# Patient Record
Sex: MALE | Race: WHITE | NOT HISPANIC OR LATINO | Employment: OTHER | URBAN - METROPOLITAN AREA
[De-identification: names, ages, dates, MRNs, and addresses within clinical notes are randomized per-mention and may not be internally consistent; named-entity substitution may affect disease eponyms.]

---

## 2019-08-14 ENCOUNTER — RECORDS - HEALTHEAST (OUTPATIENT)
Dept: LAB | Facility: CLINIC | Age: 84
End: 2019-08-14

## 2019-08-15 LAB
ANION GAP SERPL CALCULATED.3IONS-SCNC: 7 MMOL/L (ref 5–18)
BASOPHILS # BLD AUTO: 0.1 THOU/UL (ref 0–0.2)
BASOPHILS NFR BLD AUTO: 1 % (ref 0–2)
BUN SERPL-MCNC: 21 MG/DL (ref 8–28)
CALCIUM SERPL-MCNC: 9.2 MG/DL (ref 8.5–10.5)
CHLORIDE BLD-SCNC: 105 MMOL/L (ref 98–107)
CO2 SERPL-SCNC: 27 MMOL/L (ref 22–31)
CREAT SERPL-MCNC: 0.88 MG/DL (ref 0.7–1.3)
EOSINOPHIL # BLD AUTO: 0.4 THOU/UL (ref 0–0.4)
EOSINOPHIL NFR BLD AUTO: 7 % (ref 0–6)
ERYTHROCYTE [DISTWIDTH] IN BLOOD BY AUTOMATED COUNT: 15.1 % (ref 11–14.5)
GFR SERPL CREATININE-BSD FRML MDRD: >60 ML/MIN/1.73M2
GLUCOSE BLD-MCNC: 78 MG/DL (ref 70–125)
HCT VFR BLD AUTO: 26.6 % (ref 40–54)
HGB BLD-MCNC: 8.6 G/DL (ref 14–18)
LYMPHOCYTES # BLD AUTO: 1.6 THOU/UL (ref 0.8–4.4)
LYMPHOCYTES NFR BLD AUTO: 24 % (ref 20–40)
MCH RBC QN AUTO: 32.2 PG (ref 27–34)
MCHC RBC AUTO-ENTMCNC: 32.3 G/DL (ref 32–36)
MCV RBC AUTO: 100 FL (ref 80–100)
MONOCYTES # BLD AUTO: 0.9 THOU/UL (ref 0–0.9)
MONOCYTES NFR BLD AUTO: 13 % (ref 2–10)
NEUTROPHILS # BLD AUTO: 3.6 THOU/UL (ref 2–7.7)
NEUTROPHILS NFR BLD AUTO: 55 % (ref 50–70)
PLATELET # BLD AUTO: 254 THOU/UL (ref 140–440)
PMV BLD AUTO: 10 FL (ref 8.5–12.5)
POTASSIUM BLD-SCNC: 4.6 MMOL/L (ref 3.5–5)
RBC # BLD AUTO: 2.67 MILL/UL (ref 4.4–6.2)
SODIUM SERPL-SCNC: 139 MMOL/L (ref 136–145)
WBC: 6.6 THOU/UL (ref 4–11)

## 2019-08-16 ENCOUNTER — RECORDS - HEALTHEAST (OUTPATIENT)
Dept: LAB | Facility: CLINIC | Age: 84
End: 2019-08-16

## 2019-08-19 LAB
ANION GAP SERPL CALCULATED.3IONS-SCNC: 5 MMOL/L (ref 5–18)
BUN SERPL-MCNC: 26 MG/DL (ref 8–28)
CALCIUM SERPL-MCNC: 9.3 MG/DL (ref 8.5–10.5)
CHLORIDE BLD-SCNC: 105 MMOL/L (ref 98–107)
CO2 SERPL-SCNC: 30 MMOL/L (ref 22–31)
CREAT SERPL-MCNC: 0.98 MG/DL (ref 0.7–1.3)
ERYTHROCYTE [DISTWIDTH] IN BLOOD BY AUTOMATED COUNT: 15.3 % (ref 11–14.5)
GFR SERPL CREATININE-BSD FRML MDRD: >60 ML/MIN/1.73M2
GLUCOSE BLD-MCNC: 91 MG/DL (ref 70–125)
HCT VFR BLD AUTO: 27.2 % (ref 40–54)
HGB BLD-MCNC: 8.7 G/DL (ref 14–18)
MCH RBC QN AUTO: 31.9 PG (ref 27–34)
MCHC RBC AUTO-ENTMCNC: 32 G/DL (ref 32–36)
MCV RBC AUTO: 100 FL (ref 80–100)
PLATELET # BLD AUTO: 278 THOU/UL (ref 140–440)
PMV BLD AUTO: 9.9 FL (ref 8.5–12.5)
POTASSIUM BLD-SCNC: 4.4 MMOL/L (ref 3.5–5)
RBC # BLD AUTO: 2.73 MILL/UL (ref 4.4–6.2)
SODIUM SERPL-SCNC: 140 MMOL/L (ref 136–145)
WBC: 7.4 THOU/UL (ref 4–11)

## 2020-11-30 ENCOUNTER — RECORDS - HEALTHEAST (OUTPATIENT)
Dept: LAB | Facility: CLINIC | Age: 85
End: 2020-11-30

## 2020-12-01 LAB
ANION GAP SERPL CALCULATED.3IONS-SCNC: 9 MMOL/L (ref 5–18)
BUN SERPL-MCNC: 31 MG/DL (ref 8–28)
CALCIUM SERPL-MCNC: 8.9 MG/DL (ref 8.5–10.5)
CHLORIDE BLD-SCNC: 103 MMOL/L (ref 98–107)
CO2 SERPL-SCNC: 28 MMOL/L (ref 22–31)
CREAT SERPL-MCNC: 1.11 MG/DL (ref 0.7–1.3)
GFR SERPL CREATININE-BSD FRML MDRD: >60 ML/MIN/1.73M2
GLUCOSE BLD-MCNC: 76 MG/DL (ref 70–125)
POTASSIUM BLD-SCNC: 4.3 MMOL/L (ref 3.5–5)
SODIUM SERPL-SCNC: 140 MMOL/L (ref 136–145)

## 2020-12-17 ENCOUNTER — RECORDS - HEALTHEAST (OUTPATIENT)
Dept: LAB | Facility: CLINIC | Age: 85
End: 2020-12-17

## 2020-12-18 LAB
ANION GAP SERPL CALCULATED.3IONS-SCNC: 5 MMOL/L (ref 5–18)
BUN SERPL-MCNC: 27 MG/DL (ref 8–28)
CALCIUM SERPL-MCNC: 9.1 MG/DL (ref 8.5–10.5)
CHLORIDE BLD-SCNC: 102 MMOL/L (ref 98–107)
CO2 SERPL-SCNC: 34 MMOL/L (ref 22–31)
CREAT SERPL-MCNC: 1.01 MG/DL (ref 0.7–1.3)
GFR SERPL CREATININE-BSD FRML MDRD: >60 ML/MIN/1.73M2
GLUCOSE BLD-MCNC: 75 MG/DL (ref 70–125)
POTASSIUM BLD-SCNC: 4 MMOL/L (ref 3.5–5)
SODIUM SERPL-SCNC: 141 MMOL/L (ref 136–145)

## 2021-12-19 ENCOUNTER — LAB REQUISITION (OUTPATIENT)
Dept: LAB | Facility: CLINIC | Age: 86
End: 2021-12-19
Payer: MEDICARE

## 2021-12-19 DIAGNOSIS — R53.1 WEAKNESS: ICD-10-CM

## 2021-12-19 LAB — TSH SERPL DL<=0.005 MIU/L-ACNC: 8.35 MU/L (ref 0.4–4)

## 2021-12-19 PROCEDURE — 84443 ASSAY THYROID STIM HORMONE: CPT | Mod: ORL | Performed by: FAMILY MEDICINE

## 2021-12-21 ENCOUNTER — LAB REQUISITION (OUTPATIENT)
Dept: LAB | Facility: CLINIC | Age: 86
End: 2021-12-21

## 2021-12-21 DIAGNOSIS — Z11.1 ENCOUNTER FOR SCREENING FOR RESPIRATORY TUBERCULOSIS: ICD-10-CM

## 2021-12-22 ENCOUNTER — LAB REQUISITION (OUTPATIENT)
Dept: LAB | Facility: CLINIC | Age: 86
End: 2021-12-22

## 2021-12-22 DIAGNOSIS — Z11.1 ENCOUNTER FOR SCREENING FOR RESPIRATORY TUBERCULOSIS: ICD-10-CM

## 2022-01-01 ENCOUNTER — LAB REQUISITION (OUTPATIENT)
Dept: LAB | Facility: CLINIC | Age: 87
End: 2022-01-01

## 2022-01-01 DIAGNOSIS — E87.6 HYPOKALEMIA: ICD-10-CM

## 2022-01-04 LAB
ANION GAP SERPL CALCULATED.3IONS-SCNC: 9 MMOL/L (ref 5–18)
BUN SERPL-MCNC: 21 MG/DL (ref 8–28)
CALCIUM SERPL-MCNC: 8.8 MG/DL (ref 8.5–10.5)
CHLORIDE BLD-SCNC: 104 MMOL/L (ref 98–107)
CO2 SERPL-SCNC: 27 MMOL/L (ref 22–31)
CREAT SERPL-MCNC: 1.12 MG/DL (ref 0.7–1.3)
GFR SERPL CREATININE-BSD FRML MDRD: 62 ML/MIN/1.73M2
GLUCOSE BLD-MCNC: 76 MG/DL (ref 70–125)
POTASSIUM BLD-SCNC: 4.1 MMOL/L (ref 3.5–5)
SODIUM SERPL-SCNC: 140 MMOL/L (ref 136–145)

## 2022-01-04 PROCEDURE — P9604 ONE-WAY ALLOW PRORATED TRIP: HCPCS | Performed by: GENERAL PRACTICE

## 2022-01-04 PROCEDURE — 36415 COLL VENOUS BLD VENIPUNCTURE: CPT | Performed by: GENERAL PRACTICE

## 2022-01-04 PROCEDURE — 80048 BASIC METABOLIC PNL TOTAL CA: CPT | Performed by: GENERAL PRACTICE

## 2022-06-15 ENCOUNTER — APPOINTMENT (OUTPATIENT)
Dept: CT IMAGING | Facility: CLINIC | Age: 87
DRG: 871 | End: 2022-06-15
Attending: INTERNAL MEDICINE
Payer: MEDICARE

## 2022-06-15 ENCOUNTER — APPOINTMENT (OUTPATIENT)
Dept: CT IMAGING | Facility: CLINIC | Age: 87
DRG: 871 | End: 2022-06-15
Attending: UROLOGY
Payer: MEDICARE

## 2022-06-15 ENCOUNTER — HOSPITAL ENCOUNTER (INPATIENT)
Facility: CLINIC | Age: 87
LOS: 8 days | Discharge: NURSING, REHAB OR LONG-TERM CARE | DRG: 871 | End: 2022-06-23
Attending: EMERGENCY MEDICINE | Admitting: INTERNAL MEDICINE
Payer: MEDICARE

## 2022-06-15 ENCOUNTER — APPOINTMENT (OUTPATIENT)
Dept: GENERAL RADIOLOGY | Facility: CLINIC | Age: 87
DRG: 871 | End: 2022-06-15
Attending: EMERGENCY MEDICINE
Payer: MEDICARE

## 2022-06-15 ENCOUNTER — ANESTHESIA EVENT (OUTPATIENT)
Dept: SURGERY | Facility: CLINIC | Age: 87
DRG: 871 | End: 2022-06-15
Payer: MEDICARE

## 2022-06-15 ENCOUNTER — ANESTHESIA (OUTPATIENT)
Dept: SURGERY | Facility: CLINIC | Age: 87
DRG: 871 | End: 2022-06-15
Payer: MEDICARE

## 2022-06-15 ENCOUNTER — APPOINTMENT (OUTPATIENT)
Dept: INTERVENTIONAL RADIOLOGY/VASCULAR | Facility: CLINIC | Age: 87
DRG: 871 | End: 2022-06-15
Attending: INTERNAL MEDICINE
Payer: MEDICARE

## 2022-06-15 DIAGNOSIS — R65.21 SEPTIC SHOCK (H): ICD-10-CM

## 2022-06-15 DIAGNOSIS — E87.6 HYPOKALEMIA: ICD-10-CM

## 2022-06-15 DIAGNOSIS — R33.9 URINARY RETENTION: Primary | ICD-10-CM

## 2022-06-15 DIAGNOSIS — A41.9 SEPTIC SHOCK (H): ICD-10-CM

## 2022-06-15 DIAGNOSIS — J18.9 COMMUNITY ACQUIRED PNEUMONIA OF LEFT LOWER LOBE OF LUNG: ICD-10-CM

## 2022-06-15 LAB
ALBUMIN SERPL-MCNC: 3.6 G/DL (ref 3.4–5)
ALBUMIN UR-MCNC: NEGATIVE MG/DL
ALP SERPL-CCNC: 75 U/L (ref 40–150)
ALT SERPL W P-5'-P-CCNC: 21 U/L (ref 0–70)
ANION GAP SERPL CALCULATED.3IONS-SCNC: 9 MMOL/L (ref 3–14)
ANION GAP SERPL CALCULATED.3IONS-SCNC: 9 MMOL/L (ref 3–14)
APPEARANCE UR: CLEAR
AST SERPL W P-5'-P-CCNC: 32 U/L (ref 0–45)
BACTERIA #/AREA URNS HPF: ABNORMAL /HPF
BASE EXCESS BLDA CALC-SCNC: -2.7 MMOL/L (ref -9–1.8)
BASOPHILS # BLD AUTO: 0 10E3/UL (ref 0–0.2)
BASOPHILS NFR BLD AUTO: 0 %
BILIRUB SERPL-MCNC: 0.7 MG/DL (ref 0.2–1.3)
BILIRUB UR QL STRIP: NEGATIVE
BUN SERPL-MCNC: 20 MG/DL (ref 7–30)
BUN SERPL-MCNC: 21 MG/DL (ref 7–30)
CALCIUM SERPL-MCNC: 9 MG/DL (ref 8.5–10.1)
CALCIUM SERPL-MCNC: 9.6 MG/DL (ref 8.5–10.1)
CHLORIDE BLD-SCNC: 104 MMOL/L (ref 94–109)
CHLORIDE BLD-SCNC: 105 MMOL/L (ref 94–109)
CO2 SERPL-SCNC: 25 MMOL/L (ref 20–32)
CO2 SERPL-SCNC: 25 MMOL/L (ref 20–32)
COLOR UR AUTO: ABNORMAL
CREAT SERPL-MCNC: 0.89 MG/DL (ref 0.66–1.25)
CREAT SERPL-MCNC: 0.99 MG/DL (ref 0.66–1.25)
ENTEROCOCCUS FAECALIS: NOT DETECTED
ENTEROCOCCUS FAECIUM: NOT DETECTED
EOSINOPHIL # BLD AUTO: 0.1 10E3/UL (ref 0–0.7)
EOSINOPHIL NFR BLD AUTO: 1 %
ERYTHROCYTE [DISTWIDTH] IN BLOOD BY AUTOMATED COUNT: 18.6 % (ref 10–15)
ERYTHROCYTE [DISTWIDTH] IN BLOOD BY AUTOMATED COUNT: 18.9 % (ref 10–15)
FLUAV RNA SPEC QL NAA+PROBE: NEGATIVE
FLUBV RNA RESP QL NAA+PROBE: NEGATIVE
GFR SERPL CREATININE-BSD FRML MDRD: 72 ML/MIN/1.73M2
GFR SERPL CREATININE-BSD FRML MDRD: 81 ML/MIN/1.73M2
GLUCOSE BLD-MCNC: 102 MG/DL (ref 70–99)
GLUCOSE BLD-MCNC: 98 MG/DL (ref 70–99)
GLUCOSE BLDC GLUCOMTR-MCNC: 104 MG/DL (ref 70–99)
GLUCOSE BLDC GLUCOMTR-MCNC: 123 MG/DL (ref 70–99)
GLUCOSE BLDC GLUCOMTR-MCNC: 124 MG/DL (ref 70–99)
GLUCOSE UR STRIP-MCNC: NEGATIVE MG/DL
HCO3 BLD-SCNC: 22 MMOL/L (ref 21–28)
HCO3 BLDV-SCNC: 29 MMOL/L (ref 21–28)
HCT VFR BLD AUTO: 34.4 % (ref 40–53)
HCT VFR BLD AUTO: 39.2 % (ref 40–53)
HGB BLD-MCNC: 10.9 G/DL (ref 13.3–17.7)
HGB BLD-MCNC: 12.2 G/DL (ref 13.3–17.7)
HGB UR QL STRIP: NEGATIVE
IMM GRANULOCYTES # BLD: 0 10E3/UL
IMM GRANULOCYTES NFR BLD: 0 %
KETONES UR STRIP-MCNC: NEGATIVE MG/DL
LACTATE BLD-SCNC: 5.1 MMOL/L
LACTATE SERPL-SCNC: 2.9 MMOL/L (ref 0.7–2)
LACTATE SERPL-SCNC: 3.2 MMOL/L (ref 0.7–2)
LACTATE SERPL-SCNC: 3.9 MMOL/L (ref 0.7–2)
LEUKOCYTE ESTERASE UR QL STRIP: ABNORMAL
LISTERIA SPECIES (DETECTED/NOT DETECTED): NOT DETECTED
LYMPHOCYTES # BLD AUTO: 0.9 10E3/UL (ref 0.8–5.3)
LYMPHOCYTES NFR BLD AUTO: 13 %
MCH RBC QN AUTO: 28.3 PG (ref 26.5–33)
MCH RBC QN AUTO: 28.5 PG (ref 26.5–33)
MCHC RBC AUTO-ENTMCNC: 31.1 G/DL (ref 31.5–36.5)
MCHC RBC AUTO-ENTMCNC: 31.7 G/DL (ref 31.5–36.5)
MCV RBC AUTO: 90 FL (ref 78–100)
MCV RBC AUTO: 91 FL (ref 78–100)
MONOCYTES # BLD AUTO: 0.1 10E3/UL (ref 0–1.3)
MONOCYTES NFR BLD AUTO: 1 %
MRSA DNA SPEC QL NAA+PROBE: NEGATIVE
MUCOUS THREADS #/AREA URNS LPF: PRESENT /LPF
NEUTROPHILS # BLD AUTO: 5.8 10E3/UL (ref 1.6–8.3)
NEUTROPHILS NFR BLD AUTO: 85 %
NITRATE UR QL: NEGATIVE
NRBC # BLD AUTO: 0 10E3/UL
NRBC BLD AUTO-RTO: 0 /100
O2/TOTAL GAS SETTING VFR VENT: 5 %
PCO2 BLD: 35 MM HG (ref 35–45)
PCO2 BLDV: 55 MM HG (ref 40–50)
PH BLD: 7.4 [PH] (ref 7.35–7.45)
PH BLDV: 7.34 [PH] (ref 7.32–7.43)
PH UR STRIP: 5 [PH] (ref 5–7)
PLATELET # BLD AUTO: 142 10E3/UL (ref 150–450)
PLATELET # BLD AUTO: 183 10E3/UL (ref 150–450)
PO2 BLD: 73 MM HG (ref 80–105)
PO2 BLDV: 18 MM HG (ref 25–47)
POTASSIUM BLD-SCNC: 3.2 MMOL/L (ref 3.4–5.3)
POTASSIUM BLD-SCNC: 4.4 MMOL/L (ref 3.4–5.3)
PROT SERPL-MCNC: 7.7 G/DL (ref 6.8–8.8)
RBC # BLD AUTO: 3.82 10E6/UL (ref 4.4–5.9)
RBC # BLD AUTO: 4.31 10E6/UL (ref 4.4–5.9)
RBC URINE: 1 /HPF
RSV RNA SPEC NAA+PROBE: NEGATIVE
SA TARGET DNA: NEGATIVE
SAO2 % BLDV: 22 % (ref 94–100)
SARS-COV-2 RNA RESP QL NAA+PROBE: NEGATIVE
SODIUM SERPL-SCNC: 138 MMOL/L (ref 133–144)
SODIUM SERPL-SCNC: 139 MMOL/L (ref 133–144)
SP GR UR STRIP: 1.01 (ref 1–1.03)
STAPHYLOCOCCUS AUREUS: NOT DETECTED
STAPHYLOCOCCUS EPIDERMIDIS: NOT DETECTED
STAPHYLOCOCCUS LUGDUNENSIS: NOT DETECTED
STAPHYLOCOCCUS SPECIES: NOT DETECTED
STREPTOCOCCUS AGALACTIAE: NOT DETECTED
STREPTOCOCCUS ANGINOSUS GROUP: NOT DETECTED
STREPTOCOCCUS PNEUMONIAE: NOT DETECTED
STREPTOCOCCUS PYOGENES: NOT DETECTED
STREPTOCOCCUS SPECIES: DETECTED
TSH SERPL DL<=0.005 MIU/L-ACNC: 0.54 MU/L (ref 0.4–4)
UROBILINOGEN UR STRIP-MCNC: NORMAL MG/DL
WBC # BLD AUTO: 6.9 10E3/UL (ref 4–11)
WBC # BLD AUTO: 8.5 10E3/UL (ref 4–11)
WBC URINE: 17 /HPF

## 2022-06-15 PROCEDURE — C1751 CATH, INF, PER/CENT/MIDLINE: HCPCS

## 2022-06-15 PROCEDURE — 360N000076 HC SURGERY LEVEL 3, PER MIN: Performed by: UROLOGY

## 2022-06-15 PROCEDURE — 36415 COLL VENOUS BLD VENIPUNCTURE: CPT | Performed by: EMERGENCY MEDICINE

## 2022-06-15 PROCEDURE — 99291 CRITICAL CARE FIRST HOUR: CPT | Performed by: INTERNAL MEDICINE

## 2022-06-15 PROCEDURE — 250N000011 HC RX IP 250 OP 636: Performed by: ANESTHESIOLOGY

## 2022-06-15 PROCEDURE — 96366 THER/PROPH/DIAG IV INF ADDON: CPT

## 2022-06-15 PROCEDURE — 250N000013 HC RX MED GY IP 250 OP 250 PS 637: Performed by: EMERGENCY MEDICINE

## 2022-06-15 PROCEDURE — 370N000017 HC ANESTHESIA TECHNICAL FEE, PER MIN: Performed by: UROLOGY

## 2022-06-15 PROCEDURE — 87637 SARSCOV2&INF A&B&RSV AMP PRB: CPT | Performed by: EMERGENCY MEDICINE

## 2022-06-15 PROCEDURE — 77001 FLUOROGUIDE FOR VEIN DEVICE: CPT

## 2022-06-15 PROCEDURE — 250N000011 HC RX IP 250 OP 636: Performed by: EMERGENCY MEDICINE

## 2022-06-15 PROCEDURE — 0T9B70Z DRAINAGE OF BLADDER WITH DRAINAGE DEVICE, VIA NATURAL OR ARTIFICIAL OPENING: ICD-10-PCS | Performed by: UROLOGY

## 2022-06-15 PROCEDURE — 99207 PR APP CREDIT; MD BILLING SHARED VISIT: CPT | Performed by: INTERNAL MEDICINE

## 2022-06-15 PROCEDURE — 82803 BLOOD GASES ANY COMBINATION: CPT | Performed by: INTERNAL MEDICINE

## 2022-06-15 PROCEDURE — 99291 CRITICAL CARE FIRST HOUR: CPT | Performed by: ANESTHESIOLOGY

## 2022-06-15 PROCEDURE — 87077 CULTURE AEROBIC IDENTIFY: CPT | Performed by: EMERGENCY MEDICINE

## 2022-06-15 PROCEDURE — 83605 ASSAY OF LACTIC ACID: CPT | Performed by: INTERNAL MEDICINE

## 2022-06-15 PROCEDURE — 02HV33Z INSERTION OF INFUSION DEVICE INTO SUPERIOR VENA CAVA, PERCUTANEOUS APPROACH: ICD-10-PCS | Performed by: RADIOLOGY

## 2022-06-15 PROCEDURE — 250N000009 HC RX 250: Performed by: INTERNAL MEDICINE

## 2022-06-15 PROCEDURE — 272N000001 HC OR GENERAL SUPPLY STERILE: Performed by: UROLOGY

## 2022-06-15 PROCEDURE — 87149 DNA/RNA DIRECT PROBE: CPT | Performed by: EMERGENCY MEDICINE

## 2022-06-15 PROCEDURE — 250N000011 HC RX IP 250 OP 636

## 2022-06-15 PROCEDURE — 87086 URINE CULTURE/COLONY COUNT: CPT | Performed by: EMERGENCY MEDICINE

## 2022-06-15 PROCEDURE — 99285 EMERGENCY DEPT VISIT HI MDM: CPT | Mod: CS,25

## 2022-06-15 PROCEDURE — 84443 ASSAY THYROID STIM HORMONE: CPT | Performed by: INTERNAL MEDICINE

## 2022-06-15 PROCEDURE — 87641 MR-STAPH DNA AMP PROBE: CPT | Performed by: INTERNAL MEDICINE

## 2022-06-15 PROCEDURE — 250N000011 HC RX IP 250 OP 636: Performed by: INTERNAL MEDICINE

## 2022-06-15 PROCEDURE — 710N000010 HC RECOVERY PHASE 1, LEVEL 2, PER MIN: Performed by: UROLOGY

## 2022-06-15 PROCEDURE — 3E043XZ INTRODUCTION OF VASOPRESSOR INTO CENTRAL VEIN, PERCUTANEOUS APPROACH: ICD-10-PCS | Performed by: INTERNAL MEDICINE

## 2022-06-15 PROCEDURE — 258N000003 HC RX IP 258 OP 636

## 2022-06-15 PROCEDURE — C1769 GUIDE WIRE: HCPCS | Performed by: UROLOGY

## 2022-06-15 PROCEDURE — 96365 THER/PROPH/DIAG IV INF INIT: CPT

## 2022-06-15 PROCEDURE — 258N000003 HC RX IP 258 OP 636: Performed by: ANESTHESIOLOGY

## 2022-06-15 PROCEDURE — 250N000013 HC RX MED GY IP 250 OP 250 PS 637: Performed by: INTERNAL MEDICINE

## 2022-06-15 PROCEDURE — 250N000013 HC RX MED GY IP 250 OP 250 PS 637: Performed by: NURSE PRACTITIONER

## 2022-06-15 PROCEDURE — 82365 CALCULUS SPECTROSCOPY: CPT | Performed by: UROLOGY

## 2022-06-15 PROCEDURE — 99223 1ST HOSP IP/OBS HIGH 75: CPT | Performed by: NURSE PRACTITIONER

## 2022-06-15 PROCEDURE — 258N000003 HC RX IP 258 OP 636: Performed by: INTERNAL MEDICINE

## 2022-06-15 PROCEDURE — 81001 URINALYSIS AUTO W/SCOPE: CPT | Performed by: EMERGENCY MEDICINE

## 2022-06-15 PROCEDURE — 83605 ASSAY OF LACTIC ACID: CPT

## 2022-06-15 PROCEDURE — 96367 TX/PROPH/DG ADDL SEQ IV INF: CPT

## 2022-06-15 PROCEDURE — 36415 COLL VENOUS BLD VENIPUNCTURE: CPT | Performed by: ANESTHESIOLOGY

## 2022-06-15 PROCEDURE — 36569 INSJ PICC 5 YR+ W/O IMAGING: CPT | Mod: 52

## 2022-06-15 PROCEDURE — 999N000157 HC STATISTIC RCP TIME EA 10 MIN

## 2022-06-15 PROCEDURE — 52318 REMOVE BLADDER STONE: CPT | Performed by: UROLOGY

## 2022-06-15 PROCEDURE — 258N000003 HC RX IP 258 OP 636: Performed by: EMERGENCY MEDICINE

## 2022-06-15 PROCEDURE — 71045 X-RAY EXAM CHEST 1 VIEW: CPT

## 2022-06-15 PROCEDURE — 82435 ASSAY OF BLOOD CHLORIDE: CPT | Performed by: EMERGENCY MEDICINE

## 2022-06-15 PROCEDURE — 85025 COMPLETE CBC W/AUTO DIFF WBC: CPT | Performed by: EMERGENCY MEDICINE

## 2022-06-15 PROCEDURE — 83605 ASSAY OF LACTIC ACID: CPT | Performed by: ANESTHESIOLOGY

## 2022-06-15 PROCEDURE — 0TCD8ZZ EXTIRPATION OF MATTER FROM URETHRA, VIA NATURAL OR ARTIFICIAL OPENING ENDOSCOPIC: ICD-10-PCS | Performed by: UROLOGY

## 2022-06-15 PROCEDURE — 0TJD8ZZ INSPECTION OF URETHRA, VIA NATURAL OR ARTIFICIAL OPENING ENDOSCOPIC: ICD-10-PCS | Performed by: UROLOGY

## 2022-06-15 PROCEDURE — 36415 COLL VENOUS BLD VENIPUNCTURE: CPT | Performed by: INTERNAL MEDICINE

## 2022-06-15 PROCEDURE — 36584 COMPL RPLCMT PICC RS&I: CPT

## 2022-06-15 PROCEDURE — 99221 1ST HOSP IP/OBS SF/LOW 40: CPT | Performed by: UROLOGY

## 2022-06-15 PROCEDURE — 85014 HEMATOCRIT: CPT | Performed by: INTERNAL MEDICINE

## 2022-06-15 PROCEDURE — 83605 ASSAY OF LACTIC ACID: CPT | Performed by: EMERGENCY MEDICINE

## 2022-06-15 PROCEDURE — 74176 CT ABD & PELVIS W/O CONTRAST: CPT

## 2022-06-15 PROCEDURE — 96361 HYDRATE IV INFUSION ADD-ON: CPT

## 2022-06-15 PROCEDURE — 200N000001 HC R&B ICU

## 2022-06-15 PROCEDURE — 272N000026 HC KIT POWER PICC TRIPLE LUMEN

## 2022-06-15 PROCEDURE — C1769 GUIDE WIRE: HCPCS

## 2022-06-15 PROCEDURE — 36600 WITHDRAWAL OF ARTERIAL BLOOD: CPT

## 2022-06-15 PROCEDURE — 87040 BLOOD CULTURE FOR BACTERIA: CPT | Performed by: INTERNAL MEDICINE

## 2022-06-15 PROCEDURE — C9803 HOPD COVID-19 SPEC COLLECT: HCPCS

## 2022-06-15 PROCEDURE — 70450 CT HEAD/BRAIN W/O DYE: CPT

## 2022-06-15 PROCEDURE — 99223 1ST HOSP IP/OBS HIGH 75: CPT | Mod: AI | Performed by: INTERNAL MEDICINE

## 2022-06-15 PROCEDURE — 250N000011 HC RX IP 250 OP 636: Performed by: NURSE ANESTHETIST, CERTIFIED REGISTERED

## 2022-06-15 PROCEDURE — 51798 US URINE CAPACITY MEASURE: CPT

## 2022-06-15 RX ORDER — PROCHLORPERAZINE 25 MG
12.5 SUPPOSITORY, RECTAL RECTAL EVERY 12 HOURS PRN
Status: DISCONTINUED | OUTPATIENT
Start: 2022-06-15 | End: 2022-06-23 | Stop reason: HOSPADM

## 2022-06-15 RX ORDER — ACETAMINOPHEN 325 MG/1
650 TABLET ORAL EVERY 4 HOURS PRN
Status: DISCONTINUED | OUTPATIENT
Start: 2022-06-15 | End: 2022-06-23 | Stop reason: HOSPADM

## 2022-06-15 RX ORDER — LIDOCAINE HYDROCHLORIDE 10 MG/ML
INJECTION, SOLUTION EPIDURAL; INFILTRATION; INTRACAUDAL; PERINEURAL
Status: DISCONTINUED
Start: 2022-06-15 | End: 2022-06-15 | Stop reason: HOSPADM

## 2022-06-15 RX ORDER — DIPHENHYDRAMINE HYDROCHLORIDE 50 MG/ML
25 INJECTION INTRAMUSCULAR; INTRAVENOUS ONCE
Status: DISCONTINUED | OUTPATIENT
Start: 2022-06-15 | End: 2022-06-15

## 2022-06-15 RX ORDER — PHENYLEPHRINE HYDROCHLORIDE 10 MG/ML
INJECTION INTRAVENOUS PRN
Status: DISCONTINUED | OUTPATIENT
Start: 2022-06-15 | End: 2022-06-15

## 2022-06-15 RX ORDER — DEXTROSE MONOHYDRATE 25 G/50ML
25-50 INJECTION, SOLUTION INTRAVENOUS
Status: DISCONTINUED | OUTPATIENT
Start: 2022-06-15 | End: 2022-06-15

## 2022-06-15 RX ORDER — TAMSULOSIN HYDROCHLORIDE 0.4 MG/1
0.8 CAPSULE ORAL DAILY
Status: ON HOLD | COMMUNITY
Start: 2022-02-03 | End: 2022-09-22

## 2022-06-15 RX ORDER — AZITHROMYCIN 500 MG/5ML
500 INJECTION, POWDER, LYOPHILIZED, FOR SOLUTION INTRAVENOUS EVERY 24 HOURS
Status: DISCONTINUED | OUTPATIENT
Start: 2022-06-15 | End: 2022-06-19

## 2022-06-15 RX ORDER — NICOTINE POLACRILEX 4 MG
15-30 LOZENGE BUCCAL
Status: DISCONTINUED | OUTPATIENT
Start: 2022-06-15 | End: 2022-06-15

## 2022-06-15 RX ORDER — ENOXAPARIN SODIUM 100 MG/ML
40 INJECTION SUBCUTANEOUS EVERY 24 HOURS
Status: DISCONTINUED | OUTPATIENT
Start: 2022-06-15 | End: 2022-06-23 | Stop reason: HOSPADM

## 2022-06-15 RX ORDER — POLYETHYLENE GLYCOL 3350 17 G/17G
17 POWDER, FOR SOLUTION ORAL DAILY PRN
Status: DISCONTINUED | OUTPATIENT
Start: 2022-06-15 | End: 2022-06-23 | Stop reason: HOSPADM

## 2022-06-15 RX ORDER — LIDOCAINE 40 MG/G
CREAM TOPICAL
Status: DISCONTINUED | OUTPATIENT
Start: 2022-06-15 | End: 2022-06-23 | Stop reason: HOSPADM

## 2022-06-15 RX ORDER — ONDANSETRON 2 MG/ML
4 INJECTION INTRAMUSCULAR; INTRAVENOUS EVERY 6 HOURS PRN
Status: DISCONTINUED | OUTPATIENT
Start: 2022-06-15 | End: 2022-06-23 | Stop reason: HOSPADM

## 2022-06-15 RX ORDER — SODIUM CHLORIDE 9 MG/ML
INJECTION, SOLUTION INTRAVENOUS CONTINUOUS
Status: DISCONTINUED | OUTPATIENT
Start: 2022-06-15 | End: 2022-06-15 | Stop reason: CLARIF

## 2022-06-15 RX ORDER — ONDANSETRON 4 MG/1
4 TABLET, ORALLY DISINTEGRATING ORAL EVERY 6 HOURS PRN
Status: DISCONTINUED | OUTPATIENT
Start: 2022-06-15 | End: 2022-06-23 | Stop reason: HOSPADM

## 2022-06-15 RX ORDER — NICOTINE POLACRILEX 4 MG
15-30 LOZENGE BUCCAL
Status: DISCONTINUED | OUTPATIENT
Start: 2022-06-15 | End: 2022-06-23 | Stop reason: HOSPADM

## 2022-06-15 RX ORDER — DIPHENHYDRAMINE HYDROCHLORIDE 50 MG/ML
INJECTION INTRAMUSCULAR; INTRAVENOUS
Status: COMPLETED
Start: 2022-06-15 | End: 2022-06-15

## 2022-06-15 RX ORDER — AMOXICILLIN 250 MG
2 CAPSULE ORAL 2 TIMES DAILY PRN
Status: DISCONTINUED | OUTPATIENT
Start: 2022-06-15 | End: 2022-06-23 | Stop reason: HOSPADM

## 2022-06-15 RX ORDER — DEXTROSE MONOHYDRATE 25 G/50ML
25-50 INJECTION, SOLUTION INTRAVENOUS
Status: DISCONTINUED | OUTPATIENT
Start: 2022-06-15 | End: 2022-06-23 | Stop reason: HOSPADM

## 2022-06-15 RX ORDER — FUROSEMIDE 40 MG
40 TABLET ORAL 2 TIMES DAILY
Status: ON HOLD | COMMUNITY
Start: 2022-02-03 | End: 2022-10-28

## 2022-06-15 RX ORDER — CEFAZOLIN SODIUM 1 G/50ML
2000 SOLUTION INTRAVENOUS ONCE
Status: COMPLETED | OUTPATIENT
Start: 2022-06-15 | End: 2022-06-15

## 2022-06-15 RX ORDER — ACETAMINOPHEN 500 MG
1000 TABLET ORAL ONCE
Status: COMPLETED | OUTPATIENT
Start: 2022-06-15 | End: 2022-06-15

## 2022-06-15 RX ORDER — FLUTICASONE PROPIONATE 50 MCG
1 SPRAY, SUSPENSION (ML) NASAL DAILY
Status: ON HOLD | COMMUNITY
End: 2022-10-28

## 2022-06-15 RX ORDER — NOREPINEPHRINE BITARTRATE 0.02 MG/ML
.01-.6 INJECTION, SOLUTION INTRAVENOUS CONTINUOUS
Status: DISCONTINUED | OUTPATIENT
Start: 2022-06-15 | End: 2022-06-17

## 2022-06-15 RX ORDER — PROPOFOL 10 MG/ML
INJECTION, EMULSION INTRAVENOUS PRN
Status: DISCONTINUED | OUTPATIENT
Start: 2022-06-15 | End: 2022-06-15

## 2022-06-15 RX ORDER — PROCHLORPERAZINE MALEATE 5 MG
5 TABLET ORAL EVERY 6 HOURS PRN
Status: DISCONTINUED | OUTPATIENT
Start: 2022-06-15 | End: 2022-06-23 | Stop reason: HOSPADM

## 2022-06-15 RX ORDER — DIPHENHYDRAMINE HYDROCHLORIDE 50 MG/ML
25 INJECTION INTRAMUSCULAR; INTRAVENOUS ONCE
Status: COMPLETED | OUTPATIENT
Start: 2022-06-15 | End: 2022-06-15

## 2022-06-15 RX ORDER — LEVOTHYROXINE SODIUM 112 UG/1
112 TABLET ORAL DAILY
Status: ON HOLD | COMMUNITY
Start: 2021-10-20 | End: 2022-09-22

## 2022-06-15 RX ORDER — SODIUM CHLORIDE, SODIUM LACTATE, POTASSIUM CHLORIDE, CALCIUM CHLORIDE 600; 310; 30; 20 MG/100ML; MG/100ML; MG/100ML; MG/100ML
INJECTION, SOLUTION INTRAVENOUS CONTINUOUS
Status: ACTIVE | OUTPATIENT
Start: 2022-06-15 | End: 2022-06-15

## 2022-06-15 RX ORDER — LIDOCAINE 40 MG/G
CREAM TOPICAL
Status: DISCONTINUED | OUTPATIENT
Start: 2022-06-15 | End: 2022-06-16

## 2022-06-15 RX ORDER — ACETAMINOPHEN 325 MG/1
325-650 TABLET ORAL EVERY 6 HOURS PRN
Status: ON HOLD | COMMUNITY
Start: 2020-11-23 | End: 2022-09-22

## 2022-06-15 RX ORDER — ASPIRIN 81 MG/1
81 TABLET, CHEWABLE ORAL DAILY
COMMUNITY
Start: 2022-04-05 | End: 2022-09-15

## 2022-06-15 RX ORDER — FINASTERIDE 5 MG/1
5 TABLET, FILM COATED ORAL DAILY
Status: ON HOLD | COMMUNITY
Start: 2022-02-03 | End: 2022-09-22

## 2022-06-15 RX ORDER — ACETAMINOPHEN 650 MG/1
650 SUPPOSITORY RECTAL EVERY 4 HOURS PRN
Status: DISCONTINUED | OUTPATIENT
Start: 2022-06-15 | End: 2022-06-23 | Stop reason: HOSPADM

## 2022-06-15 RX ORDER — GABAPENTIN 800 MG/1
800 TABLET ORAL 3 TIMES DAILY
COMMUNITY
Start: 2021-10-20 | End: 2022-06-23

## 2022-06-15 RX ORDER — AMOXICILLIN 250 MG
2 CAPSULE ORAL 2 TIMES DAILY PRN
Status: ON HOLD | COMMUNITY
End: 2022-09-22

## 2022-06-15 RX ORDER — POTASSIUM CHLORIDE 7.45 MG/ML
10 INJECTION INTRAVENOUS
Status: COMPLETED | OUTPATIENT
Start: 2022-06-15 | End: 2022-06-15

## 2022-06-15 RX ORDER — METHYLPREDNISOLONE SODIUM SUCCINATE 40 MG/ML
40 INJECTION, POWDER, LYOPHILIZED, FOR SOLUTION INTRAMUSCULAR; INTRAVENOUS EVERY 4 HOURS
Status: DISCONTINUED | OUTPATIENT
Start: 2022-06-15 | End: 2022-06-15

## 2022-06-15 RX ORDER — AMOXICILLIN 250 MG
1 CAPSULE ORAL 2 TIMES DAILY PRN
Status: DISCONTINUED | OUTPATIENT
Start: 2022-06-15 | End: 2022-06-23 | Stop reason: HOSPADM

## 2022-06-15 RX ADMIN — SODIUM CHLORIDE: 9 INJECTION, SOLUTION INTRAVENOUS at 07:30

## 2022-06-15 RX ADMIN — PROPOFOL 80 MG: 10 INJECTION, EMULSION INTRAVENOUS at 18:41

## 2022-06-15 RX ADMIN — TAZOBACTAM SODIUM AND PIPERACILLIN SODIUM 3.38 G: 375; 3 INJECTION, SOLUTION INTRAVENOUS at 16:45

## 2022-06-15 RX ADMIN — Medication 0.03 MCG/KG/MIN: at 09:45

## 2022-06-15 RX ADMIN — LIDOCAINE HYDROCHLORIDE 50 ML: 10 INJECTION, SOLUTION EPIDURAL; INFILTRATION; INTRACAUDAL; PERINEURAL at 18:41

## 2022-06-15 RX ADMIN — TAZOBACTAM SODIUM AND PIPERACILLIN SODIUM 3.38 G: 375; 3 INJECTION, SOLUTION INTRAVENOUS at 21:15

## 2022-06-15 RX ADMIN — TAZOBACTAM SODIUM AND PIPERACILLIN SODIUM 4.5 G: 500; 4 INJECTION, SOLUTION INTRAVENOUS at 02:58

## 2022-06-15 RX ADMIN — POTASSIUM CHLORIDE 10 MEQ: 7.46 INJECTION, SOLUTION INTRAVENOUS at 21:15

## 2022-06-15 RX ADMIN — SODIUM CHLORIDE, POTASSIUM CHLORIDE, SODIUM LACTATE AND CALCIUM CHLORIDE: 600; 310; 30; 20 INJECTION, SOLUTION INTRAVENOUS at 16:27

## 2022-06-15 RX ADMIN — VANCOMYCIN HYDROCHLORIDE 1250 MG: 10 INJECTION, POWDER, LYOPHILIZED, FOR SOLUTION INTRAVENOUS at 21:55

## 2022-06-15 RX ADMIN — TAZOBACTAM SODIUM AND PIPERACILLIN SODIUM 3.38 G: 375; 3 INJECTION, SOLUTION INTRAVENOUS at 09:03

## 2022-06-15 RX ADMIN — PHENYLEPHRINE HYDROCHLORIDE 100 MCG: 10 INJECTION INTRAVENOUS at 18:47

## 2022-06-15 RX ADMIN — SODIUM CHLORIDE, POTASSIUM CHLORIDE, SODIUM LACTATE AND CALCIUM CHLORIDE 1000 ML: 600; 310; 30; 20 INJECTION, SOLUTION INTRAVENOUS at 03:47

## 2022-06-15 RX ADMIN — ACETAMINOPHEN 650 MG: 325 TABLET, FILM COATED ORAL at 07:47

## 2022-06-15 RX ADMIN — DICLOFENAC SODIUM 4 G: 10 GEL TOPICAL at 13:46

## 2022-06-15 RX ADMIN — DICLOFENAC SODIUM 4 G: 10 GEL TOPICAL at 23:07

## 2022-06-15 RX ADMIN — POTASSIUM CHLORIDE 10 MEQ: 7.46 INJECTION, SOLUTION INTRAVENOUS at 18:15

## 2022-06-15 RX ADMIN — VANCOMYCIN HYDROCHLORIDE 2000 MG: 10 INJECTION, POWDER, LYOPHILIZED, FOR SOLUTION INTRAVENOUS at 03:25

## 2022-06-15 RX ADMIN — DIPHENHYDRAMINE HYDROCHLORIDE 25 MG: 50 INJECTION INTRAMUSCULAR; INTRAVENOUS at 14:01

## 2022-06-15 RX ADMIN — POTASSIUM CHLORIDE 10 MEQ: 7.46 INJECTION, SOLUTION INTRAVENOUS at 22:06

## 2022-06-15 RX ADMIN — AZITHROMYCIN MONOHYDRATE 500 MG: 500 INJECTION, POWDER, LYOPHILIZED, FOR SOLUTION INTRAVENOUS at 13:24

## 2022-06-15 RX ADMIN — SODIUM CHLORIDE, POTASSIUM CHLORIDE, SODIUM LACTATE AND CALCIUM CHLORIDE 1000 ML: 600; 310; 30; 20 INJECTION, SOLUTION INTRAVENOUS at 02:58

## 2022-06-15 RX ADMIN — ACETAMINOPHEN 1000 MG: 500 TABLET, FILM COATED ORAL at 02:58

## 2022-06-15 RX ADMIN — POTASSIUM CHLORIDE 10 MEQ: 7.46 INJECTION, SOLUTION INTRAVENOUS at 20:03

## 2022-06-15 RX ADMIN — HYDROCORTISONE SODIUM SUCCINATE 100 MG: 100 INJECTION, POWDER, FOR SOLUTION INTRAMUSCULAR; INTRAVENOUS at 13:54

## 2022-06-15 RX ADMIN — Medication 0.03 MCG/KG/MIN: at 17:09

## 2022-06-15 RX ADMIN — ENOXAPARIN SODIUM 40 MG: 40 INJECTION SUBCUTANEOUS at 09:03

## 2022-06-15 RX ADMIN — DIPHENHYDRAMINE HYDROCHLORIDE 25 MG: 50 INJECTION, SOLUTION INTRAMUSCULAR; INTRAVENOUS at 14:01

## 2022-06-15 ASSESSMENT — ACTIVITIES OF DAILY LIVING (ADL)
ADLS_ACUITY_SCORE: 49
ADLS_ACUITY_SCORE: 35
ADLS_ACUITY_SCORE: 35
ADLS_ACUITY_SCORE: 47
ADLS_ACUITY_SCORE: 49
ADLS_ACUITY_SCORE: 35
ADLS_ACUITY_SCORE: 49
ADLS_ACUITY_SCORE: 35

## 2022-06-15 ASSESSMENT — ENCOUNTER SYMPTOMS
VOMITING: 0
SHORTNESS OF BREATH: 0
FEVER: 1
DYSRHYTHMIAS: 1

## 2022-06-15 ASSESSMENT — LIFESTYLE VARIABLES: TOBACCO_USE: 1

## 2022-06-15 NOTE — PROVIDER NOTIFICATION
DATE:  6/15/2022   TIME OF RECEIPT FROM LAB:  2:05 pm  LAB TEST:  Blood Cultures  LAB VALUE:  Streptococcus Species from 0230 collection  RESULTS GIVEN WITH READ-BACK TO (PROVIDER):  Text paged results to Dr. Love  TIME LAB VALUE REPORTED TO PROVIDER:   2:07 pm    W updated primary RN

## 2022-06-15 NOTE — ED PROVIDER NOTES
"  History   Chief Complaint:  Fever       HPI     Alexandru Still is a 89 year old male with history of hypertension who presents with fever.  Patient arrives via EMS.  Patient pushed his life alert bracelet due to \"shaking.\"  He was noted to have a fever of 102.6 and suspected rigors thus brought to the emergency department.  Patient is a very poor historian.  When asked why he presents to the emergency department, he has no response.  He will answer direct questions and denies chest pain, shortness of breath, cough, dysuria, vomiting or diarrhea but history is quite limited.  Patient does live independently.  Paramedics remarked patient is well-known to them as he has frequent falls but has home health and is well cared for..    Review of Systems   Constitutional: Positive for fever.   Respiratory: Negative for shortness of breath.    Cardiovascular: Negative for chest pain.   Gastrointestinal: Negative for vomiting.   All other systems reviewed and are negative.    Allergies:  Cefuroxime  Diatrizoate Meglumine (Iv Contrast Dye)    Medications:  Lasix  Lisinopril  Gabapentin  Aspirin 81 mg  Magnesium   Synthroid  Zinc    Past Medical History:     Hypertension  Spinal stenosis  Hypothyroidism  AV block, first degree  Edema, bilateral lower extremity  Basal cell carcinoma of right ear  T12 vertebral fracture  Osteoarthritis    Past Surgical History:    PACEMAKER PLACEMENT  HERNIA REPAIR    Family History:    COPD  Stroke    Social History:  Lives independently     Physical Exam     Patient Vitals for the past 24 hrs:   BP Temp Temp src Pulse Resp SpO2 Weight   06/15/22 0400 133/70 -- -- 76 26 96 % --   06/15/22 0349 -- (!) 101.7  F (38.7  C) Oral -- -- -- --   06/15/22 0308 -- -- -- 87 19 93 % --   06/15/22 0228 -- -- -- -- -- -- 86.2 kg (190 lb)   06/15/22 0226 (!) 148/96 (!) 102.6  F (39.2  C) Oral 109 22 (!) 89 % --       Physical Exam    HEENT:    Oropharynx is dry  Eyes:    Conjunctiva normal  Neck:  "   Supple, no meningismus.     CV:     Tachycardic, regular rhythm.      No murmurs, rubs or gallops.       No unilateral leg swelling.       2+ radial pulses bilateral.       No lower extremity edema.  PULM:    Clear to auscultation bilateral although diminished throughout.       No respiratory distress.      No rales or wheezing.     No stridor.  ABD:    Soft, non-tender, non-distended.       No pulsatile masses.       No rebound, guarding or rigidity.  MSK:     No gross deformity to all four extremities.   LYMPH:   No cervical lymphadenopathy.  NEURO:   Alert; Oriented to person and place     No tremor.      Good muscle tone, no atrophy.  Skin:    Hot, dry  Psych:    Flat affect        Emergency Department Course   ECG  No results found for this or any previous visit.    Imaging:  XR Chest Port 1 View   Final Result   IMPRESSION: Left basilar atelectasis or pneumonia.        Report per radiology    Laboratory:  Labs Ordered and Resulted from Time of ED Arrival to Time of ED Departure   ROUTINE UA WITH MICROSCOPIC REFLEX TO CULTURE - Abnormal       Result Value    Color Urine Light Yellow      Appearance Urine Clear      Glucose Urine Negative      Bilirubin Urine Negative      Ketones Urine Negative      Specific Gravity Urine 1.015      Blood Urine Negative      pH Urine 5.0      Protein Albumin Urine Negative      Urobilinogen Urine Normal      Nitrite Urine Negative      Leukocyte Esterase Urine Small (*)     Bacteria Urine Many (*)     Mucus Urine Present (*)     RBC Urine 1      WBC Urine 17 (*)    CBC WITH PLATELETS AND DIFFERENTIAL - Abnormal    WBC Count 6.9      RBC Count 4.31 (*)     Hemoglobin 12.2 (*)     Hematocrit 39.2 (*)     MCV 91      MCH 28.3      MCHC 31.1 (*)     RDW 18.6 (*)     Platelet Count 183      % Neutrophils 85      % Lymphocytes 13      % Monocytes 1      % Eosinophils 1      % Basophils 0      % Immature Granulocytes 0      NRBCs per 100 WBC 0      Absolute Neutrophils 5.8       Absolute Lymphocytes 0.9      Absolute Monocytes 0.1      Absolute Eosinophils 0.1      Absolute Basophils 0.0      Absolute Immature Granulocytes 0.0      Absolute NRBCs 0.0     ISTAT GASES LACTATE VENOUS POCT - Abnormal    Lactic Acid POCT 5.1 (*)     Bicarbonate Venous POCT 29 (*)     O2 Sat, Venous POCT 22 (*)     pCO2V Venous POCT 55 (*)     pH Venous POCT 7.34      pO2 Venous POCT 18 (*)    COMPREHENSIVE METABOLIC PANEL - Normal    Sodium 138      Potassium 4.4      Chloride 104      Carbon Dioxide (CO2) 25      Anion Gap 9      Urea Nitrogen 21      Creatinine 0.89      Calcium 9.6      Glucose 98      Alkaline Phosphatase 75      AST 32      ALT 21      Protein Total 7.7      Albumin 3.6      Bilirubin Total 0.7      GFR Estimate 81     INFLUENZA A/B & SARS-COV2 PCR MULTIPLEX - Normal    Influenza A PCR Negative      Influenza B PCR Negative      RSV PCR Negative      SARS CoV2 PCR Negative     LACTIC ACID WHOLE BLOOD   URINE CULTURE   BLOOD CULTURE   BLOOD CULTURE        Emergency Department Course:    Reviewed:  I reviewed nursing notes, vitals and past medical history    Assessments:  0228 I obtained history and examined the patient as noted above.     Consults:  0412 I spoke with Dr. Arguello of the hospitalist service who is in agreement to accept the patient for admission.       Interventions:  Medications   vancomycin (VANCOCIN) 2,000 mg in sodium chloride 0.9 % 500 mL intermittent infusion (2,000 mg Intravenous New Bag 6/15/22 0325)   lactated ringers BOLUS 1,000 mL (1,000 mLs Intravenous New Bag 6/15/22 0347)   lactated ringers BOLUS 1,000 mL (0 mLs Intravenous Stopped 6/15/22 0325)   acetaminophen (TYLENOL) tablet 1,000 mg (1,000 mg Oral Given 6/15/22 0258)   piperacillin-tazobactam (ZOSYN) intermittent infusion 4.5 g (0 g Intravenous Stopped 6/15/22 0331)     Disposition:  The patient was admitted to the hospital under the care of Dr. Arguello.     Impression & Plan     Medical Decision  Makin-year-old male presented to the ED with fever and rigors.  Patient was a poor historian and was able unable to provide any significant additional history.  Patient's evaluation was consistent with sepsis.  Lactic returned quite elevated at 5 and there was no obvious source on initial examination thus patient given broad-spectrum antibiotics with vancomycin and Zosyn.  Ultimately chest x-ray revealed left lower lobe pneumonia as likely source of sepsis and hypoxia requiring 2 to 3 L per nasal cannula.  Urinalysis is also abnormal but not definitive for infection.  Blood and urine cultures pending.  Patient given 2 L of IV fluids with pending repeat lactic.  Patient is remained hemodynamically stable during ED course.  Patient transferred to medical bed.    Diagnosis:    ICD-10-CM    1. Septic shock (H)  A41.9     R65.21    2. Community acquired pneumonia of left lower lobe of lung  J18.9        Scribe Disclosure:  Sharon MITCHELL, am serving as a scribe at 2:28 AM on 6/15/2022 to document services personally performed by Carl Crapio MD based on my observations and the provider's statements to me.            Carl Carpio MD  06/15/22 0421

## 2022-06-15 NOTE — ED TRIAGE NOTES
Pt to ER with c/o shaking and fevers, pt pushed his life alert button due to the shaking, pt very warm to the touch, o2 sats low

## 2022-06-15 NOTE — ANESTHESIA PROCEDURE NOTES
Airway       Patient location during procedure: OR       Procedure Start/Stop Times: 6/15/2022 6:43 PM  Staff -        CRNA: Haris Robni APRN CRNA       Performed By: CRNAIndications and Patient Condition       Indications for airway management: kelle-procedural       Induction type:intravenous       Mask difficulty assessment: 1 - vent by mask    Final Airway Details       Final airway type: endotracheal airway       Successful airway: ETT - single and Oral  Endotracheal Airway Details        ETT size (mm): 8.0       Successful intubation technique: video laryngoscopy       VL Blade Size: Glidescope 4       Grade View of Cords: 1       Adjucts: stylet       Position: Right       Measured from: lips       Secured at (cm): 22       Bite block used: None    Post intubation assessment        Placement verified by: capnometry, equal breath sounds and chest rise        Number of attempts at approach: 1       Secured with: plastic tape       Ease of procedure: easy       Dentition: Intact and Unchanged    Medication(s) Administered   Medication Administration Time: 6/15/2022 6:43 PM

## 2022-06-15 NOTE — ANESTHESIA PREPROCEDURE EVALUATION
Anesthesia Pre-Procedure Evaluation    Patient: Alexandru Still   MRN: [id number] : 1930        Procedure : Procedure(s):  CYSTOURETEROSCOPY, WITH LITHOTRIPSY OF  URETHERA STONE USING LASER AND POSSIBLE URETERAL STENT INSERTION          Past Medical History:   Diagnosis Date     Anemia      BPH (benign prostatic hyperplasia)      Closed T12 fracture (H)      Complete heart block (H)     s/p dual chamber pacemaker     HTN (hypertension)      Hypothyroidism      Lambert-Eaton myasthenic syndrome (H)      Lower extremity edema      S/P TAVR (transcatheter aortic valve replacement)       Past Surgical History:   Procedure Laterality Date     CV TRANSCATHETER AORTIC VALVE REPLACEMENT TRANSAORTIC APPROACH       EP PACEMAKER       HERNIA REPAIR       IR PICC PLACEMENT > 5 YRS OF AGE  6/15/2022      Allergies   Allergen Reactions     Cefuroxime Hives     Contrast Dye      Gadodiamide Hives      Social History     Tobacco Use     Smoking status: Former Smoker     Packs/day: 1.00     Years: 20.00     Pack years: 20.00     Quit date:      Years since quittin.4     Smokeless tobacco: Not on file   Substance Use Topics     Alcohol use: Not Currently      Wt Readings from Last 1 Encounters:   06/15/22 91.8 kg (202 lb 6.4 oz)        Anesthesia Evaluation   Pt has had prior anesthetic.         ROS/MED HX  ENT/Pulmonary:     (+) tobacco use, Past use, recent URI, unresolved, on 5L O2:     Neurologic: Comment: Lambert eaton disease    (+) delerium, resolved No. dementia,     Cardiovascular:     (+) hypertension-----Taking blood thinners pacemaker, Reason placed: CHB. - Patient is dependent on pacemaker. dysrhythmias, a-fib, valvular problems/murmurs type: AS s/p TAVR.     METS/Exercise Tolerance:     Hematologic:     (+) anemia,     Musculoskeletal:       GI/Hepatic:       Renal/Genitourinary:     (+) renal disease, Nephrolithiasis , BPH,     Endo:     (+) thyroid problem, hypothyroidism, Chronic steroid usage for  Other. Obesity,     Psychiatric/Substance Use:       Infectious Disease: Comment: Severe sepsis    (+) Recent Fever,     Malignancy:       Other:            Physical Exam    Airway   unable to assess          Respiratory Devices and Support         Dental    unable to assess        Cardiovascular          Rhythm and rate: regular and normal     Pulmonary           (+) decreased breath sounds           OUTSIDE LABS:  CBC:   Lab Results   Component Value Date    WBC 8.5 06/15/2022    WBC 6.9 06/15/2022    HGB 10.9 (L) 06/15/2022    HGB 12.2 (L) 06/15/2022    HCT 34.4 (L) 06/15/2022    HCT 39.2 (L) 06/15/2022     (L) 06/15/2022     06/15/2022     BMP:   Lab Results   Component Value Date     06/15/2022     06/15/2022    POTASSIUM 3.2 (L) 06/15/2022    POTASSIUM 4.4 06/15/2022    CHLORIDE 105 06/15/2022    CHLORIDE 104 06/15/2022    CO2 25 06/15/2022    CO2 25 06/15/2022    BUN 20 06/15/2022    BUN 21 06/15/2022    CR 0.99 06/15/2022    CR 0.89 06/15/2022     (H) 06/15/2022     (H) 06/15/2022     COAGS: No results found for: PTT, INR, FIBR  POC: No results found for: BGM, HCG, HCGS  HEPATIC:   Lab Results   Component Value Date    ALBUMIN 3.6 06/15/2022    PROTTOTAL 7.7 06/15/2022    ALT 21 06/15/2022    AST 32 06/15/2022    ALKPHOS 75 06/15/2022    BILITOTAL 0.7 06/15/2022     OTHER:   Lab Results   Component Value Date    PH 7.40 06/15/2022    LACT 3.9 (H) 06/15/2022    TITA 9.0 06/15/2022    TSH 0.54 06/15/2022       Anesthesia Plan    ASA Status:  4, emergent    NPO Status:  ELEVATED Aspiration Risk/Unknown    Anesthesia Type: General.     - Airway: ETT   Induction: RSI.   Maintenance: Inhalation.   Techniques and Equipment:     - Lines/Monitors: Arterial Line (PRN)     - Drips/Meds: Steroid Stress Dose (Pressors PRN)     Consents    Anesthesia Plan(s) and associated risks, benefits, and realistic alternatives discussed. Questions answered and patient/representative(s)  expressed understanding.    - Discussed:     - Discussed with:  Implied consent/emergency, Other (See Comment)      - Extended Intubation/Ventilatory Support Discussed: Yes.      - Patient is DNR/DNI Status: No    Use of blood products discussed: No .     Postoperative Care    Pain management: IV analgesics.   PONV prophylaxis: Ondansetron (or other 5HT-3), Dexamethasone or Solumedrol     Comments:    Other Comments: Spoke to granddaughter.  Per discussion, she and her twin brother seem to be next of kin as the patient's sister is dead and his brother is a good of the state.  Discussed the severity of her grandfathers clinical presentation at length.  Discussed code status.  To the best of her knowledge, she thinks he would want to be full code.  Informed her that he would be intubated for the case and that it was very likely that the ET would stay in place post op given his severe sepsis, high O2 needs, lambert eaton disease, and concurrent pneumonia.  The ability to extubate would be determined at a later time per the ICU team and told her that while I am hopeful, it is possible he may never be successfully extubated.  Discussed additional invasive monitoring as needed. Questions answered.  Plan fof GA with ETT and post op ICU cares.                  Harrison Jimenez MD

## 2022-06-15 NOTE — CONSULTS
[facility]  Palliative Care Consultation   Text Page  Addendum:  Talked with grandpamella Rao by phone. He is the only blood relative of the patient. He is agreeable to being surrogate decision maker as well. [place] and surrogacy laws/ statutes align with family consensus when patient lacks decisional capacity.  Patient lacks decisional capacity when    Lack/mpaired awareness of situation    Lack of understanding treatment options    Unable to communicate verbally or nonverbally    Disagreement alone by the patient or family alone is not sufficient to say the patient lack decisional capacity      Assessment & Plan   Alexandru Still is a 89 year old male who was admitted on 6/15/2022.   Consulted by Dr. Kyler Miguel MD  to assist with symptom management, goals of care, and development of plan of care.     Recommendations:  1. Goals of Care- Full Code  Hospitalization goals discussed  Yes with patient. He has become increasing more oriented and states no intubation. Given his fluctuation in mental status in critical illness, we were able to locate next of kin and further Discussion took place with herbs them or his son in law, Abhijit Rao has not had a discussion with the patient about goals of care so therefore for now there will be no change in CODE STATUS.   Decisional Capacity- Questionable. Patient does not have an advance directive. Per  informed consent policy next of kin should be involved if patient becomes unable. Abhijit Rao son in law has agreed to be next of of kin decision maker.    POLST  Not in EMR reasonable to complete prior to discharge     2. Pain  Bilateral hip pain   Patient's opioid use in past 24 hours: 0 = 0 mg Daily Morphine Equivalent  [place] Board of Pharmacy Data Base Reviewed:    YES; As expected, no concern for misuse/abuse of controlled medications based on this report.  ORT  NA  ( add dot phrase .FRHORT if warranted)  - acetaminophen (Tylenol)   650 mg every 4 hrs prn  -on 800 mg gabapentin tid PTA, consider restarting at lower dose and when mental status clearer   -diclofenac gel  Tid to both hips     3. Dyspnea acute respiratory failure in the setting of sepsis critical illness   - oxygen as indicated P02>90 %  -medical management   -SPL prior to starting PO intake  -respiratory hygiene      4. Spiritual Care  Oriented to Spiritual Health as part of Palliative Care team. Spiritual Health Services declined at this time.  Spiritual Background:  Restoration     5. Care Planning  Appreciate Care of VICKIE Alvarez in locating next of kin and care coordination .  Medications for discharge  TBD    Medical Decision Making and Goals of Care:  Discussed on Vy 15, 2022 with NATASHA Aponte CNP:  I met with the patient at the bedside, he is intermittently confusion and dyspneic. As I spent more time with the patient he was becoming less confused and able to make needs known.  He tells me he lives in a condo and has the assistance of a PCA but was unable to state her name.  He tells me that he has 4 adult children, 3 have passed and 1 son is in a nursing home.  His daughter, who is his emergency contact has passed away however, with care coordination's efforts and nursing support  we were able to locate his son-in-law Abhijit Rao.  He is willing to be his decision maker and make medical decisions on his behalf.  Erkate tells me that the patient has never talked to him about goals of care and what was important to him in regard to advanced life support.  I informed Abhijit, that the patient has in his chart documentation for both DNR and full code with prior records. Abhijit did tell me however that if he was unable to go back to his condo to live but that could possibly be a deal breaker and he would then limit how aggressive his care might be.    As the patient is unable to clearly state his goals of care due to intermittent confusion and his son-in-law is  "unable to identify goals of care for now, the patient will remain full code.  I also spoke with the son-in-law again by phone and he states that the patient's caregiver Jevon who is very involved in his care states that he wants \"everything done\" and recently he has been to a place called [facility] to declare his goals of care.  I asked the son-in-law  if if he could see if the patient has supporting written documentation to this effect at his condo.          Thank you for involving us in the patient's care.     Tammy Wood, NATASHA CNP, [facility]  Pain Management and Palliative Care  [facility]  Pgr: [phone]    Time Spent on this Encounter   Total unit/floor time 120  minutes, time consisted of the following, examination of the patient, reviewing the record and completing documentation. >50% of time spent in counseling and coordination of care, Bedside Nurse JAILENE Lama , Hospitalist Kyler Miguel MD  and   VICKIE Ríos .  Time spend counseling with patient and family consisted of the following topics, goals of care, advance care planning, education about prognosis and symptom management.    Understanding of disease process:   This has been discussed with  Son in law .    History of Present Illness   History is obtained from the patient, electronic health record and patient's son in law    Alexandru Still is a 89 year old male with a past medical history of complete heart block post dual chamber pace maker, Status post TAVR placement, hypertension, hypothyroidism, Eaton-Lambert myasthenia gravis syndrome benign prostatic hypertrophy, frequent falls post closed T12 fracture, chronic pain with history of opioid therapy secondary to bilateral hip pain, and lower extremity edema.  , who presents with Acute respiratory failure, probable sepsis and recent history of dysphagia. Patient upon further examination and testing reveals elevated lactic " acid, bacteremia in the urine and left atelectasis versus pneumonia on chest x-ray.    The patient has altered mental status with intermittent confusion, on Levophed and supplemental oxygen.  As this writer continued the interview patient became more oriented with improved speech and was able to make needs known.  He remained short of breath denies chest pain, headache, dizziness denies abdominal pain.  His abdomen is softly distended with bowel sounds diminished.    This is in the setting of frequent falls, reduction in functional status.  He is not at this point a good decision maker although able to tell me he would not want to be intubated but wants CPR. On goals of care and decisional support.  Fortunately, with care coordination we were able to locate decision-maker who is his son-in-law for similar.  Please see medical decision decision and discussion for further details {free text relevant HPI here, no need to repeat what is addressed in     Past Medical History   I have reviewed this patient's medical history and updated it with pertinent information if needed.   Past Medical History:   Diagnosis Date     Anemia      BPH (benign prostatic hyperplasia)      Closed T12 fracture (H)      Complete heart block (H)     s/p dual chamber pacemaker     HTN (hypertension)      Hypothyroidism      Lambert-Eaton myasthenic syndrome (H)      Lower extremity edema      S/P TAVR (transcatheter aortic valve replacement)        Past Surgical History   I have reviewed this patient's surgical history and updated it with pertinent information if needed.  Past Surgical History:   Procedure Laterality Date     CV TRANSCATHETER AORTIC VALVE REPLACEMENT TRANSAORTIC APPROACH       EP PACEMAKER       HERNIA REPAIR         Prior to Admission Medications   Prior to Admission Medications   Prescriptions Last Dose Informant Patient Reported? Taking?   Calcium Carb-Cholecalciferol (CALCIUM-VITAMIN D) 500-400 MG-UNIT TABS   Yes Yes   Sig:  Take 1 tablet by mouth daily   acetaminophen (TYLENOL) 325 MG tablet   Yes Yes   Sig: Take 650 mg by mouth every 6 hours as needed   aspirin (ASA) 81 MG chewable tablet   Yes Yes   Sig: Take 81 mg by mouth daily   finasteride (PROSCAR) 5 MG tablet   Yes Yes   Sig: Take 5 mg by mouth daily   fluticasone (FLONASE) 50 MCG/ACT nasal spray   Yes Yes   Sig: Spray 1 spray into both nostrils daily   furosemide (LASIX) 40 MG tablet   Yes Yes   Sig: Take 40 mg by mouth 2 times daily   gabapentin (NEURONTIN) 800 MG tablet   Yes Yes   Sig: Take 800 mg by mouth 3 times daily   levothyroxine (SYNTHROID/LEVOTHROID) 100 MCG tablet   Yes Yes   Sig: Take 112 mcg by mouth daily   potassium bicarbonate (K-LYTE) 25 MEQ effervescent tablet   Yes Yes   Sig: Take 25 mEq by mouth 3 times daily (with meals)   senna-docusate (SENOKOT-S/PERICOLACE) 8.6-50 MG tablet   Yes Yes   Sig: Take 2 tablets by mouth 2 times daily as needed for constipation   tamsulosin (FLOMAX) 0.4 MG capsule   Yes Yes   Sig: Take 0.8 mg by mouth daily      Facility-Administered Medications: None     Allergies   Allergies   Allergen Reactions     Cefuroxime Hives     Contrast Dye      Gadodiamide Hives       Social History   I have updated and reviewed the following Social History Narrative:   Social History     Social History Narrative     Not on file               Living situation:  In [facility] with [facility]       Support system: son in law Ahbijit Rao.  He had 4 children one is still alive ( Marc), who lives in LTC and unable to be NOK       Actual/Potential Caregiver:  PCA        Functional status:  Decline        Financial concerns:  Unknown        Substance use disorder (past / present):  Former tobacco dependence        Occupation:  [employer] in [place],        Hobbies:      Family History   I have reviewed this patient's family history and updated it with pertinent information if needed.   Family History   Problem Relation Age of Onset      Cerebrovascular Disease Mother      Chronic Obstructive Pulmonary Disease Father        Review of Systems   The 10 point Review of Systems is negative other than noted in the HPI or here.     Palliative Symptom Review (0=no symptom/no concern, 1=mild, 2=moderate, 3=severe):      Pain: 2-moderate      Fatigue: 2-moderate      Nausea: 0-none      Constipation: 0-none      Diarrhea: 0-none      Depressive Symptoms: 0-none      Anxiety: 0-none      Drowsiness: 2-moderate      Poor Appetite: 0-none      Shortness of Breath: 2-moderate      Insomnia: 0-none      Other: edema left more than right,   2-moderate        Urinary retention - moderate       Overall (0 good/no concerns, 3 very poor):      Physical Exam   Temp:  [99.7  F (37.6  C)-102.6  F (39.2  C)] 99.9  F (37.7  C)  Pulse:  [] 70  Resp:  [16-33] 21  BP: ()/(46-96) 99/49  SpO2:  [89 %-97 %] 94 %  190 lbs 0 oz  Exam:  GEN:  vairible mental status, oriented x 2 ( person, place), appears comfortable, NAD.  HEENT:  Normocephalic/atraumatic, no scleral icterus, no nasal discharge, mouth moist.  CV:  RRR, S1, S2; no murmurs or other irregularities noted.  +3 DP/PT pulses bilatererally; edema BLE +1 right +2 left pretibial .  RESP:  Clear to auscultation anteriorly bilaterally without rales/rhonchi/wheezing/retractions.  Symmetric chest rise on inhalation noted.  Increased respiratory effort.  ABD:  Rounded, soft, non-tender/softdistended.  +BS diminished   EXT:  Edema & pulses as noted above.  CMS intact x 4.     M/S:   Tender to palpation  Throughout, MAIER X 4 .    SKIN:  Dry to touch, no exanthems noted in the visualized areas.    NEURO: Symmetric movement X 4  Sensation to touch intact all extremities.   There is no area of allodynia or hyperesthesia.  PAIN BEHAVIOR: Cooperative  Psych:  Normal affect.  Calm, cooperative, conversant appropriately.    Delirium Screen/CAM:  Delirium = (#1 and #2 = YES) + (#3 and/or #4)   1) Acute onset and fluctuating  course:   YES   (acute change in mental status from baseline over last 24 hours)  2) Inattention:   YES   (difficulty focusing, distractible, can't follow conversation)  3) Disorganized thinking:   YES   (score only if #1 and #2 are YES)  (rambling/irrelevant conversation, unclear/illogical thoughts, inconsistency)  4) Altered level of consciousness:   YES   (score only if #1 and #2 are YES)  (other than alert, calm, cooperative)    Delirium/CAM score: 4/4  Interpretation:  1)  Delirium:  Present  2)  Type:  hypoactive  3)  Severity:  moderate    Data   Results for orders placed or performed during the hospital encounter of 06/15/22 (from the past 24 hour(s))   CBC with platelets differential    Narrative    The following orders were created for panel order CBC with platelets differential.  Procedure                               Abnormality         Status                     ---------                               -----------         ------                     CBC with platelets and d...[[id number]]  Abnormal            Final result                 Please view results for these tests on the individual orders.   Comprehensive metabolic panel   Result Value Ref Range    Sodium 138 133 - 144 mmol/L    Potassium 4.4 3.4 - 5.3 mmol/L    Chloride 104 94 - 109 mmol/L    Carbon Dioxide (CO2) 25 20 - 32 mmol/L    Anion Gap 9 3 - 14 mmol/L    Urea Nitrogen 21 7 - 30 mg/dL    Creatinine 0.89 0.66 - 1.25 mg/dL    Calcium 9.6 8.5 - 10.1 mg/dL    Glucose 98 70 - 99 mg/dL    Alkaline Phosphatase 75 40 - 150 U/L    AST 32 0 - 45 U/L    ALT 21 0 - 70 U/L    Protein Total 7.7 6.8 - 8.8 g/dL    Albumin 3.6 3.4 - 5.0 g/dL    Bilirubin Total 0.7 0.2 - 1.3 mg/dL    GFR Estimate 81 >60 mL/min/1.73m2   CBC with platelets and differential   Result Value Ref Range    WBC Count 6.9 4.0 - 11.0 10e3/uL    RBC Count 4.31 (L) 4.40 - 5.90 10e6/uL    Hemoglobin 12.2 (L) 13.3 - 17.7 g/dL    Hematocrit 39.2 (L) 40.0 - 53.0 %    MCV 91 78 - 100 fL     MCH 28.3 26.5 - 33.0 pg    MCHC 31.1 (L) 31.5 - 36.5 g/dL    RDW 18.6 (H) 10.0 - 15.0 %    Platelet Count 183 150 - 450 10e3/uL    % Neutrophils 85 %    % Lymphocytes 13 %    % Monocytes 1 %    % Eosinophils 1 %    % Basophils 0 %    % Immature Granulocytes 0 %    NRBCs per 100 WBC 0 <1 /100    Absolute Neutrophils 5.8 1.6 - 8.3 10e3/uL    Absolute Lymphocytes 0.9 0.8 - 5.3 10e3/uL    Absolute Monocytes 0.1 0.0 - 1.3 10e3/uL    Absolute Eosinophils 0.1 0.0 - 0.7 10e3/uL    Absolute Basophils 0.0 0.0 - 0.2 10e3/uL    Absolute Immature Granulocytes 0.0 <=0.4 10e3/uL    Absolute NRBCs 0.0 10e3/uL   iStat Gases (lactate) venous, POCT   Result Value Ref Range    Lactic Acid POCT 5.1 (HH) <=2.0 mmol/L    Bicarbonate Venous POCT 29 (H) 21 - 28 mmol/L    O2 Sat, Venous POCT 22 (L) 94 - 100 %    pCO2V Venous POCT 55 (H) 40 - 50 mm Hg    pH Venous POCT 7.34 7.32 - 7.43    pO2 Venous POCT 18 (L) 25 - 47 mm Hg   Symptomatic; Unknown Influenza A/B & SARS-CoV2 (COVID-19) Virus PCR Multiplex Nasopharyngeal    Specimen: Nasopharyngeal; Swab   Result Value Ref Range    Influenza A PCR Negative Negative    Influenza B PCR Negative Negative    RSV PCR Negative Negative    SARS CoV2 PCR Negative Negative    Narrative    Testing was performed using the Xpert Xpress CoV2/Flu/RSV Assay on the Cepheid GeneXpert Instrument. This test should be ordered for the detection of SARS-CoV-2 and influenza viruses in individuals who meet clinical and/or epidemiological criteria. Test performance is unknown in asymptomatic patients. This test is for in vitro diagnostic use under the FDA EUA for laboratories certified under CLIA to perform high or moderate complexity testing. This test has not been FDA cleared or approved. A negative result does not rule out the presence of PCR inhibitors in the specimen or target RNA in concentration below the limit of detection for the assay. If only one viral target is positive but coinfection with multiple targets  is suspected, the sample should be re-tested with another FDA cleared, approved, or authorized test, if coinfection would change clinical management. This test was validated by the [facility]. These laboratories are certified under the Clinical  Laboratory Improvement Amendments of 1988 (CLIA-88) as qualified to perform high complexity laboratory testing.   XR Chest Port 1 View    Narrative    EXAM: XR CHEST PORT 1 VIEW  LOCATION: [facility]  DATE/TIME: 6/15/2022 2:54 AM    INDICATION: Fever. Hypoxia.  COMPARISON: None.    FINDINGS: Left subclavian cardiac device. Aortic valve prosthesis. No pneumothorax. The heart is enlarged. There is no pulmonary edema. The left hemidiaphragm is elevated and there is a left basilar infiltrate. Minimal atelectasis or scar at the right   lung base. Right shoulder arthroplasty.      Impression    IMPRESSION: Left basilar atelectasis or pneumonia.   UA with Microscopic reflex to Culture    Specimen: Urine, Midstream   Result Value Ref Range    Color Urine Light Yellow Colorless, Straw, Light Yellow, Yellow    Appearance Urine Clear Clear    Glucose Urine Negative Negative mg/dL    Bilirubin Urine Negative Negative    Ketones Urine Negative Negative mg/dL    Specific Gravity Urine 1.015 1.003 - 1.035    Blood Urine Negative Negative    pH Urine 5.0 5.0 - 7.0    Protein Albumin Urine Negative Negative mg/dL    Urobilinogen Urine Normal Normal, 2.0 mg/dL    Nitrite Urine Negative Negative    Leukocyte Esterase Urine Small (A) Negative    Bacteria Urine Many (A) None Seen /HPF    Mucus Urine Present (A) None Seen /LPF    RBC Urine 1 <=2 /HPF    WBC Urine 17 (H) <=5 /HPF    Narrative    Urine Culture ordered based on laboratory criteria   Lactic acid whole blood   Result Value Ref Range    Lactic Acid 3.2 (H) 0.7 - 2.0 mmol/L   Basic metabolic panel   Result Value Ref Range    Sodium 139 133 - 144 mmol/L    Potassium 3.2 (L) 3.4 - 5.3 mmol/L     Chloride 105 94 - 109 mmol/L    Carbon Dioxide (CO2) 25 20 - 32 mmol/L    Anion Gap 9 3 - 14 mmol/L    Urea Nitrogen 20 7 - 30 mg/dL    Creatinine 0.99 0.66 - 1.25 mg/dL    Calcium 9.0 8.5 - 10.1 mg/dL    Glucose 102 (H) 70 - 99 mg/dL    GFR Estimate 72 >60 mL/min/1.73m2   Lactic acid whole blood   Result Value Ref Range    Lactic Acid 2.9 (H) 0.7 - 2.0 mmol/L   CBC with platelets   Result Value Ref Range    WBC Count 8.5 4.0 - 11.0 10e3/uL    RBC Count 3.82 (L) 4.40 - 5.90 10e6/uL    Hemoglobin 10.9 (L) 13.3 - 17.7 g/dL    Hematocrit 34.4 (L) 40.0 - 53.0 %    MCV 90 78 - 100 fL    MCH 28.5 26.5 - 33.0 pg    MCHC 31.7 31.5 - 36.5 g/dL    RDW 18.9 (H) 10.0 - 15.0 %    Platelet Count 142 (L) 150 - 450 10e3/uL

## 2022-06-15 NOTE — H&P
[facility]  Hospitalist Admission Note  Name: Alexandru Still    MRN: [id number]  YOB: 1930    Age: 89 year old  Date of admission: 6/15/2022  Primary care provider: No primary care provider on file.    Chief Complaint: Fevers    Assessment and Plan:   Severe sepsis  Likely CAP versus aspiration pneumonia  Possible UTI: Pushed his life alert button for what sounds like rigors starting acutely overnight that woke him from sleep.  On full ROS he only reports shaking chills and denies everything else.  Tachycardic here along with fever to 102.6 and lactic acid elevated 5.1 consistent with severe sepsis.  He does not have a leukocytosis and his CMP is unremarkable.  Urinalysis shows 17 WBCs and small LE so possible UTI although he denies urinary symptoms.  Chest x-ray shows a left lower lobe basilar infiltrate consistent with pneumonia.  Significant difficulty attempting to swallow pills in the ER so possible aspiration pneumonia.  He received vancomycin and Zosyn in the ER along with 2 L LR.  Repeat lactic acid down to 3.2.  He has a cefuroxime allergy listed as urticaria.  -Continue IV Zosyn alone  -Consult SLP for dysphagia eval, n.p.o. until seen  -He does have peripheral edema possible diastolic CHF so continue with IV NS at 100 ml/hr and repeat lactic acid in 2 hours  -Blood and urine cultures obtained    Acute hypoxemic respiratory failure: O2 sats high 80s on room air.  Now 93% on 3 L.  PO2 low on VBG at 18.  Suspect pneumonia as above.  -Continuous pulse ox, supplemental oxygen as needed    Possible acute septic encephalopathy: Oriented to the month, date, place, but otherwise fairly poor historian and often would not answer questions even when spoken loudly.  He is hard of hearing at baseline.  Per EMS he falls frequently so they have met him many times in the past.  There is some baseline confusion.  EMS reported that he lives independently and has a home health aide and is  generally well cared for.  May have acute encephalopathy secondary to sepsis, but baseline unclear.  No contacts listed in the demographics tab.  -Fall precautions    History of Lambert Eaton myasthenic syndrome: Frequent falls at baseline per EMS.  -Fall precautions  Consult PT/OT-    S/p TAVR    Complete heart block: s/p dual-chamber pacemaker.    HTN, suspected chronic diastolic CHF: Per Care Everywhere he has chronic lower extremity edema likely from diastolic CHF.  I believe he is on both HCTZ and furosemide at [facility], but med rec pending.  May also be on lisinopril.  /96 in the ER.  -Hold diuretics secondary to severe sepsis    BPH: [facility] has [facility] for finasteride and tamsulosin, resume once med rec completed if passes dysphagia evaluation  -monitor for retention    Chronic normocytic anemia: Previous hemoglobin levels in the 8-9 range.  Presenting with hemoglobin 12.2 although likely hemoconcentrated in setting of sepsis.  -CBC tomorrow    Hypothyroidism: Resume PTA levothyroxine once med rec complete.      DVT Prophylaxis: Enoxaparin (Lovenox) SQ  Code Status: Full Code  FEN: N.p.o. until seen by SLP, NS at 100 ml/hr  Discharge Dispo: TBD.  Consult PT/OT/SLP/TAMRA  Estimated Disch Date / # of Days until Disch: Admit inpatient for severe sepsis likely secondary to pneumonia resulting in hypoxia.  Anticipate at least 2-3 night hospitalization.      History of Present Illness:  Alexandru Still is a 89 year old male with PMH including complete heart block s/p dual-chamber pacemaker, s/p TAVR, HTN, lower extremity edema, likely diastolic CHF, anemia, hypothyroidism, some baseline confusion, and Lambert Eaton myasthenic syndrome resulting in frequent falls who presents with fevers.  Patient is overall a poor historian.  EMS was called after he activated his life alert.  He reporting having shaking chills that woke him up from sleep.  He otherwise could not tell me why he came to  the ER.  He did not answer all questions and I asked and would simply say no when I asked about recent cough, nausea, vomiting, abdominal pain, diarrhea, dysuria, urinary frequency, chest pain, shortness of breath.  Per EMS he lives independently and has a home health aide.  They know him due to him having frequent falls, but believe he is generally well cared for.  They say at baseline he is very hard of hearing and is often confused    History obtained from patient, medical record, and from Dr. Carpio in the emergency department.  Blood pressure 140/96, tachycardic at 109, febrile to 102.6  F, oxygen 89% on room air now 93% 3 L via nasal cannula.  WBC 6.9, hemoglobin 12.2, platelet count 193.  CMP within normal limits.  Lactic acid elevated at 5.1.  VBG shows pH 7.34, PCO2 55, PO2 18, HCO3 29.  Urinalysis shows 70 WBCs and small LE.  COVID19, influenza A/B, RSV PCR negative.  Chest x-ray shows a left basilar infiltrate consistent with pneumonia.  He received vancomycin, Zosyn, 2 L LR, and 1000 mg acetaminophen.  He ended up pocketing the acetaminophen and was unable to swallow them so concern for dysphagia and aspiration pneumonia.  Admit inpatient.       Clinically Significant Risk Factors Present on Admission                              Past Medical History reviewed:  Past Medical History:   Diagnosis Date     Anemia      BPH (benign prostatic hyperplasia)      Closed T12 fracture (H)      Complete heart block (H)     s/p dual chamber pacemaker     HTN (hypertension)      Hypothyroidism      Lambert-Eaton myasthenic syndrome (H)      Lower extremity edema      S/P TAVR (transcatheter aortic valve replacement)      Past Surgical History reviewed:  Past Surgical History:   Procedure Laterality Date     CV TRANSCATHETER AORTIC VALVE REPLACEMENT TRANSAORTIC APPROACH       EP PACEMAKER       HERNIA REPAIR       Social History reviewed:  Social History     Tobacco Use     Smoking status: Former Smoker      Packs/day: 1.00     Years: 20.00     Pack years: 20.00     Quit date:      Years since quittin.4     Smokeless tobacco: Not on file   Substance Use Topics     Alcohol use: Not Currently     Social History     Social History Narrative     Not on file     Family History reviewed:  Family History   Problem Relation Age of Onset     Cerebrovascular Disease Mother      Chronic Obstructive Pulmonary Disease Father      Allergies:  Allergies   Allergen Reactions     Cefuroxime Hives     Contrast Dye      Gadodiamide Hives     [facility], med rec not complete:  Furosemide 40 mg twice daily  Aspirin 81 mg daily  Gabapentin 800 mg 3 times daily  Flonase  Levothyroxine 112 mcg daily  Tamsulosin 0.4 mg daily  HCTZ 25 mg daily  Finasteride 5 mg daily  Potassium    Review of Systems:  A Comprehensive greater than 10 system review of systems was carried out.  Pertinent positives and negatives are noted above.  Otherwise negative.     Physical Exam:  Blood pressure 133/70, pulse 76, temperature (!) 101.7  F (38.7  C), temperature source Oral, resp. rate 26, weight 86.2 kg (190 lb), SpO2 96 %.  Wt Readings from Last 1 Encounters:   06/15/22 86.2 kg (190 lb)     Exam:  Constitutional: Asleep initially, easily woke, does not appear in distress  Eyes: sclera white   HEENT: atraumatic, dry mucous membranes, hard of hearing  Respiratory: Crackles lateral left lung field, no wheeze, appears comfortable on 3 L via nasal cannula  Cardiovascular: RRR.  No appreciable murmur  GI: Soft, non-tender, not distended, bowel sounds present  Skin: no rash or lesions, acyanotic  Musculoskeletal/extremities: atraumatic, no major deformities.  1+ bilateral lower extremity pitting edema  Neurologic: Alert, very hard of hearing, oriented to being at the hospital and also knew the month and year, but otherwise had difficulty answering any other questions.  Follows some commands.  Psychiatric: calm, cooperative     Lab and  imaging data personally reviewed:  Labs:  Recent Labs   Lab 06/15/22  0238   PH 7.34   HCO3V 29*     Recent Labs   Lab 06/15/22  0236   WBC 6.9   HGB 12.2*   HCT 39.2*   MCV 91        Recent Labs   Lab 06/15/22  0236      POTASSIUM 4.4   CHLORIDE 104   CO2 25   ANIONGAP 9   GLC 98   BUN 21   CR 0.89   GFRESTIMATED 81   TITA 9.6   PROTTOTAL 7.7   ALBUMIN 3.6   BILITOTAL 0.7   ALKPHOS 75   AST 32   ALT 21     Recent Labs   Lab 06/15/22  0238   LACT 5.1*   Repeat lactic acid 3.2    Recent Labs   Lab 06/15/22  0326   COLOR Light Yellow   APPEARANCE Clear   URINEGLC Negative   URINEBILI Negative   URINEKETONE Negative   SG 1.015   UBLD Negative   URINEPH 5.0   PROTEIN Negative   NITRITE Negative   LEUKEST Small*   RBCU 1   WBCU 17*     COVID-19, influenza A/B, RSV PCR negative      Imaging:  Recent Results (from the past 24 hour(s))   XR Chest Port 1 View    Narrative    EXAM: XR CHEST PORT 1 VIEW  LOCATION: [facility]  DATE/TIME: 6/15/2022 2:54 AM    INDICATION: Fever. Hypoxia.  COMPARISON: None.    FINDINGS: Left subclavian cardiac device. Aortic valve prosthesis. No pneumothorax. The heart is enlarged. There is no pulmonary edema. The left hemidiaphragm is elevated and there is a left basilar infiltrate. Minimal atelectasis or scar at the right   lung base. Right shoulder arthroplasty.      Impression    IMPRESSION: Left basilar atelectasis or pneumonia.       Lennox Arguello MD  Hospitalist  [facility]

## 2022-06-15 NOTE — PROGRESS NOTES
Assessment: Mr. Still is a 89 year old gentleman with a medical history of HFpEF, PB, BPH, heart block s/p PPM who is admitted to the ICU 6/15 with septic shock, altered mental status, and respiratory insufficiency. Today, Mr. Still presents critically ill with septic shock, altered mental status, and respiratory insufficiency.     I personally reviewed vital signs, medications, labs and imaging.    Active problems and current treatments include:    1. Septic shock-Positive blood cultures on admission, requiring norepinephrine to maintain MAP>65, wean as tolerated. Place PICC line. S/p volume resuscitation with new oxygen requirement and diuretic dependence at baseline, hold further volume at this time and trend lactate. Source likely PNA with new LLL infiltrate on CXR vs UTI with leukocyte esterase and bacteria on U/A, follow up culture results. Daily surveillance blood cultures until clearance of bacteremia.  2. Altered mental status-Patient's son in law reports he is normally clear, today he is somewhat somnolent and confused, AAOx2, likely due to sepsis, NCHCT negative for acute pathology, continue to monitor.  3. Respiratory insufficiency-Continue supplemental oxygen, wean as tolerated to goal SpO2>92%.  4. ID-Azithromycin, zosyn, vancomycin for septic shock as above, MRSA swab-discontinue vanc if negative.  5. Nutrition-Speech and swallow to evaluate given altered mental status, NPO for now  6. Prophylaxis-SCD's     I personally managed the hemodynamics, analgesia, metabolic abnormalities and nutritional status.      I spent 31 minutes of critical care time exclusive of procedures evaluating and managing this patient, discussing with the consultants, and updating the patient and family.    Reshma Delacruz M.D.

## 2022-06-15 NOTE — PROGRESS NOTES
Physician Attestation   I, Zeus Evangelista DO, was present with the medical/[employer] student who participated in the service and in the documentation of the note.  I have verified the history and personally performed the physical exam and medical decision making.  I agree with the assessment and plan of care as documented in the note.      I personally reviewed vital signs, medications, labs and imaging.    Alexandru Still is a 89 year old male who presented with fever and rigors and was admitted on 6/15/2022 for septic shock. He has a past medical history of complete heart block s/p 2019 PPI, aortic valve stenosis s/p 2019 TAVR, HTN, suspected chronic diastolic CHF with LE edema, hypothyroidism, Lambert-Eaton myasthenic syndrome, PB, chronic anemia, degenerative disc disease with neuropathy, and BPH with urinary retention.    Blood cultures positive for gram + cocci in pairs and chains, likely strep species.  Continue Vanco and Zosyn.  Repeat blood cultures.  Treating for CAP and UTI.  Appreciate Urology assistance with hannah placement.  Check TSH.  Family updated.  Granddaughter at bedside.  I participated in 40 minutes of critical care time in the evaluation and treatment of this patient with septic shock requiring vasopressor support secondary to pneumonia and UTI.    Zeus Evangelista DO  Date of Service (when I saw the patient): 06/15/22    [facility]    Progress Note - Hospitalist Service       Date of Admission:  6/15/2022    Assessment & Plan     Alexandru Still is a 89 year old male who presented with fever and rigors and was admitted on 6/15/2022 for septic shock. He has a past medical history of complete heart block s/p 2019 PPI, aortic valve stenosis s/p 2019 TAVR, HTN, suspected chronic diastolic CHF with LE edema, hypothyroidism, Lambert-Eaton myasthenic syndrome, PB, chronic anemia, degenerative disc disease with neuropathy, and BPH with urinary retention.     Septic shock  with septic encephalopathy  Likely CAP vs aspiration pneumonia  UTI  Gram Positive Cocci in Pairs and Chains x2 Bacteremia  Lactic Acidosis  Patient is a 89 yo who presented with rigors and fever after pressing his life alert button. In the ED, he was tachypnic, had a fever to 102.6, lactic acid of 5.1, and hypotension resistant to IVF resuscitation consistent with septic shock. No leukocytosis. There is a left basilar infiltrate on CXR suspicious for CAP. Patient struggled with PO medication in ED and had garbled speech concerning for possible aspiration. He was also confused and unable to provide a history. Per family, patient is sharp at baseline and current confusion is not typical for him. CT head had no acute abnormalities. Altered mental status most likely due to septic shock and patient's mentation had improved some since arriving to the ICU. In addition to possible pneumonia, patient had UA with bacteria, WBC 17, and leukocyte esterase suggestive of UTI. He does have a history of BPH with urinary retention. Not positive of source of sepsis, but most likely CAP at this point given GPC pairs and chains in both blood cultures. He is currently on zosyn and vancomycin (will discontinue vanc if MRSA swab negative). Will consult speech for dysphagia evaluation. Plan to support BPs with levophed as needed and continue LR 75 mL/hr for now given elevated lactate in the context of his history of edema and suspected dCHF.   - Continue Zosyn  - Continue vancomycin  - NPO until speech clears and SLP evaluation  - Recheck afternoon lactate   - Hold fluids for now (start LR if lactate is elevated)  - Levophed prn for MAP <65  - LR 75 mL/hr for 5 hours    Acute hypoxemic respiratory failure  Chronic, compensated hypercapnic respiratory failure  PB  Patient had O2 sats in the 80s on RA initially, now satting in the 97% on 5L oxymask. Acute hypoxia likely due to pneumonia. His blood gas was suggestive of compensated  hypercapnic respiratory failure 7.34/55/18/29 with elevated pCO2 and appropriate bicarb in the setting of tachypnea. Per chart review, he has a history of PB, unknown CPAP usage. Code status being discussed with next of kin   - supplemental O2 as needed    Hypothyroidism  Patient has a history of hypothyroidism per chart review. Pending med rec, he takes 100 mcg levothyroxine. Will check TSH with LE edema and AMS.     BPH  Patient with history of BPH and urinary retention. Pending med rec, takes finasteride and tamsulosin. Cannot get hannah in, will consult urology.   - Consult urology, appreciate recommendations     Lambert-Eaton myasthenic syndrome  Patient has Lambert-Eaton and has a history of falls due to weakness. Will be on falls procaution and note that he has baseline weakness.     HTN  Suspected chronic diastolic CHF  Patient has a history of HTN and LE edema likely secondary to chronic diastolic heart failure. Med rec pending, but possibly taking lasix 40 mg BID. Suspect more mediations than just lasix at baseline. Will keep IVF moderate to 75 mL/hr in the setting of elevated lactate and risk for fluid overload  - Hold antihypertensives  - IVF as above    Aortic stenosis s/p TAVR  Complete heart block s/p PPI  Patient previously diagnosed with first degree heart block and aortic stenosis. He had a TAVR in 2019 that resulted in complete heart block and permanent pacemaker insertion. He completed appropriate anticoagulation and is currently taking ASA 81 mg.   - Hold for now     Chronic anemia  Baseline chronic anemia with Hgb 8-9. Last Hgb 10.9.   - AM CBC    History of hypokalemia  K low on admission at 3.2, history of hypokalemia and takes potassium bicarb at baseline.   - Replete K prn    Degenerative disc disease c/b neuropathy  Pending med rec, takes gabapentin 800 mg TID. Hold for now.    History of basal cell carcinoma of R ear, remission  S/p MOHS    Known pulmonary nodule  Pt had pulmonary nodule  on imaging dating back to 2014. Not seen on CXR during this admission.        Diet: NPO for Medical/Clinical Reasons Except for: No Exceptions    DVT Prophylaxis: Enoxaparin (Lovenox) SQ  Escudero Catheter: Not present  Fluids: 75 mL/hr LR for 5 hrs  Central Lines: None  Cardiac Monitoring: ACTIVE order. Indication: ICU  Code Status: Full Code      Disposition Plan   Expected Discharge: TBD, pending septic shock improvement  Anticipated discharge location:  Awaiting care coordination huddle     The patient's care was discussed with the Attending Physician, Dr. Evangelista, Bedside Nurse and Intensivist, Dr. Delacruz.    [facility]  Medical Student  Hospitalist Service  [facility]  Securely message with the [employer] Web Console (learn more here)  Text page via [employer] Paging/Directory     ______________________________________________________________________    Interval History   Since arrival to ICU, patient with slightly improved mentation. A PICC line was ordered and he was started on levophed. Blood cultures came back positive for GPC in pairs and chains.     Data reviewed today: I reviewed all medications, new labs and imaging results over the last 24 hours. I personally reviewed the chest x-ray image(s) showing left basilar infiltrate.    Physical Exam   Vital Signs: Temp: 99.7  F (37.6  C) Temp src: Axillary BP: 106/51 Pulse: 71   Resp: 21 SpO2: 97 % O2 Device: Oxymask Oxygen Delivery: 5 LPM  Weight: 202 lbs 6.4 oz  General: Patient is laying down, NAD.  Skin: Not jaundiced, no rash, no ecchymoses  HEENT: Normocephalic, EOM intact  CV: Distant heart sounds with RRR, normal S1S2, no murmur, clicks, rubs  Resp: Clear to auscultation bilaterally, no wheezes, rhonchi, or rales  Abd: Mildly-distended, non-tender, no masses appreciated  Extremities: warm and well perfused, 1+ pitting edema at bilateral hips, 2+ at ankles  Neuro: Alert and Oriented to person and year, unsure of place, mentation seems  confused. No lateralizing symptoms or focal neurologic deficits  Lines/Drains: Peripheral IV in R hand, peripheral IV in left AC, PICC in right brachial vein    Data   Recent Labs   Lab 06/15/22  1112 06/15/22  0809 06/15/22  0236   WBC  --  8.5 6.9   HGB  --  10.9* 12.2*   MCV  --  90 91   PLT  --  142* 183   NA  --  139 138   POTASSIUM  --  3.2* 4.4   CHLORIDE  --  105 104   CO2  --  25 25   BUN  --  20 21   CR  --  0.99 0.89   ANIONGAP  --  9 9   TITA  --  9.0 9.6   * 102* 98   ALBUMIN  --   --  3.6   PROTTOTAL  --   --  7.7   BILITOTAL  --   --  0.7   ALKPHOS  --   --  75   ALT  --   --  21   AST  --   --  32     Recent Results (from the past 24 hour(s))   XR Chest Port 1 View    Narrative    EXAM: XR CHEST PORT 1 VIEW  LOCATION: [facility]  DATE/TIME: 6/15/2022 2:54 AM    INDICATION: Fever. Hypoxia.  COMPARISON: None.    FINDINGS: Left subclavian cardiac device. Aortic valve prosthesis. No pneumothorax. The heart is enlarged. There is no pulmonary edema. The left hemidiaphragm is elevated and there is a left basilar infiltrate. Minimal atelectasis or scar at the right   lung base. Right shoulder arthroplasty.      Impression    IMPRESSION: Left basilar atelectasis or pneumonia.   CT Head w/o Contrast    Narrative    CT SCAN OF THE HEAD WITHOUT CONTRAST Vy 15, 2022 11:01 AM     HISTORY: Mental status change, unknown cause.    TECHNIQUE: Axial images of the head and coronal reformations without  IV contrast material. Radiation dose for this scan was reduced using  automated exposure control, adjustment of the mA and/or kV according  to patient size, or iterative reconstruction technique.    COMPARISON: None.    FINDINGS: Moderate volume loss is present. White matter  hypoattenuation likely represents moderate chronic small vessel  ischemic change. 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  Parenchyma is otherwise unremarkable. No evidence of acute ischemia,  hemorrhage, mass,  mass effect or hydrocephalus. The visualized  calvarium, tympanic cavities, mastoid cavities, and extracranial soft  tissues are unremarkable. Mild paranasal sinus mucosal thickening.      Impression    IMPRESSION: No acute intracranial abnormality.    MIKHAIL IBARRA MD         SYSTEM ID:  [id number]     Medications     lactated ringers       norepinephrine 0.03 mcg/kg/min (06/15/22 1300)       lidocaine (PF)         azithromycin  500 mg Intravenous Q24H     diclofenac  4 g Topical TID     enoxaparin ANTICOAGULANT  40 mg Subcutaneous Q24H     piperacillin-tazobactam  3.375 g Intravenous Q6H     sodium chloride (PF)  3 mL Intracatheter Q8H     vancomycin  1,250 mg Intravenous Q24H

## 2022-06-15 NOTE — CONSULTS
Consult Date: 06/15/2022    REASON FOR CONSULTATION:  Difficult Escudero catheter placement.    HISTORY OF PRESENT ILLNESS:  Alexandru Still is a 89-year-old gentleman who came to the Emergency Department early this morning with a fever.  His urinalysis showed 17 white blood cells and 1 red blood cell.  Serum white blood count was normal.  He is admitted to the intensive care unit with a possible pneumonia and possible urinary tract infection.  He has encephalopathy and hypoxemic respiratory failure.  In the Emergency Department, they were unable to place a Escudero catheter.  He transferred to the intensive care unit this afternoon.  He was found to have 500 mL of urine in the bladder and was unable to urinate.  Urology was consulted this afternoon to help with Escudero catheter placement.  The patient cannot provide any prior urologic history and family members are also not able to provide a prior history.    PAST MEDICAL HISTORY:  He has a history of hypertension and spinal stenosis and hypothyroidism.     PAST SURGICAL HISTORY:  The patient did any of his prior surgical history, but he has had a prior pacemaker placement and prior hernia repair.  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    HOME MEDICATIONS:  Lasix, lisinopril, gabapentin, aspirin, magnesium, Synthroid.    ALLERGIES:  Cefuroxime.    SOCIAL HISTORY:  He lives independently.    FAMILY HISTORY:  No family history of urologic history or stones.    REVIEW OF SYSTEMS:  Poor mentation due to encephalopathy.  He has had fevers.  No gastrointestinal complaints.  No abdominal pain.  No musculoskeletal complaints.  He does have shortness of breath.  No chest pain.  No visual changes.    PHYSICAL EXAMINATION:  He is in the intensive care unit.  He is not intubated.  He responded partially to questions.  He is Normocephalic, atraumatic.  He has normal nonlabored breathing.  His abdomen is soft, nontender,  nondistended.    In an attempt to place a Escudero catheter, I first prepped the penis in standard sterile fashion.  I first attempted a 20-Lebanese coude tip Escudero catheter, followed by a 16-Lebanese straight catheter and a 14-Lebanese coude tip catheter.  All were unsuccessful and somewhere near the area of the prostatic urethra, I could not pass the catheter any further.  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  I therefore inserted the flexible cystoscope into the urethra.  When I came to what would be about the prostatic urethra, the patient had a large stone lodged in his urethra.  I tried to push the stone back into the bladder, but it would not move.  I was able to pass a Glidewire past the stone and it appeared to go in the correct direction.  I backloaded off the scope.  I attempted a 16-Lebanese Councill catheter over the wire, but it was not able to pass.  I therefore passed a ureteral catheter over the wire and removed the wire.  I drew from the ureteral catheter with a Luer-Mimi syringe and I did get clear urine back.    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  The patient will need to proceed to the operating room for cystoscopy and laser lithotripsy of the urethral stone followed by catheter placement.  I discussed this procedure with his grandson, Zhou Rao, over the phone today.  I also recommended the patient have a noncontrast CT scan to look for other stones. We need to rule out any obstructing ureteral stones or anything else that may require intervention in the operating room.  The patient will have a CT scan right now.  He is n.p.o. and we will proceed to the operating room for catheter placement and lithotripsy of his ureteral stone.    Ion Bahena MD        D: 06/15/2022   T:  06/15/2022   MT: [id number]    Name:     LORENA GALVANAlisha  MRN:      [id number]        Account:      [id number]   :      1930           Consult Date: 06/15/2022     Document: [id number]

## 2022-06-15 NOTE — PROGRESS NOTES
Patient was seen and examined by me this morning in the emergency department.  History and physical completed this morning by Dr. Arguello was reviewed.    Briefly patient is a 89-year-old male who presents with sepsis.  Patient is in septic shock as well as encephalopathic with increased work of breathing and acute hypoxic respiratory failure.    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  He is confused and occasionally responds to verbal commands.    Increased work of breathing.  Fever equal bilaterally.  Lung sounds are diminished with increased respiratory effort.  No crackles or wheezing  Abdomen is soft, nontender  Extremity exam is evident for bilateral lower extremity edema 2+    BP 94/50   Pulse 72   Temp (!) 100.9  F (38.3  C) (Axillary)   Resp 30   Wt 86.2 kg (190 lb)   SpO2 93%     Labs and diagnostic studies reviewed  Care plan discussed with bedside nurse Emiliana     Assessment    --Acute encephalopathy-septic  --Severe sepsis and septic shock of unclear source.  Possible UTI versus pneumonia versus others  --Acute hypoxic respiratory failure    Plan    --Transfer to ICU.  Care plan discussed with Dr. Evangelista and ICU team   -- Patient is lethargic and with increased work of breathing.  BiPAP may not be appropriate but needs to be closely monitored and may need to be intubated if he continues to decline.  No family member to reach out to.  Patient is full code  --Continue Zosyn, add vancomycin, monitor cultures and lactic acid  --He got 2 L of fluid already, administer additional liter of fluid, may need pressorif his MAP is less than 60   -- Place Escudero catheter  --Intensivist consult  -- Close monitoring    Addendum  --I spoke with intensivist who graciously agreed to see and follow patient in ICU  --Patient has active peripheral IV access, will consult  vascular access team to establish PICC line  --Conditional order for Levophed ordered  --Discussed with bedside nurse in the ER    Addendum  --Care everywhere  review revealed that the patient has previous DNR order activated in August 2019 but it was inactivated the same month..  It  is not clear if he is DNI.  Patient daughter Frida Rao telephone number was located-[phone], however I was unable to reach her over the phone and unable to leave a message for her.   --Presumed full code, try to reach daughter.    Addendum   -- unfortunately it seems that his Daughter Frida  passed already per google search obituary   -- will authorize emergency treatment -PICC line placement and ICU admission

## 2022-06-15 NOTE — ED NOTES
[facility]  ED Nurse Handoff Report    Alexandru Still is a 89 year old male   ED Chief complaint: Fever  . ED Diagnosis:   Final diagnoses:   Septic shock (H)   Community acquired pneumonia of left lower lobe of lung     Allergies:   Allergies   Allergen Reactions     Cefuroxime Hives     Contrast Dye      Gadodiamide Hives       Code Status: Full Code  Activity level - Baseline/Home:  Assist X 1. Activity Level - Current:   Assist X 2. Lift room needed: No. Bariatric: No   Needed: No   Isolation: Yes. Infection: Not Applicable  Other .     Vital Signs:   Vitals:    06/15/22 0228 06/15/22 0308 06/15/22 0349 06/15/22 0400   BP:    133/70   Pulse:  87  76   Resp:  19  26   Temp:   (!) 101.7  F (38.7  C)    TempSrc:   Oral    SpO2:  93%  96%   Weight: 86.2 kg (190 lb)          Cardiac Rhythm:  ,      Pain level:    Patient confused: Yes. Patient Falls Risk: Yes.   Elimination Status: Has voided   Patient Report - Initial Complaint: Fever. Focused Assessment: Pt slightly altered, shaking/chills, mildly tachypenic, needs 3.5 liters of NC, left sided coarse lung sounds, did not tolerate PO well  Tests Performed:   Labs Ordered and Resulted from Time of ED Arrival to Time of ED Departure   ROUTINE UA WITH MICROSCOPIC REFLEX TO CULTURE - Abnormal       Result Value    Color Urine Light Yellow      Appearance Urine Clear      Glucose Urine Negative      Bilirubin Urine Negative      Ketones Urine Negative      Specific Gravity Urine 1.015      Blood Urine Negative      pH Urine 5.0      Protein Albumin Urine Negative      Urobilinogen Urine Normal      Nitrite Urine Negative      Leukocyte Esterase Urine Small (*)     Bacteria Urine Many (*)     Mucus Urine Present (*)     RBC Urine 1      WBC Urine 17 (*)    CBC WITH PLATELETS AND DIFFERENTIAL - Abnormal    WBC Count 6.9      RBC Count 4.31 (*)     Hemoglobin 12.2 (*)     Hematocrit 39.2 (*)     MCV 91      MCH 28.3      MCHC 31.1 (*)     RDW  18.6 (*)     Platelet Count 183      % Neutrophils 85      % Lymphocytes 13      % Monocytes 1      % Eosinophils 1      % Basophils 0      % Immature Granulocytes 0      NRBCs per 100 WBC 0      Absolute Neutrophils 5.8      Absolute Lymphocytes 0.9      Absolute Monocytes 0.1      Absolute Eosinophils 0.1      Absolute Basophils 0.0      Absolute Immature Granulocytes 0.0      Absolute NRBCs 0.0     ISTAT GASES LACTATE VENOUS POCT - Abnormal    Lactic Acid POCT 5.1 (*)     Bicarbonate Venous POCT 29 (*)     O2 Sat, Venous POCT 22 (*)     pCO2V Venous POCT 55 (*)     pH Venous POCT 7.34      pO2 Venous POCT 18 (*)    LACTIC ACID WHOLE BLOOD - Abnormal    Lactic Acid 3.2 (*)    COMPREHENSIVE METABOLIC PANEL - Normal    Sodium 138      Potassium 4.4      Chloride 104      Carbon Dioxide (CO2) 25      Anion Gap 9      Urea Nitrogen 21      Creatinine 0.89      Calcium 9.6      Glucose 98      Alkaline Phosphatase 75      AST 32      ALT 21      Protein Total 7.7      Albumin 3.6      Bilirubin Total 0.7      GFR Estimate 81     INFLUENZA A/B & SARS-COV2 PCR MULTIPLEX - Normal    Influenza A PCR Negative      Influenza B PCR Negative      RSV PCR Negative      SARS CoV2 PCR Negative     URINE CULTURE   BLOOD CULTURE   BLOOD CULTURE   . Abnormal Results:   XR Chest Port 1 View   Final Result   IMPRESSION: Left basilar atelectasis or pneumonia.      .   Treatments provided: Fluid, abx  Family Comments: unable to contact anyone/no info on file  OBS brochure/video discussed/provided to patient:  N/A  ED Medications:   Medications   vancomycin (VANCOCIN) 2,000 mg in sodium chloride 0.9 % 500 mL intermittent infusion (2,000 mg Intravenous New Bag 6/15/22 0325)   lactated ringers BOLUS 1,000 mL (1,000 mLs Intravenous New Bag 6/15/22 8212)   lactated ringers BOLUS 1,000 mL (0 mLs Intravenous Stopped 6/15/22 0325)   acetaminophen (TYLENOL) tablet 1,000 mg (1,000 mg Oral Given 6/15/22 4326)   piperacillin-tazobactam (ZOSYN)  intermittent infusion 4.5 g (0 g Intravenous Stopped 6/15/22 0331)     Drips infusing:  No  For the majority of the shift, the patient's behavior Green. Interventions performed were NA.    Sepsis treatment initiated:   Yes    Per the ED Provider, Time Zero for severe sepsis or septic shock is:  0220    3 Hour Severe Sepsis Bundle Completion:  1. Initial Lactic Acid Result: Recent Labs   Lab Test 06/15/22  0426 06/15/22  0238   LACT 3.2* 5.1*     2. Blood Cultures before Antibiotics: Yes  3. Broad Spectrum Antibiotics Administered:     Anti-infectives (From now, onward)    Start     Dose/Rate Route Frequency Ordered Stop    06/15/22 0300  vancomycin (VANCOCIN) 2,000 mg in sodium chloride 0.9 % 500 mL intermittent infusion         2,000 mg  over 2 Hours Intravenous ONCE 06/15/22 0249          4. 2000 ml of IV fluids have been given so far      6 Hour Severe Sepsis Bundle Completion:    1. Repeat Lactic Acid Level:   Last result   Lab Results   Component Value Date    LACT 3.2 (H) 06/15/2022     2. Patient currently on Vasopressors =  No     Patient tested for COVID 19 prior to admission: YES    ED Nurse Name/Phone Number: Caridad Torres RN,   4:38 AM

## 2022-06-15 NOTE — PROGRESS NOTES
An ABG was completed on the pt's right arm @ 1333 on a 5 liter Oxymask with no complications noted during the procedure.

## 2022-06-15 NOTE — ED NOTES
Pt changed and repositioned, tolerated well and wakes to stimulus but appears pleasantly confused.

## 2022-06-15 NOTE — PROCEDURES
[facility]    Triple Lumen PICC Placement    Date/Time: 6/15/2022 1:49 PM  Performed by: Alejandro Pressley RN  Authorized by: Kyler Love MD   Indications: vascular access      UNIVERSAL PROTOCOL   Site Marked: Yes  Prior Images Obtained and Reviewed:  Yes  Required items: Required blood products, implants, devices and special equipment available    Patient identity confirmed:  Verbally with patient, arm band and hospital-assigned identification number (With Helen Del Valle)  NA - No sedation, light sedation, or local anesthesia  Confirmation Checklist:  Patient's identity using two indicators, relevant allergies, procedure was appropriate and matched the consent or emergent situation and correct equipment/implants were available  Time out: Immediately prior to the procedure a time out was called    Universal Protocol: the Joint Commission Universal Protocol was followed    Preparation: Patient was prepped and draped in usual sterile fashion       ANESTHESIA    Anesthesia: Local infiltration  Local Anesthetic:  Lidocaine 1% without epinephrine  Anesthetic Total (mL):  4      SEDATION    Patient Sedated: No        Preparation: skin prepped with ChloraPrep  Skin prep agent: skin prep agent completely dried prior to procedure  Sterile barriers: maximum sterile barriers were used: cap, mask, sterile gown, sterile gloves, and large sterile sheet  Hand hygiene: hand hygiene performed prior to central venous catheter insertion  Type of line used: Power PICC  Catheter type: triple lumen  Lumen type: valved  Catheter size: 5 Fr  Brand: Bard  Lot number: [id number]  Placement method: venipuncture, MST, ultrasound and tip navigation system  Number of attempts: 2  Difficulty threading catheter: yes  Successful placement: no  Comment: unable to advance, will maintain as midline and provider notified  Orientation: right    Location: brachial vein (lateral)  Arm circumference: adults 10 cm  Extremity  circumference: 29  Visible catheter length: 32  Internal length: 13 cm  Total catheter length: 45  Dressing and securement: alcohol impregnated caps, blood cleaned with CHG, blood removed, chlorhexidine disc applied, subcutaneous anchor securement system, occlusive dressing applied and statlock  Post procedure assessment: blood return through all ports  PROCEDURE   Patient Tolerance:  Patient tolerated the procedure well with no immediate complicationsDescribe Procedure: Right Basilic (5.4mm) accessed without difficulty, Guidewire threaded easily and introducer successfully placed.  Unable to advance PICC past 20cm.  Multiple arm positions attempted without success.  PICC and introducer removed, pressure held for >5min.  Additional 2ml of Lidocaine injected into the subcutaneous tissue. Right Lateral Brachial Vein accessed without difficulty, Guidewire threaded easily and introducer inserted.  PICC again would not advance past 20cm, again multiple arm positions attempted without success. Bedside RN notified and discussed with provider.  Will leave PICC coiled as a Midline (45/32cm) until seen in IR.  Report given to IR nurse.

## 2022-06-15 NOTE — ED NOTES
Palliative requested SW help with finding family contact information/discuss possible guardianship if no family.  Per chart review there is a daughter Frida and son-in-law, Abhijit Rao [phone]. Phone call with Erv who reports his wife (Frida) has passed away. He is the only living person that is still in contact with the pt. Erkate states pt lives alone in a condo and has a PCA who visits 3x a week, very involved in pt's care. He is uncertain of the PCA agency or contact information. Erv will try to go over to the pt's condo and grab the pt's phone to get the PCA contact's information. He is uncertain if he will be able to visit the pt today at the hospital. Abhijit works as a . Erkate reports he did receive a phone call from the ED Rn, Emiliana. TAMRA provided Erkate with this writers phone number to call back with PCA contact information. TAMRA handed off phone to Palliative NP. Ave Messaged Dr. Love with son-in-laws number.     Kim Fontanez MSW, [facility]  ED    [phone]

## 2022-06-15 NOTE — PROVIDER NOTIFICATION
DATE:  6/15/2022   TIME OF RECEIPT FROM LAB:  1117  LAB TEST:  Blood Culture 1/2 collected @ 0236 6/15/22  LAB VALUE:  G+ cocci, Pairs and chains  RESULTS GIVEN WITH READ-BACK TO (PROVIDER):  Dr. Jean Carlos Webber  TIME LAB VALUE REPORTED TO PROVIDER:   1118      DATE:  6/15/2022   TIME OF RECEIPT FROM LAB:  1142  LAB TEST: Blood Culture 2/2 collected @ 0236 6/15/22  LAB VALUE:  G+ cocci, pairs and chains  RESULTS GIVEN WITH READ-BACK TO (PROVIDER):  Dr. Jean Carlos Webber  TIME LAB VALUE REPORTED TO PROVIDER:   1142

## 2022-06-15 NOTE — PROGRESS NOTES
Procedure complete. No medications given.   PICC line inserted by Dr. May. Pt tolerated procedure well.

## 2022-06-15 NOTE — PHARMACY-VANCOMYCIN DOSING SERVICE
"Pharmacy Vancomycin Initial Note  Date of Service Vy 15, 2022  Patient's  1930  89 year old, male    Indication: Sepsis    Current estimated CrCl = Estimated Creatinine Clearance: 53.5 mL/min (based on SCr of 0.99 mg/dL).    Creatinine for last 3 days  6/15/2022:  2:36 AM Creatinine 0.89 mg/dL;  8:09 AM Creatinine 0.99 mg/dL    Recent Vancomycin Level(s) for last 3 days  No results found for requested labs within last 72 hours.      Vancomycin IV Administrations (past 72 hours)                   vancomycin (VANCOCIN) 2,000 mg in sodium chloride 0.9 % 500 mL intermittent infusion (mg) 2,000 mg New Bag 06/15/22 0325                Nephrotoxins and other renal medications (From now, onward)    Start     Dose/Rate Route Frequency Ordered Stop    06/15/22 2200  vancomycin 1250 mg in 0.9% NaCl 250 mL intermittent infusion 1,250 mg         1,250 mg  over 90 Minutes Intravenous EVERY 24 HOURS 06/15/22 1226      06/15/22 0900  piperacillin-tazobactam (ZOSYN) infusion 3.375 g        Note to Pharmacy: For [facility], [facility] and [facility]: For Zosyn-naive patients, use the \"Zosyn initial dose + extended infusion\" order panel.    3.375 g  100 mL/hr over 30 Minutes Intravenous EVERY 6 HOURS 06/15/22 0528      06/15/22 0850  norepinephrine (LEVOPHED) 4 mg in  mL infusion PREMIX         0.01-0.6 mcg/kg/min × 86.2 kg  3.2-194 mL/hr  Intravenous CONTINUOUS 06/15/22 0847            Contrast Orders - past 72 hours (72h ago, onward)    None          InsightRX Prediction of Planned Initial Vancomycin Regimen  Loading dose: 2000mg IV x1  Regimen: 1250 mg IV every 24 hours.  Start time: 13:23 on 06/15/2022  Exposure target: AUC24 (range)400-600 mg/L.hr   AUC24,ss: 468 mg/L.hr  Probability of AUC24 > 400: 67 %  Ctrough,ss: 14.3 mg/L  Probability of Ctrough,ss > 20: 22 %  Probability of nephrotoxicity (Lodise SRAVANI ): 9 %          Plan:  1. Start vancomycin  1250 mg IV q24h.   2. Vancomycin monitoring method: AUC  3. Vancomycin therapeutic " monitoring goal: 400-600 mg*h/L  4. Pharmacy will check vancomycin levels as appropriate in 1-3 Days.    5. Serum creatinine levels will be ordered daily for the first week of therapy and at least twice weekly for subsequent weeks.        Tierra Marshall, Pharm.D.

## 2022-06-16 ENCOUNTER — APPOINTMENT (OUTPATIENT)
Dept: PHYSICAL THERAPY | Facility: CLINIC | Age: 87
DRG: 871 | End: 2022-06-16
Attending: INTERNAL MEDICINE
Payer: MEDICARE

## 2022-06-16 ENCOUNTER — APPOINTMENT (OUTPATIENT)
Dept: OCCUPATIONAL THERAPY | Facility: CLINIC | Age: 87
DRG: 871 | End: 2022-06-16
Attending: INTERNAL MEDICINE
Payer: MEDICARE

## 2022-06-16 ENCOUNTER — APPOINTMENT (OUTPATIENT)
Dept: SPEECH THERAPY | Facility: CLINIC | Age: 87
DRG: 871 | End: 2022-06-16
Payer: MEDICARE

## 2022-06-16 LAB
ALBUMIN SERPL-MCNC: 2.4 G/DL (ref 3.4–5)
ALP SERPL-CCNC: 60 U/L (ref 40–150)
ALT SERPL W P-5'-P-CCNC: 36 U/L (ref 0–70)
ANION GAP SERPL CALCULATED.3IONS-SCNC: 7 MMOL/L (ref 3–14)
AST SERPL W P-5'-P-CCNC: 49 U/L (ref 0–45)
BACTERIA UR CULT: NORMAL
BILIRUB SERPL-MCNC: 1 MG/DL (ref 0.2–1.3)
BUN SERPL-MCNC: 21 MG/DL (ref 7–30)
CALCIUM SERPL-MCNC: 8.6 MG/DL (ref 8.5–10.1)
CHLORIDE BLD-SCNC: 108 MMOL/L (ref 94–109)
CO2 SERPL-SCNC: 23 MMOL/L (ref 20–32)
CREAT SERPL-MCNC: 0.93 MG/DL (ref 0.66–1.25)
ERYTHROCYTE [DISTWIDTH] IN BLOOD BY AUTOMATED COUNT: 19.3 % (ref 10–15)
GFR SERPL CREATININE-BSD FRML MDRD: 78 ML/MIN/1.73M2
GLUCOSE BLD-MCNC: 119 MG/DL (ref 70–99)
GLUCOSE BLDC GLUCOMTR-MCNC: 117 MG/DL (ref 70–99)
GLUCOSE BLDC GLUCOMTR-MCNC: 122 MG/DL (ref 70–99)
GLUCOSE BLDC GLUCOMTR-MCNC: 125 MG/DL (ref 70–99)
GLUCOSE BLDC GLUCOMTR-MCNC: 138 MG/DL (ref 70–99)
GLUCOSE BLDC GLUCOMTR-MCNC: 138 MG/DL (ref 70–99)
GLUCOSE BLDC GLUCOMTR-MCNC: 143 MG/DL (ref 70–99)
HCT VFR BLD AUTO: 34.8 % (ref 40–53)
HGB BLD-MCNC: 10.9 G/DL (ref 13.3–17.7)
LACTATE SERPL-SCNC: 1.9 MMOL/L (ref 0.7–2)
LACTATE SERPL-SCNC: 2.8 MMOL/L (ref 0.7–2)
MCH RBC QN AUTO: 28.2 PG (ref 26.5–33)
MCHC RBC AUTO-ENTMCNC: 31.3 G/DL (ref 31.5–36.5)
MCV RBC AUTO: 90 FL (ref 78–100)
PLATELET # BLD AUTO: 122 10E3/UL (ref 150–450)
POTASSIUM BLD-SCNC: 4.1 MMOL/L (ref 3.4–5.3)
POTASSIUM BLD-SCNC: 4.3 MMOL/L (ref 3.4–5.3)
PROT SERPL-MCNC: 5.9 G/DL (ref 6.8–8.8)
RBC # BLD AUTO: 3.86 10E6/UL (ref 4.4–5.9)
SODIUM SERPL-SCNC: 138 MMOL/L (ref 133–144)
WBC # BLD AUTO: 28.6 10E3/UL (ref 4–11)

## 2022-06-16 PROCEDURE — 84132 ASSAY OF SERUM POTASSIUM: CPT | Performed by: INTERNAL MEDICINE

## 2022-06-16 PROCEDURE — 99233 SBSQ HOSP IP/OBS HIGH 50: CPT | Performed by: INTERNAL MEDICINE

## 2022-06-16 PROCEDURE — 92610 EVALUATE SWALLOWING FUNCTION: CPT | Mod: GN

## 2022-06-16 PROCEDURE — 97530 THERAPEUTIC ACTIVITIES: CPT | Mod: GO

## 2022-06-16 PROCEDURE — 97166 OT EVAL MOD COMPLEX 45 MIN: CPT | Mod: GO

## 2022-06-16 PROCEDURE — 97530 THERAPEUTIC ACTIVITIES: CPT | Mod: GP | Performed by: PHYSICAL THERAPIST

## 2022-06-16 PROCEDURE — 250N000011 HC RX IP 250 OP 636: Performed by: ANESTHESIOLOGY

## 2022-06-16 PROCEDURE — 99233 SBSQ HOSP IP/OBS HIGH 50: CPT | Performed by: NURSE PRACTITIONER

## 2022-06-16 PROCEDURE — 85027 COMPLETE CBC AUTOMATED: CPT | Performed by: INTERNAL MEDICINE

## 2022-06-16 PROCEDURE — 200N000001 HC R&B ICU

## 2022-06-16 PROCEDURE — 99231 SBSQ HOSP IP/OBS SF/LOW 25: CPT | Performed by: PHYSICIAN ASSISTANT

## 2022-06-16 PROCEDURE — 97110 THERAPEUTIC EXERCISES: CPT | Mod: GP | Performed by: PHYSICAL THERAPIST

## 2022-06-16 PROCEDURE — 83605 ASSAY OF LACTIC ACID: CPT | Performed by: INTERNAL MEDICINE

## 2022-06-16 PROCEDURE — 97535 SELF CARE MNGMENT TRAINING: CPT | Mod: GO

## 2022-06-16 PROCEDURE — 250N000011 HC RX IP 250 OP 636: Performed by: INTERNAL MEDICINE

## 2022-06-16 PROCEDURE — 97161 PT EVAL LOW COMPLEX 20 MIN: CPT | Mod: GP | Performed by: PHYSICAL THERAPIST

## 2022-06-16 PROCEDURE — 258N000003 HC RX IP 258 OP 636: Performed by: ANESTHESIOLOGY

## 2022-06-16 RX ADMIN — ENOXAPARIN SODIUM 40 MG: 40 INJECTION SUBCUTANEOUS at 08:14

## 2022-06-16 RX ADMIN — DICLOFENAC SODIUM 4 G: 10 GEL TOPICAL at 16:37

## 2022-06-16 RX ADMIN — TAZOBACTAM SODIUM AND PIPERACILLIN SODIUM 3.38 G: 375; 3 INJECTION, SOLUTION INTRAVENOUS at 14:37

## 2022-06-16 RX ADMIN — TAZOBACTAM SODIUM AND PIPERACILLIN SODIUM 3.38 G: 375; 3 INJECTION, SOLUTION INTRAVENOUS at 08:13

## 2022-06-16 RX ADMIN — AZITHROMYCIN MONOHYDRATE 500 MG: 500 INJECTION, POWDER, LYOPHILIZED, FOR SOLUTION INTRAVENOUS at 12:26

## 2022-06-16 RX ADMIN — TAZOBACTAM SODIUM AND PIPERACILLIN SODIUM 3.38 G: 375; 3 INJECTION, SOLUTION INTRAVENOUS at 03:05

## 2022-06-16 RX ADMIN — TAZOBACTAM SODIUM AND PIPERACILLIN SODIUM 3.38 G: 375; 3 INJECTION, SOLUTION INTRAVENOUS at 21:32

## 2022-06-16 RX ADMIN — DICLOFENAC SODIUM 4 G: 10 GEL TOPICAL at 21:32

## 2022-06-16 RX ADMIN — DICLOFENAC SODIUM 4 G: 10 GEL TOPICAL at 08:14

## 2022-06-16 ASSESSMENT — ACTIVITIES OF DAILY LIVING (ADL)
DRESS: 1-->ASSISTANCE (EQUIPMENT/PERSON) NEEDED (NOT DEVELOPMENTALLY APPROPRIATE)
WEAR_GLASSES_OR_BLIND: YES
DIFFICULTY_EATING/SWALLOWING: NO
VISION_MANAGEMENT: GLASSES
BATHING: 1-->ASSISTANCE NEEDED
ADLS_ACUITY_SCORE: 51
TOILETING_ISSUES: NO
DOING_ERRANDS_INDEPENDENTLY_DIFFICULTY: NO
FALL_HISTORY_WITHIN_LAST_SIX_MONTHS: YES
CONCENTRATING,_REMEMBERING_OR_MAKING_DECISIONS_DIFFICULTY: YES
CHANGE_IN_FUNCTIONAL_STATUS_SINCE_ONSET_OF_CURRENT_ILLNESS/INJURY: YES
TRANSFERRING: 1-->ASSISTANCE (EQUIPMENT/PERSON) NEEDED (NOT DEVELOPMENTALLY APPROPRIATE)
ADLS_ACUITY_SCORE: 51
DRESSING/BATHING_DIFFICULTY: YES
ADLS_ACUITY_SCORE: 51
DRESSING/BATHING: BATHING DIFFICULTY, ASSISTANCE 1 PERSON;BATHING DIFFICULTY, REQUIRES EQUIPMENT
WALKING_OR_CLIMBING_STAIRS_DIFFICULTY: YES
ADLS_ACUITY_SCORE: 49
EQUIPMENT_CURRENTLY_USED_AT_HOME: WALKER, STANDARD
NUMBER_OF_TIMES_PATIENT_HAS_FALLEN_WITHIN_LAST_SIX_MONTHS: 2
DRESS: 1-->ASSISTANCE (EQUIPMENT/PERSON) NEEDED
ADLS_ACUITY_SCORE: 51
WALKING_OR_CLIMBING_STAIRS: AMBULATION DIFFICULTY, REQUIRES EQUIPMENT;TRANSFERRING DIFFICULTY, REQUIRES EQUIPMENT
ADLS_ACUITY_SCORE: 49
ADLS_ACUITY_SCORE: 47
ADLS_ACUITY_SCORE: 49
ADLS_ACUITY_SCORE: 49
TRANSFERRING: 1-->ASSISTANCE (EQUIPMENT/PERSON) NEEDED
ADLS_ACUITY_SCORE: 49

## 2022-06-16 NOTE — PROGRESS NOTES
[facility]  Palliative Care Progress Note  Text Page    Addendum:  13:00 met with patient and son in law Abhijit Rao, his PCA Jevon was also in room.  He was very tired and had some trouble recalling earlier conversation on code status. He is firm that he wants Erv to be his health care agent.  Patient states he has a directive at home,but may need to be updated as there has been 2 deaths Says Vanessa and Marc are listed. Requested document be brought in for review and Erv will retrieve.  Will review once arrived and if needed will do updated document, patient and Erv agree.       Jevon comes in 1-2 times per day, per her report dresses, bathes, assists with meals and cleaning as well as groceries.  He has overnight caregiver as well.     Assessment & Plan    Alexandru Still is a 89 year old male who was admitted on 6/15/2022.   Consulted by Dr. Kyler Miguel MD  to assist with symptom management, goals of care, and development of plan of care.       Recommendations:  1. Goals of Care- Full Code  Hospitalization goals discussed  Yes with patient, continue ongoing discussions, no changes with intermittent confusion. He has become increasing more oriented and states no intubation. Given his fluctuation in mental status in critical illness, we were able to locate next of kin and further Discussion took place with Abhijit Rao his son in law.  Abhijit Rao has not had a discussion with the patient about goals of care so therefore for now there will be no change in CODE STATUS.   Decisional Capacity- Questionable. Patient does not have an advance directive. Per  informed consent policy next of kin should be involved if patient becomes unable. Abhijit Rao son in law has agreed to be next of of kin decision maker.    POLST  Not in EMR reasonable to complete prior to discharge.   6/15/22 this encounter patient demonstrates intermittent confusion to place and time, discussed patient's care  goals with affirmation from patient in event of cardiac or respiratory arrest he does not want chest compressions but would want intubation with a breathing machine.     2. Pain chronic  Bilateral hip pain and shoulder pain   Patient's opioid use in past 24 hours: 0 = 0 mg Daily Morphine Equivalent  [place] Board of Pharmacy Data Base Reviewed:    YES; As expected, no concern for misuse/abuse of controlled medications based on this report.  ORT  NA  ( add dot phrase .FRHORT if warranted)  -acetaminophen (Tylenol)  650 mg every 4 hrs prn  -on 800 mg gabapentin tid PTA, consider restarting at lower dose and when mental status clearer   -diclofenac gel tid to both hips and shoulders     3. Dyspnea acute respiratory failure in the setting of sepsis critical illness   - oxygen as indicated P02>90 %  -medical management   -SPL appreciate in put and recommendations  -respiratory hygiene       4. Spiritual Care  Oriented to Spiritual Health as part of Palliative Care team. Spiritual Health Services declined at this time.  Spiritual Background:  Mandaen      5. Care Planning  -Appreciate Care of VICKIE Ríos in locating next of kin and care coordination   -disposition:  Likely TCU discharge TBD   Medications for discharge  TBD       Medical Decision Making and Goals of Care:  Discussed on June 16, 2022 with AARON ArmasC,APRN,CNP,ACHPN: patient  Up in chair, requirung assist of 2   States he walks with walker at home. PCA help everyday.  He is states he does not want chest compression, but wants Intubation if needed ( consistent with 6/15 conversation).  He states if unable to speak for himself would like Erv his son in law to make decisions.  Nurse reports intermittent confusion, however he is able to make needs know, is aware he is very ill   (unclear if he understands extent and complexity as well as risk benefit of treatment vs no treatment in regard to code status). He is hard of hearing so  "waiting for pocket talker. He is not wanting TCU if needed at discharge.  Has SPL evaluation today bite size level 6 diet thick liquids.Escudero with clear yellow urine.   Contacted Abhijit Rao and will plan to meet with him today face to face. He is agreeable to being primary health care agent.      Discussed on Vy 15, 2022 with NATASHA Aponte CNP:  I met with the patient at the bedside, he is intermittently confusion and dyspneic. As I spent more time with the patient he was becoming less confused and able to make needs known.  He tells me he lives in a condo and has the assistance of a PCA but was unable to state her name.  He tells me that he has 4 adult children, 3 have passed and 1 son is in a nursing home.  His daughter, who is his emergency contact has passed away however, with care coordination's efforts and nursing support  we were able to locate his son-in-law Abhijit Rao.  He is willing to be his decision maker and make medical decisions on his behalf.  Erkate tells me that the patient has never talked to him about goals of care and what was important to him in regard to advanced life support.  I informed Abhijit, that the patient has in his chart documentation for both DNR and full code with prior records. Abhijit did tell me however that if he was unable to go back to his condo to live but that could possibly be a deal breaker and he would then limit how aggressive his care might be.     As the patient is unable to clearly state his goals of care due to intermittent confusion and his son-in-law is unable to identify goals of care for now, the patient will remain full code.  I also spoke with the son-in-law again by phone and he states that the patient's caregiver Jevon who is very involved in his care states that he wants \"everything done\" and recently he has been to a place called [facility] to declare his goals of care.  I asked the son-in-law  if if he could see if the patient has " supporting written documentation to this effect at his condo.       Yin Menendez. MSN, RN-PHN  [facility] DNP Student  Pain Management and Palliative Care  [facility]  Pgr: [phone]       I personally reviewed note and was present during  the key or critical points of service today.  I agree with the history, assessment and plan of care.    Tammy Wood RN,PMGT-BC, APRN, CNP, ACHPN    Time Spent on this Encounter   Total unit/floor time 50  minutes, time consisted of the following, examination of the patient, reviewing the record and completing documentation. >50% of time spent in counseling and coordination of care, Bedside Nurse Enid Sinclair RN  and Hospitalist Siva Evangelista DO.  Time spend counseling with patient, son in law, PCA, Marinesia consisted of the following topics, goals of care and symptom management.    Review of Systems    CONSTITUTIONAL: NEGATIVE for fever, chills, change in weight  ENT/MOUTH: NEGATIVE for ear, mouth and throat problems, POSITIVE for [employer]  RESP: for significant cough or SOB,no cough  CV: NEGATIVE for chest pain, palpitations, POSITIVE FOR LEFT MORE THAN RIGHT BLE edema  MSK: POSITIVE FOR  L shoulder pain    Palliative Symptom Review (0=no symptom/no concern, 1=mild, 2=moderate, 3=severe):      Pain: 1-mild      Fatigue: 2-moderate      Nausea: 0-none      Constipation: 0-none , last BM 6/16       Diarrhea: 0-none      Depressive Symptoms: 0-none      Anxiety: 0-none      Drowsiness: 1-mild      Poor Appetite: 0-none      Shortness of Breath: 2-moderate      Insomnia: 0-none      Other: edema  2-moderate left more than right BLE      Overall (0 good/no concerns, 3 very poor):  2    Physical Exam   Temp:  [98  F (36.7  C)-100  F (37.8  C)] 98.9  F (37.2  C)  Pulse:  [54-76] 54  Resp:  [10-38] 26  BP: ()/(44-94) 86/47  SpO2:  [83 %-100 %] 93 %  207 lbs 0 oz  Exam:  GEN:  Responds to questions and name oriented to place and self.   appears comfortable, NAD.  HEENT:  Normocephalic/atraumatic, no scleral icterus, no nasal discharge, mouth moist. Raised voice required to communicate.  Pocket Talker ordered to assist with hearing.   CV:  RRR, S1, S2; + murmurs or other irregularities noted.  +3 DP/PT pulses bilatererally; edema BLE left >right .  RESP:  BLL diminished LS Symmetric chest rise on inhalation noted. SOB at rest noted on RA, sats during conversation in encounter mid 90's  ABD:  Rounded, soft, non-tender/non-distended.  MS: reports pain in L shoulder     SKIN:  Dry to touch, no exanthems noted in the visualized areas.    NEURO: Symmetric movement x 4.  Sensation to touch intact all extremities.   There is no area of allodynia or hyperesthesia.  PAIN BEHAVIOR: Cooperative  Psych:  Normal affect.  Calm, cooperative, conversant appropriately.    Medications     norepinephrine 0.01 mcg/kg/min (06/16/22 0800)       azithromycin  500 mg Intravenous Q24H     diclofenac  4 g Topical TID     enoxaparin ANTICOAGULANT  40 mg Subcutaneous Q24H     piperacillin-tazobactam  3.375 g Intravenous Q6H     sodium chloride (PF)  3 mL Intracatheter Q8H       Data   Results for orders placed or performed during the hospital encounter of 06/15/22 (from the past 24 hour(s))   CT Head w/o Contrast    Narrative    CT SCAN OF THE HEAD WITHOUT CONTRAST Vy 15, 2022 11:01 AM     HISTORY: Mental status change, unknown cause.    TECHNIQUE: Axial images of the head and coronal reformations without  IV contrast material. Radiation dose for this scan was reduced using  automated exposure control, adjustment of the mA and/or kV according  to patient size, or iterative reconstruction technique.    COMPARISON: None.    FINDINGS: Moderate volume loss is present. White matter  hypoattenuation likely represents moderate chronic small vessel  ischemic change. 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  Parenchyma is otherwise unremarkable. No evidence of acute  ischemia,  hemorrhage, mass, mass effect or hydrocephalus. The visualized  calvarium, tympanic cavities, mastoid cavities, and extracranial soft  tissues are unremarkable. Mild paranasal sinus mucosal thickening.      Impression    IMPRESSION: No acute intracranial abnormality.    MIKHAIL IBARRA MD         SYSTEM ID:  [id number]   Glucose by meter   Result Value Ref Range    GLUCOSE BY METER POCT 124 (H) 70 - 99 mg/dL   Blood gas arterial   Result Value Ref Range    pH Arterial 7.40 7.35 - 7.45    pCO2 Arterial 35 35 - 45 mm Hg    pO2 Arterial 73 (L) 80 - 105 mm Hg    FIO2 5     Bicarbonate Arterial 22 21 - 28 mmol/L    Base Excess/Deficit (+/-) -2.7 -9.0 - 1.8 mmol/L   MRSA MSSA PCR, Nasal Swab    Specimen: Nares, Bilateral; Swab   Result Value Ref Range    MRSA Target DNA Negative Negative    SA Target DNA Negative     Narrative    [employer]  Xpert SA Nasal Complete assay performed in the [employer]  Dx System is a qualitative in vitro diagnostic test designed for rapid detection of Staphylococcus aureus (SA) and methicillin-resistant Staphylococcus aureus (MRSA) from nasal swabs in patients at risk for nasal colonization. The test utilizes automated real-time polymerase chain reaction (PCR) to detect MRSA/SA DNA. The Xpert SA Nasal Complete assay is intended to aid in the prevention and control of MRSA/SA infections in healthcare settings. The assay is not intended to diagnose, guide or monitor treatment for MRSA/SA infections, or provide results of susceptibility to methicillin. A negative result does not preclude MRSA/SA nasal colonization.    Triple Lumen PICC Placement    Narrative    Alejandro Pressley RN     6/15/2022  1:57 PM  [facility]    Triple Lumen PICC Placement    Date/Time: 6/15/2022 1:49 PM  Performed by: Alejandro Pressley RN  Authorized by: Kyler Love MD   Indications: vascular access      UNIVERSAL PROTOCOL   Site Marked: Yes  Prior Images Obtained and Reviewed:   Yes  Required items: Required blood products, implants, devices and special   equipment available    Patient identity confirmed:  Verbally with patient, arm band and   hospital-assigned identification number (With Helen Del Valle)  NA - No sedation, light sedation, or local anesthesia  Confirmation Checklist:  Patient's identity using two indicators, relevant   allergies, procedure was appropriate and matched the consent or emergent   situation and correct equipment/implants were available  Time out: Immediately prior to the procedure a time out was called    Universal Protocol: the Joint Commission Universal Protocol was followed    Preparation: Patient was prepped and draped in usual sterile fashion       ANESTHESIA    Anesthesia: Local infiltration  Local Anesthetic:  Lidocaine 1% without epinephrine  Anesthetic Total (mL):  4      SEDATION    Patient Sedated: No        Preparation: skin prepped with ChloraPrep  Skin prep agent: skin prep agent completely dried prior to procedure  Sterile barriers: maximum sterile barriers were used: cap, mask, sterile   gown, sterile gloves, and large sterile sheet  Hand hygiene: hand hygiene performed prior to central venous catheter   insertion  Type of line used: Power PICC  Catheter type: triple lumen  Lumen type: valved  Catheter size: 5 Fr  Brand: Bard  Lot number: [id number]  Placement method: venipuncture, MST, ultrasound and tip navigation system  Number of attempts: 2  Difficulty threading catheter: yes  Successful placement: no  Comment: unable to advance, will maintain as midline and provider notified  Orientation: right    Location: brachial vein (lateral)  Arm circumference: adults 10 cm  Extremity circumference: 29  Visible catheter length: 32  Internal length: 13 cm  Total catheter length: 45  Dressing and securement: alcohol impregnated caps, blood cleaned with CHG,   blood removed, chlorhexidine disc applied, subcutaneous anchor securement   system, occlusive  dressing applied and statlock  Post procedure assessment: blood return through all ports  PROCEDURE   Patient Tolerance:  Patient tolerated the procedure well with no immediate   complicationsDescribe Procedure: Right Basilic (5.4mm) accessed without   difficulty, Guidewire threaded easily and introducer successfully placed.    Unable to advance PICC past 20cm.  Multiple arm positions attempted   without success.  PICC and introducer removed, pressure held for >5min.    Additional 2ml of Lidocaine injected into the subcutaneous tissue. Right   Lateral Brachial Vein accessed without difficulty, Guidewire threaded   easily and introducer inserted.  PICC again would not advance past 20cm,   again multiple arm positions attempted without success. Bedside RN   notified and discussed with provider.  Will leave PICC coiled as a Midline   (45/32cm) until seen in IR.  Report given to IR nurse.    Lactic acid whole blood   Result Value Ref Range    Lactic Acid 3.9 (H) 0.7 - 2.0 mmol/L   IR PICC Placement > 5 Yrs of Age    Narrative    IR PICC PLACEMENT > 5 YEARS OF AGE 6/15/2022 3:53 PM    HISTORY: 89-year-old patient with PICC placed in right upper arm by  PICC team, though unable to advance. Request made for advancement.    TECHNIQUE: Patient was brought to the interventional radiology  department and informed consent reiterated. Patient was placed in a  supine position. Skin overlying the right upper arm and existing PICC  were prepped and draped in standard sterile fashion. Maximal Sterile  Barrier Technique Utilized: Cap AND mask AND sterile gown AND sterile  gloves AND sterile full body drape AND hand hygiene AND skin  preparation 2% chlorhexidine for cutaneous antisepsis (or acceptable  alternative antiseptics). Ultrasound was not needed as access was  already obtained. A Nitrex wire was advanced through the existing PICC  and the PICC was removed. New peel-away sheath was placed. A new  triple-lumen power PICC was  then advanced over the wire until the tip  at the right atrial/SVC junction, confirmed with single spot  fluoroscopic image. The PICC was cut at 46 cm with 3 cm external to  the patient. The PICC was secured in place, flushed with normal saline  after all lumens were aspirated easily. Patient tolerated the  procedure well. No sedation or contrast.    FLUOROSCOPIC TIME: 1 minute  AIR KERMA: 15 mGy    FINDINGS: Single spot fluoroscopic image confirms appropriate  placement of PICC with tip at the RA/SVC junction. The PICC is ready  for immediate use.    SAAD GARCÍA MD         SYSTEM ID:  [id number]   Glucose by meter   Result Value Ref Range    GLUCOSE BY METER POCT 104 (H) 70 - 99 mg/dL   CT Abdomen Pelvis w/o Contrast    Narrative    EXAM: CT ABDOMEN PELVIS W/O CONTRAST  LOCATION: [facility]  DATE/TIME: 6/15/2022 5:22 PM    INDICATION: Flank pain, kidney stone suspected  COMPARISON: None.  TECHNIQUE: CT scan of the abdomen and pelvis was performed without IV contrast. Multiplanar reformats were obtained. Dose reduction techniques were used.  CONTRAST: None.    FINDINGS:   LOWER CHEST: Mild atelectasis/infiltrate bases tiny pleural effusions. Moderate hiatal hernia. Pacemaker.    HEPATOBILIARY: Mild motion artifact. Numerous calcified gallstones but no definite laboratory changes of gallbladder. Bile ducts normal in caliber.    PANCREAS: Normal.    SPLEEN: Normal.    ADRENAL GLANDS: Normal.    KIDNEYS/BLADDER: Extrarenal pelves bilaterally but no true hydronephrosis. No urinary tract stone. Small bilateral renal cysts need no further workup.    Escudero catheter present but there remains moderate volume of urine within bladder suggesting malfunction of the catheter.    BOWEL: Diverticulosis of the colon. No acute inflammatory change. No obstruction.     LYMPH NODES: Normal.    VASCULATURE: Moderate diffuse atherosclerotic plaque.    PELVIC ORGANS: Penile prosthesis device present edema evident  throughout scrotal wall. Small hydroceles.    MUSCULOSKELETAL: Prominent degenerative changes lower lumbar spine with mild compression of T12 and L3 vertebral bodies, age indeterminate. Previous spinal process fusion at L4-5 with mild anterolisthesis at L4-5 and L5-S1.      Impression    IMPRESSION:   1.  No urinary tract stones. Modest bilateral extrarenal pelves but no hydronephrosis.  2.  Moderate amount of urine within bladder even with Escudero catheter present suggests catheter obstruction or malfunction.  3.  Cholelithiasis, no suggestion for acute cholecystitis.  4.  Small dependent infiltrate/pleural effusion at lung bases.     Glucose by meter   Result Value Ref Range    GLUCOSE BY METER POCT 123 (H) 70 - 99 mg/dL   Glucose by meter   Result Value Ref Range    GLUCOSE BY METER POCT 125 (H) 70 - 99 mg/dL   Potassium   Result Value Ref Range    Potassium 4.3 3.4 - 5.3 mmol/L   Lactic acid whole blood   Result Value Ref Range    Lactic Acid 2.8 (H) 0.7 - 2.0 mmol/L   Glucose by meter   Result Value Ref Range    GLUCOSE BY METER POCT 117 (H) 70 - 99 mg/dL   Lactic acid whole blood   Result Value Ref Range    Lactic Acid 1.9 0.7 - 2.0 mmol/L   CBC with platelets   Result Value Ref Range    WBC Count 28.6 (H) 4.0 - 11.0 10e3/uL    RBC Count 3.86 (L) 4.40 - 5.90 10e6/uL    Hemoglobin 10.9 (L) 13.3 - 17.7 g/dL    Hematocrit 34.8 (L) 40.0 - 53.0 %    MCV 90 78 - 100 fL    MCH 28.2 26.5 - 33.0 pg    MCHC 31.3 (L) 31.5 - 36.5 g/dL    RDW 19.3 (H) 10.0 - 15.0 %    Platelet Count 122 (L) 150 - 450 10e3/uL   Comprehensive metabolic panel   Result Value Ref Range    Sodium 138 133 - 144 mmol/L    Potassium 4.1 3.4 - 5.3 mmol/L    Chloride 108 94 - 109 mmol/L    Carbon Dioxide (CO2) 23 20 - 32 mmol/L    Anion Gap 7 3 - 14 mmol/L    Urea Nitrogen 21 7 - 30 mg/dL    Creatinine 0.93 0.66 - 1.25 mg/dL    Calcium 8.6 8.5 - 10.1 mg/dL    Glucose 119 (H) 70 - 99 mg/dL    Alkaline Phosphatase 60 40 - 150 U/L    AST 49 (H) 0 - 45 U/L     ALT 36 0 - 70 U/L    Protein Total 5.9 (L) 6.8 - 8.8 g/dL    Albumin 2.4 (L) 3.4 - 5.0 g/dL    Bilirubin Total 1.0 0.2 - 1.3 mg/dL    GFR Estimate 78 >60 mL/min/1.73m2   Glucose by meter   Result Value Ref Range    GLUCOSE BY METER POCT 122 (H) 70 - 99 mg/dL

## 2022-06-16 NOTE — PLAN OF CARE
ICU End of Shift Summary.  For vital signs and complete assessments, please see documentation flowsheets.      Pertinent assessments: REsting comfortably.  Pt tolerating oxymask, tele SR/paced.  Denying pain, skin intact.  Escudero patent with good ouptput  Major Shift Events: None  Plan (Upcoming Events): Monitor bp and uop.   Discharge/Transfer Needs: Continue ICU cares til off levo     Bedside Shift Report Completed : y  Bedside Safety Check Completed:y

## 2022-06-16 NOTE — CONSULTS
Care Management Initial Consult    General Information  Assessment completed with: PatientAlexandru  Type of CM/SW Visit: Initial Assessment    Primary Care Provider verified and updated as needed: Yes   Readmission within the last 30 days:        Reason for Consult: discharge planning             Communication Assessment  Patient's communication style: spoken language (English or Bilingual)    Hearing Difficulty or Deaf: yes   Wear Glasses or Blind: yes    Cognitive  Cognitive/Neuro/Behavioral: .WDL except  Level of Consciousness: confused  Arousal Level: opens eyes spontaneously  Orientation: disoriented to, time, place  Mood/Behavior: calm     Speech: garbled    Living Environment:   People in home: alone     Current living Arrangements: condominium      Able to return to prior arrangements: yes (After TCU)       Family/Social Support:  Care provided by: self  Provides care for: no one      (Son-in-law and grandchildren)          Description of Support System: Involved, Supportive    Support Assessment: Adequate family and caregiver support    Current Resources:   Patient receiving home care services: No     Community Resources: PCA 2-3 hours/day  Equipment currently used at home: wheelchair, manual, walker, rolling      Employment/Financial:  Employment Status: retired        Financial Concerns: No concerns identified   Referral to Financial Worker: No       Lifestyle & Psychosocial Needs:  Social Determinants of Health     Tobacco Use: Medium Risk     Smoking Tobacco Use: Former Smoker     Smokeless Tobacco Use: Unknown   Alcohol Use: Not on file   Financial Resource Strain: Not on file   Food Insecurity: Not on file   Transportation Needs: Not on file   Physical Activity: Not on file   Stress: Not on file   Social Connections: Not on file   Intimate Partner Violence: Not on file   Depression: Not on file   Housing Stability: Not on file       Functional Status:  Prior to admission patient needed assistance:     Pt states he drives at baseline.  He has home support that comes and assists him with getting dressed and getting his meals.        Mental Health Status:  Mental Health Status: No Current Concerns       Chemical Dependency Status:  Chemical Dependency Status: No Current Concerns             Values/Beliefs:  Spiritual, Cultural Beliefs, Alevism Practices, Values that affect care:            Values/Beliefs Comment: Unknown    Additional Information:  Sw met with the pt to discuss his discharge plan.  The pt was sitting in his chair when Sw arrived.  The pt contributed appropriately to the conversation.  Pt states that he lives a lone in a condo that has elevator access.  He uses a walker at baseline and has a wheelchair if needed.    Sw discussed the discharge recommendation of TCU.  The pt states that he wants to discharge home and he does not want to go to TCU.  He said that his home support (PCA 2-3 hours/day) will be able to help him.  Sw acknowledged that staff are using a lift to transfer him.  The pt told Sw that he isn't walking in his room at this time, but still does not want to go to TCU.  He said that when he is ready for discharge, his son-in-law, Erv, will provide transport.  Sw told the pt that they will follow-up with him tomorrow to see what progress he has made and readdress his discharge plan.  The pt was agreeable.    Sw will continue with discharge planning and will be available as needed until discharge.    OPAL Diaz, [facility]  Inpatient Care Coordination  [facility]  [phone]

## 2022-06-16 NOTE — ANESTHESIA POSTPROCEDURE EVALUATION
Patient: Alexandru Still    Procedure: Procedure(s):  CYSTOURETEROSCOPY, WITH LITHOTRIPSY OF  URETHERA STONE USING LASER AND POSSIBLE URETERAL STENT INSERTION       Anesthesia Type:  General    Note:  Disposition: Inpatient   Postop Pain Control: Uneventful            Sign Out: Well controlled pain   PONV: No   Neuro/Psych: Uneventful            Sign Out: Acceptable/Baseline neuro status   Airway/Respiratory: Uneventful            Sign Out: Acceptable/Baseline resp. status   CV/Hemodynamics: Uneventful            Sign Out: Acceptable CV status; No obvious hypovolemia; No obvious fluid overload   Other NRE: NONE   DID A NON-ROUTINE EVENT OCCUR? No           Last vitals:  Vitals Value Taken Time   /67 06/15/22 1940   Temp 100  F (37.8  C) 06/15/22 1940   Pulse 67 06/15/22 1941   Resp 24 06/15/22 1941   SpO2 94 % 06/15/22 1943   Vitals shown include unvalidated device data.    Electronically Signed By: Cornell Suarez MD  Vy 15, 2022  11:37 PM

## 2022-06-16 NOTE — PROGRESS NOTES
[facility] Urology Progress Note          Assessment and Plan:     Assessment:    POD 1 Cystourethroscopy, laser lithotripsy of a urethral stone, basketing of a urethral stone, complex hannah catheter placement over a wire    Septic shock (H)    Community acquired pneumonia of left lower lobe of lung      Plan:   -Continue with indwelling Hannah.   -Would recommend a trial of void close to discharge.    -Will sign off. Please contact us with any urological concerns.    Marychuy Alaniz PA-C   [facility]  [phone]               Interval History:     Feeling better.  Still in ICU.  Hannah with clear yellow urine.  BC 2/2 positive with gram positive cocci in pairs.  WBC 28.6 (8.5).  Creatinine 0.93 eGFR 78.              Review of Systems:     The 5 point Review of Systems is negative other than noted in the HPI             Medications:     Current Facility-Administered Medications Ordered in Epic   Medication Dose Route Frequency Last Rate Last Admin     acetaminophen (TYLENOL) tablet 650 mg  650 mg Oral Q4H PRN   650 mg at 06/15/22 0747    Or     acetaminophen (TYLENOL) Suppository 650 mg  650 mg Rectal Q4H PRN         azithromycin 500 mg (ZITHROMAX) in 0.9% NaCl 250 mL intermittent infusion 500 mg  500 mg Intravenous Q24H   500 mg at 06/15/22 1324     glucose gel 15-30 g  15-30 g Oral Q15 Min PRN        Or     dextrose 50 % injection 25-50 mL  25-50 mL Intravenous Q15 Min PRN        Or     glucagon injection 1 mg  1 mg Subcutaneous Q15 Min PRN         diclofenac (VOLTAREN) 1 % topical gel 4 g  4 g Topical TID   4 g at 06/16/22 0814     enoxaparin ANTICOAGULANT (LOVENOX) injection 40 mg  40 mg Subcutaneous Q24H   40 mg at 06/16/22 0814     lidocaine (LMX4) cream   Topical Q1H PRN         lidocaine (LMX4) cream   Topical Q1H PRN         lidocaine 1 % 0.1-1 mL  0.1-1 mL Other Q1H PRN         lidocaine 1 % 0.1-5 mL  0.1-5 mL Other Q1H PRN   50 mL at 06/15/22 1841     melatonin tablet 1 mg  1 mg  Oral At Bedtime PRN         norepinephrine (LEVOPHED) 4 mg in  mL infusion PREMIX  0.01-0.6 mcg/kg/min Intravenous Continuous 3.2 mL/hr at 06/16/22 0800 0.01 mcg/kg/min at 06/16/22 0800     ondansetron (ZOFRAN ODT) ODT tab 4 mg  4 mg Oral Q6H PRN        Or     ondansetron (ZOFRAN) injection 4 mg  4 mg Intravenous Q6H PRN         piperacillin-tazobactam (ZOSYN) infusion 3.375 g  3.375 g Intravenous Q6H 100 mL/hr at 06/16/22 0813 3.375 g at 06/16/22 0813     polyethylene glycol (MIRALAX) Packet 17 g  17 g Oral Daily PRN         prochlorperazine (COMPAZINE) injection 5 mg  5 mg Intravenous Q6H PRN        Or     prochlorperazine (COMPAZINE) tablet 5 mg  5 mg Oral Q6H PRN        Or     prochlorperazine (COMPAZINE) suppository 12.5 mg  12.5 mg Rectal Q12H PRN         senna-docusate (SENOKOT-S/PERICOLACE) 8.6-50 MG per tablet 1 tablet  1 tablet Oral BID PRN        Or     senna-docusate (SENOKOT-S/PERICOLACE) 8.6-50 MG per tablet 2 tablet  2 tablet Oral BID PRN         sodium chloride (PF) 0.9% PF flush 10-40 mL  10-40 mL Intracatheter Once PRN         sodium chloride (PF) 0.9% PF flush 3 mL  3 mL Intracatheter Q8H   3 mL at 06/16/22 0531     sodium chloride (PF) 0.9% PF flush 3 mL  3 mL Intracatheter q1 min prn         No current [facility]-ordered outpatient medications on file.                  Physical Exam:   Vitals were reviewed  Patient Vitals for the past 8 hrs:   BP Temp Temp src Pulse Resp SpO2 Weight   06/16/22 0800 (!) 86/47 -- -- 54 26 93 % --   06/16/22 0755 -- 98.9  F (37.2  C) Oral 76 17 96 % 93.9 kg (207 lb)   06/16/22 0645 -- -- -- 64 25 99 % --   06/16/22 0630 129/70 -- -- 59 24 100 % --   06/16/22 0615 128/62 -- -- 60 25 100 % --   06/16/22 0600 136/58 -- -- 63 25 100 % --   06/16/22 0545 128/63 -- -- 56 24 100 % --   06/16/22 0530 116/54 -- -- 58 26 100 % --   06/16/22 0515 117/62 -- -- 60 25 100 % --   06/16/22 0500 127/61 -- -- 62 20 100 % --   06/16/22 0445 126/51 -- -- 60 26 100 % --   06/16/22 0430  127/60 -- -- 63 26 100 % --   06/16/22 0415 117/46 -- -- 57 21 100 % --   06/16/22 0400 122/62 -- -- 60 26 100 % --   06/16/22 0345 -- -- -- 61 23 100 % --   06/16/22 0330 -- -- -- 58 24 100 % --   06/16/22 0315 127/55 -- -- 59 24 100 % --   06/16/22 0300 125/63 98  F (36.7  C) Axillary 62 24 100 % --   06/16/22 0245 119/58 -- -- 60 22 100 % --   06/16/22 0230 121/58 -- -- 58 23 100 % --   06/16/22 0215 135/67 -- -- 61 23 99 % --   06/16/22 0200 132/67 -- -- 60 24 100 % --   06/16/22 0145 139/72 -- -- 63 24 100 % --   06/16/22 0130 132/68 -- -- 61 25 100 % --   06/16/22 0115 135/66 -- -- 60 24 100 % --   06/16/22 0100 138/64 -- -- 61 23 100 % --     GEN: NAD, lying in bed  EYES: EOMI  MOUTH: MMM  NECK: Supple  RESP: Unlabored breathing  SKIN: Warm  NEURO: AAO to self  URO: Escudero draining clear yellow urine.           Data:   No results found for: NTBNPI, NTBNP  Lab Results   Component Value Date    WBC 28.6 (H) 06/16/2022    WBC 8.5 06/15/2022    WBC 6.9 06/15/2022    HGB 10.9 (L) 06/16/2022    HGB 10.9 (L) 06/15/2022    HGB 12.2 (L) 06/15/2022    HCT 34.8 (L) 06/16/2022    HCT 34.4 (L) 06/15/2022    HCT 39.2 (L) 06/15/2022    MCV 90 06/16/2022    MCV 90 06/15/2022    MCV 91 06/15/2022     (L) 06/16/2022     (L) 06/15/2022     06/15/2022

## 2022-06-16 NOTE — PLAN OF CARE
ICU End of Shift Summary.  For vital signs and complete assessments, please see documentation flowsheets.      Pertinent assessments: Somnolent, Oriented to self. Reports pain to L shoulder improved with positioning. Tmax 99.9. Tele Vpaced. MAPs >65 with low dose Levophed. No UOP, unable to void. Last BM 6/14 per pt.   Major Shift Events: Admit to ICU @ 1110. IR placed PICC, CT Head and Abd. Urology consult, attempted to place Escudero bedside, unsuccessful. To OR for lithotripsy, uretheral stone removal. K replacement   Plan (Upcoming Events): Wean Levo as able, Monitor temp, recheck K  Discharge/Transfer Needs: TBD     Bedside Shift Report Completed : Y  Bedside Safety Check Completed: Pt off unit at shift change    Goal Outcome Evaluation: Ongoing

## 2022-06-16 NOTE — PROGRESS NOTES
"Clinical Swallow Evaluation (CSE):    Recommendations: Small/bite sized solids (IDDSI 6), mildly thick liquids (IDDSI 2) when fully upright, slow pacing, periodic liquid wash; hold if any decline in respiratory status or overt difficulty. Encourage self-feeding, though assist if needed.     06/16/22 0914   General Information   Onset of Illness/Injury or Date of Surgery 06/15/22   Referring Physician Lennox Arguello MD   Patient/Family Therapy Goal Statement (SLP) To eat/drink   Pertinent History of Current Problem   Per provider \"Alexandru Still is a 89 year old male who presented with fever and rigors and was admitted on 6/15/2022 for septic shock. He has a past medical history of complete heart block s/p 2019 PPI, aortic valve stenosis s/p 2019 TAVR, HTN, suspected chronic diastolic CHF with LE edema, hypothyroidism, Lambert-Eaton myasthenic syndrome, PB, chronic anemia, degenerative disc disease with neuropathy, and BPH with urinary retention\" Admtited with Septic shock with septic encephalopathy; likely CAP vs aspiration PNA; UTI; gram positive cocci in pairs and chains x1 bacteremia; lactic acidosis; acutehypoxemic respiratory failure; chronic, compensated hypercapnic respiratory failure; PB,  etc. SLP for swallow  evaluation     General Observations Pt alert, positioned upright in bed for eval, on RA with stable sats.   Past History of Dysphagia None per EMR or pt report   Pain Assessment   Patient Currently in Pain No   Type of Evaluation   Type of Evaluation Swallow Evaluation   Oral Motor   Oral Musculature anomalies present  (? minimal R sided asymmetry though could be positioning, otherwise WFL)   Structural Abnormalities none present   Mucosal Quality dry   Dentition (Oral Motor)   Dentition (Oral Motor) dental appliance/dentures   Dental Appliance/Denture (Oral Motor) upper and lower   Cough/Swallow/Gag Reflex (Oral Motor)   Volitional Throat Clear/Cough (Oral Motor) WNL   Volitional " Swallow (Oral Motor) WNL   Vocal Quality/Secretion Management (Oral Motor)   Vocal Quality (Oral Motor) WNL   Secretion Management (Oral Motor) WNL   General Swallowing Observations   Respiratory Support (General Swallowing Observations) none   Current Diet/Method of Nutritional Intake (General Swallowing Observations, NIS) NPO   Swallowing Evaluation Clinical swallow evaluation   Clinical Swallow Evaluation   Feeding Assistance minimal assistance required   Clinical Swallow Evaluation Textures Trialed thin liquids;mildly thick liquids;pureed;solid foods   Clinical Swallow Eval: Thin Liquid Texture Trial   Mode of Presentation, Thin Liquids cup;straw  (hand over hand assist)   Volume of Liquid or Food Presented 3 oz   Oral Phase of Swallow premature pharyngeal entry  (anterior spillage via cup)   Pharyngeal Phase of Swallow reduction in laryngeal movement;wet vocal quality after swallow   Diagnostic Statement slight wet vocal quality x2 and wheezing, increased  WOB with reduced swallow/respiratory coordination   Clinical Swallow Eval: Mildly Thick Liquids   Mode of Presentation cup;straw;self-fed   Volume Presented 4 oz   Oral Phase   (anterior spillage via cup)   Pharyngeal Phase reduction in laryngeal movement   Diagnostic Statement no overt  clinical signs/sx  aspiration   Clinical Swallow Evaluation: Puree Solid Texture Trial   Mode of Presentation, Puree spoon;self-fed   Volume of Puree Presented 4 oz   Oral Phase, Puree WFL   Pharyngeal Phase, Puree reduction in laryngeal movement   Diagnostic Statement WFL   Clinical Swallow Evaluation: Solid Food Texture Trial   Mode of Presentation self-fed   Volume Presented 1.5 gaham crackers   Oral Phase delayed AP movement  (slow mastication)   Pharyngeal Phase reduction in laryngeal movement   Diagnostic Statement slow oral phase with increased WOB   Esophageal Phase of Swallow   Patient reports or presents with symptoms of esophageal dysphagia No   Swallowing  Recommendations   Diet Consistency Recommendations soft & bite-sized (level 6);mildly thick liquids (level 2)   Supervision Level for Intake close supervision needed   Mode of Delivery Recommendations bolus size, small;food moistened;slow rate of intake   Swallowing Maneuver Recommendations alternate food and liquid intake   Monitoring/Assistance Required (Eating/Swallowing) stop eating activities when fatigue is present;monitor for cough or change in vocal quality with intake   Recommended Feeding/Eating Techniques (Swallow Eval) maintain upright sitting position for eating;maintain upright posture during/after eating for 30 minutes;moisten oral mucosa prior to intake   Medication Administration Recommendations, Swallowing (SLP) whole as tolerated, crush if needed   General Therapy Interventions   Planned Therapy Interventions Dysphagia Treatment   Clinical Impression   Criteria for Skilled Therapeutic Interventions Met (SLP Eval) Yes, treatment indicated   SLP Diagnosis mild oral and suspected pharyngeal dysphagia   Risks & Benefits of therapy have been explained evaluation/treatment results reviewed;care plan/treatment goals reviewed;risks/benefits reviewed;current/potential barriers reviewed;participants voiced agreement with care plan;participants included;patient   Clinical Impression Comments   Clinical swallow evaluation completed: pt currently presents with mild oral and suspected pharyngeal dysphagia. Baseline regular/thin diet per pt report and pt currently appears below baseline 2/2 deconditioning, weakness. Oral phase notable for ?minimal R sided weakness and anterior spillage when drinking from cup with both thin/mildly thick liquids, prolonged oral transit time, prolonged mastication and suspect reduced oral control/premature bolus spillage with thinner liquids.  Potential pharyngeal swallow delay and ? reduced laryngeal elevation to palpation. Single swallows per bolus. Slight wet vocal quality x2,  wheezing and increased WOB with thin liquids -- all improved with mildly thick liquids. No overt signs/sx aspiration with mildly thick liquids or solids. Liquid and solid modifications appear indicated at this time.     SLP Discharge Planning   SLP Discharge Recommendation Transitional Care Facility;home with home care speech therapy   SLP Rationale for DC Rec Pt below baseline re: swallow function and  will require ongong SLP intervention   SLP Brief overview of current status    Small/bite sized solids (IDDSI 6), mildly thick liquids  (IDDSI 2) when fully upright, slow pacing, periodic liquid wash; hold if any decline in respiratory status or overt difficulty. Encourage  self-feeding, though assist if needed.      Total Evaluation Time   Total Evaluation Time (Minutes) 25   SLP Goals   Therapy Frequency (SLP Eval) 5 times/wk   SLP Predicted Duration/Target Date for Goal Attainment 06/23/22   SLP Goals Swallow   SLP: Safely tolerate diet without signs/symptoms of aspiration Regular diet;Thin liquids;With use of swallow precautions;Independently

## 2022-06-16 NOTE — PROGRESS NOTES
[facility]    Hospitalist Progress Note  Name: Alexandru Still    MRN: [id number]  Provider:  Zeus Evangelista DO  Date of Service: 06/16/2022    Summary of Stay: Alexandru Still is a 89 year old male with a history of complete heart block status post pacemaker in 2019, aortic valve stenosis status post TAVR in 2019, hypertension, diastolic CHF, hypothyroidism, Lambert-Eaton myasthenic syndrome, PB, chronic anemia, degenerative disc disease with neuropathy, BPH admitted on 6/15/2022 with fever and rigors.  In the emergency department the patient was found to have a blood pressure of 140/96, tachycardic at 109, temperature 102.6  F, SPO2 89% on room air with improvement to 93% on 3 L nasal cannula.  Initial lab work revealed WBC 6.9, hemoglobin 12.2, platelet 193, CMP within normal limits, lactic acid 5.1.  Urinalysis showed 17 WBCs and small leukocyte esterase, many bacteria.  Chest x-ray showed left basilar infiltrate consistent with pneumonia.  Patient was started on empiric vancomycin and Zosyn as well as IV fluids.  The patient did become hypotensive and despite fluid resuscitation required vasopressor support with norepinephrine.  The patient was admitted to the ICU.  Due to difficulty with Escudero placement and urinary retention the patient was seen by urology.  Urology found the patient to have a urethral stone.  On 6/15/2022, the patient was taken for cystourethroscopy with laser lithotripsy of urethral stone and Escudero placement.  The patient was also seen by speech therapy who recommended small bite-size solids with mildly thick liquids.    TODAY'S PLAN: Continue Zosyn and azithromycin.  Urine cultures pending.  Repeat blood cultures pending.  Patient sitting in chair eating food.  He knows he is in the hospital.  Norepinephrine weaned off this morning.  Appreciate urology recommendations.  Son-in-law Erv at bedside.  All questions answered.    Problem List:   Acute Septic  Encephalopathy  Septic Shock  Community Acquired Pneumonia  Urinary Tract Infection  Gram Positive Cocci in Pairs and Chains Bacteremia  Lactic Acidosis  - Wean vasopressors  - Initially on Vancomycin and Zosyn.  Azithromycin added.  Vancomyin discontinued secondary to negative MRSA swab.  - Urine cultures pending  - Blood culture on 6/15 positive for gram + cocci in pairs and chains  - Repeat Blood culture on 6/15 NTD  - LA improved with IVF    Urinary Retention  Urethral Stone s/p Urethroscopy and Lithotripsy on 6/15/2022  BPH  - Appreciate Urology recommendations  - Maintain hannah for now.  Will plan for TOV prior to discharge.    Generalized Weakness/Deconditioning  Dysphagia  - Likely secondary to septic shock  - PT/OT recommending TCU  - Appreciate SLP recommendation - Small/bite sized solids (IDDSI 6), mildly thick liquids (IDDSI 2)    Acute Hypoxic Respiratory Failure  - Likely secondary to PNA  - Wean O2 as able  - Abx as above    Lambert-Eaton Myasthenic Syndrome  - Hx of falls due to weakness  - PT/OT    Hypertension  Diastolic CHF  - Hold PTA lasix for now secondary to hypotension  - Would benefit from resumption in the next day or two due to LE edema  - Antihypertensives on hold    Chronic Anemia  - Baseline hgb 8-9  - Daily CBC    Chronic Medical Problems:  Obesity - BMI 31.47  PB  Hypothyroidism  Cholelithiasis  Aortic Stenosis s/p TAVR  Complete Heart Block s/p PPM  Degenerative Disc Disease with Neuropathy  Known Pulmonary Nodule - dating back to 2014    DVT Prophylaxis: Enoxaparin (Lovenox) SQ  Code Status: Full Code  Diet: Combination Diet Soft and Bite Sized Diet (level 6); Mildly Thick (level 2)    Hannah Catheter: PRESENT, indication: /GI/GYN Pelvic Procedure  Disposition: Expected discharge in 3-4 days to TCU. Goals prior to discharge include abx plan established, mentation improved, VSS.   Family updated today: Yes      Interval History   Pt seen and examined.  Pt denies any pain.  Pt knows  he is in the hospital but thinks it is [place].  Pt asking for his dentures.  Son-in-law at bedside.    -Data reviewed today: I personally reviewed all new labs and imaging results over the last 24 hours.     Physical Exam   Temp: 98.9  F (37.2  C) Temp src: Oral BP: (!) 85/47 (recheck BP) Pulse: 67   Resp: 24 SpO2: 97 % O2 Device: Oxymask Oxygen Delivery: 3 LPM  Vitals:    06/15/22 0228 06/15/22 1115 06/16/22 0755   Weight: 86.2 kg (190 lb) 91.8 kg (202 lb 6.4 oz) 93.9 kg (207 lb)     Vital Signs with Ranges  Temp:  [98  F (36.7  C)-100  F (37.8  C)] 98.9  F (37.2  C)  Pulse:  [54-79] 67  Resp:  [10-36] 24  BP: ()/(44-94) 85/47  SpO2:  [83 %-100 %] 97 %  I/O last 3 completed shifts:  In: 1662.69 [I.V.:1212.69; IV Piggyback:450]  Out: 600 [Urine:600]    GENERAL: No apparent distress. Awake, alert  HEENT: Normocephalic, atraumatic. Extraocular movements intact.  CARDIOVASCULAR: Regular rate and rhythm without murmurs or rubs. No S3.  PULMONARY: Clear bilaterally.  GASTROINTESTINAL: Soft, non-tender, non-distended. Bowel sounds normoactive.   EXTREMITIES: No cyanosis or clubbing. No edema.  NEUROLOGICAL: CN 2-12 grossly intact, no focal neurological deficits.  DERMATOLOGICAL: No rash, ulcer, bruising, nor jaundice.    Medications     norepinephrine 0.01 mcg/kg/min (06/16/22 1136)       azithromycin  500 mg Intravenous Q24H     diclofenac  4 g Topical TID     enoxaparin ANTICOAGULANT  40 mg Subcutaneous Q24H     piperacillin-tazobactam  3.375 g Intravenous Q6H     sodium chloride (PF)  3 mL Intracatheter Q8H     Data     Laboratory:  Recent Labs   Lab 06/16/22  0532 06/15/22  0809 06/15/22  0236   WBC 28.6* 8.5 6.9   HGB 10.9* 10.9* 12.2*   HCT 34.8* 34.4* 39.2*   MCV 90 90 91   * 142* 183     Recent Labs   Lab 06/16/22  0824 06/16/22  0532 06/16/22  0329 06/16/22  0043 06/15/22  1112 06/15/22  0809 06/15/22  0236   NA  --  138  --   --   --  139 138   POTASSIUM  --  4.1  --  4.3  --  3.2* 4.4    CHLORIDE  --  108  --   --   --  105 104   CO2  --  23  --   --   --  25 25   ANIONGAP  --  7  --   --   --  9 9   * 119* 117*  --    < > 102* 98   BUN  --  21  --   --   --  20 21   CR  --  0.93  --   --   --  0.99 0.89   GFRESTIMATED  --  78  --   --   --  72 81   TITA  --  8.6  --   --   --  9.0 9.6    < > = values in this interval not displayed.     No results for input(s): CULT in the last 168 hours.    Imaging:  Recent Results (from the past 24 hour(s))   IR PICC Placement > 5 Yrs of Age    Narrative    IR PICC PLACEMENT > 5 YEARS OF AGE 6/15/2022 3:53 PM    HISTORY: 89-year-old patient with PICC placed in right upper arm by  PICC team, though unable to advance. Request made for advancement.    TECHNIQUE: Patient was brought to the interventional radiology  department and informed consent reiterated. Patient was placed in a  supine position. Skin overlying the right upper arm and existing PICC  were prepped and draped in standard sterile fashion. Maximal Sterile  Barrier Technique Utilized: Cap AND mask AND sterile gown AND sterile  gloves AND sterile full body drape AND hand hygiene AND skin  preparation 2% chlorhexidine for cutaneous antisepsis (or acceptable  alternative antiseptics). Ultrasound was not needed as access was  already obtained. A Nitrex wire was advanced through the existing PICC  and the PICC was removed. New peel-away sheath was placed. A new  triple-lumen power PICC was then advanced over the wire until the tip  at the right atrial/SVC junction, confirmed with single spot  fluoroscopic image. The PICC was cut at 46 cm with 3 cm external to  the patient. The PICC was secured in place, flushed with normal saline  after all lumens were aspirated easily. Patient tolerated the  procedure well. No sedation or contrast.    FLUOROSCOPIC TIME: 1 minute  AIR KERMA: 15 mGy    FINDINGS: Single spot fluoroscopic image confirms appropriate  placement of PICC with tip at the RA/SVC junction. The  PICC is ready  for immediate use.    SAAD GARCÍA MD         SYSTEM ID:  [id number]   CT Abdomen Pelvis w/o Contrast    Narrative    EXAM: CT ABDOMEN PELVIS W/O CONTRAST  LOCATION: [facility]  DATE/TIME: 6/15/2022 5:22 PM    INDICATION: Flank pain, kidney stone suspected  COMPARISON: None.  TECHNIQUE: CT scan of the abdomen and pelvis was performed without IV contrast. Multiplanar reformats were obtained. Dose reduction techniques were used.  CONTRAST: None.    FINDINGS:   LOWER CHEST: Mild atelectasis/infiltrate bases tiny pleural effusions. Moderate hiatal hernia. Pacemaker.    HEPATOBILIARY: Mild motion artifact. Numerous calcified gallstones but no definite laboratory changes of gallbladder. Bile ducts normal in caliber.    PANCREAS: Normal.    SPLEEN: Normal.    ADRENAL GLANDS: Normal.    KIDNEYS/BLADDER: Extrarenal pelves bilaterally but no true hydronephrosis. No urinary tract stone. Small bilateral renal cysts need no further workup.    Escudero catheter present but there remains moderate volume of urine within bladder suggesting malfunction of the catheter.    BOWEL: Diverticulosis of the colon. No acute inflammatory change. No obstruction.     LYMPH NODES: Normal.    VASCULATURE: Moderate diffuse atherosclerotic plaque.    PELVIC ORGANS: Penile prosthesis device present edema evident throughout scrotal wall. Small hydroceles.    MUSCULOSKELETAL: Prominent degenerative changes lower lumbar spine with mild compression of T12 and L3 vertebral bodies, age indeterminate. Previous spinal process fusion at L4-5 with mild anterolisthesis at L4-5 and L5-S1.      Impression    IMPRESSION:   1.  No urinary tract stones. Modest bilateral extrarenal pelves but no hydronephrosis.  2.  Moderate amount of urine within bladder even with Escudero catheter present suggests catheter obstruction or malfunction.  3.  Cholelithiasis, no suggestion for acute cholecystitis.  4.  Small dependent infiltrate/pleural  effusion at lung bases.           Zeus Evangelista DO  [facility] Hospitalist  [address] [place] [address]  06/16/2022

## 2022-06-16 NOTE — PROGRESS NOTES
"   06/16/22 0929   Quick Adds   Type of Visit Initial Occupational Therapy Evaluation   Living Environment   People in Home alone   Current Living Arrangements condominium   Home Accessibility no concerns   Living Environment Comments Pt reports he lives alone in a condo, elevator access.   Self-Care   Usual Activity Tolerance good   Current Activity Tolerance poor   Equipment Currently Used at Home walker, rolling   Fall history within last six months yes   Number of times patient has fallen within last six months   (Chart indicates \"frequent falls\")   Activity/Exercise/Self-Care Comment Pt reports he has daily PCA support for 2-3 hours to \"help with anything.\" Reports he utilizes FWW/4ww or wheelchair for mobility. Was able to perform dressing, toileting modified independently.   Instrumental Activities of Daily Living (IADL)   IADL Comments Reports PCA assists with IADLs - unable to verify level of service receives, reports \"daily\"   General Information   Onset of Illness/Injury or Date of Surgery 06/15/22   Referring Physician Lennox Arguello MD   Patient/Family Therapy Goal Statement (OT) Pt's goal is to return home   Additional Occupational Profile Info/Pertinent History of Current Problem Per chart: Pt is a 89 year old male admitted with septic shock requiring vasopressor support secondary to pneumonia and UTI.   Existing Precautions/Restrictions fall   General Observations and Info [facility]   Cognitive Status Examination   Orientation Status person;place   Sensory   Sensory Quick Adds No deficits were identified   Pain Assessment   Patient Currently in Pain No   Strength Comprehensive (MMT)   Comment, General Manual Muscle Testing (MMT) Assessment Generalized weakness BUEs   Bed Mobility   Bed Mobility supine-sit;sit-supine   Supine-Sit Norton (Bed Mobility) dependent (less than 25% patient effort)   Sit-Supine [place] (Bed Mobility) dependent (less than 25% patient effort)   Transfers "   Transfers sit-stand transfer;toilet transfer;bed-chair transfer   Transfer Skill: Bed to Chair/Chair to Bed   Bed-Chair Clare (Transfers) dependent (less than 25% patient effort)   Sit-Stand Transfer   Sit-Stand Clare (Transfers) dependent (less than 25% patient effort)   Toilet Transfer   [place] Level (Toilet Transfer) dependent (less than 25% patient effort)   Balance   Balance Comments Lateral lean to R side while seated EOB requiring assist to correct, pt unaware   Activities of Daily Living   BADL Assessment/Intervention upper body dressing;lower body dressing;grooming;toileting   Upper Body Dressing Assessment/Training   Clare Level (Upper Body Dressing) maximum assist (25% patient effort)   Lower Body Dressing Assessment/Training   Clare Level (Lower Body Dressing) dependent (less than 25% patient effort)   Grooming Assessment/Training   Clare Level (Grooming) minimum assist (75% patient effort)   Toileting   Clare Level (Toileting) dependent (less than 25% patient effort)   Clinical Impression   Criteria for Skilled Therapeutic Interventions Met (OT) Yes, treatment indicated   OT Diagnosis Impaired ADLs, mobility tasks   OT Problem List-Impairments impacting ADL problems related to;activity tolerance impaired;balance;cognition;strength   ADL comments/analysis Pt below stated baseline level of functioning   Assessment of Occupational Performance 3-5 Performance Deficits   Identified Performance Deficits Dressing, grooming, toileting, transfers   Planned Therapy Interventions (OT) ADL retraining;IADL retraining;strengthening;transfer training;progressive activity/exercise;cognition;bed mobility training   Clinical Decision Making Complexity (OT) moderate complexity   Risk & Benefits of therapy have been explained evaluation/treatment results reviewed;care plan/treatment goals reviewed;risks/benefits reviewed;current/potential barriers reviewed;participants  voiced agreement with care plan;patient;participants included   OT Discharge Planning   OT Discharge Recommendation (DC Rec) Transitional Care Facility   OT Rationale for DC Rec Pt is significantly below stated baseline level of functioning; unable to safely stand with max Ax2 this date requiring lift assist. Recommend ongoing skilled OT while IP and in TCU setting to improve strength, functional activity tolerance, balance and safety needed for daily tasks.   Total Evaluation Time (Minutes)   Total Evaluation Time (Minutes) 10   OT Goals   Therapy Frequency (OT) 5 times/wk   OT Predicted Duration/Target Date for Goal Attainment 06/22/22   OT Goals Hygiene/Grooming;Lower Body Dressing;Toilet Transfer/Toileting;OT Goal 1;Cognition   OT: Hygiene/Grooming supervision/stand-by assist;using adaptive equipment;while standing;from wheelchair   OT: Lower Body Dressing Supervision/stand-by assist;using adaptive equipment   OT: Toilet Transfer/Toileting Supervision/stand-by assist;toilet transfer;cleaning and garment management;using adaptive equipment   OT: Cognitive Patient/caregiver will verbalize understanding of cognitive assessment results/recommendations as needed for safe discharge planning   OT: Goal 1 Pt will participate in BUE HEP in order to improve strength needed for ADL completion in discharge environment.

## 2022-06-16 NOTE — PROGRESS NOTES
"   06/16/22 1550   Quick Adds   Type of Visit Initial PT Evaluation   Living Environment   People in Home alone   Current Living Arrangements condominium   Home Accessibility no concerns   Living Environment Comments Pt reports he lives alone in a condo, elevator access. Has FWW and 4WW, always uses 4WW in apt.   Self-Care   Usual Activity Tolerance good   Current Activity Tolerance poor   Equipment Currently Used at Home walker, rolling;wheelchair, manual   Fall history within last six months yes   Number of times patient has fallen within last six months 2  (\"I can't remember\", answered yes to 2-4 falls in past 6 mo)   Activity/Exercise/Self-Care Comment Pt reports he has daily PCA support for 2-3 hours to \"help with anything.\" Reports he utilizes FWW/4ww or wheelchair for mobility. Was able to perform dressing, toileting modified independently. Son-in law Erv is supportive.   General Information   Onset of Illness/Injury or Date of Surgery 06/15/22   Referring Physician Lennox Arguello MD   Patient/Family Therapy Goals Statement (PT) return home   Pertinent History of Current Problem (include personal factors and/or comorbidities that impact the POC) 89 year old male who presented with fever and rigors and was admitted on 6/15/2022 for septic shock. He has a past medical history of complete heart block s/p 2019 PPI, aortic valve stenosis s/p 2019 TAVR, HTN, suspected chronic diastolic CHF with LE edema, hypothyroidism, Lambert-Eaton myasthenic syndrome, PB, chronic anemia, degenerative disc disease with neuropathy, and BPH with urinary retention.   Existing Precautions/Restrictions fall   Weight-Bearing Status - LLE full weight-bearing   Weight-Bearing Status - RLE full weight-bearing   Cognition   Affect/Mental Status (Cognition) confused   Orientation Status (Cognition) oriented to;person;time  (confused on where he is or situation)   Follows Commands (Cognition) follows one-step commands;delayed " "response/completion   Safety Deficit (Cognition) insight into deficits/self-awareness   Pain Assessment   Patient Currently in Pain Yes, see Vital Sign flowsheet  (chronic L shoulder pain)   Range of Motion (ROM)   Range of Motion ROM deficits secondary to weakness   ROM Comment global generlaized weakness leading to impaired ROM, chronic L shoulder pain with reduced AROM noted. B knee replacements - \"years ago\"   Strength (Manual Muscle Testing)   Strength (Manual Muscle Testing) Deficits observed during functional mobility   Strength Comments able to perform anti-gravity but not full-range, globablly < 3/5 MMT and is currently unable to stand d/t generalized weakness   Bed Mobility   Comment, (Bed Mobility) not assessed, up in chair via lift with nsg d/t inability to stand with OT earlier today   Transfers   Comment, (Transfers) Max A x 1 with FWW, however is unable to stand or clear butt off chair despite multiple attempts   Gait/Stairs (Locomotion)   Distance in Feet (Required for LE Total Joints) unable at this time d/t B LE weakness   Balance   Balance Comments impaired sitting balance, unable to stand   Sensory Examination   Sensory Perception patient reports no sensory changes   Clinical Impression   Criteria for Skilled Therapeutic Intervention Yes, treatment indicated   PT Diagnosis (PT) impaired mobility   Influenced by the following impairments weakness, pain, impaired endurance   Functional limitations due to impairments fall risk, decreased activity tolerance   Clinical Presentation (PT Evaluation Complexity) Stable/Uncomplicated   Clinical Presentation Rationale clinical judgement   Clinical Decision Making (Complexity) low complexity   Planned Therapy Interventions (PT) balance training;bed mobility training;gait training;home exercise program;neuromuscular re-education;patient/family education;strengthening;transfer training   Anticipated Equipment Needs at Discharge (PT) walker, rolling;wheelchair "   Risk & Benefits of therapy have been explained evaluation/treatment results reviewed;care plan/treatment goals reviewed;risks/benefits reviewed;current/potential barriers reviewed;participants voiced agreement with care plan;participants included;patient   PT Discharge Planning   PT Discharge Recommendation (DC Rec) Transitional Care Facility   PT Rationale for DC Rec Pt currently unable to stand from chair and will need A x 2 people for all safe mobility, would be unsafe to return home alone to [facility] despite good PCA support. Recommend TCU to improve strength, endurance and progress towards baseline mobility.   PT Brief overview of current status lift to chair, unable to stand d/t B LE weakness, vital signs stable on room air with LE exercises in chair   Plan of Care Review   Plan of Care Reviewed With patient   Total Evaluation Time   Total Evaluation Time (Minutes) 15   Physical Therapy Goals   PT Frequency 5x/week   PT Predicted Duration/Target Date for Goal Attainment 06/24/22   PT Goals Bed Mobility;Transfers;Gait   PT: Bed Mobility Minimal assist;Supine to/from sit   PT: Transfers Minimal assist;Sit to/from stand;Bed to/from chair;Assistive device   PT: Gait Minimal assist;Rolling walker;25 feet

## 2022-06-16 NOTE — ANESTHESIA CARE TRANSFER NOTE
Patient: Alexandru Still    Procedure: Procedure(s):  CYSTOURETEROSCOPY, WITH LITHOTRIPSY OF  URETHERA STONE USING LASER AND POSSIBLE URETERAL STENT INSERTION       Diagnosis: * No pre-op diagnosis entered *  Diagnosis Additional Information: No value filed.    Anesthesia Type:   General     Note:    Oropharynx: oropharynx clear of all foreign objects  Level of Consciousness: drowsy  Oxygen Supplementation: face mask  Level of Supplemental Oxygen (L/min / FiO2): 6  Independent Airway: airway patency satisfactory and stable  Dentition: dentition unchanged  Vital Signs Stable: post-procedure vital signs reviewed and stable  Report to RN Given: handoff report given  Patient transferred to: PACU    Handoff Report: Identifed the Patient, Identified the Reponsible Provider, Reviewed the pertinent medical history, Discussed the surgical course, Reviewed Intra-OP anesthesia mangement and issues during anesthesia, Set expectations for post-procedure period and Allowed opportunity for questions and acknowledgement of understanding      Vitals:  Vitals Value Taken Time   BP 88/52 06/15/22 1920   Temp     Pulse 65 06/15/22 1922   Resp 34 06/15/22 1922   SpO2 99 % 06/15/22 1922   Vitals shown include unvalidated device data.    Electronically Signed By: NATASHA Arias CRNA  Vy 15, 2022  7:23 PM

## 2022-06-16 NOTE — OP NOTE
OPERATIVE REPORT  DATE OF SURGERY: 06/15/22  LOCATION OF SURGERY: [facility]  PREOPERATIVE DIAGNOSIS:  (R33.9) Urinary retention  (primary encounter diagnosis)  (A41.9,  R65.21) Septic shock (H)  (J18.9) Community acquired pneumonia of left lower lobe of lung  POSTOPERATIVE DIAGNOSIS: (R33.9) Urinary retention  (primary encounter diagnosis)  (A41.9,  R65.21) Septic shock (H)  (J18.9) Community acquired pneumonia of left lower lobe of lung     START TIME: 6:48 PM  END TIME: 7:01 PM    PROCEDURE PERFORMED:   1. Cystourethroscopy  2. Laser lithotripsy of a urethral stone  3. Basketing of a urethral stone  4. Complex hannah catheter placement over a wire     STAFF SURGEON: Beny Ha MD  ANESTHESIA: General.   ESTIMATED BLOOD LOSS: 0 mL.   DRAINS AND TUBES: 20fr [place] tip catheter  COMPLICATIONS: None.   DISPOSITION: PACU.   SPECIMENS OBTAINED:   ID Type Source Tests Collected by Time Destination   1 : urethra stone Calculus/Stone Urethra SURGICAL PATHOLOGY EXAM Beny Ha MD 6/15/2022  7:02 PM      SIGNIFICANT FINDINGS: Cystourethroscopy with evidence of a 8 to 10 mm stone within the urethra near the bulbomembranous junction.  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  Stone fragmented with laser lithotripsy and fragments were basketed and removed.  Cystoscopy with evidence of likely prior TURP defect with moderate to severe bladder trabeculation and no other intravesical abnormalities..  Complex Hannah catheter placement over a wire.     HISTORY OF PRESENT ILLNESS: Alexandru Still is a 89 year old man who presented to the emergency room this morning with evidence of sepsis from an unclear source.  He was noted to have possible pneumonia as well as possible urinary tract infection.  Attempt at Hannah catheter placement was unsuccessful with a bladder scan of 500 cc.  My partner, Dr. Bahena, I attempted bedside Hannah catheter placement and bedside cystoscopy with evidence of a large stone in  the urethra with evidence of impaction.  Long discussion had with the family who elected to proceed with the above surgery.    OPERATION PERFORMED:   Informed consent was obtained and the patient was brought to the operating room where general anesthesia was induced. The patient was given appropriate preoperative antibiotics and positioned supine. The patient was then repositioned in dorsal lithotomy with all pressure points padded. We then performed a timeout, verifying the correct patient's site and procedure to be performed.    A 22 Jordanian cystoscope was inserted atraumatically through the urethra.  This was advanced gently up to the level of the bulbar membranous junction where there was evidence of likely prior stricture versus some urethral trauma from prior Escudero catheterization at the level of an impacted stone.  The stone was fragmented with laser lithotripsy.  Fragments were basketed and removed and the larger fragment was then able to wash into the bladder.  The cystoscope was able to then advance through the remainder of the urethra through the prostatic urethra with evidence of possible prior TURP defect and into the bladder.  The bladder neck was open.  Complete cystoscopy was performed with moderate to severe bladder trabeculation, with no other significant intravesical abnormalities.  Further stone fragments were irrigated and removed.  A 0.035 sensor wire was advanced into the bladder and the cystoscope was removed.  A 20 Jordanian [place] tip catheter was advanced over the wire into the bladder with 10 cc sterile water instilled into the balloon.  The wire was removed.  Plan to emerge from anesthesia with possible extubation in the operating room.    Plan:  - Maintain Escudero catheter to gravity drainage at this time  - Pending his clinical course a trial of void will likely be possible prior to discharge    Beny Ha MD   Urology  [facility]  Clinic Phone [phone]

## 2022-06-17 ENCOUNTER — DOCUMENTATION ONLY (OUTPATIENT)
Dept: OTHER | Facility: CLINIC | Age: 87
End: 2022-06-17
Payer: MEDICARE

## 2022-06-17 ENCOUNTER — APPOINTMENT (OUTPATIENT)
Dept: SPEECH THERAPY | Facility: CLINIC | Age: 87
DRG: 871 | End: 2022-06-17
Payer: MEDICARE

## 2022-06-17 ENCOUNTER — APPOINTMENT (OUTPATIENT)
Dept: OCCUPATIONAL THERAPY | Facility: CLINIC | Age: 87
DRG: 871 | End: 2022-06-17
Payer: MEDICARE

## 2022-06-17 LAB
ALBUMIN SERPL-MCNC: 2.3 G/DL (ref 3.4–5)
ALP SERPL-CCNC: 71 U/L (ref 40–150)
ALT SERPL W P-5'-P-CCNC: 40 U/L (ref 0–70)
ANION GAP SERPL CALCULATED.3IONS-SCNC: 5 MMOL/L (ref 3–14)
AST SERPL W P-5'-P-CCNC: 45 U/L (ref 0–45)
BACTERIA BLD CULT: ABNORMAL
BILIRUB SERPL-MCNC: 0.8 MG/DL (ref 0.2–1.3)
BUN SERPL-MCNC: 25 MG/DL (ref 7–30)
CALCIUM SERPL-MCNC: 8.5 MG/DL (ref 8.5–10.1)
CHLORIDE BLD-SCNC: 110 MMOL/L (ref 94–109)
CO2 SERPL-SCNC: 25 MMOL/L (ref 20–32)
CREAT SERPL-MCNC: 0.91 MG/DL (ref 0.66–1.25)
ERYTHROCYTE [DISTWIDTH] IN BLOOD BY AUTOMATED COUNT: 19.5 % (ref 10–15)
GFR SERPL CREATININE-BSD FRML MDRD: 80 ML/MIN/1.73M2
GLUCOSE BLD-MCNC: 99 MG/DL (ref 70–99)
GLUCOSE BLDC GLUCOMTR-MCNC: 100 MG/DL (ref 70–99)
GLUCOSE BLDC GLUCOMTR-MCNC: 120 MG/DL (ref 70–99)
GLUCOSE BLDC GLUCOMTR-MCNC: 160 MG/DL (ref 70–99)
GLUCOSE BLDC GLUCOMTR-MCNC: 89 MG/DL (ref 70–99)
GLUCOSE BLDC GLUCOMTR-MCNC: 92 MG/DL (ref 70–99)
HCT VFR BLD AUTO: 32.9 % (ref 40–53)
HGB BLD-MCNC: 10.4 G/DL (ref 13.3–17.7)
MCH RBC QN AUTO: 28.7 PG (ref 26.5–33)
MCHC RBC AUTO-ENTMCNC: 31.6 G/DL (ref 31.5–36.5)
MCV RBC AUTO: 91 FL (ref 78–100)
PLATELET # BLD AUTO: 102 10E3/UL (ref 150–450)
POTASSIUM BLD-SCNC: 3.5 MMOL/L (ref 3.4–5.3)
PROT SERPL-MCNC: 5.7 G/DL (ref 6.8–8.8)
RBC # BLD AUTO: 3.62 10E6/UL (ref 4.4–5.9)
SODIUM SERPL-SCNC: 140 MMOL/L (ref 133–144)
WBC # BLD AUTO: 18.2 10E3/UL (ref 4–11)

## 2022-06-17 PROCEDURE — 250N000011 HC RX IP 250 OP 636: Performed by: ANESTHESIOLOGY

## 2022-06-17 PROCEDURE — 99356 PR PROLONGED SERV,INPATIENT,1ST HR: CPT | Performed by: NURSE PRACTITIONER

## 2022-06-17 PROCEDURE — 200N000001 HC R&B ICU

## 2022-06-17 PROCEDURE — 99233 SBSQ HOSP IP/OBS HIGH 50: CPT | Performed by: INTERNAL MEDICINE

## 2022-06-17 PROCEDURE — 80053 COMPREHEN METABOLIC PANEL: CPT | Performed by: INTERNAL MEDICINE

## 2022-06-17 PROCEDURE — 258N000003 HC RX IP 258 OP 636: Performed by: ANESTHESIOLOGY

## 2022-06-17 PROCEDURE — 85027 COMPLETE CBC AUTOMATED: CPT | Performed by: INTERNAL MEDICINE

## 2022-06-17 PROCEDURE — 250N000011 HC RX IP 250 OP 636: Performed by: INTERNAL MEDICINE

## 2022-06-17 PROCEDURE — 99233 SBSQ HOSP IP/OBS HIGH 50: CPT | Performed by: NURSE PRACTITIONER

## 2022-06-17 PROCEDURE — 97535 SELF CARE MNGMENT TRAINING: CPT | Mod: GO

## 2022-06-17 PROCEDURE — 250N000013 HC RX MED GY IP 250 OP 250 PS 637: Performed by: INTERNAL MEDICINE

## 2022-06-17 PROCEDURE — 92526 ORAL FUNCTION THERAPY: CPT | Mod: GN

## 2022-06-17 RX ORDER — POTASSIUM CHLORIDE 1500 MG/1
20 TABLET, EXTENDED RELEASE ORAL DAILY
Status: DISCONTINUED | OUTPATIENT
Start: 2022-06-17 | End: 2022-06-19

## 2022-06-17 RX ORDER — FLUTICASONE PROPIONATE 50 MCG
1 SPRAY, SUSPENSION (ML) NASAL DAILY PRN
Status: DISCONTINUED | OUTPATIENT
Start: 2022-06-17 | End: 2022-06-23 | Stop reason: HOSPADM

## 2022-06-17 RX ORDER — FUROSEMIDE 40 MG
40 TABLET ORAL DAILY
Status: DISCONTINUED | OUTPATIENT
Start: 2022-06-17 | End: 2022-06-18

## 2022-06-17 RX ORDER — FUROSEMIDE 40 MG
40 TABLET ORAL 2 TIMES DAILY
Status: DISCONTINUED | OUTPATIENT
Start: 2022-06-17 | End: 2022-06-17

## 2022-06-17 RX ORDER — LEVOTHYROXINE SODIUM 112 UG/1
112 TABLET ORAL DAILY
Status: DISCONTINUED | OUTPATIENT
Start: 2022-06-17 | End: 2022-06-23 | Stop reason: HOSPADM

## 2022-06-17 RX ORDER — ASPIRIN 81 MG/1
81 TABLET, CHEWABLE ORAL DAILY
Status: DISCONTINUED | OUTPATIENT
Start: 2022-06-17 | End: 2022-06-23 | Stop reason: HOSPADM

## 2022-06-17 RX ORDER — FINASTERIDE 5 MG/1
5 TABLET, FILM COATED ORAL DAILY
Status: DISCONTINUED | OUTPATIENT
Start: 2022-06-17 | End: 2022-06-23 | Stop reason: HOSPADM

## 2022-06-17 RX ORDER — TAMSULOSIN HYDROCHLORIDE 0.4 MG/1
0.8 CAPSULE ORAL DAILY
Status: DISCONTINUED | OUTPATIENT
Start: 2022-06-17 | End: 2022-06-23 | Stop reason: HOSPADM

## 2022-06-17 RX ORDER — SODIUM BICARBONATE 650 MG/1
650 TABLET ORAL
Status: DISCONTINUED | OUTPATIENT
Start: 2022-06-17 | End: 2022-06-23 | Stop reason: HOSPADM

## 2022-06-17 RX ADMIN — LEVOTHYROXINE SODIUM 112 MCG: 0.11 TABLET ORAL at 08:48

## 2022-06-17 RX ADMIN — TAMSULOSIN HYDROCHLORIDE 0.8 MG: 0.4 CAPSULE ORAL at 08:48

## 2022-06-17 RX ADMIN — TAZOBACTAM SODIUM AND PIPERACILLIN SODIUM 3.38 G: 375; 3 INJECTION, SOLUTION INTRAVENOUS at 21:07

## 2022-06-17 RX ADMIN — AZITHROMYCIN MONOHYDRATE 500 MG: 500 INJECTION, POWDER, LYOPHILIZED, FOR SOLUTION INTRAVENOUS at 11:41

## 2022-06-17 RX ADMIN — ENOXAPARIN SODIUM 40 MG: 40 INJECTION SUBCUTANEOUS at 08:57

## 2022-06-17 RX ADMIN — FINASTERIDE 5 MG: 5 TABLET, FILM COATED ORAL at 08:48

## 2022-06-17 RX ADMIN — TAZOBACTAM SODIUM AND PIPERACILLIN SODIUM 3.38 G: 375; 3 INJECTION, SOLUTION INTRAVENOUS at 04:10

## 2022-06-17 RX ADMIN — ASPIRIN 81 MG CHEWABLE TABLET 81 MG: 81 TABLET CHEWABLE at 08:48

## 2022-06-17 RX ADMIN — DICLOFENAC SODIUM 4 G: 10 GEL TOPICAL at 08:58

## 2022-06-17 RX ADMIN — POTASSIUM CHLORIDE 20 MEQ: 1500 TABLET, EXTENDED RELEASE ORAL at 08:48

## 2022-06-17 RX ADMIN — SODIUM BICARBONATE 650 MG TABLET 650 MG: at 09:04

## 2022-06-17 RX ADMIN — DICLOFENAC SODIUM 4 G: 10 GEL TOPICAL at 15:22

## 2022-06-17 RX ADMIN — TAZOBACTAM SODIUM AND PIPERACILLIN SODIUM 3.38 G: 375; 3 INJECTION, SOLUTION INTRAVENOUS at 15:21

## 2022-06-17 RX ADMIN — TAZOBACTAM SODIUM AND PIPERACILLIN SODIUM 3.38 G: 375; 3 INJECTION, SOLUTION INTRAVENOUS at 08:55

## 2022-06-17 RX ADMIN — FUROSEMIDE 40 MG: 40 TABLET ORAL at 08:48

## 2022-06-17 RX ADMIN — SODIUM BICARBONATE 650 MG TABLET 650 MG: at 11:36

## 2022-06-17 RX ADMIN — SODIUM BICARBONATE 650 MG TABLET 650 MG: at 17:58

## 2022-06-17 RX ADMIN — DICLOFENAC SODIUM 4 G: 10 GEL TOPICAL at 21:09

## 2022-06-17 ASSESSMENT — ACTIVITIES OF DAILY LIVING (ADL)
ADLS_ACUITY_SCORE: 51
ADLS_ACUITY_SCORE: 55
ADLS_ACUITY_SCORE: 51
ADLS_ACUITY_SCORE: 55
ADLS_ACUITY_SCORE: 51

## 2022-06-17 NOTE — PLAN OF CARE
ICU End of Shift Summary.  For vital signs and complete assessments, please see documentation flowsheets.      Pertinent assessments: Pt alert, oriented to self and place, intermittently to time.  Pt tele Paced, BP tolerating off norepi gtt.  Pt denies nausea, numbness.  Pain to shoulders, Voltaren applied.  Pt with low UOP, still adequate, 1 large BM  Major Shift Events: None  Plan (Upcoming Events): Monitor uop, pressure  Discharge/Transfer Needs: Medical patient this AM, transfer as able     Bedside Shift Report Completed : y  Bedside Safety Check Completed:y

## 2022-06-17 NOTE — PLAN OF CARE
Pt more alert today, confused to date and time. Levophed weaned off. Too weak to stand today. Up to chair with 2 assist and lift. Started on diet. Pts grandson brought in pt's lower dentures and hearing aids. Pt/OT is working with pt. Pt does not want to go to TCU/Ltach. Palliative is following pt.     Goal Outcome Evaluation:    Plan of Care Reviewed With: patient, grandchild(mart)

## 2022-06-17 NOTE — PHARMACY-ADMISSION MEDICATION HISTORY
Admission medication history interview status for this patient is complete. See [facility] admission navigator for allergy information, prior to admission medications and immunization status.     Medication history interview done, indicate source(s): Patient and Caregiver (PCA - Yamila [phone])  Medication history resources (including written lists, pill bottles, clinic record): Clinic record from [facility] in [place], [phone]  Pharmacy: Unknown - no pharmacy on file    Changes made to PTA medication list:  Added: None  Changed: levothyroxine 100mcg tablet --> 112mcg tablet, APAP 650mg q6h PRN --> 325-650mg q6h PRN  Reported as Not Taking: aspirin 81mg  Removed: none    Actions taken by pharmacist (provider contacted, etc): [facility] contacted for med hx.     Additional medication history information: Spoke with pt's son-in-law and grandson who stated that his PCA would be the best person to speak with regarding medication hx. PCA stated that pt's last took all of his meds as prescribed on Wednesday night. Pt also takes a variety of vitamins, but she will give a list to pt's son-in-law to bring in.    Medication reconciliation/reorder completed by provider prior to medication history?  Y     Prior to Admission medications    Medication Sig Last Dose Taking? Auth Provider Long Term End Date   acetaminophen (TYLENOL) 325 MG tablet Take 325-650 mg by mouth every 6 hours as needed 6/15/2022 at PRN Yes Unknown, Entered By History     Calcium Carb-Cholecalciferol (CALCIUM-VITAMIN D) 500-400 MG-UNIT TABS Take 1 tablet by mouth daily 6/15/2022 at PM Yes Unknown, Entered By History     finasteride (PROSCAR) 5 MG tablet Take 5 mg by mouth daily 6/15/2022 at AM Yes Unknown, Entered By History     fluticasone (FLONASE) 50 MCG/ACT nasal spray Spray 1 spray into both nostrils daily Unknown at PRN Yes Unknown, Entered By History     furosemide (LASIX) 40 MG tablet Take 40 mg by mouth 2 times  daily 6/15/2022 at PM Yes Unknown, Entered By History Yes    gabapentin (NEURONTIN) 800 MG tablet Take 800 mg by mouth 3 times daily 6/15/2022 at PM Yes Unknown, Entered By History Yes    levothyroxine (SYNTHROID/LEVOTHROID) 112 MCG tablet Take 112 mcg by mouth daily 6/15/2022 at PM Yes Unknown, Entered By History Yes    potassium bicarbonate (K-LYTE) 25 MEQ effervescent tablet Take 25 mEq by mouth 3 times daily (with meals) 6/15/2022 at PM Yes Unknown, Entered By History     senna-docusate (SENOKOT-S/PERICOLACE) 8.6-50 MG tablet Take 2 tablets by mouth 2 times daily as needed for constipation (Hold for loose stool.) 6/15/2022 at PM Yes Unknown, Entered By History     tamsulosin (FLOMAX) 0.4 MG capsule Take 0.8 mg by mouth daily 6/15/2022 at PM Yes Unknown, Entered By History     aspirin (ASA) 81 MG chewable tablet Take 81 mg by mouth daily  Patient not taking: Reported on 6/17/2022 Not Taking at Unknown time  Unknown, Entered By History

## 2022-06-17 NOTE — PROGRESS NOTES
[facility]    Hospitalist Progress Note  Name: Alexandru Still    MRN: [id number]  Provider:  Zeus Evangelista DO  Date of Service: 06/17/2022    Summary of Stay: Alexandru Still is a 89 year old male with a history of complete heart block status post pacemaker in 2019, aortic valve stenosis status post TAVR in 2019, hypertension, diastolic CHF, hypothyroidism, Lambert-Eaton myasthenic syndrome, PB, chronic anemia, degenerative disc disease with neuropathy, BPH admitted on 6/15/2022 with fever and rigors.  In the emergency department the patient was found to have a blood pressure of 140/96, tachycardic at 109, temperature 102.6  F, SPO2 89% on room air with improvement to 93% on 3 L nasal cannula.  Initial lab work revealed WBC 6.9, hemoglobin 12.2, platelet 193, CMP within normal limits, lactic acid 5.1.  Urinalysis showed 17 WBCs and small leukocyte esterase, many bacteria.  Chest x-ray showed left basilar infiltrate consistent with pneumonia.  Patient was started on empiric vancomycin and Zosyn as well as IV fluids.  The patient did become hypotensive and despite fluid resuscitation required vasopressor support with norepinephrine.  The patient was admitted to the ICU.  Due to difficulty with Escudero placement and urinary retention the patient was seen by urology.  Urology found the patient to have a urethral stone.  On 6/15/2022, the patient was taken for cystourethroscopy with laser lithotripsy of urethral stone and Escudero placement.  The patient was also seen by speech therapy who recommended small bite-size solids with mildly thick liquids.  Urine cultures returned showing mixed urogenital deisy.  Blood cultures returned positive for group G strep.  Repeat blood cultures were negative.  The patient was titrated off of norepinephrine and on 6/17/2022, the patient was transferred out of the ICU.  The patient was seen by physical therapy who recommended transitional care for the patient.   The patient refused stating that he had been to several facilities previously and they did not to anything for him.    TODAY'S PLAN: Transfer out of the ICU today.  Patient's vital signs stable off of norepinephrine.  We will plan to restart home Lasix today.  Discussed physical therapy recommendations for transitional care.  Patient is refusing at this point and would prefer to go home as he states that he has been to them previously and they have done nothing for him.  Patient was encouraged to reconsider as he has required significant assistance while in the hospital.  Discontinue hannah today and plan for TOV.    Problem List:   Acute Septic Encephalopathy  Septic Shock  Community Acquired Pneumonia  Urinary Tract Infection  Gram Positive Cocci in Pairs and Chains Bacteremia  Lactic Acidosis  - Norepinephrine weaned off on 6/16/2022  - Initially on Vancomycin and Zosyn.  Azithromycin added.  Vancomyin discontinued secondary to negative MRSA swab.  - Urine cultures showed mixed urogenital deisy  - Blood culture on 6/15 positive for gram G strep  - Repeat Blood culture on 6/15 NTD  - LA improved with IVF     Urinary Retention  Urethral Stone s/p Urethroscopy and Lithotripsy on 6/15/2022  BPH  - Appreciate Urology recommendations  - Maintain hannah for now.  Will plan for TOV prior to discharge.     Generalized Weakness/Deconditioning  Dysphagia  - Likely secondary to septic shock  - PT/OT recommending TCU.  Pt refusing.  - Appreciate SLP recommendation - Small/bite sized solids (IDDSI 6), mildly thick liquids (IDDSI 2)     Acute Hypoxic Respiratory Failure  - Likely secondary to PNA  - Wean O2 as able  - Abx as above     Lambert-Eaton Myasthenic Syndrome  - Hx of falls due to weakness  - PT/OT     Hypertension  Diastolic CHF  - Resume home lasix at lower dose to start: lasix 40 mg PO daily.  PTA dosing is lasix 40 mg PO BID  - Antihypertensives on hold     Hypokalemia  - Start Potassium Chloride 20 mEq daily  -  Electrolyte replacement protocol  - Daily BMP    Chronic Anemia  - Baseline hgb 8-9  - Daily CBC     Chronic Medical Problems:  Obesity - BMI 31.47  PB  Hypothyroidism  Cholelithiasis  Aortic Stenosis s/p TAVR  Complete Heart Block s/p PPM  Degenerative Disc Disease with Neuropathy  Known Pulmonary Nodule - dating back to 2014    DVT Prophylaxis: Enoxaparin (Lovenox) SQ  Code Status: Full Code  Diet: Combination Diet Soft and Bite Sized Diet (level 6); Mildly Thick (level 2)    Escudero Catheter: PRESENT, indication: /GI/GYN Pelvic Procedure  Disposition: Expected discharge in 1-2 days to TCU vs home with home care. Goals prior to discharge include abx plan established, repeat blood cultures negative.   Family updated today: No     Interval History   Pt seen and examined.  Pt denies cp, sob.  States his appetite is good.    -Data reviewed today: I personally reviewed all new labs and imaging results over the last 24 hours.     Physical Exam   Temp: 98.3  F (36.8  C) Temp src: Axillary BP: 138/79 Pulse: 59   Resp: 23 SpO2: 95 % O2 Device: None (Room air)    Vitals:    06/15/22 1115 06/16/22 0755 06/17/22 0645   Weight: 91.8 kg (202 lb 6.4 oz) 93.9 kg (207 lb) 96.7 kg (213 lb 1.6 oz)     Vital Signs with Ranges  Temp:  [98.3  F (36.8  C)-98.9  F (37.2  C)] 98.3  F (36.8  C)  Pulse:  [54-79] 59  Resp:  [9-47] 23  BP: ()/() 138/79  SpO2:  [81 %-98 %] 95 %  I/O last 3 completed shifts:  In: 629.51 [P.O.:357; I.V.:272.51]  Out: 925 [Urine:925]    GENERAL: No apparent distress. Awake, alert, and fully oriented.  HEENT: Normocephalic, atraumatic. Extraocular movements intact.  CARDIOVASCULAR: Regular rate and rhythm without murmurs or rubs. No S3.  PULMONARY: Clear bilaterally.  GASTROINTESTINAL: Soft, non-tender, non-distended. Bowel sounds normoactive.   EXTREMITIES: No cyanosis or clubbing. No edema.  NEUROLOGICAL: CN 2-12 grossly intact, no focal neurological deficits.  DERMATOLOGICAL: No rash, ulcer,  bruising, nor jaundice.    Medications       azithromycin  500 mg Intravenous Q24H     diclofenac  4 g Topical TID     enoxaparin ANTICOAGULANT  40 mg Subcutaneous Q24H     piperacillin-tazobactam  3.375 g Intravenous Q6H     sodium chloride (PF)  3 mL Intracatheter Q8H     Data     Laboratory:  Recent Labs   Lab 06/16/22  0532 06/15/22  0809 06/15/22  0236   WBC 28.6* 8.5 6.9   HGB 10.9* 10.9* 12.2*   HCT 34.8* 34.4* 39.2*   MCV 90 90 91   * 142* 183     Recent Labs   Lab 06/17/22  0648 06/17/22  0415 06/16/22  2354 06/16/22  0824 06/16/22  0532 06/16/22  0329 06/16/22  0043 06/15/22  1112 06/15/22  0809     --   --   --  138  --   --   --  139   POTASSIUM 3.5  --   --   --  4.1  --  4.3  --  3.2*   CHLORIDE 110*  --   --   --  108  --   --   --  105   CO2 25  --   --   --  23  --   --   --  25   ANIONGAP 5  --   --   --  7  --   --   --  9   GLC 99 92 120*   < > 119*   < >  --    < > 102*   BUN 25  --   --   --  21  --   --   --  20   CR 0.91  --   --   --  0.93  --   --   --  0.99   GFRESTIMATED 80  --   --   --  78  --   --   --  72   TITA 8.5  --   --   --  8.6  --   --   --  9.0    < > = values in this interval not displayed.     No results for input(s): CULT in the last 168 hours.    Imaging:  No results found for this or any previous visit (from the past 24 hour(s)).      Zeus Evangelista DO  [facility] Hospitalist  [address] [place] [address]  06/17/2022

## 2022-06-17 NOTE — PLAN OF CARE
RN - Hypertensive otherwise vitally stable. Tele SR with 1st degree AVB and BBB with noted demand pacing. Pt A&Ox2 - disoriented at times to situation and place. Denying pain. Indwelling hannah removed - Pt incontinent of urine with low PVR per bladder scan. Pt had 2x large loose incontinent stools. PICC line removed - pt receiving new PIV for ongoing antibiotics. Up with chair x2 today - pt heavy assist of 2 with walker and gait belt. Palliative care facilitating POLST and advance health care directive. Pt code status changed. Discharge likely to TCU despite pt reluctance to accept.

## 2022-06-17 NOTE — PROGRESS NOTES
Off norepinephrine since 7 PM last night.  Vital signs appear stable.  Improving from his infection.  Changed to medical overflow.  Transfer navigator orders completed.

## 2022-06-17 NOTE — PROGRESS NOTES
SPIRITUAL HEALTH SERVICES Progress Note  Formerly [facility] ICU    Referral Source: Routine admission hospital  request.    Pt is visiting with grandson and declined SHS stating he has not requested SHS.  Oriented to Spiritual Health Services for support during hospital stay.    Plan: Spiritual Health Services remains available for additional emotional/spiritual support.    Lewis Chapin MA  Staff   Pager: [phone]  Phone: [phone]  *off on Mondays

## 2022-06-17 NOTE — PROGRESS NOTES
[facility]  Palliative Care Progress Note  Text Page    Addendum:  11:00-11:35  met with patient and son in law Abhijit Rao,   Patient looks better today.  Reviewed medical documents for patient  brought in by Abhijit Rao.  POLST dated below reviewed and current with patient wishes and now validated by honoring [employer].  Patient also has health care directive, noting agents are no longer appropriate and patient choosing Erv to be primary agent Reviewed Health care directive and both are willing to complete new document.   16:30- 1700 This writer witnessed notary via video visit with Danie Murphy from [place].  Sent signed document to honoring [employer], awaiting validation and entry into EMR     Assessment & Plan    Alexandru Still is a 89 year old male who was admitted on 6/15/2022.   Consulted by Dr. Kyler Miguel MD  to assist with symptom management, goals of care, and development of plan of care.       Recommendations:  1. Goals of Care- No CPR- Do NOT Intubate  Hospitalization goals discussed  Yes see above   Decisional Capacity- intact. Patient does not have an advance directive. Per  informed consent policy next of kin should be involved if patient becomes unable. Abhijit Rao son in law has agreed to be next of of kin decision maker.  see above.    POLST dated 12/03/2020 restorative goals with selective treatments  entered into EMR 6/17/22  2. Pain chronic  Bilateral hip pain and shoulder pain   Patient's opioid use in past 24 hours: 0 = 0 mg Daily Morphine Equivalent  [place] Board of Pharmacy Data Base Reviewed:    YES; As expected, no concern for misuse/abuse of controlled medications based on this report.  ORT  NA  ( add dot phrase .FRHORT if warranted)  -acetaminophen (Tylenol)  650 mg every 4 hrs prn  -on 800 mg gabapentin tid PTA, consider restarting at lower dose and when mental status clearer   -diclofenac gel tid to both hips and shoulders     3. Dyspnea  acute respiratory failure in the setting of sepsis critical illness and baseline dyspnea   - oxygen as indicated P02>90 %  -medical management   -SPL appreciate in put and recommendations  -respiratory hygiene       4. Spiritual Care  Oriented to Spiritual Health as part of Palliative Care team. Spiritual Health Services declined at this time.  Spiritual Background:  [place]      5. Care Planning  -Appreciate Care of VICKIE Ríos in locating next of kin and care coordination   -disposition:  Likely TCU discharge but patient did not find much benefit in past and would like to return home   Medications for discharge  TBD       Medical Decision Making and Goals of Care:  Discussed on June 16, 2022 with Tammy Wood, RN-C,APRN,CNP,ACHPN: patient  Up in chair, requirung assist of 2   States he walks with walker at home. PCA help everyday.  He is states he does not want chest compression, but wants Intubation if needed ( consistent with 6/15 conversation).  He states if unable to speak for himself would like Erv his son in law to make decisions.  Nurse reports intermittent confusion, however he is able to make needs know, is aware he is very ill   (unclear if he understands extent and complexity as well as risk benefit of treatment vs no treatment in regard to code status). He is hard of hearing so waiting for pocket talker. He is not wanting TCU if needed at discharge.  Has SPL evaluation today bite size level 6 diet thick liquids.Escudero with clear yellow urine.   Contacted Abhijit Rao and will plan to meet with him today face to face. He is agreeable to being primary health care agent.      Discussed on Vy 15, 2022 with NATASHA Aponte CNP:  I met with the patient at the bedside, he is intermittently confusion and dyspneic. As I spent more time with the patient he was becoming less confused and able to make needs known.  He tells me he lives in a condo and has the assistance of a  "PCA but was unable to state her name.  He tells me that he has 4 adult children, 3 have passed and 1 son is in a nursing home.  His daughter, who is his emergency contact has passed away however, with care coordination's efforts and nursing support  we were able to locate his son-in-law Abhijit Rao.  He is willing to be his decision maker and make medical decisions on his behalf.  Erv tells me that the patient has never talked to him about goals of care and what was important to him in regard to advanced life support.  I informed Erv, that the patient has in his chart documentation for both DNR and full code with prior records. Erkate did tell me however that if he was unable to go back to his condo to live but that could possibly be a deal breaker and he would then limit how aggressive his care might be.     As the patient is unable to clearly state his goals of care due to intermittent confusion and his son-in-law is unable to identify goals of care for now, the patient will remain full code.  I also spoke with the son-in-law again by phone and he states that the patient's caregiver Jevon who is very involved in his care states that he wants \"everything done\" and recently he has been to a place called [facility] to declare his goals of care.  I asked the son-in-law  if if he could see if the patient has supporting written documentation to this effect at his condo.       NATASHA Aponte CNP. MSN, RN-PHN  [facility] DNP Student  Pain Management and Palliative Care  [facility]  Pgr: [phone]      Time Spent on this Encounter   Total unit/floor time 75 minutes, time consisted of the following, examination of the patient, reviewing the record and completing documentation. >50% of time spent in counseling and coordination of care, Bedside Nurse Reg Cannon, JAILENE  and Hospitalist  Zeus Evangelista DO .  Time spend counseling with patient, son in law, " PCA, [place]ia consisted of the following topics, goals of care, advance care planning and symptom management.      Total Visit Time: 75  o For Outpatient, 'Total Visit Time' = Face-to-Face time only.  o For Inpatient, 'Total Visit Time' = Unit Floor + Face-to-Face time.    Total Face-to-Face Prolonged Service Time: 30  Content of the Prolonged Time:  Goals of care, health care directive execution.        Review of Systems    CONSTITUTIONAL: NEGATIVE for fever, chills, change in weight  ENT/MOUTH: NEGATIVE for ear, mouth and throat problems, POSITIVE for [place]  RESP: for significant cough or SOB,no cough  CV: NEGATIVE for chest pain, palpitations, POSITIVE FOR LEFT MORE THAN RIGHT BLE edema  MSK: POSITIVE FOR  L shoulder pain    Palliative Symptom Review (0=no symptom/no concern, 1=mild, 2=moderate, 3=severe):      Pain: 1-mild      Fatigue: 2-moderate      Nausea: 0-none      Constipation: 0-none , last BM 6/16       Diarrhea: 0-none      Depressive Symptoms: 0-none      Anxiety: 0-none      Drowsiness: 1-mild      Poor Appetite: 0-none      Shortness of Breath: 2-moderate      Insomnia: 0-none      Other: edema  2-moderate left more than right BLE      Overall (0 good/no concerns, 3 very poor):  2    Physical Exam   Temp:  [98.2  F (36.8  C)-98.8  F (37.1  C)] 98.2  F (36.8  C)  Pulse:  [59-78] 60  Resp:  [9-47] 20  BP: ()/() 160/84  SpO2:  [81 %-98 %] 98 %  213 lbs 1.6 oz  Exam:  GE Alert oriented to place and self.and date  appears comfortable, NAD.  HEENT:  Normocephalic/atraumatic, no scleral icterus, no nasal discharge, mouth moist. Raised voice required to communicate.  Pocket Talker ordered to assist with hearing.   CV:  RRR, S1, S2; + murmurs or other irregularities noted.  +3 DP/PT pulses bilatererally; edema BLE left >right .  RESP:  BLL diminished LS Symmetric chest rise on inhalation noted. SOB at rest noted on RA,increasing with exertion.  Audible wheezing with exertion.  sats during  conversation in encounter mid 90's  ABD:  Rounded, soft, non-tender/non-distended.  MS: reports pain in L shoulder     SKIN:  Dry to touch, no exanthems noted in the visualized areas.    NEURO: Symmetric movement x 4.  Sensation to touch intact all extremities.   There is no area of allodynia or hyperesthesia.  PAIN BEHAVIOR: Cooperative  Psych:  Normal affect.  Calm, cooperative, conversant appropriately.    Medications       aspirin  81 mg Oral Daily     azithromycin  500 mg Intravenous Q24H     diclofenac  4 g Topical TID     enoxaparin ANTICOAGULANT  40 mg Subcutaneous Q24H     finasteride  5 mg Oral Daily     furosemide  40 mg Oral Daily     levothyroxine  112 mcg Oral Daily     piperacillin-tazobactam  3.375 g Intravenous Q6H     potassium chloride  20 mEq Oral Daily     sodium bicarbonate  650 mg Oral TID w/meals     sodium chloride (PF)  3 mL Intracatheter Q8H     tamsulosin  0.8 mg Oral Daily       Data   Results for orders placed or performed during the hospital encounter of 06/15/22 (from the past 24 hour(s))   Glucose by meter   Result Value Ref Range    GLUCOSE BY METER POCT 143 (H) 70 - 99 mg/dL   Glucose by meter   Result Value Ref Range    GLUCOSE BY METER POCT 138 (H) 70 - 99 mg/dL   Glucose by meter   Result Value Ref Range    GLUCOSE BY METER POCT 120 (H) 70 - 99 mg/dL   Glucose by meter   Result Value Ref Range    GLUCOSE BY METER POCT 92 70 - 99 mg/dL   CBC with platelets   Result Value Ref Range    WBC Count 18.2 (H) 4.0 - 11.0 10e3/uL    RBC Count 3.62 (L) 4.40 - 5.90 10e6/uL    Hemoglobin 10.4 (L) 13.3 - 17.7 g/dL    Hematocrit 32.9 (L) 40.0 - 53.0 %    MCV 91 78 - 100 fL    MCH 28.7 26.5 - 33.0 pg    MCHC 31.6 31.5 - 36.5 g/dL    RDW 19.5 (H) 10.0 - 15.0 %    Platelet Count 102 (L) 150 - 450 10e3/uL   Comprehensive metabolic panel   Result Value Ref Range    Sodium 140 133 - 144 mmol/L    Potassium 3.5 3.4 - 5.3 mmol/L    Chloride 110 (H) 94 - 109 mmol/L    Carbon Dioxide (CO2) 25 20 - 32  mmol/L    Anion Gap 5 3 - 14 mmol/L    Urea Nitrogen 25 7 - 30 mg/dL    Creatinine 0.91 0.66 - 1.25 mg/dL    Calcium 8.5 8.5 - 10.1 mg/dL    Glucose 99 70 - 99 mg/dL    Alkaline Phosphatase 71 40 - 150 U/L    AST 45 0 - 45 U/L    ALT 40 0 - 70 U/L    Protein Total 5.7 (L) 6.8 - 8.8 g/dL    Albumin 2.3 (L) 3.4 - 5.0 g/dL    Bilirubin Total 0.8 0.2 - 1.3 mg/dL    GFR Estimate 80 >60 mL/min/1.73m2   Glucose by meter   Result Value Ref Range    GLUCOSE BY METER POCT 89 70 - 99 mg/dL   Glucose by meter   Result Value Ref Range    GLUCOSE BY METER POCT 160 (H) 70 - 99 mg/dL

## 2022-06-18 ENCOUNTER — APPOINTMENT (OUTPATIENT)
Dept: SPEECH THERAPY | Facility: CLINIC | Age: 87
DRG: 871 | End: 2022-06-18
Payer: MEDICARE

## 2022-06-18 LAB
ANION GAP SERPL CALCULATED.3IONS-SCNC: 5 MMOL/L (ref 3–14)
BUN SERPL-MCNC: 26 MG/DL (ref 7–30)
CALCIUM SERPL-MCNC: 8.6 MG/DL (ref 8.5–10.1)
CHLORIDE BLD-SCNC: 109 MMOL/L (ref 94–109)
CO2 SERPL-SCNC: 25 MMOL/L (ref 20–32)
CREAT SERPL-MCNC: 0.86 MG/DL (ref 0.66–1.25)
ERYTHROCYTE [DISTWIDTH] IN BLOOD BY AUTOMATED COUNT: 19 % (ref 10–15)
GFR SERPL CREATININE-BSD FRML MDRD: 82 ML/MIN/1.73M2
GLUCOSE BLD-MCNC: 91 MG/DL (ref 70–99)
GLUCOSE BLDC GLUCOMTR-MCNC: 102 MG/DL (ref 70–99)
GLUCOSE BLDC GLUCOMTR-MCNC: 128 MG/DL (ref 70–99)
GLUCOSE BLDC GLUCOMTR-MCNC: 89 MG/DL (ref 70–99)
HCT VFR BLD AUTO: 33.9 % (ref 40–53)
HGB BLD-MCNC: 10.7 G/DL (ref 13.3–17.7)
MCH RBC QN AUTO: 27.9 PG (ref 26.5–33)
MCHC RBC AUTO-ENTMCNC: 31.6 G/DL (ref 31.5–36.5)
MCV RBC AUTO: 88 FL (ref 78–100)
PLATELET # BLD AUTO: 109 10E3/UL (ref 150–450)
POTASSIUM BLD-SCNC: 3.5 MMOL/L (ref 3.4–5.3)
RBC # BLD AUTO: 3.84 10E6/UL (ref 4.4–5.9)
SODIUM SERPL-SCNC: 139 MMOL/L (ref 133–144)
WBC # BLD AUTO: 14.2 10E3/UL (ref 4–11)

## 2022-06-18 PROCEDURE — 258N000003 HC RX IP 258 OP 636: Performed by: ANESTHESIOLOGY

## 2022-06-18 PROCEDURE — 80048 BASIC METABOLIC PNL TOTAL CA: CPT | Performed by: INTERNAL MEDICINE

## 2022-06-18 PROCEDURE — 250N000013 HC RX MED GY IP 250 OP 250 PS 637: Performed by: INTERNAL MEDICINE

## 2022-06-18 PROCEDURE — 36415 COLL VENOUS BLD VENIPUNCTURE: CPT | Performed by: INTERNAL MEDICINE

## 2022-06-18 PROCEDURE — 250N000011 HC RX IP 250 OP 636: Performed by: INTERNAL MEDICINE

## 2022-06-18 PROCEDURE — 200N000001 HC R&B ICU

## 2022-06-18 PROCEDURE — 92526 ORAL FUNCTION THERAPY: CPT | Mod: GN | Performed by: SPEECH-LANGUAGE PATHOLOGIST

## 2022-06-18 PROCEDURE — 85014 HEMATOCRIT: CPT | Performed by: INTERNAL MEDICINE

## 2022-06-18 PROCEDURE — 99233 SBSQ HOSP IP/OBS HIGH 50: CPT | Performed by: INTERNAL MEDICINE

## 2022-06-18 PROCEDURE — 250N000011 HC RX IP 250 OP 636: Performed by: ANESTHESIOLOGY

## 2022-06-18 RX ORDER — FUROSEMIDE 10 MG/ML
40 INJECTION INTRAMUSCULAR; INTRAVENOUS ONCE
Status: COMPLETED | OUTPATIENT
Start: 2022-06-18 | End: 2022-06-18

## 2022-06-18 RX ORDER — FUROSEMIDE 40 MG
40 TABLET ORAL
Status: DISCONTINUED | OUTPATIENT
Start: 2022-06-18 | End: 2022-06-19

## 2022-06-18 RX ADMIN — TAZOBACTAM SODIUM AND PIPERACILLIN SODIUM 3.38 G: 375; 3 INJECTION, SOLUTION INTRAVENOUS at 02:25

## 2022-06-18 RX ADMIN — FUROSEMIDE 40 MG: 40 TABLET ORAL at 16:46

## 2022-06-18 RX ADMIN — SODIUM BICARBONATE 650 MG TABLET 650 MG: at 12:44

## 2022-06-18 RX ADMIN — FINASTERIDE 5 MG: 5 TABLET, FILM COATED ORAL at 09:06

## 2022-06-18 RX ADMIN — DICLOFENAC SODIUM 4 G: 10 GEL TOPICAL at 16:46

## 2022-06-18 RX ADMIN — TAZOBACTAM SODIUM AND PIPERACILLIN SODIUM 3.38 G: 375; 3 INJECTION, SOLUTION INTRAVENOUS at 10:41

## 2022-06-18 RX ADMIN — FUROSEMIDE 40 MG: 10 INJECTION, SOLUTION INTRAMUSCULAR; INTRAVENOUS at 12:43

## 2022-06-18 RX ADMIN — POTASSIUM CHLORIDE 20 MEQ: 1500 TABLET, EXTENDED RELEASE ORAL at 09:06

## 2022-06-18 RX ADMIN — TAMSULOSIN HYDROCHLORIDE 0.8 MG: 0.4 CAPSULE ORAL at 09:06

## 2022-06-18 RX ADMIN — TAZOBACTAM SODIUM AND PIPERACILLIN SODIUM 3.38 G: 375; 3 INJECTION, SOLUTION INTRAVENOUS at 16:46

## 2022-06-18 RX ADMIN — TAZOBACTAM SODIUM AND PIPERACILLIN SODIUM 3.38 G: 375; 3 INJECTION, SOLUTION INTRAVENOUS at 20:44

## 2022-06-18 RX ADMIN — AZITHROMYCIN MONOHYDRATE 500 MG: 500 INJECTION, POWDER, LYOPHILIZED, FOR SOLUTION INTRAVENOUS at 12:43

## 2022-06-18 RX ADMIN — DICLOFENAC SODIUM 4 G: 10 GEL TOPICAL at 21:32

## 2022-06-18 RX ADMIN — ASPIRIN 81 MG CHEWABLE TABLET 81 MG: 81 TABLET CHEWABLE at 09:06

## 2022-06-18 RX ADMIN — SODIUM BICARBONATE 650 MG TABLET 650 MG: at 18:17

## 2022-06-18 RX ADMIN — SODIUM BICARBONATE 650 MG TABLET 650 MG: at 09:06

## 2022-06-18 RX ADMIN — ENOXAPARIN SODIUM 40 MG: 40 INJECTION SUBCUTANEOUS at 09:08

## 2022-06-18 RX ADMIN — DICLOFENAC SODIUM 4 G: 10 GEL TOPICAL at 09:11

## 2022-06-18 RX ADMIN — LEVOTHYROXINE SODIUM 112 MCG: 0.11 TABLET ORAL at 09:06

## 2022-06-18 RX ADMIN — FUROSEMIDE 40 MG: 40 TABLET ORAL at 09:06

## 2022-06-18 ASSESSMENT — ACTIVITIES OF DAILY LIVING (ADL)
ADLS_ACUITY_SCORE: 55
ADLS_ACUITY_SCORE: 57
ADLS_ACUITY_SCORE: 57
ADLS_ACUITY_SCORE: 55

## 2022-06-18 NOTE — PLAN OF CARE
Goal Outcome Evaluation:    Plan of Care Reviewed With: patient   ICU End of Shift Summary.  For vital signs and complete assessments, please see documentation flowsheets.     Neuro: A&Ox4, forgetful, [place]  Cardiac: SB-SR, demand pacing, bp hypertensive 160s  Resp: sats > 92% on room air, [place], wheezy  GI: Abd rounded, ate 50% of dinner + a pudding, smear of soft BM  : scrotal edema, incontinent of urine and using urinal as well  Skin: see flowsheets  Lines: PIV    Plan (Upcoming Events): transfer to medical floor, continue abx  Discharge/Transfer Needs: tbd     Bedside Shift Report Completed : y  Bedside Safety Check Completed: y

## 2022-06-18 NOTE — PROGRESS NOTES
[facility]    Hospitalist Progress Note  Name: Alexandru Still    MRN: [id number]  Provider:  Zeus Evangelista DO  Date of Service: 06/18/2022    Summary of Stay: Alexandru Still is a 89 year old male with a history of complete heart block status post pacemaker in 2019, aortic valve stenosis status post TAVR in 2019, hypertension, diastolic CHF, hypothyroidism, Lambert-Eaton myasthenic syndrome, PB, chronic anemia, degenerative disc disease with neuropathy, BPH admitted on 6/15/2022 with fever and rigors.  In the emergency department the patient was found to have a blood pressure of 140/96, tachycardic at 109, temperature 102.6  F, SPO2 89% on room air with improvement to 93% on 3 L nasal cannula.  Initial lab work revealed WBC 6.9, hemoglobin 12.2, platelet 193, CMP within normal limits, lactic acid 5.1.  Urinalysis showed 17 WBCs and small leukocyte esterase, many bacteria.  Chest x-ray showed left basilar infiltrate consistent with pneumonia.  Patient was started on empiric vancomycin and Zosyn as well as IV fluids.  The patient did become hypotensive and despite fluid resuscitation required vasopressor support with norepinephrine.  The patient was admitted to the ICU.  Due to difficulty with Escudero placement and urinary retention the patient was seen by urology.  Urology found the patient to have a urethral stone.  On 6/15/2022, the patient was taken for cystourethroscopy with laser lithotripsy of urethral stone and Escudero placement.  The patient was also seen by speech therapy who recommended small bite-size solids with mildly thick liquids.  Urine cultures returned showing mixed urogenital deisy.  Blood cultures returned positive for group G strep.  Repeat blood cultures were negative.  The patient was titrated off of norepinephrine and on 6/17/2022, the patient was transferred out of the ICU.  The patient was seen by physical therapy who recommended transitional care for the patient.   The patient refused stating that he had been to several facilities previously and they did not to anything for him.      TODAY'S PLAN: Patient improving today.  Sounds wheezy on physical exam but denies any shortness of breath or chest pain.  Will increase back to his home dose Lasix 40 mg twice daily.  Discussed again our recommendation for patient to go to TCU.  Son-in-law James at bedside.  Patient continues to refuse stating he has been to them previously and they were ineffective.  Patient does have home care nearly 24 hours a day.  Anticipate patient will be able to discharge home with home care tomorrow.  Give Lasix 40 mg IV x1.    Problem List:   Acute Septic Encephalopathy  Septic Shock  Community Acquired Pneumonia  Urinary Tract Infection  Gram Positive Cocci in Pairs and Chains Bacteremia  Lactic Acidosis  - Norepinephrine weaned off on 6/16/2022  - Initially on Vancomycin and Zosyn.  Azithromycin added.  Vancomyin discontinued secondary to negative MRSA swab.  - Urine cultures showed mixed urogenital deisy  - Blood culture on 6/15 positive for gram G strep - pan-sensitive  - Repeat Blood culture on 6/15 NTD  - LA improved with IVF     Urinary Retention  Urethral Stone s/p Urethroscopy and Lithotripsy on 6/15/2022  BPH  - Appreciate Urology recommendations  - Maintain hannah for now.  Will plan for TOV prior to discharge.     Generalized Weakness/Deconditioning  Dysphagia  - Likely secondary to septic shock  - PT/OT recommending TCU.  Pt refusing.  - Appreciate SLP recommendation - Small/bite sized solids (IDDSI 6), mildly thick liquids (IDDSI 2)     Acute Hypoxic Respiratory Failure  - Likely secondary to PNA  - Wean O2 as able  - Abx as above     Lambert-Eaton Myasthenic Syndrome  - Hx of falls due to weakness  - PT/OT     Hypertension  Diastolic CHF  - Resume home lasix at lower dose to start: lasix 40 mg PO daily.  PTA dosing is lasix 40 mg PO BID  - Antihypertensives on hold     Hypokalemia  - Start  Potassium Chloride 20 mEq daily  - Electrolyte replacement protocol  - Daily BMP     Chronic Anemia  - Baseline hgb 8-9  - Daily CBC     Chronic Medical Problems:  Obesity - BMI 31.47  PB  Hypothyroidism  Cholelithiasis  Aortic Stenosis s/p TAVR  Complete Heart Block s/p PPM  Degenerative Disc Disease with Neuropathy  Known Pulmonary Nodule - dating back to 2014    DVT Prophylaxis: Enoxaparin (Lovenox) SQ  Code Status: No CPR- Do NOT Intubate  Diet: Combination Diet Easy to Chew (level 7); Thin Liquids (level 0)    Escudero Catheter: Not present  Disposition: Expected discharge tomorrow to home with home care. Goals prior to discharge include abx plan established, VSS.   Family updated today: Yes      Interval History   Pt seen and examined.  Pt denies pain.  Denies sob, cp.  States his appetite is not very good.    -Data reviewed today: I personally reviewed all new labs and imaging results over the last 24 hours.     Physical Exam   Temp: 98.2  F (36.8  C) Temp src: Oral BP: (!) 178/95 Pulse: 50   Resp: 18 SpO2: 99 % O2 Device: None (Room air)    Vitals:    06/16/22 0755 06/17/22 0645 06/18/22 0440   Weight: 93.9 kg (207 lb) 96.7 kg (213 lb 1.6 oz) 93.8 kg (206 lb 12.8 oz)     Vital Signs with Ranges  Temp:  [97.9  F (36.6  C)-98.4  F (36.9  C)] 98.2  F (36.8  C)  Pulse:  [50-59] 50  Resp:  [12-22] 18  BP: (135-178)/(71-95) 178/95  SpO2:  [89 %-99 %] 99 %  I/O last 3 completed shifts:  In: 650 [P.O.:650]  Out: 625 [Urine:625]    GENERAL: No apparent distress. Awake, alert, and fully oriented.  HEENT: Normocephalic, atraumatic. Extraocular movements intact.  CARDIOVASCULAR: Regular rate and rhythm without murmurs or rubs. No S3.  PULMONARY: Clear bilaterally.  GASTROINTESTINAL: Soft, non-tender, non-distended. Bowel sounds normoactive.   EXTREMITIES: No cyanosis or clubbing. No edema.  NEUROLOGICAL: CN 2-12 grossly intact, no focal neurological deficits.  DERMATOLOGICAL: No rash, ulcer, bruising, nor  jaundice.    Medications       aspirin  81 mg Oral Daily     azithromycin  500 mg Intravenous Q24H     diclofenac  4 g Topical TID     enoxaparin ANTICOAGULANT  40 mg Subcutaneous Q24H     finasteride  5 mg Oral Daily     furosemide  40 mg Oral BID     levothyroxine  112 mcg Oral Daily     piperacillin-tazobactam  3.375 g Intravenous Q6H     potassium chloride  20 mEq Oral Daily     sodium bicarbonate  650 mg Oral TID w/meals     sodium chloride (PF)  3 mL Intracatheter Q8H     tamsulosin  0.8 mg Oral Daily     Data     Laboratory:  Recent Labs   Lab 06/18/22  0520 06/17/22  0648 06/16/22  0532   WBC 14.2* 18.2* 28.6*   HGB 10.7* 10.4* 10.9*   HCT 33.9* 32.9* 34.8*   MCV 88 91 90   * 102* 122*     Recent Labs   Lab 06/18/22  0520 06/18/22  0418 06/18/22  0006 06/17/22  0813 06/17/22  0648 06/16/22  0824 06/16/22  0532     --   --   --  140  --  138   POTASSIUM 3.5  --   --   --  3.5  --  4.1   CHLORIDE 109  --   --   --  110*  --  108   CO2 25  --   --   --  25  --  23   ANIONGAP 5  --   --   --  5  --  7   GLC 91 89 102*   < > 99   < > 119*   BUN 26  --   --   --  25  --  21   CR 0.86  --   --   --  0.91  --  0.93   GFRESTIMATED 82  --   --   --  80  --  78   TITA 8.6  --   --   --  8.5  --  8.6    < > = values in this interval not displayed.     No results for input(s): CULT in the last 168 hours.    Imaging:  No results found for this or any previous visit (from the past 24 hour(s)).      Zeus Evangelista DO  [facility] Hospitalist  [address] [place] [address]  06/18/2022

## 2022-06-19 ENCOUNTER — APPOINTMENT (OUTPATIENT)
Dept: PHYSICAL THERAPY | Facility: CLINIC | Age: 87
DRG: 871 | End: 2022-06-19
Payer: MEDICARE

## 2022-06-19 ENCOUNTER — APPOINTMENT (OUTPATIENT)
Dept: SPEECH THERAPY | Facility: CLINIC | Age: 87
DRG: 871 | End: 2022-06-19
Payer: MEDICARE

## 2022-06-19 ENCOUNTER — APPOINTMENT (OUTPATIENT)
Dept: GENERAL RADIOLOGY | Facility: CLINIC | Age: 87
DRG: 871 | End: 2022-06-19
Attending: INTERNAL MEDICINE
Payer: MEDICARE

## 2022-06-19 LAB
ANION GAP SERPL CALCULATED.3IONS-SCNC: 3 MMOL/L (ref 3–14)
APPEARANCE STONE: NORMAL
BUN SERPL-MCNC: 23 MG/DL (ref 7–30)
CALCIUM SERPL-MCNC: 8.5 MG/DL (ref 8.5–10.1)
CHLORIDE BLD-SCNC: 102 MMOL/L (ref 94–109)
CO2 SERPL-SCNC: 31 MMOL/L (ref 20–32)
COMPN STONE: NORMAL
CREAT SERPL-MCNC: 0.91 MG/DL (ref 0.66–1.25)
ERYTHROCYTE [DISTWIDTH] IN BLOOD BY AUTOMATED COUNT: 18.3 % (ref 10–15)
GFR SERPL CREATININE-BSD FRML MDRD: 80 ML/MIN/1.73M2
GLUCOSE BLD-MCNC: 91 MG/DL (ref 70–99)
GLUCOSE BLDC GLUCOMTR-MCNC: 178 MG/DL (ref 70–99)
HCT VFR BLD AUTO: 32 % (ref 40–53)
HGB BLD-MCNC: 10.4 G/DL (ref 13.3–17.7)
MCH RBC QN AUTO: 28 PG (ref 26.5–33)
MCHC RBC AUTO-ENTMCNC: 32.5 G/DL (ref 31.5–36.5)
MCV RBC AUTO: 86 FL (ref 78–100)
PLATELET # BLD AUTO: 109 10E3/UL (ref 150–450)
POTASSIUM BLD-SCNC: 2.8 MMOL/L (ref 3.4–5.3)
POTASSIUM BLD-SCNC: 3.5 MMOL/L (ref 3.4–5.3)
RBC # BLD AUTO: 3.71 10E6/UL (ref 4.4–5.9)
SODIUM SERPL-SCNC: 136 MMOL/L (ref 133–144)
SPECIMEN WT: 106 MG
WBC # BLD AUTO: 10.6 10E3/UL (ref 4–11)

## 2022-06-19 PROCEDURE — 97530 THERAPEUTIC ACTIVITIES: CPT | Mod: GP | Performed by: PHYSICAL THERAPIST

## 2022-06-19 PROCEDURE — 120N000001 HC R&B MED SURG/OB

## 2022-06-19 PROCEDURE — 71045 X-RAY EXAM CHEST 1 VIEW: CPT

## 2022-06-19 PROCEDURE — 97116 GAIT TRAINING THERAPY: CPT | Mod: GP | Performed by: PHYSICAL THERAPIST

## 2022-06-19 PROCEDURE — 36415 COLL VENOUS BLD VENIPUNCTURE: CPT | Performed by: INTERNAL MEDICINE

## 2022-06-19 PROCEDURE — 250N000011 HC RX IP 250 OP 636: Performed by: INTERNAL MEDICINE

## 2022-06-19 PROCEDURE — 258N000003 HC RX IP 258 OP 636: Performed by: INTERNAL MEDICINE

## 2022-06-19 PROCEDURE — 82310 ASSAY OF CALCIUM: CPT | Performed by: INTERNAL MEDICINE

## 2022-06-19 PROCEDURE — 250N000013 HC RX MED GY IP 250 OP 250 PS 637: Performed by: INTERNAL MEDICINE

## 2022-06-19 PROCEDURE — 85027 COMPLETE CBC AUTOMATED: CPT | Performed by: INTERNAL MEDICINE

## 2022-06-19 PROCEDURE — 84132 ASSAY OF SERUM POTASSIUM: CPT | Performed by: INTERNAL MEDICINE

## 2022-06-19 PROCEDURE — 92526 ORAL FUNCTION THERAPY: CPT | Mod: GN | Performed by: SPEECH-LANGUAGE PATHOLOGIST

## 2022-06-19 PROCEDURE — 99233 SBSQ HOSP IP/OBS HIGH 50: CPT | Performed by: INTERNAL MEDICINE

## 2022-06-19 RX ORDER — ALBUTEROL SULFATE 90 UG/1
2 AEROSOL, METERED RESPIRATORY (INHALATION) EVERY 6 HOURS PRN
Status: DISCONTINUED | OUTPATIENT
Start: 2022-06-19 | End: 2022-06-23 | Stop reason: HOSPADM

## 2022-06-19 RX ORDER — AZITHROMYCIN 500 MG/5ML
500 INJECTION, POWDER, LYOPHILIZED, FOR SOLUTION INTRAVENOUS EVERY 24 HOURS
Status: COMPLETED | OUTPATIENT
Start: 2022-06-19 | End: 2022-06-19

## 2022-06-19 RX ORDER — POTASSIUM CHLORIDE 1500 MG/1
40 TABLET, EXTENDED RELEASE ORAL DAILY
Status: DISCONTINUED | OUTPATIENT
Start: 2022-06-19 | End: 2022-06-23 | Stop reason: HOSPADM

## 2022-06-19 RX ORDER — POTASSIUM CHLORIDE 1500 MG/1
20 TABLET, EXTENDED RELEASE ORAL ONCE
Status: COMPLETED | OUTPATIENT
Start: 2022-06-19 | End: 2022-06-19

## 2022-06-19 RX ORDER — IPRATROPIUM BROMIDE AND ALBUTEROL SULFATE 2.5; .5 MG/3ML; MG/3ML
3 SOLUTION RESPIRATORY (INHALATION) EVERY 4 HOURS PRN
Status: DISCONTINUED | OUTPATIENT
Start: 2022-06-19 | End: 2022-06-23 | Stop reason: HOSPADM

## 2022-06-19 RX ORDER — AMOXICILLIN 500 MG/1
500 CAPSULE ORAL EVERY 8 HOURS SCHEDULED
Status: DISCONTINUED | OUTPATIENT
Start: 2022-06-19 | End: 2022-06-23 | Stop reason: HOSPADM

## 2022-06-19 RX ORDER — FUROSEMIDE 10 MG/ML
40 INJECTION INTRAMUSCULAR; INTRAVENOUS
Status: COMPLETED | OUTPATIENT
Start: 2022-06-19 | End: 2022-06-22

## 2022-06-19 RX ADMIN — AZITHROMYCIN MONOHYDRATE 500 MG: 500 INJECTION, POWDER, LYOPHILIZED, FOR SOLUTION INTRAVENOUS at 12:20

## 2022-06-19 RX ADMIN — TAMSULOSIN HYDROCHLORIDE 0.8 MG: 0.4 CAPSULE ORAL at 09:10

## 2022-06-19 RX ADMIN — DICLOFENAC SODIUM 4 G: 10 GEL TOPICAL at 16:42

## 2022-06-19 RX ADMIN — LEVOTHYROXINE SODIUM 112 MCG: 0.11 TABLET ORAL at 09:10

## 2022-06-19 RX ADMIN — DICLOFENAC SODIUM 4 G: 10 GEL TOPICAL at 09:20

## 2022-06-19 RX ADMIN — TAZOBACTAM SODIUM AND PIPERACILLIN SODIUM 3.38 G: 375; 3 INJECTION, SOLUTION INTRAVENOUS at 03:45

## 2022-06-19 RX ADMIN — FUROSEMIDE 40 MG: 10 INJECTION, SOLUTION INTRAMUSCULAR; INTRAVENOUS at 09:10

## 2022-06-19 RX ADMIN — ALBUTEROL SULFATE 2 PUFF: 90 AEROSOL, METERED RESPIRATORY (INHALATION) at 23:05

## 2022-06-19 RX ADMIN — ASPIRIN 81 MG CHEWABLE TABLET 81 MG: 81 TABLET CHEWABLE at 09:10

## 2022-06-19 RX ADMIN — AMOXICILLIN 500 MG: 500 CAPSULE ORAL at 13:19

## 2022-06-19 RX ADMIN — AMOXICILLIN 500 MG: 500 CAPSULE ORAL at 22:03

## 2022-06-19 RX ADMIN — FUROSEMIDE 40 MG: 10 INJECTION, SOLUTION INTRAMUSCULAR; INTRAVENOUS at 16:43

## 2022-06-19 RX ADMIN — TAZOBACTAM SODIUM AND PIPERACILLIN SODIUM 3.38 G: 375; 3 INJECTION, SOLUTION INTRAVENOUS at 09:09

## 2022-06-19 RX ADMIN — FINASTERIDE 5 MG: 5 TABLET, FILM COATED ORAL at 09:10

## 2022-06-19 RX ADMIN — DICLOFENAC SODIUM 4 G: 10 GEL TOPICAL at 22:03

## 2022-06-19 RX ADMIN — ENOXAPARIN SODIUM 40 MG: 40 INJECTION SUBCUTANEOUS at 09:09

## 2022-06-19 RX ADMIN — POTASSIUM CHLORIDE 20 MEQ: 1500 TABLET, EXTENDED RELEASE ORAL at 13:19

## 2022-06-19 RX ADMIN — POTASSIUM CHLORIDE 40 MEQ: 1500 TABLET, EXTENDED RELEASE ORAL at 09:10

## 2022-06-19 ASSESSMENT — ACTIVITIES OF DAILY LIVING (ADL)
ADLS_ACUITY_SCORE: 53
ADLS_ACUITY_SCORE: 57
ADLS_ACUITY_SCORE: 53
ADLS_ACUITY_SCORE: 57

## 2022-06-19 NOTE — PLAN OF CARE
ICU End of Shift Summary.  For vital signs and complete assessments, please see documentation flowsheets.     Pertinent assessments: A&Ox4, LS wheezes, BS audible - BM this shift, incontinent, Tele - paced rhythm   Major Shift Events:   - LS wheezes, physician notified. One time IVP lasix given   - External cath placed due to incontinence  Plan (Upcoming Events): Continue cares  Discharge/Transfer Needs: TBD    Bedside Shift Report Completed : Y  Bedside Safety Check Completed: Y

## 2022-06-19 NOTE — PROGRESS NOTES
[facility]    Hospitalist Progress Note  Name: Alexandru Still    MRN: [id number]  Provider:  Zeus Evangelista DO  Date of Service: 06/19/2022    Summary of Stay: Alexandru Still is a 89 year old male with a history of complete heart block status post pacemaker in 2019, aortic valve stenosis status post TAVR in 2019, hypertension, diastolic CHF, hypothyroidism, Lambert-Eaton myasthenic syndrome, PB, chronic anemia, degenerative disc disease with neuropathy, BPH admitted on 6/15/2022 with fever and rigors.  In the emergency department the patient was found to have a blood pressure of 140/96, tachycardic at 109, temperature 102.6  F, SPO2 89% on room air with improvement to 93% on 3 L nasal cannula.  Initial lab work revealed WBC 6.9, hemoglobin 12.2, platelet 193, CMP within normal limits, lactic acid 5.1.  Urinalysis showed 17 WBCs and small leukocyte esterase, many bacteria.  Chest x-ray showed left basilar infiltrate consistent with pneumonia.  Patient was started on empiric vancomycin and Zosyn as well as IV fluids.  The patient did become hypotensive and despite fluid resuscitation required vasopressor support with norepinephrine.  The patient was admitted to the ICU.  Due to difficulty with Escudero placement and urinary retention the patient was seen by urology.  Urology found the patient to have a urethral stone.  On 6/15/2022, the patient was taken for cystourethroscopy with laser lithotripsy of urethral stone and Escudero placement.  The patient was also seen by speech therapy who recommended small bite-size solids with mildly thick liquids.  Urine cultures returned showing mixed urogenital deisy.  Blood cultures returned positive for group G strep.  Repeat blood cultures were negative.  The patient was titrated off of norepinephrine and on 6/17/2022, the patient was transferred out of the ICU.  The patient was seen by physical therapy who recommended transitional care for the patient.   The patient refused stating that he had been to several facilities previously and they did not do anything for him.    The patient did require IV diuresis secondary to some mild shortness of breath.    TODAY'S PLAN:  Check CXR this AM.  Schedule lasix 40 mg IV BID for now pending CXR result.  Pt c/o mild SOB.  Schedule KCl 40 mEq daily with electrolyte replacement protocol.  Will add duoneb too.  Hopeful for discharge home with home care later today vs tomorrow.  ADDENDUM:  Transition to amoxicillin 500 mg TID for 14 days.  CXR consistent with pulmonary edema.  Plan to keep pt for IV diuresis today.  Possible discharge home with home care tomorrow.    Problem List:   Acute Septic Encephalopathy  Septic Shock  Community Acquired Pneumonia  Urinary Tract Infection  Gram Positive Cocci in Pairs and Chains Bacteremia  Lactic Acidosis  - Norepinephrine weaned off on 6/16/2022  - Initially on Vancomycin and Zosyn.  Azithromycin added.  Vancomyin discontinued secondary to negative MRSA swab.  - Urine cultures showed mixed urogenital deisy  - Blood culture on 6/15 positive for gram G strep - pan-sensitive  - Repeat Blood culture on 6/15 NTD  - LA improved with IVF     Urinary Retention  Urethral Stone s/p Urethroscopy and Lithotripsy on 6/15/2022  BPH  - Appreciate Urology recommendations  - Maintain hannah for now.  Will plan for TOV prior to discharge.     Generalized Weakness/Deconditioning  Dysphagia  - Likely secondary to septic shock  - PT/OT recommending TCU.  Pt refusing.  - Appreciate SLP recommendation - Small/bite sized solids (IDDSI 6), mildly thick liquids (IDDSI 2)     Acute Hypoxic Respiratory Failure  - Likely secondary to PNA  - Wean O2 as able  - Abx as above     Lambert-Eaton Myasthenic Syndrome  - Hx of falls due to weakness  - PT/OT     Hypertension  Diastolic CHF  - PTA dosing is lasix 40 mg PO BID  - Check CXR  - Lasix 40 mg IV BID for now  - Antihypertensives on hold     Hypokalemia  - Start  Potassium Chloride 20 mEq daily  - Electrolyte replacement protocol  - Daily BMP     Chronic Anemia  - Baseline hgb 8-9  - Daily CBC     Chronic Medical Problems:  Obesity - BMI 31.47  PB  Hypothyroidism  Cholelithiasis  Aortic Stenosis s/p TAVR  Complete Heart Block s/p PPM  Degenerative Disc Disease with Neuropathy  Known Pulmonary Nodule - dating back to 2014    DVT Prophylaxis: Enoxaparin (Lovenox) SQ  Code Status: No CPR- Do NOT Intubate  Diet: Combination Diet Easy to Chew (level 7); Thin Liquids (level 0)    Escudero Catheter: Not present  Disposition: Expected discharge in today vs tomorrow to home with home care. Goals prior to discharge include sob improved.   Family updated today: No     Interval History   Pt seen and examined.  Pt admits to mild sob this AM.  Denies cp.  Did not sleep very well.    -Data reviewed today: I personally reviewed all new labs and imaging results over the last 24 hours.     Physical Exam   Temp: 97.8  F (36.6  C) Temp src: Oral BP: (!) 157/80 Pulse: (!) 49   Resp: 15 SpO2: 98 % O2 Device: None (Room air)    Vitals:    06/17/22 0645 06/18/22 0440 06/19/22 0600   Weight: 96.7 kg (213 lb 1.6 oz) 93.8 kg (206 lb 12.8 oz) 92.9 kg (204 lb 11.2 oz)     Vital Signs with Ranges  Temp:  [97.8  F (36.6  C)-98.2  F (36.8  C)] 97.8  F (36.6  C)  Pulse:  [49-55] 49  Resp:  [11-21] 15  BP: (125-178)/(58-98) 157/80  SpO2:  [96 %-99 %] 98 %  I/O last 3 completed shifts:  In: 480 [P.O.:480]  Out: 2150 [Urine:2150]    GENERAL: No apparent distress. Awake, alert, and fully oriented.  HEENT: Normocephalic, atraumatic. Extraocular movements intact.  CARDIOVASCULAR: Regular rate and rhythm without murmurs or rubs. No S3.  PULMONARY: Mild exp wheezes at bases  GASTROINTESTINAL: Soft, non-tender, non-distended. Bowel sounds normoactive.   EXTREMITIES: No cyanosis or clubbing. Edema of UE and LE  NEUROLOGICAL: CN 2-12 grossly intact, no focal neurological deficits.  DERMATOLOGICAL: No rash, ulcer,  bruising, nor jaundice.    Medications       aspirin  81 mg Oral Daily     azithromycin  500 mg Intravenous Q24H     diclofenac  4 g Topical TID     enoxaparin ANTICOAGULANT  40 mg Subcutaneous Q24H     finasteride  5 mg Oral Daily     furosemide  40 mg Intravenous BID     levothyroxine  112 mcg Oral Daily     piperacillin-tazobactam  3.375 g Intravenous Q6H     potassium chloride  40 mEq Oral Daily     [Held by provider] sodium bicarbonate  650 mg Oral TID w/meals     sodium chloride (PF)  3 mL Intracatheter Q8H     tamsulosin  0.8 mg Oral Daily     Data     Laboratory:  Recent Labs   Lab 06/19/22  0509 06/18/22  0520 06/17/22  0648   WBC 10.6 14.2* 18.2*   HGB 10.4* 10.7* 10.4*   HCT 32.0* 33.9* 32.9*   MCV 86 88 91   * 109* 102*     Recent Labs   Lab 06/19/22  0509 06/18/22  0520 06/18/22  0418 06/17/22  0813 06/17/22  0648    139  --   --  140   POTASSIUM 2.8* 3.5  --   --  3.5   CHLORIDE 102 109  --   --  110*   CO2 31 25  --   --  25   ANIONGAP 3 5  --   --  5   GLC 91 91 89   < > 99   BUN 23 26  --   --  25   CR 0.91 0.86  --   --  0.91   GFRESTIMATED 80 82  --   --  80   TITA 8.5 8.6  --   --  8.5    < > = values in this interval not displayed.     No results for input(s): CULT in the last 168 hours.    Imaging:  No results found for this or any previous visit (from the past 24 hour(s)).      Zeus Evangelista DO  [facility] Hospitalist  [address] [place] [address]  06/19/2022

## 2022-06-19 NOTE — PLAN OF CARE
Assessment: A&Ox4, [place]. HR paced @ 50 bpm. LS exp wheezes, diminished. [place], infrequent cough. Voiding into external catheter. Voltaren gel to L hip and shoulder.    Major shift events: none    Plan: discharge home today?

## 2022-06-19 NOTE — PLAN OF CARE
ICU End of Shift Summary.  For vital signs and complete assessments, please see documentation flowsheets.     Pertinent assessments: A&Ox4, [place], LS diminished, BS audible - BM this shift. External cath in place, incontinent. Minced and moist diet. Tele - paced.   Major Shift Events:   - Potassium replaced. Recheck at 1730  Plan (Upcoming Events): Transfer to floor when bed available  Discharge/Transfer Needs: TBD    Bedside Shift Report Completed :   Bedside Safety Check Completed:

## 2022-06-20 ENCOUNTER — APPOINTMENT (OUTPATIENT)
Dept: SPEECH THERAPY | Facility: CLINIC | Age: 87
DRG: 871 | End: 2022-06-20
Payer: MEDICARE

## 2022-06-20 ENCOUNTER — APPOINTMENT (OUTPATIENT)
Dept: PHYSICAL THERAPY | Facility: CLINIC | Age: 87
DRG: 871 | End: 2022-06-20
Payer: MEDICARE

## 2022-06-20 ENCOUNTER — APPOINTMENT (OUTPATIENT)
Dept: OCCUPATIONAL THERAPY | Facility: CLINIC | Age: 87
DRG: 871 | End: 2022-06-20
Payer: MEDICARE

## 2022-06-20 LAB
ANION GAP SERPL CALCULATED.3IONS-SCNC: 2 MMOL/L (ref 3–14)
BACTERIA BLD CULT: NO GROWTH
BUN SERPL-MCNC: 22 MG/DL (ref 7–30)
CALCIUM SERPL-MCNC: 8.7 MG/DL (ref 8.5–10.1)
CHLORIDE BLD-SCNC: 101 MMOL/L (ref 94–109)
CO2 SERPL-SCNC: 31 MMOL/L (ref 20–32)
CREAT SERPL-MCNC: 0.92 MG/DL (ref 0.66–1.25)
ERYTHROCYTE [DISTWIDTH] IN BLOOD BY AUTOMATED COUNT: 18.3 % (ref 10–15)
GFR SERPL CREATININE-BSD FRML MDRD: 79 ML/MIN/1.73M2
GLUCOSE BLD-MCNC: 87 MG/DL (ref 70–99)
HCT VFR BLD AUTO: 32.3 % (ref 40–53)
HGB BLD-MCNC: 10.4 G/DL (ref 13.3–17.7)
MCH RBC QN AUTO: 28 PG (ref 26.5–33)
MCHC RBC AUTO-ENTMCNC: 32.2 G/DL (ref 31.5–36.5)
MCV RBC AUTO: 87 FL (ref 78–100)
PLATELET # BLD AUTO: 152 10E3/UL (ref 150–450)
POTASSIUM BLD-SCNC: 3.1 MMOL/L (ref 3.4–5.3)
POTASSIUM BLD-SCNC: 3.3 MMOL/L (ref 3.4–5.3)
POTASSIUM BLD-SCNC: 3.6 MMOL/L (ref 3.4–5.3)
RBC # BLD AUTO: 3.72 10E6/UL (ref 4.4–5.9)
SODIUM SERPL-SCNC: 134 MMOL/L (ref 133–144)
WBC # BLD AUTO: 8 10E3/UL (ref 4–11)

## 2022-06-20 PROCEDURE — 80048 BASIC METABOLIC PNL TOTAL CA: CPT | Performed by: INTERNAL MEDICINE

## 2022-06-20 PROCEDURE — 84132 ASSAY OF SERUM POTASSIUM: CPT | Performed by: INTERNAL MEDICINE

## 2022-06-20 PROCEDURE — 250N000011 HC RX IP 250 OP 636: Performed by: INTERNAL MEDICINE

## 2022-06-20 PROCEDURE — 120N000001 HC R&B MED SURG/OB

## 2022-06-20 PROCEDURE — 250N000013 HC RX MED GY IP 250 OP 250 PS 637: Performed by: INTERNAL MEDICINE

## 2022-06-20 PROCEDURE — 85027 COMPLETE CBC AUTOMATED: CPT | Performed by: INTERNAL MEDICINE

## 2022-06-20 PROCEDURE — 92526 ORAL FUNCTION THERAPY: CPT | Mod: GN

## 2022-06-20 PROCEDURE — 36415 COLL VENOUS BLD VENIPUNCTURE: CPT | Performed by: INTERNAL MEDICINE

## 2022-06-20 PROCEDURE — 97530 THERAPEUTIC ACTIVITIES: CPT | Mod: GP | Performed by: PHYSICAL THERAPIST

## 2022-06-20 PROCEDURE — 97535 SELF CARE MNGMENT TRAINING: CPT | Mod: GO | Performed by: OCCUPATIONAL THERAPIST

## 2022-06-20 PROCEDURE — 99233 SBSQ HOSP IP/OBS HIGH 50: CPT | Performed by: INTERNAL MEDICINE

## 2022-06-20 RX ORDER — POTASSIUM CHLORIDE 1500 MG/1
40 TABLET, EXTENDED RELEASE ORAL ONCE
Status: COMPLETED | OUTPATIENT
Start: 2022-06-20 | End: 2022-06-20

## 2022-06-20 RX ADMIN — FUROSEMIDE 40 MG: 10 INJECTION, SOLUTION INTRAMUSCULAR; INTRAVENOUS at 16:43

## 2022-06-20 RX ADMIN — FUROSEMIDE 40 MG: 10 INJECTION, SOLUTION INTRAMUSCULAR; INTRAVENOUS at 10:28

## 2022-06-20 RX ADMIN — DICLOFENAC SODIUM 4 G: 10 GEL TOPICAL at 21:24

## 2022-06-20 RX ADMIN — ENOXAPARIN SODIUM 40 MG: 40 INJECTION SUBCUTANEOUS at 10:28

## 2022-06-20 RX ADMIN — AMOXICILLIN 500 MG: 500 CAPSULE ORAL at 21:24

## 2022-06-20 RX ADMIN — POTASSIUM CHLORIDE 40 MEQ: 1500 TABLET, EXTENDED RELEASE ORAL at 16:42

## 2022-06-20 RX ADMIN — FINASTERIDE 5 MG: 5 TABLET, FILM COATED ORAL at 10:27

## 2022-06-20 RX ADMIN — ASPIRIN 81 MG CHEWABLE TABLET 81 MG: 81 TABLET CHEWABLE at 10:28

## 2022-06-20 RX ADMIN — AMOXICILLIN 500 MG: 500 CAPSULE ORAL at 06:17

## 2022-06-20 RX ADMIN — LEVOTHYROXINE SODIUM 112 MCG: 0.11 TABLET ORAL at 10:28

## 2022-06-20 RX ADMIN — TAMSULOSIN HYDROCHLORIDE 0.8 MG: 0.4 CAPSULE ORAL at 10:27

## 2022-06-20 RX ADMIN — POTASSIUM CHLORIDE 40 MEQ: 1500 TABLET, EXTENDED RELEASE ORAL at 10:27

## 2022-06-20 RX ADMIN — DICLOFENAC SODIUM 4 G: 10 GEL TOPICAL at 16:43

## 2022-06-20 RX ADMIN — AMOXICILLIN 500 MG: 500 CAPSULE ORAL at 14:57

## 2022-06-20 RX ADMIN — DICLOFENAC SODIUM 4 G: 10 GEL TOPICAL at 10:28

## 2022-06-20 ASSESSMENT — ACTIVITIES OF DAILY LIVING (ADL)
ADLS_ACUITY_SCORE: 49
ADLS_ACUITY_SCORE: 49
ADLS_ACUITY_SCORE: 53
ADLS_ACUITY_SCORE: 53
ADLS_ACUITY_SCORE: 49
ADLS_ACUITY_SCORE: 53
ADLS_ACUITY_SCORE: 49
ADLS_ACUITY_SCORE: 53
ADLS_ACUITY_SCORE: 49
ADLS_ACUITY_SCORE: 53
ADLS_ACUITY_SCORE: 49
ADLS_ACUITY_SCORE: 53

## 2022-06-20 NOTE — PROGRESS NOTES
Care Management Follow Up    Length of Stay (days): 5    Expected Discharge Date: 06/20/2022     Concerns to be Addressed: discharge planning     Patient plan of care discussed at interdisciplinary rounds: Yes    Anticipated Discharge Disposition: Skilled Nursing Facility, Transitional Care     Anticipated Discharge Services: None  Anticipated Discharge DME: Wheelchair, Walker    Patient/family educated on Medicare website which has current facility and service quality ratings: yes  Education Provided on the Discharge Plan:    Patient/Family in Agreement with the Plan: yes    Referrals Placed by CM/SW: Post Acute Facilities  Private pay costs discussed: Not applicable    Additional Information:  Met with pt at bedside to discuss the recommendation from PT to go to a TCU. Pt was hesitant about the plan and expressed a desire to go home in which safety concerns with a home discharge were discussed. Upon discussion pt agreed to look over the TCU list to send referrals. Pt expressed that he would not want to go to [facility] due to prior experiences but would look at other options. SW to follow.    Addendum    Met with pt at bedside to confirm agreement with sending TCU referrals. Pt agreed and would like to stay close to his home. TCU referrals have been sent.        JULIANA Billingsley

## 2022-06-20 NOTE — PROGRESS NOTES
[facility]    Hospitalist Progress Note  Name: Alexandru Still    MRN: [id number]  Provider:  Pato Bui MD  Date of Service: 06/20/2022    Summary of Stay: Alexandru Still is a 89 year old male with a history of complete heart block status post pacemaker in 2019, aortic valve stenosis status post TAVR in 2019, hypertension, diastolic CHF, hypothyroidism, Lambert-Eaton myasthenic syndrome, PB, chronic anemia, degenerative disc disease with neuropathy, BPH admitted on 6/15/2022 with fever and rigors.  In the emergency department the patient was found to have a blood pressure of 140/96, tachycardic at 109, temperature 102.6  F, SPO2 89% on room air with improvement to 93% on 3 L nasal cannula.  Initial lab work revealed WBC 6.9, hemoglobin 12.2, platelet 193, CMP within normal limits, lactic acid 5.1.  Urinalysis showed 17 WBCs and small leukocyte esterase, many bacteria.  Chest x-ray showed left basilar infiltrate consistent with pneumonia.  Patient was started on empiric vancomycin and Zosyn as well as IV fluids.  The patient did become hypotensive and despite fluid resuscitation required vasopressor support with norepinephrine.  The patient was admitted to the ICU.  Due to difficulty with Escudero placement and urinary retention the patient was seen by urology.  Urology found the patient to have a urethral stone.  On 6/15/2022, the patient was taken for cystourethroscopy with laser lithotripsy of urethral stone and Escudero placement.  The patient was also seen by speech therapy who recommended small bite-size solids with mildly thick liquids.  Urine cultures returned showing mixed urogenital deisy.  Blood cultures returned positive for group G strep.  Repeat blood cultures were negative.  The patient was titrated off of norepinephrine and on 6/17/2022, the patient was transferred out of the ICU.  The patient was seen by physical therapy who recommended transitional care for the  patient.  The patient refused stating that he had been to several facilities previously and they did not do anything for him.    The patient did require IV diuresis secondary to some mild shortness of breath.    TODAY'S PLAN:    -Continue IV Lasix 40 mg twice a day for now.  -We will consider transition to oral diuretics in 1-2 days.  -Continue KCl 40 mill equivalent daily and addition to the replacement protocol.  -Patient wants to be discharged home but PT/OT evaluated the patient and felt that he is not safe to discharge home based on his current condition and mentation.  Continue Amoxicillin 500 mg TID for total of 14 days.    -Awaiting SAFE discharge plan.      Problem List:   Acute Septic Encephalopathy  Septic Shock  Community Acquired Pneumonia  Urinary Tract Infection  Gram Positive Cocci in Pairs and Chains Bacteremia  Lactic Acidosis  -Patient required pressors on admission, weaned off on 6/16/2022  -Initially he was on Vancomycin and Zosyn.  Azithromycin added.  Vancomyin discontinued secondary to negative MRSA swab.  -Urine cultures showed mixed urogenital deisy  -Blood culture on 6/15 positive for gram G strep - pan-sensitive  - Repeat Blood culture on 6/15 NTD  - LA improved with IVF     Urinary Retention  Urethral Stone s/p Urethroscopy and Lithotripsy on 6/15/2022  BPH  - Appreciate Urology recommendations  -Monitor urine output, Escudero catheter out.     Generalized Weakness/Deconditioning  Dysphagia  - Likely secondary to septic shock  - PT/OT recommending TCU.  Pt refusing, still PT OT concerning his safety if he is discharged home.  - Appreciate SLP recommendation - Small/bite sized solids (IDDSI 6), mildly thick liquids (IDDSI 2)     Acute Hypoxic Respiratory Failure  - Likely secondary to PNA  - Wean O2 as able  - Abx as above     Lambert-Eaton Myasthenic Syndrome  - Hx of falls due to weakness  - PT/OT     Hypertension  Diastolic CHF  - PTA dosing is lasix 40 mg PO BID  - Check CXR  - Lasix 40  mg IV BID for now  - Antihypertensives on hold     Hypokalemia  - Start Potassium Chloride 20 mEq daily  - Electrolyte replacement protocol  - Daily BMP     Chronic Anemia  - Baseline hgb 8-9  - Daily CBC     Chronic Medical Problems:  Obesity - BMI 31.47  PB  Hypothyroidism  Cholelithiasis  Aortic Stenosis s/p TAVR  Complete Heart Block s/p PPM  Degenerative Disc Disease with Neuropathy  Known Pulmonary Nodule - dating back to 2014    DVT Prophylaxis: Enoxaparin (Lovenox) SQ  Code Status: No CPR- Do NOT Intubate  Diet: Combination Diet Minced and Moist Diet (level 5); Thin Liquids (level 0)    Escudero Catheter: Not present  Disposition: Expected discharge.  Patient refusing to go to TCU, therapy recommended may not be safe at home based on his current condition.    Family updated today: No     Interval History   Pt seen and examined, assumed care today, ICU transfer out. He denied any pain, sitting on a chair and trying to go through his mail but was not reading them.  Denies cp.  He slept better last night.    -Data reviewed today: I personally reviewed all new labs and imaging results over the last 24 hours.     Physical Exam   Temp: 97.8  F (36.6  C) Temp src: Oral BP: (!) 147/63 Pulse: 52   Resp: 20 SpO2: 91 % O2 Device: None (Room air)    Vitals:    06/17/22 0645 06/18/22 0440 06/19/22 0600   Weight: 96.7 kg (213 lb 1.6 oz) 93.8 kg (206 lb 12.8 oz) 92.9 kg (204 lb 11.2 oz)     Vital Signs with Ranges  Temp:  [97.8  F (36.6  C)-98.5  F (36.9  C)] 97.8  F (36.6  C)  Pulse:  [52-53] 52  Resp:  [18-20] 20  BP: (147-162)/(63-72) 147/63  SpO2:  [91 %-95 %] 91 %  I/O last 3 completed shifts:  In: 480 [P.O.:480]  Out: 3200 [Urine:3200]    GENERAL: No apparent distress. Awake, alert, and not oriented to his situation but oriented to time place and person.  HEENT: Normocephalic, atraumatic. Extraocular movements intact.  CARDIOVASCULAR: S1 and S2 well heard without murmurs or rubs. No S3.  PULMONARY: Scattered expiratory  wheezes, and crackles at the base.  ABD: Soft, non-tender, non-distended. Bowel sounds normoactive.   EXT: No cyanosis or clubbing.  Positive edema UE and LE  NEUR: CN 2-12 grossly intact, no focal neurological deficits.  DERMATOLOGICAL: No rash, ulcer, bruising, nor jaundice.    Medications       amoxicillin  500 mg Oral Q8H ZHEN     aspirin  81 mg Oral Daily     diclofenac  4 g Topical TID     enoxaparin ANTICOAGULANT  40 mg Subcutaneous Q24H     finasteride  5 mg Oral Daily     furosemide  40 mg Intravenous BID     levothyroxine  112 mcg Oral Daily     potassium chloride  40 mEq Oral Daily     [Held by provider] sodium bicarbonate  650 mg Oral TID w/meals     sodium chloride (PF)  3 mL Intracatheter Q8H     tamsulosin  0.8 mg Oral Daily     Data     Laboratory:  Recent Labs   Lab 06/20/22  0719 06/19/22  0509 06/18/22  0520   WBC 8.0 10.6 14.2*   HGB 10.4* 10.4* 10.7*   HCT 32.3* 32.0* 33.9*   MCV 87 86 88    109* 109*     Recent Labs   Lab 06/20/22  0719 06/19/22  1724 06/19/22  1215 06/19/22  0509 06/18/22  0520     --   --  136 139   POTASSIUM 3.1* 3.5  --  2.8* 3.5   CHLORIDE 101  --   --  102 109   CO2 31  --   --  31 25   ANIONGAP 2*  --   --  3 5   GLC 87  --  178* 91 91   BUN 22  --   --  23 26   CR 0.92  --   --  0.91 0.86   GFRESTIMATED 79  --   --  80 82   TITA 8.7  --   --  8.5 8.6     No results for input(s): CULT in the last 168 hours.    Imaging:  No results found for this or any previous visit (from the past 24 hour(s)).      06/20/2022

## 2022-06-20 NOTE — PLAN OF CARE
End of Shift Summary  For vital signs and complete assessments, please see documentation flowsheets.     Cared for patient from 11am-1500  Pertinent assessments: A&Ox4, VSS. Remains on room air. Lungs diminished. Speech slow with some word finding difficulty.Up in chair this afternoon. Minced/moist diet, fair appetite. Up A1 GB/walker.     Major Shift Events: none    Treatment Plan: Home vs TCU. Amoxicillin, IV lasix.

## 2022-06-20 NOTE — PLAN OF CARE
Goal Outcome Evaluation:    Pertinent assessments: [facility] [id number]. Pt A&Ox4, with slow response. Denies pain. Reported some shortness of breath. Sats 93% on RA. LS with some inspiratory wheezing. Prn albuterol inhaler administered with some improvement. BP elevated at 160/70, rest of vitals stable on RA. Patient sat in a chair for dinner. Assist of 1 with walker and gaitbelt to bed.   Major Shift Events .  Treatment Plan: .  Bedside Nurse: Virgie Reddy RN

## 2022-06-20 NOTE — PLAN OF CARE
End of Shift Summary  For vital signs and complete assessments, please see documentation flowsheets.     Pertinent assessments: Patient Aox4. Denies pain. VSS. Up with assist of 1 and fww. K replaced earlier and rechecked this shift, WNL. Patient lungs diminished with expiratory wheezes. States some mild SOB. Incontinent, using male external catheter. Mepi to shin patent. Bottom pink.     Major Shift Events: none    Treatment Plan: PO abx, PT/OT, diuresis, possible discharge tomorrow pending discharge safe plan    Bedside Nurse: Lili Reynolds RN

## 2022-06-21 ENCOUNTER — APPOINTMENT (OUTPATIENT)
Dept: OCCUPATIONAL THERAPY | Facility: CLINIC | Age: 87
DRG: 871 | End: 2022-06-21
Payer: MEDICARE

## 2022-06-21 ENCOUNTER — APPOINTMENT (OUTPATIENT)
Dept: SPEECH THERAPY | Facility: CLINIC | Age: 87
DRG: 871 | End: 2022-06-21
Payer: MEDICARE

## 2022-06-21 ENCOUNTER — APPOINTMENT (OUTPATIENT)
Dept: PHYSICAL THERAPY | Facility: CLINIC | Age: 87
DRG: 871 | End: 2022-06-21
Payer: MEDICARE

## 2022-06-21 LAB
CREAT SERPL-MCNC: 0.85 MG/DL (ref 0.66–1.25)
GFR SERPL CREATININE-BSD FRML MDRD: 82 ML/MIN/1.73M2
PLATELET # BLD AUTO: 183 10E3/UL (ref 150–450)
POTASSIUM BLD-SCNC: 3.7 MMOL/L (ref 3.4–5.3)

## 2022-06-21 PROCEDURE — 92526 ORAL FUNCTION THERAPY: CPT | Mod: GN | Performed by: SPEECH-LANGUAGE PATHOLOGIST

## 2022-06-21 PROCEDURE — 250N000011 HC RX IP 250 OP 636: Performed by: INTERNAL MEDICINE

## 2022-06-21 PROCEDURE — 97110 THERAPEUTIC EXERCISES: CPT | Mod: GP

## 2022-06-21 PROCEDURE — 84132 ASSAY OF SERUM POTASSIUM: CPT | Performed by: INTERNAL MEDICINE

## 2022-06-21 PROCEDURE — 82565 ASSAY OF CREATININE: CPT | Performed by: INTERNAL MEDICINE

## 2022-06-21 PROCEDURE — 85049 AUTOMATED PLATELET COUNT: CPT | Performed by: INTERNAL MEDICINE

## 2022-06-21 PROCEDURE — 120N000001 HC R&B MED SURG/OB

## 2022-06-21 PROCEDURE — 99232 SBSQ HOSP IP/OBS MODERATE 35: CPT | Performed by: INTERNAL MEDICINE

## 2022-06-21 PROCEDURE — 97530 THERAPEUTIC ACTIVITIES: CPT | Mod: GO | Performed by: REHABILITATION PRACTITIONER

## 2022-06-21 PROCEDURE — 250N000013 HC RX MED GY IP 250 OP 250 PS 637: Performed by: INTERNAL MEDICINE

## 2022-06-21 PROCEDURE — 36415 COLL VENOUS BLD VENIPUNCTURE: CPT | Performed by: INTERNAL MEDICINE

## 2022-06-21 RX ADMIN — DICLOFENAC SODIUM 4 G: 10 GEL TOPICAL at 16:42

## 2022-06-21 RX ADMIN — AMOXICILLIN 500 MG: 500 CAPSULE ORAL at 21:02

## 2022-06-21 RX ADMIN — DICLOFENAC SODIUM 4 G: 10 GEL TOPICAL at 08:41

## 2022-06-21 RX ADMIN — FUROSEMIDE 40 MG: 10 INJECTION, SOLUTION INTRAMUSCULAR; INTRAVENOUS at 08:40

## 2022-06-21 RX ADMIN — LEVOTHYROXINE SODIUM 112 MCG: 0.11 TABLET ORAL at 08:40

## 2022-06-21 RX ADMIN — ASPIRIN 81 MG CHEWABLE TABLET 81 MG: 81 TABLET CHEWABLE at 08:41

## 2022-06-21 RX ADMIN — AMOXICILLIN 500 MG: 500 CAPSULE ORAL at 16:41

## 2022-06-21 RX ADMIN — FINASTERIDE 5 MG: 5 TABLET, FILM COATED ORAL at 08:40

## 2022-06-21 RX ADMIN — ENOXAPARIN SODIUM 40 MG: 40 INJECTION SUBCUTANEOUS at 08:40

## 2022-06-21 RX ADMIN — POTASSIUM CHLORIDE 40 MEQ: 1500 TABLET, EXTENDED RELEASE ORAL at 08:40

## 2022-06-21 RX ADMIN — AMOXICILLIN 500 MG: 500 CAPSULE ORAL at 06:32

## 2022-06-21 RX ADMIN — FUROSEMIDE 40 MG: 10 INJECTION, SOLUTION INTRAMUSCULAR; INTRAVENOUS at 16:41

## 2022-06-21 RX ADMIN — TAMSULOSIN HYDROCHLORIDE 0.8 MG: 0.4 CAPSULE ORAL at 08:40

## 2022-06-21 ASSESSMENT — ACTIVITIES OF DAILY LIVING (ADL)
ADLS_ACUITY_SCORE: 53
ADLS_ACUITY_SCORE: 49
ADLS_ACUITY_SCORE: 53
ADLS_ACUITY_SCORE: 49

## 2022-06-21 NOTE — PROGRESS NOTES
[facility]    Hospitalist Progress Note  Name: Alexandru Still    MRN: [id number]  Provider:  Pato Bui MD  Date of Service: 06/21/2022    Summary of Stay: Alexandru Still is a 89 year old male with a history of complete heart block status post pacemaker in 2019, aortic valve stenosis status post TAVR in 2019, hypertension, diastolic CHF, hypothyroidism, Lambert-Eaton myasthenic syndrome, PB, chronic anemia, degenerative disc disease with neuropathy, BPH admitted on 6/15/2022 with fever and rigors.  In the emergency department the patient was found to have a blood pressure of 140/96, tachycardic at 109, temperature 102.6  F, SPO2 89% on room air with improvement to 93% on 3 L nasal cannula.  Initial lab work revealed WBC 6.9, hemoglobin 12.2, platelet 193, CMP within normal limits, lactic acid 5.1.  Urinalysis showed 17 WBCs and small leukocyte esterase, many bacteria.  Chest x-ray showed left basilar infiltrate consistent with pneumonia.  Patient was started on empiric vancomycin and Zosyn as well as IV fluids.  The patient did become hypotensive and despite fluid resuscitation required vasopressor support with norepinephrine.  The patient was admitted to the ICU.  Due to difficulty with Escudero placement and urinary retention the patient was seen by urology.  Urology found the patient to have a urethral stone.  On 6/15/2022, the patient was taken for cystourethroscopy with laser lithotripsy of urethral stone and Escudero placement.  The patient was also seen by speech therapy who recommended small bite-size solids with mildly thick liquids.  Urine cultures returned showing mixed urogenital deisy.  Blood cultures returned positive for group G strep.  Repeat blood cultures were negative.  The patient was titrated off of norepinephrine and on 6/17/2022, the patient was transferred out of the ICU.  The patient was seen by physical therapy who recommended transitional care for the  patient.  The patient refused stating that he had been to several facilities previously and they did not do anything for him.    The patient did require IV diuresis secondary to some mild shortness of breath.    TODAY'S PLAN:    -Continue IV Lasix 40 mg twice a day for now, reevaluate on a daily basis.  -He does not seem to be fluid overloaded today, may consider transitioning  to oral diuretics in 1 day.   -Continue KCl 40 mill equivalent daily in addition to the replacement protocol while on IV lasix..  -Patient wants to discharg home but PT/OT evaluated the patient and felt that he is not safe to discharge home based on his current condition and mentation.  -Patient is forgetful but seem to understand the need to go to TCU.  -Continue Amoxicillin 500 mg TID for total of 14 days.    -Awaiting TCU availability.      Problem List:   Acute Septic Encephalopathy  Septic Shock  Community Acquired Pneumonia  Urinary Tract Infection  Gram Positive Cocci in Pairs and Chains Bacteremia/ Strept spp.  Lactic Acidosis  -Patient required pressors on admission, weaned him off on 6/16/2022  -Initially he was on Vancomycin and Zosyn.  Azithromycin added.  Vancomyin discontinued as he was negative for MRSA PCR swab.  -Urine cultures showed mixed urogenital deisy  -Blood culture on 6/15 positive for gram G strep - pan-sensitive  -Repeat Blood culture on 6/15 NTD  - LA improved with IVF     Urinary Retention.  Urethral Stone s/p Urethroscopy and Lithotripsy on 6/15/2022  BPH  -Appreciate Urology recommendations.  -Monitor urine output, Escudero catheter out.     Generalized Weakness/Deconditioning  Dysphagia  -Likely secondary to septic shock  -PT/OT recommending TCU.  Pt refusing, still PT OT concerning his safety if he is discharged home.  -Appreciate SLP recommendation - Small/bite sized solids (IDDSI 6), mildly thick liquids (IDDSI 2)     Acute Hypoxic Respiratory Failure  - Likely secondary to PNA  - Wean O2 as able  - Abx as  above     Lambert-Eaton Myasthenic Syndrome  - Hx of falls due to weakness  - PT/OT     Hypertension  Diastolic CHF  - PTA dosing is lasix 40 mg PO BID  - Check CXR  - Lasix 40 mg IV BID for now  - Antihypertensives on hold     Hypokalemia  - Start Potassium Chloride 20 mEq daily  - Electrolyte replacement protocol  - Daily BMP     Chronic Anemia  - Baseline hgb 8-9  - Daily CBC     Chronic Medical Problems:  Obesity - BMI 31.47  PB  Hypothyroidism  Cholelithiasis  Aortic Stenosis s/p TAVR  Complete Heart Block s/p PPM  Degenerative Disc Disease with Neuropathy  Known Pulmonary Nodule - dating back to 2014    DVT Prophylaxis: Enoxaparin (Lovenox) SQ  Code Status: No CPR- Do NOT Intubate  Diet: Combination Diet Minced and Moist Diet (level 5); Thin Liquids (level 0)    Escudero Catheter: Not present  Disposition: Expected discharge.  Patient agreed to go to TCU and social service is looking for placement  Family updated today: No     Interval History   Patient seen and examined, transferred out of ICU 2 days ago.  Overall showing improvement, is forgetful, decreased cognitive function. He denied any pain, sitting on a chair.  Denied any cough no runny nose no sore throat  Denies cp.  He slept better last night.    -Data reviewed today: I personally reviewed all new labs and imaging results over the last 24 hours.     Physical Exam   Temp: 98.1  F (36.7  C) Temp src: Oral BP: (!) 140/61 Pulse: 53   Resp: 20 SpO2: 93 % O2 Device: None (Room air)    Vitals:    06/17/22 0645 06/18/22 0440 06/19/22 0600   Weight: 96.7 kg (213 lb 1.6 oz) 93.8 kg (206 lb 12.8 oz) 92.9 kg (204 lb 11.2 oz)     Vital Signs with Ranges  Temp:  [97.2  F (36.2  C)-98.1  F (36.7  C)] 98.1  F (36.7  C)  Pulse:  [53-59] 53  Resp:  [16-20] 20  BP: (106-140)/(56-64) 140/61  SpO2:  [93 %-94 %] 93 %  I/O last 3 completed shifts:  In: 320 [P.O.:320]  Out: 1525 [Urine:1525]    GENERAL: No apparent distress. Awake, alert, and not oriented to his situation  but oriented to time place and person.  HEENT: Normocephalic, atraumatic. Extraocular movements intact.  CvS: S1 and S2 well heard without murmurs or rubs. No S3.  PULMONARY: Scattered expiratory wheezes, and crackles at the base.  ABD: Soft, non-tender, non-distended. Bowel sounds normoactive.   EXT: No cyanosis or clubbing.  Positive edema UE and LE  NEUR: CN 2-12 grossly intact, no focal neurological deficits.  DERMATOLOGICAL: No rash, ulcer, bruising, nor jaundice.    Medications       amoxicillin  500 mg Oral Q8H ZHEN     aspirin  81 mg Oral Daily     diclofenac  4 g Topical TID     enoxaparin ANTICOAGULANT  40 mg Subcutaneous Q24H     finasteride  5 mg Oral Daily     furosemide  40 mg Intravenous BID     levothyroxine  112 mcg Oral Daily     potassium chloride  40 mEq Oral Daily     [Held by provider] sodium bicarbonate  650 mg Oral TID w/meals     sodium chloride (PF)  3 mL Intracatheter Q8H     tamsulosin  0.8 mg Oral Daily     Data     Laboratory:  Recent Labs   Lab 06/21/22  0555 06/20/22  0719 06/19/22  0509 06/18/22  0520   WBC  --  8.0 10.6 14.2*   HGB  --  10.4* 10.4* 10.7*   HCT  --  32.3* 32.0* 33.9*   MCV  --  87 86 88    152 109* 109*     Recent Labs   Lab 06/21/22  0555 06/20/22 2011 06/20/22  1425 06/20/22  0719 06/19/22  1724 06/19/22  1215 06/19/22  0509 06/18/22  0520   NA  --   --   --  134  --   --  136 139   POTASSIUM 3.7 3.6 3.3* 3.1*   < >  --  2.8* 3.5   CHLORIDE  --   --   --  101  --   --  102 109   CO2  --   --   --  31  --   --  31 25   ANIONGAP  --   --   --  2*  --   --  3 5   GLC  --   --   --  87  --  178* 91 91   BUN  --   --   --  22  --   --  23 26   CR 0.85  --   --  0.92  --   --  0.91 0.86   GFRESTIMATED 82  --   --  79  --   --  80 82   TITA  --   --   --  8.7  --   --  8.5 8.6    < > = values in this interval not displayed.     No results for input(s): CULT in the last 168 hours.    Imaging:  No results found for this or any previous visit (from the past 24  hour(s)).      06/21/2022

## 2022-06-21 NOTE — PROGRESS NOTES
Care Management Follow Up    Length of Stay (days): 6    Expected Discharge Date: 06/22/2022     Concerns to be Addressed: discharge planning     Patient plan of care discussed at interdisciplinary rounds: Yes    Anticipated Discharge Disposition: Skilled Nursing Facility, Transitional Care     Anticipated Discharge Services: None  Anticipated Discharge DME: Wheelchair, Walker    Patient/family educated on Medicare website which has current facility and service quality ratings: yes  Education Provided on the Discharge Plan:    Patient/Family in Agreement with the Plan: yes    Referrals Placed by CM/SW: Post Acute Facilities  Private pay costs discussed: Not applicable    Additional Information:  Per RN, pt is agreeable to go to a TCU as of 6/21/22. Received options of TCU's that family would believe is a good fit and sent additional referrals. SW to follow.       JULIANA Billingsley

## 2022-06-21 NOTE — PLAN OF CARE
Goal Outcome Evaluation:    Pertinent assessments: Assumed cares [id number]. Pt A&Ox4, with slow response. Denies pain. Reported some shortness of breath. Sats 93% on RA. LS dim. Prn albuterol inhaler administered with some improvement. Vitals stable on RA. Patient was found soiled with urine and bowel and not aware of it. Incontinence care provided. Needed assist of 2 for that because patient can not turn well, and body seems stiff.   Major Shift Events .  Treatment Plan: .  Bedside Nurse: Virgie Reddy RN

## 2022-06-21 NOTE — PROGRESS NOTES
Care Management Follow Up    Length of Stay (days): 6    Expected Discharge Date: 06/21/2022     Concerns to be Addressed: discharge planning     Patient plan of care discussed at interdisciplinary rounds: Yes    Anticipated Discharge Disposition: Skilled Nursing Facility, Transitional Care     Anticipated Discharge Services: None  Anticipated Discharge DME: Wheelchair, Walker    Patient/family educated on Medicare website which has current facility and service quality ratings: yes  Education Provided on the Discharge Plan:    Patient/Family in Agreement with the Plan: yes    Referrals Placed by CM/SW: Post Acute Facilities  Private pay costs discussed: Not applicable    Additional Information:  Called facilities to follow up on referrals that were sent yesterday and left VM. SW will continue to follow.     Addendum    Met with pt and family at [facility] to update on the status of referrals in which most were declined. Gave pt TCU list to look at more options to send referrals in which pt declined. Pt family member gave some options for TCU's that family would be ok with but pt stated that they would not like to go to these TCU's. SW will continue to follow.       JULIANA Billingsley

## 2022-06-21 NOTE — PLAN OF CARE
Goal Outcome Evaluation:        End of Shift Summary  For vital signs and complete assessments, please see documentation flowsheets.     Pertinent assessments: Up A-1 with gait belt and walker. O2 stable on RA. Lungs diminished. Word finding difficulty noted. External catheter in place with adequate output this shift. Fair appetite for dinner. A-2 when transferring from chair to bed later in shift. Mepilex to right shin CDI.     Major Shift Events: K+ replaced, recheck resulted at 3.6. BP soft this shift, fluids encouraged.   Treatment Plan: Home vs TCU. Amoxicillin, IV lasix.  Bedside Nurse: Sana Shrot RN

## 2022-06-21 NOTE — PLAN OF CARE
Goal Outcome Evaluation:    End of Shift Summary  For vital signs and complete assessments, please see documentation flowsheets.     Pertinent assessments: Up A-1 with gait belt and walker. O2 stable on RA. Lungs diminished. Slow to respond- [place] hearing aids in. External catheter in place with adequate output this shift. Mepilex to right shin CDI.     Major Shift Events- Uneventful    Treatment Plan: Home vs TCU. Amoxicillin, IV lasix.    Bedside Nurse: Michell Negrete RN

## 2022-06-22 ENCOUNTER — APPOINTMENT (OUTPATIENT)
Dept: OCCUPATIONAL THERAPY | Facility: CLINIC | Age: 87
DRG: 871 | End: 2022-06-22
Payer: MEDICARE

## 2022-06-22 ENCOUNTER — APPOINTMENT (OUTPATIENT)
Dept: SPEECH THERAPY | Facility: CLINIC | Age: 87
DRG: 871 | End: 2022-06-22
Payer: MEDICARE

## 2022-06-22 LAB
ANION GAP SERPL CALCULATED.3IONS-SCNC: 6 MMOL/L (ref 3–14)
BUN SERPL-MCNC: 24 MG/DL (ref 7–30)
CALCIUM SERPL-MCNC: 9.2 MG/DL (ref 8.5–10.1)
CHLORIDE BLD-SCNC: 100 MMOL/L (ref 94–109)
CO2 SERPL-SCNC: 29 MMOL/L (ref 20–32)
CREAT SERPL-MCNC: 0.85 MG/DL (ref 0.66–1.25)
GFR SERPL CREATININE-BSD FRML MDRD: 82 ML/MIN/1.73M2
GLUCOSE BLD-MCNC: 92 MG/DL (ref 70–99)
POTASSIUM BLD-SCNC: 3.9 MMOL/L (ref 3.4–5.3)
SARS-COV-2 RNA RESP QL NAA+PROBE: NEGATIVE
SODIUM SERPL-SCNC: 135 MMOL/L (ref 133–144)

## 2022-06-22 PROCEDURE — 250N000013 HC RX MED GY IP 250 OP 250 PS 637: Performed by: INTERNAL MEDICINE

## 2022-06-22 PROCEDURE — 97530 THERAPEUTIC ACTIVITIES: CPT | Mod: GO | Performed by: REHABILITATION PRACTITIONER

## 2022-06-22 PROCEDURE — 92526 ORAL FUNCTION THERAPY: CPT | Mod: GN

## 2022-06-22 PROCEDURE — 250N000011 HC RX IP 250 OP 636: Performed by: INTERNAL MEDICINE

## 2022-06-22 PROCEDURE — U0005 INFEC AGEN DETEC AMPLI PROBE: HCPCS | Performed by: INTERNAL MEDICINE

## 2022-06-22 PROCEDURE — 120N000001 HC R&B MED SURG/OB

## 2022-06-22 PROCEDURE — 99233 SBSQ HOSP IP/OBS HIGH 50: CPT | Performed by: INTERNAL MEDICINE

## 2022-06-22 PROCEDURE — 36415 COLL VENOUS BLD VENIPUNCTURE: CPT | Performed by: INTERNAL MEDICINE

## 2022-06-22 PROCEDURE — 80048 BASIC METABOLIC PNL TOTAL CA: CPT | Performed by: INTERNAL MEDICINE

## 2022-06-22 RX ORDER — FUROSEMIDE 40 MG
40 TABLET ORAL
Status: DISCONTINUED | OUTPATIENT
Start: 2022-06-23 | End: 2022-06-23 | Stop reason: HOSPADM

## 2022-06-22 RX ADMIN — ENOXAPARIN SODIUM 40 MG: 40 INJECTION SUBCUTANEOUS at 08:54

## 2022-06-22 RX ADMIN — FUROSEMIDE 40 MG: 10 INJECTION, SOLUTION INTRAMUSCULAR; INTRAVENOUS at 16:40

## 2022-06-22 RX ADMIN — FINASTERIDE 5 MG: 5 TABLET, FILM COATED ORAL at 08:57

## 2022-06-22 RX ADMIN — AMOXICILLIN 500 MG: 500 CAPSULE ORAL at 05:30

## 2022-06-22 RX ADMIN — DICLOFENAC SODIUM 4 G: 10 GEL TOPICAL at 09:37

## 2022-06-22 RX ADMIN — FUROSEMIDE 40 MG: 10 INJECTION, SOLUTION INTRAMUSCULAR; INTRAVENOUS at 08:53

## 2022-06-22 RX ADMIN — TAMSULOSIN HYDROCHLORIDE 0.8 MG: 0.4 CAPSULE ORAL at 08:55

## 2022-06-22 RX ADMIN — POTASSIUM CHLORIDE 40 MEQ: 1500 TABLET, EXTENDED RELEASE ORAL at 08:56

## 2022-06-22 RX ADMIN — ASPIRIN 81 MG CHEWABLE TABLET 81 MG: 81 TABLET CHEWABLE at 08:56

## 2022-06-22 RX ADMIN — AMOXICILLIN 500 MG: 500 CAPSULE ORAL at 13:29

## 2022-06-22 RX ADMIN — LEVOTHYROXINE SODIUM 112 MCG: 0.11 TABLET ORAL at 08:55

## 2022-06-22 RX ADMIN — AMOXICILLIN 500 MG: 500 CAPSULE ORAL at 21:14

## 2022-06-22 RX ADMIN — ACETAMINOPHEN 650 MG: 325 TABLET, FILM COATED ORAL at 08:56

## 2022-06-22 ASSESSMENT — ACTIVITIES OF DAILY LIVING (ADL)
ADLS_ACUITY_SCORE: 47
ADLS_ACUITY_SCORE: 53
ADLS_ACUITY_SCORE: 50
ADLS_ACUITY_SCORE: 50
ADLS_ACUITY_SCORE: 53
ADLS_ACUITY_SCORE: 51
ADLS_ACUITY_SCORE: 50
ADLS_ACUITY_SCORE: 47
ADLS_ACUITY_SCORE: 51
ADLS_ACUITY_SCORE: 53
ADLS_ACUITY_SCORE: 48
ADLS_ACUITY_SCORE: 47

## 2022-06-22 ASSESSMENT — MIFFLIN-ST. JEOR: SCORE: 1549.5

## 2022-06-22 NOTE — CONSULTS
"CLINICAL NUTRITION SERVICES - ASSESSMENT NOTE     Nutrition Prescription    Recommendations:  - Continue diet as ordered.  Appreciate any encouragement surrounding PO intakes.    MALNUTRITION:  % Weight Loss: Unable to determine --> fluid shifts  % Intake:  Decreased intake does not meet criteria for malnutrition   Subcutaneous Fat Loss:  Upper arm region moderate depletion --> not using as indicator with only 1 region present   Muscle Loss:  Temporal region mild depletion, Clavicle bone region mild to moderate depletion and Acromion bone region mild to moderate depletion  Fluid Retention: Trace to mild --> weak 2nd indicator, especially without weight history, therefore not using as indicator at this time    Malnutrition Diagnosis: Unable to determine due to overall lack of wt hx       REASON FOR ASSESSMENT  Alexandru Still is a/an 89 year old male assessed by the dietitian for Reason For Assessment: LOS    See \"Adult Nutrition Assessment\" flowsheet for additional details.    - Information obtained from mostly chart, attempted from patient though extremely [place].  - Meal/Snack Patterns: Difficult to obtain, very [place], even when shouting into ear.  - Supplemental Drinks/Foods/Additives: Denies.  - Additional Information: Difficult to assess if decreased PO intakes PTA.  - Weight history:  Wt Readings from Last 10 Encounters:   06/19/22 92.9 kg (204 lb 11.2 oz)     - No wt history on file (care everywhere reviewed).  Food Allergy(s)/Intolerance(s): NKFA    CURRENT NUTRITION ORDERS  Diet/Nutrition Received: minced & moist (level 5) + thins    Current Intake/Tolerance:  SLP following throughout admit with most recent diet rec as above.  Mostly % intakes per review of flowsheets.  Discussed PO intakes with current RN who had no concerns.  Ate well this AM for breakfast and helped to order a lunch meal.     ASSESSED NUTRITION NEEDS:  Weight for Calculation (kg): 92 kg (202 lb 13.2 oz) - lowest weight    Estimated " Energy Needs:   Allie Reardon (Considerations): 1866 kcal/day = MSJ w/ AF >/=1.2  Justification: maintenance  Estimated Protein Needs:   >/=92 grams protein (>/=1 g pro/Kg)  Justification: preservation of lean body mass  Estimated Fluid Needs: per MD    Nutrition Diagnosis: Predicted inadequate nutrient intake (energy/protein) related to potential for decline in PO intakes during admit pending LOS and appetite.    INTERVENTIONS  Recommendations  See above    Implementation  Collaboration and referral of nutrition-related care: Discussed POC with teams during rounds and PO intakes with RN.    Goals  Patient to consume on average >/=75% of meals TID while acutely admitted.     Monitoring/Evaluation  Progress towards goals will be monitored and evaluated per protocol and Practice Guidelines      Tammy Hanna RDN, LD  Clinical Dietitian  3rd floor/ICU: [phone]  All other floors: [phone]  Weekend/holiday: [phone]  Office: [phone]

## 2022-06-22 NOTE — PLAN OF CARE
Pertinent assessments: Disoriented to situation. Up A-2 with gait belt and walker. VSS. RA. Lungs diminished. Slow to respond- [place] hearing aids in. External catheter in place with adequate output this shift. Mepilex to right shin CDI. Repositioning in bed over night. Large incontinent bowel movement. Over night. Incontinent of urine as well. External cath in place.     Major Shift Events- Uneventful    Treatment Plan: Home vs TCU. Amoxicillin, IV lasix.    Bedside Nurse: Catherine Flores RN

## 2022-06-22 NOTE — DOWNTIME EVENT NOTE
The EMR was down for 4 hours on 6/22/2022.    Catherine Flores RN was responsible for completing the paper charting during this time period.     The following information was re-entered into the system by Catherine Flores RN: Flowsheet data and MAR    The following information will remain in the paper chart: CARI Flores RN  6/22/2022

## 2022-06-22 NOTE — PROGRESS NOTES
[facility]  Hospitalist Progress Note  Reshma Rodriguez MD 06/22/22    Reason for Stay (Diagnosis): Septic shock         Assessment and Plan:      Summary of Stay: Alexandru Still is a 89 year old male with past medical history of complete heart block status post pacemaker in 2019, aortic valve stenosis status post TAVR in 2019, hypertension, diastolic CHF, hypothyroidism, Lambert-Eaton myasthenic syndrome, PB, chronic anemia, degenerative disc disease with neuropathy, BPH who was admitted on 6/15/2022 with fever and rigors and was found to have sepsis due to PNA.    Hospital course complicated by development of septic shock requiring transfer to ICU for pressor support.  He had difficult hannah placement and Urology consulted and ultimately patient was found to have ureteral stone s/p cystourethroscopy with laser lithotripsy of the ureteral stone and hannah placement on 6/15.    Blood cultures ultimately returned positive for Group G strep.  Transferred out of ICU on 6/17.  Recommendation for TCU, initially patient not agreeable to this but now agreeable, awaiting bed placement.    Problem List/Assessment and Plan:     Acute Septic Encephalopathy  Septic Shock - Resolved  Acute Hypoxic Respiratory Failure - Resolved  Community Acquired Pneumonia  Group G Strep Bacteremia  Lactic Acidosis  Presented with fever and rigors.  Initially blood pressure was fine but subsequently became hypotensive requiring pressor support (stopped 6/16).  Was hypoxic on admission but this has also resolved.  CXR showed left basilar infiltrate consistent with pneumonia.  Initially there was concern that he had a UTI but urine culture shows mixed urogenital deisy.  He does have group G strep bacteremia but repeat cultures are negative.  Fever curve has improved and leukocytosis has resolved.  Lactic acid has also normalized.  - Patient received Zosyn, azithromycin vancomycin for 5 days (completed on 6/19).  This completes  his pneumonia treatment course  - He is now on amoxicillin to treat his group G strep bacteremia, subsequent cultures remain negative.  Today is day 8 of antibiotics (should have 14 days total given bacteremia)     Urinary Retention  Urethral Stone s/p Urethroscopy and Lithotripsy on 6/15/2022  BPH  -Appreciate Urology recommendations, have now signed off  -Doing well with hannah catheter removal     Generalized Weakness/Deconditioning  Dysphagia  - Likely secondary to septic shock  - PT/OT recommending TCU.  Patient initially did not want this but now agreeable  - Appreciate SLP recommendation - Small/bite sized solids (IDDSI 6), mildly thick liquids (IDDSI 2)     Acute Hypoxic Respiratory Failure - Resolved  - Likely secondary to PNA     Lambert-Eaton Myasthenic Syndrome  - Hx of falls due to weakness  - PT/OT     Hypertension  Diastolic CHF  - PTA dosing is lasix 40 mg PO BID  - He had slight volume overload in the setting of volume resuscitation for his septic shock.  Currently on Lasix 40 mg IV BID, will plan to transition back to PO dosing tomorrow     Hypokalemia  - Start Potassium Chloride 20 mEq daily  - Electrolyte replacement protocol  - Daily BMP     Chronic Anemia  - Baseline hgb 8-9  - Daily CBC     Chronic Medical Problems:  Obesity - BMI 31.47  PB  Hypothyroidism  Aortic Stenosis s/p TAVR  Complete Heart Block s/p PPM  Degenerative Disc Disease with Neuropathy  Known Pulmonary Nodule - dating back to 2014    Diet: Combination Diet Minced and Moist Diet (level 5); Thin Liquids (level 0)    DVT Prophylaxis: Enoxaparin (Lovenox) SQ  Hannah Catheter: Not present  Code Status: No CPR- Do NOT Intubate      Disposition Plan      Expected Discharge Date: Needs discharge to TCU, ready once bed is found  Destination: inpatient rehabilitation facility       Entered: Reshma Rodriguez MD 06/22/2022, 11:11 AM       The patient's care was discussed with the Bedside Nurse, Care Coordinator/ and  "Patient.    Hospitalist Service  [facility]          Interval History (Subjective):      Patient was sleeping when I saw him this morning but was easily awoken.  He states he is feeling tired today but otherwise has no complaints.  Denies chest pain, shortness of breath, nausea, vomiting, abdominal pain.  Reviewed the care plan explained that we are working on finding TCU.                  Physical Exam:      Last Vital Signs:  BP (!) 151/71 (BP Location: Left arm)   Pulse 50   Temp 97.8  F (36.6  C) (Oral)   Resp 20   Ht 1.727 m (5' 8\")   Wt 92.9 kg (204 lb 11.2 oz)   SpO2 94%   BMI 31.12 kg/m      General: Sleeping, easily awoken, no acute distress  HEENT: Normocephalic and atraumatic, eyes anicteric and without scleral injection, EOMI, face symmetric, MMM.  Cardiac: RRR, normal S1, S2. No m/g/r, no LE edema.  Pulmonary: Normal chest rise, normal work of breathing.  Lungs CTAB without crackles or wheezing.  Abdomen: soft, non-tender, non-distended.  Normoactive bowel sounds, no guarding or rebound tenderness.  Extremities: no deformities.  Warm, well perfused.  Skin: no rashes or lesions.  Warm and Dry.  Neuro: No focal deficits.  Speech clear.  Moving all extremities in bed.  Psych: Alert and oriented to person and place but not exact situation. Appropriate affect.         Medications:      All current medications were reviewed with changes reflected in problem list.         Data:      All new lab and imaging data was reviewed.   Labs:  Recent Labs   Lab 06/22/22  0647 06/21/22  0555 06/20/22 2011 06/20/22  1425 06/20/22  0719 06/19/22  1724 06/19/22  1215 06/19/22  0509     --   --   --  134  --   --  136   POTASSIUM 3.9 3.7 3.6   < > 3.1*   < >  --  2.8*   CHLORIDE 100  --   --   --  101  --   --  102   CO2 29  --   --   --  31  --   --  31   ANIONGAP 6  --   --   --  2*  --   --  3   GLC 92  --   --   --  87  --  178* 91   BUN 24  --   --   --  22  --   --  23   CR 0.85 " 0.85  --   --  0.92  --   --  0.91   GFRESTIMATED 82 82  --   --  79  --   --  80   TITA 9.2  --   --   --  8.7  --   --  8.5    < > = values in this interval not displayed.     Recent Labs   Lab 06/21/22  0555 06/20/22  0719 06/19/22  0509 06/18/22  0520   WBC  --  8.0 10.6 14.2*   HGB  --  10.4* 10.4* 10.7*   HCT  --  32.3* 32.0* 33.9*   MCV  --  87 86 88    152 109* 109*      Imaging:   No results found for this or any previous visit (from the past 24 hour(s)).    Reshma Rodriguez MD

## 2022-06-22 NOTE — PLAN OF CARE
Goal Outcome Evaluation:  PO intake improving throughout admit and now adequate for discharge.

## 2022-06-22 NOTE — PROGRESS NOTES
Patient needed to use the restroom. Support staff went to help, patient was unable to stand with support and writer was called to room. Writer asked patient to try and stand on his own as if he was at home. Patient needed 2 assist to stand from the chair. Writer then asked patient if he knew why TCU was recommended and he told writer no. Writer explained that patient needed to increase his strength. Patient informed writer that he has a lift chair and has help at home. Patient still not wanting tcu

## 2022-06-22 NOTE — PLAN OF CARE
Goal Outcome Evaluation:    Plan of Care Reviewed With: patient     Overall Patient Progress: no change       To Do:  End of Shift Summary  For vital signs and complete assessments, please see documentation flowsheets.     Pertinent assessments: Disoriented to situation.  Otherwise VSS. Pain controlled w/ Voltaren gel. Up A 1-2 with gait belt and walker. Lungs diminished. Slow to respond- [place] hearing aids in. External catheter in place with adequate output this shift. Mepilex to right shin CDI. Up to chair frequently. Large continent bowel movement.      Major Shift Events- Uneventful    Treatment Plan: Home vs TCU. Amoxicillin, IV lasix.    Bedside Nurse: Justino washington RN

## 2022-06-22 NOTE — PROGRESS NOTES
Care Management Follow Up    Length of Stay (days): 7    Expected Discharge Date: 06/22/2022 - when placement is available     Concerns to be Addressed: discharge planning     Patient plan of care discussed at interdisciplinary rounds: Yes    Anticipated Discharge Disposition: Skilled Nursing Facility, Transitional Care     Anticipated Discharge Services: None  Anticipated Discharge DME: Wheelchair, Walker    Patient/family educated on Medicare website which has current facility and service quality ratings: yes  Education Provided on the Discharge Plan:  yes  Patient/Family in Agreement with the Plan: yes    Referrals Placed by CM/SW: Post Acute Facilities  Private pay costs discussed: private room/amenity fees and transportation costs    Additional Information:  Constance left a vm with [facility], [phone], requesting a call back to discuss the status of the TCU referral sent to them.  Constance spoke with Jammie at [facility], [phone], who said that they might have a TCU bed available tomorrow.  They will update Sw after they have reviewed the referral.    Sw will continue with discharge planning and will be available as needed until discharge.      OPAL Diaz, [facility]  Inpatient Care Coordination  [facility]  [phone]    ADDENDUM 1516:  [facility] is able to admit the pt tomorrow, but the pt has to be there by 1700.  Constance updated the pt who is undecided if he still wants TCU.  Sw explained the safety reasons as to why he cannot go home alone.  The pt gave Sw permission to call his son-in-law Erv.  Constance spoke with Erv who said that he can provide transport tomorrow.  Constance updated him that the pt is still undecided and wants to go home.  Erv noted that the pt cannot go home and TCU is the only safe discharge option for the pt.  Constance discussed the situation and conversation that was had with Erv.  Erv said that he will be able to stop at the hospital tomorrow to talk  with the pt regarding this again.  Constance spoke with the rehab lead who said that they will change his scheduled times tomorrow, so that he will be seen by PT/OT tomorrow morning to point out to him why he needs TCU and it is not safe for him to discharge home.    Constance updated Jammie at [facility] that the pt will admit to their facility tomorrow P: [phone].

## 2022-06-23 ENCOUNTER — APPOINTMENT (OUTPATIENT)
Dept: PHYSICAL THERAPY | Facility: CLINIC | Age: 87
DRG: 871 | End: 2022-06-23
Payer: MEDICARE

## 2022-06-23 ENCOUNTER — APPOINTMENT (OUTPATIENT)
Dept: OCCUPATIONAL THERAPY | Facility: CLINIC | Age: 87
DRG: 871 | End: 2022-06-23
Payer: MEDICARE

## 2022-06-23 VITALS
RESPIRATION RATE: 20 BRPM | TEMPERATURE: 97.7 F | WEIGHT: 204.7 LBS | OXYGEN SATURATION: 97 % | DIASTOLIC BLOOD PRESSURE: 56 MMHG | HEART RATE: 64 BPM | SYSTOLIC BLOOD PRESSURE: 110 MMHG | HEIGHT: 68 IN | BODY MASS INDEX: 31.02 KG/M2

## 2022-06-23 LAB — POTASSIUM BLD-SCNC: 4 MMOL/L (ref 3.4–5.3)

## 2022-06-23 PROCEDURE — 99239 HOSP IP/OBS DSCHRG MGMT >30: CPT | Performed by: INTERNAL MEDICINE

## 2022-06-23 PROCEDURE — 97110 THERAPEUTIC EXERCISES: CPT | Mod: GP | Performed by: PHYSICAL THERAPIST

## 2022-06-23 PROCEDURE — 96125 COGNITIVE TEST BY HC PRO: CPT | Mod: GO

## 2022-06-23 PROCEDURE — 250N000011 HC RX IP 250 OP 636: Performed by: INTERNAL MEDICINE

## 2022-06-23 PROCEDURE — 97129 THER IVNTJ 1ST 15 MIN: CPT | Mod: GO

## 2022-06-23 PROCEDURE — 250N000013 HC RX MED GY IP 250 OP 250 PS 637: Performed by: INTERNAL MEDICINE

## 2022-06-23 PROCEDURE — 36415 COLL VENOUS BLD VENIPUNCTURE: CPT | Performed by: INTERNAL MEDICINE

## 2022-06-23 PROCEDURE — 84132 ASSAY OF SERUM POTASSIUM: CPT | Performed by: INTERNAL MEDICINE

## 2022-06-23 PROCEDURE — 97116 GAIT TRAINING THERAPY: CPT | Mod: GP | Performed by: PHYSICAL THERAPIST

## 2022-06-23 RX ORDER — AMOXICILLIN 500 MG/1
500 CAPSULE ORAL EVERY 8 HOURS
Qty: 18 CAPSULE | Refills: 0 | DISCHARGE
Start: 2022-06-23 | End: 2022-06-29

## 2022-06-23 RX ORDER — IPRATROPIUM BROMIDE AND ALBUTEROL SULFATE 2.5; .5 MG/3ML; MG/3ML
3 SOLUTION RESPIRATORY (INHALATION) EVERY 4 HOURS PRN
Qty: 1620 ML | Status: ON HOLD | DISCHARGE
Start: 2022-06-23 | End: 2022-10-28

## 2022-06-23 RX ORDER — POTASSIUM CHLORIDE 1500 MG/1
40 TABLET, EXTENDED RELEASE ORAL DAILY
Status: ON HOLD | DISCHARGE
Start: 2022-06-24 | End: 2022-09-22

## 2022-06-23 RX ADMIN — LEVOTHYROXINE SODIUM 112 MCG: 0.11 TABLET ORAL at 08:06

## 2022-06-23 RX ADMIN — ASPIRIN 81 MG CHEWABLE TABLET 81 MG: 81 TABLET CHEWABLE at 08:06

## 2022-06-23 RX ADMIN — FINASTERIDE 5 MG: 5 TABLET, FILM COATED ORAL at 08:06

## 2022-06-23 RX ADMIN — FUROSEMIDE 40 MG: 40 TABLET ORAL at 08:06

## 2022-06-23 RX ADMIN — AMOXICILLIN 500 MG: 500 CAPSULE ORAL at 05:56

## 2022-06-23 RX ADMIN — AMOXICILLIN 500 MG: 500 CAPSULE ORAL at 14:41

## 2022-06-23 RX ADMIN — POTASSIUM CHLORIDE 40 MEQ: 1500 TABLET, EXTENDED RELEASE ORAL at 08:06

## 2022-06-23 RX ADMIN — TAMSULOSIN HYDROCHLORIDE 0.8 MG: 0.4 CAPSULE ORAL at 08:06

## 2022-06-23 RX ADMIN — ENOXAPARIN SODIUM 40 MG: 40 INJECTION SUBCUTANEOUS at 08:06

## 2022-06-23 ASSESSMENT — ACTIVITIES OF DAILY LIVING (ADL)
ADLS_ACUITY_SCORE: 50
ADLS_ACUITY_SCORE: 51
ADLS_ACUITY_SCORE: 51
ADLS_ACUITY_SCORE: 50
ADLS_ACUITY_SCORE: 51
ADLS_ACUITY_SCORE: 50
ADLS_ACUITY_SCORE: 54
ADLS_ACUITY_SCORE: 50

## 2022-06-23 NOTE — PROGRESS NOTES
Called TCU and gave RN to RN handoff.    Upon leaving with EMS, patient stated he was missing one of his hearing aids and that he last remembers having it a couple days ago. Room 540 searched thoroughly. Called ICU where patient transferred from. Hearing aids not documented under patient belongings. Patient had one hearing aid in right ear and a charging case upon discharge. Consulted with charge nurse, patient relations phone number given to patient to follow-up, Med-Surg 5 nurse manager emailed.

## 2022-06-23 NOTE — PROGRESS NOTES
VSS. PIV removed. Belongings returned to patient. Discharge instructions and medications reviewed with patient. Patient to leave for TCU with wheelchair transport at 1600.

## 2022-06-23 NOTE — PROGRESS NOTES
Attempted twice to call TCU at [phone] for RN to RN handoff with no answer and no voicemail option.

## 2022-06-23 NOTE — PLAN OF CARE
Goal Outcome Evaluation:  [id number]     Pertinent assessments: Pt disoriented to situation. VSS. Denies pain. Up A 1-2 with gait belt and walker. Lungs diminished. [place]. External catheter in place. Mepilex to right shin CDI. Will continue to monitor.     Major Shift Events- Uneventful    Treatment Plan: Home vs TCU. Amoxicillin, IV lasix.    Bedside Nurse: Kim Ponce RN

## 2022-06-23 NOTE — DISCHARGE SUMMARY
[facility]  Discharge Summary  Name: Alexandru Still    MRN: [id number]  YOB: 1930    Age: 89 year old  Date of Discharge:  6/23/2022  Date of Admission: 6/15/2022  Primary Care Provider: No Ref-Primary, Physician  Discharge Physician:  Reshma Rodriguez MD  Discharging Service:  Hospitalist      Discharge Diagnoses:  1.  Septic shock with acute septic encephalopathy  2.  Community-acquired pneumonia with acute hypoxic respiratory failure  3.  Group G strep bacteremia  4.  Urinary retention with urethral stone status post ureteroscopy and lithotripsy  5.  BPH  6.  Generalized weakness/deconditioning  7.  Dysphagia  8.  Lambert Eaton myasthenic syndrome  9.  Hypertension with chronic diastolic heart failure   10.  Hypokalemia  11.  Chronic anemia  12.  Hypothyroidism  13.  PB  14.  Complete heart block status post PPM  15.  DDD with neuropathy     Follow-ups Needed After Discharge   Follow up with PCP upon discharge from TCU    Hospital Course:  Alexandru Still is a 89 year old male with past medical history of complete heart block status post pacemaker in 2019, aortic valve stenosis status post TAVR in 2019, hypertension, diastolic CHF, hypothyroidism, Lambert-Eaton myasthenic syndrome, PB, chronic anemia, degenerative disc disease with neuropathy, BPH who was admitted on 6/15/2022 with fever and rigors and was found to have sepsis due to PNA.     Hospital course complicated by development of septic shock requiring transfer to ICU for pressor support.  He had difficult hannah placement and Urology consulted and ultimately patient was found to have ureteral stone s/p cystourethroscopy with laser lithotripsy of the ureteral stone and hannah placement on 6/15.     Blood cultures ultimately returned positive for Group G strep.  Transferred out of ICU on 6/17.  Improving and will discharge to TCU.     Problem List/Assessment and Plan:      Acute Septic Encephalopathy  Septic Shock -  Resolved  Acute Hypoxic Respiratory Failure - Resolved  Community Acquired Pneumonia  Group G Strep Bacteremia  Lactic Acidosis  Presented with fever and rigors.  Initially blood pressure was fine but subsequently became hypotensive requiring pressor support (stopped 6/16).  Was hypoxic on admission but this has also resolved.  CXR showed left basilar infiltrate consistent with pneumonia.  Initially there was concern that he had a UTI but urine culture shows mixed urogenital deisy.  He does have group G strep bacteremia but repeat cultures are negative.  Fever curve has improved and leukocytosis has resolved.  Lactic acid has also normalized.  - Patient received Zosyn, azithromycin vancomycin for 5 days (completed on 6/19).  This completes his pneumonia treatment course  - He is now on amoxicillin to treat his group G strep bacteremia, subsequent cultures remain negative. He will need total of 14 days of antibiotics given his bacteremia, at time of discharge he has 6 more days of Amoxicillin to complete his course     Urinary Retention  Urethral Stone s/p Urethroscopy and Lithotripsy on 6/15/2022  BPH  -Appreciate Urology recommendations, have now signed off  -Doing well with hannah catheter removal     Generalized Weakness/Deconditioning  Dysphagia  - Likely secondary to septic shock  - PT/OT recommending TCU.  Patient initially did not want this but now agreeable  - Appreciate SLP recommendation - Small/bite sized solids (IDDSI 6), mildly thick liquids (IDDSI 2)     Acute Hypoxic Respiratory Failure - Resolved  - Likely secondary to PNA     Lambert-Eaton Myasthenic Syndrome  - Hx of falls due to weakness  - PT/OT     Hypertension  Chronic Diastolic CHF  - PTA dosing is lasix 40 mg PO BID  - He had slight volume overload in the setting of volume resuscitation for his septic shock.  Treated for a few days with Lasix 40 mg IV BID, transitioned back to PTA dosage prior to admission     Hypokalemia  - Replaced per  "protocol     Chronic Anemia  - Baseline hgb 8-9     Chronic Medical Problems:  Obesity - BMI 31.47  PB  Hypothyroidism  Aortic Stenosis s/p TAVR  Complete Heart Block s/p PPM  Degenerative Disc Disease with Neuropathy  Known Pulmonary Nodule - dating back to 2014     Discharge Disposition:  Discharged to short-term care facility     Allergies:  Allergies   Allergen Reactions     Cefuroxime Hives     Contrast Dye      Gadodiamide Hives        Condition on Discharge:  Discharge condition: Stable   Discharge vitals: Blood pressure (!) 153/69, pulse 57, temperature 98.2  F (36.8  C), temperature source Oral, resp. rate 20, height 1.727 m (5' 8\"), weight 92.9 kg (204 lb 11.2 oz), SpO2 93 %.   Code status on discharge: DNR / DNI     History of Illness:  See detailed admission note for full details.    Physical Exam:  Blood pressure (!) 153/69, pulse 57, temperature 98.2  F (36.8  C), temperature source Oral, resp. rate 20, height 1.727 m (5' 8\"), weight 92.9 kg (204 lb 11.2 oz), SpO2 93 %.  Wt Readings from Last 1 Encounters:   06/19/22 92.9 kg (204 lb 11.2 oz)     General: Awake, sitting up in bed, answering questions, NAD  HEENT: Normocephalic and atraumatic, eyes anicteric and without scleral injection, EOMI, face symmetric, MMM.  Cardiac: RRR, normal S1, S2. No m/g/r, no LE edema.  Pulmonary: Normal chest rise, normal work of breathing.  Lungs CTAB without crackles or wheezing.  Abdomen: soft, non-tender, non-distended.  Normoactive bowel sounds, no guarding or rebound tenderness.  Extremities: no deformities.  Warm, well perfused.  Skin: no rashes or lesions.  Warm and Dry.  Neuro: No focal deficits.  Speech clear.  Moving all extremities in bed.  Psych: Alert and oriented to person and place but not exact situation. Appropriate affect.    Procedures other than Imaging:  Phlebotomy  Pressors     Imaging:  Results for orders placed or performed during the hospital encounter of 06/15/22   XR Chest Port 1 View    " Narrative    EXAM: XR CHEST PORT 1 VIEW  LOCATION: [facility]  DATE/TIME: 6/15/2022 2:54 AM    INDICATION: Fever. Hypoxia.  COMPARISON: None.    FINDINGS: Left subclavian cardiac device. Aortic valve prosthesis. No pneumothorax. The heart is enlarged. There is no pulmonary edema. The left hemidiaphragm is elevated and there is a left basilar infiltrate. Minimal atelectasis or scar at the right   lung base. Right shoulder arthroplasty.      Impression    IMPRESSION: Left basilar atelectasis or pneumonia.   CT Head w/o Contrast    Narrative    CT SCAN OF THE HEAD WITHOUT CONTRAST Vy 15, 2022 11:01 AM     HISTORY: Mental status change, unknown cause.    TECHNIQUE: Axial images of the head and coronal reformations without  IV contrast material. Radiation dose for this scan was reduced using  automated exposure control, adjustment of the mA and/or kV according  to patient size, or iterative reconstruction technique.    COMPARISON: None.    FINDINGS: Moderate volume loss is present. White matter  hypoattenuation likely represents moderate chronic small vessel  ischemic change. 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  Parenchyma is otherwise unremarkable. No evidence of acute ischemia,  hemorrhage, mass, mass effect or hydrocephalus. The visualized  calvarium, tympanic cavities, mastoid cavities, and extracranial soft  tissues are unremarkable. Mild paranasal sinus mucosal thickening.      Impression    IMPRESSION: No acute intracranial abnormality.    MIKHAIL IBARRA MD         SYSTEM ID:  [id number]   IR PICC Placement > 5 Yrs of Age    Narrative    IR PICC PLACEMENT > 5 YEARS OF AGE 6/15/2022 3:53 PM    HISTORY: 89-year-old patient with PICC placed in right upper arm by  PICC team, though unable to advance. Request made for advancement.    TECHNIQUE: Patient was brought to the interventional radiology  department and informed consent reiterated. Patient was placed in a  supine position. Skin  overlying the right upper arm and existing PICC  were prepped and draped in standard sterile fashion. Maximal Sterile  Barrier Technique Utilized: Cap AND mask AND sterile gown AND sterile  gloves AND sterile full body drape AND hand hygiene AND skin  preparation 2% chlorhexidine for cutaneous antisepsis (or acceptable  alternative antiseptics). Ultrasound was not needed as access was  already obtained. A Nitrex wire was advanced through the existing PICC  and the PICC was removed. New peel-away sheath was placed. A new  triple-lumen power PICC was then advanced over the wire until the tip  at the right atrial/SVC junction, confirmed with single spot  fluoroscopic image. The PICC was cut at 46 cm with 3 cm external to  the patient. The PICC was secured in place, flushed with normal saline  after all lumens were aspirated easily. Patient tolerated the  procedure well. No sedation or contrast.    FLUOROSCOPIC TIME: 1 minute  AIR KERMA: 15 mGy    FINDINGS: Single spot fluoroscopic image confirms appropriate  placement of PICC with tip at the RA/SVC junction. The PICC is ready  for immediate use.    SAAD GARCÍA MD         SYSTEM ID:  [id number]   CT Abdomen Pelvis w/o Contrast    Narrative    EXAM: CT ABDOMEN PELVIS W/O CONTRAST  LOCATION: [facility]  DATE/TIME: 6/15/2022 5:22 PM    INDICATION: Flank pain, kidney stone suspected  COMPARISON: None.  TECHNIQUE: CT scan of the abdomen and pelvis was performed without IV contrast. Multiplanar reformats were obtained. Dose reduction techniques were used.  CONTRAST: None.    FINDINGS:   LOWER CHEST: Mild atelectasis/infiltrate bases tiny pleural effusions. Moderate hiatal hernia. Pacemaker.    HEPATOBILIARY: Mild motion artifact. Numerous calcified gallstones but no definite laboratory changes of gallbladder. Bile ducts normal in caliber.    PANCREAS: Normal.    SPLEEN: Normal.    ADRENAL GLANDS: Normal.    KIDNEYS/BLADDER: Extrarenal pelves  bilaterally but no true hydronephrosis. No urinary tract stone. Small bilateral renal cysts need no further workup.    Escudero catheter present but there remains moderate volume of urine within bladder suggesting malfunction of the catheter.    BOWEL: Diverticulosis of the colon. No acute inflammatory change. No obstruction.     LYMPH NODES: Normal.    VASCULATURE: Moderate diffuse atherosclerotic plaque.    PELVIC ORGANS: Penile prosthesis device present edema evident throughout scrotal wall. Small hydroceles.    MUSCULOSKELETAL: Prominent degenerative changes lower lumbar spine with mild compression of T12 and L3 vertebral bodies, age indeterminate. Previous spinal process fusion at L4-5 with mild anterolisthesis at L4-5 and L5-S1.      Impression    IMPRESSION:   1.  No urinary tract stones. Modest bilateral extrarenal pelves but no hydronephrosis.  2.  Moderate amount of urine within bladder even with Escudero catheter present suggests catheter obstruction or malfunction.  3.  Cholelithiasis, no suggestion for acute cholecystitis.  4.  Small dependent infiltrate/pleural effusion at lung bases.     XR Chest Port 1 View    Narrative    EXAM: XR CHEST PORT 1 VIEW  LOCATION: [facility]  DATE/TIME: 6/19/2022 7:42 AM    INDICATION: Shortness of breath  COMPARISON: 06/15/2022      Impression    IMPRESSION:     Left subclavian approach dual chamber pacemaker has right atrial appendage and right ventricular leads. There is a catheter deployed valvular prosthesis in aortic position. Moderate mitral annular calcifications are noted. Unchanged cardiac and   mediastinal borders.    The lungs remain underexpanded. There is bandlike atelectasis in the retrocardiac left lower lobe and paradiaphragmatic atelectasis in the left lateral costophrenic sulcus, unchanged. No new airspace opacities or interstitial thickening.    No visible pleural fluid. No pneumothorax.    Previous right shoulder arthroplasty.         Consultations:  Consultations This Hospital Stay   PHARMACY TO DOSE VANCO  OCCUPATIONAL THERAPY ADULT IP CONSULT  PHYSICAL THERAPY ADULT IP CONSULT  CARE MANAGEMENT / SOCIAL WORK IP CONSULT  SPEECH LANGUAGE PATH ADULT IP CONSULT  VASCULAR ACCESS ADULT IP CONSULT  PHARMACY TO [facility]  INTENSIVIST IP CONSULT  PALLIATIVE CARE ADULT IP CONSULT  CARE MANAGEMENT / SOCIAL WORK IP CONSULT  PALLIATIVE CARE ADULT IP CONSULT  UROLOGY IP CONSULT  ENT IP CONSULT  PHYSICAL THERAPY ADULT IP CONSULT  OCCUPATIONAL THERAPY ADULT IP CONSULT  SPEECH LANGUAGE PATH ADULT IP CONSULT     Recent Lab Results:  Recent Labs   Lab 06/21/22  0555 06/20/22  0719 06/19/22  0509 06/18/22  0520   WBC  --  8.0 10.6 14.2*   HGB  --  10.4* 10.4* 10.7*   HCT  --  32.3* 32.0* 33.9*   MCV  --  87 86 88    152 109* 109*     Recent Labs   Lab 06/23/22  0708 06/22/22  0647 06/21/22  0555 06/20/22  1425 06/20/22  0719 06/19/22  1724 06/19/22  1215 06/19/22  0509   NA  --  135  --   --  134  --   --  136   POTASSIUM 4.0 3.9 3.7   < > 3.1*   < >  --  2.8*   CHLORIDE  --  100  --   --  101  --   --  102   CO2  --  29  --   --  31  --   --  31   ANIONGAP  --  6  --   --  2*  --   --  3   GLC  --  92  --   --  87  --  178* 91   BUN  --  24  --   --  22  --   --  23   CR  --  0.85 0.85  --  0.92  --   --  0.91   GFRESTIMATED  --  82 82  --  79  --   --  80   TITA  --  9.2  --   --  8.7  --   --  8.5    < > = values in this interval not displayed.          Pending Results:    Unresulted Labs Ordered in the Past 30 Days of this Admission     No orders found from 5/16/2022 to 6/16/2022.           Discharge Instructions and Follow-Up:   Discharge Orders      General info for SNF    Length of Stay Estimate: Short Term Care: Estimated # of Days <30  Condition at Discharge: Stable  Level of care:skilled   Rehabilitation Potential: Good  Admission H&P remains valid and up-to-date: Yes  Recent Chemotherapy: N/A  Use Nursing Home Standing Orders: Yes     Thomas  instructions    Give two-step Mantoux (PPD) Per Facility Policy Yes     Follow Up and recommended labs and tests    Follow up with primary care upon discharge from TCU.     Reason for your hospital stay    You were hospitalized for septic shock which was due to pneumonia and also bacteremia.  You are improving and will complete 6 more days of antibiotics to treat your infection.  You will discharge to rehab for further therapy to get stronger prior to returning home.     Daily weights    Call Provider for weight gain of more than 2 pounds per day or 5 pounds per week.     Activity - Up with nursing assistance     No CPR- Do NOT Intubate     Physical Therapy Adult Consult    Evaluate and treat as clinically indicated.    Reason:  weakness     Occupational Therapy Adult Consult    Evaluate and treat as clinically indicated.    Reason:  weakness     Speech Language Path Adult Consult    Evaluate and treat as clinically indicated.    Reason:  dysphagia     Fall precautions     Diet    Follow this diet upon discharge: Orders Placed This Encounter      Combination Diet Minced and Moist Diet (level 5); Thin Liquids (level 0)     Discharge Medications   Current Discharge Medication List      START taking these medications    Details   amoxicillin (AMOXIL) 500 MG capsule Take 1 capsule (500 mg) by mouth every 8 hours for 6 days  Qty: 18 capsule, Refills: 0    Associated Diagnoses: Septic shock (H)      diclofenac (VOLTAREN) 1 % topical gel Apply 4 g topically 4 times daily as needed for moderate pain    Associated Diagnoses: Community acquired pneumonia of left lower lobe of lung      ipratropium - albuterol 0.5 mg/2.5 mg/3 mL (DUONEB) 0.5-2.5 (3) MG/3ML neb solution Take 1 vial (3 mLs) by nebulization every 4 hours as needed for wheezing or shortness of breath / dyspnea  Qty: 1620 mL    Associated Diagnoses: Community acquired pneumonia of left lower lobe of lung      potassium chloride ER (KLOR-CON M) 20 MEQ CR tablet Take  2 tablets (40 mEq) by mouth daily    Associated Diagnoses: Hypokalemia         CONTINUE these medications which have NOT CHANGED    Details   acetaminophen (TYLENOL) 325 MG tablet Take 325-650 mg by mouth every 6 hours as needed      Calcium Carb-Cholecalciferol (CALCIUM-VITAMIN D) 500-400 MG-UNIT TABS Take 1 tablet by mouth daily      finasteride (PROSCAR) 5 MG tablet Take 5 mg by mouth daily      fluticasone (FLONASE) 50 MCG/ACT nasal spray Spray 1 spray into both nostrils daily      furosemide (LASIX) 40 MG tablet Take 40 mg by mouth 2 times daily      levothyroxine (SYNTHROID/LEVOTHROID) 112 MCG tablet Take 112 mcg by mouth daily      senna-docusate (SENOKOT-S/PERICOLACE) 8.6-50 MG tablet Take 2 tablets by mouth 2 times daily as needed for constipation (Hold for loose stool.)      tamsulosin (FLOMAX) 0.4 MG capsule Take 0.8 mg by mouth daily      aspirin (ASA) 81 MG chewable tablet Take 81 mg by mouth daily         STOP taking these medications       gabapentin (NEURONTIN) 800 MG tablet Comments:   Reason for Stopping:         potassium bicarbonate (K-LYTE) 25 MEQ effervescent tablet Comments:   Reason for Stopping:               Time Spent on this Encounter   I, Reshma Rodriguez MD, personally saw the patient today and spent greater than 30 minutes discharging this patient.    Reshma Rodriguez MD

## 2022-06-23 NOTE — PLAN OF CARE
Physical Therapy Discharge Summary    Reason for therapy discharge:    Discharged to transitional care facility.    Progress towards therapy goal(s). See goals on Care Plan in [facility] electronic health record for goal details.  Goals not met.  Barriers to achieving goals:   limited tolerance for therapy and discharge from facility.    Therapy recommendation(s):    Continued therapy is recommended.  Rationale/Recommendations:   maximize progression toward more indep mobility..

## 2022-06-23 NOTE — PROGRESS NOTES
Care Management Discharge Note    Discharge Date: 06/23/2022       Discharge Disposition: Skilled Nursing Facility, Transitional Care    Discharge Services: None    Discharge DME: Walker    Discharge Transportation: agency -  [employer] w/c  Reviewed out of pocket cost for [facility] transport, $81.80 for base rate and $5.26 per mile to the destination. Spoke with the pt and his son-in-law Erv, they expressed understanding and are agreeable to this.     Private pay costs discussed: private room/amenity fees and transportation costs    PAS Confirmation Code: [id number]  Patient/family educated on Medicare website which has current facility and service quality ratings: yes    Education Provided on the Discharge Plan:  yes  Persons Notified of Discharge Plans: Pt, pt's son-in-law Erv, [facility], pt's bedside nurse  Patient/Family in Agreement with the Plan: yes    Handoff Referral Completed: No    Additional Information:  The pt will discharge to [facility] today via  [employer] w/c at 1600.  Constance updated the pt's son-in-law Erv and the pt.  Constance confirmed the discharge plan and time with Jammie at [facility] P: [phone] F: [phone].  She asked for a RN to RN handoff be called to [phone].  Constance faxed the pt's discharge orders to [facility].    Constance updated the pt's bedside nurse who will call the RN to RN handoff.    Sw will continue to be available as needed until discharge.      OPAL Diaz, [facility]  Inpatient Care Coordination  [facility]  [phone]

## 2022-06-24 ENCOUNTER — PATIENT OUTREACH (OUTPATIENT)
Dept: CARE COORDINATION | Facility: CLINIC | Age: 87
End: 2022-06-24

## 2022-06-24 DIAGNOSIS — Z71.89 OTHER SPECIFIED COUNSELING: ICD-10-CM

## 2022-06-24 NOTE — PLAN OF CARE
"Speech Language Therapy Discharge Summary    Reason for therapy discharge:    Discharged to transitional care facility.    Progress towards therapy goal(s). See goals on Care Plan in [facility] electronic health record for goal details.  Goals not met.  Barriers to achieving goals:   discharge from facility.    Therapy recommendation(s):    Continued therapy is recommended.  Rationale/Recommendations:  Ongoing SLP for upgrades to least restrictive diet, strategy education.    At time of discharge \"Continue Minced & Moist diet (IDDSI 5) with thin liquid (IDDSI 0) with swallow precautions: sit upright and remain upright for 30 minutes after meals, slow rate, small bites/sips, liquid wash for oral clearance PRN.\"      "

## 2022-06-24 NOTE — PROGRESS NOTES
[facility] [place]    Background: Care Coordination referral placed from [facility] discharge report for reason of patient meeting criteria for a TCM outreach call by [facility] team.    Assessment: Upon chart review, CCRC Team member will cancel/close the referral for TCM outreach due to reason below:    Patient is not established within [facility]. Upon chart review, CCRC Team member noted patient discharged to TCU    Plan: Care Coordination referral for TCM outreach canceled.      Radha Brito MA  [facility], [facility]

## 2022-06-24 NOTE — PROGRESS NOTES
"Occupational Therapy Discharge Summary    Reason for therapy discharge:    Discharged to transitional care facility.    Progress towards therapy goal(s). See goals on Care Plan in [facility] electronic health record for goal details.  Goals not met.  Barriers to achieving goals:   discharge from facility.    Therapy recommendation(s):    Continued therapy is recommended.  Rationale/Recommendations:  Pt is making improvements, but continues to be significantly below stated baseline level of functioning; min A for bed mobility, min to max A sit<>stand pending surface height, min A, fww to ambulate short distances not safe to return home alone. Recommend ongoing skilled OT while IP and in TCU setting to improve strength, functional activity tolerance, balance and safety needed for daily tasks.. Patient completed [facility] cognitive screen with score of 6/30 on 6/23/2022; treating OT noted \"fatigue likely reducing score validity\".      "

## 2022-07-20 ENCOUNTER — HOSPITAL ENCOUNTER (EMERGENCY)
Facility: CLINIC | Age: 87
Discharge: HOME | End: 2022-07-20
Attending: INTERNAL MEDICINE | Admitting: INTERNAL MEDICINE
Payer: MEDICARE

## 2022-07-20 ENCOUNTER — NURSE TRIAGE (OUTPATIENT)
Dept: NURSING | Facility: CLINIC | Age: 87
End: 2022-07-20

## 2022-07-20 ENCOUNTER — APPOINTMENT (OUTPATIENT)
Dept: CT IMAGING | Facility: CLINIC | Age: 87
End: 2022-07-20
Attending: INTERNAL MEDICINE
Payer: MEDICARE

## 2022-07-20 VITALS
BODY MASS INDEX: 28.79 KG/M2 | HEIGHT: 68 IN | TEMPERATURE: 97.9 F | WEIGHT: 190 LBS | SYSTOLIC BLOOD PRESSURE: 118 MMHG | HEART RATE: 66 BPM | OXYGEN SATURATION: 96 % | DIASTOLIC BLOOD PRESSURE: 54 MMHG | RESPIRATION RATE: 18 BRPM

## 2022-07-20 DIAGNOSIS — N50.82 SCROTAL PAIN: ICD-10-CM

## 2022-07-20 DIAGNOSIS — R33.9 URINARY RETENTION: ICD-10-CM

## 2022-07-20 PROCEDURE — 99284 EMERGENCY DEPT VISIT MOD MDM: CPT

## 2022-07-20 PROCEDURE — G1010 CDSM STANSON: HCPCS

## 2022-07-20 RX ORDER — NYSTATIN 100000 U/G
CREAM TOPICAL 2 TIMES DAILY
Qty: 60 G | Refills: 0 | Status: ON HOLD | OUTPATIENT
Start: 2022-07-20 | End: 2022-09-22

## 2022-07-20 ASSESSMENT — ENCOUNTER SYMPTOMS: DIFFICULTY URINATING: 0

## 2022-07-20 NOTE — ED PROVIDER NOTES
History   Chief Complaint:  Catheter Problem       The history is provided by the patient and a friend.      Alexandru Still is a 89 year old male who presents with a catheter problem. The patients friend provides that he was admitted here 6/15 after he developed septic shock likely due to pneumonia after being seen for urinary retention. During his stay, it was determined the patient had a kidney stone, for which he underwent a ureteroscopy and lithotripsy. He was then seen at [facility] on 7/15 after he had urinary retention and constipation for 2 days, for which he then had a catheter placed and began having normal urine output. He was told to have the catheter removed after 3 days, but his caregiver did not see the paperwork and now they are unable to get into his clinic and come to the ED to have it removed. On evaluation, he also complains of some scrotal pain but otherwise denies any other complaints.    Review of Systems   Genitourinary: Negative for difficulty urinating.        (+) scrotal pain   All other systems reviewed and are negative.      Allergies:  Cefuroxime  Contrast Dye  Gadodiamide    Medications:  Aspirin 81 mg  Proscar  Flonase  Lasix  Duoneb  Levothyroxine  Senna docusate  Flomax  Potassium chloride    Past Medical History:     Septic shock  Community acquired pneumonia of LLL of lung   Lambert-eaton myasthenic syndrome  Complete heart block  Severe aortic stenosis  Urge incontinence of urine  Mild chronic anemia  Osteoarthritis of both knees  T12 vetebral fracture  AV block, 1st degree  Allergic rhinitis  Hypothyroidism  Hypertension  Sciatica    Past Surgical History:    TAVR  EP pacemaker  Hernia repair  IR picc placement  Laser holmium lithotripsy, ureters, insert stent, combined     Family History:    COPD  Stroke  Hypertension    Social History:  Patient came from home.  Patient is accompanied in the ED by his friend, Kori.    Physical Exam     Patient Vitals for the past 24  "hrs:   BP Temp Temp src Pulse Resp SpO2 Height Weight   07/20/22 1124 118/54 97.9  F (36.6  C) Oral 66 18 96 % 1.727 m (5' 8\") 86.2 kg (190 lb)       Physical Exam  Constitutional:       Comments: Pleasant and cooperative   HENT:      Mouth/Throat:      Pharynx: No posterior oropharyngeal erythema.   Eyes:      Conjunctiva/sclera: Conjunctivae normal.   Cardiovascular:      Rate and Rhythm: Normal rate and regular rhythm.      Heart sounds: Normal heart sounds.   Pulmonary:      Effort: Pulmonary effort is normal.      Breath sounds: Normal breath sounds.   Abdominal:      General: Bowel sounds are normal. There is no distension.      Palpations: Abdomen is soft.      Tenderness: There is no abdominal tenderness. There is no guarding or rebound.   Genitourinary:     Comments: Penis erect  Tube-like structure in scrotum is tender  Musculoskeletal:         General: Normal range of motion.      Cervical back: Neck supple.   Skin:     General: Skin is warm and dry.   Neurological:      Mental Status: He is alert.         Emergency Department Course     ECG  No results found for this or any previous visit.    Imaging:  CT Pelvis Soft Tissue wo Contrast   Preliminary Result   IMPRESSION:    1.  Escudero catheter localizes to the urinary bladder lumen.   2.  A right scrotum and right inguinal canal balloon device associated   with a penile prosthesis is incidentally seen.   3.  Colonic diverticulosis without diverticulitis.         Report per radiology    Laboratory:  Labs Ordered and Resulted from Time of ED Arrival to Time of ED Departure - No data to display     Procedures      Emergency Department Course:       Reviewed:  I reviewed nursing notes, vitals, past medical history and Care Everywhere    Assessments:  1203 I obtained history and examined the patient as noted above.   1227 I informed the patient of the plan to obtain CT imaging.  1332 I rechecked the patient and explained findings.     Consults:  1329 I " consulted with BINTA Alaniz of Urology and discussed patient history, findings, and plan of care.    Disposition:  The patient was discharged to home.     Impression & Plan       Medical Decision Making:    Alexandru Still is a 89 year old male brought to the emergency department by caregiver requesting Escudero catheter removal.  I reviewed the patient's charts in detail.  This is his second episode of urinary retention in about a month.  I am concerned he might not be able to void without the catheter.  I discussed with him and his caregiver options of removing the catheter Malathi, but then he would have to return to the emergency department if unable to void.  They agree that a trial of voiding at urology office is appropriate.  He may require cystoscopy or other care.  Patient has pain in the scrotum and some tenderness around his erectile dysfunction device but no evidence of abscess or other significant condition.    Diagnosis:    ICD-10-CM    1. Urinary retention  R33.9    2. Scrotal pain  N50.82        Discharge Medications:  New Prescriptions    No medications on file       Scribe Disclosure:  I, Navin Thompson, am serving as a scribe at 12:01 PM on 7/20/2022 to document services personally performed by Kimberly Nava MD based on my observations and the provider's statements to me.        Kimberly Nava MD  07/20/22 3084

## 2022-07-20 NOTE — ED NOTES
MD informed that pt has skin peeling off from penis and groin area. Pt complains of pain when touching penis. And a odar noted to groin area with white stuff in creases of skin.

## 2022-07-20 NOTE — ED TRIAGE NOTES
Pt presents for evaluation of catheter removal. Pt had a catheter placed on 7/15/22 for urinary retention at an [facility] clinic. Was told to have it removed on Monday, but caregiver didn't see paperwork. Pt unable to get in to clinic, so came here fore removal. Pt c/o pain in scrotum that started yesterday. Adequate output in leg bag.

## 2022-07-20 NOTE — ED NOTES
Pt assisted with getting undressed prior to going to CT scan. Pt able to move up in bed with assistance. Some discharge and old blood noted in pt brief after undressing. Urine in cath bag looks clear.

## 2022-07-20 NOTE — TELEPHONE ENCOUNTER
Patient calling with a friend.  He is reporting something is wrong with his urinary catheter. He is reporting it hurts.  He was advised to go to the ER at [facility], and have it checked.   His friend reports she will take him there now.    Celine English RN   [facility] Nurse Advisor    Additional Information    Information only question and nurse able to answer    Protocols used: INFORMATION ONLY CALL - NO TRIAGE-A-[place]

## 2022-07-26 ENCOUNTER — ALLIED HEALTH/NURSE VISIT (OUTPATIENT)
Dept: UROLOGY | Facility: CLINIC | Age: 87
End: 2022-07-26
Payer: MEDICARE

## 2022-07-26 DIAGNOSIS — R33.9 URINARY RETENTION: Primary | ICD-10-CM

## 2022-07-26 PROCEDURE — 99207 PR NO CHARGE NURSE ONLY: CPT

## 2022-07-26 NOTE — PROGRESS NOTES
Alexandru Still comes into clinic today at the request of Marychuy Alaniz PA-C Ordering Provider for Trial of Void.    Patient presents to clinic for a trial of void per provider order. Patient properly identified and procedure explained to patient. Patient's leg-bag emptied, clear-yellow urine drained from patient's catheter. Catheter was then detached from leg-bag and sterile cysto tubing inserted into catheter opening. Via gravity 200 cc's sterile water was gently instilled with brief pauses into bladder. Patient tolerated fairly well and then catheter balloon was completely deflated. Escudero catheter was removed from bladder. Patient was able to successfully void 60 cc's following procedure, some urine leaked around catheter during procedure.  PVR following trial of void was 113 mL.  Per Marychuy pt was instructed to return to clinic this afternoon for bladder scan to check bladder emptying.     Pt returned this afternoon with a bladder scan of 105 mL, pt states he has been able to successfully void since leaving clinic this morning.  Patient was instructed to drink plenty of water, (At least 6-8 glasses daily). Patient instructed to call office during daytime hours, otherwise go to ER if unable to urinate/difficulty emptying. Patient verbalized understanding of this and will follow-up with MD as planned.     This service provided today was under the supervising provider of the day Marychuy Alaniz PA-C, who was available if needed.    Marina Yap, CMA

## 2022-09-15 ENCOUNTER — APPOINTMENT (OUTPATIENT)
Dept: GENERAL RADIOLOGY | Facility: CLINIC | Age: 87
End: 2022-09-15
Attending: EMERGENCY MEDICINE
Payer: MEDICARE

## 2022-09-15 ENCOUNTER — APPOINTMENT (OUTPATIENT)
Dept: GENERAL RADIOLOGY | Facility: CLINIC | Age: 87
DRG: 004 | End: 2022-09-15
Attending: EMERGENCY MEDICINE
Payer: MEDICARE

## 2022-09-15 ENCOUNTER — APPOINTMENT (OUTPATIENT)
Dept: CT IMAGING | Facility: CLINIC | Age: 87
End: 2022-09-15
Attending: EMERGENCY MEDICINE
Payer: MEDICARE

## 2022-09-15 ENCOUNTER — APPOINTMENT (OUTPATIENT)
Dept: CT IMAGING | Facility: CLINIC | Age: 87
DRG: 004 | End: 2022-09-15
Attending: EMERGENCY MEDICINE
Payer: MEDICARE

## 2022-09-15 ENCOUNTER — ANESTHESIA EVENT (OUTPATIENT)
Dept: EMERGENCY MEDICINE | Facility: CLINIC | Age: 87
DRG: 004 | End: 2022-09-15
Payer: MEDICARE

## 2022-09-15 ENCOUNTER — APPOINTMENT (OUTPATIENT)
Dept: CT IMAGING | Facility: CLINIC | Age: 87
DRG: 004 | End: 2022-09-15
Attending: PHYSICIAN ASSISTANT
Payer: MEDICARE

## 2022-09-15 ENCOUNTER — ANESTHESIA (OUTPATIENT)
Dept: EMERGENCY MEDICINE | Facility: CLINIC | Age: 87
DRG: 004 | End: 2022-09-15
Payer: MEDICARE

## 2022-09-15 ENCOUNTER — APPOINTMENT (OUTPATIENT)
Dept: GENERAL RADIOLOGY | Facility: CLINIC | Age: 87
DRG: 004 | End: 2022-09-15
Attending: SURGERY
Payer: MEDICARE

## 2022-09-15 ENCOUNTER — APPOINTMENT (OUTPATIENT)
Dept: GENERAL RADIOLOGY | Facility: CLINIC | Age: 87
DRG: 004 | End: 2022-09-15
Attending: PHYSICIAN ASSISTANT
Payer: MEDICARE

## 2022-09-15 ENCOUNTER — HOSPITAL ENCOUNTER (INPATIENT)
Facility: CLINIC | Age: 87
LOS: 7 days | Discharge: NURSING, REHAB OR LONG-TERM CARE | DRG: 004 | End: 2022-09-22
Attending: EMERGENCY MEDICINE | Admitting: SURGERY
Payer: MEDICARE

## 2022-09-15 ENCOUNTER — APPOINTMENT (OUTPATIENT)
Dept: GENERAL RADIOLOGY | Facility: CLINIC | Age: 87
DRG: 004 | End: 2022-09-15
Attending: ANESTHESIOLOGY
Payer: MEDICARE

## 2022-09-15 ENCOUNTER — HOSPITAL ENCOUNTER (EMERGENCY)
Facility: CLINIC | Age: 87
Discharge: OTHER INSTITUTION | End: 2022-09-15
Attending: EMERGENCY MEDICINE | Admitting: EMERGENCY MEDICINE
Payer: MEDICARE

## 2022-09-15 VITALS
DIASTOLIC BLOOD PRESSURE: 72 MMHG | TEMPERATURE: 97.6 F | OXYGEN SATURATION: 91 % | SYSTOLIC BLOOD PRESSURE: 139 MMHG | RESPIRATION RATE: 15 BRPM | HEART RATE: 65 BPM

## 2022-09-15 DIAGNOSIS — S00.83XA CONTUSION OF FACE, INITIAL ENCOUNTER: ICD-10-CM
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
## 2022-09-15 DIAGNOSIS — S80.01XA CONTUSION OF RIGHT KNEE, INITIAL ENCOUNTER: ICD-10-CM

## 2022-09-15 DIAGNOSIS — S12.111A CLOSED ODONTOID FRACTURE WITH TYPE II MORPHOLOGY AND POSTERIOR DISPLACEMENT, INITIAL ENCOUNTER (H): ICD-10-CM

## 2022-09-15 DIAGNOSIS — S12.100A: ICD-10-CM

## 2022-09-15 DIAGNOSIS — T14.90XA TRAUMA: Primary | ICD-10-CM

## 2022-09-15 DIAGNOSIS — S12.001A CLOSED NONDISPLACED FRACTURE OF FIRST CERVICAL VERTEBRA, UNSPECIFIED FRACTURE MORPHOLOGY, INITIAL ENCOUNTER (H): ICD-10-CM

## 2022-09-15 DIAGNOSIS — S12.090A: ICD-10-CM

## 2022-09-15 DIAGNOSIS — S06.5X0A TRAUMATIC SUBDURAL HEMORRHAGE WITHOUT LOSS OF CONSCIOUSNESS, INITIAL ENCOUNTER (H): ICD-10-CM

## 2022-09-15 DIAGNOSIS — S81.811A NONINFECTED SKIN TEAR OF RIGHT LOWER EXTREMITY, INITIAL ENCOUNTER: ICD-10-CM

## 2022-09-15 DIAGNOSIS — N40.0 BENIGN PROSTATIC HYPERPLASIA WITHOUT LOWER URINARY TRACT SYMPTOMS: ICD-10-CM

## 2022-09-15 DIAGNOSIS — I62.00 SUBDURAL HEMORRHAGE (H): ICD-10-CM
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
## 2022-09-15 DIAGNOSIS — S12.9XXA CLOSED FRACTURE OF CERVICAL VERTEBRA, UNSPECIFIED CERVICAL VERTEBRAL LEVEL, INITIAL ENCOUNTER (H): ICD-10-CM

## 2022-09-15 DIAGNOSIS — S06.5XAA SDH (SUBDURAL HEMATOMA) (H): ICD-10-CM

## 2022-09-15 DIAGNOSIS — E03.9 HYPOTHYROIDISM, UNSPECIFIED TYPE: ICD-10-CM

## 2022-09-15 LAB
ABO/RH(D): NORMAL
ALBUMIN SERPL BCG-MCNC: 3.8 G/DL (ref 3.5–5.2)
ALP SERPL-CCNC: 70 U/L (ref 40–129)
ALT SERPL W P-5'-P-CCNC: 11 U/L (ref 10–50)
ANION GAP SERPL CALCULATED.3IONS-SCNC: 10 MMOL/L (ref 7–15)
ANION GAP SERPL CALCULATED.3IONS-SCNC: 13 MMOL/L (ref 7–15)
ANTIBODY SCREEN: NEGATIVE
APTT PPP: 29 SECONDS (ref 22–38)
AST SERPL W P-5'-P-CCNC: 20 U/L (ref 10–50)
BASOPHILS # BLD AUTO: 0.1 10E3/UL (ref 0–0.2)
BASOPHILS NFR BLD AUTO: 0 %
BILIRUB SERPL-MCNC: 0.6 MG/DL
BUN SERPL-MCNC: 16.5 MG/DL (ref 8–23)
BUN SERPL-MCNC: 16.7 MG/DL (ref 8–23)
CALCIUM SERPL-MCNC: 9.1 MG/DL (ref 8.2–9.6)
CALCIUM SERPL-MCNC: 9.8 MG/DL (ref 8.2–9.6)
CHLORIDE SERPL-SCNC: 100 MMOL/L (ref 98–107)
CHLORIDE SERPL-SCNC: 97 MMOL/L (ref 98–107)
CK SERPL-CCNC: 55 U/L (ref 39–308)
CREAT SERPL-MCNC: 0.83 MG/DL (ref 0.67–1.17)
CREAT SERPL-MCNC: 0.97 MG/DL (ref 0.67–1.17)
DEPRECATED HCO3 PLAS-SCNC: 27 MMOL/L (ref 22–29)
DEPRECATED HCO3 PLAS-SCNC: 28 MMOL/L (ref 22–29)
EOSINOPHIL # BLD AUTO: 0.1 10E3/UL (ref 0–0.7)
EOSINOPHIL NFR BLD AUTO: 1 %
ERYTHROCYTE [DISTWIDTH] IN BLOOD BY AUTOMATED COUNT: 14.7 % (ref 10–15)
GFR SERPL CREATININE-BSD FRML MDRD: 73 ML/MIN/1.73M2
GFR SERPL CREATININE-BSD FRML MDRD: 82 ML/MIN/1.73M2
GLUCOSE BLDC GLUCOMTR-MCNC: 154 MG/DL (ref 70–99)
GLUCOSE SERPL-MCNC: 126 MG/DL (ref 70–99)
GLUCOSE SERPL-MCNC: 138 MG/DL (ref 70–99)
HCO3 BLDV-SCNC: 22 MMOL/L (ref 21–28)
HCO3 BLDV-SCNC: 30 MMOL/L (ref 21–28)
HCT VFR BLD AUTO: 41.1 % (ref 40–53)
HGB BLD-MCNC: 13 G/DL (ref 13.3–17.7)
IMM GRANULOCYTES # BLD: 0.1 10E3/UL
IMM GRANULOCYTES NFR BLD: 1 %
INR PPP: 1.06 (ref 0.85–1.15)
LACTATE BLD-SCNC: 1.3 MMOL/L
LACTATE BLD-SCNC: 2.2 MMOL/L
LYMPHOCYTES # BLD AUTO: 1.3 10E3/UL (ref 0.8–5.3)
LYMPHOCYTES NFR BLD AUTO: 8 %
MCH RBC QN AUTO: 30.9 PG (ref 26.5–33)
MCHC RBC AUTO-ENTMCNC: 31.6 G/DL (ref 31.5–36.5)
MCV RBC AUTO: 98 FL (ref 78–100)
MONOCYTES # BLD AUTO: 1.3 10E3/UL (ref 0–1.3)
MONOCYTES NFR BLD AUTO: 8 %
NEUTROPHILS # BLD AUTO: 13 10E3/UL (ref 1.6–8.3)
NEUTROPHILS NFR BLD AUTO: 82 %
NRBC # BLD AUTO: 0 10E3/UL
NRBC BLD AUTO-RTO: 0 /100
NT-PROBNP SERPL-MCNC: 498 PG/ML (ref 0–1800)
PCO2 BLDV: 43 MM HG (ref 40–50)
PCO2 BLDV: 48 MM HG (ref 40–50)
PH BLDV: 7.33 [PH] (ref 7.32–7.43)
PH BLDV: 7.41 [PH] (ref 7.32–7.43)
PLATELET # BLD AUTO: 265 10E3/UL (ref 150–450)
PO2 BLDV: 42 MM HG (ref 25–47)
PO2 BLDV: 47 MM HG (ref 25–47)
POTASSIUM SERPL-SCNC: 3 MMOL/L (ref 3.4–5.3)
POTASSIUM SERPL-SCNC: 3.8 MMOL/L (ref 3.4–5.3)
PROCALCITONIN SERPL IA-MCNC: 0.04 NG/ML
PROT SERPL-MCNC: 7 G/DL (ref 6.4–8.3)
RADIOLOGIST FLAGS: ABNORMAL
RBC # BLD AUTO: 4.21 10E6/UL (ref 4.4–5.9)
SAO2 % BLDV: 77 % (ref 94–100)
SAO2 % BLDV: 79 % (ref 94–100)
SARS-COV-2 RNA RESP QL NAA+PROBE: NEGATIVE
SODIUM SERPL-SCNC: 137 MMOL/L (ref 136–145)
SODIUM SERPL-SCNC: 138 MMOL/L (ref 136–145)
SPECIMEN EXPIRATION DATE: NORMAL
WBC # BLD AUTO: 15.9 10E3/UL (ref 4–11)

## 2022-09-15 PROCEDURE — 85610 PROTHROMBIN TIME: CPT | Performed by: EMERGENCY MEDICINE

## 2022-09-15 PROCEDURE — 71045 X-RAY EXAM CHEST 1 VIEW: CPT

## 2022-09-15 PROCEDURE — 94660 CPAP INITIATION&MGMT: CPT

## 2022-09-15 PROCEDURE — 258N000003 HC RX IP 258 OP 636: Performed by: PHYSICIAN ASSISTANT

## 2022-09-15 PROCEDURE — 99291 CRITICAL CARE FIRST HOUR: CPT | Mod: 25 | Performed by: EMERGENCY MEDICINE

## 2022-09-15 PROCEDURE — 70498 CT ANGIOGRAPHY NECK: CPT | Mod: 26 | Performed by: RADIOLOGY

## 2022-09-15 PROCEDURE — 96375 TX/PRO/DX INJ NEW DRUG ADDON: CPT | Performed by: EMERGENCY MEDICINE

## 2022-09-15 PROCEDURE — 5A09357 ASSISTANCE WITH RESPIRATORY VENTILATION, LESS THAN 24 CONSECUTIVE HOURS, CONTINUOUS POSITIVE AIRWAY PRESSURE: ICD-10-PCS | Performed by: EMERGENCY MEDICINE

## 2022-09-15 PROCEDURE — 87040 BLOOD CULTURE FOR BACTERIA: CPT | Performed by: EMERGENCY MEDICINE

## 2022-09-15 PROCEDURE — 999N000065 XR ABDOMEN PORT 1 VIEW

## 2022-09-15 PROCEDURE — 71250 CT THORAX DX C-: CPT | Mod: MG

## 2022-09-15 PROCEDURE — G1010 CDSM STANSON: HCPCS

## 2022-09-15 PROCEDURE — 250N000011 HC RX IP 250 OP 636

## 2022-09-15 PROCEDURE — 99285 EMERGENCY DEPT VISIT HI MDM: CPT | Mod: 25

## 2022-09-15 PROCEDURE — 83605 ASSAY OF LACTIC ACID: CPT

## 2022-09-15 PROCEDURE — 31500 INSERT EMERGENCY AIRWAY: CPT | Performed by: EMERGENCY MEDICINE

## 2022-09-15 PROCEDURE — 71045 X-RAY EXAM CHEST 1 VIEW: CPT | Mod: 26 | Performed by: RADIOLOGY

## 2022-09-15 PROCEDURE — C9803 HOPD COVID-19 SPEC COLLECT: HCPCS

## 2022-09-15 PROCEDURE — 99292 CRITICAL CARE ADDL 30 MIN: CPT | Performed by: EMERGENCY MEDICINE

## 2022-09-15 PROCEDURE — 250N000011 HC RX IP 250 OP 636: Performed by: PHYSICIAN ASSISTANT

## 2022-09-15 PROCEDURE — 96376 TX/PRO/DX INJ SAME DRUG ADON: CPT

## 2022-09-15 PROCEDURE — 85025 COMPLETE CBC W/AUTO DIFF WBC: CPT | Performed by: EMERGENCY MEDICINE

## 2022-09-15 PROCEDURE — 84145 PROCALCITONIN (PCT): CPT | Performed by: EMERGENCY MEDICINE

## 2022-09-15 PROCEDURE — G1010 CDSM STANSON: HCPCS | Mod: GC | Performed by: RADIOLOGY

## 2022-09-15 PROCEDURE — U0003 INFECTIOUS AGENT DETECTION BY NUCLEIC ACID (DNA OR RNA); SEVERE ACUTE RESPIRATORY SYNDROME CORONAVIRUS 2 (SARS-COV-2) (CORONAVIRUS DISEASE [COVID-19]), AMPLIFIED PROBE TECHNIQUE, MAKING USE OF HIGH THROUGHPUT TECHNOLOGIES AS DESCRIBED BY CMS-2020-01-R: HCPCS | Performed by: EMERGENCY MEDICINE

## 2022-09-15 PROCEDURE — 93005 ELECTROCARDIOGRAM TRACING: CPT | Performed by: EMERGENCY MEDICINE

## 2022-09-15 PROCEDURE — 73030 X-RAY EXAM OF SHOULDER: CPT | Mod: LT

## 2022-09-15 PROCEDURE — 250N000011 HC RX IP 250 OP 636: Performed by: SURGERY

## 2022-09-15 PROCEDURE — 74018 RADEX ABDOMEN 1 VIEW: CPT | Mod: 26 | Performed by: RADIOLOGY

## 2022-09-15 PROCEDURE — 5A1955Z RESPIRATORY VENTILATION, GREATER THAN 96 CONSECUTIVE HOURS: ICD-10-PCS | Performed by: ANESTHESIOLOGY

## 2022-09-15 PROCEDURE — 36415 COLL VENOUS BLD VENIPUNCTURE: CPT | Performed by: EMERGENCY MEDICINE

## 2022-09-15 PROCEDURE — 94002 VENT MGMT INPAT INIT DAY: CPT

## 2022-09-15 PROCEDURE — 70498 CT ANGIOGRAPHY NECK: CPT | Mod: MG

## 2022-09-15 PROCEDURE — 99222 1ST HOSP IP/OBS MODERATE 55: CPT | Performed by: PHYSICIAN ASSISTANT

## 2022-09-15 PROCEDURE — 51798 US URINE CAPACITY MEASURE: CPT | Performed by: EMERGENCY MEDICINE

## 2022-09-15 PROCEDURE — 86901 BLOOD TYPING SEROLOGIC RH(D): CPT | Performed by: EMERGENCY MEDICINE

## 2022-09-15 PROCEDURE — 96374 THER/PROPH/DIAG INJ IV PUSH: CPT

## 2022-09-15 PROCEDURE — 999N000065 XR CHEST PORT 1 VIEW

## 2022-09-15 PROCEDURE — 51702 INSERT TEMP BLADDER CATH: CPT | Performed by: EMERGENCY MEDICINE

## 2022-09-15 PROCEDURE — 73562 X-RAY EXAM OF KNEE 3: CPT | Mod: RT

## 2022-09-15 PROCEDURE — 85730 THROMBOPLASTIN TIME PARTIAL: CPT | Performed by: EMERGENCY MEDICINE

## 2022-09-15 PROCEDURE — 250N000011 HC RX IP 250 OP 636: Performed by: EMERGENCY MEDICINE

## 2022-09-15 PROCEDURE — 82803 BLOOD GASES ANY COMBINATION: CPT

## 2022-09-15 PROCEDURE — 999N000158 HC STATISTIC RCP TIME ED VENT EA 10 MIN

## 2022-09-15 PROCEDURE — 93010 ELECTROCARDIOGRAM REPORT: CPT | Performed by: EMERGENCY MEDICINE

## 2022-09-15 PROCEDURE — 71045 X-RAY EXAM CHEST 1 VIEW: CPT | Mod: 77

## 2022-09-15 PROCEDURE — 999N000185 HC STATISTIC TRANSPORT TIME EA 15 MIN

## 2022-09-15 PROCEDURE — 74176 CT ABD & PELVIS W/O CONTRAST: CPT | Mod: 26 | Performed by: RADIOLOGY

## 2022-09-15 PROCEDURE — 83880 ASSAY OF NATRIURETIC PEPTIDE: CPT | Performed by: EMERGENCY MEDICINE

## 2022-09-15 PROCEDURE — 96365 THER/PROPH/DIAG IV INF INIT: CPT | Mod: 59 | Performed by: EMERGENCY MEDICINE

## 2022-09-15 PROCEDURE — 200N000002 HC R&B ICU UMMC

## 2022-09-15 PROCEDURE — 258N000003 HC RX IP 258 OP 636: Performed by: EMERGENCY MEDICINE

## 2022-09-15 PROCEDURE — 70496 CT ANGIOGRAPHY HEAD: CPT | Mod: MG

## 2022-09-15 PROCEDURE — 94150 VITAL CAPACITY TEST: CPT

## 2022-09-15 PROCEDURE — 96361 HYDRATE IV INFUSION ADD-ON: CPT | Performed by: EMERGENCY MEDICINE

## 2022-09-15 PROCEDURE — 82550 ASSAY OF CK (CPK): CPT | Performed by: EMERGENCY MEDICINE

## 2022-09-15 PROCEDURE — G0390 TRAUMA RESPONS W/HOSP CRITI: HCPCS | Performed by: EMERGENCY MEDICINE

## 2022-09-15 PROCEDURE — 70496 CT ANGIOGRAPHY HEAD: CPT | Mod: 26 | Performed by: RADIOLOGY

## 2022-09-15 PROCEDURE — 999N000157 HC STATISTIC RCP TIME EA 10 MIN

## 2022-09-15 PROCEDURE — 250N000013 HC RX MED GY IP 250 OP 250 PS 637: Performed by: PHYSICIAN ASSISTANT

## 2022-09-15 PROCEDURE — 31500 INSERT EMERGENCY AIRWAY: CPT | Performed by: ANESTHESIOLOGY

## 2022-09-15 PROCEDURE — 80053 COMPREHEN METABOLIC PANEL: CPT | Performed by: EMERGENCY MEDICINE

## 2022-09-15 PROCEDURE — 250N000011 HC RX IP 250 OP 636: Performed by: ANESTHESIOLOGY

## 2022-09-15 PROCEDURE — 71250 CT THORAX DX C-: CPT | Mod: 26 | Performed by: RADIOLOGY

## 2022-09-15 RX ORDER — LIDOCAINE 40 MG/G
CREAM TOPICAL
Status: DISCONTINUED | OUTPATIENT
Start: 2022-09-15 | End: 2022-09-22 | Stop reason: HOSPADM

## 2022-09-15 RX ORDER — FUROSEMIDE 10 MG/ML
40 INJECTION INTRAMUSCULAR; INTRAVENOUS ONCE
Status: COMPLETED | OUTPATIENT
Start: 2022-09-15 | End: 2022-09-15

## 2022-09-15 RX ORDER — LIDOCAINE HYDROCHLORIDE 20 MG/ML
SOLUTION OROPHARYNGEAL
Status: DISCONTINUED
Start: 2022-09-15 | End: 2022-09-15 | Stop reason: HOSPADM

## 2022-09-15 RX ORDER — NICOTINE POLACRILEX 4 MG
15-30 LOZENGE BUCCAL
Status: DISCONTINUED | OUTPATIENT
Start: 2022-09-15 | End: 2022-09-22 | Stop reason: HOSPADM

## 2022-09-15 RX ORDER — SODIUM CHLORIDE 9 MG/ML
INJECTION, SOLUTION INTRAVENOUS CONTINUOUS
Status: DISCONTINUED | OUTPATIENT
Start: 2022-09-15 | End: 2022-09-15

## 2022-09-15 RX ORDER — GLYCOPYRROLATE 0.2 MG/ML
0.2 INJECTION, SOLUTION INTRAMUSCULAR; INTRAVENOUS ONCE
Status: COMPLETED | OUTPATIENT
Start: 2022-09-15 | End: 2022-09-15

## 2022-09-15 RX ORDER — NALOXONE HYDROCHLORIDE 0.4 MG/ML
0.4 INJECTION, SOLUTION INTRAMUSCULAR; INTRAVENOUS; SUBCUTANEOUS
Status: DISCONTINUED | OUTPATIENT
Start: 2022-09-15 | End: 2022-09-22 | Stop reason: HOSPADM

## 2022-09-15 RX ORDER — NALOXONE HYDROCHLORIDE 0.4 MG/ML
0.2 INJECTION, SOLUTION INTRAMUSCULAR; INTRAVENOUS; SUBCUTANEOUS
Status: DISCONTINUED | OUTPATIENT
Start: 2022-09-15 | End: 2022-09-22 | Stop reason: HOSPADM

## 2022-09-15 RX ORDER — PROCHLORPERAZINE 25 MG
12.5 SUPPOSITORY, RECTAL RECTAL EVERY 12 HOURS PRN
Status: DISCONTINUED | OUTPATIENT
Start: 2022-09-15 | End: 2022-09-22 | Stop reason: HOSPADM

## 2022-09-15 RX ORDER — ONDANSETRON 2 MG/ML
4 INJECTION INTRAMUSCULAR; INTRAVENOUS EVERY 6 HOURS PRN
Status: DISCONTINUED | OUTPATIENT
Start: 2022-09-15 | End: 2022-09-22 | Stop reason: HOSPADM

## 2022-09-15 RX ORDER — ACETAMINOPHEN 650 MG/1
650 SUPPOSITORY RECTAL EVERY 6 HOURS
Status: DISCONTINUED | OUTPATIENT
Start: 2022-09-15 | End: 2022-09-18

## 2022-09-15 RX ORDER — SODIUM CHLORIDE, SODIUM LACTATE, POTASSIUM CHLORIDE, CALCIUM CHLORIDE 600; 310; 30; 20 MG/100ML; MG/100ML; MG/100ML; MG/100ML
INJECTION, SOLUTION INTRAVENOUS CONTINUOUS
Status: DISCONTINUED | OUTPATIENT
Start: 2022-09-15 | End: 2022-09-15

## 2022-09-15 RX ORDER — DIPHENHYDRAMINE HYDROCHLORIDE 50 MG/ML
25 INJECTION INTRAMUSCULAR; INTRAVENOUS
Status: COMPLETED | OUTPATIENT
Start: 2022-09-15 | End: 2022-09-15

## 2022-09-15 RX ORDER — DEXTROSE MONOHYDRATE 25 G/50ML
25-50 INJECTION, SOLUTION INTRAVENOUS
Status: DISCONTINUED | OUTPATIENT
Start: 2022-09-15 | End: 2022-09-22 | Stop reason: HOSPADM

## 2022-09-15 RX ORDER — METHOCARBAMOL 100 MG/ML
1000 INJECTION, SOLUTION INTRAMUSCULAR; INTRAVENOUS ONCE
Status: COMPLETED | OUTPATIENT
Start: 2022-09-15 | End: 2022-09-15

## 2022-09-15 RX ORDER — AMOXICILLIN 250 MG
1 CAPSULE ORAL 2 TIMES DAILY PRN
Status: DISCONTINUED | OUTPATIENT
Start: 2022-09-15 | End: 2022-09-16

## 2022-09-15 RX ORDER — LIDOCAINE 40 MG/G
CREAM TOPICAL
Status: DISCONTINUED
Start: 2022-09-15 | End: 2022-09-15 | Stop reason: HOSPADM

## 2022-09-15 RX ORDER — MORPHINE SULFATE 4 MG/ML
4 INJECTION, SOLUTION INTRAMUSCULAR; INTRAVENOUS
Status: DISCONTINUED | OUTPATIENT
Start: 2022-09-15 | End: 2022-09-15 | Stop reason: HOSPADM

## 2022-09-15 RX ORDER — PROPOFOL 10 MG/ML
20 INJECTION, EMULSION INTRAVENOUS ONCE
Status: COMPLETED | OUTPATIENT
Start: 2022-09-15 | End: 2022-09-15

## 2022-09-15 RX ORDER — AMOXICILLIN 250 MG
2 CAPSULE ORAL 2 TIMES DAILY PRN
Status: DISCONTINUED | OUTPATIENT
Start: 2022-09-15 | End: 2022-09-16

## 2022-09-15 RX ORDER — ACETAMINOPHEN 650 MG/1
650 SUPPOSITORY RECTAL EVERY 4 HOURS PRN
Status: DISCONTINUED | OUTPATIENT
Start: 2022-09-15 | End: 2022-09-15

## 2022-09-15 RX ORDER — LIDOCAINE 40 MG/G
CREAM TOPICAL
Status: DISCONTINUED | OUTPATIENT
Start: 2022-09-15 | End: 2022-09-15 | Stop reason: HOSPADM

## 2022-09-15 RX ORDER — HYDROMORPHONE HYDROCHLORIDE 1 MG/ML
0.5 INJECTION, SOLUTION INTRAMUSCULAR; INTRAVENOUS; SUBCUTANEOUS
Status: DISCONTINUED | OUTPATIENT
Start: 2022-09-15 | End: 2022-09-16

## 2022-09-15 RX ORDER — ACETAMINOPHEN 325 MG/1
650 TABLET ORAL EVERY 4 HOURS PRN
Status: DISCONTINUED | OUTPATIENT
Start: 2022-09-15 | End: 2022-09-16

## 2022-09-15 RX ORDER — PROCHLORPERAZINE MALEATE 5 MG
5 TABLET ORAL EVERY 6 HOURS PRN
Status: DISCONTINUED | OUTPATIENT
Start: 2022-09-15 | End: 2022-09-22 | Stop reason: HOSPADM

## 2022-09-15 RX ORDER — FUROSEMIDE 10 MG/ML
20 INJECTION INTRAMUSCULAR; INTRAVENOUS DAILY
Status: DISCONTINUED | OUTPATIENT
Start: 2022-09-16 | End: 2022-09-16

## 2022-09-15 RX ORDER — BISACODYL 10 MG
10 SUPPOSITORY, RECTAL RECTAL DAILY PRN
Status: DISCONTINUED | OUTPATIENT
Start: 2022-09-15 | End: 2022-09-22 | Stop reason: HOSPADM

## 2022-09-15 RX ORDER — HYDROMORPHONE HYDROCHLORIDE 1 MG/ML
.5-1 INJECTION, SOLUTION INTRAMUSCULAR; INTRAVENOUS; SUBCUTANEOUS
Status: DISCONTINUED | OUTPATIENT
Start: 2022-09-15 | End: 2022-09-19

## 2022-09-15 RX ORDER — ONDANSETRON 4 MG/1
4 TABLET, ORALLY DISINTEGRATING ORAL EVERY 6 HOURS PRN
Status: DISCONTINUED | OUTPATIENT
Start: 2022-09-15 | End: 2022-09-22 | Stop reason: HOSPADM

## 2022-09-15 RX ORDER — PROPOFOL 10 MG/ML
5-75 INJECTION, EMULSION INTRAVENOUS CONTINUOUS
Status: DISCONTINUED | OUTPATIENT
Start: 2022-09-15 | End: 2022-09-16

## 2022-09-15 RX ORDER — PROPOFOL 10 MG/ML
10 INJECTION, EMULSION INTRAVENOUS EVERY 5 MIN PRN
Status: DISCONTINUED | OUTPATIENT
Start: 2022-09-15 | End: 2022-09-16

## 2022-09-15 RX ORDER — METHYLPREDNISOLONE SODIUM SUCCINATE 125 MG/2ML
40 INJECTION, POWDER, LYOPHILIZED, FOR SOLUTION INTRAMUSCULAR; INTRAVENOUS EVERY 4 HOURS
Status: COMPLETED | OUTPATIENT
Start: 2022-09-15 | End: 2022-09-15

## 2022-09-15 RX ORDER — IOPAMIDOL 755 MG/ML
75 INJECTION, SOLUTION INTRAVASCULAR ONCE
Status: COMPLETED | OUTPATIENT
Start: 2022-09-15 | End: 2022-09-16

## 2022-09-15 RX ADMIN — METHYLPREDNISOLONE SODIUM SUCCINATE 37.5 MG: 125 INJECTION, POWDER, FOR SOLUTION INTRAMUSCULAR; INTRAVENOUS at 21:24

## 2022-09-15 RX ADMIN — HYDROMORPHONE HYDROCHLORIDE 0.5 MG: 1 INJECTION, SOLUTION INTRAMUSCULAR; INTRAVENOUS; SUBCUTANEOUS at 17:42

## 2022-09-15 RX ADMIN — PROPOFOL 20 MG: 10 INJECTION, EMULSION INTRAVENOUS at 19:27

## 2022-09-15 RX ADMIN — MORPHINE SULFATE 4 MG: 4 INJECTION, SOLUTION INTRAMUSCULAR; INTRAVENOUS at 05:40

## 2022-09-15 RX ADMIN — GLYCOPYRROLATE 0.2 MG: 0.2 INJECTION, SOLUTION INTRAMUSCULAR; INTRAVENOUS at 18:58

## 2022-09-15 RX ADMIN — METHOCARBAMOL 1000 MG: 100 INJECTION INTRAMUSCULAR; INTRAVENOUS at 22:08

## 2022-09-15 RX ADMIN — MORPHINE SULFATE 4 MG: 4 INJECTION, SOLUTION INTRAMUSCULAR; INTRAVENOUS at 13:25

## 2022-09-15 RX ADMIN — PROPOFOL 20 MG: 10 INJECTION, EMULSION INTRAVENOUS at 18:54

## 2022-09-15 RX ADMIN — DIPHENHYDRAMINE HYDROCHLORIDE 25 MG: 50 INJECTION, SOLUTION INTRAMUSCULAR; INTRAVENOUS at 21:25

## 2022-09-15 RX ADMIN — SODIUM CHLORIDE: 9 INJECTION, SOLUTION INTRAVENOUS at 15:31

## 2022-09-15 RX ADMIN — Medication 25 MCG/HR: at 21:15

## 2022-09-15 RX ADMIN — METHYLPREDNISOLONE SODIUM SUCCINATE 37.5 MG: 125 INJECTION, POWDER, FOR SOLUTION INTRAMUSCULAR; INTRAVENOUS at 17:04

## 2022-09-15 RX ADMIN — HYDROMORPHONE HYDROCHLORIDE 0.5 MG: 1 INJECTION, SOLUTION INTRAMUSCULAR; INTRAVENOUS; SUBCUTANEOUS at 15:36

## 2022-09-15 RX ADMIN — ACETAMINOPHEN 650 MG: 650 SUPPOSITORY RECTAL at 20:56

## 2022-09-15 RX ADMIN — MORPHINE SULFATE 4 MG: 4 INJECTION, SOLUTION INTRAMUSCULAR; INTRAVENOUS at 08:38

## 2022-09-15 RX ADMIN — DEXTROSE AND SODIUM CHLORIDE: 5; 900 INJECTION, SOLUTION INTRAVENOUS at 21:46

## 2022-09-15 RX ADMIN — FUROSEMIDE 40 MG: 10 INJECTION, SOLUTION INTRAVENOUS at 17:46

## 2022-09-15 RX ADMIN — PROPOFOL 100 MCG/KG/MIN: 10 INJECTION, EMULSION INTRAVENOUS at 18:45

## 2022-09-15 ASSESSMENT — ENCOUNTER SYMPTOMS
ARTHRALGIAS: 1
VOMITING: 0
DIARRHEA: 0
ARTHRALGIAS: 1
HEADACHES: 0
BACK PAIN: 1
WOUND: 1
NECK PAIN: 1
WOUND: 1
SHORTNESS OF BREATH: 1

## 2022-09-15 ASSESSMENT — ACTIVITIES OF DAILY LIVING (ADL)
ADLS_ACUITY_SCORE: 35

## 2022-09-15 NOTE — ED TRIAGE NOTES
Presents with EMS post a fall at home. Pt fell hitting g his head. Has bruise to head. Repots a headche as well. C-COLLAR in place  Pt lives alone and per EMS they get called for frequent falls

## 2022-09-15 NOTE — CONSULTS
[facility]       NEUROSURGERY CONSULTATION    This consultation was requested by Dr. Cortes from the ED service.    Reason for Consultation: C1, C2 fracture with posterior displacement with moderate canal stenosis    HPI:  Alexandru Still is a 89 year old male with past medical histroy of complete heart block with cardiac pacemaker in situ, S/P transcatheter aortic valve replacement, and HTN who was transferred to our ED from [facility] for trauma work-up.  The patient had a unwitnessed mechanical fall, with positive head strike, no loss of consciousness and was down for 2 hours. CT scan demonstrated a left frontal subdural hemorrhage measuring 5 mm, C1 fracture, and a type II C2 odontoid fracture with 1 cm of posterior displacement.  Patient was placed in a C collar and transferred to us for a neurosurgery consultation.      On arrival, the patient is in aspen collor and was on 2L NC.He said that he tried to reach his walker, fell face down, and was on the ground for over 2 hours. He said he did not loose consciousness. His neurological examination was grossly non focal, with neck pain and a new dysarthria. He was maintaining his air way at this moment, but developed new dysarthria concerning for threatening airwar.He takes ASA daily, and is not on any AC.Denies recent fevers, chills, nausea, vomiting, chest pain, shortness of breath, and denies headaches, weakness, LOC, numbness/weakness/paresthesias in extremities, changes in sensation, taste, smell, nor trouble speaking or other neurologic symptoms.     PAST MEDICAL HISTORY:   Past Medical History:   Diagnosis Date     Anemia      Aortic stenosis      BPH (benign prostatic hyperplasia)      Closed T12 fracture      Complete heart block     s/p dual chamber pacemaker     Hypertension      Hypothyroidism      Kidney stone      Lambert-Eaton myasthenic syndrome      Lower extremity edema        PAST SURGICAL  HISTORY:   Past Surgical History:   Procedure Laterality Date     CV TRANSCATHETER AORTIC VALVE REPLACEMENT TRANSAORTIC APPROACH       EP PACEMAKER       HERNIA REPAIR       IR PICC PLACEMENT > 5 YRS OF AGE  6/15/2022     LASER HOLMIUM LITHOTRIPSY URETER(S), INSERT STENT, COMBINED N/A 6/15/2022    Procedure: 1. Cystourethroscopy 2. Laser lithotripsy of a urethral stone 3. Basketing of a urethral stone 4. Complex hannah catheter placement over a wire;  Surgeon: Beny Ha MD;  Location: [facility] OR       FAMILY HISTORY:   Family History   Problem Relation Age of Onset     Cerebrovascular Disease Mother      Chronic Obstructive Pulmonary Disease Father        SOCIAL HISTORY:   Social History     Tobacco Use     Smoking status: Former Smoker     Packs/day: 1.00     Years: 20.00     Pack years: 20.00     Quit date:      Years since quittin.7     Smokeless tobacco: Never Used   Substance Use Topics     Alcohol use: Not Currently       MEDICATIONS:  Current Outpatient Medications   Medication Sig Dispense Refill     acetaminophen (TYLENOL) 325 MG tablet Take 325-650 mg by mouth every 6 hours as needed       aspirin (ASA) 81 MG chewable tablet Take 81 mg by mouth daily (Patient not taking: Reported on 2022)       Calcium Carb-Cholecalciferol (CALCIUM-VITAMIN D) 500-400 MG-UNIT TABS Take 1 tablet by mouth daily       diclofenac (VOLTAREN) 1 % topical gel Apply 4 g topically 4 times daily as needed for moderate pain       finasteride (PROSCAR) 5 MG tablet Take 5 mg by mouth daily       fluticasone (FLONASE) 50 MCG/ACT nasal spray Spray 1 spray into both nostrils daily       furosemide (LASIX) 40 MG tablet Take 40 mg by mouth 2 times daily       ipratropium - albuterol 0.5 mg/2.5 mg/3 mL (DUONEB) 0.5-2.5 (3) MG/3ML neb solution Take 1 vial (3 mLs) by nebulization every 4 hours as needed for wheezing or shortness of breath / dyspnea 1620 mL      levothyroxine (SYNTHROID/LEVOTHROID) 112 MCG tablet Take 112 mcg by  mouth daily       nystatin (MYCOSTATIN) 753230 UNIT/GM external cream Apply topically 2 times daily To penis and groin folds 60 g 0     potassium chloride ER (KLOR-CON M) 20 MEQ CR tablet Take 2 tablets (40 mEq) by mouth daily       senna-docusate (SENOKOT-S/PERICOLACE) 8.6-50 MG tablet Take 2 tablets by mouth 2 times daily as needed for constipation (Hold for loose stool.)       tamsulosin (FLOMAX) 0.4 MG capsule Take 0.8 mg by mouth daily         Allergies:  Allergies   Allergen Reactions     Cefuroxime Hives     Contrast Dye      Gadodiamide Hives       ROS: 10 point ROS of systems including Constitutional, Eyes, Respiratory, Cardiovascular, Gastroenterology, Genitourinary, Integumentary, Muscularskeletal, Psychiatric were all negative except for pertinent positives noted in my HPI.    Physical exam:   Blood pressure (!) 149/78, pulse 60, resp. rate 15, SpO2 96 %.  General: awake and alert  HEENT: Aspen collor, neck tenderness  PULM: breathing comfortably on room air  : rectal tone intact    NEUROLOGIC:  -- Awake; Alert; oriented x 3  -- Follows commands briskly  -- +repetition, calculation, and naming  -- Dysarthria.  -- no gaze preference. No apparent hemineglect.  Cranial Nerves:  -- visual fields full to confrontation, PERRL 3-2mm bilat and brisk, extraocular movements intact  -- face symmetrical, tongue midline  -- sensory V1-V3 intact bilaterally  -- palate elevates symmetrically, uvula midline  -- hearing grossly intact bilat  -- Trapezii 5/5 strength bilat symmetric  -- Cerebellar: Finger nose finger without dysmetria, intact rapid alternating motions bilaterally    Motor:  Normal bulk / tone; no tremor, rigidity, or bradykinesia.  No muscle wasting or fasciculations  No Pronator Drift     Delt Bi Tri Hand Flexion/  Extension Iliopsoas Quadriceps Hamstrings Tibialis Anterior Gastroc    C5 C6 C7 C8/T1 L2 L3 L4-S1 L4 S1   R 5 5 5 5 5 5 5 5 5   L 5 5 5 5 5 5 5 5 5     Sensory:  intact to LT x 4 extremities        Reflexes: no clonus       Bi Tri BR Isabella Pat Ach Bab     C5-6 C7-8 C6 UMN L2-4 S1 UMN   R 2+ 2+ 2+ Norm 2+ 2+ Norm   L 2+ 2+ 2+ Norm 2+ 2+ Norm      Gait: Normal.       IMAGING:  Recent Results (from the past 24 hour(s))   Head CT w/o contrast    Narrative    EXAM: CT HEAD W/O CONTRAST, CT CERVICAL SPINE W/O CONTRAST, CT FACIAL BONES WITHOUT CONTRAST  LOCATION: [facility]  DATE/TIME: 9/15/2022 4:36 AM    INDICATION: Traumatic injury.  COMPARISON: Head CT dated 06/15/2022.  TECHNIQUE:   1) Routine CT Head without IV contrast. Multiplanar reformats. Dose reduction techniques were used.  2) Routine CT Facial Bones without IV contrast. Multiplanar reformats. Dose reduction techniques were used.  3) Routine CT Cervical Spine without IV contrast. Multiplanar reformats. Dose reduction techniques were used.    FINDINGS:  HEAD CT:   INTRACRANIAL CONTENTS: Acute subdural hemorrhage along the anterior left frontal lobe measuring up to 5 mm in thickness. No adverse intracranial mass effect. No CT evidence of acute infarct. Moderate presumed chronic small vessel ischemic changes.   Moderate generalized volume loss. No hydrocephalus.     OSSEOUS STRUCTURES/SOFT TISSUES: Right frontal scalp swelling. No acute calvarial fracture.    FACIAL BONE CT:  OSSEOUS STRUCTURES/SOFT TISSUES: Right frontal scalp swelling. No facial bone fracture or malalignment.    ORBITAL CONTENTS: No acute abnormality.    SINUSES: Mucous retention cyst in the left maxillary sinus. Scattered mucosal thickening of the ethmoid air cells.    CERVICAL SPINE CT:   VERTEBRA: Posteriorly displaced type II fracture of the odontoid, with approximately 1 cm posterior displacement. There is a fracture fragment anterior to the odontoid, that appears to arise from the anterior arch of C1. The C1 ring remains intact.   Posterior displacement results in at least moderate canal stenosis. There is hyperdensity along the posterior dens measuring 7  mm in thickness, suspicious for epidural hemorrhage.     There is 3 mm degenerative anterolisthesis of C4 on C5, C6 on C7, and C7 on T1.     CANAL/FORAMINA: Multilevel degenerative changes, with at least moderate canal stenosis at C7-T1. Multilevel bilateral neural foraminal narrowing, with scattered mild and moderate stenosis.    PARASPINAL: Prevertebral hemorrhage at the level of C1-C2, measuring up to 4 mm in thickness. Visualized lung fields are clear.      Impression    IMPRESSION:  HEAD CT:  1.  Thin subdural hemorrhage along the anterior left frontal lobe measuring up to 5 mm in thickness. No adverse intracranial mass effect.  2.  Right frontal scalp swelling. No acute calvarial fracture.    FACIAL BONE CT:  1.  No facial bone or mandibular fracture.    CERVICAL SPINE CT:  1.  Acute posteriorly displaced type II odontoid fracture, with approximately 1 cm posterior displacement. Findings result in at least moderate canal stenosis.  2.  Displaced fracture along the inferior aspect of the anterior arch of C1.   3.  Hyperdensity along the ventral epidural space posterior to the dens, suggestive of epidural hemorrhage measuring up to 7 mm in thickness.                 CT Facial Bones without Contrast    Narrative    EXAM: CT HEAD W/O CONTRAST, CT CERVICAL SPINE W/O CONTRAST, CT FACIAL BONES WITHOUT CONTRAST  LOCATION: [facility]  DATE/TIME: 9/15/2022 4:36 AM    INDICATION: Traumatic injury.  COMPARISON: Head CT dated 06/15/2022.  TECHNIQUE:   1) Routine CT Head without IV contrast. Multiplanar reformats. Dose reduction techniques were used.  2) Routine CT Facial Bones without IV contrast. Multiplanar reformats. Dose reduction techniques were used.  3) Routine CT Cervical Spine without IV contrast. Multiplanar reformats. Dose reduction techniques were used.    FINDINGS:  HEAD CT:   INTRACRANIAL CONTENTS: Acute subdural hemorrhage along the anterior left frontal lobe measuring up to 5 mm in  thickness. No adverse intracranial mass effect. No CT evidence of acute infarct. Moderate presumed chronic small vessel ischemic changes.   Moderate generalized volume loss. No hydrocephalus.     OSSEOUS STRUCTURES/SOFT TISSUES: Right frontal scalp swelling. No acute calvarial fracture.    FACIAL BONE CT:  OSSEOUS STRUCTURES/SOFT TISSUES: Right frontal scalp swelling. No facial bone fracture or malalignment.    ORBITAL CONTENTS: No acute abnormality.    SINUSES: Mucous retention cyst in the left maxillary sinus. Scattered mucosal thickening of the ethmoid air cells.    CERVICAL SPINE CT:   VERTEBRA: Posteriorly displaced type II fracture of the odontoid, with approximately 1 cm posterior displacement. There is a fracture fragment anterior to the odontoid, that appears to arise from the anterior arch of C1. The C1 ring remains intact.   Posterior displacement results in at least moderate canal stenosis. There is hyperdensity along the posterior dens measuring 7 mm in thickness, suspicious for epidural hemorrhage.     There is 3 mm degenerative anterolisthesis of C4 on C5, C6 on C7, and C7 on T1.     CANAL/FORAMINA: Multilevel degenerative changes, with at least moderate canal stenosis at C7-T1. Multilevel bilateral neural foraminal narrowing, with scattered mild and moderate stenosis.    PARASPINAL: Prevertebral hemorrhage at the level of C1-C2, measuring up to 4 mm in thickness. Visualized lung fields are clear.      Impression    IMPRESSION:  HEAD CT:  1.  Thin subdural hemorrhage along the anterior left frontal lobe measuring up to 5 mm in thickness. No adverse intracranial mass effect.  2.  Right frontal scalp swelling. No acute calvarial fracture.    FACIAL BONE CT:  1.  No facial bone or mandibular fracture.    CERVICAL SPINE CT:  1.  Acute posteriorly displaced type II odontoid fracture, with approximately 1 cm posterior displacement. Findings result in at least moderate canal stenosis.  2.  Displaced fracture  along the inferior aspect of the anterior arch of C1.   3.  Hyperdensity along the ventral epidural space posterior to the dens, suggestive of epidural hemorrhage measuring up to 7 mm in thickness.                 XR Shoulder Left 2 Views    Narrative    EXAM: XR SHOULDER LEFT 2 VIEWS  LOCATION: [facility]  DATE/TIME: 9/15/2022 4:40 AM    INDICATION: Pain after fall.  COMPARISON: None.      Impression    IMPRESSION: No acute fracture or dislocation. 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 Post endovascular repair of the aortic valve. Left   subclavian venous pacemaker with leads at the right atrium and right ventricle.   XR Knee Right 3 Views    Narrative    EXAM: RIGHT KNEE 3 VIEWS  LOCATION: [facility]  DATE/TIME: 9/15/2022 4:40 AM    INDICATION: Fall. Multiple abrasions in the extremities.  COMPARISON: None.      Impression    IMPRESSION:   1. No visualized acute fracture or malalignment of the right knee.  2. A right knee arthroplasty is in place. No evidence of hardware failure.  3. No right knee joint effusion.                  Cervical spine CT w/o contrast    Narrative    EXAM: CT HEAD W/O CONTRAST, CT CERVICAL SPINE W/O CONTRAST, CT FACIAL BONES WITHOUT CONTRAST  LOCATION: [facility]  DATE/TIME: 9/15/2022 4:36 AM    INDICATION: Traumatic injury.  COMPARISON: Head CT dated 06/15/2022.  TECHNIQUE:   1) Routine CT Head without IV contrast. Multiplanar reformats. Dose reduction techniques were used.  2) Routine CT Facial Bones without IV contrast. Multiplanar reformats. Dose reduction techniques were used.  3) Routine CT Cervical Spine without IV contrast. Multiplanar reformats. Dose reduction techniques were used.    FINDINGS:  HEAD CT:   INTRACRANIAL CONTENTS: Acute subdural hemorrhage along the anterior left frontal lobe measuring  up to 5 mm in thickness. No adverse intracranial mass effect. No CT evidence of acute infarct. Moderate presumed chronic small vessel ischemic changes.   Moderate generalized volume loss. No hydrocephalus.     OSSEOUS STRUCTURES/SOFT TISSUES: Right frontal scalp swelling. No acute calvarial fracture.    FACIAL BONE CT:  OSSEOUS STRUCTURES/SOFT TISSUES: Right frontal scalp swelling. No facial bone fracture or malalignment.    ORBITAL CONTENTS: No acute abnormality.    SINUSES: Mucous retention cyst in the left maxillary sinus. Scattered mucosal thickening of the ethmoid air cells.    CERVICAL SPINE CT:   VERTEBRA: Posteriorly displaced type II fracture of the odontoid, with approximately 1 cm posterior displacement. There is a fracture fragment anterior to the odontoid, that appears to arise from the anterior arch of C1. The C1 ring remains intact.   Posterior displacement results in at least moderate canal stenosis. There is hyperdensity along the posterior dens measuring 7 mm in thickness, suspicious for epidural hemorrhage.     There is 3 mm degenerative anterolisthesis of C4 on C5, C6 on C7, and C7 on T1.     CANAL/FORAMINA: Multilevel degenerative changes, with at least moderate canal stenosis at C7-T1. Multilevel bilateral neural foraminal narrowing, with scattered mild and moderate stenosis.    PARASPINAL: Prevertebral hemorrhage at the level of C1-C2, measuring up to 4 mm in thickness. Visualized lung fields are clear.      Impression    IMPRESSION:  HEAD CT:  1.  Thin subdural hemorrhage along the anterior left frontal lobe measuring up to 5 mm in thickness. No adverse intracranial mass effect.  2.  Right frontal scalp swelling. No acute calvarial fracture.    FACIAL BONE CT:  1.  No facial bone or mandibular fracture.    CERVICAL SPINE CT:  1.  Acute posteriorly displaced type II odontoid fracture, with approximately 1 cm posterior displacement. Findings result in at least moderate canal stenosis.  2.   Displaced fracture along the inferior aspect of the anterior arch of C1.   3.  Hyperdensity along the ventral epidural space posterior to the dens, suggestive of epidural hemorrhage measuring up to 7 mm in thickness.                       LABS:   Last Comprehensive Metabolic Panel:  Sodium   Date Value Ref Range Status   09/15/2022 137 136 - 145 mmol/L Final     Potassium   Date Value Ref Range Status   09/15/2022 3.0 (L) 3.4 - 5.3 mmol/L Final   06/23/2022 4.0 3.4 - 5.3 mmol/L Final     Chloride   Date Value Ref Range Status   09/15/2022 97 (L) 98 - 107 mmol/L Final   06/22/2022 100 94 - 109 mmol/L Final     Carbon Dioxide (CO2)   Date Value Ref Range Status   09/15/2022 27 22 - 29 mmol/L Final   06/22/2022 29 20 - 32 mmol/L Final     Anion Gap   Date Value Ref Range Status   09/15/2022 13 7 - 15 mmol/L Final   06/22/2022 6 3 - 14 mmol/L Final     Glucose   Date Value Ref Range Status   09/15/2022 126 (H) 70 - 99 mg/dL Final   06/22/2022 92 70 - 99 mg/dL Final     Urea Nitrogen   Date Value Ref Range Status   09/15/2022 16.7 8.0 - 23.0 mg/dL Final   06/22/2022 24 7 - 30 mg/dL Final     Creatinine   Date Value Ref Range Status   09/15/2022 0.97 0.67 - 1.17 mg/dL Final     GFR Estimate   Date Value Ref Range Status   09/15/2022 73 >60 mL/min/1.73m2 Final     Comment:     Effective December 21, 2021 eGFRcr in adults is calculated using the 2021 CKD-EPI creatinine equation which includes age and gender (Michelle et al., NEJM, DOI: 10.1056/BSQQbh2168874)   12/18/2020 >60 >60 mL/min/1.73m2 Final     Calcium   Date Value Ref Range Status   09/15/2022 9.8 (H) 8.2 - 9.6 mg/dL Final     Lab Results   Component Value Date    WBC 15.9 09/15/2022     Lab Results   Component Value Date    RBC 4.21 09/15/2022     Lab Results   Component Value Date    HGB 13.0 09/15/2022     Lab Results   Component Value Date    HCT 41.1 09/15/2022     Lab Results   Component Value Date    MCV 98 09/15/2022     Lab Results   Component Value Date     "MCH 30.9 09/15/2022     Lab Results   Component Value Date    MCHC 31.6 09/15/2022     Lab Results   Component Value Date    RDW 14.7 09/15/2022     Lab Results   Component Value Date     09/15/2022     INR   Date Value Ref Range Status   09/15/2022 1.06 0.85 - 1.15 Final      aPTT   Date Value Ref Range Status   09/15/2022 29 22 - 38 Seconds Final        ASSESSMENT:  Alexandru Still is a 89 year old male with unwitnessed mechanical fall resulting in C1 fracture, and a type II C2 odontoid fracture with 1 cm of posterior displacement and left frontal 5mm SDH.     Clinically Significant Risk Factors Present on Admission   Complete heart block with cardiac pacemaker in situ  Transcatheter aortic valve replacement  HTN    RECOMMENDATIONS:  Hold ASA  Aspen collar at all times  Repeat head CT Head in 6 hours from the time of first acquisition  CTA Head and Neck with contrast, premedicate given contrast allergy  SLP evaluation for dysarthria  Serial neurological examinations  Orthotics consult for MIAMI J, Upright x rays when able  Platelets > 100,000  INR < 1.5  Hemoglobin > 8  DVT: SCDs while in bed  Will discuss surgical vs conservative options with the patient and the family in care conference tomorrow.  Neurosurgery follow the patient closely    I discussed with Mr Still, the nature of the injury and the surgical vs conservative options for the management of his cervical fractures. I told him that conservative management would include MIAMI J collar at all times, and the possibility of paralysis vs death in case of another fall/ neck injury. I also told that it is not possible to determine the extent of his spinal instability at this moment. I also explained that any surgical intervention to stabilize the spine would be a very high risk procedure given his advanced age, co morbidities and osteoporosis. He expressed understanding and said that\" he is too old for surgery\". I called Abhijit Rao, his son in " law and his health care agent and explained the same. He said that he would come over to see Mr Still and have a goals of care conversation with all the teams to determine the best course of action. Mr Still was initially DNR DNI per records, but verbally expressed that he want to be intubated and was agreeable to chest compression if needed. He was back and forth about surgery and was quite worried.     Fermín Ramos MD  Neurosurgery Resident  Pager- [phone]    The patient was discussed with Dr. Wyman, neurosurgery chief resident, and Dr. Simmons, neurosurgery staff.    Please contact neurosurgery resident on call with questions.    Dial * * *[phone], enter [phone] when prompted.

## 2022-09-15 NOTE — ED PROVIDER NOTES
History     Chief Complaint:  Fall     The history is provided by the patient and the EMS personnel.      Alexandru Still is a 89 year old male with history of HTN and a complete heart block with cardiac pacemaker in situ who presents for evaluation after a fall today. Per EMS, patient falls frequently at home. Patient says he was getting out of bed today to use the urinal and was walking with his walker when he suddenly fell forward and hit his head. He denies any loss of consciousness. Patient currently complains of right shoulder and neck pain. He has multiple abrasions, lacerations, and bruises to his face and extremities. No recent vomiting, diarrhea, SOB or chest pain. Patient is not anticoagulated.     Review of Systems   Cardiovascular: Negative for chest pain.   Gastrointestinal: Negative for diarrhea and vomiting.   Musculoskeletal: Positive for arthralgias (right shoulder) and neck pain.   Skin: Positive for wound.   Neurological: Negative for syncope.   All other systems reviewed and are negative.    Allergies:  Cefuroxime  Contrast Dye  Gadodiamide    Medications:  Proscar  Lasix  Duoneb  Synthroid  Mycostatin  Senokot  Flomax    Past Medical History:     Lambert-Eaton myasthenic syndrome  Complete heart block s/p PM placement  Nonrheumatic aortic valve stenosis  Mild chronic anemia   Primary osteoarthritis   Allergic rhinitis  Sciatica  Hypothyroidism  HTN  Spinal stenosis   Basal cell carcinoma   BPH  Closed T12 fracture  Kidney stone    Past Surgical History:    Transcatheter aortic valve replacement  Pacemaker insertion   Hernia repair  PICC placement  Laser holmium lithotripsy ureter, insert stent combined    MOHS surgery     Family History:    Mother: Cerebrovascular disease, stroke  Father: COPD, HTN  Brother: COPD  Stroke: Stroke     Social History:  The patient presents to the ED alone via EMS  [facility]  PCP: [facility]    Physical Exam     Patient Vitals  for the past 24 hrs:   BP Temp Pulse Resp SpO2   09/15/22 0530 151/75 -- 80 -- 93 %   09/15/22 0500 146/75 -- 77 -- 92 %   09/15/22 0400 151/74 -- 71 -- 91 %   09/15/22 0348 177/91 97.6  F (36.4  C) -- 16 --   09/15/22 0345 -- -- -- -- 90 %     Physical Exam  Nursing note and vitals reviewed.  Constitutional: Cooperative.   HENT:   Mouth/Throat: Mucous membranes are normal.   Cardiovascular: Normal rate, regular rhythm.  3/6 systolic murmur.  No murmur.  Pulmonary/Chest: Effort normal and breath sounds normal. No respiratory distress. No wheezes. No rales. Pacemaker present in left upper chest.  Abdominal: Soft. Normal appearance and bowel sounds are normal. No distension. There is no tenderness. There is no rigidity and no guarding.   Musculoskeletal: Normal range of motion of extremities. C-Collar in place.   Neurological: Alert. Strength and sensation in upper and lower extremities normal.  GCS 15.  Oriented x4.   Skin: Skin is warm and dry. No rash noted.   Psychiatric: Normal mood and affect. Abrasions and bruising to the nose, right eyebrow, and forehead.  Small laceration to the right eyebrow. Small skin tear on right lower leg and bruise to right knee.    Emergency Department Course   Imaging:  Cervical spine CT w/o contrast   Final Result   IMPRESSION:   CERVICAL SPINE CT:   1.  Acute posteriorly displaced type II odontoid fracture, with approximately 1 cm posterior displacement. Findings result in at least moderate canal stenosis.   2.  Displaced fracture along the inferior aspect of the anterior arch of C1.    3.  Hyperdensity along the ventral epidural space posterior to the dens, suggestive of epidural hemorrhage measuring up to 7 mm in thickness.          XR Knee Right 3 Views   Final Result   IMPRESSION:    1. No visualized acute fracture or malalignment of the right knee.   2. A right knee arthroplasty is in place. No evidence of hardware failure.   3. No right knee joint effusion.           XR  Shoulder Left 2 Views   Final Result   IMPRESSION: No acute fracture or dislocation. 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 Post endovascular repair of the aortic valve. Left    subclavian venous pacemaker with leads at the right atrium and right ventricle.      CT Facial Bones without Contrast   Final Result   IMPRESSION:   FACIAL BONE CT:   1.  No facial bone or mandibular fracture.      Head CT w/o contrast   Final Result   IMPRESSION:   HEAD CT:   1.  Thin subdural hemorrhage along the anterior left frontal lobe measuring up to 5 mm in thickness. No adverse intracranial mass effect.   2.  Right frontal scalp swelling. No acute calvarial fracture.      Report per radiology    Laboratory:  Labs Ordered and Resulted from Time of ED Arrival to Time of ED Departure   BASIC METABOLIC PANEL - Abnormal       Result Value    Sodium 137      Potassium 3.0 (*)     Chloride 97 (*)     Carbon Dioxide (CO2) 27      Anion Gap 13      Urea Nitrogen 16.7      Creatinine 0.97      Calcium 9.8 (*)     Glucose 126 (*)     GFR Estimate 73     CBC WITH PLATELETS AND DIFFERENTIAL - Abnormal    WBC Count 15.9 (*)     RBC Count 4.21 (*)     Hemoglobin 13.0 (*)     Hematocrit 41.1      MCV 98      MCH 30.9      MCHC 31.6      RDW 14.7      Platelet Count 265      % Neutrophils 82      % Lymphocytes 8      % Monocytes 8      % Eosinophils 1      % Basophils 0      % Immature Granulocytes 1      NRBCs per 100 WBC 0      Absolute Neutrophils 13.0 (*)     Absolute Lymphocytes 1.3      Absolute Monocytes 1.3      Absolute Eosinophils 0.1      Absolute Basophils 0.1      Absolute Immature Granulocytes 0.1      Absolute NRBCs 0.0     INR - Normal    INR 1.06     PARTIAL THROMBOPLASTIN TIME - Normal    aPTT 29     COVID-19 VIRUS (CORONAVIRUS) BY PCR - Normal    SARS CoV2 PCR Negative     TYPE AND SCREEN, ADULT    ABO/RH(D) O NEG       Antibody Screen Negative      SPECIMEN EXPIRATION DATE [id number]     ABO/RH TYPE AND SCREEN       Reviewed:  I reviewed nursing notes, vitals, past medical history and Care Everywhere    Assessments:  0348 I obtained history and examined the patient as noted above.   0530 I rechecked the patient and explained findings.     Consults:  0508 I spoke with the ER physician at the [facility]. They recommend getting a CTA prior to transfer.     Interventions:  0540 Morphine 4 mg  IV    Disposition:  The patient was transferred to [facility] via EMS. The ER physician accepted the patient for transfer.     Impression & Plan     Medical Decision Making:  Mr. Still is a 89 year old gentlemen who presents with a mechanical fall. No concern for an acute neurologic or cardiogenic etiology as he simply lost his balance leaning forward while using the urinal. Unfortunately he sustained significant injuries including a left frontal subdural hemorrhage measuring 5 mm. He also sustained a C1 fracture as well as a type II C2 odontoid fracture with 1 cm of posterior displacement. His neurologic function of his arms and legs is reassuring and he is otherwise hemodynamically stable. He has been placed in a C collar. I have a transfer to the [place] pending bed availability as a trauma transfer later this morning. Pain controlled as above.     Covid-19  Alexandru Still was evaluated during a global COVID-19 pandemic, which necessitated consideration that the patient might be at risk for infection with the SARS-CoV-2 virus that causes COVID-19.   Applicable protocols for evaluation were followed during the patient's care.   COVID-19 was considered as part of the patient's evaluation. The plan for testing is: A test was obtained today.    COVID-19 Virus (Coronavirus) by PCR Nasopharyngeal Swab: negative    Diagnosis:    ICD-10-CM    1. Fall, initial encounter  W19.XXXA    2. Noninfected skin tear of right  lower extremity, initial encounter  S81.811A    3. Contusion of right knee, initial encounter  S80.01XA    4. Contusion of face, initial encounter  S00.83XA    5. Subdural hemorrhage - Left frontal  I62.00    6. Closed nondisplaced fracture of first cervical vertebra, unspecified fracture morphology, initial encounter  S12.001A    7. Closed odontoid fracture with type II morphology and posterior displacement, initial encounter S12.111A      Scribe Disclosure:  I, Eda Guerrero, am serving as a scribe at 3:46 AM on 9/15/2022 to document services personally performed by Cornell Gonzalez MD based on my observations and the provider's statements to me.            Cornell Gonzalez MD  09/15/22 0653

## 2022-09-15 NOTE — H&P
[facility]    History and Physical: Trauma Service       Date of Admission:  9/15/2022    Time of Admission/Consult Request (page/call): [phone]    Time of my evaluation: 1500  Consulting services:  Neurosurgery - Emergent consult (within 30 mins): Called by ED  Palliative - Time called     Assessment   Trauma mechanism: Fall while urinating   Time/date of injury: 9/15/22  Known Injuries:  1. Anterior left frontal SDH, 5 mm  2. Posteriorly displaced Tyle II odontoid fx, ~1cm posterior dislocation   3. C1 anterior arch fx   4. Epidural hemorrhage up to 7mm along posterior dens   5. Questionable acute anterior left 6th rib fx  6. Right forehead abrasion     Palliative Consulted: Discussed Patient just in case he declines over night. Patient has since been intubated.   - Neurosurgery does not want to preform surgery d/t patient's advance age and medical conditions. Patient first declined then requested surgery.  - Patient has a poor prognosis wither way and will need long term health care, and require to wear C-collar the rest of his life.   - Patient's son-in-law and grandson saw him prior to intubation, discussed possible outcomes if not able to extubate with patient and family, including Trach and Peg.   - Family left prior to patient being transferred to SICU. Discussed with NSGY that we should have a Care Conference tomorrow to discuss surgical plans and goals of care. Family was overwhelmed with extent of information tonight.    Neuro/Pain/Psych:  # Fall from standing,   # Traumatic SDH, along left frontal lobe up to 5mm in thickness.   # Posteriorly displaced Tyle II odontoid fx, ~1cm posterior dislocation   # C1 anterior arch fx   # Epidural hemorrhage up to 7mm along posterior dens   # Sensorineural Hearing loss   - repeat Head CT @ 16:18: Slightly decreased size of the left frontal subdural hematoma measuring 4 mm without significant mass effect. No new  intracranial  hemorrhage.  - CTA for Head and Neck scheduled for 2200, pt has allergy to contrast so was on medication protocols.  - Per discussion with Neurosurgery. C-collar on at all times, no pillows, head straight, HOB not to exceed 30 degrees.      # Acute traumatic neck pain  # Chronic hip and shoulder pain  # Sedation   - Propofol gtts, RASS -2 per SICU  - One time dose Robaxin 1g IV  - Scheduled: Tylenol supp.,   - Prn: Dilaudid     Pulmonary:  # Acute respiratory distress requiring intubation and MV  # PB  - Requiring increased O2 needs in ED from NC to BiPAP, pt intubated prior to becoming emergent in setting of severe spinal injuries   - CXR: Perihilar and bibasilar mixed interstitial and airspace opacities could represent infection, and atelectasis/edema.    Cardiovascular:    # Hypertension   # Complete heart block s/p PM placement  # Nonrheumatic aortic valve stenosis s/p TAVR 2019  # HF  - Monitor hemodynamic status.   - BNP in ED: 498  - PTA Lasix po changed to IV  - Holding PTA: finasteride     GI/Nutrition:    # Hx of dysphagia   - Unable to get OG placement   - npo until OG tube is placed  - NSGY ok with medications and enteral feeding     Renal/ Fluids/Electrolytes:  # Hypomagnesia   # Lactic Acidosis, improved with fluids   - NS for IV fluid hydration.   - electrolyte replacement protocol in place.     Endocrine:  # Hypothyroidism   - Holding levothyroxine     Infectious disease:   # Leukocytosis, stress vs infectious   - Admitted to [facility] for Septic Shock, CAP, Streptococcus dysgalactiae (Group G Strep) Bacteremia, Urinary retention with urethral stone s/p ureteroscopy and lithotripsy  - WBC: 15.9  - LA: 2.2 ? 1.3  - Procal: 0.04  - BCx2 collected  - No indications for antibiotics.   - UA needs to be collected     Hematology:    # Anemia of chronic illness   - Hgb 13.0. Monitor and trend.   - Threshold for transfusion if hgb <7.0 or signs/symptoms of hypoperfusion.      # DVT  Prophylaxis: pcd, no pharmacologic prophylaxis at this time d/t SDH and possible epidural hemorrhage.     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    Skin:  # Abrasion to forehead   - dilgent cares to prevent skin breakdown and wound formation.      Code status: Full code confirmed with patient twrashid.     General Cares:  GI Prophylaxis: Protonix while intubated  DVT Prophylaxis: PCD  Date of last stool/Bowel Regimen: in place  Pulmonary toilet: MV    ETOH: This patient was asked if in the last 3-6 months there has been a time when he had  5 or more drinks in a single day/outing.. Patient answer to the screening question was in the negative. No intervention needed.  Primary Care Physician   [facility]      Plan   1. Admit to SICU  2. Follow-up with Neurosurgery recs       Tammy Velásquez PA-C  Primary team: Trauma Services   Job code pager [phone] (24 hours a day)  Use [employer] to text page (not text page compatible with [email or web address]).    Dial * * * [phone] then  [phone], wait for prompt and then enter call back number.   Do NOT call numbers listed to right in Treatment Team section.       Chief Complaint   Fall    History is obtained from the patient, electronic health record and emergency department physician    History of Present Illness   Alexandru Still is a 89 year old male who presented as a transfer from [facility] ED with a SDH, C1/C2 fracture after a fall very early this morning while trying to ambulate to the urinal with his walker. Patient has frequent falls at home. EMS responded and he was seen at [facility] ED around 0340 this morning. Patient c/o neck and shoulder pain. On CT a small SDH was identified and a significant fracture to both the anterior ring of the  C1 vertebra and the dens. Patient was transferred to [facility] this afternoon where he had repeat images and to be evaluated by the Trauma and Neurosurgical team.   Upon arrival patient was on 2L NC, normally not on Supplemental Oxygen. Because of the hyper extension of his neck due to his fractures this caused him more work later. He was placed on BiPAP around 1730, and was intubated after 1800 to protect his airway.     Past Medical History    I have reviewed this patient's medical history and updated it with pertinent information if needed.   Past Medical History:   Diagnosis Date     Anemia      Aortic stenosis      BPH (benign prostatic hyperplasia)      Closed T12 fracture      Complete heart block     s/p dual chamber pacemaker     Hypertension      Hypothyroidism      Kidney stone      Lambert-Eaton myasthenic syndrome      Lower extremity edema        Past Surgical History   I have reviewed this patient's surgical history and updated it with pertinent information if needed.  Past Surgical History:   Procedure Laterality Date     CV TRANSCATHETER AORTIC VALVE REPLACEMENT TRANSAORTIC APPROACH       EP PACEMAKER       HERNIA REPAIR       IR PICC PLACEMENT > 5 YRS OF AGE  6/15/2022     LASER HOLMIUM LITHOTRIPSY URETER(S), INSERT STENT, COMBINED N/A 6/15/2022    Procedure: 1. Cystourethroscopy 2. Laser lithotripsy of a urethral stone 3. Basketing of a urethral stone 4. Complex hannah catheter placement over a wire;  Surgeon: Beny Ha MD;  Location: [facility] OR     Prior to Admission Medications   Prior to Admission Medications   Prescriptions Last Dose Informant Patient Reported? Taking?   Calcium Carb-Cholecalciferol (CALCIUM-VITAMIN D) 500-400 MG-UNIT TABS   Yes No   Sig: Take 1 tablet by mouth daily   acetaminophen (TYLENOL) 325 MG tablet   Yes No   Sig: Take 325-650 mg by mouth every 6 hours as needed   aspirin (ASA) 81 MG chewable tablet   Yes No   Sig: Take 81 mg by mouth daily   Patient not taking: Reported on  2022   diclofenac (VOLTAREN) 1 % topical gel   No No   Sig: Apply 4 g topically 4 times daily as needed for moderate pain   finasteride (PROSCAR) 5 MG tablet   Yes No   Sig: Take 5 mg by mouth daily   fluticasone (FLONASE) 50 MCG/ACT nasal spray   Yes No   Sig: Spray 1 spray into both nostrils daily   furosemide (LASIX) 40 MG tablet   Yes No   Sig: Take 40 mg by mouth 2 times daily   ipratropium - albuterol 0.5 mg/2.5 mg/3 mL (DUONEB) 0.5-2.5 (3) MG/3ML neb solution   No No   Sig: Take 1 vial (3 mLs) by nebulization every 4 hours as needed for wheezing or shortness of breath / dyspnea   levothyroxine (SYNTHROID/LEVOTHROID) 112 MCG tablet   Yes No   Sig: Take 112 mcg by mouth daily   nystatin (MYCOSTATIN) 825631 UNIT/GM external cream   No No   Sig: Apply topically 2 times daily To penis and groin folds   potassium chloride ER (KLOR-CON M) 20 MEQ CR tablet   No No   Sig: Take 2 tablets (40 mEq) by mouth daily   senna-docusate (SENOKOT-S/PERICOLACE) 8.6-50 MG tablet   Yes No   Sig: Take 2 tablets by mouth 2 times daily as needed for constipation (Hold for loose stool.)   tamsulosin (FLOMAX) 0.4 MG capsule   Yes No   Sig: Take 0.8 mg by mouth daily      Facility-Administered Medications: None     Allergies   Allergies   Allergen Reactions     Cefuroxime Hives     Contrast Dye      Gadodiamide Hives       Social History   Social History     Socioeconomic History     Marital status:      Spouse name: Not on file     Number of children: Not on file     Years of education: Not on file     Highest education level: Not on file   Occupational History     Not on file   Tobacco Use     Smoking status: Former Smoker     Packs/day: 1.00     Years: 20.00     Pack years: 20.00     Quit date:      Years since quittin.7     Smokeless tobacco: Never Used   Substance and Sexual Activity     Alcohol use: Not Currently     Drug use: Never     Sexual activity: Not on file   Other Topics Concern     Not on file  "  Social History Narrative     Not on file     Social Determinants of Health     Financial Resource Strain: Not on file   Food Insecurity: Not on file   Transportation Needs: Not on file   Physical Activity: Not on file   Stress: Not on file   Social Connections: Not on file   Intimate Partner Violence: Not on file   Housing Stability: Not on file       Family History   Family history reviewed with patient and is noncontributory.    Review of Systems   CONSTITUTIONAL: fatigue  EYES: no visual blurring, no double vision or visual loss  ENT: decrease in hearing baseline, increased hoarseness  RESPIRATORY:shortness of breath, sputum   CARDIOVASCULAR: no palpitations, no chest  pain, no exertional chest pain or pressure  GASTROINTESTINAL: no nausea or vomiting, or abd pain  GENITOURINARY: no dysuria, no frequency or hesitancy, no hematuria  MUSCULOSKELETAL: weakness,  joint pain,   SKIN:  abrasions  NEUROLOGIC: numbness and tingling of hands and feet, no syncope,   PSYCHIATRIC: no sleep disturbances, no anxiety or depression    Physical Exam       BP: (!) 149/78 Pulse: 60   Resp: 15 SpO2: 96 %      Vital Signs with Ranges  Temp:  [97.6  F (36.4  C)] 97.6  F (36.4  C)  Pulse:  [60-80] 60  Resp:  [8-27] 15  BP: (124-177)/(67-91) 149/78  SpO2:  [90 %-99 %] 96 % 0 lbs 0 oz    Primary Survey:   Airway: patient talking, change in voice per patient   Breathing: symmetric respiratory effort bilaterally, upper airway \"snoring\" sound when not talking  Circulation: central pulses present and peripheral pulses present  Disability: Pupils - left 4 mm and brisk, right 4 mm and brisk     [place] Coma Scale - Total 15/15  Eye Response (E): 4  4= spontaneous,  3= to verbal/voice, 2=  to pain, 1= No response   Verbal Response (V): 5   5= Orientated, converses,  4= Confused, converses, 3= Inappropriate words,  2= Incomprehensible sounds,  1=No response   Motor Response (M): 6   6= Obeys commands, 5= Localizes to pain, 4= Withdrawal to " pain, 3=Fexion to pain, 2= Extension to pain, 1= No response    Secondary Survey:  General: alert, oriented to person, place, time  Head: normocephalic, abrasion to right forehead,   Eyes: PERRLA, pupils 4mm, EOMI, corneas and conjunctivae clear  Nose: nares patent, but excessive nose hair, no drainage, nasal septum non-tender   Mouth/Throat: no exudates or erythema, lower teeth dentures (had pt remove)   Neck:  Aspen cervical collar present. Trachea midline.  Chest/Pulmonary: normal respiratory rate and rhythm,   Cardiovascular: S1, S2,  normal and regular rate and rhythm,   Abdomen: soft, non-tender, no guarding, no rebound tenderness  : priapism, pelvis stable to lateral compression, no hannah (ordered one)  Back/Spine: no deformity, no midline tenderness, no sacral tenderness,  no step-offs and no abrasions or contusions  Musculoskel/Extremities: normal extremities, abrasion on right lower leg dressed CDI. + PP. - edema.   Hand: no gross deformities of hands or fingers. Full AROM of hand and fingers in flexion and extension.  strength equal and symmetric.   Skin:skin warm and dry.   Neuro: PERRLA, alert, oriented x 4. CN II-XII grossly intact. Strength 3/5 x 4 extremities.  Sensation intact.  Psychiatric: affect/mood normal, cooperative, normal judgement/insight and memory intact  # Pain Assessment:  Current Pain Score 9/15/2022   Patient currently in pain? yes   CPOT pain score -   - Alexandru is unable to participate in a collaborative plan for pain management due to being intubated and sedated   - Please see the plan for pain management as documented above    Data   Results for orders placed or performed during the hospital encounter of 09/15/22 (from the past 24 hour(s))   iStat Gases (lactate) venous, POCT   Result Value Ref Range    Lactic Acid POCT 2.2 (H) <=2.0 mmol/L    Bicarbonate Venous POCT 30 (H) 21 - 28 mmol/L    O2 Sat, Venous POCT 77 (L) 94 - 100 %    pCO2V Venous POCT 48 40 - 50 mm Hg    pH  Venous POCT 7.41 7.32 - 7.43    pO2 Venous POCT 42 25 - 47 mm Hg   XR Chest Port 1 View    Narrative    Exam: XR CHEST PORT 1 VIEW, 9/15/2022 3:50 PM    Comparison: 6/19/2022    History: fall, hypoxia, c spine fx, sdh eval rib fx/aspiration, ptx    Findings:  Single AP portable supine view of the chest. Left chest wall pacemaker  with leads projecting over the right atrium and ventricle. TAVR.    Trachea is midline. Stable enlarged cardiac silhouette. Perihilar and  bibasilar mixed opacities. No pneumothorax. Right shoulder  arthroplasty. Chronic appearing left posterior rib fracture.      Impression    Impression: Perihilar and bibasilar mixed interstitial and airspace  opacities could represent infection, and atelectasis/edema.    I have personally reviewed the examination and initial interpretation  and I agree with the findings.    JEANNE LOW MD         SYSTEM ID:  [id number]   BNP   Result Value Ref Range    N terminal Pro BNP Inpatient 498 0 - 1,800 pg/mL   Procalcitonin   Result Value Ref Range    Procalcitonin 0.04 <0.05 ng/mL   Comprehensive metabolic panel   Result Value Ref Range    Sodium 138 136 - 145 mmol/L    Potassium 3.8 3.4 - 5.3 mmol/L    Chloride 100 98 - 107 mmol/L    Carbon Dioxide (CO2) 28 22 - 29 mmol/L    Anion Gap 10 7 - 15 mmol/L    Urea Nitrogen 16.5 8.0 - 23.0 mg/dL    Creatinine 0.83 0.67 - 1.17 mg/dL    Calcium 9.1 8.2 - 9.6 mg/dL    Glucose 138 (H) 70 - 99 mg/dL    Alkaline Phosphatase 70 40 - 129 U/L    AST 20 10 - 50 U/L    ALT 11 10 - 50 U/L    Protein Total 7.0 6.4 - 8.3 g/dL    Albumin 3.8 3.5 - 5.2 g/dL    Bilirubin Total 0.6 <=1.2 mg/dL    GFR Estimate 82 >60 mL/min/1.73m2   CK total   Result Value Ref Range    CK 55 39 - 308 U/L   CT Head w/o Contrast    Narrative    EXAM: CT HEAD W/O CONTRAST  9/15/2022 4:18 PM     HISTORY:  fu on SDH       COMPARISON:  Earlier same day head CT obtained at 0419 hours    TECHNIQUE: Using multidetector thin collimation helical  acquisition  technique, axial, coronal and sagittal CT images from the skull base  to the vertex were obtained without intravenous contrast.   (topogram) image(s) also obtained and reviewed.    FINDINGS:  Unchanged size of the subdural hematoma overlying the left frontal  lobe convexity measuring 4 mm in greatest thickness without  significant mass effect upon the underlying frontal lobe. Moderate  diffuse cerebral atrophy with patchy periventricular and subcortical  white matter hypoattenuation. No acute loss of gray-white  differentiation. No midline shift. Basal cisterns are clear.     Type II odontoid fracture with the dens posteriorly dislocated  approximately 9 mm. Displaced comminuted fracture of the anterior arch  of C1. The bony calvaria and the bones of the skull base are normal.  The visualized portions of the paranasal sinuses and mastoid air cells  are clear. Grossly normal orbits.       Impression    IMPRESSION:   1. Slightly decreased size of the left frontal subdural hematoma  measuring 4 mm without significant mass effect. No new intracranial  hemorrhage.  2. Stable displaced fractures of the anterior arch of C1 and type II  odontoid fracture.   3. Moderate diffuse cerebral atrophy with patchy periventricular and  subcortical white matter hypoattenuation, likely sequelae of chronic  small vessel ischemic disease.    I have personally reviewed the examination and initial interpretation  and I agree with the findings.    MEDNIA NICOLE MD         SYSTEM ID:  [id number]   CT Chest Abdomen Pelvis w/o Contrast    Narrative    EXAMINATION: CT CHEST ABDOMEN PELVIS W/O CONTRAST, 9/15/2022 4:18 PM    TECHNIQUE:  Helical CT images from the thoracic inlet through the  symphysis pubis were obtained without IV contrast.    COMPARISON: Same day chest x-ray. Soft tissue pelvis CT 7/20/2022 and  abdomen and pelvis CT 6/15/2022    HISTORY: fall    FINDINGS:  CHEST:  LUNGS: Central tracheobronchial tree is patent.  No pneumothorax or  pleural effusion. Lower lobe bronchial wall thickening with multifocal  plaquing at the lung bases with streaky consolidative opacities at the  lung bases. Probable intrafissural lymph node measuring 4 mm along the  right minor fissure (series 5, image 141). No suspicious pulmonary  nodule.    MEDIASTINUM: Left chest wall ICD/pacemaker with leads in the right  atrium and right ventricle. Postoperative changes aortic valve  replacement. Bulky mitral annular calcifications. Heart size is within  normal limits. No pericardial effusion. Ascending aorta and main  pulmonary artery diameters are within normal limits. Normal appearance  and configuration of the great vessels off of the aortic arch. All  prominent mediastinal lymph nodes for example 5 mm prevascular node  (series 2, image 88) no suspicious hilar or axillary lymph nodes.    Visualized thyroid and esophagus are unremarkable.    ABDOMEN/PELVIS:  LIVER: No suspicious focal hepatic lesion.    BILIARY: Layering calcified gallstones. No gallbladder wall thickening  or pericholecystic fluid to suggest cholecystitis. No intrahepatic or  extrahepatic biliary ductal dilation.    PANCREAS: Moderate fatty atrophy of the pancreatic parenchyma. No  focal pancreatic lesion. The main pancreatic duct is not dilated.    SPLEEN: Within normal limits.    ADRENAL GLANDS: No focal adrenal nodule.    URINARY TRACT: Scattered hypodense cysts in both kidneys some of which  are simple in attenuation and others intermediate density and are  unchanged since 6/15/2022. Additional cystic hypodensity in the right  kidney likely representing a benign proteinaceous or hemorrhagic cyst.  Prominent right extrarenal pelvis. No hydronephrosis or ureter. No  renal stone. Urinary bladder is within normal limits.    REPRODUCTIVE ORGANS: Coarse calcifications in the prostate and seminal  vesicles. Right scrotal pump with penile prosthesis.     STOMACH: Within normal  limits.    BOWEL: Colonic diverticulosis without evidence for acute  diverticulitis. Normal caliber large and small bowel. No abnormal  bowel wall thickening or enhancement. Appendix is unremarkable.    PERITONEUM/FLUID: No ascites or pelvic free fluid.    VESSELS: Calcifications of the abdominal aorta and iliac arteries.  Ectatic dilation of the left common iliac artery measuring 1.8 cm.    LYMPH NODES: No lymphadenopathy.    BONES/SOFT TISSUES: Chronic and healed left second through fourth rib  fractures. Age indeterminant anterior left sixth rib fracture. Chronic  appearing anterior right third, fourth, fifth, seventh, ninth, and  10th rib fractures. Postoperative changes to the right shoulder.  Advanced degenerative change to the left shoulder. Interspinous fusion  device at L4-5. Unchanged superior endplate compression deformity at  L3. Grade 1 anterolisthesis of L4 on L5 and L5 on S1. Chronic  bilateral pars interarticularis defects at L5. Chronic appearing  anterior wedge compression deformity at T12 and T9. Chronic sternal  body fracture.      Impression    IMPRESSION:   1. Question acute anterior left sixth rib fracture. Multiple chronic  appearing bilateral rib fractures and chronic appearing compression  deformities in the thoracolumbar spine.  2. No acute visceral injury in the chest, abdomen, or pelvis.    I have personally reviewed the examination and initial interpretation  and I agree with the findings.    JEANNE LOW MD         SYSTEM ID:  [id number]   EKG 12-lead, tracing only   Result Value Ref Range    Systolic Blood Pressure  mmHg    Diastolic Blood Pressure  mmHg    Ventricular Rate 64 BPM    Atrial Rate 64 BPM    OR Interval 222 ms    QRS Duration 130 ms     ms    QTc 511 ms    P Axis 76 degrees    R AXIS 62 degrees    T Axis -55 degrees    Interpretation ECG       Atrial-sensed ventricular-paced rhythm with prolonged AV conduction  Abnormal ECG     iStat Gases (lactate) venous, POCT    Result Value Ref Range    Lactic Acid POCT 1.3 <=2.0 mmol/L    Bicarbonate Venous POCT 22 21 - 28 mmol/L    O2 Sat, Venous POCT 79 (L) 94 - 100 %    pCO2V Venous POCT 43 40 - 50 mm Hg    pH Venous POCT 7.33 7.32 - 7.43    pO2 Venous POCT 47 25 - 47 mm Hg   XR Chest Port 1 View    Narrative    XR CHEST PORT 1 VIEW  9/15/2022 7:19 PM      HISTORY: intubation    COMPARISON: 9/15/2022    FINDINGS:   Single AP view of the chest. Endotracheal tube tip overlying the mid  to lower thoracic trachea approximately 3 cm above the taylor. Stable  left chest wall pacer leads. TAVR. Stable mediastinum. No pneumothorax  or significant pleural effusion. Largely unchanged perihilar and  bibasilar streaky opacities.       Impression    IMPRESSION:   1. Endotracheal tube tip overlying the mid to lower thoracic trachea  approximately 3 cm above the taylor.  2. Largely unchanged perihilar and bibasilar opacities consistent with  atelectasis and suspected aspiration given the CT findings.    I have personally reviewed the examination and initial interpretation  and I agree with the findings.    JEANNE LOW MD         SYSTEM ID:  [id number]   XR Abdomen Port 1 View   Result Value Ref Range    Radiologist flags Malpositioned enteric tube (Urgent)     Narrative    EXAM: XR ABDOMEN PORT 1 VIEW  9/15/2022 7:33 PM     HISTORY:  og verification       COMPARISON:  Same day chest abdomen and pelvis CT    FINDINGS: Supine radiograph of the abdomen.  Enteric tube with tip  overlying the left mainstem bronchus. Aortic valve replacement.  Endotracheal tube projecting over the midthoracic trachea with the tip  approximately 4.4 cm above the taylor. Left chest wall ICD/pacemaker  with leads overlying the right atrium and right ventricle.  Nonobstructive bowel gas pattern. Cardiomediastinal silhouette is  within normal limits. Mixed interstitial and patchy perihilar airspace  opacities.       Impression    IMPRESSION:  1. Enteric tube with tip  overlying the left mainstem bronchus versus  within the thoracic esophagus.  2. Endotracheal tube projecting over the midthoracic trachea.  3. Perihilar patchy airspace opacities possibly representing pulmonary  edema and/or atelectasis. Infection is difficult to entirely exclude.  4. Nonobstructive bowel gas pattern.    [Urgent Result: Malpositioned enteric tube]    Finding was identified on 9/15/2022 7:35 PM.     Dr. Morrison was contacted by Dr. Guy at 9/15/2022 7:49 PM and  verbalized understanding of the urgent finding.     I have personally reviewed the examination and initial interpretation  and I agree with the findings.    JEANNE LOW MD         SYSTEM ID:  [id number]   XR Abdomen Port 1 View    Narrative    EXAM: XR ABDOMEN PORT 1 VIEW  9/15/2022 7:48 PM     HISTORY:  new OG       COMPARISON:  Earlier same day abdominal radiograph obtained at 1916  hours.    FINDINGS: Advanced enteric tube with tip now overlying the cardiac  silhouette and is likely within the patient's large hiatal hernia.  Nonobstructive bowel gas pattern. Unchanged mixed interstitial and  patchy perihilar airspace opacities. Vascular calcifications overlying  the pelvis. Postoperative changes of the spine.      Impression    IMPRESSION:  Advanced enteric tube with tip now likely within the patient's hiatal  hernia.    I have personally reviewed the examination and initial interpretation  and I agree with the findings.    JEANNE LOW MD         SYSTEM ID:  [id number]   Glucose by meter   Result Value Ref Range    GLUCOSE BY METER POCT 154 (H) 70 - 99 mg/dL   XR Chest Port 1 View    Impression    RESIDENT PRELIMINARY INTERPRETATION  IMPRESSION:   1. Endotracheal tube tip overlying the mid trachea approximately 3.3  cm above the taylor.  2. Largely unchanged perihilar and bibasilar opacities.       Studies:  XR Chest Port 1 View    (Results Pending)   CTA Head Neck w Contrast    (Results Pending)   CT Chest Abdomen Pelvis w/o Contrast     (Results Pending)

## 2022-09-15 NOTE — ED TRIAGE NOTES
[facility] SDH. Pt had unwitnessed fall @home. Not on thinners. Denies LOC. Pt was on floor for 2 hours before family noticed. Family called 911, pt brought to [facility] initially. Found SDH. Pt also has C1, C2 fracture. Pt in aspen collar. Neuro intact. GCS 15 per report /[facility]. Fentanyl 25mcg given enroute by ems for neck pain rated 7/10. Pt now rating 5/10.  's.

## 2022-09-15 NOTE — H&P
SURGICAL ICU ADMISSION NOTE  09/15/2022      PRIMARY TEAM: Trauma  PRIMARY PHYSICIAN: Dr. Nils Drew MD  REASON FOR CRITICAL CARE ADMISSION: Respiratory Distress requiring Intubation   ADMITTING PHYSICIAN: Dr. Kash Hendricks MD  Date of Service (when I saw the patient): 09/15/2022    ASSESSMENT:  Alexandru Still is a 89 year old male who was admitted to the SICU on 9/15/2022 after experiencing an unwitnessed fall and striking his head. He was transferred to the ED and was found to have a left frontal SDH (5mm) + C1 fx + Type II C2 odontoid fx w/ 1cm posterior displacement. Increasing O2 requirements necessitated intubation in the ED.    PLAN:      Neurological:  # Acute pain   # Subdural Hematoma  # C1, C2 Cervical Spine Injury  # hx of Lambert Eaton Myasthenic syndrome  - Monitor neurological status. Delirium preventions and precautions  - q1h neurochecks  - repeat head CT to assess for stability of hematoma  - c-collar to remain on at all times, no pillows, head straight, HOB not to exceed 30 degrees  - Palliative care consulted  - family care conference on 9/16 to discuss NSGY intervention for C1/2 fx  - Pain: Fentanyl gtt    - Sedation plan: Propofol for comfort s/p intubation; wean once more comfortable    Pulmonary:   # Mechanical Ventilatory Support  # Acute hypoxic respiratory support   - VENT: CMV. Continue full vent support. PST when meets criteria.  Ventilatory bundle. HOB elevation   - Supplemental O2, goal SpO2 > 92%.  - IS q15-30 minutes when awake.    Cardiovascular:    # hx of TAVR  # hx of Complete Heart Block s/p Pacemaker  # hx of HTN  - Monitor hemodynamic status  - IV Lasix (pta med)  - hold finasteride    Gastroenterology/Nutrition:  # Hiatal Hernia  - NPO; OGT unable to be advanced past hiatal hernia    Renal/Fluids/Electrolytes:   # Hypokalemia  - D5 NS 50mIVF  - Will cont to monitor I/Os  - Electrolyte replacement protocol  - Kota to remain in place while  intubated    Endocrine:  # Hypothyroidism  - No management indication.       ID:  # Leukocytosis   - no indications for antibiotics  - f/up BCx & UA/UCx   - leukocytosis likely inflammatory response; will continue to monitor    Heme:     # Anemia  - [id number] on arrival  - Transfuse if hgb <7.0 or signs/symptoms of hypoperfusion. Monitor and trend.     MSK:  # Facial Abrasion   # Right Lower Leg Abrasion  # Weakness and deconditioning  - Physical and occupational therapy consult   - local wound care    General Cares/Prophylaxis:    DVT Prophylaxis: Pneumatic Compression Devices and VTE Prophylaxis contraindicated due to subdural hematoma  GI Prophylaxis: PPI  Restraints: Restraints for medical healing needed: only if pt gets agitated and pulling at lines after chemical sedation    LDAs:  - PIVx2  - ETT  - Escudero    Disposition:  - SICU.    Patient seen, findings and plan discussed with surgical ICU staff, Dr. Kash Hendricks MD.    Kevin Morrison MD (PGY-2)  Surgery    - - - - - - - - - - - - - - - - - - - - - - - - - - - - - - - - - - - - - - - - - - - - - -   HISTORY PRESENTING ILLNESS: Alexandru Still is a 89 year old male w/ a pmh of TAVR, complete heart block w/ pacemaker, and g who was admitted to the SICU on 9/15/2022 after experiencing an unwitnessed fall and striking his head. He was transferred to the ED and was found to have a left frontal SDH (5mm) + C1 fx + Type II C2 odontoid fx w/ 1cm posterior displacement. A c-collar was placed but he endorsed difficulty swallowing and was developing increasing O2 requirements on BiPAP (originally on 2L NC on arrival). He developed priapism which was c/f threatened neurologic function and the decision was made to intubate to protect the airway in the setting of c-spine injury.    He is admitted to the SICU intubated.    REVIEW OF SYSTEMS: 10 point ROS neg other than the symptoms noted above in the HPI.    PAST MEDICAL HISTORY:   Past Medical History:    Diagnosis Date     Anemia      Aortic stenosis      BPH (benign prostatic hyperplasia)      Closed T12 fracture      Complete heart block     s/p dual chamber pacemaker     Hypertension      Hypothyroidism      Kidney stone      Lambert-Eaton myasthenic syndrome      Lower extremity edema        SURGICAL HISTORY:   Past Surgical History:   Procedure Laterality Date     CV TRANSCATHETER AORTIC VALVE REPLACEMENT TRANSAORTIC APPROACH       EP PACEMAKER       HERNIA REPAIR       IR PICC PLACEMENT > 5 YRS OF AGE  6/15/2022     LASER HOLMIUM LITHOTRIPSY URETER(S), INSERT STENT, COMBINED N/A 6/15/2022    Procedure: 1. Cystourethroscopy 2. Laser lithotripsy of a urethral stone 3. Basketing of a urethral stone 4. Complex hannah catheter placement over a wire;  Surgeon: Beny Ha MD;  Location:  OR       SOCIAL HISTORY:   Social History     Tobacco Use     Smoking status: Former Smoker     Packs/day: 1.00     Years: 20.00     Pack years: 20.00     Quit date:      Years since quittin.7     Smokeless tobacco: Never Used   Substance Use Topics     Alcohol use: Not Currently     Drug use: Never       FAMILY HISTORY:   Family History   Problem Relation Age of Onset     Cerebrovascular Disease Mother      Chronic Obstructive Pulmonary Disease Father      No bleeding/clotting disorders nor problems with anesthesia.    ALLERGIES:   Allergies   Allergen Reactions     Cefuroxime Hives     Contrast Dye      Gadodiamide Hives       MEDICATIONS:  No current facility-administered medications on file prior to encounter.  acetaminophen (TYLENOL) 325 MG tablet, Take 325-650 mg by mouth every 6 hours as needed  Calcium Carb-Cholecalciferol (CALCIUM-VITAMIN D) 500-400 MG-UNIT TABS, Take 1 tablet by mouth daily  diclofenac (VOLTAREN) 1 % topical gel, Apply 4 g topically 4 times daily as needed for moderate pain  finasteride (PROSCAR) 5 MG tablet, Take 5 mg by mouth daily  fluticasone (FLONASE) 50 MCG/ACT nasal spray, Spray 1  spray into both nostrils daily  furosemide (LASIX) 40 MG tablet, Take 40 mg by mouth 2 times daily  ipratropium - albuterol 0.5 mg/2.5 mg/3 mL (DUONEB) 0.5-2.5 (3) MG/3ML neb solution, Take 1 vial (3 mLs) by nebulization every 4 hours as needed for wheezing or shortness of breath / dyspnea  levothyroxine (SYNTHROID/LEVOTHROID) 112 MCG tablet, Take 112 mcg by mouth daily  nystatin (MYCOSTATIN) 370859 UNIT/GM external cream, Apply topically 2 times daily To penis and groin folds  potassium chloride ER (KLOR-CON M) 20 MEQ CR tablet, Take 2 tablets (40 mEq) by mouth daily  senna-docusate (SENOKOT-S/PERICOLACE) 8.6-50 MG tablet, Take 2 tablets by mouth 2 times daily as needed for constipation (Hold for loose stool.)  tamsulosin (FLOMAX) 0.4 MG capsule, Take 0.8 mg by mouth daily  [DISCONTINUED] gabapentin (NEURONTIN) 800 MG tablet, Take 800 mg by mouth 3 times daily        PHYSICAL EXAMINATION:  Temp:  [97.6  F (36.4  C)-97.7  F (36.5  C)] 97.7  F (36.5  C)  Pulse:  [60-87] 87  Resp:  [8-99] 10  BP: (124-177)/() 143/114  FiO2 (%):  [40 %-93 %] 40 %  SpO2:  [90 %-99 %] 98 %    General: sedated  HEENT: abrasion to right forehead, ETT in place  Neck: c-collar in place   Neuro: sedated  Pulm/Resp: intubated and on mechanical ventilation  CV: RRR on monitor  Abdomen: Soft, ntnd, no rebound tenderness or guarding  : hannah catheter in place with clear yellow urine  MSK/Extremities: no peripheral edema, extremities wwp; palpable lower extremity pulses; right leg abrasion dressed w/o strikethrough    LABS: Reviewed.   Arterial Blood Gases   Recent Labs   Lab 09/15/22  1733 09/15/22  1526   PH 7.33 7.41     Complete Blood Count   Recent Labs   Lab 09/15/22  0521   WBC 15.9*   HGB 13.0*        Basic Metabolic Panel  Recent Labs   Lab 09/15/22  1959 09/15/22  1617 09/15/22  0521   NA  --  138 137   POTASSIUM  --  3.8 3.0*   CHLORIDE  --  100 97*   CO2  --  28 27   BUN  --  16.5 16.7   CR  --  0.83 0.97   * 138*  126*     Liver Function Tests  Recent Labs   Lab 09/15/22  1617 09/15/22  0521   AST 20  --    ALT 11  --    ALKPHOS 70  --    BILITOTAL 0.6  --    ALBUMIN 3.8  --    INR  --  1.06     Pancreatic Enzymes  No lab results found in last 7 days.  Coagulation Profile  Recent Labs   Lab 09/15/22  0521   INR 1.06   PTT 29       IMAGING:  Recent Results (from the past 24 hour(s))   Head CT w/o contrast    Narrative    EXAM: CT HEAD W/O CONTRAST, CT CERVICAL SPINE W/O CONTRAST, CT FACIAL BONES WITHOUT CONTRAST  LOCATION: [facility]  DATE/TIME: 9/15/2022 4:36 AM    INDICATION: Traumatic injury.  COMPARISON: Head CT dated 06/15/2022.  TECHNIQUE:   1) Routine CT Head without IV contrast. Multiplanar reformats. Dose reduction techniques were used.  2) Routine CT Facial Bones without IV contrast. Multiplanar reformats. Dose reduction techniques were used.  3) Routine CT Cervical Spine without IV contrast. Multiplanar reformats. Dose reduction techniques were used.    FINDINGS:  HEAD CT:   INTRACRANIAL CONTENTS: Acute subdural hemorrhage along the anterior left frontal lobe measuring up to 5 mm in thickness. No adverse intracranial mass effect. No CT evidence of acute infarct. Moderate presumed chronic small vessel ischemic changes.   Moderate generalized volume loss. No hydrocephalus.     OSSEOUS STRUCTURES/SOFT TISSUES: Right frontal scalp swelling. No acute calvarial fracture.    FACIAL BONE CT:  OSSEOUS STRUCTURES/SOFT TISSUES: Right frontal scalp swelling. No facial bone fracture or malalignment.    ORBITAL CONTENTS: No acute abnormality.    SINUSES: Mucous retention cyst in the left maxillary sinus. Scattered mucosal thickening of the ethmoid air cells.    CERVICAL SPINE CT:   VERTEBRA: Posteriorly displaced type II fracture of the odontoid, with approximately 1 cm posterior displacement. There is a fracture fragment anterior to the odontoid, that appears to arise from the anterior arch of C1. The C1  ring remains intact.   Posterior displacement results in at least moderate canal stenosis. There is hyperdensity along the posterior dens measuring 7 mm in thickness, suspicious for epidural hemorrhage.     There is 3 mm degenerative anterolisthesis of C4 on C5, C6 on C7, and C7 on T1.     CANAL/FORAMINA: Multilevel degenerative changes, with at least moderate canal stenosis at C7-T1. Multilevel bilateral neural foraminal narrowing, with scattered mild and moderate stenosis.    PARASPINAL: Prevertebral hemorrhage at the level of C1-C2, measuring up to 4 mm in thickness. Visualized lung fields are clear.      Impression    IMPRESSION:  HEAD CT:  1.  Thin subdural hemorrhage along the anterior left frontal lobe measuring up to 5 mm in thickness. No adverse intracranial mass effect.  2.  Right frontal scalp swelling. No acute calvarial fracture.    FACIAL BONE CT:  1.  No facial bone or mandibular fracture.    CERVICAL SPINE CT:  1.  Acute posteriorly displaced type II odontoid fracture, with approximately 1 cm posterior displacement. Findings result in at least moderate canal stenosis.  2.  Displaced fracture along the inferior aspect of the anterior arch of C1.   3.  Hyperdensity along the ventral epidural space posterior to the dens, suggestive of epidural hemorrhage measuring up to 7 mm in thickness.                 CT Facial Bones without Contrast    Narrative    EXAM: CT HEAD W/O CONTRAST, CT CERVICAL SPINE W/O CONTRAST, CT FACIAL BONES WITHOUT CONTRAST  LOCATION: [facility]  DATE/TIME: 9/15/2022 4:36 AM    INDICATION: Traumatic injury.  COMPARISON: Head CT dated 06/15/2022.  TECHNIQUE:   1) Routine CT Head without IV contrast. Multiplanar reformats. Dose reduction techniques were used.  2) Routine CT Facial Bones without IV contrast. Multiplanar reformats. Dose reduction techniques were used.  3) Routine CT Cervical Spine without IV contrast. Multiplanar reformats. Dose reduction  techniques were used.    FINDINGS:  HEAD CT:   INTRACRANIAL CONTENTS: Acute subdural hemorrhage along the anterior left frontal lobe measuring up to 5 mm in thickness. No adverse intracranial mass effect. No CT evidence of acute infarct. Moderate presumed chronic small vessel ischemic changes.   Moderate generalized volume loss. No hydrocephalus.     OSSEOUS STRUCTURES/SOFT TISSUES: Right frontal scalp swelling. No acute calvarial fracture.    FACIAL BONE CT:  OSSEOUS STRUCTURES/SOFT TISSUES: Right frontal scalp swelling. No facial bone fracture or malalignment.    ORBITAL CONTENTS: No acute abnormality.    SINUSES: Mucous retention cyst in the left maxillary sinus. Scattered mucosal thickening of the ethmoid air cells.    CERVICAL SPINE CT:   VERTEBRA: Posteriorly displaced type II fracture of the odontoid, with approximately 1 cm posterior displacement. There is a fracture fragment anterior to the odontoid, that appears to arise from the anterior arch of C1. The C1 ring remains intact.   Posterior displacement results in at least moderate canal stenosis. There is hyperdensity along the posterior dens measuring 7 mm in thickness, suspicious for epidural hemorrhage.     There is 3 mm degenerative anterolisthesis of C4 on C5, C6 on C7, and C7 on T1.     CANAL/FORAMINA: Multilevel degenerative changes, with at least moderate canal stenosis at C7-T1. Multilevel bilateral neural foraminal narrowing, with scattered mild and moderate stenosis.    PARASPINAL: Prevertebral hemorrhage at the level of C1-C2, measuring up to 4 mm in thickness. Visualized lung fields are clear.      Impression    IMPRESSION:  HEAD CT:  1.  Thin subdural hemorrhage along the anterior left frontal lobe measuring up to 5 mm in thickness. No adverse intracranial mass effect.  2.  Right frontal scalp swelling. No acute calvarial fracture.    FACIAL BONE CT:  1.  No facial bone or mandibular fracture.    CERVICAL SPINE CT:  1.  Acute posteriorly  displaced type II odontoid fracture, with approximately 1 cm posterior displacement. Findings result in at least moderate canal stenosis.  2.  Displaced fracture along the inferior aspect of the anterior arch of C1.   3.  Hyperdensity along the ventral epidural space posterior to the dens, suggestive of epidural hemorrhage measuring up to 7 mm in thickness.                 XR Shoulder Left 2 Views    Narrative    EXAM: XR SHOULDER LEFT 2 VIEWS  LOCATION: [facility]  DATE/TIME: 9/15/2022 4:40 AM    INDICATION: Pain after fall.  COMPARISON: None.      Impression    IMPRESSION: No acute fracture or dislocation. 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 Post endovascular repair of the aortic valve. Left   subclavian venous pacemaker with leads at the right atrium and right ventricle.   XR Knee Right 3 Views    Narrative    EXAM: RIGHT KNEE 3 VIEWS  LOCATION: [facility]  DATE/TIME: 9/15/2022 4:40 AM    INDICATION: Fall. Multiple abrasions in the extremities.  COMPARISON: None.      Impression    IMPRESSION:   1. No visualized acute fracture or malalignment of the right knee.  2. A right knee arthroplasty is in place. No evidence of hardware failure.  3. No right knee joint effusion.                  Cervical spine CT w/o contrast    Narrative    EXAM: CT HEAD W/O CONTRAST, CT CERVICAL SPINE W/O CONTRAST, CT FACIAL BONES WITHOUT CONTRAST  LOCATION: [facility]  DATE/TIME: 9/15/2022 4:36 AM    INDICATION: Traumatic injury.  COMPARISON: Head CT dated 06/15/2022.  TECHNIQUE:   1) Routine CT Head without IV contrast. Multiplanar reformats. Dose reduction techniques were used.  2) Routine CT Facial Bones without IV contrast. Multiplanar reformats. Dose reduction techniques were used.  3) Routine CT Cervical Spine without IV contrast. Multiplanar  reformats. Dose reduction techniques were used.    FINDINGS:  HEAD CT:   INTRACRANIAL CONTENTS: Acute subdural hemorrhage along the anterior left frontal lobe measuring up to 5 mm in thickness. No adverse intracranial mass effect. No CT evidence of acute infarct. Moderate presumed chronic small vessel ischemic changes.   Moderate generalized volume loss. No hydrocephalus.     OSSEOUS STRUCTURES/SOFT TISSUES: Right frontal scalp swelling. No acute calvarial fracture.    FACIAL BONE CT:  OSSEOUS STRUCTURES/SOFT TISSUES: Right frontal scalp swelling. No facial bone fracture or malalignment.    ORBITAL CONTENTS: No acute abnormality.    SINUSES: Mucous retention cyst in the left maxillary sinus. Scattered mucosal thickening of the ethmoid air cells.    CERVICAL SPINE CT:   VERTEBRA: Posteriorly displaced type II fracture of the odontoid, with approximately 1 cm posterior displacement. There is a fracture fragment anterior to the odontoid, that appears to arise from the anterior arch of C1. The C1 ring remains intact.   Posterior displacement results in at least moderate canal stenosis. There is hyperdensity along the posterior dens measuring 7 mm in thickness, suspicious for epidural hemorrhage.     There is 3 mm degenerative anterolisthesis of C4 on C5, C6 on C7, and C7 on T1.     CANAL/FORAMINA: Multilevel degenerative changes, with at least moderate canal stenosis at C7-T1. Multilevel bilateral neural foraminal narrowing, with scattered mild and moderate stenosis.    PARASPINAL: Prevertebral hemorrhage at the level of C1-C2, measuring up to 4 mm in thickness. Visualized lung fields are clear.      Impression    IMPRESSION:  HEAD CT:  1.  Thin subdural hemorrhage along the anterior left frontal lobe measuring up to 5 mm in thickness. No adverse intracranial mass effect.  2.  Right frontal scalp swelling. No acute calvarial fracture.    FACIAL BONE CT:  1.  No facial bone or mandibular fracture.    CERVICAL SPINE  CT:  1.  Acute posteriorly displaced type II odontoid fracture, with approximately 1 cm posterior displacement. Findings result in at least moderate canal stenosis.  2.  Displaced fracture along the inferior aspect of the anterior arch of C1.   3.  Hyperdensity along the ventral epidural space posterior to the dens, suggestive of epidural hemorrhage measuring up to 7 mm in thickness.                 XR Chest Port 1 View    Narrative    Exam: XR CHEST PORT 1 VIEW, 9/15/2022 3:50 PM    Comparison: 6/19/2022    History: fall, hypoxia, c spine fx, sdh eval rib fx/aspiration, ptx    Findings:  Single AP portable supine view of the chest. Left chest wall pacemaker  with leads projecting over the right atrium and ventricle. TAVR.    Trachea is midline. Stable enlarged cardiac silhouette. Perihilar and  bibasilar mixed opacities. No pneumothorax. Right shoulder  arthroplasty. Chronic appearing left posterior rib fracture.      Impression    Impression: Perihilar and bibasilar mixed interstitial and airspace  opacities could represent infection, and atelectasis/edema.    I have personally reviewed the examination and initial interpretation  and I agree with the findings.    JEANNE LOW MD         SYSTEM ID:  [id number]   CT Head w/o Contrast    Narrative    EXAM: CT HEAD W/O CONTRAST  9/15/2022 4:18 PM     HISTORY:  fu on SDH       COMPARISON:  Earlier same day head CT obtained at 0419 hours    TECHNIQUE: Using multidetector thin collimation helical acquisition  technique, axial, coronal and sagittal CT images from the skull base  to the vertex were obtained without intravenous contrast.   (topogram) image(s) also obtained and reviewed.    FINDINGS:  Unchanged size of the subdural hematoma overlying the left frontal  lobe convexity measuring 4 mm in greatest thickness without  significant mass effect upon the underlying frontal lobe. Moderate  diffuse cerebral atrophy with patchy periventricular and subcortical  white  matter hypoattenuation. No acute loss of gray-white  differentiation. No midline shift. Basal cisterns are clear.     Type II odontoid fracture with the dens posteriorly dislocated  approximately 9 mm. Displaced comminuted fracture of the anterior arch  of C1. The bony calvaria and the bones of the skull base are normal.  The visualized portions of the paranasal sinuses and mastoid air cells  are clear. Grossly normal orbits.       Impression    IMPRESSION:   1. Slightly decreased size of the left frontal subdural hematoma  measuring 4 mm without significant mass effect. No new intracranial  hemorrhage.  2. Stable displaced fractures of the anterior arch of C1 and type II  odontoid fracture.   3. Moderate diffuse cerebral atrophy with patchy periventricular and  subcortical white matter hypoattenuation, likely sequelae of chronic  small vessel ischemic disease.    I have personally reviewed the examination and initial interpretation  and I agree with the findings.    MEDINA NICOLE MD         SYSTEM ID:  [id number]   CT Chest Abdomen Pelvis w/o Contrast    Narrative    EXAMINATION: CT CHEST ABDOMEN PELVIS W/O CONTRAST, 9/15/2022 4:18 PM    TECHNIQUE:  Helical CT images from the thoracic inlet through the  symphysis pubis were obtained without IV contrast.    COMPARISON: Same day chest x-ray. Soft tissue pelvis CT 7/20/2022 and  abdomen and pelvis CT 6/15/2022    HISTORY: fall    FINDINGS:  CHEST:  LUNGS: Central tracheobronchial tree is patent. No pneumothorax or  pleural effusion. Lower lobe bronchial wall thickening with multifocal  plaquing at the lung bases with streaky consolidative opacities at the  lung bases. Probable intrafissural lymph node measuring 4 mm along the  right minor fissure (series 5, image 141). No suspicious pulmonary  nodule.    MEDIASTINUM: Left chest wall ICD/pacemaker with leads in the right  atrium and right ventricle. Postoperative changes aortic valve  replacement. Bulky mitral annular  calcifications. Heart size is within  normal limits. No pericardial effusion. Ascending aorta and main  pulmonary artery diameters are within normal limits. Normal appearance  and configuration of the great vessels off of the aortic arch. All  prominent mediastinal lymph nodes for example 5 mm prevascular node  (series 2, image 88) no suspicious hilar or axillary lymph nodes.    Visualized thyroid and esophagus are unremarkable.    ABDOMEN/PELVIS:  LIVER: No suspicious focal hepatic lesion.    BILIARY: Layering calcified gallstones. No gallbladder wall thickening  or pericholecystic fluid to suggest cholecystitis. No intrahepatic or  extrahepatic biliary ductal dilation.    PANCREAS: Moderate fatty atrophy of the pancreatic parenchyma. No  focal pancreatic lesion. The main pancreatic duct is not dilated.    SPLEEN: Within normal limits.    ADRENAL GLANDS: No focal adrenal nodule.    URINARY TRACT: Scattered hypodense cysts in both kidneys some of which  are simple in attenuation and others intermediate density and are  unchanged since 6/15/2022. Additional cystic hypodensity in the right  kidney likely representing a benign proteinaceous or hemorrhagic cyst.  Prominent right extrarenal pelvis. No hydronephrosis or ureter. No  renal stone. Urinary bladder is within normal limits.    REPRODUCTIVE ORGANS: Coarse calcifications in the prostate and seminal  vesicles. Right scrotal pump with penile prosthesis.     STOMACH: Within normal limits.    BOWEL: Colonic diverticulosis without evidence for acute  diverticulitis. Normal caliber large and small bowel. No abnormal  bowel wall thickening or enhancement. Appendix is unremarkable.    PERITONEUM/FLUID: No ascites or pelvic free fluid.    VESSELS: Calcifications of the abdominal aorta and iliac arteries.  Ectatic dilation of the left common iliac artery measuring 1.8 cm.    LYMPH NODES: No lymphadenopathy.    BONES/SOFT TISSUES: Chronic and healed left second through  fourth rib  fractures. Age indeterminant anterior left sixth rib fracture. Chronic  appearing anterior right third, fourth, fifth, seventh, ninth, and  10th rib fractures. Postoperative changes to the right shoulder.  Advanced degenerative change to the left shoulder. Interspinous fusion  device at L4-5. Unchanged superior endplate compression deformity at  L3. Grade 1 anterolisthesis of L4 on L5 and L5 on S1. Chronic  bilateral pars interarticularis defects at L5. Chronic appearing  anterior wedge compression deformity at T12 and T9. Chronic sternal  body fracture.      Impression    IMPRESSION:   1. Question acute anterior left sixth rib fracture. Multiple chronic  appearing bilateral rib fractures and chronic appearing compression  deformities in the thoracolumbar spine.  2. No acute visceral injury in the chest, abdomen, or pelvis.    I have personally reviewed the examination and initial interpretation  and I agree with the findings.    JEANNE LOW MD         SYSTEM ID:  [id number]   XR Chest Port 1 View    Narrative    XR CHEST PORT 1 VIEW  9/15/2022 7:19 PM      HISTORY: intubation    COMPARISON: 9/15/2022    FINDINGS:   Single AP view of the chest. Endotracheal tube tip overlying the mid  to lower thoracic trachea approximately 3 cm above the taylor. Stable  left chest wall pacer leads. TAVR. Stable mediastinum. No pneumothorax  or significant pleural effusion. Largely unchanged perihilar and  bibasilar streaky opacities.       Impression    IMPRESSION:   1. Endotracheal tube tip overlying the mid to lower thoracic trachea  approximately 3 cm above the taylor.  2. Largely unchanged perihilar and bibasilar opacities consistent with  atelectasis and suspected aspiration given the CT findings.    I have personally reviewed the examination and initial interpretation  and I agree with the findings.    JEANNE LOW MD         SYSTEM ID:  [id number]   XR Abdomen Port 1 View   Result Value    Radiologist flags  Malpositioned enteric tube (Urgent)    Narrative    EXAM: XR ABDOMEN PORT 1 VIEW  9/15/2022 7:33 PM     HISTORY:  og verification       COMPARISON:  Same day chest abdomen and pelvis CT    FINDINGS: Supine radiograph of the abdomen.  Enteric tube with tip  overlying the left mainstem bronchus. Aortic valve replacement.  Endotracheal tube projecting over the midthoracic trachea with the tip  approximately 4.4 cm above the taylor. Left chest wall ICD/pacemaker  with leads overlying the right atrium and right ventricle.  Nonobstructive bowel gas pattern. Cardiomediastinal silhouette is  within normal limits. Mixed interstitial and patchy perihilar airspace  opacities.       Impression    IMPRESSION:  1. Enteric tube with tip overlying the left mainstem bronchus versus  within the thoracic esophagus.  2. Endotracheal tube projecting over the midthoracic trachea.  3. Perihilar patchy airspace opacities possibly representing pulmonary  edema and/or atelectasis. Infection is difficult to entirely exclude.  4. Nonobstructive bowel gas pattern.    [Urgent Result: Malpositioned enteric tube]    Finding was identified on 9/15/2022 7:35 PM.     Dr. Morrison was contacted by Dr. Guy at 9/15/2022 7:49 PM and  verbalized understanding of the urgent finding.     I have personally reviewed the examination and initial interpretation  and I agree with the findings.    JEANNE LOW MD         SYSTEM ID:  [id number]   XR Abdomen Port 1 View    Narrative    EXAM: XR ABDOMEN PORT 1 VIEW  9/15/2022 7:48 PM     HISTORY:  new OG       COMPARISON:  Earlier same day abdominal radiograph obtained at 1916  hours.    FINDINGS: Advanced enteric tube with tip now overlying the cardiac  silhouette and is likely within the patient's large hiatal hernia.  Nonobstructive bowel gas pattern. Unchanged mixed interstitial and  patchy perihilar airspace opacities. Vascular calcifications overlying  the pelvis. Postoperative changes of the spine.       Impression    IMPRESSION:  Advanced enteric tube with tip now likely within the patient's hiatal  hernia.    I have personally reviewed the examination and initial interpretation  and I agree with the findings.    JEANNE LOW MD         SYSTEM ID:  [id number]   XR Chest Port 1 View    Narrative    XR CHEST PORT 1 VIEW  9/15/2022 8:58 PM      HISTORY: Endotracheal tube positioning    COMPARISON: 9/15/2022    FINDINGS:   Single AP view of the chest. Endotracheal tube tip overlying the mid  thoracic trachea approximately 3.3 cm above the taylor. Stable left  chest wall pacer leads. TAVR. Stable mediastinum. No pneumothorax. No  significant pleural effusion. Largely unchanged perihilar and  bibasilar streaky opacities.      Impression    IMPRESSION:   1. Endotracheal tube tip overlying the mid trachea approximately 3.3  cm above the taylor.  2. Largely unchanged perihilar and bibasilar opacities.    I have personally reviewed the examination and initial interpretation  and I agree with the findings.    JEANNE LOW MD         SYSTEM ID:  [id number]

## 2022-09-15 NOTE — PROCEDURES
Intubation note:  89M patient with acute SDH, high cervical fracture with C collar in situ, heart block with pacemaker, s/p TAVR, HTN.    Difficulties swallowing with C collar  Increasing oxygen requirements (on BIPAP) due to issues with secretions  Also developed priapsm, suggestive of threatened neurologic function, although motor strength was good everywhere.    Decision made to intubate for airway protection in the context of his cervical spinal injury.    Topicalized with viscous lidocaine, benzocaine, and lidocaine jelly    Easy awake fiberoptic intubation with 10mg propofol boluses x2 during crossing the cords.    After intubation, patient demonstrated ability to lift both legs off the bed, strong hand  bilaterally.    SHABBIR Pat MD  Clinical   Anesthesia / Critical Care  *[phone]

## 2022-09-15 NOTE — ANESTHESIA PROCEDURE NOTES
Airway       Patient location during procedure: ED       Procedure Start/Stop Times: 9/15/2022 6:50 PM and 9/15/2022 6:53 PM  Staff -        Anesthesiologist:  Luis Felipe Pat MD       Performed By: anesthesiologist  Consent for Airway        Urgency: emergent       Consent: No emergent situation. Verbal consent obtained.       Consent given by: patient       Risks and benefits: risks, benefits and alternatives were discussed       Patient identity confirmed: verbally with patient and arm band  Report Obtained from Primary Care Team       History regarding most recent potassium obtained: Yes       History regarding presence/absence of renal failure obtained:Yes       History regarding stroke/CVA obtained:Yes       History regarding presence/absence of NM disorder: YesIndications and Patient Condition       Indications for airway management: airway protection       Mallampati: Not Assessed       Mask difficulty assessment: 0 - not attempted    Final Airway Details       Final airway type: endotracheal airway       Successful airway: ETT - single  Endotracheal Airway Details        ETT size (mm): 8.0       Cuffed: yes       Successful intubation technique: flexible bronchoscopy       Grade View of Cords: 1       Position: Center       Measured from: lips       Secured at (cm): 24    Post intubation assessment        ETT secured       Placement verified by: capnometry and equal breath sounds        Number of attempts at approach: 2       Secured with: commercial tube lynn       Ease of procedure: easy       Dentition: Intact    Medication(s) Administered   Medication Administration Time: 9/15/2022 6:50 PM    Additional Comments       Easy awake fiberoptic intubation.  20mg propofol and 0.2mg glycopyrrolate administered

## 2022-09-15 NOTE — ED PROVIDER NOTES
[facility] ([facility])  9/15/22  History     Chief Complaint   Patient presents with     Fall     sdh     cervical fracture     The history is provided by the patient and the EMS personnel.     Alexandru Still is a hard of hearing, 89 year old male who has a significant medical history of complete heart block with cardiac pacemaker, S/P transcatheter aortic valve replacement, Lambert-Eaton myasthenic syndrome, spinal stenosis, and HTN who was transferred to our ED from [facility] after a fall during which he sustained a subdural hematoma and multiple C-spine fractures.  The patient reportedly had an unwitnessed fall today at home.  He denied loss of consciousness. He was on the floor for about 2 hours before his family found him and called 911.  He was evaluated at [facility] emergency department where imaging showed a left frontal subdural hemorrhage measuring 5 mm, a C1 fracture, and a type II C2 odontoid fracture with 1 cm of posterior displacement. Patient was placed in a C collar and transferred here. On arrival, the patient is on 2 L of O2. He is on room air at baseline. Patient reports pain in his right shoulder and left shin where he is noted to have a skin tear. The patient denies headache and he is not anticoagulated.    Past Medical History  Past Medical History:   Diagnosis Date     Anemia      Aortic stenosis      BPH (benign prostatic hyperplasia)      Closed T12 fracture      Complete heart block     s/p dual chamber pacemaker     Hypertension      Hypothyroidism      Kidney stone      Lambert-Eaton myasthenic syndrome      Lower extremity edema      Past Surgical History:   Procedure Laterality Date     CV TRANSCATHETER AORTIC VALVE REPLACEMENT TRANSAORTIC APPROACH       EP PACEMAKER       HERNIA REPAIR       IR PICC PLACEMENT > 5 YRS OF AGE  6/15/2022     LASER HOLMIUM LITHOTRIPSY URETER(S), INSERT STENT, COMBINED N/A 6/15/2022    Procedure: 1.  Cystourethroscopy 2. Laser lithotripsy of a urethral stone 3. Basketing of a urethral stone 4. Complex hannah catheter placement over a wire;  Surgeon: Beny Ha MD;  Location: [facility] OR     acetaminophen (TYLENOL) 325 MG tablet  Calcium Carb-Cholecalciferol (CALCIUM-VITAMIN D) 500-400 MG-UNIT TABS  diclofenac (VOLTAREN) 1 % topical gel  finasteride (PROSCAR) 5 MG tablet  fluticasone (FLONASE) 50 MCG/ACT nasal spray  furosemide (LASIX) 40 MG tablet  ipratropium - albuterol 0.5 mg/2.5 mg/3 mL (DUONEB) 0.5-2.5 (3) MG/3ML neb solution  levothyroxine (SYNTHROID/LEVOTHROID) 112 MCG tablet  nystatin (MYCOSTATIN) 846031 UNIT/GM external cream  potassium chloride ER (KLOR-CON M) 20 MEQ CR tablet  senna-docusate (SENOKOT-S/PERICOLACE) 8.6-50 MG tablet  tamsulosin (FLOMAX) 0.4 MG capsule      Allergies   Allergen Reactions     Cefuroxime Hives     Contrast Dye      Gadodiamide Hives     Family History  Family History   Problem Relation Age of Onset     Cerebrovascular Disease Mother      Chronic Obstructive Pulmonary Disease Father      Social History   Social History     Tobacco Use     Smoking status: Former Smoker     Packs/day: 1.00     Years: 20.00     Pack years: 20.00     Quit date:      Years since quittin.7     Smokeless tobacco: Never Used   Substance Use Topics     Alcohol use: Not Currently     Drug use: Never      Past medical history, past surgical history, medications, allergies, family history, and social history were reviewed with the patient. No additional pertinent items.     I have reviewed the Medications, Allergies, Past Medical and Surgical History, and Social History in the Epic system.    Review of Systems   Unable to perform ROS: Acuity of condition   Respiratory: Positive for shortness of breath.    Musculoskeletal: Positive for arthralgias and back pain.   Skin: Positive for wound.   Neurological: Negative for headaches.     A complete review of systems was performed with pertinent  positives and negatives noted in the HPI, and all other systems negative.    Physical Exam   BP: (!) 166/87  Pulse: 68  Resp: 27  SpO2: 99 %      Physical Exam  Vitals and nursing note reviewed.   Constitutional:       General: He is awake. He is not in acute distress.     Appearance: He is well-developed. He is obese. He is ill-appearing. He is not toxic-appearing or diaphoretic.      Interventions: Cervical collar in place.      Comments: C collar in place. Patient appears hyperextended but stabilized with collar in place.   HENT:      Head: Normocephalic. Abrasion, contusion and laceration present.      Jaw: No trismus.      Comments: multiple abrasions and a well approximated laceration over right eyebrow     Right Ear: Decreased hearing noted.      Left Ear: Decreased hearing noted.      Nose: Nose normal.   Eyes:      General: No scleral icterus.     Conjunctiva/sclera: Conjunctivae normal.   Cardiovascular:      Rate and Rhythm: Normal rate.      Heart sounds:     No gallop.   Pulmonary:      Effort: Pulmonary effort is normal. Tachypnea present. No respiratory distress.      Breath sounds: No stridor.   Abdominal:      General: There is no distension.      Palpations: Abdomen is soft.      Tenderness: There is no abdominal tenderness. There is no rebound.   Musculoskeletal:         General: No deformity or signs of injury. Normal range of motion.      Cervical back: Normal range of motion and neck supple. No rigidity.   Skin:     General: Skin is warm and dry.      Coloration: Skin is not jaundiced or pale.      Findings: No rash.             Comments: Superficial skin tear and contusion on left anterior shin   Neurological:      Mental Status: He is alert and oriented to person, place, and time.      GCS: GCS eye subscore is 4. GCS verbal subscore is 5. GCS motor subscore is 6.      Cranial Nerves: No dysarthria or facial asymmetry.      Motor: No tremor or seizure activity.   Psychiatric:         Mood and  Affect: Mood normal.         Behavior: Behavior normal. Behavior is cooperative.         Thought Content: Thought content normal.         ED Course     At 2:48 PM the patient was seen and examined by Mattie Cortes MD in Room ED01.        Procedures              EKG Interpretation:      Interpreted by Mattie Cortes MD  Time reviewed: 3817   Symptoms at time of EKG: trauma   Rhythm: Normal sinus  and Paced  Rate: Normal  Axis: Left Axis Deviation  Ectopy: None  Conduction: prolonged AV conduction  ST Segments/ T Waves: No acute ischemic changes  Q Waves: Nonspecific  Comparison to prior: No old EKG available    Clinical Impression: paced rhythm          Critical Care Addendum    My initial assessment, based on my review of prehospital provider report, review of nursing observations, review of vital signs, focused history, physical exam and review of cardiac rhythm monitor, established that Alexandru Still has severe traumatic injuries and respiratory insufficiency, which requires immediate intervention, and therefore he is critically ill.     After the initial assessment, the care team initiated multiple lab tests, initiated IV fluid administration, provided noninvasive respiratory support, and consulted with NSG, trauma to provide stabilization care. Due to the critical nature of this patient, I reassessed nursing observations, vital signs, physical exam, review of cardiac rhythm monitor, 12 lead ECG analysis, mental status, neurologic status and respiratory status multiple times prior to his disposition.     Time also spent performing documentation, reviewing test results, discussion with consultants and coordination of care.     Critical care time (excluding teaching time and procedures): 68 minutes.     The Lactic acid level is elevated due to dehydration, at this time there is no sign of severe sepsis or septic shock.     Trauma:  Level of trauma activation: Trauma evaluation (consult)   Full Primary and  Secondary survey with appropriate immobilization of spine completed in exam section.  C-collar and immobilization: applied prior to arrival.  CSpine Clearance: Patient left in collar  GCS at arrival: 15  GCS at disposition: unchanged  Consults prior to admission or transfer: Neurosurgery   Procedures done in the ED: none  Disposition: Admit. Admission ordered     Results for orders placed or performed during the hospital encounter of 09/15/22 (from the past 24 hour(s))   iStat Gases (lactate) venous, POCT   Result Value Ref Range    Lactic Acid POCT 2.2 (H) <=2.0 mmol/L    Bicarbonate Venous POCT 30 (H) 21 - 28 mmol/L    O2 Sat, Venous POCT 77 (L) 94 - 100 %    pCO2V Venous POCT 48 40 - 50 mm Hg    pH Venous POCT 7.41 7.32 - 7.43    pO2 Venous POCT 42 25 - 47 mm Hg   XR Chest Port 1 View    Narrative    Exam: XR CHEST PORT 1 VIEW, 9/15/2022 3:50 PM    Comparison: 6/19/2022    History: fall, hypoxia, c spine fx, sdh eval rib fx/aspiration, ptx    Findings:  Single AP portable supine view of the chest. Left chest wall pacemaker  with leads projecting over the right atrium and ventricle. TAVR.    Trachea is midline. Stable enlarged cardiac silhouette. Perihilar and  bibasilar mixed opacities. No pneumothorax. Right shoulder  arthroplasty. Chronic appearing left posterior rib fracture.      Impression    Impression: Perihilar and bibasilar mixed interstitial and airspace  opacities could represent infection, and atelectasis/edema.    I have personally reviewed the examination and initial interpretation  and I agree with the findings.    JEANNE LOW MD         SYSTEM ID:  [id number]   BNP   Result Value Ref Range    N terminal Pro BNP Inpatient 498 0 - 1,800 pg/mL   Procalcitonin   Result Value Ref Range    Procalcitonin 0.04 <0.05 ng/mL   Comprehensive metabolic panel   Result Value Ref Range    Sodium 138 136 - 145 mmol/L    Potassium 3.8 3.4 - 5.3 mmol/L    Chloride 100 98 - 107 mmol/L    Carbon Dioxide (CO2) 28 22 -  29 mmol/L    Anion Gap 10 7 - 15 mmol/L    Urea Nitrogen 16.5 8.0 - 23.0 mg/dL    Creatinine 0.83 0.67 - 1.17 mg/dL    Calcium 9.1 8.2 - 9.6 mg/dL    Glucose 138 (H) 70 - 99 mg/dL    Alkaline Phosphatase 70 40 - 129 U/L    AST 20 10 - 50 U/L    ALT 11 10 - 50 U/L    Protein Total 7.0 6.4 - 8.3 g/dL    Albumin 3.8 3.5 - 5.2 g/dL    Bilirubin Total 0.6 <=1.2 mg/dL    GFR Estimate 82 >60 mL/min/1.73m2   CT Head w/o Contrast    Narrative    EXAM: CT HEAD W/O CONTRAST  9/15/2022 4:18 PM     HISTORY:  fu on SDH       COMPARISON:  Earlier same day head CT obtained at 0419 hours    TECHNIQUE: Using multidetector thin collimation helical acquisition  technique, axial, coronal and sagittal CT images from the skull base  to the vertex were obtained without intravenous contrast.   (topogram) image(s) also obtained and reviewed.    FINDINGS:  Unchanged size of the subdural hematoma overlying the left frontal  lobe convexity measuring 4 mm in greatest thickness without  significant mass effect upon the underlying frontal lobe. Moderate  diffuse cerebral atrophy with patchy periventricular and subcortical  white matter hypoattenuation. No acute loss of gray-white  differentiation. No midline shift. Basal cisterns are clear.     Type II odontoid fracture with the dens posteriorly dislocated  approximately 9 mm. Displaced comminuted fracture of the anterior arch  of C1. The bony calvaria and the bones of the skull base are normal.  The visualized portions of the paranasal sinuses and mastoid air cells  are clear. Grossly normal orbits.       Impression    IMPRESSION:   1. Slightly decreased size of the left frontal subdural hematoma  measuring 4 mm without significant mass effect. No new intracranial  hemorrhage.  2. Stable displaced fractures of the anterior arch of C1 and type II  odontoid fracture.   3. Moderate diffuse cerebral atrophy with patchy periventricular and  subcortical white matter hypoattenuation, likely  sequelae of chronic  small vessel ischemic disease.    I have personally reviewed the examination and initial interpretation  and I agree with the findings.    MEDINA NICOLE MD         SYSTEM ID:  [id number]   CT Chest Abdomen Pelvis w/o Contrast    Narrative    EXAMINATION: CT CHEST ABDOMEN PELVIS W/O CONTRAST, 9/15/2022 4:18 PM    TECHNIQUE:  Helical CT images from the thoracic inlet through the  symphysis pubis were obtained without IV contrast.    COMPARISON: Same day chest x-ray. Soft tissue pelvis CT 7/20/2022 and  abdomen and pelvis CT 6/15/2022    HISTORY: fall    FINDINGS:  CHEST:  LUNGS: Central tracheobronchial tree is patent. No pneumothorax or  pleural effusion. Lower lobe bronchial wall thickening with multifocal  plaquing at the lung bases with streaky consolidative opacities at the  lung bases. Probable intrafissural lymph node measuring 4 mm along the  right minor fissure (series 5, image 141). No suspicious pulmonary  nodule.    MEDIASTINUM: Left chest wall ICD/pacemaker with leads in the right  atrium and right ventricle. Postoperative changes aortic valve  replacement. Bulky mitral annular calcifications. Heart size is within  normal limits. No pericardial effusion. Ascending aorta and main  pulmonary artery diameters are within normal limits. Normal appearance  and configuration of the great vessels off of the aortic arch. All  prominent mediastinal lymph nodes for example 5 mm prevascular node  (series 2, image 88) no suspicious hilar or axillary lymph nodes.    Visualized thyroid and esophagus are unremarkable.    ABDOMEN/PELVIS:  LIVER: No suspicious focal hepatic lesion.    BILIARY: Layering calcified gallstones. No gallbladder wall thickening  or pericholecystic fluid to suggest cholecystitis. No intrahepatic or  extrahepatic biliary ductal dilation.    PANCREAS: Moderate fatty atrophy of the pancreatic parenchyma. No  focal pancreatic lesion. The main pancreatic duct is not  dilated.    SPLEEN: Within normal limits.    ADRENAL GLANDS: No focal adrenal nodule.    URINARY TRACT: Scattered hypodense cysts in both kidneys some of which  are simple in attenuation and others intermediate density and are  unchanged since 6/15/2022. Additional cystic hypodensity in the right  kidney likely representing a benign proteinaceous or hemorrhagic cyst.  Prominent right extrarenal pelvis. No hydronephrosis or ureter. No  renal stone. Urinary bladder is within normal limits.    REPRODUCTIVE ORGANS: Coarse calcifications in the prostate and seminal  vesicles. Right scrotal pump with penile prosthesis.     STOMACH: Within normal limits.    BOWEL: Colonic diverticulosis without evidence for acute  diverticulitis. Normal caliber large and small bowel. No abnormal  bowel wall thickening or enhancement. Appendix is unremarkable.    PERITONEUM/FLUID: No ascites or pelvic free fluid.    VESSELS: Calcifications of the abdominal aorta and iliac arteries.  Ectatic dilation of the left common iliac artery measuring 1.8 cm.    LYMPH NODES: No lymphadenopathy.    BONES/SOFT TISSUES: Chronic and healed left second through fourth rib  fractures. Age indeterminant anterior left sixth rib fracture. Chronic  appearing anterior right third, fourth, fifth, seventh, ninth, and  10th rib fractures. Postoperative changes to the right shoulder.  Advanced degenerative change to the left shoulder. Interspinous fusion  device at L4-5. Unchanged superior endplate compression deformity at  L3. Grade 1 anterolisthesis of L4 on L5 and L5 on S1. Chronic  bilateral pars interarticularis defects at L5. Chronic appearing  anterior wedge compression deformity at T12 and T9. Chronic sternal  body fracture.      Impression    IMPRESSION:   1. Question acute anterior left sixth rib fracture. Multiple chronic  appearing bilateral rib fractures and chronic appearing compression  deformities in the thoracolumbar spine.  2. No acute visceral  injury in the chest, abdomen, or pelvis.    I have personally reviewed the examination and initial interpretation  and I agree with the findings.    JEANNE LOW MD         SYSTEM ID:  [id number]   EKG 12-lead, tracing only   Result Value Ref Range    Systolic Blood Pressure  mmHg    Diastolic Blood Pressure  mmHg    Ventricular Rate 64 BPM    Atrial Rate 64 BPM    GA Interval 222 ms    QRS Duration 130 ms     ms    QTc 511 ms    P Axis 76 degrees    R AXIS 62 degrees    T Axis -55 degrees    Interpretation ECG       Atrial-sensed ventricular-paced rhythm with prolonged AV conduction  Abnormal ECG       Medications   sodium chloride 0.9% infusion ( Intravenous New Bag 9/15/22 1531)   HYDROmorphone (PF) (DILAUDID) injection 0.5 mg (0.5 mg Intravenous Given 9/15/22 1536)   methylPREDNISolone sodium succinate (solu-MEDROL) injection 37.5 mg (37.5 mg Intravenous Given 9/15/22 1704)   diphenhydrAMINE (BENADRYL) injection 25 mg (has no administration in time range)   lidocaine 1 % 0.1-1 mL (has no administration in time range)   lidocaine (LMX4) cream (has no administration in time range)   sodium chloride (PF) 0.9% PF flush 3 mL (has no administration in time range)   sodium chloride (PF) 0.9% PF flush 3 mL (has no administration in time range)             Assessments & Plan (with Medical Decision Making)   Alexandru Still is a hard of hearing, 89 year old male who has a significant medical history of complete heart block with cardiac pacemaker, S/P transcatheter aortic valve replacement, Lambert-Eaton myasthenic syndrome, spinal stenosis, and HTN who was transferred to our ED from [facility] after a fall during which he sustained a subdural hematoma and multiple C-spine fractures.     Ddx: C spine fx, cervical artery dissection/stenosis, c spine epidural hematoma, cervical stenosis, aspiration, hypercarbic respiratory insufficiency, pna, uti, diaphragmatic paralysis/impaired estephania, rib  fracture/contusion    Patient NAD w/o headache.  Given Dilaudid for pain.  Alert and oriented. New O2 requirement c/f possible diaphragmatic paralysis or developing neurogenic shock. Currently hypertensive and alert. Trauma and NSG consulted. RT called for stat NIF. NIF resulted normal. CXR with bilateral patchy infiltrates.  Procalcitonin and BNP added on.  Venous blood gas showed a lactate of 2.2 with a normal pH and PCO2.  Patient started on maintenance IV fluids with normal saline.  Ordering a CT of the chest, abdomen, pelvis to complete the trauma work-up.  Patient has a listed allergy to contrast so this was ordered without contrast.  For the CT angio that neurosurgery quested they were okay doing a pretreatment protocol.  I talked to radiology who instructed me to order the 6-hour protocol with 1 dose of 40 mg IV methylprednisolone now followed by a second dose of 40 mg and 4 hours.  The patient will get 50 mg IV Benadryl 1 hour prior to the contrast CT.  The CT was ordered for 2 hours after the second dose of methylprednisolone.  The initial head CT was obtained at 4 AM so repeat noncontrast head CT obtained with the chest abdomen pelvis.  Patient admitted to trauma surgery on intermediate level care.  Neurosurgery following.  Initially the patient declined surgical intervention however he did wish to change his CODE STATUS from DNR/DNI to full code.  I ordered blood cultures and a urinalysis to complete the infectious work-up.  Repeat head CT without contrast shows stable size of the left frontal subdural hematoma.  Also with stable displaced fractures of the anterior arch of C1 and type II odontoid fracture.     5:10 PM the RN came to find we did report that the patient has priapism which was noted when they tried to obtain a urinalysis.  Patient was able to void spontaneously but we will obtain a postvoid residual bladder scan.  Patient also with increasing oxygen requirements now on 8 L via Ventimask.   He appears to be hyperventilating on my examination with shallow respirations.  CT of the chest abdomen pelvis shows question of acute anterior left sixth rib fracture and multiple chronic appearing bilateral rib fractures.  Also chronic appearing compression deformities in thoracolumbar spine.  No visual injury noted within the chest, abdomen, or pelvis.  RT called to place patient on BiPAP.  Trauma surgery and neurosurgery also called back to update them on patient's worsening respiratory status as well as the new clinical finding of priapism.  The patient remains hypertensive.   I spoke with trauma about patient's airway management.  I do not think he is a good candidate for an emergent emergency department intubation.  I would like to involve anesthesiology so that they can plan for a strategic intubation, potentially in the OR, in the case that it becomes necessary.  The intensivist is an anesthesiologist so trauma SUSANA updated him on patient's respiratory decompensation.    Neurosurgery updated and states that he had not previously checked for priapism.  It is unclear if this is an acute development or if it has been present since onset of injury.     Patient signed out to Dr. Wellington at shift change.  Patient is actively being managed by trauma surgery and neurosurgery who are aware of patient's recent clinical developments.  They are involving anesthesiology for airway evaluation in the case that the patient would require intubation.  I have also called RT to come place the patient on BiPAP.  The SICU anesthesiology staff will evaluate to help determine patient's ultimate level of care.  I anticipate he will require escalation to surgical ICU from intermediate level care.    I have reviewed the nursing notes.    I have reviewed the findings, diagnosis, plan and need for follow up with the patient.    New Prescriptions    No medications on file       Final diagnoses:   Closed fracture of cervical vertebra,  unspecified cervical vertebral level, initial encounter (H)   SDH (subdural hematoma) (H)       I, Justino Juan am serving as a trained medical scribe to document services personally performed by Mattie Cortes, based on the provider's statements to me.      I, Mattie Cortes, was physically present and have reviewed and verified the accuracy of this note documented by Justino Juan.     Mattie Cortes MD  9/15/2022   [facility] EMERGENCY DEPARTMENT     Mattie Cortes MD  09/15/22 1728       Mattie Cortes MD  09/15/22 1731       Mattie Cortes MD  09/15/22 1732

## 2022-09-15 NOTE — ED NOTES
hannah order placed, staff noted patient penis erect( ptprism). staff notified MD. hannah was no placed yet. [id number] for 342 PVR. O 2 desats in the 80's, applied oxy mask @ 8 liter, still continued to desat. Notified MD, BIPAP place at 45%  Oxygen, SATs at 98%. External male catheter placed, IV lasix given., to continued to monitor.     C-G 4 blood gas ordered and complete     Trauma team rounded after notifying team of patient above conditions. Orders received.    Order for UA- unable to collect urine, cannot place hannah( spinal cord injury), external catheter placed.

## 2022-09-15 NOTE — ED NOTES
Pt reports he stood at his bedside to use urinal as he normally does. Pt says he usually holds his walker with one hand to balance himself and his walker moved and he fell. Pt denies any LOC.

## 2022-09-15 NOTE — ED NOTES
GF at bedside. Patient states pain has improved after morphine given. Pt remains in aspen collar. A&OX4.

## 2022-09-16 ENCOUNTER — APPOINTMENT (OUTPATIENT)
Dept: CARDIOLOGY | Facility: CLINIC | Age: 87
DRG: 004 | End: 2022-09-16
Attending: NURSE PRACTITIONER
Payer: MEDICARE

## 2022-09-16 ENCOUNTER — APPOINTMENT (OUTPATIENT)
Dept: GENERAL RADIOLOGY | Facility: CLINIC | Age: 87
DRG: 004 | End: 2022-09-16
Attending: SURGERY
Payer: MEDICARE

## 2022-09-16 ENCOUNTER — APPOINTMENT (OUTPATIENT)
Dept: CT IMAGING | Facility: CLINIC | Age: 87
DRG: 004 | End: 2022-09-16
Payer: MEDICARE

## 2022-09-16 ENCOUNTER — APPOINTMENT (OUTPATIENT)
Dept: GENERAL RADIOLOGY | Facility: CLINIC | Age: 87
DRG: 004 | End: 2022-09-16
Payer: MEDICARE

## 2022-09-16 LAB
ALBUMIN UR-MCNC: NEGATIVE MG/DL
ANION GAP SERPL CALCULATED.3IONS-SCNC: 13 MMOL/L (ref 7–15)
APPEARANCE UR: CLEAR
ATRIAL RATE - MUSE: 64 BPM
BILIRUB UR QL STRIP: NEGATIVE
BUN SERPL-MCNC: 18.4 MG/DL (ref 8–23)
CALCIUM SERPL-MCNC: 9.4 MG/DL (ref 8.2–9.6)
CHLORIDE SERPL-SCNC: 103 MMOL/L (ref 98–107)
COLOR UR AUTO: ABNORMAL
CREAT SERPL-MCNC: 0.86 MG/DL (ref 0.67–1.17)
DEPRECATED HCO3 PLAS-SCNC: 24 MMOL/L (ref 22–29)
DIASTOLIC BLOOD PRESSURE - MUSE: NORMAL MMHG
ERYTHROCYTE [DISTWIDTH] IN BLOOD BY AUTOMATED COUNT: 15.2 % (ref 10–15)
GFR SERPL CREATININE-BSD FRML MDRD: 81 ML/MIN/1.73M2
GLUCOSE BLDC GLUCOMTR-MCNC: 138 MG/DL (ref 70–99)
GLUCOSE BLDC GLUCOMTR-MCNC: 144 MG/DL (ref 70–99)
GLUCOSE BLDC GLUCOMTR-MCNC: 162 MG/DL (ref 70–99)
GLUCOSE SERPL-MCNC: 125 MG/DL (ref 70–99)
GLUCOSE UR STRIP-MCNC: NEGATIVE MG/DL
HBA1C MFR BLD: 6.2 %
HCT VFR BLD AUTO: 32.6 % (ref 40–53)
HGB BLD-MCNC: 10.4 G/DL (ref 13.3–17.7)
HGB UR QL STRIP: NEGATIVE
INTERPRETATION ECG - MUSE: NORMAL
KETONES UR STRIP-MCNC: NEGATIVE MG/DL
LEUKOCYTE ESTERASE UR QL STRIP: NEGATIVE
LVEF ECHO: NORMAL
MAGNESIUM SERPL-MCNC: 1.6 MG/DL (ref 1.7–2.3)
MCH RBC QN AUTO: 31 PG (ref 26.5–33)
MCHC RBC AUTO-ENTMCNC: 31.9 G/DL (ref 31.5–36.5)
MCV RBC AUTO: 97 FL (ref 78–100)
MUCOUS THREADS #/AREA URNS LPF: PRESENT /LPF
NITRATE UR QL: NEGATIVE
P AXIS - MUSE: 76 DEGREES
PH UR STRIP: 5.5 [PH] (ref 5–7)
PHOSPHATE SERPL-MCNC: 1.5 MG/DL (ref 2.5–4.5)
PLATELET # BLD AUTO: 213 10E3/UL (ref 150–450)
POTASSIUM SERPL-SCNC: 3.4 MMOL/L (ref 3.4–5.3)
PR INTERVAL - MUSE: 222 MS
QRS DURATION - MUSE: 130 MS
QT - MUSE: 496 MS
QTC - MUSE: 511 MS
R AXIS - MUSE: 62 DEGREES
RBC # BLD AUTO: 3.35 10E6/UL (ref 4.4–5.9)
RBC URINE: <1 /HPF
SODIUM SERPL-SCNC: 140 MMOL/L (ref 136–145)
SP GR UR STRIP: 1.01 (ref 1–1.03)
SQUAMOUS EPITHELIAL: <1 /HPF
SYSTOLIC BLOOD PRESSURE - MUSE: NORMAL MMHG
T AXIS - MUSE: -55 DEGREES
UROBILINOGEN UR STRIP-MCNC: NORMAL MG/DL
VENTRICULAR RATE- MUSE: 64 BPM
WBC # BLD AUTO: 9.8 10E3/UL (ref 4–11)
WBC URINE: 3 /HPF

## 2022-09-16 PROCEDURE — 85027 COMPLETE CBC AUTOMATED: CPT | Performed by: SURGERY

## 2022-09-16 PROCEDURE — 74340 X-RAY GUIDE FOR GI TUBE: CPT | Mod: 26 | Performed by: STUDENT IN AN ORGANIZED HEALTH CARE EDUCATION/TRAINING PROGRAM

## 2022-09-16 PROCEDURE — 44500 INTRO GASTROINTESTINAL TUBE: CPT

## 2022-09-16 PROCEDURE — 250N000009 HC RX 250: Performed by: NURSE PRACTITIONER

## 2022-09-16 PROCEDURE — 250N000009 HC RX 250: Performed by: SURGERY

## 2022-09-16 PROCEDURE — L0174 CERV SR 2PC THOR EXT PRE OTS: HCPCS

## 2022-09-16 PROCEDURE — 36415 COLL VENOUS BLD VENIPUNCTURE: CPT | Performed by: SURGERY

## 2022-09-16 PROCEDURE — 999N000185 HC STATISTIC TRANSPORT TIME EA 15 MIN

## 2022-09-16 PROCEDURE — C9113 INJ PANTOPRAZOLE SODIUM, VIA: HCPCS | Performed by: PHYSICIAN ASSISTANT

## 2022-09-16 PROCEDURE — 999N000208 ECHOCARDIOGRAM COMPLETE

## 2022-09-16 PROCEDURE — 70450 CT HEAD/BRAIN W/O DYE: CPT | Mod: 26 | Performed by: RADIOLOGY

## 2022-09-16 PROCEDURE — 999N000157 HC STATISTIC RCP TIME EA 10 MIN

## 2022-09-16 PROCEDURE — 99232 SBSQ HOSP IP/OBS MODERATE 35: CPT | Performed by: PHYSICIAN ASSISTANT

## 2022-09-16 PROCEDURE — 94003 VENT MGMT INPAT SUBQ DAY: CPT

## 2022-09-16 PROCEDURE — 44500 INTRO GASTROINTESTINAL TUBE: CPT | Mod: 53 | Performed by: STUDENT IN AN ORGANIZED HEALTH CARE EDUCATION/TRAINING PROGRAM

## 2022-09-16 PROCEDURE — 84100 ASSAY OF PHOSPHORUS: CPT | Performed by: SURGERY

## 2022-09-16 PROCEDURE — 83735 ASSAY OF MAGNESIUM: CPT | Performed by: SURGERY

## 2022-09-16 PROCEDURE — 81001 URINALYSIS AUTO W/SCOPE: CPT | Performed by: EMERGENCY MEDICINE

## 2022-09-16 PROCEDURE — 250N000011 HC RX IP 250 OP 636: Performed by: ANESTHESIOLOGY

## 2022-09-16 PROCEDURE — 250N000009 HC RX 250: Performed by: STUDENT IN AN ORGANIZED HEALTH CARE EDUCATION/TRAINING PROGRAM

## 2022-09-16 PROCEDURE — G1010 CDSM STANSON: HCPCS

## 2022-09-16 PROCEDURE — 250N000013 HC RX MED GY IP 250 OP 250 PS 637: Performed by: PHYSICIAN ASSISTANT

## 2022-09-16 PROCEDURE — 71045 X-RAY EXAM CHEST 1 VIEW: CPT

## 2022-09-16 PROCEDURE — 250N000011 HC RX IP 250 OP 636: Performed by: SURGERY

## 2022-09-16 PROCEDURE — L1499 SPINAL ORTHOSIS NOS: HCPCS

## 2022-09-16 PROCEDURE — 93306 TTE W/DOPPLER COMPLETE: CPT | Mod: 26 | Performed by: INTERNAL MEDICINE

## 2022-09-16 PROCEDURE — 80048 BASIC METABOLIC PNL TOTAL CA: CPT | Performed by: SURGERY

## 2022-09-16 PROCEDURE — 99291 CRITICAL CARE FIRST HOUR: CPT | Performed by: ANESTHESIOLOGY

## 2022-09-16 PROCEDURE — 83036 HEMOGLOBIN GLYCOSYLATED A1C: CPT | Performed by: NURSE PRACTITIONER

## 2022-09-16 PROCEDURE — 258N000003 HC RX IP 258 OP 636: Performed by: STUDENT IN AN ORGANIZED HEALTH CARE EDUCATION/TRAINING PROGRAM

## 2022-09-16 PROCEDURE — 99223 1ST HOSP IP/OBS HIGH 75: CPT | Performed by: STUDENT IN AN ORGANIZED HEALTH CARE EDUCATION/TRAINING PROGRAM

## 2022-09-16 PROCEDURE — 250N000011 HC RX IP 250 OP 636: Performed by: STUDENT IN AN ORGANIZED HEALTH CARE EDUCATION/TRAINING PROGRAM

## 2022-09-16 PROCEDURE — 71045 X-RAY EXAM CHEST 1 VIEW: CPT | Mod: 26 | Performed by: RADIOLOGY

## 2022-09-16 PROCEDURE — 99222 1ST HOSP IP/OBS MODERATE 55: CPT | Mod: GC | Performed by: NEUROLOGICAL SURGERY

## 2022-09-16 PROCEDURE — 258N000003 HC RX IP 258 OP 636

## 2022-09-16 PROCEDURE — 200N000002 HC R&B ICU UMMC

## 2022-09-16 PROCEDURE — 999N000015 HC STATISTIC ARTERIAL MONITORING DAILY

## 2022-09-16 PROCEDURE — G1010 CDSM STANSON: HCPCS | Mod: GC | Performed by: RADIOLOGY

## 2022-09-16 PROCEDURE — 250N000011 HC RX IP 250 OP 636: Performed by: PHYSICIAN ASSISTANT

## 2022-09-16 PROCEDURE — 255N000002 HC RX 255 OP 636: Performed by: INTERNAL MEDICINE

## 2022-09-16 PROCEDURE — 999N000248 HC STATISTIC IV INSERT WITH US BY RN

## 2022-09-16 RX ORDER — GUAIFENESIN 600 MG/1
15 TABLET, EXTENDED RELEASE ORAL DAILY
Status: DISCONTINUED | OUTPATIENT
Start: 2022-09-16 | End: 2022-09-16

## 2022-09-16 RX ORDER — MAGNESIUM SULFATE HEPTAHYDRATE 40 MG/ML
2 INJECTION, SOLUTION INTRAVENOUS ONCE
Status: COMPLETED | OUTPATIENT
Start: 2022-09-16 | End: 2022-09-16

## 2022-09-16 RX ORDER — AMINO AC/PROTEIN HYDR/WHEY PRO 10G-100/30
2 LIQUID (ML) ORAL 3 TIMES DAILY
Status: DISCONTINUED | OUTPATIENT
Start: 2022-09-16 | End: 2022-09-16

## 2022-09-16 RX ORDER — DEXAMETHASONE SODIUM PHOSPHATE 4 MG/ML
10 INJECTION, SOLUTION INTRA-ARTICULAR; INTRALESIONAL; INTRAMUSCULAR; INTRAVENOUS; SOFT TISSUE EVERY 8 HOURS
Status: COMPLETED | OUTPATIENT
Start: 2022-09-16 | End: 2022-09-17

## 2022-09-16 RX ORDER — DEXMEDETOMIDINE HYDROCHLORIDE 4 UG/ML
.1-1.2 INJECTION, SOLUTION INTRAVENOUS CONTINUOUS
Status: DISCONTINUED | OUTPATIENT
Start: 2022-09-16 | End: 2022-09-22

## 2022-09-16 RX ORDER — SODIUM CHLORIDE, SODIUM LACTATE, POTASSIUM CHLORIDE, CALCIUM CHLORIDE 600; 310; 30; 20 MG/100ML; MG/100ML; MG/100ML; MG/100ML
INJECTION, SOLUTION INTRAVENOUS CONTINUOUS
Status: DISCONTINUED | OUTPATIENT
Start: 2022-09-16 | End: 2022-09-18

## 2022-09-16 RX ORDER — FUROSEMIDE 10 MG/ML
20 INJECTION INTRAMUSCULAR; INTRAVENOUS EVERY 12 HOURS
Status: DISCONTINUED | OUTPATIENT
Start: 2022-09-17 | End: 2022-09-16

## 2022-09-16 RX ORDER — FUROSEMIDE 20 MG
40 TABLET ORAL
Status: DISCONTINUED | OUTPATIENT
Start: 2022-09-16 | End: 2022-09-17

## 2022-09-16 RX ORDER — DEXTROSE MONOHYDRATE 100 MG/ML
INJECTION, SOLUTION INTRAVENOUS CONTINUOUS PRN
Status: DISCONTINUED | OUTPATIENT
Start: 2022-09-16 | End: 2022-09-19

## 2022-09-16 RX ORDER — LEVOTHYROXINE SODIUM 112 UG/1
112 TABLET ORAL
Status: DISCONTINUED | OUTPATIENT
Start: 2022-09-16 | End: 2022-09-18

## 2022-09-16 RX ADMIN — IOPAMIDOL 75 ML: 755 INJECTION, SOLUTION INTRAVENOUS at 00:03

## 2022-09-16 RX ADMIN — DEXAMETHASONE SODIUM PHOSPHATE 10 MG: 4 INJECTION, SOLUTION INTRA-ARTICULAR; INTRALESIONAL; INTRAMUSCULAR; INTRAVENOUS; SOFT TISSUE at 23:47

## 2022-09-16 RX ADMIN — DEXMEDETOMIDINE HYDROCHLORIDE 0.3 MCG/KG/HR: 400 INJECTION INTRAVENOUS at 10:01

## 2022-09-16 RX ADMIN — DEXAMETHASONE SODIUM PHOSPHATE 10 MG: 4 INJECTION, SOLUTION INTRA-ARTICULAR; INTRALESIONAL; INTRAMUSCULAR; INTRAVENOUS; SOFT TISSUE at 16:44

## 2022-09-16 RX ADMIN — ACETAMINOPHEN 650 MG: 650 SUPPOSITORY RECTAL at 20:32

## 2022-09-16 RX ADMIN — ACETAMINOPHEN 650 MG: 650 SUPPOSITORY RECTAL at 15:09

## 2022-09-16 RX ADMIN — DEXMEDETOMIDINE HYDROCHLORIDE 0.2 MCG/KG/HR: 400 INJECTION INTRAVENOUS at 23:47

## 2022-09-16 RX ADMIN — PROPOFOL 25 MCG/KG/MIN: 10 INJECTION, EMULSION INTRAVENOUS at 01:41

## 2022-09-16 RX ADMIN — MAGNESIUM SULFATE HEPTAHYDRATE 2 G: 40 INJECTION, SOLUTION INTRAVENOUS at 13:51

## 2022-09-16 RX ADMIN — ACETAMINOPHEN 650 MG: 650 SUPPOSITORY RECTAL at 09:52

## 2022-09-16 RX ADMIN — ACETAMINOPHEN 650 MG: 650 SUPPOSITORY RECTAL at 02:15

## 2022-09-16 RX ADMIN — PANTOPRAZOLE SODIUM 40 MG: 40 INJECTION, POWDER, FOR SOLUTION INTRAVENOUS at 07:59

## 2022-09-16 RX ADMIN — FUROSEMIDE 20 MG: 10 INJECTION, SOLUTION INTRAVENOUS at 07:59

## 2022-09-16 RX ADMIN — POTASSIUM PHOSPHATE, MONOBASIC AND POTASSIUM PHOSPHATE, DIBASIC 30 MMOL: 224; 236 INJECTION, SOLUTION INTRAVENOUS at 16:36

## 2022-09-16 RX ADMIN — SODIUM CHLORIDE, PRESERVATIVE FREE 90 ML: 5 INJECTION INTRAVENOUS at 00:04

## 2022-09-16 RX ADMIN — SODIUM CHLORIDE, POTASSIUM CHLORIDE, SODIUM LACTATE AND CALCIUM CHLORIDE: 600; 310; 30; 20 INJECTION, SOLUTION INTRAVENOUS at 20:33

## 2022-09-16 RX ADMIN — HUMAN ALBUMIN MICROSPHERES AND PERFLUTREN 5 ML: 10; .22 INJECTION, SOLUTION INTRAVENOUS at 15:40

## 2022-09-16 ASSESSMENT — ACTIVITIES OF DAILY LIVING (ADL)
ADLS_ACUITY_SCORE: 51
ADLS_ACUITY_SCORE: 47
ADLS_ACUITY_SCORE: 51
ADLS_ACUITY_SCORE: 47
ADLS_ACUITY_SCORE: 51
ADLS_ACUITY_SCORE: 47
ADLS_ACUITY_SCORE: 35
ADLS_ACUITY_SCORE: 47
ADLS_ACUITY_SCORE: 51
ADLS_ACUITY_SCORE: 47
ADLS_ACUITY_SCORE: 51
ADLS_ACUITY_SCORE: 47

## 2022-09-16 ASSESSMENT — VISUAL ACUITY
OU: NOT TESTABLE
OU: OTHER (SEE COMMENT)
OU: NOT TESTABLE
OU: OTHER (SEE COMMENT)
OU: NOT TESTABLE
OU: OTHER (SEE COMMENT)
OU: NOT TESTABLE

## 2022-09-16 NOTE — PROGRESS NOTES
Care Conference Note        A family meeting was held for Alexandru Still. It was held in the conference room; the patient was unable to participate.      Those present from the family included: Erv (son-in-law)     Those present from the care team included: SICU MD, Palliative MD, Neurosurgery MD, Trauma PA, and RNCC     The purpose of the care conference was to inform family of the patient s current status and determine goals of care.     The following was discussed: The team started off by giving Erv an introduction of each discipline and explained their role in pt's care. Each discipline gave a detailed account of how pt has progressed since Erv was last here. Neurosurgery explained to Erv that pt had a severe C1 fracture and that there are only 2 current routes of care that can be taken. Pt can choose the conservative route where he does not get surgery and will have to wear a neck collar for the rest of his life. After pt discharges, any type of injury, fall, or abrupt movement of neck could cause permanet damage or death. Pt also has a history of falling at home so he will most likely have to transition to a LTC facility so he can be supervised and assisted at all times. The other route would be to go forward with surgery. Neurosurgery explained pt was a very bad candidate for surgery due to his age, comorbidities, brittle bones, and extent of C1 fracture. There is a high chance pt may not make it out of surgery and even if pt did, they are unable to determine how much function pt would regain.     Team asked for the care conference to try to understand what pt's wishes are. When pt was transferred here, he was DNR/DNI but wanted it switched in our ER. Pt originally declined surgery due to his age but then later stated he wanted the surgery. Erv stated when he last spoke to pt yesterday, pt had stated he did not want surgery. Team is unsure exactly what pt wants so it is up to Erv to determine what the pt  would have wanted.     Erv asked if we could wait until pt wakes up. Team stated pt is in a very fragile state right now and the longer we wait, the more likely complications could arise that may be detrimental. They could not wait until pt wakes up.    Team stated Erv may take time to process and speak with family regarding what they would like to do. They hope Erv can make a decision by tomorrow and let the team know. Erv agreed and stated he would go back and speak with his children. Erv did not have any further questions or concerns at this time.     Code status was confirmed as Full Code     A follow-up family meeting was not scheduled. Care coordination team will schedule meeting when/if necessary. Family will continue to receive medical updates on a regular basis.

## 2022-09-16 NOTE — PROGRESS NOTES
1615:   Intubation team arrived  Explained procedure to patient and family.     1630: Lidocaine, hericaine and  And topical lidocaine paste given to patient orally.    1630: propofol  10 mg given x 2 total of 20 mg given to patient.    1635 - Dr Pena stated Intubation    1645- 1700: intubation successful    4207-6791: started Propofol  Infusion 5 mcg/kg/min    0699-9058:  Rate change 10 mcg/kg/min, patient restless. Patient sedated, order placed for soft wrist restraint, places, bilaterally      1267-0178: OJ tube inserted by Dr. Jean Dominique     1925: Called [facility] to give report    1930- xray, OJ tube in chest called Doctor Pena, advancing tube.    1935: Dr Pena in room advancing tube    1949: OJ was not placed, Dr Pena will place in ICU tomorrow.    1945: bolus of  10 mg propofol administered prior to  transfer to ICU.      JAILENE Alejandre 1953

## 2022-09-16 NOTE — CONSULTS
[facility]  Palliative Care Consultation Note    Patient: Alexandru Still  Date of Admission:  9/15/2022    Requesting Clinician / Team: Tammy Velásquez PA-C  Reason for consult: Goals of care, Decisional support    Recommendations:  Encounter for palliative care  Psychosocial support    Psychosocial support was provided to patient and/or family.    Prognosis: Poor    Palliative performance scale (PPS): 10% (intubated and sedated, seems like PPS was 80-90% at least prior to this admission though Alexandru would fall sometimes)    CODE status: FULL     Alexandru was noted to previously be AND/DNI and changed his mind in the ED, confirmed by several teams after speaking with him consecutively    Goals of Care: Disease-oriented for now    Care conference held on 9/16/2022 with palliative, SICU, Neuro surgery, trauma, and son-in-law Erv    Summarized current situation for ERV    SICU states that patient is currently stable from respiratory/CV standpoint    Trauma states they are trying to get a feeding tube placed as it has been difficult    Neurosurgery states there are two choices: either have Alexandru in a permanent [place] collar for the rest of his life with the idea that he would be at risk for more severe trauma and death if he was to fall again (which is likely) or to take him to surgery which would be he risk given his comorbidities (age, other medical conditions including osteoporosis, degree of damage done to C1/2 areas, etc).     When asked, Erv stated that Alexandru is very stubborn and would often change back and forth between what he would want    It was noted that initially Alexandru did NOT want surgery but after he got back from his CT scans in the ED, he changed his mind state he DID want surgery    Erv asked if it would be possible for Alexandru to wake up and decide himself. Teams noted that while Alexandru is stable at the moment many complications could  arise and that there is no guarantee he would be able to be extubated due to his severe C1/2 fracture.    Neurosurgery stated that while Alexandru is unlikely to decompensate today and it was somewhat reassuring he was not quadriplegic in the ED, it would be best to make a decision by tomorrow, Saturday 9/17, whether Erv would like to pursue intervention or opt for a more comfort-oriented approach to Alexandru's care.    Asked Erv if he would like additional  or social support and though her noted that Alexandru only had Erv's family and Erv only has his immediate family, he felt fine and declined PSS resources for now.    Disposition: Inpatient per primary    Palliative medicine will continue to follow    These recommendations have been discussed with primary/consulting teams.    Total time spent was >80 minutes,  >50% of time was spent counseling and/or coordination of care regarding goals of care.    Mello Antoine DO / Palliative Medicine / Text me via [employer].     Team Consult Pager [phone] (answered 8am-430pm M-F) - ok to text page via [employer] / After-Hours Answering Service [phone] / Palliative Clinic in the [facility] at the [facility] - [phone] (scheduling); [phone] (triage).       Assessments:  Alexandru Still is a 89 year old male who was admitted on 9/15/2022 due to an unwitnessed fall  PMHx: Lambert-Eaton syndrome, TAVR, complete heart block with pacemaker dependency, DM2, arthritis, BPH, HTN, osteoporosis  He was found to have a small frontal intracranial hemorrhage (SDH) and odontoid fracture with displacement and associated epidural hemorrhage. He was intubated for airway protection. Care conference held on 9/16/2022 with trauma, SICU, neurosurgery, son-in-law, and palliative. Pending family decision.    Today, the patient was seen for:  Goals of Care  Encounter for palliative care    Prognosis, Goals, & Planning:      Functional Status just prior to hospitalization: 1  (Restricted in physically strenuous activity but ambulatory and able to carry out work of a light or sedentary nature)      Prognosis, Goals, and/or Advance Care Planning were addressed today: Yes        Summary/Comments:       Patient's decision making preferences: unable to assess          Patient has decision-making capacity today for complex decisions: No            I have concerns about the patient/family's health literacy today: Unable to assess today           Patient has a completed Health Care Directive: Unknown or unable to assess.      Code status: Full Code    Coping, Meaning, & Spirituality:   Mood, coping, and/or meaning in the context of serious illness were addressed today: No  Summary/Comments:     Social:     Key family / caregivers: Son-in-Law Erv    History of Present Illness:  History gathered today from: family/loved ones, medical chart, medical team members, unit team members    Alexandru Still is a 89 year old male who was admitted on 9/15/2022 due to an unwitnessed fall  PMHx: Lambert-Eaton syndrome, TAVR, complete heart block with pacemaker dependency, DM2, arthritis, BPH, HTN, osteoporosis  He was found to have a small frontal intracranial hemorrhage (SDH) and odontoid fracture with displacement and associated epidural hemorrhage. He was intubated for airway protection. Care conference held on 9/16/2022 with trauma, SICU, neurosurgery, son-in-law, and palliative.     Details of conversation above. Erv will let teams know his decision tomorrow.    Key Palliative Symptom Data:  We are not helping to manage these symptoms currently in this patient.    Patient is on opioids: bowels not assessed today.    ROS:  A 14 point ROS was negative unless stated otherwise above in HPI     Past Medical History:  Past Medical History:   Diagnosis Date     Anemia      Aortic stenosis      BPH (benign prostatic hyperplasia)      Closed T12 fracture      Complete heart block     s/p dual chamber pacemaker      Hypertension      Hypothyroidism      Kidney stone      Lambert-Eaton myasthenic syndrome      Lower extremity edema         Past Surgical History:  Past Surgical History:   Procedure Laterality Date     CV TRANSCATHETER AORTIC VALVE REPLACEMENT TRANSAORTIC APPROACH       EP PACEMAKER       HERNIA REPAIR       IR PICC PLACEMENT > 5 YRS OF AGE  6/15/2022     LASER HOLMIUM LITHOTRIPSY URETER(S), INSERT STENT, COMBINED N/A 6/15/2022    Procedure: 1. Cystourethroscopy 2. Laser lithotripsy of a urethral stone 3. Basketing of a urethral stone 4. Complex hannah catheter placement over a wire;  Surgeon: Beny Ha MD;  Location: [facility] OR         Family History:  Family History   Problem Relation Age of Onset     Cerebrovascular Disease Mother      Chronic Obstructive Pulmonary Disease Father        Allergies:  Allergies   Allergen Reactions     Cefuroxime Hives     Contrast Dye      Gadodiamide Hives        Medications:  I have reviewed this patient's medication profile and medications from this hospitalization.   Noted:  Dexamethasone 10 mg IV q8h scheduled  Lasix 20 mg IV q12h  Protonix  Precedex  D5 NS at 10cc/hr  Dilaudid 0.5-1 mg IB q2h prn pain  Narcan PRN  Dulcolax PRN  Zofran PRN  Compazine PRN  Senna-docusate PRN    Physical Exam:  Vital Signs: Temp: 97.5  F (36.4  C) Temp src: Axillary BP: 129/71 Pulse: (!) 49   Resp: 16 SpO2: 97 % O2 Device: Mechanical Ventilator Oxygen Delivery: 8 LPM  Weight: 177 lbs 14.58 oz  General: Not in acute distress.  Head: Left frontal temporal trauma with bruising   Eyes: Eyes closed  Ear, Nose, and Throat: No neck masses noted. Intubated  Pulmonary: Intubated. Equal chest rise. Normal breath sounds.  Cardiovascular: S1 and S2 noted. No murmurs, rubs, or gallops. No jugular venous distension. Minimal LE edema.  Abdomen: Soft. Non-tender to palpation. Normal bowel sound present.  Skin: Warm. Dry. Facial and arm bruising noted with Left calf/shin gauze wrapped  around  Musculoskeletal: Muscle atrophy  Neuro: Intubated and sedated, unable to assess  Psych: Intubated and sedated, unable to assess      Data reviewed:  Recent imaging reviewed, my comments on pertinents:          XR Feeding Tube Placement [[id number]] Resulted: 09/16/22 1333       PENDING    CTA Head Neck w Contrast [[id number]] Resulted: 09/16/22 0859   Impression:     1. Head CTA demonstrates no aneurysm or stenosis of the major   intracranial arteries. Relative oligemia of the posterior circulation   likely due to contrast bolus timing.   2. Neck CTA demonstrates no definite acute arterial injury.  No   convincingly identified intramural hematoma, dissection flap, or   arteria occlusion. Approximately 40-50 % right and 60-70% left carotid   bulb stenoses.   3. Unchanged anterior arch C1 and base of dens fractures.  Unchanged 7   mm probable epidural hematoma.     CT Head w/o Contrast [[id number]] Resulted: 09/16/22 0849   Impression:   1. Unchanged thin 2 mm subdural overlying the anterior left frontal   lobe.   2. No new or worsening acute intracranial findings.   3. Partially imaged C1 and C2 fracture deformities with up to 6 mm   epidural hyperdensity thought to represent hematoma.       Recent lab data reviewed, my comments on pertinents:   ROUTINE ICU LABS (Last four results)  CMPRecent Labs   Lab 09/16/22  1105 09/16/22  1052 09/16/22  0927 09/16/22  0402 09/16/22  0020 09/15/22  1959 09/15/22  1617 09/15/22  0521     --   --   --   --   --  138 137   POTASSIUM 3.4  --   --   --   --   --  3.8 3.0*   CHLORIDE 103  --   --   --   --   --  100 97*   CO2 24  --   --   --   --   --  28 27   ANIONGAP 13  --   --   --   --   --  10 13   * 138*  --  162* 144*   < > 138* 126*   BUN 18.4  --   --   --   --   --  16.5 16.7   CR 0.86  --   --   --   --   --  0.83 0.97   GFRESTIMATED 81  --   --   --   --   --  82 73   TITA 9.4  --   --   --   --   --  9.1 9.8*   MAG  --   --  1.6*  --   --   --   --   --     PHOS  --   --  1.5*  --   --   --   --   --    PROTTOTAL  --   --   --   --   --   --  7.0  --    ALBUMIN  --   --   --   --   --   --  3.8  --    BILITOTAL  --   --   --   --   --   --  0.6  --    ALKPHOS  --   --   --   --   --   --  70  --    AST  --   --   --   --   --   --  20  --    ALT  --   --   --   --   --   --  11  --     < > = values in this interval not displayed.     CBC  Recent Labs   Lab 09/16/22  0927 09/15/22  0521   WBC 9.8 15.9*   RBC 3.35* 4.21*   HGB 10.4* 13.0*   HCT 32.6* 41.1   MCV 97 98   MCH 31.0 30.9   MCHC 31.9 31.6   RDW 15.2* 14.7    265     INR  Recent Labs   Lab 09/15/22  0521   INR 1.06     Arterial Blood Gas  Recent Labs   Lab 09/15/22  1733 09/15/22  1526   PH 7.33 7.41

## 2022-09-16 NOTE — PROGRESS NOTES
[facility], [place]  Neurosurgery Progress Note:  09/16/2022      Interval History: CTA without concern for vertebral artery injury. Intermittent change in exam. CT head stable. Patient intubated due to respiratory decompensation.    Assessment:  Alexandru Still is a 89 year old male with unwitnessed mechanical fall resulting in C1 fracture, and a type II C2 odontoid fracture with 1 cm of posterior displacement and left frontal 5mm SDH.     Clinically Significant Risk Factors Present on Admission                # Coma: based on GCS score of <8          Plan:  Hold ASA  Serial neurological examinations  C collar at all times- keep neck flat, no pillows  Orthotics consult for MIAMI J, Upright x rays when able  Platelets > 100,000  INR < 1.5  Hemoglobin > 8  DVT: SCDs while in bed  Will discuss surgical vs conservative options with the patient and the family in care conference today  Neurosurgery will follow the patient closely    -----------------------------------  Reginaldo Still MD  Neurosurgery resident, PGY-2  -----------------------------------    General: awake and alert  HEENT: Aspen collor, neck tenderness  PULM: breathing comfortably on room air  : rectal tone intact     NEUROLOGIC:  -- Intubated and sedated, on propofol gtt at 20  -- Follows commands briskly in all 4 extremities  -- PERRL, tracks, face symmetric  -- Withdraws in all extremities       Objective:   Temp:  [97.3  F (36.3  C)-97.7  F (36.5  C)] 97.3  F (36.3  C)  Pulse:  [60-87] 87  Resp:  [8-99] 10  BP: (124-177)/() 143/114  FiO2 (%):  [40 %-93 %] 40 %  SpO2:  [90 %-99 %] 98 %  I/O last 3 completed shifts:  In: -   Out: 950 [Urine:950]        LABS:  Recent Labs   Lab 09/16/22  0020 09/15/22  1959 09/15/22  1617 09/15/22  0521   NA  --   --  138 137   POTASSIUM  --   --  3.8 3.0*   CHLORIDE  --   --  100 97*   CO2  --   --  28 27   ANIONGAP  --   --  10 13   * 154* 138* 126*   BUN  --   --  16.5  16.7   CR  --   --  0.83 0.97   TITA  --   --  9.1 9.8*       Recent Labs   Lab 09/15/22  0521   WBC 15.9*   RBC 4.21*   HGB 13.0*   HCT 41.1   MCV 98   MCH 30.9   MCHC 31.6   RDW 14.7          IMAGING:  Recent Results (from the past 24 hour(s))   Head CT w/o contrast    Narrative    EXAM: CT HEAD W/O CONTRAST, CT CERVICAL SPINE W/O CONTRAST, CT FACIAL BONES WITHOUT CONTRAST  LOCATION: [facility]  DATE/TIME: 9/15/2022 4:36 AM    INDICATION: Traumatic injury.  COMPARISON: Head CT dated 06/15/2022.  TECHNIQUE:   1) Routine CT Head without IV contrast. Multiplanar reformats. Dose reduction techniques were used.  2) Routine CT Facial Bones without IV contrast. Multiplanar reformats. Dose reduction techniques were used.  3) Routine CT Cervical Spine without IV contrast. Multiplanar reformats. Dose reduction techniques were used.    FINDINGS:  HEAD CT:   INTRACRANIAL CONTENTS: Acute subdural hemorrhage along the anterior left frontal lobe measuring up to 5 mm in thickness. No adverse intracranial mass effect. No CT evidence of acute infarct. Moderate presumed chronic small vessel ischemic changes.   Moderate generalized volume loss. No hydrocephalus.     OSSEOUS STRUCTURES/SOFT TISSUES: Right frontal scalp swelling. No acute calvarial fracture.    FACIAL BONE CT:  OSSEOUS STRUCTURES/SOFT TISSUES: Right frontal scalp swelling. No facial bone fracture or malalignment.    ORBITAL CONTENTS: No acute abnormality.    SINUSES: Mucous retention cyst in the left maxillary sinus. Scattered mucosal thickening of the ethmoid air cells.    CERVICAL SPINE CT:   VERTEBRA: Posteriorly displaced type II fracture of the odontoid, with approximately 1 cm posterior displacement. There is a fracture fragment anterior to the odontoid, that appears to arise from the anterior arch of C1. The C1 ring remains intact.   Posterior displacement results in at least moderate canal stenosis. There is hyperdensity along the  posterior dens measuring 7 mm in thickness, suspicious for epidural hemorrhage.     There is 3 mm degenerative anterolisthesis of C4 on C5, C6 on C7, and C7 on T1.     CANAL/FORAMINA: Multilevel degenerative changes, with at least moderate canal stenosis at C7-T1. Multilevel bilateral neural foraminal narrowing, with scattered mild and moderate stenosis.    PARASPINAL: Prevertebral hemorrhage at the level of C1-C2, measuring up to 4 mm in thickness. Visualized lung fields are clear.      Impression    IMPRESSION:  HEAD CT:  1.  Thin subdural hemorrhage along the anterior left frontal lobe measuring up to 5 mm in thickness. No adverse intracranial mass effect.  2.  Right frontal scalp swelling. No acute calvarial fracture.    FACIAL BONE CT:  1.  No facial bone or mandibular fracture.    CERVICAL SPINE CT:  1.  Acute posteriorly displaced type II odontoid fracture, with approximately 1 cm posterior displacement. Findings result in at least moderate canal stenosis.  2.  Displaced fracture along the inferior aspect of the anterior arch of C1.   3.  Hyperdensity along the ventral epidural space posterior to the dens, suggestive of epidural hemorrhage measuring up to 7 mm in thickness.                 CT Facial Bones without Contrast    Narrative    EXAM: CT HEAD W/O CONTRAST, CT CERVICAL SPINE W/O CONTRAST, CT FACIAL BONES WITHOUT CONTRAST  LOCATION: [facility]  DATE/TIME: 9/15/2022 4:36 AM    INDICATION: Traumatic injury.  COMPARISON: Head CT dated 06/15/2022.  TECHNIQUE:   1) Routine CT Head without IV contrast. Multiplanar reformats. Dose reduction techniques were used.  2) Routine CT Facial Bones without IV contrast. Multiplanar reformats. Dose reduction techniques were used.  3) Routine CT Cervical Spine without IV contrast. Multiplanar reformats. Dose reduction techniques were used.    FINDINGS:  HEAD CT:   INTRACRANIAL CONTENTS: Acute subdural hemorrhage along the anterior left frontal lobe  measuring up to 5 mm in thickness. No adverse intracranial mass effect. No CT evidence of acute infarct. Moderate presumed chronic small vessel ischemic changes.   Moderate generalized volume loss. No hydrocephalus.     OSSEOUS STRUCTURES/SOFT TISSUES: Right frontal scalp swelling. No acute calvarial fracture.    FACIAL BONE CT:  OSSEOUS STRUCTURES/SOFT TISSUES: Right frontal scalp swelling. No facial bone fracture or malalignment.    ORBITAL CONTENTS: No acute abnormality.    SINUSES: Mucous retention cyst in the left maxillary sinus. Scattered mucosal thickening of the ethmoid air cells.    CERVICAL SPINE CT:   VERTEBRA: Posteriorly displaced type II fracture of the odontoid, with approximately 1 cm posterior displacement. There is a fracture fragment anterior to the odontoid, that appears to arise from the anterior arch of C1. The C1 ring remains intact.   Posterior displacement results in at least moderate canal stenosis. There is hyperdensity along the posterior dens measuring 7 mm in thickness, suspicious for epidural hemorrhage.     There is 3 mm degenerative anterolisthesis of C4 on C5, C6 on C7, and C7 on T1.     CANAL/FORAMINA: Multilevel degenerative changes, with at least moderate canal stenosis at C7-T1. Multilevel bilateral neural foraminal narrowing, with scattered mild and moderate stenosis.    PARASPINAL: Prevertebral hemorrhage at the level of C1-C2, measuring up to 4 mm in thickness. Visualized lung fields are clear.      Impression    IMPRESSION:  HEAD CT:  1.  Thin subdural hemorrhage along the anterior left frontal lobe measuring up to 5 mm in thickness. No adverse intracranial mass effect.  2.  Right frontal scalp swelling. No acute calvarial fracture.    FACIAL BONE CT:  1.  No facial bone or mandibular fracture.    CERVICAL SPINE CT:  1.  Acute posteriorly displaced type II odontoid fracture, with approximately 1 cm posterior displacement. Findings result in at least moderate canal  stenosis.  2.  Displaced fracture along the inferior aspect of the anterior arch of C1.   3.  Hyperdensity along the ventral epidural space posterior to the dens, suggestive of epidural hemorrhage measuring up to 7 mm in thickness.                 XR Shoulder Left 2 Views    Narrative    EXAM: XR SHOULDER LEFT 2 VIEWS  LOCATION: [facility]  DATE/TIME: 9/15/2022 4:40 AM    INDICATION: Pain after fall.  COMPARISON: None.      Impression    IMPRESSION: No acute fracture or dislocation. 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 Post endovascular repair of the aortic valve. Left   subclavian venous pacemaker with leads at the right atrium and right ventricle.   XR Knee Right 3 Views    Narrative    EXAM: RIGHT KNEE 3 VIEWS  LOCATION: [facility]  DATE/TIME: 9/15/2022 4:40 AM    INDICATION: Fall. Multiple abrasions in the extremities.  COMPARISON: None.      Impression    IMPRESSION:   1. No visualized acute fracture or malalignment of the right knee.  2. A right knee arthroplasty is in place. No evidence of hardware failure.  3. No right knee joint effusion.                  Cervical spine CT w/o contrast    Narrative    EXAM: CT HEAD W/O CONTRAST, CT CERVICAL SPINE W/O CONTRAST, CT FACIAL BONES WITHOUT CONTRAST  LOCATION: [facility]  DATE/TIME: 9/15/2022 4:36 AM    INDICATION: Traumatic injury.  COMPARISON: Head CT dated 06/15/2022.  TECHNIQUE:   1) Routine CT Head without IV contrast. Multiplanar reformats. Dose reduction techniques were used.  2) Routine CT Facial Bones without IV contrast. Multiplanar reformats. Dose reduction techniques were used.  3) Routine CT Cervical Spine without IV contrast. Multiplanar reformats. Dose reduction techniques were used.    FINDINGS:  HEAD CT:   INTRACRANIAL CONTENTS: Acute subdural hemorrhage along the  anterior left frontal lobe measuring up to 5 mm in thickness. No adverse intracranial mass effect. No CT evidence of acute infarct. Moderate presumed chronic small vessel ischemic changes.   Moderate generalized volume loss. No hydrocephalus.     OSSEOUS STRUCTURES/SOFT TISSUES: Right frontal scalp swelling. No acute calvarial fracture.    FACIAL BONE CT:  OSSEOUS STRUCTURES/SOFT TISSUES: Right frontal scalp swelling. No facial bone fracture or malalignment.    ORBITAL CONTENTS: No acute abnormality.    SINUSES: Mucous retention cyst in the left maxillary sinus. Scattered mucosal thickening of the ethmoid air cells.    CERVICAL SPINE CT:   VERTEBRA: Posteriorly displaced type II fracture of the odontoid, with approximately 1 cm posterior displacement. There is a fracture fragment anterior to the odontoid, that appears to arise from the anterior arch of C1. The C1 ring remains intact.   Posterior displacement results in at least moderate canal stenosis. There is hyperdensity along the posterior dens measuring 7 mm in thickness, suspicious for epidural hemorrhage.     There is 3 mm degenerative anterolisthesis of C4 on C5, C6 on C7, and C7 on T1.     CANAL/FORAMINA: Multilevel degenerative changes, with at least moderate canal stenosis at C7-T1. Multilevel bilateral neural foraminal narrowing, with scattered mild and moderate stenosis.    PARASPINAL: Prevertebral hemorrhage at the level of C1-C2, measuring up to 4 mm in thickness. Visualized lung fields are clear.      Impression    IMPRESSION:  HEAD CT:  1.  Thin subdural hemorrhage along the anterior left frontal lobe measuring up to 5 mm in thickness. No adverse intracranial mass effect.  2.  Right frontal scalp swelling. No acute calvarial fracture.    FACIAL BONE CT:  1.  No facial bone or mandibular fracture.    CERVICAL SPINE CT:  1.  Acute posteriorly displaced type II odontoid fracture, with approximately 1 cm posterior displacement. Findings result in at  least moderate canal stenosis.  2.  Displaced fracture along the inferior aspect of the anterior arch of C1.   3.  Hyperdensity along the ventral epidural space posterior to the dens, suggestive of epidural hemorrhage measuring up to 7 mm in thickness.                 XR Chest Port 1 View    Narrative    Exam: XR CHEST PORT 1 VIEW, 9/15/2022 3:50 PM    Comparison: 6/19/2022    History: fall, hypoxia, c spine fx, sdh eval rib fx/aspiration, ptx    Findings:  Single AP portable supine view of the chest. Left chest wall pacemaker  with leads projecting over the right atrium and ventricle. TAVR.    Trachea is midline. Stable enlarged cardiac silhouette. Perihilar and  bibasilar mixed opacities. No pneumothorax. Right shoulder  arthroplasty. Chronic appearing left posterior rib fracture.      Impression    Impression: Perihilar and bibasilar mixed interstitial and airspace  opacities could represent infection, and atelectasis/edema.    I have personally reviewed the examination and initial interpretation  and I agree with the findings.    JEANNE LOW MD         SYSTEM ID:  [id number]   CT Head w/o Contrast    Narrative    EXAM: CT HEAD W/O CONTRAST  9/15/2022 4:18 PM     HISTORY:  fu on SDH       COMPARISON:  Earlier same day head CT obtained at 0419 hours    TECHNIQUE: Using multidetector thin collimation helical acquisition  technique, axial, coronal and sagittal CT images from the skull base  to the vertex were obtained without intravenous contrast.   (topogram) image(s) also obtained and reviewed.    FINDINGS:  Unchanged size of the subdural hematoma overlying the left frontal  lobe convexity measuring 4 mm in greatest thickness without  significant mass effect upon the underlying frontal lobe. Moderate  diffuse cerebral atrophy with patchy periventricular and subcortical  white matter hypoattenuation. No acute loss of gray-white  differentiation. No midline shift. Basal cisterns are clear.     Type II odontoid  fracture with the dens posteriorly dislocated  approximately 9 mm. Displaced comminuted fracture of the anterior arch  of C1. The bony calvaria and the bones of the skull base are normal.  The visualized portions of the paranasal sinuses and mastoid air cells  are clear. Grossly normal orbits.       Impression    IMPRESSION:   1. Slightly decreased size of the left frontal subdural hematoma  measuring 4 mm without significant mass effect. No new intracranial  hemorrhage.  2. Stable displaced fractures of the anterior arch of C1 and type II  odontoid fracture.   3. Moderate diffuse cerebral atrophy with patchy periventricular and  subcortical white matter hypoattenuation, likely sequelae of chronic  small vessel ischemic disease.    I have personally reviewed the examination and initial interpretation  and I agree with the findings.    MEDINA NICOLE MD         SYSTEM ID:  [id number]   CT Chest Abdomen Pelvis w/o Contrast    Narrative    EXAMINATION: CT CHEST ABDOMEN PELVIS W/O CONTRAST, 9/15/2022 4:18 PM    TECHNIQUE:  Helical CT images from the thoracic inlet through the  symphysis pubis were obtained without IV contrast.    COMPARISON: Same day chest x-ray. Soft tissue pelvis CT 7/20/2022 and  abdomen and pelvis CT 6/15/2022    HISTORY: fall    FINDINGS:  CHEST:  LUNGS: Central tracheobronchial tree is patent. No pneumothorax or  pleural effusion. Lower lobe bronchial wall thickening with multifocal  plaquing at the lung bases with streaky consolidative opacities at the  lung bases. Probable intrafissural lymph node measuring 4 mm along the  right minor fissure (series 5, image 141). No suspicious pulmonary  nodule.    MEDIASTINUM: Left chest wall ICD/pacemaker with leads in the right  atrium and right ventricle. Postoperative changes aortic valve  replacement. Bulky mitral annular calcifications. Heart size is within  normal limits. No pericardial effusion. Ascending aorta and main  pulmonary artery diameters are  within normal limits. Normal appearance  and configuration of the great vessels off of the aortic arch. All  prominent mediastinal lymph nodes for example 5 mm prevascular node  (series 2, image 88) no suspicious hilar or axillary lymph nodes.    Visualized thyroid and esophagus are unremarkable.    ABDOMEN/PELVIS:  LIVER: No suspicious focal hepatic lesion.    BILIARY: Layering calcified gallstones. No gallbladder wall thickening  or pericholecystic fluid to suggest cholecystitis. No intrahepatic or  extrahepatic biliary ductal dilation.    PANCREAS: Moderate fatty atrophy of the pancreatic parenchyma. No  focal pancreatic lesion. The main pancreatic duct is not dilated.    SPLEEN: Within normal limits.    ADRENAL GLANDS: No focal adrenal nodule.    URINARY TRACT: Scattered hypodense cysts in both kidneys some of which  are simple in attenuation and others intermediate density and are  unchanged since 6/15/2022. Additional cystic hypodensity in the right  kidney likely representing a benign proteinaceous or hemorrhagic cyst.  Prominent right extrarenal pelvis. No hydronephrosis or ureter. No  renal stone. Urinary bladder is within normal limits.    REPRODUCTIVE ORGANS: Coarse calcifications in the prostate and seminal  vesicles. Right scrotal pump with penile prosthesis.     STOMACH: Within normal limits.    BOWEL: Colonic diverticulosis without evidence for acute  diverticulitis. Normal caliber large and small bowel. No abnormal  bowel wall thickening or enhancement. Appendix is unremarkable.    PERITONEUM/FLUID: No ascites or pelvic free fluid.    VESSELS: Calcifications of the abdominal aorta and iliac arteries.  Ectatic dilation of the left common iliac artery measuring 1.8 cm.    LYMPH NODES: No lymphadenopathy.    BONES/SOFT TISSUES: Chronic and healed left second through fourth rib  fractures. Age indeterminant anterior left sixth rib fracture. Chronic  appearing anterior right third, fourth, fifth, seventh,  ninth, and  10th rib fractures. Postoperative changes to the right shoulder.  Advanced degenerative change to the left shoulder. Interspinous fusion  device at L4-5. Unchanged superior endplate compression deformity at  L3. Grade 1 anterolisthesis of L4 on L5 and L5 on S1. Chronic  bilateral pars interarticularis defects at L5. Chronic appearing  anterior wedge compression deformity at T12 and T9. Chronic sternal  body fracture.      Impression    IMPRESSION:   1. Question acute anterior left sixth rib fracture. Multiple chronic  appearing bilateral rib fractures and chronic appearing compression  deformities in the thoracolumbar spine.  2. No acute visceral injury in the chest, abdomen, or pelvis.    I have personally reviewed the examination and initial interpretation  and I agree with the findings.    JEANNE LOW MD         SYSTEM ID:  [id number]   XR Chest Port 1 View    Narrative    XR CHEST PORT 1 VIEW  9/15/2022 7:19 PM      HISTORY: intubation    COMPARISON: 9/15/2022    FINDINGS:   Single AP view of the chest. Endotracheal tube tip overlying the mid  to lower thoracic trachea approximately 3 cm above the taylor. Stable  left chest wall pacer leads. TAVR. Stable mediastinum. No pneumothorax  or significant pleural effusion. Largely unchanged perihilar and  bibasilar streaky opacities.       Impression    IMPRESSION:   1. Endotracheal tube tip overlying the mid to lower thoracic trachea  approximately 3 cm above the taylor.  2. Largely unchanged perihilar and bibasilar opacities consistent with  atelectasis and suspected aspiration given the CT findings.    I have personally reviewed the examination and initial interpretation  and I agree with the findings.    JEANNE LOW MD         SYSTEM ID:  [id number]   XR Abdomen Port 1 View   Result Value    Radiologist flags Malpositioned enteric tube (Urgent)    Narrative    EXAM: XR ABDOMEN PORT 1 VIEW  9/15/2022 7:33 PM     HISTORY:  og verification        COMPARISON:  Same day chest abdomen and pelvis CT    FINDINGS: Supine radiograph of the abdomen.  Enteric tube with tip  overlying the left mainstem bronchus. Aortic valve replacement.  Endotracheal tube projecting over the midthoracic trachea with the tip  approximately 4.4 cm above the taylor. Left chest wall ICD/pacemaker  with leads overlying the right atrium and right ventricle.  Nonobstructive bowel gas pattern. Cardiomediastinal silhouette is  within normal limits. Mixed interstitial and patchy perihilar airspace  opacities.       Impression    IMPRESSION:  1. Enteric tube with tip overlying the left mainstem bronchus versus  within the thoracic esophagus.  2. Endotracheal tube projecting over the midthoracic trachea.  3. Perihilar patchy airspace opacities possibly representing pulmonary  edema and/or atelectasis. Infection is difficult to entirely exclude.  4. Nonobstructive bowel gas pattern.    [Urgent Result: Malpositioned enteric tube]    Finding was identified on 9/15/2022 7:35 PM.     Dr. Morrison was contacted by Dr. Guy at 9/15/2022 7:49 PM and  verbalized understanding of the urgent finding.     I have personally reviewed the examination and initial interpretation  and I agree with the findings.    JEANNE LOW MD         SYSTEM ID:  [id number]   XR Abdomen Port 1 View    Narrative    EXAM: XR ABDOMEN PORT 1 VIEW  9/15/2022 7:48 PM     HISTORY:  new OG       COMPARISON:  Earlier same day abdominal radiograph obtained at 1916  hours.    FINDINGS: Advanced enteric tube with tip now overlying the cardiac  silhouette and is likely within the patient's large hiatal hernia.  Nonobstructive bowel gas pattern. Unchanged mixed interstitial and  patchy perihilar airspace opacities. Vascular calcifications overlying  the pelvis. Postoperative changes of the spine.      Impression    IMPRESSION:  Advanced enteric tube with tip now likely within the patient's hiatal  hernia.    I have personally reviewed  the examination and initial interpretation  and I agree with the findings.    JEANNE LOW MD         SYSTEM ID:  [id number]   XR Chest Port 1 View    Narrative    XR CHEST PORT 1 VIEW  9/15/2022 8:58 PM      HISTORY: Endotracheal tube positioning    COMPARISON: 9/15/2022    FINDINGS:   Single AP view of the chest. Endotracheal tube tip overlying the mid  thoracic trachea approximately 3.3 cm above the taylor. Stable left  chest wall pacer leads. TAVR. Stable mediastinum. No pneumothorax. No  significant pleural effusion. Largely unchanged perihilar and  bibasilar streaky opacities.      Impression    IMPRESSION:   1. Endotracheal tube tip overlying the mid trachea approximately 3.3  cm above the taylor.  2. Largely unchanged perihilar and bibasilar opacities.    I have personally reviewed the examination and initial interpretation  and I agree with the findings.    JEANNE LOW MD         SYSTEM ID:  [id number]

## 2022-09-16 NOTE — PROGRESS NOTES
[facility]   Tertiary Survey Progress Note     Date of Service: 09/16/2022    Trauma mechanism: Fall while going to urinal    Time/date of injury: 9/15/22  Known Injuries:  1. Anterior left frontal SDH, 5 mm  2. Posteriorly displaced Tyle II odontoid fx, ~1cm posterior dislocation   3. C1 anterior arch fx   4. Epidural hemorrhage up to 7mm along posterior dens   5. Questionable acute anterior left 6th rib fx  6. Right forehead abrasion     Care Conference today with Palliative, RNCC, SICU, and NSGY, to discuss with the son-in-law the choices in care plans, and complications that can arise. He will discuss it with his children, the patient's grandchildren.      Assessment & Plan   Neuro/Pain/Psych:  # Fall from standing,   # Traumatic SDH, along left frontal lobe up to 5mm in thickness.   # Posteriorly displaced Tyle II odontoid fx, ~1cm posterior dislocation   # C1 anterior arch fx   # Epidural hemorrhage up to 7mm along posterior dens   # Sensorineural Hearing loss   - repeat Head CT @ 16:18: Slightly decreased size of the left frontal subdural hematoma measuring 4 mm without significant mass effect. No new intracranial  hemorrhage.  - Head CTA demonstrates no aneurysm or stenosis of the major intracranial arteries. Relative oligemia of the posterior circulation likely due to contrast bolus timing.  - Neck CTA demonstrates no definite acute arterial injury.  No convincingly identified intramural hematoma, dissection flap, or arteria occlusion. Approximately 50-60% bilateral carotid bulb stenoses. Unchanged anterior arch C1 and base of dens fractures.  Unchanged 7 mm probable epidural hematoma.  - Repeat Head CT this morning d/t decrease response: Unchanged thin 2 mm subdural overlying the anterior left frontal lobe. No new or worsening acute intracranial findings. Partially imaged C1 and C2 fracture deformities with up to 6 mm epidural hyperdensity thought to represent  hematoma.  - Neurosurgery following: Serial neurological examinations, C collar at all times- keep neck flat, no pillows, Orthotics consult for [place] J, Upright x rays when able, Platelets > 100,000, INR < 1.5  Hemoglobin > 8, DVT: SCDs while in bed. Will discuss surgical vs conservative options with the patient and the family in care conference today     # Acute traumatic neck pain  # Chronic hip and shoulder pain  # Sedation   - Propofol gtts,  - Fentanyl gtts   - One time dose Robaxin 1g IV given last night   - Scheduled: Tylenol supp.,   - Prn: Dilaudid      Pulmonary:  # Acute respiratory distress requiring intubation and MV  # PB  - SICU managing MV  - Holding PTA: Flonase, Duoneb     Cardiovascular:    # Hypertension   # Complete heart block s/p PM placement  # Nonrheumatic aortic valve stenosis s/p TAVR 2019  # HF  - Monitor hemodynamic status.   - BNP in ED: 498  - PTA Lasix po changed to IV  - Holding PTA: finasteride      GI/Nutrition:    # Hx of dysphagia   # Hiatal Hernia    - Unable to get OG placement in ED, SICU attempting placement of NG today to start TF   - NSGY ok with medications and enteral feeding      Renal/ Fluids/Electrolytes:  # Hypomagnesia   # Lactic Acidosis, improved with fluids   - electrolyte replacement protocol in place.      Endocrine:  # Hypothyroidism   - Holding levothyroxine until enteral access      Infectious disease:   # Leukocytosis, stress   - Admitted to [facility] for Septic Shock, CAP, Streptococcus dysgalactiae (Group G Strep) Bacteremia, Urinary retention with urethral stone s/p ureteroscopy and lithotripsy  - WBC: 15.9?9.8  - LA: 2.2 ? 1.3, improved with fluids  - Procal: 0.04  - 9/15: BCx2: NGTD  - No indications for antibiotics.   - UA unremarkable for infection     Hematology:    # Anemia of chronic illness   - Hgb 13.0?1034, drop across all cell lines could be 2/2 dilution. Monitor and trend.   - Threshold for transfusion if hgb <7.0 or signs/symptoms of  hypoperfusion.      # DVT Prophylaxis: pcd, no pharmacologic prophylaxis at this time d/t SDH and possible epidural hemorrhage.      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     Skin:  # Abrasion to forehead   - dilgent cares to prevent skin breakdown and wound formation.      Lines/ tubes/ drains:  - PIV/ ETT/ Escudero     General Cares:    PPI/H2 blocker:  Protonix    DVT prophylaxis: pcd   Bowel Regimen/Date of last stool: in place   Pulmonary toilet: MV hygiene     Code status:  Full Code, confirmed with patient multiple times prior to intubation. Patient was also full code during June admission when critically ill.          Expected D/C date: Remain in SICU    Tammy Velásquez PA-C  To contact the trauma service use job code pager [phone],   Numeric texts or alpha text through [facility]      Interval History   Review of Systems Limited d/t intubation and sedation  Skin: positive for scaling, bruising, lumps or bumps, abrasion  Eyes: positive for cataracts  Ears/Nose/Throat: positive for hearing loss  Musculoskeletal: positive for fracture and neck pain  Neurologic: positive for local weakness  Hematologic/Lymphatic/Immunologic: negative  Endocrine: positive for thyroid disorder     Physical Exam   Keily Coma Scale - Total 3i/15  Eye Response (E): 1   4= spontaneous, 3= to verbal/voice, 2= to pain, 1= No response   Verbal Response (V): 1   5= Orientated, converses, 4= Confused, converses, 3= Inappropriate words, 2= Incomprehensible sounds, 1=No response   Motor Response (M): 1   6= Obeys commands, 5= Localizes to pain, 4= Withdrawal to pain, 3=Fexion to pain, 2= Extension to pain, 1= No response        Frailty Questionnaire: To be done for all patients age 60+   F (Fatigue): Is the patient easily  fatigued? YES = 1  R (Resistance): Is the patient unable to walk one flight of stairs? YES = 1  A (Ambulation): Is the patient unable to walk one block? YES = 1  I  (Illness): Does the patient have more than five illnesses? YES = 1  L (Loss of weight): Has the patient lost more than 5% of weight in the past 6 months. NO = 0  Lost five pounds or more in the last 3 months without trying? AND/OR Unintended weight loss?  Does the patient have difficulty performing housework such as washing windows or scrubbing floors? AND Activity in a typical 24-hour day- No moderate or vigorous activity    Score: 4    Score:3-5: PLAN: Palliative Care Consult, PT/OT Consult, consider PM&R, Delirium Prevention Strategies                           Physical Exam  Constitutional: Sedated,   Eyes: Lids and lashes normal, PERRL, EOMI, sclera clear, conjunctiva normal.  HENT: Normocephalic, abrasion to right forehead, [place] in Place  Respiratory: ETT in place,   Cardiovascular:  regular rate and rhythm,   GI: Abdomen soft, non-distended,   Genitourinary:  Escudero in place   Skin:  Warm & dry, ecchymosis in various stages of healing over body   Musculoskeletal: There is no redness, warmth, or swelling of the joints.  Pedal pulse palpated.  Neurologic: Sedated  Strength and sensory is intact. No focal deficits.      Temp: 97.4  F (36.3  C) Temp src: Axillary BP: 129/66 Pulse: 59   Resp: 18 SpO2: 99 % O2 Device: Mechanical Ventilator Oxygen Delivery: 8 LPM  Vitals:    09/15/22 2000   Weight: 80.7 kg (177 lb 14.6 oz)     Vital Signs with Ranges  Temp:  [97.3  F (36.3  C)-97.7  F (36.5  C)] 97.4  F (36.3  C)  Pulse:  [49-87] 59  Resp:  [8-99] 18  BP: ()/() 129/66  FiO2 (%):  [40 %-93 %] 40 %  SpO2:  [91 %-99 %] 99 %  I/O last 3 completed shifts:  In: 113.18 [I.V.:113.18]  Out: 1335 [Urine:1335]

## 2022-09-16 NOTE — PROGRESS NOTES
Care Management Follow Up    Length of Stay (days): 1  Expected Discharge Date: 09/17/2022  Concerns to be Addressed:    Goals of care   Patient plan of care discussed at interdisciplinary rounds: Yes       Additional Information:  RNFABRICE was notified by trauma provider, Tammy Velásquez, that she would like a CC set up for sometime today from 12-3pm with family to discuss progress of patient and overall goals of care.    RNCC reached out to Erv, pt's son-in-law, to schedule CC and Erv stated he would be able to attend at 12pm today. RNCC reached out to palliative, Tammy, neurosurgery, and SICU regarding time of CC.    CC scheduled for 9/16/22 at 12pm.    Nick Lockett, RN BSN  RN Care Coordinator [facility]

## 2022-09-16 NOTE — PROGRESS NOTES
S: Pt seen at [facility] bedside room 4208 not awake for fitting and delivery of [place] collar. O: I see the EPIC order for the [place] collar due to C1-2 Fx. A: Patient was fit with a [place] J collar size 300, and the instructions and extra pad set was put on the window sill in the belongings bag in the room next to the [place] that was previously in use. Instructions were given and seemed to be understood by staff. P: FU PRN. G: The goal is to restrict motion of the head/neck during healing.  Electronically signed Brown Polanco CPO, LPO.

## 2022-09-16 NOTE — PROGRESS NOTES
SURGICAL ICU PROGRESS NOTE        PRIMARY TEAM: Trauma  PRIMARY PHYSICIAN: Dr. Nils Drew MD  REASON FOR CRITICAL CARE ADMISSION: Airway management/mechanical ventilation   ADMITTING PHYSICIAN: Dr. Kash Hendricks MD  Date of Service (when I saw the patient): 09/16/2022      ASSESSMENT:  Alexandru Still is a 89 year old male who was admitted to the SICU on 9/15/2022 after experiencing an unwitnessed fall and striking his head. He was transferred to the ED and was found to have a left frontal SDH (5mm) + C1 fx + Type II C2 odontoid fx w/ 1cm posterior displacement. Intubated for airway protection in the ED given high c-spine injury and difficulty swallowing. Planning for care conference to discuss surgical intervention and continued intubation.      PLAN:     Neurological:  # Acute pain   # Subdural Hematoma, stable  # C1, C2 Cervical Spine Injury  # hx of Lambert Eaton Myasthenic syndrome  - Monitor neurological status. Delirium preventions and precautions  - q1h neurochecks  - c-collar to remain on at all times, no pillows behind head, head straight, HOB not to exceed 30 degrees  - Palliative care consulted  - family care conference on 9/16 to discuss NSGY intervention for C1/2 fx  - Pain: harini tylenol, prn dilaudid                         - Sedation plan: precedex     Pulmonary:   # Mechanical Ventilatory Support  # Acute hypoxic respiratory support   - VENT: CMV. Continue full vent support. PST when meets criteria.  Ventilatory bundle. HOB elevation   - Supplemental O2, goal SpO2 > 92%.  - Lung protective ventilator settings  Vent Mode: CMV/AC  (Continuous Mandatory Ventilation/ Assist Control)  FiO2 (%): 40 %  Resp Rate (Set): 16 breaths/min  Tidal Volume (Set, mL): 450 mL  PEEP (cm H2O): 5 cmH2O  Resp: 18    Cardiovascular:    # hx of TAVR   # hx of Complete Heart Block s/p Pacemaker  # hx of HTN  - Monitor hemodynamic status  - IV Lasix  - hold PTA finasteride     Gastroenterology/Nutrition:  #  Hiatal Hernia  - NPO; difficulty with OG placement in ED  - No enteral access, plan to place NG today  - No bowel reg, will start after enteral access obtained    Renal/Fluids/Electrolytes:   # Hypokalemia, resolved  # Hypomaknesemia  # Hypophosphatemia  # Lactic acidosis, resolved  - Will cont to monitor I/O  - Electrolyte replacement protocol (K, Mg, Phos)  - Escudero to remain in place while intubated  - Lactate normalized 9/15 (2.2 -> 1.3)     Endocrine:  # Hypothyroidism  # At risk of stress-induced hyperglycemia  - PTA levothyroxine 112 mcg, will restart with enteral access  - Glucose checks, no indication for insulin at this time  - Hgb A1C ordered     ID:  # Leukocytosis, resolved  - no indications for antibiotics  - f/up BCx & UA/UCx   - leukocytosis likely reactive to trauma; will continue to monitor    Heme:     # Normocytic anemia  - Hemoglobin 13 on arrival  - Transfuse if hgb <7.0 or signs/symptoms of hypoperfusion.  - Monitor and trend.      MSK:  # Facial Abrasion   # Right Lower Leg Abrasion  # Weakness and deconditioning  - Physical and occupational therapy consult   - local wound care     General Cares/Prophylaxis:    DVT Prophylaxis: Pneumatic Compression Devices and VTE Prophylaxis contraindicated due to subdural hematoma  GI Prophylaxis: PPI  Restraints: Restraints for medical healing needed: only if pt gets agitated and pulling at lines after chemical sedation     LDAs:  - PIVx2   - ETT  - Escudero     Disposition:  - SICU.     Patient seen, findings and plan discussed with surgical ICU staff, Dr. Bi MD.     Jocelyn Stanton MD, PhD on 9/16/2022 at 6:39 AM  PGY-1 Neurosurgery     - - - - - - - - - - - - - - - - - - - - - - - - - - - - - - - - - - - - - - - - - - - - - -   HISTORY PRESENTING ILLNESS: Alexandru Still is a 89 year old male w/ a pmh of TAVR, complete heart block w/ pacemaker, and Lambert-Eaton syndrome who was admitted to the SICU on 9/15/2022 after experiencing an unwitnessed  fall and striking his head. He was transferred to the ED and was found to have a left frontal SDH (5mm) + C1 fx + Type II C2 odontoid fx w/ 1cm posterior displacement. A c-collar was placed. He developed priapism which was c/f threatened neurologic function and the decision was made to intubate to protect the airway in the setting of c-spine injury. Initially DNR/DNI on presentation but pt voiced a desire to be intubated and receive chest compressions if necessary.    REVIEW OF SYSTEMS: Unable to complete ROS due to patient intubation.    PHYSICAL EXAMINATION:  Vital signs:  Temp: 97.4  F (36.3  C) Temp  Min: 97.3  F (36.3  C)  Max: 97.7  F (36.5  C)  Resp: 18 Resp  Min: 8  Max: 99  SpO2: 99 % SpO2  Min: 91 %  Max: 99 %  Pulse: 65 Pulse  Min: 49  Max: 87    No data recorded  BP: (!) 141/79 Systolic (24hrs), Av , Min:90 , Max:176   Diastolic (24hrs), Av, Min:55, Max:114    General: intubated/sedated  HEENT: abrasion to right forehead, ETT in place  Neck: c-collar in place   Neuro: iatrogenically sedated but arousable, PERRL, tracking appropriately, FCx4  Pulm/Resp: intubated and on mechanical ventilation  CV: bradycardic on monitor  Abdomen: Soft, no grimace to palpation, nd  : hannah catheter in place with clear yellow urine  MSK/Extremities: no peripheral edema, extremities wwp      Data   I reviewed all medications, new labs and imaging results over the last 24 hours.  Arterial Blood Gases   Recent Labs   Lab 09/15/22  1733 09/15/22  1526   PH 7.33 7.41       Complete Blood Count   Recent Labs   Lab 22  0927 09/15/22  0521   WBC 9.8 15.9*   HGB 10.4* 13.0*    265       Basic Metabolic Panel  Recent Labs   Lab 22  0402 22  0020 09/15/22  1959 09/15/22  1617 09/15/22  0521   NA  --   --   --  138 137   POTASSIUM  --   --   --  3.8 3.0*   CHLORIDE  --   --   --  100 97*   CO2  --   --   --  28 27   BUN  --   --   --  16.5 16.7   CR  --   --   --  0.83 0.97   * 144* 154* 138*  126*       Liver Function Tests  Recent Labs   Lab 09/15/22  1617 09/15/22  0521   AST 20  --    ALT 11  --    ALKPHOS 70  --    BILITOTAL 0.6  --    ALBUMIN 3.8  --    INR  --  1.06       Pancreatic Enzymes  No lab results found in last 7 days.    Coagulation Profile  Recent Labs   Lab 09/15/22  0521   INR 1.06   PTT 29       IMAGING:  Recent Results (from the past 24 hour(s))   XR Chest Port 1 View    Narrative    Exam: XR CHEST PORT 1 VIEW, 9/15/2022 3:50 PM    Comparison: 6/19/2022    History: fall, hypoxia, c spine fx, sdh eval rib fx/aspiration, ptx    Findings:  Single AP portable supine view of the chest. Left chest wall pacemaker  with leads projecting over the right atrium and ventricle. TAVR.    Trachea is midline. Stable enlarged cardiac silhouette. Perihilar and  bibasilar mixed opacities. No pneumothorax. Right shoulder  arthroplasty. Chronic appearing left posterior rib fracture.      Impression    Impression: Perihilar and bibasilar mixed interstitial and airspace  opacities could represent infection, and atelectasis/edema.    I have personally reviewed the examination and initial interpretation  and I agree with the findings.    JEANNE LOW MD         SYSTEM ID:  [id number]   CT Head w/o Contrast    Narrative    EXAM: CT HEAD W/O CONTRAST  9/15/2022 4:18 PM     HISTORY:  fu on SDH       COMPARISON:  Earlier same day head CT obtained at 0419 hours    TECHNIQUE: Using multidetector thin collimation helical acquisition  technique, axial, coronal and sagittal CT images from the skull base  to the vertex were obtained without intravenous contrast.   (topogram) image(s) also obtained and reviewed.    FINDINGS:  Unchanged size of the subdural hematoma overlying the left frontal  lobe convexity measuring 4 mm in greatest thickness without  significant mass effect upon the underlying frontal lobe. Moderate  diffuse cerebral atrophy with patchy periventricular and subcortical  white matter  hypoattenuation. No acute loss of gray-white  differentiation. No midline shift. Basal cisterns are clear.     Type II odontoid fracture with the dens posteriorly dislocated  approximately 9 mm. Displaced comminuted fracture of the anterior arch  of C1. The bony calvaria and the bones of the skull base are normal.  The visualized portions of the paranasal sinuses and mastoid air cells  are clear. Grossly normal orbits.       Impression    IMPRESSION:   1. Slightly decreased size of the left frontal subdural hematoma  measuring 4 mm without significant mass effect. No new intracranial  hemorrhage.  2. Stable displaced fractures of the anterior arch of C1 and type II  odontoid fracture.   3. Moderate diffuse cerebral atrophy with patchy periventricular and  subcortical white matter hypoattenuation, likely sequelae of chronic  small vessel ischemic disease.    I have personally reviewed the examination and initial interpretation  and I agree with the findings.    MEDINA NICOLE MD         SYSTEM ID:  [id number]   CT Chest Abdomen Pelvis w/o Contrast    Narrative    EXAMINATION: CT CHEST ABDOMEN PELVIS W/O CONTRAST, 9/15/2022 4:18 PM    TECHNIQUE:  Helical CT images from the thoracic inlet through the  symphysis pubis were obtained without IV contrast.    COMPARISON: Same day chest x-ray. Soft tissue pelvis CT 7/20/2022 and  abdomen and pelvis CT 6/15/2022    HISTORY: fall    FINDINGS:  CHEST:  LUNGS: Central tracheobronchial tree is patent. No pneumothorax or  pleural effusion. Lower lobe bronchial wall thickening with multifocal  plaquing at the lung bases with streaky consolidative opacities at the  lung bases. Probable intrafissural lymph node measuring 4 mm along the  right minor fissure (series 5, image 141). No suspicious pulmonary  nodule.    MEDIASTINUM: Left chest wall ICD/pacemaker with leads in the right  atrium and right ventricle. Postoperative changes aortic valve  replacement. Bulky mitral annular  calcifications. Heart size is within  normal limits. No pericardial effusion. Ascending aorta and main  pulmonary artery diameters are within normal limits. Normal appearance  and configuration of the great vessels off of the aortic arch. All  prominent mediastinal lymph nodes for example 5 mm prevascular node  (series 2, image 88) no suspicious hilar or axillary lymph nodes.    Visualized thyroid and esophagus are unremarkable.    ABDOMEN/PELVIS:  LIVER: No suspicious focal hepatic lesion.    BILIARY: Layering calcified gallstones. No gallbladder wall thickening  or pericholecystic fluid to suggest cholecystitis. No intrahepatic or  extrahepatic biliary ductal dilation.    PANCREAS: Moderate fatty atrophy of the pancreatic parenchyma. No  focal pancreatic lesion. The main pancreatic duct is not dilated.    SPLEEN: Within normal limits.    ADRENAL GLANDS: No focal adrenal nodule.    URINARY TRACT: Scattered hypodense cysts in both kidneys some of which  are simple in attenuation and others intermediate density and are  unchanged since 6/15/2022. Additional cystic hypodensity in the right  kidney likely representing a benign proteinaceous or hemorrhagic cyst.  Prominent right extrarenal pelvis. No hydronephrosis or ureter. No  renal stone. Urinary bladder is within normal limits.    REPRODUCTIVE ORGANS: Coarse calcifications in the prostate and seminal  vesicles. Right scrotal pump with penile prosthesis.     STOMACH: Within normal limits.    BOWEL: Colonic diverticulosis without evidence for acute  diverticulitis. Normal caliber large and small bowel. No abnormal  bowel wall thickening or enhancement. Appendix is unremarkable.    PERITONEUM/FLUID: No ascites or pelvic free fluid.    VESSELS: Calcifications of the abdominal aorta and iliac arteries.  Ectatic dilation of the left common iliac artery measuring 1.8 cm.    LYMPH NODES: No lymphadenopathy.    BONES/SOFT TISSUES: Chronic and healed left second through  fourth rib  fractures. Age indeterminant anterior left sixth rib fracture. Chronic  appearing anterior right third, fourth, fifth, seventh, ninth, and  10th rib fractures. Postoperative changes to the right shoulder.  Advanced degenerative change to the left shoulder. Interspinous fusion  device at L4-5. Unchanged superior endplate compression deformity at  L3. Grade 1 anterolisthesis of L4 on L5 and L5 on S1. Chronic  bilateral pars interarticularis defects at L5. Chronic appearing  anterior wedge compression deformity at T12 and T9. Chronic sternal  body fracture.      Impression    IMPRESSION:   1. Question acute anterior left sixth rib fracture. Multiple chronic  appearing bilateral rib fractures and chronic appearing compression  deformities in the thoracolumbar spine.  2. No acute visceral injury in the chest, abdomen, or pelvis.    I have personally reviewed the examination and initial interpretation  and I agree with the findings.    JEANNE LOW MD         SYSTEM ID:  [id number]   XR Chest Port 1 View    Narrative    XR CHEST PORT 1 VIEW  9/15/2022 7:19 PM      HISTORY: intubation    COMPARISON: 9/15/2022    FINDINGS:   Single AP view of the chest. Endotracheal tube tip overlying the mid  to lower thoracic trachea approximately 3 cm above the taylor. Stable  left chest wall pacer leads. TAVR. Stable mediastinum. No pneumothorax  or significant pleural effusion. Largely unchanged perihilar and  bibasilar streaky opacities.       Impression    IMPRESSION:   1. Endotracheal tube tip overlying the mid to lower thoracic trachea  approximately 3 cm above the taylor.  2. Largely unchanged perihilar and bibasilar opacities consistent with  atelectasis and suspected aspiration given the CT findings.    I have personally reviewed the examination and initial interpretation  and I agree with the findings.    JEANNE LOW MD         SYSTEM ID:  [id number]   XR Abdomen Port 1 View   Result Value    Radiologist flags  Malpositioned enteric tube (Urgent)    Narrative    EXAM: XR ABDOMEN PORT 1 VIEW  9/15/2022 7:33 PM     HISTORY:  og verification       COMPARISON:  Same day chest abdomen and pelvis CT    FINDINGS: Supine radiograph of the abdomen.  Enteric tube with tip  overlying the left mainstem bronchus. Aortic valve replacement.  Endotracheal tube projecting over the midthoracic trachea with the tip  approximately 4.4 cm above the taylor. Left chest wall ICD/pacemaker  with leads overlying the right atrium and right ventricle.  Nonobstructive bowel gas pattern. Cardiomediastinal silhouette is  within normal limits. Mixed interstitial and patchy perihilar airspace  opacities.       Impression    IMPRESSION:  1. Enteric tube with tip overlying the left mainstem bronchus versus  within the thoracic esophagus.  2. Endotracheal tube projecting over the midthoracic trachea.  3. Perihilar patchy airspace opacities possibly representing pulmonary  edema and/or atelectasis. Infection is difficult to entirely exclude.  4. Nonobstructive bowel gas pattern.    [Urgent Result: Malpositioned enteric tube]    Finding was identified on 9/15/2022 7:35 PM.     Dr. Morrison was contacted by Dr. Guy at 9/15/2022 7:49 PM and  verbalized understanding of the urgent finding.     I have personally reviewed the examination and initial interpretation  and I agree with the findings.    JEANNE LOW MD         SYSTEM ID:  [id number]   XR Abdomen Port 1 View    Narrative    EXAM: XR ABDOMEN PORT 1 VIEW  9/15/2022 7:48 PM     HISTORY:  new OG       COMPARISON:  Earlier same day abdominal radiograph obtained at 1916  hours.    FINDINGS: Advanced enteric tube with tip now overlying the cardiac  silhouette and is likely within the patient's large hiatal hernia.  Nonobstructive bowel gas pattern. Unchanged mixed interstitial and  patchy perihilar airspace opacities. Vascular calcifications overlying  the pelvis. Postoperative changes of the spine.       Impression    IMPRESSION:  Advanced enteric tube with tip now likely within the patient's hiatal  hernia.    I have personally reviewed the examination and initial interpretation  and I agree with the findings.    JEANNE LOW MD         SYSTEM ID:  [id number]   XR Chest Port 1 View    Narrative    XR CHEST PORT 1 VIEW  9/15/2022 8:58 PM      HISTORY: Endotracheal tube positioning    COMPARISON: 9/15/2022    FINDINGS:   Single AP view of the chest. Endotracheal tube tip overlying the mid  thoracic trachea approximately 3.3 cm above the taylor. Stable left  chest wall pacer leads. TAVR. Stable mediastinum. No pneumothorax. No  significant pleural effusion. Largely unchanged perihilar and  bibasilar streaky opacities.      Impression    IMPRESSION:   1. Endotracheal tube tip overlying the mid trachea approximately 3.3  cm above the taylor.  2. Largely unchanged perihilar and bibasilar opacities.    I have personally reviewed the examination and initial interpretation  and I agree with the findings.    JEANNE LOW MD         SYSTEM ID:  [id number]   CTA Head Neck w Contrast    Narrative    CTA HEAD NECK W CONTRAST 9/16/2022 12:09 AM    CT angiogram of the neck   CT angiogram of the base of the brain with contrast  Reconstruction on the 3D workstation    Provided History:  c spine factor  ICD-10:    Comparison:  Head CT 9/15/2022 4:18 PM. Cervical spine CT 9/15/2022    Technique:  HEAD and NECK CTA: During rapid bolus intravenous injection of  nonionic contrast material, axial images were obtained using thin  collimation multidetector helical technique from the base of the upper  aortic arch through the [place]. This CT angiogram data was  reconstructed at thin intervals with mild overlap. Images were sent to  the 3D workstation, and 3D reconstructions were obtained. The axial  source images, multiplanar reformations, 3D reconstructions in both  maximum intensity projection display and volume rendered models  were  reviewed, with reconstructions performed by the technologist.    Contrast: 75 ml isovue 370     Findings:    Head CTA demonstrates no aneurysm or stenosis of the major  intracranial arteries. However, is relative oligemia in the occipital  lobes as well as posterior parietal lobes which is likely secondary to  contrast bolus timing. Mildly hypoplastic left A1 KRYSTIN segments. Fetal  origin of the left PCA. Anterior communicating artery is present.  Right posterior commuting artery is not well-visualized. No large  vessel occlusion.    Neck CTA demonstrates Approximately 60-70 % stenosis of the left and  40-50% stenosis of the right carotid bulbs. . The origins of the great  vessels from the aortic arch are patent. The normal distal right  internal carotid artery measures 5 mm. The normal distal left internal  carotid artery measures 5 mm.Right dominant vertebrobasilar system.    Again is demonstrated displaced complete fracture is through the body  of the dens and the anterior arch of C1. Ventral epidural hyperdensity  measures up to 7 mm in greatest maximal thickness. Nondisplaced right  anterior rib fracture deformity.    No mass within the visualized portions of the cervical soft tissues or  lung apices. Endotracheal tube tip terminates in the low thoracic  trachea.      Impression    Impression:    1. Head CTA demonstrates no aneurysm or stenosis of the major  intracranial arteries. Relative oligemia of the posterior circulation  likely due to contrast bolus timing.  2. Neck CTA demonstrates no definite acute arterial injury.  No  convincingly identified intramural hematoma, dissection flap, or  arteria occlusion. Approximately 40-50 % right and 60-70% left carotid  bulb stenoses.  3. Unchanged anterior arch C1 and base of dens fractures.  Unchanged 7  mm probable epidural hematoma.    I have personally reviewed the examination and initial interpretation  and I agree with the findings.    EMIR LINDSEY,  MD         SYSTEM ID:  [id number]   CT Head w/o Contrast    Narrative    CT HEAD W/O CONTRAST 9/16/2022 4:58 AM    History: No response in exam,  new from prior   ICD-10:    Comparison: Head CT 9/15/2022 at 4:18 PM    Technique: Using multidetector thin collimation helical acquisition  technique, axial, coronal and sagittal CT images from the skull base  to the vertex were obtained without intravenous contrast.   (topogram) image(s) also obtained and reviewed.    Findings: Thin subdural hemorrhage overlying the anterior left frontal  lobe measures up to 2 mm best appreciated on series 4 image 161.  Otherwise, no new or worsening intracranial hemorrhage. Incompletely  imaged hyperdensity in the ventral cervical spinal canal measures up  to 6 mm. No mass effect or midline shift. Gray-white differentiation  is preserved.     Ventricles are proportionate to the cerebral sulci. The basal cisterns  are clear.    The bony calvaria and the bones of the skull base are normal.      Impression    Impression:  1. Unchanged thin 2 mm subdural overlying the anterior left frontal  lobe.  2. No new or worsening acute intracranial findings.  3. Partially imaged C1 and C2 fracture deformities with up to 6 mm  epidural hyperdensity thought to represent hematoma.         I have personally reviewed the examination and initial interpretation  and I agree with the findings.    EMIR LINDSEY MD         SYSTEM ID:  [id number]

## 2022-09-16 NOTE — PROGRESS NOTES
Pt arrive to 4A intubated with 7.5 ETT secured 25 @lip and was placed on initial ventilator settings of cmv 16 450 40% +5    Breath sounds clear and diminished

## 2022-09-16 NOTE — PROGRESS NOTES
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  Unwitnessed fall with small frontal intracranial hemorrhage (SDH) and odontoid fracture with displacement.    Developed swallowing difficulties and increasing O2 requirement in ED with priapism (cocnerning for worsening cord edema), so had awke fiberoptic intubation    Exam currently oversedated - I can't get a neuro exam  Pupils reactive, but no current response to noxious stimuli on the amount of propofol he's currently on  Reportedly was still moving all extremities with good strength this morning  Still has priapism  Some edema    Labs notable for potassium and magnesium that need replacement  Hgb A1c is 6.2  CBC notable only for mild anemia  Urinalysis clean    Imaging from overnight shows stable SDH    This is a 89M hx Lambert-Eaton syndrome, TAVR, complete heart block with pacemaker dependency, DM2, arthritis, BPH, HTN.  Unwitnessed fall with small frontal intracranial hemorrhage (SDH) and odontoid fracture with displacement and assocaited epidural hemorrhage.      Neurosurgery reports that they are willing to offer surgical intervention if family agrees that this is what he would want.  From my perspective, his best case scenario is 6 weeks of C collar after surgical intervention, and given his difficulties with swallowing prior to intubation, I am quite certain that a trach/PEG would be necessary for discharge.  Given his age and likely progressive debility after surgery, I suspect that he is also looking at institutionalization for the rest of his life.  A care conference is planned for today to discuss this further with his family.  For what it's worth, I personally spoke with the patient prior to intubating him yesterday, and articulated a pretty dismal prognosis, and at that time he told me he did want intubation and was ok with eventual  trach, althlough I'm not sure how much he really understood the implications (I did not do a formal CAM-ICU or capacity exam at the time, while he was on BIPAP and in extremis).      METABOLIC ENCEPHALOPATHY / DELIRIUM:  -due to head bleed; monitoring with clinical exam  -nothing for sleep; try precedex for sedation so we can get a more consistent neuro exam  -using non-pharmacologic methods as much as possilble    SDH:  -NTD acutely.  Holding home aspirin    ODONTOID FRACTURE:  -C collar.  Very strict cervical precautions    NEED FOR MECHANICAL VENTILATION:  -intubated for airway protection and difficulties swallowing, rather than hypoxia or work of breathing  -lung protective ventilator settings with Vt <8ml/kg IBW (in this case, at least <500ml)  -I anticiapte will need tracheotomy    PACEMAKER DEPENDENCE:  -underlying rhythm is reportedly complete heart block  -since likely going to surgery, will get a device check, although I don't think a posterior fusion will be close enough to have any significant effect on pacing functionality, and even if it did, he should do fine with just a dumb magnet    Hx TAVR:  -no need for anticoagulation  -can give equivalent of his home lasix now    INADEQUATE ORAL INTAKE:  -place Cortrak today and start tube feeds; may be difficult to get enteric access because of a large hiatal hernia (I struggled to place an OG yesterday after intbuation)  -start bowel regimen    LACTIC ACIDOSIS:  -related to head bleed    HYPOTHYROID:  -levothyroxine    MISC:  -Code status is full code per patient report  -family updated by trauma service and resident; care conference with trauma + neurosurg and Palliative today  -SQH to be held with his SDH; PPI for intubation   -lines: peripheral only  -hannah while intubated and given priapism  -anticipate discharge to skilled nursing or LTACH after trach/PEG if he remains full code, probably in >>1 week.    Billing statement: 39min of critical care time;  spent in an initial review of imaging, labs, physical exam, and discussion of the patient with my own team and the extended care team including the primary service; Based on this patient's presentation / recent intervention and my bedside assessment, I felt there was or is a reasonably high probability of imminent or life-threatening deterioration today or tonight for neurologic reasons.   My overall critical care time, as described in detail above, includes such things as coordination of care, arrhythmia and hemodynamics management with infusions of medicines, respiratory management, fluid therapy including fluid boluses, and pain and sedation therapy. This time excludes time I spent personally performing or supervising procedures for this patient.    SHABBIR Pat MD  Clinical   Anesthesia / Critical Care  *[phone]

## 2022-09-16 NOTE — PROGRESS NOTES
CLINICAL NUTRITION SERVICES - ASSESSMENT NOTE     Nutrition Prescription    RECOMMENDATIONS FOR MDs/PROVIDERS TO ORDER:  Discussed high-risk and high likelihood of unsuccessful Cortrak FT placement given large known hiatal hernia and difficulty passing large bore NG into this area. Additional complexity from pt's C-spine injury making advancement more difficult without use of chin-tuck maneuver. Consider attempting further advancement of current NG vs Radiology attempt placement d/t better visual of area.    Malnutrition Status:    Severe malnutrition in the context of acute on chronic illness    Recommendations already ordered by Registered Dietitian (RD):  EN support via enteral access:  Osmolite 1.5 Kali @ goal of  60ml/hr  (1440ml/day) + 2 pkts ProSource TID (6 pkts) will provide: 2400 kcals (30 kcal/kg), 156 g PRO (1.9 g pro/kg), 1097 ml free H20, 293 g CHO, and 0 g fiber daily.  - Once enteral access verified ok for use by SICU/Trauma, initiate @ 10 mL/hr and advance by 10 mL q8hr as tolerated. Do not start or advance unless K+ >/= 3, Mg++ >1.5, and phos >1.9.  - 30 mL q4hr fluid flushes for tube patency. Additional fluids and/or adjustments per MD.    - Multivitamin/mineral (15 mL/day via FT) to help ensure micronutrient needs being met with suspected hypermetabolic demands and potential interruptions to TF infusions.  - If gastric access: HOB >30 degrees.     Future/Additional Recommendations:  -Monitor start, advancement, and tolerance to EN support  -Monitor GOC     REASON FOR ASSESSMENT  Alexandru Still is a/an 89 year old male assessed by the dietitian for Provider Order - Registered Dietitian to Assess and Order TF per Medical Nutrition Therapy Protocol    NUTRITION HISTORY  No food allergies noted.    Pt seen by Clinical Nutrition in 6/2022 for LOS, was unable to determine malnutrition status at that time d/t lack of wt hx.     Unable to obtain nutrition history at this time d/t pt intubated,  "sedated.    CURRENT NUTRITION ORDERS  Diet: NPO    LABS  Labs reviewed  Electrolytes  Potassium (mmol/L)   Date Value   09/15/2022 3.8   09/15/2022 3.0 (L)   06/23/2022 4.0   06/22/2022 3.9   06/21/2022 3.7     Phosphorus (mg/dL)   Date Value   09/16/2022 1.5 (L)    Blood Glucose  Glucose (mg/dL)   Date Value   09/15/2022 138 (H)   09/15/2022 126 (H)   06/22/2022 92   06/20/2022 87   06/19/2022 91   06/18/2022 91   06/17/2022 99     GLUCOSE BY METER POCT (mg/dL)   Date Value   09/16/2022 162 (H)   09/16/2022 144 (H)   09/15/2022 154 (H)   06/19/2022 178 (H)   06/18/2022 89    Inflammatory Markers  WBC Count (10e3/uL)   Date Value   09/16/2022 9.8   09/15/2022 15.9 (H)   06/20/2022 8.0     Albumin (g/dL)   Date Value   09/15/2022 3.8   06/17/2022 2.3 (L)   06/16/2022 2.4 (L)   06/15/2022 3.6      Magnesium (mg/dL)   Date Value   09/16/2022 1.6 (L)     Sodium (mmol/L)   Date Value   09/15/2022 138   09/15/2022 137   06/22/2022 135    Renal  Urea Nitrogen (mg/dL)   Date Value   09/15/2022 16.5   09/15/2022 16.7   06/22/2022 24   06/20/2022 22   06/19/2022 23     Creatinine (mg/dL)   Date Value   09/15/2022 0.83   09/15/2022 0.97   06/22/2022 0.85     Additional  Ketones Urine (mg/dL)   Date Value   09/16/2022 Negative        MEDICATIONS  Medications reviewed  -D5W + NS @ 10 mL/hr    ANTHROPOMETRICS  Height:   Ht Readings from Last 1 Encounters:   07/20/22 1.727 m (5' 8\")   Most Recent Weight: 80.7 kg (177 lb 14.6 oz)    IBW: 70 kg   BMI: 27.05 kg/m2; Overweight BMI 25-29  Weight History: No recent wt hx in Care Everywhere. ~13% wt loss over past ~3 months.  Wt Readings from Last 20 Encounters:   09/15/22 80.7 kg (177 lb 14.6 oz)   07/20/22 86.2 kg (190 lb)   06/19/22 92.9 kg (204 lb 11.2 oz)   Dosing Weight: 81 kg (actual, based on 80.7 kg on 9/15)    ASSESSED NUTRITION NEEDS  Estimated Energy Needs: 0833-0136 kcals/day (25 - 30 kcals/kg)  Justification: Maintenance  Estimated Protein Needs: 122-162 grams protein/day " (1.5 - 2 grams of pro/kg)  Justification: Hypercatabolism with critical illness  Estimated Fluid Needs: 1 mL/kcal   Justification: Maintenance or Per provider pending fluid status    PHYSICAL FINDINGS  See malnutrition section below.  -ETT  -OGT (AXR - tip and sidehole in large hiatal hernia) - now removed, previously attempts went into lung     MALNUTRITION  % Intake: Unable to assess  % Weight Loss: > 7.5% in 3 months (severe)  Subcutaneous Fat Loss: Upper arm:  Mild and Lower arm:  Mild  Muscle Loss: Temporal:  Mild, Thoracic region (clavicle, acromium bone, deltoid, trapezius, pectoral):  Moderate, Upper arm (bicep, tricep):  Moderate, Lower arm  (forearm): Mild, Upper leg (quadricep, hamstring):  Mild, Patellar region:  Mild and Posterior calf:  Moderate - suspect at least partially age-related, but given extent and wt loss suspect malnutrition-related too  Fluid Accumulation/Edema: None noted  Malnutrition Diagnosis: Severe malnutrition in the context of acute on chronic illness    NUTRITION DIAGNOSIS  Inadequate protein-energy intake related to NPO status in setting of intubation as evidenced by pt currently meeting 0% minimum assessed needs (not accounting for kcal from lipid/dextrose-containing medications).     INTERVENTIONS  Implementation  -Nutrition Education: Not appropriate at this time due to patient condition   -Discussed pt with SICU team and SICU dietitian colleague. Plan is for PEG now d/t laryngeal swelling preventing Radiology from obtaining enteral access either.  -Enteral Nutrition - Initiate  -Feeding tube flush  -Multivitamin/mineral supplement therapy     Goals  Once EN support meets goal rate, total avg nutritional intake to meet a minimum of 25 kcal/kg and 1.5 g PRO/kg daily (per dosing wt 81 kg).     Monitoring/Evaluation  Progress toward goals will be monitored and evaluated per protocol.    Marychuy Davila RD, LD  Pager: [phone]

## 2022-09-16 NOTE — PLAN OF CARE
Goal Outcome Evaluation:    Plan of Care Reviewed With: other (see comments) (SICU team, bedside RN)     Overall Patient Progress: no change    Outcome Evaluation: Once enteral access obtained, and if within GOC, start EN support as ordered.

## 2022-09-16 NOTE — PHARMACY-ADMISSION MEDICATION HISTORY
Admission Medication History Completed by Pharmacy    See [facility] Admission Navigator for allergy information, preferred outpatient pharmacy, prior to admission medications and immunization status.     Medication History Sources:     External medication dispense resport    Chart review    Changes made to PTA medication list (reason):    Added: None    Deleted: None    Changed: None    Additional Information:    Patient lives at home alone. Unable to speak with patient given current mechanical ventilation status. Identified recent fill history for most medications other than as needed prescriptions and over-the-counter products.    Patient recently completed at 7-day course of ciprofloxacin 250 mg tablets (take 1 tablet by mouth twice daily) for a UTI which began on 7/20/22.    Prior to Admission medications    Medication Sig Last Dose Taking? Auth Provider Long Term End Date   acetaminophen (TYLENOL) 325 MG tablet Take 325-650 mg by mouth every 6 hours as needed Unknown at PRN Yes Unknown, Entered By History     Calcium Carb-Cholecalciferol (CALCIUM-VITAMIN D) 500-400 MG-UNIT TABS Take 1 tablet by mouth daily Past Week at Unknown time Yes Unknown, Entered By History     diclofenac (VOLTAREN) 1 % topical gel Apply 4 g topically 4 times daily as needed for moderate pain Unknown at PRN Yes Reshma Rodriguez MD     finasteride (PROSCAR) 5 MG tablet Take 5 mg by mouth daily Past Week at Unknown time Yes Unknown, Entered By History     fluticasone (FLONASE) 50 MCG/ACT nasal spray Spray 1 spray into both nostrils daily Past Week at Unknown time Yes Unknown, Entered By History     furosemide (LASIX) 40 MG tablet Take 40 mg by mouth 2 times daily Past Week at Unknown time Yes Unknown, Entered By History Yes    levothyroxine (SYNTHROID/LEVOTHROID) 112 MCG tablet Take 112 mcg by mouth daily Past Week at Unknown time Yes Unknown, Entered By History Yes    nystatin (MYCOSTATIN) 841072 UNIT/GM external cream Apply topically 2  times daily To penis and groin folds Past Week at Unknown time Yes Kimberly Nava MD     potassium chloride ER (KLOR-CON M) 20 MEQ CR tablet Take 2 tablets (40 mEq) by mouth daily Past Week at Unknown time Yes Reshma Rodriguez MD     senna-docusate (SENOKOT-S/PERICOLACE) 8.6-50 MG tablet Take 2 tablets by mouth 2 times daily as needed for constipation (Hold for loose stool.) Unknown at PRN Yes Unknown, Entered By History     tamsulosin (FLOMAX) 0.4 MG capsule Take 0.8 mg by mouth daily Past Week at Unknown time Yes Unknown, Entered By History     ipratropium - albuterol 0.5 mg/2.5 mg/3 mL (DUONEB) 0.5-2.5 (3) MG/3ML neb solution Take 1 vial (3 mLs) by nebulization every 4 hours as needed for wheezing or shortness of breath / dyspnea   Reshma Rodriguez MD Yes        Date completed: 09/16/22    Medication history completed by: Zaida Babb, PharmD

## 2022-09-16 NOTE — PLAN OF CARE
Admitted/transferred from: ED  Reason for admission/transfer: Respiratory distress requiring intubation  2 RN skin assessment: completed by Jeannine WHITLEY  Result of skin assessment and interventions/actions: No interventions necessary  Height, weight, drug calc weight: Done  Patient belongings (see Flowsheet)  MDRO education added to care planN/A    ICU End of Shift Summary. See flowsheets for vital signs and detailed assessment.     Major Events: Patient arrived from ED around 2000. CTA and Head CT completed. Hannah placed.     Neuro: Able to follow simple commands (open eyes, squeeze fingers), PERRLA. Patient was restless upon transfer- propofol increased and fentanyl started, titrated to RASS goal of -2. Neuro exam change at 0400- patient no longer following commands, unresponsive. STAT head CT completed, propofol decreased. C-collar remains in place.   CV: Sinus to sinus bradycardia, paced.   Resp: Intubated in ED. CMV settings 450/16/5/40%. LS clear, diminished.  GI/: Priapism, hannah placed with good urine output. No BM this shift.       Plan: Continue to monitor respiratory and neuro status, maintain c-spine precautions, pain management.       ?

## 2022-09-17 LAB
ANION GAP SERPL CALCULATED.3IONS-SCNC: 12 MMOL/L (ref 7–15)
BUN SERPL-MCNC: 25 MG/DL (ref 8–23)
CALCIUM SERPL-MCNC: 9.1 MG/DL (ref 8.2–9.6)
CHLORIDE SERPL-SCNC: 105 MMOL/L (ref 98–107)
CREAT SERPL-MCNC: 0.88 MG/DL (ref 0.67–1.17)
DEPRECATED HCO3 PLAS-SCNC: 24 MMOL/L (ref 22–29)
ERYTHROCYTE [DISTWIDTH] IN BLOOD BY AUTOMATED COUNT: 15.4 % (ref 10–15)
GFR SERPL CREATININE-BSD FRML MDRD: 81 ML/MIN/1.73M2
GLUCOSE SERPL-MCNC: 137 MG/DL (ref 70–99)
HCT VFR BLD AUTO: 38.3 % (ref 40–53)
HGB BLD-MCNC: 12.5 G/DL (ref 13.3–17.7)
MAGNESIUM SERPL-MCNC: 2.4 MG/DL (ref 1.7–2.3)
MCH RBC QN AUTO: 30.6 PG (ref 26.5–33)
MCHC RBC AUTO-ENTMCNC: 32.6 G/DL (ref 31.5–36.5)
MCV RBC AUTO: 94 FL (ref 78–100)
NT-PROBNP SERPL-MCNC: 596 PG/ML (ref 0–1800)
PHOSPHATE SERPL-MCNC: 3.8 MG/DL (ref 2.5–4.5)
PLATELET # BLD AUTO: 251 10E3/UL (ref 150–450)
POTASSIUM SERPL-SCNC: 3.5 MMOL/L (ref 3.4–5.3)
RBC # BLD AUTO: 4.08 10E6/UL (ref 4.4–5.9)
SODIUM SERPL-SCNC: 141 MMOL/L (ref 136–145)
WBC # BLD AUTO: 11.5 10E3/UL (ref 4–11)

## 2022-09-17 PROCEDURE — 99232 SBSQ HOSP IP/OBS MODERATE 35: CPT | Performed by: PHYSICIAN ASSISTANT

## 2022-09-17 PROCEDURE — 99291 CRITICAL CARE FIRST HOUR: CPT | Performed by: ANESTHESIOLOGY

## 2022-09-17 PROCEDURE — 250N000011 HC RX IP 250 OP 636: Performed by: SURGERY

## 2022-09-17 PROCEDURE — 80048 BASIC METABOLIC PNL TOTAL CA: CPT

## 2022-09-17 PROCEDURE — 250N000009 HC RX 250: Performed by: NURSE PRACTITIONER

## 2022-09-17 PROCEDURE — 94003 VENT MGMT INPAT SUBQ DAY: CPT

## 2022-09-17 PROCEDURE — 99233 SBSQ HOSP IP/OBS HIGH 50: CPT | Performed by: STUDENT IN AN ORGANIZED HEALTH CARE EDUCATION/TRAINING PROGRAM

## 2022-09-17 PROCEDURE — 200N000002 HC R&B ICU UMMC

## 2022-09-17 PROCEDURE — C9113 INJ PANTOPRAZOLE SODIUM, VIA: HCPCS | Performed by: PHYSICIAN ASSISTANT

## 2022-09-17 PROCEDURE — 999N000157 HC STATISTIC RCP TIME EA 10 MIN

## 2022-09-17 PROCEDURE — 250N000011 HC RX IP 250 OP 636: Performed by: PHYSICIAN ASSISTANT

## 2022-09-17 PROCEDURE — 85027 COMPLETE CBC AUTOMATED: CPT

## 2022-09-17 PROCEDURE — 83880 ASSAY OF NATRIURETIC PEPTIDE: CPT

## 2022-09-17 PROCEDURE — 84100 ASSAY OF PHOSPHORUS: CPT

## 2022-09-17 PROCEDURE — 36415 COLL VENOUS BLD VENIPUNCTURE: CPT

## 2022-09-17 PROCEDURE — 250N000013 HC RX MED GY IP 250 OP 250 PS 637: Performed by: PHYSICIAN ASSISTANT

## 2022-09-17 PROCEDURE — 83735 ASSAY OF MAGNESIUM: CPT

## 2022-09-17 PROCEDURE — 250N000011 HC RX IP 250 OP 636

## 2022-09-17 RX ORDER — POTASSIUM CHLORIDE 7.45 MG/ML
10 INJECTION INTRAVENOUS
Status: COMPLETED | OUTPATIENT
Start: 2022-09-17 | End: 2022-09-17

## 2022-09-17 RX ORDER — FUROSEMIDE 10 MG/ML
20 INJECTION INTRAMUSCULAR; INTRAVENOUS 2 TIMES DAILY
Status: DISCONTINUED | OUTPATIENT
Start: 2022-09-17 | End: 2022-09-18

## 2022-09-17 RX ADMIN — POTASSIUM CHLORIDE 10 MEQ: 7.46 INJECTION, SOLUTION INTRAVENOUS at 08:08

## 2022-09-17 RX ADMIN — ACETAMINOPHEN 650 MG: 650 SUPPOSITORY RECTAL at 15:57

## 2022-09-17 RX ADMIN — HYDROMORPHONE HYDROCHLORIDE 0.5 MG: 1 INJECTION, SOLUTION INTRAMUSCULAR; INTRAVENOUS; SUBCUTANEOUS at 18:35

## 2022-09-17 RX ADMIN — ACETAMINOPHEN 650 MG: 650 SUPPOSITORY RECTAL at 02:18

## 2022-09-17 RX ADMIN — HYDROMORPHONE HYDROCHLORIDE 0.5 MG: 1 INJECTION, SOLUTION INTRAMUSCULAR; INTRAVENOUS; SUBCUTANEOUS at 07:57

## 2022-09-17 RX ADMIN — HYDROMORPHONE HYDROCHLORIDE 0.5 MG: 1 INJECTION, SOLUTION INTRAMUSCULAR; INTRAVENOUS; SUBCUTANEOUS at 23:18

## 2022-09-17 RX ADMIN — FUROSEMIDE 20 MG: 10 INJECTION, SOLUTION INTRAVENOUS at 20:06

## 2022-09-17 RX ADMIN — ACETAMINOPHEN 650 MG: 650 SUPPOSITORY RECTAL at 20:07

## 2022-09-17 RX ADMIN — ACETAMINOPHEN 650 MG: 650 SUPPOSITORY RECTAL at 08:14

## 2022-09-17 RX ADMIN — FUROSEMIDE 20 MG: 10 INJECTION, SOLUTION INTRAVENOUS at 10:45

## 2022-09-17 RX ADMIN — HYDROMORPHONE HYDROCHLORIDE 0.5 MG: 1 INJECTION, SOLUTION INTRAMUSCULAR; INTRAVENOUS; SUBCUTANEOUS at 15:57

## 2022-09-17 RX ADMIN — DEXMEDETOMIDINE HYDROCHLORIDE 0.3 MCG/KG/HR: 400 INJECTION INTRAVENOUS at 22:28

## 2022-09-17 RX ADMIN — PANTOPRAZOLE SODIUM 40 MG: 40 INJECTION, POWDER, FOR SOLUTION INTRAVENOUS at 07:58

## 2022-09-17 RX ADMIN — POTASSIUM CHLORIDE 10 MEQ: 7.46 INJECTION, SOLUTION INTRAVENOUS at 09:17

## 2022-09-17 RX ADMIN — HYDROMORPHONE HYDROCHLORIDE 0.5 MG: 1 INJECTION, SOLUTION INTRAMUSCULAR; INTRAVENOUS; SUBCUTANEOUS at 03:58

## 2022-09-17 RX ADMIN — DEXAMETHASONE SODIUM PHOSPHATE 10 MG: 4 INJECTION, SOLUTION INTRA-ARTICULAR; INTRALESIONAL; INTRAMUSCULAR; INTRAVENOUS; SOFT TISSUE at 08:03

## 2022-09-17 ASSESSMENT — ACTIVITIES OF DAILY LIVING (ADL)
ADLS_ACUITY_SCORE: 47

## 2022-09-17 NOTE — PROGRESS NOTES
[facility]  Trauma Service Progress Note    Date of Service: 09/17/2022    Trauma mechanism: Fall while going to urinal    Time/date of injury: 9/15/22  Known Injuries:  1. Anterior left frontal SDH, 5 mm  2. Posteriorly displaced Tyle II odontoid fx, ~1cm posterior dislocation   3. C1 anterior arch fx   4. Epidural hemorrhage up to 7mm along posterior dens   5. Questionable acute anterior left 6th rib fx  6. Right forehead abrasion      Assessment & Plan    Bedside care conference today with SICU and Palliative, with Son-in-Law Erv, Grandson Zhou and Granddaughter Lolis. Patient was a lot more responsive today and able to respond to questions. Discussed best case scenarios and complications with all choices, along with long term complication. Also, discussed Comfort Care. After we left family to decide I was later called back per family request. They said they asked Alexandru what he would want, deciding on a plan by lifting his right or left hand. Per them he wants to not have surgery and under going the trach and peg knowing that no matter what he will be in a institution for assistance for the trach and with tube feeds. He will be in the collar the rest of his life. If he falls again it could be fatal. He could still aspirate even with the trach and peg because of his hx of dysphagia that is now exacerbated by the collar. He will be more prone to pnemonias from this and will have an increased chance of pressure ulcers because of his decreased mobility. This will be more frequent hospitalizations.   I told the family that because of his unstable spine the trach is much more risky and will have to be preformed in the OR. Also the PEG will also have to be preformed in the OR  When able because of the edema in his throat causing difficulty even placing an NG at this time.     I updated SICU and Palliative on family's decision. Palliative will touch base with SICU next week  since complications can happen while placing a trach in an unstable spine and it would be beneficial to have them still around for family support.                    Neuro/Pain/Psych:  # Fall from standing,   # Traumatic SDH, along left frontal lobe up to 5mm in thickness.   # Posteriorly displaced Tyle II odontoid fx, ~1cm posterior dislocation   # C1 anterior arch fx   # Epidural hemorrhage up to 7mm along posterior dens   # Sensorineural Hearing loss   - Repeat Head CT complete, stable  - Neurosurgery following: Serial neurological examinations, C collar at all times- keep neck flat, no pillows, Orthotics consult for MIAMI J, Upright x rays when able, Platelets > 100,000, INR < 1.5, Hemoglobin > 8, DVT: SCDs while in bed.    # Acute traumatic neck pain  # Chronic hip and shoulder pain  # Sedation   - SICU managing Precedex  - Scheduled: Tylenol supp.,   - Prn: Dilaudid 0.5-1mg    EENT:  # Laryngeal Edema   - No enteral access   - OG was attempted in ED on 9/15, unsuccessful after 2 attempts by Dr Pat. Tube would route to the trachea.  - 9/16:  Radiology was unable to place NG tube d/t laryngeal edema. Recommend PEG for enteral access.   - Ordered 3 doses Dexamethasone q8hrs per Dr Pena, SICU Attending, now complete       Pulmonary:  # Acute respiratory distress requiring intubation and MV  # PB  - SICU managing MV  - Holding PTA: Sera Godoy     Cardiovascular:    # Hypertension   # Complete heart block s/p PM placement  # Nonrheumatic aortic valve stenosis s/p TAVR 2019  # HF  - Monitor hemodynamic status.   - BNP in ED: 498?596  - PTA Lasix po changed to IV, 20mg bid   - Holding PTA: finasteride      GI/Nutrition:    # Hx of dysphagia   # Hiatal Hernia    - Unable to get OG placement in ED, SICU attempting placement of NG today to start TF   - NSGY ok with medications and enteral feeding      Renal/ Fluids/Electrolytes:  # Hypomagnesia   # Lactic Acidosis, improved with fluids   - electrolyte  replacement protocol in place.      Endocrine:  # Hypothyroidism   - Holding levothyroxine until enteral access, last dose 9/14. Plan to discuss with pharmacy on when to start IV.       Infectious disease:   # Leukocytosis  - Admitted to [facility] for Septic Shock, CAP, Streptococcus dysgalactiae (Group G Strep) Bacteremia, Urinary retention with urethral stone s/p ureteroscopy and lithotripsy  - WBC: 15.9?9.8?11.5,  elevation without transfusion probably d/t decrease in overall volume status, more concentrated. Increase along all cell lines.   - LA: 2.2 ? 1.3, improved with fluids  - Procal: 0.04  - 9/15: BCx2: NGTD  - No indications for antibiotics.   - UA unremarkable for infection     Hematology:    # Anemia of chronic illness   - Hgb 13.0?10.4?12.5, elevation without transfusion probably d/t decrease in overall volume status, more concentrated. Increase along all cell lines.   - Threshold for transfusion if hgb <7.0 or signs/symptoms of hypoperfusion.      # DVT Prophylaxis: pcd, no pharmacologic prophylaxis at this time d/t SDH and possible epidural hemorrhage.      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     Skin:  # Abrasion to forehead   - dilgent cares to prevent skin breakdown and wound formation.      Palliative Following: Dr Mello Antoine     Lines/ tubes/ drains:  - PIV/ ETT/ Escudero      General Cares:                 PPI/H2 blocker:  Protonix                 DVT prophylaxis: pcd                Bowel Regimen/Date of last stool: in place, unable to take orals                 Pulmonary toilet: MV hygiene      Code status:  Full Code, confirmed with patient multiple times prior to intubation. Patient was also full code during June admission when critically ill.       Discharge goals:        Expected D/C date: Remain in SICU    Tammy Velásquez PA-C  To contact the trauma service use job code pager [phone],   Numeric texts or alpha text through [facility]     Interval History   Patient able to follow commands, no agitation on minimal vent settings.   ROS x 8 negative with exception of those things listed in interval hx    Physical Exam   Temp: 97.4  F (36.3  C) Temp src: Axillary BP: (!) 143/75 Pulse: (!) 49   Resp: 16 SpO2: 98 % O2 Device: Mechanical Ventilator    Vitals:    09/15/22 2000 09/17/22 0000   Weight: 80.7 kg (177 lb 14.6 oz) 81.4 kg (179 lb 7.3 oz)     Vital Signs with Ranges  Temp:  [97.4  F (36.3  C)-97.8  F (36.6  C)] 97.4  F (36.3  C)  Pulse:  [49-73] 49  Resp:  [13-22] 16  BP: (119-154)/(65-85) 143/75  FiO2 (%):  [40 %] 40 %  SpO2:  [96 %-99 %] 98 %  I/O last 3 completed shifts:  In: 1716.33 [I.V.:1716.33]  Out: 857 [Urine:857]    [place] Coma Scale - Total 11i/15  Eye Response (E): 4   4= spontaneous, 3= to verbal/voice, 2= to pain, 1= No response   Verbal Response (V): 1i   5= Orientated, converses, 4= Confused, converses, 3= Inappropriate words, 2= Incomprehensible sounds, 1=No response   Motor Response (M): 6   6= Obeys commands, 5= Localizes to pain, 4= Withdrawal to pain, 3=Fexion to pain, 2= Extension to pain, 1= No response     Constitutional: Awake, alert, cooperative, no apparent distress.  Eyes: Lids and lashes normal, PERRL, EOM, sclera clear, conjunctiva normal.  HENT: Normocephalic, abrasion to right forehead, [place] in Place  Respiratory: ETT in place,   Cardiovascular:  wade rate and rhythm, paced  GI: Abdomen soft, non-distended,   Genitourinary:  Escudero in place   Skin:  Warm & dry, ecchymosis in various stages of healing over body   Musculoskeletal: There is no redness, warmth, or swelling of the joints.  Pedal pulse palpated.  Neurologic:  Strength and sensory is intact. No new, focal deficits.able to follow commands.   Psych: calm,

## 2022-09-17 NOTE — PROGRESS NOTES
[facility]  Palliative Care Daily Progress Note       Recommendations & Counseling     Encounter for palliative care  Psychosocial support    Psychosocial support was provided to patient and/or family.    Prognosis: Poor    Palliative performance scale (PPS): 10% (intubated and sedated, seems like PPS was 80-90% at least prior to this admission though Alexandru would fall sometimes)    CODE status: YANIRA Horvath was noted to previously be AND/DNI and changed his mind in the ED, confirmed by several teams after speaking with him consecutively    Goals of Care: Disease-oriented    Another care conference held on 9/17/2022 with palliative, SICU, Neuro surgery, trauma, and son-in-law Erv's children (aka Alexandru's granddaughter and grandson)    Alexandru fredo to follow commands and shake and nod head and had some sort of understanding. ICU attending spoke with him early prior to care conference.    ICU team started off by summarizing thoughts about Alexandru's various organ systems noting that his mentation will likely resemble a patient with mild dementia and he would not be quite the same as before. They also noted that he would be trach and PEG dependent given his need for C-collar from his Csp fractures. Finally, this would have a grave effect on his independence as he would have to be placed to a facility for full-time care as his family noted they would not be able to care for him 24/7.    Neurosurgery notes that taking Alexandru to surgery would be an option but there would be risk involved as well. They also validated that there is risk involved in having Alexandru in a C-collar for the rest of his life. Regardless, if Alexandru falls again, he would likely decline further if not fully sever his spinal cord.     Also explained to family what disease-oriented, comfort-oriented, and in-between pathways may look like for Alexandru    Stressed to family that since he is not able  to tell us in words what he would want, we are looking to them to guide his care    After discussion, family spoke amongst themselves and with Alexandru and reportedly asked patient to raise his hand to decide on a plan. Alexandru seems to be opting for quantity of life as opposed to quality. Will plan to Trach/PEG sometime next week as it cannot be safely done at bedside.     Disposition: Inpatient per primary     Palliative medicine will follow peripherally and plan to check in with SICU next week once trach/PEG have been placed.     These recommendations have been discussed with primary/consulting teams.    Total time spent was >35 minutes,  >50% of time was spent counseling and/or coordination of care regarding goals of care.    Mello Antoine DO / Palliative Medicine / Text me via [employer].     Team Consult Pager [phone] (answered 8am-430pm M-F) - ok to text page via [employer] / After-Hours Answering Service [phone] / Palliative Clinic in the [facility] at the [facility] [place] - [phone] (scheduling); [phone] (triage).        Assessments          Alexandru Still is a 89 year old male who was admitted on 9/15/2022 due to an unwitnessed fall  PMHx: Lambert-Eaton syndrome, TAVR, complete heart block with pacemaker dependency, DM2, arthritis, BPH, HTN, osteoporosis  He was found to have a small frontal intracranial hemorrhage (SDH) and odontoid fracture with displacement and associated epidural hemorrhage. He was intubated for airway protection. Care conference held on 9/16/2022 with trauma, SICU, neurosurgery, son-in-law, and palliative. Pending family decision.     Today, the patient was seen for:  Goals of Care  Encounter for palliative care    Prognosis, Goals, or Advance Care Planning was addressed today with: Yes.  Mood, coping, and/or meaning in the context of serious illness were addressed today: No.  Summary/Comments: as above            Interval History:     Chart review/discussion with unit or  clinical team members:   Received x2 doses of Dilaudid 0.5 mg IV from 0700 to 0700 and additional dose in PM at 1554    Per patient or family/caregivers today:  Care conference held with SICU, neurosurgery, family, and palliative. Detailed Above. Alexandru is on minimal sedation with Precedex and is able to follow commands and seems to understand the general situation. Still intubated.     Key Palliative Symptoms:  We are not helping to manage these symptoms currently in this patient.    Patient is on opioids: assessed and bowels ok/no needed changes to plan of care today.           Review of Systems:     Besides above, >4 ROS was reviewed and is unremarkable          Medications:     I have reviewed this patient's medication profile and medications during this hospitalization.    Noted meds:    Precedex  APAP suppository 650 mg q6h scheduled  Lasix 20 mg IV BID  Protonix 40 mg IV daily  Dilaudid 0.5-1 mg q2h prn pain  Dulcolax prn  Compazine prn  Zofran prn           Physical Exam:   Vitals were reviewed  Temp: 97.5  F (36.4  C) Temp src: Axillary BP: 132/72 Pulse: (!) 49   Resp: 16 SpO2: 99 % O2 Device: Mechanical Ventilator    General: Not in acute distress.  Head: Left frontal temporal trauma with bruising   Eyes: Eyes intermittently open  Ear, Nose, and Throat: No neck masses noted. Intubated. In C-collar  Pulmonary: Equal chest rise. Normal breath sounds. Intubated.   Cardiovascular: No jugular venous distension. Minimal LE edema.  Abdomen: Soft. Non-tender to palpation. Normal bowel sound present.  Skin: Warm. Dry. Facial and arm bruising noted with Left calf/shin gauze wrapped around  Musculoskeletal: Muscle atrophy  Neuro: On minimal sedation, fredo to follow commands and understand statements and slightly shake head yes or no  Psych: Seemingly normal mood but still slightly sedated and and intubated             Data Reviewed:     Reviewed recent pertinent imaging, comments:   XR Feeding Tube Placement  [[id number]] Resulted: 09/17/22 1115   Impression: Unsuccessful feeding tube placement.     XR Chest Port 1 View [[id number]] Resulted: 09/16/22 4497   IMPRESSION:   1. Endotracheal tube tip overlying the mid thoracic trachea   approximately 4.5 cm above the taylor.   2. Increased perihilar and bibasilar opacities suspicious for   pulmonary edema and atelectasis versus infection.   3. Question trace pleural effusions.     Echocardiogram Complete [[id number]] Collected: 09/16/22 1503   Interpretation Summary   Global and regional left ventricular function is normal with an EF of 60-65%.   Global right ventricular function is normal.   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 Doppler interrogation of the aortic valve is normal. The   peak velocity across the aortic valve is 1.6 m/sec. The mean gradient is 5   mmHg.   No pericardial effusion is present.         Reviewed recent labs, comments:   ROUTINE ICU LABS (Last four results)  CMPRecent Labs   Lab 09/17/22  0523 09/16/22  1105 09/16/22  1052 09/16/22  0927 09/16/22  0402 09/15/22  1959 09/15/22  1617 09/15/22  0521    140  --   --   --   --  138 137   POTASSIUM 3.5 3.4  --   --   --   --  3.8 3.0*   CHLORIDE 105 103  --   --   --   --  100 97*   CO2 24 24  --   --   --   --  28 27   ANIONGAP 12 13  --   --   --   --  10 13   * 125* 138*  --  162*   < > 138* 126*   BUN 25.0* 18.4  --   --   --   --  16.5 16.7   CR 0.88 0.86  --   --   --   --  0.83 0.97   GFRESTIMATED 81 81  --   --   --   --  82 73   TITA 9.1 9.4  --   --   --   --  9.1 9.8*   MAG 2.4*  --   --  1.6*  --   --   --   --    PHOS 3.8  --   --  1.5*  --   --   --   --    PROTTOTAL  --   --   --   --   --   --  7.0  --    ALBUMIN  --   --   --   --   --   --  3.8  --    BILITOTAL  --   --   --   --   --   --  0.6  --    ALKPHOS  --   --   --   --   --   --  70  --    AST  --   --   --   --   --   --  20  --    ALT  --   --   --   --   --   --  11  --     < > = values in this interval not displayed.      CBC  Recent Labs   Lab 09/17/22  0523 09/16/22  0927 09/15/22  0521   WBC 11.5* 9.8 15.9*   RBC 4.08* 3.35* 4.21*   HGB 12.5* 10.4* 13.0*   HCT 38.3* 32.6* 41.1   MCV 94 97 98   MCH 30.6 31.0 30.9   MCHC 32.6 31.9 31.6   RDW 15.4* 15.2* 14.7    213 265     INR  Recent Labs   Lab 09/15/22  0521   INR 1.06     Arterial Blood Gas  Recent Labs   Lab 09/15/22  1733 09/15/22  1526   PH 7.33 7.41

## 2022-09-17 NOTE — PROGRESS NOTES
[facility], [place]  Neurosurgery Progress Note:  09/17/2022      Interval History: DAVIAN. Care conference with decision for likely no surgery.     Assessment:  Alexandru Still is a 89 year old male with unwitnessed mechanical fall resulting in C1 fracture, and a type II C2 odontoid fracture with 1 cm of posterior displacement and left frontal 5mm SDH.     Clinically Significant Risk Factors Present on Admission            # Severe Malnutrition: based on nutrition assessment     Plan:  Hold ASA  Serial neurological examinations  C collar at all times- keep neck flat, no pillows  Orthotics consult for ROSY ROSALES, Upright x rays when able  Platelets >100,000  INR < 1.5  Hemoglobin > 8  DVT: SCDs while in bed  Neurosurgery will follow the patient     -----------------------------------  Reginaldo Still MD  Neurosurgery resident, PGY-2  -----------------------------------    General: awake and alert  HEENT: Aspen collor, neck tenderness  PULM: breathing comfortably on room air  : rectal tone intact     NEUROLOGIC:  -- Intubated and sedated, on propofol gtt at 20  -- Follows commands briskly in all 4 extremities  -- PERRL, tracks, face symmetric  -- Withdraws in all extremities       Objective:   Temp:  [97.4  F (36.3  C)-97.8  F (36.6  C)] 97.4  F (36.3  C)  Pulse:  [49-73] 49  Resp:  [13-22] 16  BP: (110-154)/(62-85) 143/75  FiO2 (%):  [40 %] 40 %  SpO2:  [96 %-99 %] 98 %  I/O last 3 completed shifts:  In: 1716.33 [I.V.:1716.33]  Out: 857 [Urine:857]        LABS:  Recent Labs   Lab 09/17/22  0523 09/16/22  1105 09/16/22  1052 09/15/22  1959 09/15/22  1617    140  --   --  138   POTASSIUM 3.5 3.4  --   --  3.8   CHLORIDE 105 103  --   --  100   CO2 24 24  --   --  28   ANIONGAP 12 13  --   --  10   * 125* 138*   < > 138*   BUN 25.0* 18.4  --   --  16.5   CR 0.88 0.86  --   --  0.83   TITA 9.1 9.4  --   --  9.1    < > = values in this interval not displayed.       Recent Labs    Lab 22  0523   WBC 11.5*   RBC 4.08*   HGB 12.5*   HCT 38.3*   MCV 94   MCH 30.6   MCHC 32.6   RDW 15.4*          IMAGING:  Recent Results (from the past 24 hour(s))   XR Feeding Tube Placement    Narrative    Feeding tube placement.    Comparison:  none.    History: Feeding tube needed for nutrition.     Fluoro time: 57.5 seconds    Technique: After injection of Xylocaine gel into the right nostril, a  feeding tube was advanced under fluoroscopic guidance. After  unsuccessful attempt in the right nostril, Xylocaine gel was injected  into the left nostril, and the feeding tube was advanced under  fluoroscopic guidance.    Findings: The feeding tube was advanced with inability to negotiate  the nasopharynx there was resistance to advancing the feeding tube.  The other nostril was attempted without success.       Impression    Impression: Unsuccessful feeding tube placement.    IASHIA MD, attest that I was present for all critical  portions of the procedure and was immediately available to provide  guidance and assistance during the remainder of the procedure.    Echocardiogram Complete   Result Value    LVEF  60-65%    Narrative    [id number]  [id number]  [id number]  [id number]^VLADIMIR FOSTER^EV^ANAHY     [facility],Surveyor  Echocardiography Laboratory  [address] [place] [address]     Name: LORENA GALVAN  MRN: [id number]  : 1930  Study Date: 2022 03:09 PM  Age: 89 yrs  Gender: Male  Patient Location: [facility]  Reason For Study: Cardiac Device, Unspecified  Ordering Physician: EV MARCH  Performed By: Ally Bower     BSA: 1.9 m2  Height: 68 in  Weight: 177 lb  HR: 50  BP: 127/74 mmHg  ______________________________________________________________________________  Procedure  Complete Portable Echo Adult. Contrast Optison. Technically difficult  study.Extremely poor acoustic windows. Optison (NDC #0281-2127-96)  given  intravenously. Patient was given 5 ml mixture of 3 ml Optison and 6 ml saline.  4 ml wasted.  ______________________________________________________________________________  Interpretation Summary  Global and regional left ventricular function is normal with an EF of 60-65%.  Global right ventricular function is normal.  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 Doppler interrogation of the aortic valve is normal. The  peak velocity across the aortic valve is 1.6 m/sec. The mean gradient is 5  mmHg.  No pericardial effusion is present.  ______________________________________________________________________________  Left Ventricle  Left ventricular size is normal. Global and regional left ventricular function  is normal with an EF of 60-65%.     Right Ventricle  The right ventricle is normal size. Global right ventricular function is  normal.     Mitral Valve  Mild to moderate mitral annular calcification is present.     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 Doppler interrogation of the aortic valve is normal. The  peak velocity across the aortic valve is 1.6 m/sec. The mean gradient is 5  mmHg.     Tricuspid Valve  Trace to mild tricuspid insufficiency is present. Estimated pulmonary artery  systolic pressure is 17 mmHg plus right atrial pressure.     Pulmonic Valve  On Doppler interrogation, there is no significant stenosis or regurgitation.     Vessels  The aorta root cannot be assessed. The inferior vena cava cannot be assessed.     Pericardium  No pericardial effusion is present.     Compared to Previous Study  There is no prior study for direct comparison.     ______________________________________________________________________________  MMode/2D Measurements & Calculations  asc Aorta Diam: 2.6 cm  LVOT diam: 2.3 cm  LVOT area: 4.2 cm2     Doppler Measurements & Calculations  MV E max albert: 71.6 cm/sec  MV A max albert: 94.3 cm/sec  MV E/A: 0.76  MV dec slope: 217.0 cm/sec2  MV dec time: 0.33 sec  Ao V2 max: 156.1 cm/sec  Ao max  P.7 mmHg  Ao V2 mean: 109.4 cm/sec  Ao mean P.5 mmHg  Ao V2 VTI: 30.4 cm  JUAN(I,D): 2.5 cm2  JUAN(V,D): 1.8 cm2  LV V1 max P.8 mmHg  LV V1 max: 67.9 cm/sec  LV V1 VTI: 18.3 cm  SV(LVOT): 76.1 ml  SI(LVOT): 39.2 ml/m2  AV Apollo Ratio (DI): 0.44  JUAN Index (cm2/m2): 1.3  E/E' av.0  Lateral E/e': 10.6  Medial E/e': 11.3     ______________________________________________________________________________  Report approved by: Homer Cobb 2022 04:17 PM         XR Chest Port 1 View    Narrative    XR CHEST PORT 1 VIEW  2022 9:05 PM      HISTORY: ETT migration    COMPARISON: 9/15/2022    FINDINGS:   Single AP view of the chest. Endotracheal tube tip overlying the mid  thoracic trachea approximately 4.5 cm above the taylor. Stable left  chest wall pacer leads. TAVR. Stable mediastinum. No pneumothorax.  Question trace pleural effusions. Increased left greater than right  perihilar and bibasilar interstitial and airspace opacities.      Impression    IMPRESSION:   1. Endotracheal tube tip overlying the mid thoracic trachea  approximately 4.5 cm above the taylor.  2. Increased perihilar and bibasilar opacities suspicious for  pulmonary edema and atelectasis versus infection.  3. Question trace pleural effusions.    I have personally reviewed the examination and initial interpretation  and I agree with the findings.    ANICETO QUIGLEY MD         SYSTEM ID:  [id number]

## 2022-09-17 NOTE — PLAN OF CARE
D/I: No change in neuro status. Precedex at 0.2mcg/kg/mn. No changes in vent settings. Pt unable to tolerate pressure support. Heart rate paced, blood pressure within parameters (see flow sheets). Adequate UO. Family at bedside for care conference.   A: Pt neurologically stable with neck brace and C-spine precautions.   P: Possible trach and PEG tube when family decides. Continue with current plan  of care. Update Md with concerns.     Plan of Care Reviewed With: patient, family

## 2022-09-17 NOTE — PROGRESS NOTES
SURGICAL ICU PROGRESS NOTE        PRIMARY TEAM: Trauma  PRIMARY PHYSICIAN: Dr. Nils Drew MD  REASON FOR CRITICAL CARE ADMISSION: Airway management/mechanical ventilation   ADMITTING PHYSICIAN: Dr. Kash Hendricks MD  Date of Service (when I saw the patient): 09/17/2022      ASSESSMENT:  Alexandru Still is a 89 year old male who was admitted to the SICU on 9/15/2022 after experiencing an unwitnessed fall and striking his head. He was transferred to the ED and was found to have a left frontal SDH (5mm) + C1 fx + Type II C2 odontoid fx w/ 1cm posterior displacement. Intubated for airway protection in the ED given high c-spine injury and difficulty swallowing. Awaiting decision from family regarding plan to proceed with surgery.     Changes today:  - Restarting IV lasix 20mg BID  - Care conference  - GI consult for feeding tube placement  - PST  - BNP today  - Will discuss starting subcutaneous heparin for DVT ppx with neurosurgery    PLAN:     Neurological:  # Acute pain   # Subdural Hematoma, stable  # C1, C2 Cervical Spine Injury  # hx of Lambert Eaton Myasthenic syndrome  - Monitor neurological status. Delirium preventions and precautions  - q1h neurochecks  - c-collar to remain on at all times, no pillows behind head, head straight, HOB not to exceed 30 degrees  - MIAMI J at all times, upright x-rays when able  - Palliative care consulted  - family care conference again today - family hesitant to proceed with surgery at this time but have not made a final decision  - Pain: harini tylenol, prn dilaudid                         - Sedation plan: precedex     Pulmonary:   # Mechanical Ventilatory Support  # Acute hypoxic respiratory support   # Pulmonary edema vs atelectasis  - VENT: CMV. Continue full vent support. Ventilatory bundle. HOB elevation   - Supplemental O2, goal SpO2 > 92%.  - Lung protective ventilator settings  - Pressure support trial today, ok to stay on this setting indefinitely if he does  well  Vent Mode: CMV/AC  (Continuous Mandatory Ventilation/ Assist Control)  FiO2 (%): 40 %  Resp Rate (Set): 16 breaths/min  Tidal Volume (Set, mL): 450 mL  PEEP (cm H2O): 5 cmH2O  Resp: 15    Cardiovascular:    # hx of TAVR   # hx of Complete Heart Block s/p Pacemaker  # hx of HTN  - Monitor hemodynamic status  - IV Lasix  - Echo 9/16 -- normal EF 60-65%, TAVR with appropriate function     Gastroenterology/Nutrition:  # Hiatal Hernia  - NPO  - difficulty with OG placement in ED and at bedside under fluoro due to laryngeal edema, will consult GI to attempt today (received decadron 10mg q8hrs x 3 doses)  - No enteral access  - No bowel reg, will start after enteral access obtained  - PRN ducolax suppository    Renal/Fluids/Electrolytes/:   # Hypokalemia, replaced  # Hypomagnesemia, replaced  # Hypophosphatemia, replaced  # Lactic acidosis, resolved  # Elevated BUN  # BPH  - Will cont to monitor I/O (0.5 ml/kg/hr UOP yesterday)  - Electrolyte replacement protocol (K, Mg, Phos)  - Escudero to remain in place while intubated  - Lactate normalized 9/15 (2.2 -> 1.3)  - hold PTA finasteride  - LR 75ml/hr while NPO  - Continue PTA lasix equivalent IV dosage     Endocrine:  # Hypothyroidism  # At risk of stress-induced hyperglycemia  # At risk of steroid-induced hyperglycemia  # Pre-diabetes  - PTA levothyroxine 112 mcg, will restart with enteral access, or will start IV tomorrow  - Hgb A1C 6.2  - Goal blood glucose < 180  - Continue glucose checks, no indication for insulin at this time     ID:  # Leukocytosis, downtrending from admission  - no indications for antibiotics  - f/up BCx (NGTD) & UA (unremarkable)  - leukocytosis likely reactive to trauma; will continue to monitor    Heme:     # Normocytic anemia  - Hemoglobin 13 on arrival  - Transfuse if hgb <8.0 or signs/symptoms of hypoperfusion.  - Monitor and trend.      MSK:  # Facial Abrasion   # Right Lower Leg Abrasion  # Weakness and deconditioning  - Physical and  occupational therapy consult   - local wound care     General Cares/Prophylaxis:    DVT Prophylaxis: SCDs; will discuss starting subcutaneous heparin with neurosurgery  GI Prophylaxis: PPI  Restraints: Restraints for medical healing needed: only if pt gets agitated and pulling at lines after chemical sedation     LDAs:  - PIVx2   - ETT  - Escudero     Disposition:  - SICU.     Patient seen, findings and plan discussed with surgical ICU staff, Dr. Bi MD.     Jocelyn Stanton MD, PhD on 2022 at 6:27 AM  PGY-1 Neurosurgery     - - - - - - - - - - - - - - - - - - - - - - - - - - - - - - - - - - - - - - - - - - - - - -   SUBJECTIVE: Stable on 0.2 precedex overnight, no change in neurologic exam. Still awaiting family decision regarding surgery.      HISTORY PRESENTING ILLNESS: Alexandru Still is a 89 year old male w/ a pmh of TAVR, complete heart block w/ pacemaker, and Lambert-Eaton syndrome who was admitted to the SICU on 9/15/2022 after experiencing an unwitnessed fall and striking his head. He was transferred to the ED and was found to have a left frontal SDH (5mm) + C1 fx + Type II C2 odontoid fx w/ 1cm posterior displacement. A c-collar was placed. He developed priapism which was c/f threatened neurologic function and the decision was made to intubate to protect the airway in the setting of c-spine injury. Initially DNR/DNI on presentation but pt voiced a desire to be intubated and receive chest compressions if necessary.    REVIEW OF SYSTEMS: Unable to complete ROS due to patient intubation.    PHYSICAL EXAMINATION:  Vital signs:  Temp: 97.4  F (36.3  C) Temp  Min: 97.4  F (36.3  C)  Max: 97.8  F (36.6  C)  Resp: 15 Resp  Min: 13  Max: 22  SpO2: 97 % SpO2  Min: 96 %  Max: 99 %  Pulse: (!) 49 Pulse  Min: 49  Max: 73    No data recorded  BP: (!) 141/75 Systolic (24hrs), Av , Min:110 , Max:154   Diastolic (24hrs), Av, Min:62, Max:85    General: intubated and on 0.2 precedex  HEENT: abrasion to right  forehead, ETT in place  Neck: MIAMI-J  in place   Neuro: awake/alert, PERRL, tracking appropriately, FCx4  Pulm/Resp: intubated and on mechanical ventilation  CV: bradycardic on monitor, paced rhythm  Abdomen: Soft, no grimace to palpation, nd  : hannah catheter in place with clear yellow urine  MSK/Extremities: no peripheral edema, extremities wwp      Data   I reviewed all medications, new labs and imaging results over the last 24 hours.  Arterial Blood Gases   Recent Labs   Lab 09/15/22  1733 09/15/22  1526   PH 7.33 7.41       Complete Blood Count   Recent Labs   Lab 09/17/22  0523 09/16/22  0927 09/15/22  0521   WBC 11.5* 9.8 15.9*   HGB 12.5* 10.4* 13.0*    213 265       Basic Metabolic Panel  Recent Labs   Lab 09/16/22  1105 09/16/22  1052 09/16/22  0402 09/16/22  0020 09/15/22  1959 09/15/22  1617 09/15/22  0521     --   --   --   --  138 137   POTASSIUM 3.4  --   --   --   --  3.8 3.0*   CHLORIDE 103  --   --   --   --  100 97*   CO2 24  --   --   --   --  28 27   BUN 18.4  --   --   --   --  16.5 16.7   CR 0.86  --   --   --   --  0.83 0.97   * 138* 162* 144*   < > 138* 126*    < > = values in this interval not displayed.       Liver Function Tests  Recent Labs   Lab 09/15/22  1617 09/15/22  0521   AST 20  --    ALT 11  --    ALKPHOS 70  --    BILITOTAL 0.6  --    ALBUMIN 3.8  --    INR  --  1.06       Pancreatic Enzymes  No lab results found in last 7 days.    Coagulation Profile  Recent Labs   Lab 09/15/22  0521   INR 1.06   PTT 29       IMAGING:  Recent Results (from the past 24 hour(s))   XR Feeding Tube Placement    Narrative    Feeding tube placement.    Comparison:  none.    History: Feeding tube needed for nutrition.     Fluoro time: 57.5 seconds    Technique: After injection of Xylocaine gel into the right nostril, a  feeding tube was advanced under fluoroscopic guidance. After  unsuccessful attempt in the right nostril, Xylocaine gel was injected  into the left nostril, and  the feeding tube was advanced under  fluoroscopic guidance.    Findings: The feeding tube was advanced with inability to negotiate  the nasopharynx there was resistance to advancing the feeding tube.  The other nostril was attempted without success.       Impression    Impression: Unsuccessful feeding tube placement.    IASHIA MD, attest that I was present for all critical  portions of the procedure and was immediately available to provide  guidance and assistance during the remainder of the procedure.    Echocardiogram Complete   Result Value    LVEF  60-65%    [facility]    [id number]  [id number]  [id number]  [id number]^VLADIMIR FSOTER^EV^ANAHY     [facility],[place]  Echocardiography Laboratory  [address] [place] [id number]     Name: LORENA GALVAN  MRN: [id number]  : 1930  Study Date: 2022 03:09 PM  Age: 89 yrs  Gender: Male  Patient Location: [facility]  Reason For Study: Cardiac Device, Unspecified  Ordering Physician: EV MARCH  Performed By: Ally Bower     BSA: 1.9 m2  Height: 68 in  Weight: 177 lb  HR: 50  BP: 127/74 mmHg  ______________________________________________________________________________  Procedure  Complete Portable Echo Adult. Contrast Optison. Technically difficult  study.Extremely poor acoustic windows. Optison (NDC #5604-4736-75) given  intravenously. Patient was given 5 ml mixture of 3 ml Optison and 6 ml saline.  4 ml wasted.  ______________________________________________________________________________  Interpretation Summary  Global and regional left ventricular function is normal with an EF of 60-65%.  Global right ventricular function is normal.  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 Doppler interrogation of the aortic valve is normal. The  peak velocity across the aortic valve is 1.6 m/sec. The mean gradient is 5  mmHg.  No pericardial effusion is  present.  ______________________________________________________________________________  Left Ventricle  Left ventricular size is normal. Global and regional left ventricular function  is normal with an EF of 60-65%.     Right Ventricle  The right ventricle is normal size. Global right ventricular function is  normal.     Mitral Valve  Mild to moderate mitral annular calcification is present.     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 Doppler interrogation of the aortic valve is normal. The  peak velocity across the aortic valve is 1.6 m/sec. The mean gradient is 5  mmHg.     Tricuspid Valve  Trace to mild tricuspid insufficiency is present. Estimated pulmonary artery  systolic pressure is 17 mmHg plus right atrial pressure.     Pulmonic Valve  On Doppler interrogation, there is no significant stenosis or regurgitation.     Vessels  The aorta root cannot be assessed. The inferior vena cava cannot be assessed.     Pericardium  No pericardial effusion is present.     Compared to Previous Study  There is no prior study for direct comparison.     ______________________________________________________________________________  MMode/2D Measurements & Calculations  asc Aorta Diam: 2.6 cm  LVOT diam: 2.3 cm  LVOT area: 4.2 cm2     Doppler Measurements & Calculations  MV E max apollo: 71.6 cm/sec  MV A max apollo: 94.3 cm/sec  MV E/A: 0.76  MV dec slope: 217.0 cm/sec2  MV dec time: 0.33 sec  Ao V2 max: 156.1 cm/sec  Ao max P.7 mmHg  Ao V2 mean: 109.4 cm/sec  Ao mean P.5 mmHg  Ao V2 VTI: 30.4 cm  JUAN(I,D): 2.5 cm2  JUAN(V,D): 1.8 cm2  LV V1 max P.8 mmHg  LV V1 max: 67.9 cm/sec  LV V1 VTI: 18.3 cm  SV(LVOT): 76.1 ml  SI(LVOT): 39.2 ml/m2  AV Apollo Ratio (DI): 0.44  JUAN Index (cm2/m2): 1.3  E/E' av.0  Lateral E/e': 10.6  Medial E/e': 11.3     ______________________________________________________________________________  Report approved by: Homer Cobb 2022 04:17 PM         XR Chest Port 1 View    Narrative     XR CHEST PORT 1 VIEW  9/16/2022 9:05 PM      HISTORY: ETT migration    COMPARISON: 9/15/2022    FINDINGS:   Single AP view of the chest. Endotracheal tube tip overlying the mid  thoracic trachea approximately 4.5 cm above the taylor. Stable left  chest wall pacer leads. TAVR. Stable mediastinum. No pneumothorax.  Question trace pleural effusions. Increased left greater than right  perihilar and bibasilar interstitial and airspace opacities.      Impression    IMPRESSION:   1. Endotracheal tube tip overlying the mid thoracic trachea  approximately 4.5 cm above the taylor.  2. Increased perihilar and bibasilar opacities suspicious for  pulmonary edema and atelectasis versus infection.  3. Question trace pleural effusions.    I have personally reviewed the examination and initial interpretation  and I agree with the findings.    ANICETO QUIGLEY MD         SYSTEM ID:  [id number]

## 2022-09-17 NOTE — PROGRESS NOTES
STAFF NOTE:  Family currently wants to hold on surgery, but no plans to transition to comfort care    Exam CAM-ICU negative  Good  strength, can lift both legs up off the bed  Still has priapism with hannah in place  Some edema, about the same as yestrdday  No lines; no enteral access  Dayanara ROSALES in situ    Labs overall unremarkable  WBC and BUN bumped with the initiation of steroids, but Cr is stable  All counts on CBC are up, though, so may also be some concentration effect    By I/Os euvolemic  CXR slight pulmonary edema / effusions    Formal TTE no concerns    This is a 89M hx Lambert-Eaton syndrome, TAVR, complete heart block with pacemaker dependency, DM2, arthritis, BPH, HTN.  Unwitnessed fall with small frontal intracranial hemorrhage (SDH) and odontoid fracture with displacement and assocaited epidural hemorrhage.  He was intubated for airway protection given swallowign issues.      The big question is whether he will undergo surgical cervical stabilization; if he does, then I think he is indicated for trach/PEG given his swallowing issues and the need to remain in the C collar (will need a collar for ~6weeks after posterior fusion, too).    We will again attempt to get enteric access today.  We can start SQH      METABOLIC ENCEPHALOPATHY / DELIRIUM:  -due to head bleed; monitoring with clinical exam  -nothing for sleep given no enteral access; precedex for sedation     SDH:  -NTD acutely.  Holding prescribed aspirin (he had not taken it for a few months per drug reconciliation)    ODONTOID FRACTURE:  -C collar.  Very strict cervical precautions    NEED FOR MECHANICAL VENTILATION:  -intubated for airway protection and difficulties swallowing, rather than hypoxia or work of breathing  -ok for indefinite pressure support  -I anticiapte will need tracheotomy if we continue with full supportive cares    PACEMAKER DEPENDENCE:  -underlying rhythm is reportedly complete heart block  -since likely going to surgery,  will want a device check, although I don't think a posterior fusion will be close enough to have any significant effect on pacing functionality, and even if it did, he should do fine with just a dumb magnet    Hx TAVR:  -no need for anticoagulation  -can give equivalent of his home lasix now    INADEQUATE ORAL INTAKE:  -will again consult GI about possible Cortrak placement today for tube feeds; he has now had 24h of steroids for edema  -start bowel regimen if we get enteral access    HYPOTHYROID:  -levothyroxine once we have enteric access; if we can't get enteral access, then will start IV levothyroxine tomorrow    HYPERGLYCEMIA:  -HgbA1c was 6.2  -has not needed sliding scale insulin, even when on steroids    MISC:  -Code status is full code per patient report  -family updated by trauma service and resident; care conferences ongoing with trauma + neurosurg + Palliative today  -SQH can start today; PPI for intubation; make enteral once we have gastric access   -lines: peripheral only  -hannah while intubated and given priapism  -anticipate discharge to skilled nursing or LTACH after trach/PEG if he remains full code, probably in >>1 week.    Billing statement: 39min of critical care time; spent in an initial review of imaging, labs, physical exam, and discussion of the patient with my own team and the extended care team including the primary service; Based on this patient's presentation / recent intervention and my bedside assessment, I felt there was or is a reasonably high probability of imminent or life-threatening deterioration today or tonight for neurologic reasons.   My overall critical care time, as described in detail above, includes such things as coordination of care, arrhythmia and hemodynamics management with infusions of medicines, respiratory management, fluid therapy including fluid boluses, and pain and sedation therapy. This time excludes time I spent personally performing or supervising  procedures for this patient.    SHABBIR Pat MD  Clinical   Anesthesia / Critical Care  *[phone]

## 2022-09-17 NOTE — PLAN OF CARE
Major Shift Events:  No change to neuro exam overnight. Continues on 0.2 of Dex. Prn dilaudid given one time. CV - 100% Paced rhythm. -150, DBP 75-80. CMV vent settings, unchanged. Lung sounds clear, scant secretions.     Plan: Discuss plan of care with family, continue frequent neuro checks.   For vital signs and complete assessments, please see documentation flowsheets.

## 2022-09-18 ENCOUNTER — APPOINTMENT (OUTPATIENT)
Dept: GENERAL RADIOLOGY | Facility: CLINIC | Age: 87
DRG: 004 | End: 2022-09-18
Attending: ANESTHESIOLOGY
Payer: MEDICARE

## 2022-09-18 ENCOUNTER — APPOINTMENT (OUTPATIENT)
Dept: GENERAL RADIOLOGY | Facility: CLINIC | Age: 87
DRG: 004 | End: 2022-09-18
Payer: MEDICARE

## 2022-09-18 ENCOUNTER — APPOINTMENT (OUTPATIENT)
Dept: GENERAL RADIOLOGY | Facility: CLINIC | Age: 87
DRG: 004 | End: 2022-09-18
Attending: PHYSICIAN ASSISTANT
Payer: MEDICARE

## 2022-09-18 LAB
ABO/RH(D): NORMAL
ANION GAP SERPL CALCULATED.3IONS-SCNC: 10 MMOL/L (ref 7–15)
ANTIBODY SCREEN: NEGATIVE
BUN SERPL-MCNC: 33.8 MG/DL (ref 8–23)
CALCIUM SERPL-MCNC: 8.9 MG/DL (ref 8.2–9.6)
CHLORIDE SERPL-SCNC: 106 MMOL/L (ref 98–107)
CREAT SERPL-MCNC: 0.91 MG/DL (ref 0.67–1.17)
DEPRECATED HCO3 PLAS-SCNC: 26 MMOL/L (ref 22–29)
ERYTHROCYTE [DISTWIDTH] IN BLOOD BY AUTOMATED COUNT: 15.3 % (ref 10–15)
GFR SERPL CREATININE-BSD FRML MDRD: 79 ML/MIN/1.73M2
GLUCOSE SERPL-MCNC: 121 MG/DL (ref 70–99)
HCT VFR BLD AUTO: 37.9 % (ref 40–53)
HGB BLD-MCNC: 12.5 G/DL (ref 13.3–17.7)
MAGNESIUM SERPL-MCNC: 2.3 MG/DL (ref 1.7–2.3)
MCH RBC QN AUTO: 30.5 PG (ref 26.5–33)
MCHC RBC AUTO-ENTMCNC: 33 G/DL (ref 31.5–36.5)
MCV RBC AUTO: 92 FL (ref 78–100)
NT-PROBNP SERPL-MCNC: 854 PG/ML (ref 0–1800)
PHOSPHATE SERPL-MCNC: 3 MG/DL (ref 2.5–4.5)
PLATELET # BLD AUTO: 233 10E3/UL (ref 150–450)
POTASSIUM SERPL-SCNC: 3.2 MMOL/L (ref 3.4–5.3)
POTASSIUM SERPL-SCNC: 3.5 MMOL/L (ref 3.4–5.3)
RBC # BLD AUTO: 4.1 10E6/UL (ref 4.4–5.9)
SODIUM SERPL-SCNC: 142 MMOL/L (ref 136–145)
SPECIMEN EXPIRATION DATE: NORMAL
WBC # BLD AUTO: 12.3 10E3/UL (ref 4–11)

## 2022-09-18 PROCEDURE — 0B21XEZ CHANGE ENDOTRACHEAL AIRWAY IN TRACHEA, EXTERNAL APPROACH: ICD-10-PCS | Performed by: ANESTHESIOLOGY

## 2022-09-18 PROCEDURE — C9113 INJ PANTOPRAZOLE SODIUM, VIA: HCPCS | Performed by: PHYSICIAN ASSISTANT

## 2022-09-18 PROCEDURE — 83735 ASSAY OF MAGNESIUM: CPT

## 2022-09-18 PROCEDURE — 250N000013 HC RX MED GY IP 250 OP 250 PS 637: Performed by: NURSE PRACTITIONER

## 2022-09-18 PROCEDURE — 86850 RBC ANTIBODY SCREEN: CPT

## 2022-09-18 PROCEDURE — 0BJ08ZZ INSPECTION OF TRACHEOBRONCHIAL TREE, VIA NATURAL OR ARTIFICIAL OPENING ENDOSCOPIC: ICD-10-PCS | Performed by: ANESTHESIOLOGY

## 2022-09-18 PROCEDURE — 43241 EGD TUBE/CATH INSERTION: CPT | Performed by: SURGERY

## 2022-09-18 PROCEDURE — 99232 SBSQ HOSP IP/OBS MODERATE 35: CPT | Mod: GC | Performed by: NEUROLOGICAL SURGERY

## 2022-09-18 PROCEDURE — 250N000009 HC RX 250: Performed by: NURSE PRACTITIONER

## 2022-09-18 PROCEDURE — 250N000013 HC RX MED GY IP 250 OP 250 PS 637: Performed by: SURGERY

## 2022-09-18 PROCEDURE — 74018 RADEX ABDOMEN 1 VIEW: CPT | Mod: 26 | Performed by: RADIOLOGY

## 2022-09-18 PROCEDURE — 36415 COLL VENOUS BLD VENIPUNCTURE: CPT | Performed by: SURGERY

## 2022-09-18 PROCEDURE — 71045 X-RAY EXAM CHEST 1 VIEW: CPT

## 2022-09-18 PROCEDURE — 83880 ASSAY OF NATRIURETIC PEPTIDE: CPT

## 2022-09-18 PROCEDURE — 250N000011 HC RX IP 250 OP 636: Performed by: PHYSICIAN ASSISTANT

## 2022-09-18 PROCEDURE — 250N000011 HC RX IP 250 OP 636: Performed by: SURGERY

## 2022-09-18 PROCEDURE — 250N000011 HC RX IP 250 OP 636

## 2022-09-18 PROCEDURE — 250N000013 HC RX MED GY IP 250 OP 250 PS 637: Performed by: PHYSICIAN ASSISTANT

## 2022-09-18 PROCEDURE — 999N000065 XR ABDOMEN PORT 1 VIEW

## 2022-09-18 PROCEDURE — 999N000065 XR CHEST PORT 1 VIEW

## 2022-09-18 PROCEDURE — 84132 ASSAY OF SERUM POTASSIUM: CPT | Performed by: SURGERY

## 2022-09-18 PROCEDURE — 71045 X-RAY EXAM CHEST 1 VIEW: CPT | Mod: 26 | Performed by: RADIOLOGY

## 2022-09-18 PROCEDURE — 72040 X-RAY EXAM NECK SPINE 2-3 VW: CPT

## 2022-09-18 PROCEDURE — 94003 VENT MGMT INPAT SUBQ DAY: CPT

## 2022-09-18 PROCEDURE — 72040 X-RAY EXAM NECK SPINE 2-3 VW: CPT | Mod: 26 | Performed by: RADIOLOGY

## 2022-09-18 PROCEDURE — 84100 ASSAY OF PHOSPHORUS: CPT

## 2022-09-18 PROCEDURE — 999N000157 HC STATISTIC RCP TIME EA 10 MIN

## 2022-09-18 PROCEDURE — 80048 BASIC METABOLIC PNL TOTAL CA: CPT

## 2022-09-18 PROCEDURE — 250N000011 HC RX IP 250 OP 636: Performed by: ANESTHESIOLOGY

## 2022-09-18 PROCEDURE — 200N000002 HC R&B ICU UMMC

## 2022-09-18 PROCEDURE — 85018 HEMOGLOBIN: CPT

## 2022-09-18 PROCEDURE — 258N000003 HC RX IP 258 OP 636

## 2022-09-18 PROCEDURE — 86901 BLOOD TYPING SEROLOGIC RH(D): CPT

## 2022-09-18 PROCEDURE — 74018 RADEX ABDOMEN 1 VIEW: CPT

## 2022-09-18 PROCEDURE — 36415 COLL VENOUS BLD VENIPUNCTURE: CPT

## 2022-09-18 PROCEDURE — 250N000011 HC RX IP 250 OP 636: Performed by: NURSE PRACTITIONER

## 2022-09-18 PROCEDURE — 0DJ08ZZ INSPECTION OF UPPER INTESTINAL TRACT, VIA NATURAL OR ARTIFICIAL OPENING ENDOSCOPIC: ICD-10-PCS | Performed by: ANESTHESIOLOGY

## 2022-09-18 PROCEDURE — 99291 CRITICAL CARE FIRST HOUR: CPT | Mod: 25 | Performed by: ANESTHESIOLOGY

## 2022-09-18 PROCEDURE — 250N000009 HC RX 250

## 2022-09-18 PROCEDURE — 99232 SBSQ HOSP IP/OBS MODERATE 35: CPT | Performed by: PHYSICIAN ASSISTANT

## 2022-09-18 RX ORDER — PROPOFOL 10 MG/ML
25-50 INJECTION, EMULSION INTRAVENOUS EVERY 5 MIN PRN
Status: DISCONTINUED | OUTPATIENT
Start: 2022-09-18 | End: 2022-09-19

## 2022-09-18 RX ORDER — FENTANYL CITRATE 50 UG/ML
200 INJECTION, SOLUTION INTRAMUSCULAR; INTRAVENOUS EVERY 5 MIN PRN
Status: DISCONTINUED | OUTPATIENT
Start: 2022-09-18 | End: 2022-09-18

## 2022-09-18 RX ORDER — OXYCODONE HCL 5 MG/5 ML
5 SOLUTION, ORAL ORAL
Status: DISCONTINUED | OUTPATIENT
Start: 2022-09-18 | End: 2022-09-22 | Stop reason: HOSPADM

## 2022-09-18 RX ORDER — AMINO AC/PROTEIN HYDR/WHEY PRO 10G-100/30
2 LIQUID (ML) ORAL EVERY 8 HOURS
Status: DISCONTINUED | OUTPATIENT
Start: 2022-09-18 | End: 2022-09-22 | Stop reason: HOSPADM

## 2022-09-18 RX ORDER — POTASSIUM CHLORIDE 29.8 MG/ML
20 INJECTION INTRAVENOUS
Status: DISCONTINUED | OUTPATIENT
Start: 2022-09-18 | End: 2022-09-18 | Stop reason: CLARIF

## 2022-09-18 RX ORDER — FENTANYL CITRATE 50 UG/ML
INJECTION, SOLUTION INTRAMUSCULAR; INTRAVENOUS
Status: COMPLETED
Start: 2022-09-18 | End: 2022-09-18

## 2022-09-18 RX ORDER — ACETAMINOPHEN 325 MG/1
650 TABLET ORAL EVERY 6 HOURS
Status: DISCONTINUED | OUTPATIENT
Start: 2022-09-18 | End: 2022-09-18

## 2022-09-18 RX ORDER — LIDOCAINE HYDROCHLORIDE 20 MG/ML
SOLUTION OROPHARYNGEAL
Status: COMPLETED
Start: 2022-09-18 | End: 2022-09-18

## 2022-09-18 RX ORDER — GUAIFENESIN 600 MG/1
15 TABLET, EXTENDED RELEASE ORAL DAILY
Status: DISCONTINUED | OUTPATIENT
Start: 2022-09-18 | End: 2022-09-22 | Stop reason: HOSPADM

## 2022-09-18 RX ORDER — AMPICILLIN AND SULBACTAM 2; 1 G/1; G/1
3 INJECTION, POWDER, FOR SOLUTION INTRAMUSCULAR; INTRAVENOUS EVERY 6 HOURS
Status: DISCONTINUED | OUTPATIENT
Start: 2022-09-18 | End: 2022-09-19

## 2022-09-18 RX ORDER — POTASSIUM CHLORIDE 7.45 MG/ML
10 INJECTION INTRAVENOUS
Status: DISPENSED | OUTPATIENT
Start: 2022-09-18 | End: 2022-09-18

## 2022-09-18 RX ORDER — HEPARIN SODIUM 5000 [USP'U]/.5ML
5000 INJECTION, SOLUTION INTRAVENOUS; SUBCUTANEOUS EVERY 8 HOURS
Status: DISCONTINUED | OUTPATIENT
Start: 2022-09-18 | End: 2022-09-22 | Stop reason: HOSPADM

## 2022-09-18 RX ORDER — SENNOSIDES 8.6 MG
8.6 TABLET ORAL 2 TIMES DAILY
Status: DISCONTINUED | OUTPATIENT
Start: 2022-09-18 | End: 2022-09-22 | Stop reason: HOSPADM

## 2022-09-18 RX ORDER — POTASSIUM CHLORIDE 1.5 G/1.58G
20 POWDER, FOR SOLUTION ORAL ONCE
Status: COMPLETED | OUTPATIENT
Start: 2022-09-18 | End: 2022-09-18

## 2022-09-18 RX ORDER — ACETAMINOPHEN 325 MG/10.15ML
650 LIQUID ORAL EVERY 6 HOURS
Status: DISCONTINUED | OUTPATIENT
Start: 2022-09-18 | End: 2022-09-21

## 2022-09-18 RX ORDER — PROPOFOL 10 MG/ML
INJECTION, EMULSION INTRAVENOUS
Status: COMPLETED
Start: 2022-09-18 | End: 2022-09-18

## 2022-09-18 RX ORDER — DEXTROSE MONOHYDRATE 100 MG/ML
INJECTION, SOLUTION INTRAVENOUS CONTINUOUS PRN
Status: DISCONTINUED | OUTPATIENT
Start: 2022-09-18 | End: 2022-09-22 | Stop reason: HOSPADM

## 2022-09-18 RX ADMIN — POTASSIUM CHLORIDE 20 MEQ: 1.5 POWDER, FOR SOLUTION ORAL at 18:20

## 2022-09-18 RX ADMIN — HEPARIN SODIUM 5000 UNITS: 5000 INJECTION, SOLUTION INTRAVENOUS; SUBCUTANEOUS at 23:50

## 2022-09-18 RX ADMIN — PROPOFOL 90 MG: 10 INJECTION, EMULSION INTRAVENOUS at 15:47

## 2022-09-18 RX ADMIN — LIDOCAINE HYDROCHLORIDE: 20 SOLUTION ORAL; TOPICAL at 15:53

## 2022-09-18 RX ADMIN — FENTANYL CITRATE 200 MCG: 50 INJECTION, SOLUTION INTRAMUSCULAR; INTRAVENOUS at 15:46

## 2022-09-18 RX ADMIN — PANTOPRAZOLE SODIUM 40 MG: 40 INJECTION, POWDER, FOR SOLUTION INTRAVENOUS at 07:32

## 2022-09-18 RX ADMIN — ACETAMINOPHEN 650 MG: 650 SUPPOSITORY RECTAL at 10:39

## 2022-09-18 RX ADMIN — HEPARIN SODIUM 5000 UNITS: 5000 INJECTION, SOLUTION INTRAVENOUS; SUBCUTANEOUS at 07:45

## 2022-09-18 RX ADMIN — ACETAMINOPHEN 650 MG: 650 SUPPOSITORY RECTAL at 02:13

## 2022-09-18 RX ADMIN — FUROSEMIDE 20 MG: 10 INJECTION, SOLUTION INTRAVENOUS at 07:41

## 2022-09-18 RX ADMIN — POTASSIUM CHLORIDE 10 MEQ: 7.46 INJECTION, SOLUTION INTRAVENOUS at 05:44

## 2022-09-18 RX ADMIN — ACETAMINOPHEN 650 MG: 325 SOLUTION ORAL at 23:50

## 2022-09-18 RX ADMIN — SENNOSIDES 8.6 MG: 8.6 TABLET, FILM COATED ORAL at 20:34

## 2022-09-18 RX ADMIN — DOXAZOSIN 1 MG: 4 TABLET ORAL at 20:34

## 2022-09-18 RX ADMIN — DEXMEDETOMIDINE HYDROCHLORIDE 0.3 MCG/KG/HR: 400 INJECTION INTRAVENOUS at 14:32

## 2022-09-18 RX ADMIN — MULTIVITAMIN 15 ML: LIQUID ORAL at 18:20

## 2022-09-18 RX ADMIN — HYDROMORPHONE HYDROCHLORIDE 0.5 MG: 1 INJECTION, SOLUTION INTRAMUSCULAR; INTRAVENOUS; SUBCUTANEOUS at 07:40

## 2022-09-18 RX ADMIN — AMPICILLIN SODIUM AND SULBACTAM SODIUM 3 G: 2; 1 INJECTION, POWDER, FOR SOLUTION INTRAMUSCULAR; INTRAVENOUS at 20:33

## 2022-09-18 RX ADMIN — HEPARIN SODIUM 5000 UNITS: 5000 INJECTION, SOLUTION INTRAVENOUS; SUBCUTANEOUS at 16:27

## 2022-09-18 RX ADMIN — POTASSIUM CHLORIDE 10 MEQ: 7.46 INJECTION, SOLUTION INTRAVENOUS at 06:53

## 2022-09-18 RX ADMIN — ACETAMINOPHEN 650 MG: 325 SOLUTION ORAL at 18:21

## 2022-09-18 RX ADMIN — HYDROMORPHONE HYDROCHLORIDE 0.5 MG: 1 INJECTION, SOLUTION INTRAMUSCULAR; INTRAVENOUS; SUBCUTANEOUS at 11:05

## 2022-09-18 RX ADMIN — LEVOTHYROXINE SODIUM 112 MCG: 100 SOLUTION ORAL at 20:34

## 2022-09-18 RX ADMIN — SODIUM CHLORIDE, POTASSIUM CHLORIDE, SODIUM LACTATE AND CALCIUM CHLORIDE: 600; 310; 30; 20 INJECTION, SOLUTION INTRAVENOUS at 02:23

## 2022-09-18 RX ADMIN — MIDAZOLAM HYDROCHLORIDE 2 MG: 1 INJECTION, SOLUTION INTRAMUSCULAR; INTRAVENOUS at 15:46

## 2022-09-18 ASSESSMENT — ACTIVITIES OF DAILY LIVING (ADL)
CONCENTRATING,_REMEMBERING_OR_MAKING_DECISIONS_DIFFICULTY: NO
DRESSING/BATHING_DIFFICULTY: YES
DRESSING/BATHING: DRESSING DIFFICULTY, ASSISTANCE 1 PERSON;BATHING DIFFICULTY, ASSISTANCE 1 PERSON;BATHING DIFFICULTY, REQUIRES EQUIPMENT
ADLS_ACUITY_SCORE: 50
TOILETING_ISSUES: NO
ADLS_ACUITY_SCORE: 50
ADLS_ACUITY_SCORE: 54
BATHING: 1-->ASSISTANCE NEEDED
TRANSFERRING: 1-->ASSISTANCE (EQUIPMENT/PERSON) NEEDED (NOT DEVELOPMENTALLY APPROPRIATE)
VISION_MANAGEMENT: GLASSES
ADLS_ACUITY_SCORE: 50
CHANGE_IN_FUNCTIONAL_STATUS_SINCE_ONSET_OF_CURRENT_ILLNESS/INJURY: YES
DRESS: 1-->ASSISTANCE (EQUIPMENT/PERSON) NEEDED
DIFFICULTY_EATING/SWALLOWING: NO
WEAR_GLASSES_OR_BLIND: YES
TRANSFERRING: 1-->ASSISTANCE (EQUIPMENT/PERSON) NEEDED
ADLS_ACUITY_SCORE: 50
ADLS_ACUITY_SCORE: 50
WALKING_OR_CLIMBING_STAIRS: AMBULATION DIFFICULTY, REQUIRES EQUIPMENT;TRANSFERRING DIFFICULTY, REQUIRES EQUIPMENT
DRESS: 1-->ASSISTANCE (EQUIPMENT/PERSON) NEEDED (NOT DEVELOPMENTALLY APPROPRIATE)
NUMBER_OF_TIMES_PATIENT_HAS_FALLEN_WITHIN_LAST_SIX_MONTHS: 3
ADLS_ACUITY_SCORE: 50
ADLS_ACUITY_SCORE: 50
DOING_ERRANDS_INDEPENDENTLY_DIFFICULTY: NO
ADLS_ACUITY_SCORE: 47
FALL_HISTORY_WITHIN_LAST_SIX_MONTHS: YES
ADLS_ACUITY_SCORE: 50
ADLS_ACUITY_SCORE: 50
WALKING_OR_CLIMBING_STAIRS_DIFFICULTY: YES
EQUIPMENT_CURRENTLY_USED_AT_HOME: WALKER, ROLLING
ADLS_ACUITY_SCORE: 50

## 2022-09-18 NOTE — PROGRESS NOTES
[facility]  Trauma Service Progress Note    Date of Service: 09/18/2022    Trauma mechanism: Fall while going to urinal    Time/date of injury: 9/15/22  Known Injuries:  1. Anterior left frontal SDH, 5 mm  2. Posteriorly displaced Tyle II odontoid fx, ~1cm posterior dislocation   3. C1 anterior arch fx   4. Epidural hemorrhage up to 7mm along posterior dens   5. Questionable acute anterior left 6th rib fx  6. Right forehead abrasion      Assessment & Plan                 Neuro/Pain/Psych:  # Fall from standing,   # Traumatic SDH, along left frontal lobe up to 5mm in thickness.   # Posteriorly displaced Tyle II odontoid fx, ~1cm posterior dislocation   # C1 anterior arch fx   # Epidural hemorrhage up to 7mm along posterior dens   # Sensorineural Hearing loss   - Repeat Head CT complete, stable  - Upright xrays: Redemonstrated type II odontoid fracture with 8 mm of retrolisthesis and hyperextension.  - Neurosurgery following: Serial neurological examinations, C collar at all times (no pillows) - keep neck flat, no pillows, Orthotics consult for MIAMI J, Upright x rays when able, Platelets > 100,000, INR < 1.5, Hemoglobin > 8, DVT: SCDs while in bed.    # Acute traumatic neck pain  # Chronic hip and shoulder pain  # Sedation   - SICU managing Precedex  - Scheduled: Tylenol supp.,   - Prn: Dilaudid 0.5-1mg    EENT:  # Laryngeal Edema   - No enteral access   - OG was attempted in ED on 9/15, unsuccessful after 2 attempts by Dr Pat. Tube would route to the trachea.  - 9/16:  Radiology was unable to place NG tube d/t laryngeal edema. Recommend PEG for enteral access.   - 9/16 3 doses Dexamethasone q8hrs per Dr Pat  - SICU planning on attempting NG again today       Pulmonary:  # Acute respiratory distress requiring intubation and MV  # PB  - SICU managing MV  - Holding PTA: Flonase, Duoneb     Cardiovascular:    # Hypertension   # Complete heart block s/p PM  placement  # Nonrheumatic aortic valve stenosis s/p TAVR 2019  # HF  - Monitor hemodynamic status.   - BNP in ED: 498?596  - PTA Lasix po changed to IV, 20mg bid      GI/Nutrition:    # Hx of dysphagia   # Hiatal Hernia    - Unable to get OG placement in ED, SICU attempting placement of NG today to start TF   - NSGY ok with medications and enteral feeding      Renal/ Fluids/Electrolytes:  # Hypomagnesia, improved   # Hypokalemia  # Lactic Acidosis, improved with fluids   - restarted daily potassium  - electrolyte replacement protocol in place.   - Holding PTA: finasteride      Endocrine:  # Hypothyroidism   - Holding levothyroxine until enteral access, last dose 9/14. If unable to get access today then will start IV     Infectious disease:   # Leukocytosis  - Admitted to [facility] for Septic Shock, CAP, Streptococcus dysgalactiae (Group G Strep) Bacteremia, Urinary retention with urethral stone s/p ureteroscopy and lithotripsy  - WBC: 15.9?9.8?11.5?12.3,  elevation without transfusion probably d/t decrease in overall volume status, more concentrated. Increase along all cell lines.   - LA: 2.2 ? 1.3, improved with fluids  - Procal: 0.04  - 9/15: BCx2: NGTD  - No indications for antibiotics.   - UA unremarkable for infection     Hematology:    # Anemia of chronic illness   - Hgb 12.5, stable   - Threshold for transfusion if hgb <7.0 or signs/symptoms of hypoperfusion.      # DVT Prophylaxis: pcd, no pharmacologic prophylaxis at this time d/t SDH and possible epidural hemorrhage.      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     Skin:  # Abrasion to forehead   - dilgent cares to prevent skin breakdown and wound formation.      Palliative Following: will touch base with SICU this week    SICU  contacted family and reaffirmed that they believe that the patient does not want the surgery, but does want the trach and the Peg. Understanding the risks that would occur with that plan.      Lines/ tubes/ drains:  - PIV/ ETT/ Escudero      General Cares:                 PPI/H2 blocker:  Protonix                 DVT prophylaxis: pcd                Bowel Regimen/Date of last stool: in place, unable to take orals                 Pulmonary toilet: MV hygiene      Code status:  Full Code, confirmed with patient multiple times prior to intubation. Patient was also full code during June admission when critically ill.       Discharge goals:       Expected D/C date: Remain in SICU    Tammy Velásquez PA-C  To contact the trauma service use job code pager [phone],   Numeric texts or alpha text through [facility]     Interval History    Patient will follow with eyes and follow commands.   ROS x 8 negative with exception of those things listed in interval hx    Physical Exam   Temp: 97.5  F (36.4  C) Temp src: Axillary BP: 137/78 Pulse: (!) 49   Resp: 12 SpO2: 99 % O2 Device: Mechanical Ventilator    Vitals:    09/15/22 2000 09/17/22 0000 09/18/22 0500   Weight: 80.7 kg (177 lb 14.6 oz) 81.4 kg (179 lb 7.3 oz) 80.4 kg (177 lb 4 oz)     Vital Signs with Ranges  Temp:  [97.5  F (36.4  C)-97.8  F (36.6  C)] 97.5  F (36.4  C)  Pulse:  [49] 49  Resp:  [11-23] 12  BP: (132-163)/(71-86) 137/78  FiO2 (%):  [40 %] 40 %  SpO2:  [97 %-100 %] 99 %  I/O last 3 completed shifts:  In: 1939.37 [I.V.:1939.37]  Out: 1680 [Urine:1680]    Keily Coma Scale - Total 11i/15  Eye Response (E): 4   4= spontaneous, 3= to verbal/voice, 2= to pain, 1= No response   Verbal Response (V): 1i   5= Orientated, converses, 4= Confused, converses, 3= Inappropriate words, 2= Incomprehensible sounds, 1=No response   Motor Response (M): 6   6= Obeys commands, 5= Localizes to pain, 4= Withdrawal to pain, 3=Fexion to pain, 2= Extension to pain, 1= No response      Constitutional: Awake, alert, cooperative, no apparent distress.  HEENT: Normocephalic, abrasion to right forehead, PERRL, [place]-J in Place  Respiratory: ETT in place,   Cardiovascular:  wade rate and rhythm, paced  GI: Abdomen soft, non-distended,   Genitourinary:  Escudero in place   Skin:  Warm & dry, ecchymosis in various stages of healing over body   Musculoskeletal: There is no redness, warmth, or swelling of the joints.  Pedal pulse palpated.  Neurologic:  Strength and sensory is intact. No new, focal deficits.able to follow commands.   Psych: calm,

## 2022-09-18 NOTE — PLAN OF CARE
D/I: Pt follows some simple commands. Sats 97% on vent settings. No vent changes. Blood pressure and heart rate stable. Tube feeds restarted, BM x2. Incontinent of urine.   A: No major shift events.   P: Continue with current plan of care. Update MD with concerns.

## 2022-09-18 NOTE — PLAN OF CARE
Major Shift Events:  No change in neuro exam throughout night. Patient complaining of pain. Dilaudid given 1x prn. Lasix given with results, see flowsheets. 40mEq of K replaced this AM. No BM   Plan:  Solidify plan of care with patient's support system.   For vital signs and complete assessments, please see documentation flowsheets.

## 2022-09-18 NOTE — PROCEDURES
Intubation     Date/Time: 9/18/2022 4:02 PM  Performed by: Starla Sawant MD  Authorized by: Starla Sawant MD   Indications: pulmonary toilet and  respiratory failure  Intubation method: video-assisted      UNIVERSAL PROTOCOL   Site Marked: NA  Prior Images Obtained and Reviewed:  NA  Required items: Required blood products, implants, devices and special equipment available    Patient identity confirmed:  Arm band  Patient was reevaluated immediately before administering moderate or deep sedation or anesthesia  Confirmation Checklist:  Patient's identity using two indicators  Time out: Immediately prior to the procedure a time out was called    Universal Protocol: the Joint Commission Universal Protocol was followed    Preparation: Patient was prepped and draped in usual sterile fashion       Patient status: sedated  Pretreatment medications: fentanyl and midazolam  Sedatives: propofol  Tube size: 7.5 mm  Tube type: cuffed  Number of attempts: 1  Post-procedure assessment: CXR verification  Cuff inflated: yes  ETT to lip: 25 cm  Tube secured with: ETT lynn           PROCEDURE  Describe Procedure: We noted copious brown / tan secretion around the endotracheal tube. We decided to perform a tube exchange. We placed a tube exchange catheter down the current ETT, and using Seldinger technique, we exchanged this for a new 7.5 ETT. The bronchoscope was used to inspect the left and right main bronchi, and we found a moderate amount of bloody secretions. We confirmed the ETT was 4 cm away from the taylor and the tube was secured.   Length of time physician/provider present for 1:1 monitoring during sedation: 30

## 2022-09-18 NOTE — PROCEDURES
Procedure: EGD, feeding tube placement      Fellow: Starla Sawant MD      Staff: Marcel Pat MD      Indication: respiratory failure, malnutrition       Complications: none     Medications used: versed, propofol, fentanyl      Indications:   Alexandru Still is a 89 year old male here s/p fall from standing with a cervical spine fracture and respiratory failure. He does not have enteral access due to difficulty placing NGT at bedside despite use of fluoroscopy. This is likely complicated by his pharyngeal and cervical edema. Discussion was had to attempt a small bore feeding tube for him by using endoscopy. Consent was obtained from the POA after discussing risks and benefits.      Description of procedure:   The patient was sedated, placed in supine position, and preoxygenated on the ventilator. We first placed the feeding tube through the nasopharynx and brought it out through the mouth. The tip of the tube was grasped with the snare from the endoscope. We first attempted with a pediatric scope, but were unable to obtain sufficient views. We transitioned to an adult scope and were able to pass the tube into the stomach. The wire was placed into the feeding tube, and the snare was opened. The scope was withdrawn and confirmed the tube was remaining in the stomach. The nasal bridal was placed and secured around the tube. The patient tolerated the procedure well.     Dr. Pat was immediately available for the procedure.     OK for meds via NG tube.   OK for trickle feeds tonight.   OK to restart anticoagulation four hours after procedure.

## 2022-09-18 NOTE — PROGRESS NOTES
SURGICAL ICU PROGRESS NOTE        PRIMARY TEAM: Trauma  PRIMARY PHYSICIAN: Dr. Nils Drew MD  REASON FOR CRITICAL CARE ADMISSION: Airway management/mechanical ventilation   ADMITTING PHYSICIAN: Dr. Kash Hendricks MD  Date of Service (when I saw the patient): 09/18/2022      ASSESSMENT:  Alexandru Still is a 89 year old male who was admitted to the SICU on 9/15/2022 after experiencing an unwitnessed fall and striking his head. He was transferred to the ED and was found to have a left frontal SDH (5mm) + C1 fx + Type II C2 odontoid fx w/ 1cm posterior displacement. Intubated for airway protection in the ED given high c-spine injury and difficulty swallowing. Family not planning on proceeding with C-spine stabilization surgery but will likely desire trach/PEG.     Changes today:  - Decreased frequency of neuro checks to q4hr  - Starting subcutaneous heparin q8hrs for DVT ppx  - Family does not plan for surgical intervention of C-spine, likely trach/PEG this week  - Decreased respiratory rate  - Discontinue lasix and IVF  - Endoscopy to place NJ this afternoon    PLAN:     Neurological:  # Acute pain   # Subdural Hematoma, stable  # C1, C2 Cervical Spine Injury  # hx of Lambert Eaton Myasthenic syndrome  - Monitor neurological status. Delirium preventions and precautions  - q4h neurochecks  - c-collar to remain on at all times, no pillows behind head, head straight, HOB not to exceed 30 degrees  - MIAMI J at all times, upright x-rays today, can adjust activity orders based on results  - Palliative care consulted  - family care conference again today - family is not currently desiring C-spine surgery but likely trach/PEG, will continue to discuss ongoing surgical plans  - Pain: harini rectal tylenol, prn dilaudid                         - Sedation plan: precedex      Pulmonary:   # Mechanical Ventilatory Support  # Acute hypoxic respiratory support   # Pulmonary edema vs atelectasis  - VENT: CMV. Continue  full vent support. Ventilatory bundle. HOB elevation   - Supplemental O2, goal SpO2 > 92%.  - Lung protective ventilator settings  - Continue pressure support trials today, ok to stay on this setting indefinitely if he does well  Vent Mode: CMV/AC  (Continuous Mandatory Ventilation/ Assist Control)  FiO2 (%): 40 %  Resp Rate (Set): 16 breaths/min  Tidal Volume (Set, mL): 450 mL  PEEP (cm H2O): 5 cmH2O  Resp: 16  - Decrease RR to 12    Cardiovascular:    # hx of TAVR   # hx of Complete Heart Block s/p Pacemaker  # hx of HTN  - Monitor hemodynamic status  - IV Lasix held  - Echo 9/16 -- normal EF 60-65%, TAVR with appropriate function     Gastroenterology/Nutrition:  # Hiatal Hernia  - NPO  - difficulty with OG placement in ED and at bedside under fluoro due to laryngeal edema, will attempt under direct visualization with scope today  - No enteral access  - No bowel reg, will start after enteral access obtained  - PRN ducolax suppository       Renal/Fluids/Electrolytes/:   # Hypokalemia, replaced  # Hypomagnesemia, replaced  # Hypophosphatemia, replaced  # Lactic acidosis, resolved  # Elevated BUN  # BPH  - Will cont to monitor I/O (0.77 ml/kg/hr UOP yesterday)  - Electrolyte replacement protocol (K, Mg, Phos)  - Escudero to remain in place while intubated  - Lactate normalized 9/15 (2.2 -> 1.3)  - hold PTA finasteride  - Discontinue LR  - Discontinue PTA lasix equivalent     Endocrine:  # Hypothyroidism  # At risk of stress-induced hyperglycemia  # At risk of steroid-induced hyperglycemia  # Pre-diabetes  - PTA levothyroxine 112 mcg, will restart with enteral access, or will start IV today pending enteral access  - Hgb A1C 6.2  - Goal blood glucose < 180  - Continue glucose checks, no indication for insulin at this time     ID:  # Leukocytosis, down from admission  - 12.3 from 11.5 (15.9 on admission)  - no indications for antibiotics  - f/up BCx (NGTD) & UA (unremarkable)  - leukocytosis likely reactive to trauma;  will continue to monitor    Heme:     # Normocytic anemia, stable  - Hemoglobin 13 on arrival  - Transfuse if hgb <8.0 or signs/symptoms of hypoperfusion.  - Monitor and trend.      MSK:  # Facial Abrasion   # Right Lower Leg Abrasion  # Weakness and deconditioning  - Physical and occupational therapy consult   - local wound care     General Cares/Prophylaxis:    DVT Prophylaxis: SCDs; subcutaneous heparin  GI Prophylaxis: PPI  Restraints: Restraints for medical healing needed: only if pt gets agitated and pulling at lines after chemical sedation     LDAs:  - PIVx2   - ETT  - Escudero     Disposition:  - SICU.      Patient seen, findings and plan discussed with surgical ICU staff, Dr. Bi MD.     Jocelyn Stanton MD, PhD on 9/18/2022 at 7:24 AM  PGY-1 Neurosurgery       - - - - - - - - - - - - - - - - - - - - - - - - - - - - - - - - - - - - - - - - - - - - - -   SUBJECTIVE: Stable overnight. Family discussion yesterday-- family not planning surgical intervention of C-spine but likely planning for trach/PEG. Pt is reportedly desiring quantity of life over quality of life at this time.      HISTORY PRESENTING ILLNESS: Alexandru Still is a 89 year old male w/ a pmh of TAVR, complete heart block w/ pacemaker, and Lambert-Eaton syndrome who was admitted to the SICU on 9/15/2022 after experiencing an unwitnessed fall and striking his head. He was transferred to the ED and was found to have a left frontal SDH (5mm) + C1 fx + Type II C2 odontoid fx w/ 1cm posterior displacement. A c-collar was placed. He developed priapism which was c/f threatened neurologic function and the decision was made to intubate to protect the airway in the setting of c-spine injury. Initially DNR/DNI on presentation but pt voiced a desire to be intubated and receive chest compressions if necessary.    REVIEW OF SYSTEMS: Unable to complete ROS due to patient intubation.    PHYSICAL EXAMINATION:  Vital signs:  Temp: 97.5  F (36.4  C) Temp  Min:  97.5  F (36.4  C)  Max: 97.8  F (36.6  C)  Resp: 16 Resp  Min: 15  Max: 23  SpO2: 98 % SpO2  Min: 97 %  Max: 100 %  Pulse: (!) 49 Pulse  Min: 49  Max: 49    No data recorded  BP: (!) 154/83 Systolic (24hrs), Av , Min:132 , Max:163   Diastolic (24hrs), Av, Min:71, Max:86    General: intubated and on 0.3 precedex  HEENT: abrasion to right forehead, ETT in place  Neck: MIAMI-J  in place   Neuro: sedated iatrogenically, PERRL, FCx4  Pulm/Resp: intubated and on mechanical ventilation  CV: bradycardic on monitor, paced rhythm  Abdomen: Soft, no grimace to palpation, nd  : hannah catheter in place with clear yellow urine  MSK/Extremities: no peripheral edema, extremities wwp       Data   I reviewed all medications, new labs and imaging results over the last 24 hours.  Arterial Blood Gases   Recent Labs   Lab 09/15/22  1733 09/15/22  1526   PH 7.33 7.41       Complete Blood Count   Recent Labs   Lab 22  0409 22  0523 22  0927 09/15/22  0521   WBC 12.3* 11.5* 9.8 15.9*   HGB 12.5* 12.5* 10.4* 13.0*    251 213 265       Basic Metabolic Panel  Recent Labs   Lab 22  0409 22  0523 22  1105 22  1052 09/15/22  1959 09/15/22  1617    141 140  --   --  138   POTASSIUM 3.2* 3.5 3.4  --   --  3.8   CHLORIDE 106 105 103  --   --  100   CO2 26 24 24  --   --  28   BUN 33.8* 25.0* 18.4  --   --  16.5   CR 0.91 0.88 0.86  --   --  0.83   * 137* 125* 138*   < > 138*    < > = values in this interval not displayed.       Liver Function Tests  Recent Labs   Lab 09/15/22  1617 09/15/22  0521   AST 20  --    ALT 11  --    ALKPHOS 70  --    BILITOTAL 0.6  --    ALBUMIN 3.8  --    INR  --  1.06       Pancreatic Enzymes  No lab results found in last 7 days.    Coagulation Profile  Recent Labs   Lab 09/15/22  0521   INR 1.06   PTT 29       IMAGING:  Recent Results (from the past 24 hour(s))   XR Chest Port 1 View    Narrative    Exam: XR CHEST PORT 1 VIEW, 2022 2:01  AM    Indication: Rib fracture(s) in trauma patient    Comparison: Chest x-ray 9/16/2022    Findings:   ET tube tip over the mid thoracic trachea. Cardiac device with leads  projecting over the right atrium and right ventricle. Transcatheter  aortic valve replacement. Cardiac borders are partially obscured. No  appreciable pneumothorax. No appreciable effusions. No substantial  interval change in perihilar interstitial opacities and left  basilar/retrocardiac atelectasis/consolidation. No appreciable rib  fracture deformities by chest radiograph. Partially imaged right  shoulder arthroplasty.      Impression    Impression:   1. Support devices in place as above.  2. Unchanged perihilar interstitial opacities representing  edema/atelectasis.  3. Unchanged left basilar/retrocardiac atelectasis/consolidation.    I have personally reviewed the examination and initial interpretation  and I agree with the findings.    JEANNE LOW MD         SYSTEM ID:  [id number]

## 2022-09-18 NOTE — PROGRESS NOTES
[facility], [place]  Neurosurgery Progress Note:  09/17/2022      Interval History: Care conference with primary team with family and patient reportedly decision confirmed for no surgery.  Family elected to pursue trach and PEG.    Assessment:  Alexandru Still is a 89 year old male with unwitnessed mechanical fall resulting in C1 fracture, and a type II C2 odontoid fracture with 1 cm of posterior displacement and left frontal 5mm SDH.     Clinically Significant Risk Factors Present on Admission            # Severe Malnutrition: based on nutrition assessment     Plan:  Hold ASA  Serial neurological examinations  C collar at all times- keep neck flat, no pillows  Orthotics consult for MIAMI J, Upright x rays when able  Platelets >100,000  INR < 1.5  Hemoglobin > 8  DVT: SCDs while in bed, okay for subcutaneous heparin from neurosurgery perspective    Neurosurgery will follow the patient     -----------------------------------  Margarita Ferguson MD  Neurosurgery resident  -----------------------------------    General: awake and alert  HEENT: Aspen collar, mild neck tenderness  PULM: breathing comfortably on room air  : rectal tone intact     NEUROLOGIC:  -- Intubated and sedated  -- Follows commands briskly in all 4 extremities  -- PERRL, tracks, face symmetric  --Spontaneous movements in all 4 extremities.       Objective:   Temp:  [97.1  F (36.2  C)-97.8  F (36.6  C)] 97.8  F (36.6  C)  Pulse:  [49] 49  Resp:  [15-23] 16  BP: (130-154)/(71-86) 148/80  FiO2 (%):  [40 %] 40 %  SpO2:  [97 %-100 %] 98 %  I/O last 3 completed shifts:  In: 1897.57 [I.V.:1897.57]  Out: 1485 [Urine:1485]        LABS:  Recent Labs   Lab 09/17/22  0523 09/16/22  1105 09/16/22  1052 09/15/22  1959 09/15/22  1617    140  --   --  138   POTASSIUM 3.5 3.4  --   --  3.8   CHLORIDE 105 103  --   --  100   CO2 24 24  --   --  28   ANIONGAP 12 13  --   --  10   * 125* 138*   < > 138*   BUN 25.0* 18.4  --    --  16.5   CR 0.88 0.86  --   --  0.83   TITA 9.1 9.4  --   --  9.1    < > = values in this interval not displayed.       Recent Labs   Lab 09/17/22  0523   WBC 11.5*   RBC 4.08*   HGB 12.5*   HCT 38.3*   MCV 94   MCH 30.6   MCHC 32.6   RDW 15.4*          IMAGING:  No results found for this or any previous visit (from the past 24 hour(s)).

## 2022-09-18 NOTE — PROGRESS NOTES
"STAFF NOTE:  Repeat family conference - again emphasized desire for trach/PEG/full supportive cares, despite me being fairly aggressive about prognosticating loss of independence, rapidly progressive debility  Nursing discovered that he has a penile pump - NOT priapism!!!  No other issues    Exam CAM-ICU negative (non-delirious).  Hard of hearing, but otherwise quick in responses  Good  strength, can lift both legs up off the bed  Some edema, about the same as yestrdday  No lines; no enteral access  Grainger J in situ  Failed pressure support due to apnea  Escudero diluate  Penile pump palpable    Labs hypokalemic, otherwise everything looks fine  BNP continues to trend up  leukcotyosis slowly climbing again (he got steroids 2d ago)    By I/Os essentially perfectly net even for hospitalization    Formal TTE no concerns    This is a 89M hx Lambert-Eaton syndrome, TAVR, complete heart block with pacemaker dependency, DM2, arthritis, BPH, HTN.  Unwitnessed fall with small frontal intracranial hemorrhage (SDH) and odontoid fracture with displacement and assocaited epidural hemorrhage.  He was intubated (unmedicated awake fiberoptic) for airway protection given swallowign issues.      In repeated family conferences and in direct conversation with the patient, he has been consistent and adamant about saying he is comfortable with the idea of functional dependence / long term institutionalization, trach, PEG, and prolonged C collar.      Will therefore work to clarify whether [facility] will offer posterior spinal fusion.  In my mind, although he is clearly osteopenic and not a great surgical candidate for screws, I don't think that \"no surgery\" is equivalent to \"surgery\" in terms of expected long-term outcome and prognosis for return to significant mobility.  I don't understand the rationale, apparently expressed by the family, that he would want trach/PEG but no fusion.    Will attempt endoscopic Dobhoff placement " today.    METABOLIC ENCEPHALOPATHY / DELIRIUM:  -due to head bleed; monitoring with clinical exam  -nothing for sleep given no enteral access; can change this after we get a dobhoff today; precedex for sedation     SDH:  -NTD acutely.  Holding prescribed aspirin (he had not taken it for a few months per drug reconciliation)    ODONTOID FRACTURE:  -C collar.  Very strict cervical precautions  -upright films today  -unclear whether [facility] is willing to offer surgical intervention given his repeatedly-expressed wish to be full code / full intervention    NEED FOR MECHANICAL VENTILATION:  -intubated for airway protection and difficulties swallowing, rather than hypoxia or work of breathing  -ok for indefinite pressure support.  Didn't tolerate a brief PS trial yesterday due to apnea, so will decrease respiratory rate on ventilator and then try again in a few hours once CO2 drive has increased  -From my perspective, given his inability to swallow secretions while in the C collar in the ED, despite a lack of significant edema, will need tracheotomy given repeatedly articulated desire for full supportive cares    PACEMAKER DEPENDENCE:  -underlying rhythm is reportedly complete heart block  -will get a device check before surgery if goes for spianl fusion, although I don't think the operative site be close enough to his pacemaker to have any significant risk of interfence (and I anticipate that neurosurgery will be exclusively using Bipolar cautery anyways)     Hx TAVR:  -no need for anticoagulation  -holding lasix again until has enteral feeds    INADEQUATE ORAL INTAKE:  -will attempt endoscopic NG placement today  -start bowel regimen if we get enteral access    HYPOTHYROID:  -levothyroxine once we have enteric access; if we can't get enteral access, then will start IV levothyroxine     HYPERGLYCEMIA:  -HgbA1c was 6.2  -has not needed sliding scale insulin, even when on steroids    MISC:  -Code status is full code per  patient report  -family updated by trauma service and resident; care conferences ongoing with trauma + neurosurg + Palliative.  In family conference yesteready, family again articulated desire for full aggressive cares   -SQH can start today; PPI for intubation; make enteral once we have gastric access   -lines: peripheral only  -hannah while intubated and with penile pump  -anticipate discharge to skilled nursing or LTACH after trach/PEG/PSF if he remains full code, probably in >>1 week.    Billing statement: 51min of critical care time; spent in an initial review of imaging, labs, physical exam, and discussion of the patient with my own team and the extended care team including the primary service; Based on this patient's presentation / recent intervention and my bedside assessment, I felt there was or is a reasonably high probability of imminent or life-threatening deterioration today or tonight for neurologic reasons.   My overall critical care time, as described in detail above, includes such things as coordination of care, arrhythmia and hemodynamics management with infusions of medicines, respiratory management, fluid therapy including fluid boluses, and pain and sedation therapy. This time excludes time I spent personally performing or supervising procedures for this patient.    SHABBIR Pat MD  Clinical   Anesthesia / Critical Care  *[phone]

## 2022-09-18 NOTE — PHARMACY-CONSULT NOTE
Pharmacy Tube Feeding Consult    Medication reviewed for administration by feeding tube and for potential food/drug interactions.    Recommendation: No changes are needed at this time.    If the following home medications are resumed, see recommendations below:  - Tamsulosin - switch to doxazosin which is crushable. tamsulosin 0.4 mg = doxazosin 1 mg  - Finasteride - tablet can be crushed, however, women who are or may become pregnant should not handle crushed or broke tablets     Pharmacy will continue to follow as new medications are ordered.

## 2022-09-18 NOTE — PLAN OF CARE
D/I: No change in pt neuro exam from previous shifts. Vent rate set to 12, PEEP increased to 10, ETT exchanged and bronch done. Heart rate paced. Blood pressure slightly elevated this afternoon. MD lofton with elevated BP. NG tube placed with endoscopy. Tube feeds to start tonight. Adequate UO.   A: Multiple procedures done today. Pt tolerated without changes in vital signs.   P: Start tube feeds tonight, IV antibiotics. Monitor neuro status and VS.

## 2022-09-18 NOTE — PROGRESS NOTES
INTERVAL PROGRESS NOTE - FAMILY DISCUSSION    Contacted pt's decision maker, Abhijit Rao, to confirm family's decision to not proceed with spinal stabilization surgery with NSGY. Mr. Rao expressed understanding that electing to not proceed with this neurosurgical intervention does put pt Cheko at increased risk of future spinal cord injury relative to the cervical collar. Mr. Rao stated that this decision has been made with input from his children (pt's grandchildren) and with pt Cheko himself to the extent that he has been able to participate while intubated. Mr. Rao expressed that this decision to not proceed to surgery with NSGY, and the decision to proceed with trach/PEG this week, are consistent with the patient and whole family's wishes.    Jocelyn Stanton MD, PhD on 9/18/2022 at 12:28 PM  PGY-1 Neurosurgery

## 2022-09-19 ENCOUNTER — ANESTHESIA EVENT (OUTPATIENT)
Dept: SURGERY | Facility: CLINIC | Age: 87
DRG: 004 | End: 2022-09-19
Payer: MEDICARE

## 2022-09-19 LAB
ANION GAP SERPL CALCULATED.3IONS-SCNC: 9 MMOL/L (ref 7–15)
BUN SERPL-MCNC: 35.5 MG/DL (ref 8–23)
CALCIUM SERPL-MCNC: 8.8 MG/DL (ref 8.2–9.6)
CHLORIDE SERPL-SCNC: 108 MMOL/L (ref 98–107)
CREAT SERPL-MCNC: 0.85 MG/DL (ref 0.67–1.17)
DEPRECATED HCO3 PLAS-SCNC: 25 MMOL/L (ref 22–29)
ERYTHROCYTE [DISTWIDTH] IN BLOOD BY AUTOMATED COUNT: 15.3 % (ref 10–15)
GFR SERPL CREATININE-BSD FRML MDRD: 82 ML/MIN/1.73M2
GLUCOSE BLDC GLUCOMTR-MCNC: 120 MG/DL (ref 70–99)
GLUCOSE SERPL-MCNC: 122 MG/DL (ref 70–99)
HCT VFR BLD AUTO: 39.4 % (ref 40–53)
HGB BLD-MCNC: 12.8 G/DL (ref 13.3–17.7)
MAGNESIUM SERPL-MCNC: 2.4 MG/DL (ref 1.7–2.3)
MCH RBC QN AUTO: 30.9 PG (ref 26.5–33)
MCHC RBC AUTO-ENTMCNC: 32.5 G/DL (ref 31.5–36.5)
MCV RBC AUTO: 95 FL (ref 78–100)
PHOSPHATE SERPL-MCNC: 2.9 MG/DL (ref 2.5–4.5)
PLATELET # BLD AUTO: 203 10E3/UL (ref 150–450)
POTASSIUM SERPL-SCNC: 3.4 MMOL/L (ref 3.4–5.3)
POTASSIUM SERPL-SCNC: 4 MMOL/L (ref 3.4–5.3)
RBC # BLD AUTO: 4.14 10E6/UL (ref 4.4–5.9)
SODIUM SERPL-SCNC: 142 MMOL/L (ref 136–145)
WBC # BLD AUTO: 11.3 10E3/UL (ref 4–11)

## 2022-09-19 PROCEDURE — 200N000002 HC R&B ICU UMMC

## 2022-09-19 PROCEDURE — 84100 ASSAY OF PHOSPHORUS: CPT | Performed by: SURGERY

## 2022-09-19 PROCEDURE — 84132 ASSAY OF SERUM POTASSIUM: CPT | Performed by: SURGERY

## 2022-09-19 PROCEDURE — 94003 VENT MGMT INPAT SUBQ DAY: CPT

## 2022-09-19 PROCEDURE — 999N000157 HC STATISTIC RCP TIME EA 10 MIN

## 2022-09-19 PROCEDURE — 36415 COLL VENOUS BLD VENIPUNCTURE: CPT | Performed by: SURGERY

## 2022-09-19 PROCEDURE — C9113 INJ PANTOPRAZOLE SODIUM, VIA: HCPCS | Performed by: PHYSICIAN ASSISTANT

## 2022-09-19 PROCEDURE — 99291 CRITICAL CARE FIRST HOUR: CPT | Mod: GC | Performed by: SURGERY

## 2022-09-19 PROCEDURE — 999N000147 HC STATISTIC PT IP EVAL DEFER

## 2022-09-19 PROCEDURE — 250N000011 HC RX IP 250 OP 636

## 2022-09-19 PROCEDURE — 250N000011 HC RX IP 250 OP 636: Performed by: NURSE PRACTITIONER

## 2022-09-19 PROCEDURE — 250N000013 HC RX MED GY IP 250 OP 250 PS 637: Performed by: NURSE PRACTITIONER

## 2022-09-19 PROCEDURE — 83735 ASSAY OF MAGNESIUM: CPT | Performed by: SURGERY

## 2022-09-19 PROCEDURE — 99232 SBSQ HOSP IP/OBS MODERATE 35: CPT | Performed by: PHYSICIAN ASSISTANT

## 2022-09-19 PROCEDURE — 85027 COMPLETE CBC AUTOMATED: CPT

## 2022-09-19 PROCEDURE — 36415 COLL VENOUS BLD VENIPUNCTURE: CPT

## 2022-09-19 PROCEDURE — 99232 SBSQ HOSP IP/OBS MODERATE 35: CPT | Mod: GC | Performed by: NEUROLOGICAL SURGERY

## 2022-09-19 PROCEDURE — 250N000011 HC RX IP 250 OP 636: Performed by: STUDENT IN AN ORGANIZED HEALTH CARE EDUCATION/TRAINING PROGRAM

## 2022-09-19 PROCEDURE — 250N000011 HC RX IP 250 OP 636: Performed by: PHYSICIAN ASSISTANT

## 2022-09-19 PROCEDURE — 250N000009 HC RX 250: Performed by: NURSE PRACTITIONER

## 2022-09-19 PROCEDURE — 80048 BASIC METABOLIC PNL TOTAL CA: CPT

## 2022-09-19 PROCEDURE — 250N000011 HC RX IP 250 OP 636: Performed by: SURGERY

## 2022-09-19 RX ORDER — ENOXAPARIN SODIUM 100 MG/ML
40 INJECTION SUBCUTANEOUS
Status: CANCELLED | OUTPATIENT
Start: 2022-09-19

## 2022-09-19 RX ORDER — POTASSIUM CHLORIDE 7.45 MG/ML
10 INJECTION INTRAVENOUS
Status: DISPENSED | OUTPATIENT
Start: 2022-09-19 | End: 2022-09-19

## 2022-09-19 RX ORDER — HYDROMORPHONE HCL IN WATER/PF 6 MG/30 ML
.2-.3 PATIENT CONTROLLED ANALGESIA SYRINGE INTRAVENOUS EVERY 4 HOURS PRN
Status: DISCONTINUED | OUTPATIENT
Start: 2022-09-19 | End: 2022-09-22 | Stop reason: HOSPADM

## 2022-09-19 RX ADMIN — AMPICILLIN SODIUM AND SULBACTAM SODIUM 3 G: 2; 1 INJECTION, POWDER, FOR SOLUTION INTRAMUSCULAR; INTRAVENOUS at 08:26

## 2022-09-19 RX ADMIN — DEXMEDETOMIDINE HYDROCHLORIDE 0.2 MCG/KG/HR: 400 INJECTION INTRAVENOUS at 06:11

## 2022-09-19 RX ADMIN — HEPARIN SODIUM 5000 UNITS: 5000 INJECTION, SOLUTION INTRAVENOUS; SUBCUTANEOUS at 08:29

## 2022-09-19 RX ADMIN — HYDROMORPHONE HYDROCHLORIDE 0.5 MG: 1 INJECTION, SOLUTION INTRAMUSCULAR; INTRAVENOUS; SUBCUTANEOUS at 13:15

## 2022-09-19 RX ADMIN — HYDROMORPHONE HYDROCHLORIDE 0.3 MG: 0.2 INJECTION, SOLUTION INTRAMUSCULAR; INTRAVENOUS; SUBCUTANEOUS at 23:16

## 2022-09-19 RX ADMIN — POTASSIUM CHLORIDE 10 MEQ: 7.46 INJECTION, SOLUTION INTRAVENOUS at 10:20

## 2022-09-19 RX ADMIN — HYDROMORPHONE HYDROCHLORIDE 0.5 MG: 1 INJECTION, SOLUTION INTRAMUSCULAR; INTRAVENOUS; SUBCUTANEOUS at 16:07

## 2022-09-19 RX ADMIN — HEPARIN SODIUM 5000 UNITS: 5000 INJECTION, SOLUTION INTRAVENOUS; SUBCUTANEOUS at 23:15

## 2022-09-19 RX ADMIN — HYDROMORPHONE HYDROCHLORIDE 0.5 MG: 1 INJECTION, SOLUTION INTRAMUSCULAR; INTRAVENOUS; SUBCUTANEOUS at 10:38

## 2022-09-19 RX ADMIN — PANTOPRAZOLE SODIUM 40 MG: 40 INJECTION, POWDER, FOR SOLUTION INTRAVENOUS at 08:29

## 2022-09-19 RX ADMIN — POTASSIUM CHLORIDE 10 MEQ: 7.46 INJECTION, SOLUTION INTRAVENOUS at 09:09

## 2022-09-19 RX ADMIN — DEXMEDETOMIDINE HYDROCHLORIDE 0.3 MCG/KG/HR: 400 INJECTION INTRAVENOUS at 21:28

## 2022-09-19 RX ADMIN — AMPICILLIN SODIUM AND SULBACTAM SODIUM 3 G: 2; 1 INJECTION, POWDER, FOR SOLUTION INTRAMUSCULAR; INTRAVENOUS at 02:06

## 2022-09-19 RX ADMIN — HEPARIN SODIUM 5000 UNITS: 5000 INJECTION, SOLUTION INTRAVENOUS; SUBCUTANEOUS at 15:30

## 2022-09-19 RX ADMIN — ACETAMINOPHEN 650 MG: 325 SOLUTION ORAL at 05:51

## 2022-09-19 RX ADMIN — POTASSIUM CHLORIDE 10 MEQ: 7.46 INJECTION, SOLUTION INTRAVENOUS at 06:10

## 2022-09-19 ASSESSMENT — ACTIVITIES OF DAILY LIVING (ADL)
ADLS_ACUITY_SCORE: 50

## 2022-09-19 ASSESSMENT — ENCOUNTER SYMPTOMS: DYSRHYTHMIAS: 1

## 2022-09-19 ASSESSMENT — LIFESTYLE VARIABLES: TOBACCO_USE: 1

## 2022-09-19 NOTE — PROGRESS NOTES
Brief Resident Note - Neuro exam    BP (!) 141/76   Pulse (!) 49   Temp 97.5  F (36.4  C) (Axillary)   Resp 12   Wt 80.4 kg (177 lb 4 oz)   SpO2 100%   BMI 26.95 kg/m      Gen: intubated  HEENT: trachea midline, ETT in place, NGT in place  P: on mechanical ventilation  CV: mildly bradycardic on monitor  GI: TF running through NGT; abdomen soft/nt/nd  : deferred  Neuro: Pt moves all 4 extremities to commands appropriately    Plan:  Continue w/ current care; Flush NGT as needed    Kevin Morrison MD  PGY2, Surgery

## 2022-09-19 NOTE — PLAN OF CARE
Major Shift Events:  No change in neuro exam. Collar pads changed. Respiratory rate remained at 12, Sp02 was 98 to 100%. Urine output 20-50 an hour. Hannah remains in place. Started tube feeds, frequent alarms. A lot of resistance to pushing anything down the NJ. Stopped feeds about 0300, currently clamped.   Plan:Remove hannah? Advance NJ and restart Tube Feed.     For vital signs and complete assessments, please see documentation flowsheets.

## 2022-09-19 NOTE — PLAN OF CARE
Major Shift Events:  Pt had a RASS of -1, able to follow simple commands. Dex being used for sedation. Neuro unchanged. Able to move extremities. Pupils equal and reactive. C-Collar cares completed. PRN dilaudid given for pain.  Mechanically ventilated on CMV settings, no vent changes made. 100% AV paced, HR 49-50. Afebrile. NJ coiled in pt mouth at start of shift, FT removed and team notified. No oral meds given during shift due to lack of access. Escudero in place. Towards last two hours of shift pt UO started to decrease (see flowsheets). SICU notified, no interventions at this time.. No BM. Care conference completed today. Grandson updated.  Plan: Trach and PEG at some point in the future. Notify team of any acute changes.   For vital signs and complete assessments, please see documentation flowsheets.    Problem: Plan of Care - These are the overarching goals to be used throughout the patient stay.    Goal: Optimal Comfort and Wellbeing  Outcome: Ongoing, Progressing     Problem: Risk for Delirium  Goal: Optimal Coping  Outcome: Ongoing, Progressing

## 2022-09-19 NOTE — PROGRESS NOTES
[facility], [place]  Neurosurgery Progress Note:  09/19/2022      Interval History: Most recent family discussion with decision to pursue trach and PEG but not any neurosurgical intervention. Exam remains stable. UXR done.     Assessment:  Alexandru Still is a 89 year old male with unwitnessed mechanical fall resulting in C1 fracture, and a type II C2 odontoid fracture with 1 cm of posterior displacement and left frontal 5mm SDH.     Clinically Significant Risk Factors Present on Admission            # Severe Malnutrition: based on nutrition assessment     Plan:  Hold ASA for now  Serial neurological examinations  C collar at all times- keep neck flat, no pillows  Ok to pursue trach/PEG, no hyperextension during tracheostomy  Platelets >100,000  INR < 1.5  Hemoglobin > 8  DVT: SCDs while in bed, okay for subcutaneous heparin from neurosurgery perspective    Neurosurgery will follow the patient     -----------------------------------  Reginaldo Still MD  Neurosurgery resident  -----------------------------------    General: awake and alert  HEENT: Aspen collar, mild neck tenderness  PULM: breathing comfortably on room air  : rectal tone intact     NEUROLOGIC:  -- Intubated and sedated  -- Follows commands briskly in all 4 extremities  -- PERRL, tracks, face symmetric  -- 4/5 strength in all extremities       Objective:   Temp:  [97.3  F (36.3  C)-98  F (36.7  C)] 97.3  F (36.3  C)  Pulse:  [49] 49  Resp:  [11-25] 25  BP: ()/(57-89) 120/66  FiO2 (%):  [40 %] 40 %  SpO2:  [97 %-100 %] 100 %  I/O last 3 completed shifts:  In: 1091.4 [I.V.:991.4; NG/GT:60]  Out: 1380 [Urine:1380]        LABS:  Recent Labs   Lab 09/18/22  1426 09/18/22  0409 09/17/22  0523 09/16/22  1105   NA  --  142 141 140   POTASSIUM 3.5 3.2* 3.5 3.4   CHLORIDE  --  106 105 103   CO2  --  26 24 24   ANIONGAP  --  10 12 13   GLC  --  121* 137* 125*   BUN  --  33.8* 25.0* 18.4   CR  --  0.91 0.88 0.86   TITA  --   8.9 9.1 9.4       Recent Labs   Lab 09/18/22  0409   WBC 12.3*   RBC 4.10*   HGB 12.5*   HCT 37.9*   MCV 92   MCH 30.5   MCHC 33.0   RDW 15.3*          IMAGING:  Recent Results (from the past 24 hour(s))   XR Cervical Spine Port 2/3 Views    Narrative    EXAM: XR CERVICAL SPINE PORT 2/3 VIEWS 9/18/2022 11:06 AM    HISTORY: Upright in bed with [place]-J     COMPARISON: 9/15/2022    FINDINGS: AP and lateral views of the cervical spine were obtained.  Redemonstrated type II odontoid fracture with roughly 8 mm of  retrolisthesis and hyperextension. Multilevel cervical spondylosis  with grade 1 C4-5 anterolisthesis. Endotracheal tube is present. Lower  cervical vertebral bodies are well visualized. Multilevel disc height  loss, facet arthropathy      Impression    IMPRESSION: Redemonstrated type II odontoid fracture with 8 mm of  retrolisthesis and hyperextension.    I have personally reviewed the examination and initial interpretation  and I agree with the findings.    DEEPAK TRAN MD         SYSTEM ID:  [id number]   XR Abdomen Port 1 View    Narrative    XR ABDOMEN PORT 1 VIEW  9/18/2022 5:28 PM      HISTORY: new nasogastric feeding tube    COMPARISON: 9/18/2022, 9/15/2022    FINDINGS:   Single AP view of the abdomen and pelvis. TAVR.  Stable pacer leads.  Feeding tube tip overlying the proximal duodenum. Nonobstructive bowel  gas pattern. No pneumatosis.      Impression    IMPRESSION:   Feeding tube tip overlying the proximal duodenum.    I have personally reviewed the examination and initial interpretation  and I agree with the findings.    ANICETO QUIGLEY MD         SYSTEM ID:  [id number]   XR Abdomen Port 1 View    Narrative    EXAM: XR ABDOMEN PORT 1 VIEW  9/18/2022 7:31 PM     HISTORY:  not able to push fluids through NGT - rechecking positioning        COMPARISON:  Earlier same day abdominal radiograph obtained at 1654  hours    FINDINGS: Supine radiograph of the abdomen. Feeding tube with tip in  the  right side of the abdomen possibly within the stomach versus first  portion of the duodenum. Nonspecific, nonobstructive bowel gas  pattern. No pneumatosis or portal venous gas. The visualized lung  bases are clear. No acute osseous abnormality. Degenerative changes to  the spine with spinal fusion device overlying the L4-5.      Impression    IMPRESSION: Feeding tube with tip overlying the right side of the  abdomen with the tip possibly within the stomach versus first portion  of the duodenum, consider further advancement if postpyloric position  is desired.  Non obstructive bowel gas pattern.    I have personally reviewed the examination and initial interpretation  and I agree with the findings.    ANICETO QUIGLEY MD         SYSTEM ID:  [id number]   XR Chest Port 1 View    Narrative    EXAM: XR CHEST PORT 1 VIEW  9/18/2022 7:32 PM     HISTORY:  reintubated       COMPARISON:  Earlier same day chest x-ray obtained at 0042 hours    FINDINGS: AP radiograph of the chest. Endotracheal tube tip likely in  the mid trachea but not well-visualized as it is overlapping the  enteric tube. Projecting 2.5 cm cephalad to the taylor. Left chest  wall ICD/pacemaker with leads overlying the right atrium and right  ventricle. Aortic valve replacement. Partly visualized feeding tube  coursing below the diaphragm and inferior to the field of view.    Stable cardiomediastinal silhouette. Asymmetric elevation of the left  hemidiaphragm. Low lung volumes. Unchanged perihilar interstitial  opacities and streaky bibasilar opacities. No significant pleural  effusion. No pneumothorax. Postoperative changes to the right  shoulder. Visualized upper abdomen is unremarkable.      Impression    IMPRESSION:  1. Endotracheal tube with tip likely in the mid trachea but not well  visualized as it is overlapping the enteric tube.   2. Unchanged perihilar interstitial and streaky bibasilar opacities  likely representing pulmonary edema and/or  atelectasis.  3. Low lung volumes.    I have personally reviewed the examination and initial interpretation  and I agree with the findings.    ANICETO QUIGLEY MD         SYSTEM ID:  [id number]

## 2022-09-19 NOTE — PROGRESS NOTES
[facility]  Trauma Service Progress Note    Date of Service (when I saw the patient): 09/19/2022     Assessment & Plan   Trauma mechanism: Fall while going to urinal    Time/date of injury: 9/15/22  Known Injuries:  1. Anterior left frontal SDH, 5 mm  2. Posteriorly displaced Tyle II odontoid fx, ~1cm posterior dislocation   3. C1 anterior arch fx   4. Epidural hemorrhage up to 7mm along posterior dens   5. Questionable acute anterior left 6th rib fx  6. Right forehead abrasion      Neuro/Pain/Psych:  # Traumatic SDH, along left frontal lobe up to 5mm in thickness.   # Posteriorly displaced Tyle II odontoid fx, ~1cm posterior dislocation   # C1 anterior arch fx   # Epidural hemorrhage up to 7mm along posterior dens   # Sensorineural Hearing loss   - Repeat Head CT completed stable  - Upright xrays: Redemonstrated type II odontoid fracture with 8 mm of retrolisthesis and hyperextension.  - Neurosurgery following: No plan surgical plan per pt preference  Serial neurological examinations: C collar at all times (no pillows)  C collar at all times- keep neck flat, no pillows  Ok to pursue trach/PEG, no hyperextension during tracheostomy, plan to discuss with family during care conference today  DVT: SCDs while in bed, okay for subcutaneous heparin from neurosurgery perspective    # Acute traumatic neck pain  # Chronic hip and shoulder pain  Sedation management per SICU  Can add tylenol supp with no enteral access  - Prn: Dilaudid 0.5-1mg     EENT:  # Laryngeal Edema 2/2 traumatic cervical fracture/injury  OG was attempted in ED on 9/15, unsuccessful after 2 attempts, 9/16 Radiology was unable to place NG tube d/t laryngeal edema. Recommend PEG for enteral access.   - 9/16 3 doses Dexamethasone q8hrs, completed  - Underwent an EGD with NG placement 09/18 unfortunately became dislodged overnight now removed.     Pulmonary:  # Acute hypoxic respiratory failure required intubated  shortly after admission  # PB  - SICU managing MV  - Holding PTA: Flonase, Duoneb     Cardiovascular:    # Hypertension   # Complete heart block s/p PM placement  # Nonrheumatic aortic valve stenosis s/p TAVR 2019  - Monitor hemodynamic status.   - PTA Lasix on hold     GI/Nutrition:    # Hx of dysphagia   # Hiatal Hernia    - Failed bed side, radiology attempts at placing NG. Had an EGD 09/18 with feeding tube placement. Unfortunately became dislodged.     Renal/ Fluids/Electrolytes:  # Hypomagnesia, improved   # Hypokalemia, improved with replacements per protocol  # Lactic Acidosis, improved with fluids   - restarted daily potassiumlectrolyte replacement protocol in place.      Endocrine:  # Hypothyroidism   - PTA Levothyroxine, currently on hold due to lack of access     Infectious disease:   # stress Leukocytosis   - Admitted to [facility] for Septic Shock, CAP, Streptococcus dysgalactiae (Group G Strep) Bacteremia, Urinary retention with urethral stone s/p ureteroscopy and lithotripsy  WBC peaked at 12.3, afebrile  - LA: 2.2 ? 1.3, improved with fluids, Procal: 0.04  - 9/15: BCx2: NGTD  - UA unremarkable for infection  - No indications for antibiotics, monitor fever curve    Hematology:    # Anemia of chronic illness   - Hgb 12.5, stable   - Threshold for transfusion if hgb <7.0 or signs/symptoms of hypoperfusion.      # DVT Prophylaxis: pcd, no pharmacologic prophylaxis at this time d/t SDH and possible epidural hemorrhage.      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     Skin:  # Abrasion to forehead   - dilgent cares to prevent skin breakdown and wound formation.    Palliative     Lines/ tubes/ drains:  - PIV/ ETT/ Escudero      General Cares:                 PPI/H2 blocker:   Protonix                 DVT prophylaxis: pcd                Bowel Regimen/Date of last stool: in place, unable to take orals                 Pulmonary toilet:Vent management  Interval History   NG tube got dislodged.   ROS x 8 negative with exception of those things listed in interval hx    Physical Exam   Temp: 97.9  F (36.6  C) Temp src: Axillary BP: 132/69 Pulse: (!) 49   Resp: 12 SpO2: 99 % O2 Device: Mechanical Ventilator    Vitals:    09/17/22 0000 09/18/22 0500 09/19/22 0200   Weight: 81.4 kg (179 lb 7.3 oz) 80.4 kg (177 lb 4 oz) 82.6 kg (182 lb 1.6 oz)     Vital Signs with Ranges  Temp:  [97.3  F (36.3  C)-98  F (36.7  C)] 97.9  F (36.6  C)  Pulse:  [49] 49  Resp:  [11-25] 12  BP: ()/(57-89) 132/69  FiO2 (%):  [40 %] 40 %  SpO2:  [98 %-100 %] 99 %  I/O last 3 completed shifts:  In: 830.3 [I.V.:540.3; NG/GT:170]  Out: 1100 [Urine:1100]    Keily Coma Scale - Total 11/T/15  Eye Response (E): 4   4= spontaneous, 3= to verbal/voice, 2= to pain, 1= No response   Verbal Response (V): 1- intubated   5= Orientated, converses, 4= Confused, converses, 3= Inappropriate words, 2= Incomprehensible sounds, 1=No response   Motor Response (M): 6   6= Obeys commands, 5= Localizes to pain, 4= Withdrawal to pain, 3=Fexion to pain, 2= Extension to pain, 1= No response   Constitutional: mildly sedated, follows simple comands  HEENT: Normocephalic, scabbed abrasion to right forehead, PERRL, [place]-J in Place  Respiratory: ETT in place,   Cardiovascular:  Paced bradycardia  GI: Abdomen soft, non-distended,   Genitourinary:  Escudero in place   Skin:  Warm & dry, ecchymosis in various stages of healing over body   Musculoskeletal: There is no redness, warmth, or swelling of the joints.  Pedal pulse palpated.  Neurologic:  Strength and sensory is intact. No new, focal deficits.able to follow commands.   Psych: bela,     NATASHA Morse CNP  To contact the trauma service use job code pager [phone],   Numeric texts or alpha  text through [employer]

## 2022-09-19 NOTE — PROGRESS NOTES
SURGICAL ICU PROGRESS NOTE        PRIMARY TEAM: Trauma  PRIMARY PHYSICIAN: Dr. Nils Drew MD  REASON FOR CRITICAL CARE ADMISSION: Airway management/mechanical ventilation   ADMITTING PHYSICIAN: Dr. Kash Hendricks MD  Date of Service (when I saw the patient): 09/19/2022      ASSESSMENT:  Alexandru Still is a 89 year old male who was admitted to the SICU on 9/15/2022 after experiencing an unwitnessed fall and striking his head. He was transferred to the ED and was found to have a left frontal SDH (5mm) + C1 fx + Type II C2 odontoid fx w/ 1cm posterior displacement. Intubated for airway protection in the ED given high c-spine injury and difficulty swallowing. Family not planning on proceeding with C-spine stabilization surgery but will likely desire trach/PEG.     Changes today:  - Care conference today or tomorrow with palliative care, neurosurgery and surgical ICU.  - NGT was coiled inside his oral cavity. Oral medications has been placed on hold.   - Up with assist with C-collar in place at all times.     PLAN:     Neurological:  # Acute pain   # Subdural Hematoma, stable  # C1, C2 Cervical Spine Injury  # hx of Lambert Eaton Myasthenic syndrome  - Monitor neurological status. Delirium preventions and precautions  - q4h neurochecks  - c-collar to remain on at all times, no pillows behind head, head straight, HOB not to exceed 30 degrees  - MIAMI J at all times  - family care conference again today   - Last Head CT from 9/16 showing a 2 mm SDH. GCS of 11.  - Pain: harini rectal tylenol, prn dilaudid. No pain reported.  - Sedation plan: precedex      Pulmonary:   # Mechanical Ventilatory Support  # Acute hypoxic respiratory support   # Pulmonary edema vs atelectasis  - VENT: CMV. Continue full vent support. Ventilatory bundle. HOB elevation   - Supplemental O2, goal SpO2 > 92%.  - Lung protective ventilator settings  - Continue pressure support trials today, ok to stay on this setting indefinitely if he  does well  Vent Mode: CMV/AC  (Continuous Mandatory Ventilation/ Assist Control)  FiO2 (%): 40 %  Resp Rate (Set): 12 breaths/min  Tidal Volume (Set, mL): 450 mL  PEEP (cm H2O): 10 cmH2O  Resp: 12  - Yesterday, tube was replaced due to copious brown secretion. Bronchoscopy showed blooddy secretions per note.  - Today with copious pink secretions. Patient was aspirated and is currently with good O2 saturation (O2 %)    Cardiovascular:    # hx of TAVR   # hx of Complete Heart Block s/p Pacemaker  # hx of HTN  - Monitor hemodynamic status  - Echo 9/16 -- normal EF 60-65%, TAVR with appropriate function  - Pulse 49  - -159, DBP 63-80.   - Doxasozin was held.        Gastroenterology/Nutrition:  # Hiatal Hernia  - NPO  - difficulty with OG placement in ED and at bedside under fluoro due to laryngeal edema, will attempt under direct visualization with scope today  - No enteral access  - No bowel reg, will start after enteral access obtained  - PRN ducolax suppository   - NGT was found coiled inside oral cavity and was pulled out. Oral medications and feeding were held.       Renal/Fluids/Electrolytes/:   # Hypokalemia, replaced  # Hypomagnesemia, replaced  # Hypophosphatemia, replaced  # Lactic acidosis, resolved  # Elevated BUN  # BPH  - Will cont to monitor I/O (0.54 ml/kg/hr UOP yesterday)  - Electrolyte replacement protocol (K, Mg, Phos)  - Escudero to remain in place while intubated  - Cr 0.85, , Na 145, K 3.4,  Mg 2.4       Endocrine:  # Hypothyroidism  # At risk of stress-induced hyperglycemia  # At risk of steroid-induced hyperglycemia  # Pre-diabetes  - PTA levothyroxine 112 mcg, will restart with enteral access, or will start IV today pending enteral access  - Hgb A1C 6.2  - Goal blood glucose < 180  - Continue glucose checks, no indication for insulin at this time  - Glucose 121-138. Without need of insulin units.      ID:  # Leukocytosis, down from admission  - 12.3 from 11.5 (15.9 on  admission)  - no indications for antibiotics  - f/up BCx (NGTD) & UA (unremarkable)  - Leukocytes 11.3  - Temp. 97.3 - 98.  - Currently on Unasyn    Heme:     # Normocytic anemia, stable  - Hemoglobin 13 on arrival  - Transfuse if hgb <8.0 or signs/symptoms of hypoperfusion.  - Monitor and trend.   - Hb 12.8     MSK:  # Facial Abrasion   # Right Lower Leg Abrasion  # Weakness and deconditioning  - Physical and occupational therapy consult   - local wound care     General Cares/Prophylaxis:    DVT Prophylaxis: SCDs; subcutaneous heparin  GI Prophylaxis: PPI  Restraints: Restraints for medical healing needed: only if pt gets agitated and pulling at lines after chemical sedation     LDAs:  - PIVx2   - ETT  - Escudero     Disposition:  - SICU.      Patient seen, findings and plan discussed with surgical ICU staff, Dr. Fuentes MD.     MD Neisha Carty/DANISH Preliminary Resident  Department of Surgery       - - - - - - - - - - - - - - - - - - - - - - - - - - - - - - - - - - - - - - - - - - - - - -   SUBJECTIVE: Patient was found with copious pink secretions today and was aspirated. No changes in oxygen saturation. NGT was found coiled in oral cavity and was removed. Oral medications were held. Care conference today at PM.       HISTORY PRESENTING ILLNESS: Alexandru Still is a 89 year old male w/ a pmh of TAVR, complete heart block w/ pacemaker, and Lambert-Eaton syndrome who was admitted to the SICU on 9/15/2022 after experiencing an unwitnessed fall and striking his head. He was transferred to the ED and was found to have a left frontal SDH (5mm) + C1 fx + Type II C2 odontoid fx w/ 1cm posterior displacement. A c-collar was placed. He developed priapism which was c/f threatened neurologic function and the decision was made to intubate to protect the airway in the setting of c-spine injury. Initially DNR/DNI on presentation but pt voiced a desire to be intubated and receive chest compressions if  necessary.    REVIEW OF SYSTEMS: Unable to complete ROS due to patient intubation.    PHYSICAL EXAMINATION:  Vital signs:  Temp: 98  F (36.7  C) Temp  Min: 97.3  F (36.3  C)  Max: 98  F (36.7  C)  Resp: 12 Resp  Min: 11  Max: 25  SpO2: 99 % SpO2  Min: 98 %  Max: 100 %  Pulse: (!) 49 Pulse  Min: 49  Max: 49    No data recorded  BP: 135/73 Systolic (24hrs), Av , Min:88 , Max:174   Diastolic (24hrs), Av, Min:57, Max:89    General: intubated and on 0.3 precedex  HEENT: abrasion to right forehead, ETT in place  Neck: MIAMI-J  in place   Neuro: sedated iatrogenically, PERRL, FCx4  Pulm/Resp: intubated and on mechanical ventilation  CV: bradycardic on monitor, paced rhythm  Abdomen: Soft, no grimace to palpation, nd  : hannah catheter in place with clear yellow urine  MSK/Extremities: no peripheral edema, extremities wwp       Data   I reviewed all medications, new labs and imaging results over the last 24 hours.  Arterial Blood Gases   Recent Labs   Lab 09/15/22  1733 09/15/22  1526   PH 7.33 7.41       Complete Blood Count   Recent Labs   Lab 22  0452 22  0409 22  0523 22  0927   WBC 11.3* 12.3* 11.5* 9.8   HGB 12.8* 12.5* 12.5* 10.4*    233 251 213       Basic Metabolic Panel  Recent Labs   Lab 22  0452 22  1426 22  0409 22  0523 22  1105     --  142 141 140   POTASSIUM 3.4 3.5 3.2* 3.5 3.4   CHLORIDE 108*  --  106 105 103   CO2 25  --  26 24 24   BUN 35.5*  --  33.8* 25.0* 18.4   CR 0.85  --  0.91 0.88 0.86   *  --  121* 137* 125*       Liver Function Tests  Recent Labs   Lab 09/15/22  1617 09/15/22  0521   AST 20  --    ALT 11  --    ALKPHOS 70  --    BILITOTAL 0.6  --    ALBUMIN 3.8  --    INR  --  1.06       Pancreatic Enzymes  No lab results found in last 7 days.    Coagulation Profile  Recent Labs   Lab 09/15/22  0521   INR 1.06   PTT 29       IMAGING:  Recent Results (from the past 24 hour(s))   XR Cervical Spine Port 2/3 Views     Narrative    EXAM: XR CERVICAL SPINE PORT 2/3 VIEWS 9/18/2022 11:06 AM    HISTORY: Upright in bed with [place]-J     COMPARISON: 9/15/2022    FINDINGS: AP and lateral views of the cervical spine were obtained.  Redemonstrated type II odontoid fracture with roughly 8 mm of  retrolisthesis and hyperextension. Multilevel cervical spondylosis  with grade 1 C4-5 anterolisthesis. Endotracheal tube is present. Lower  cervical vertebral bodies are well visualized. Multilevel disc height  loss, facet arthropathy      Impression    IMPRESSION: Redemonstrated type II odontoid fracture with 8 mm of  retrolisthesis and hyperextension.    I have personally reviewed the examination and initial interpretation  and I agree with the findings.    EDEPAK TRAN MD         SYSTEM ID:  [id number]   XR Abdomen Port 1 View    Narrative    XR ABDOMEN PORT 1 VIEW  9/18/2022 5:28 PM      HISTORY: new nasogastric feeding tube    COMPARISON: 9/18/2022, 9/15/2022    FINDINGS:   Single AP view of the abdomen and pelvis. TAVR.  Stable pacer leads.  Feeding tube tip overlying the proximal duodenum. Nonobstructive bowel  gas pattern. No pneumatosis.      Impression    IMPRESSION:   Feeding tube tip overlying the proximal duodenum.    I have personally reviewed the examination and initial interpretation  and I agree with the findings.    ANICETO QUIGLEY MD         SYSTEM ID:  [id number]   XR Abdomen Port 1 View    Narrative    EXAM: XR ABDOMEN PORT 1 VIEW  9/18/2022 7:31 PM     HISTORY:  not able to push fluids through NGT - rechecking positioning        COMPARISON:  Earlier same day abdominal radiograph obtained at 1654  hours    FINDINGS: Supine radiograph of the abdomen. Feeding tube with tip in  the right side of the abdomen possibly within the stomach versus first  portion of the duodenum. Nonspecific, nonobstructive bowel gas  pattern. No pneumatosis or portal venous gas. The visualized lung  bases are clear. No acute osseous abnormality.  Degenerative changes to  the spine with spinal fusion device overlying the L4-5.      Impression    IMPRESSION: Feeding tube with tip overlying the right side of the  abdomen with the tip possibly within the stomach versus first portion  of the duodenum, consider further advancement if postpyloric position  is desired.  Non obstructive bowel gas pattern.    I have personally reviewed the examination and initial interpretation  and I agree with the findings.    ANICETO QUIGLEY MD         SYSTEM ID:  [id number]   XR Chest Port 1 View    Narrative    EXAM: XR CHEST PORT 1 VIEW  9/18/2022 7:32 PM     HISTORY:  reintubated       COMPARISON:  Earlier same day chest x-ray obtained at 0042 hours    FINDINGS: AP radiograph of the chest. Endotracheal tube tip likely in  the mid trachea but not well-visualized as it is overlapping the  enteric tube. Projecting 2.5 cm cephalad to the taylor. Left chest  wall ICD/pacemaker with leads overlying the right atrium and right  ventricle. Aortic valve replacement. Partly visualized feeding tube  coursing below the diaphragm and inferior to the field of view.    Stable cardiomediastinal silhouette. Asymmetric elevation of the left  hemidiaphragm. Low lung volumes. Unchanged perihilar interstitial  opacities and streaky bibasilar opacities. No significant pleural  effusion. No pneumothorax. Postoperative changes to the right  shoulder. Visualized upper abdomen is unremarkable.      Impression    IMPRESSION:  1. Endotracheal tube with tip likely in the mid trachea but not well  visualized as it is overlapping the enteric tube.   2. Unchanged perihilar interstitial and streaky bibasilar opacities  likely representing pulmonary edema and/or atelectasis.  3. Low lung volumes.    I have personally reviewed the examination and initial interpretation  and I agree with the findings.    ANICETO QUIGLEY MD         SYSTEM ID:  [id number]        [id number]

## 2022-09-19 NOTE — PROGRESS NOTES
[facility]  Palliative Care Daily Progress Note       Recommendations & Counseling     Encounter for palliative care  Psychosocial support    Psychosocial support was provided to patient and/or family.    Prognosis: Poor    Palliative performance scale (PPS): 10% (intubated and sedated, seems like PPS was 80-90% at least prior to this admission though Alexandru would fall sometimes)    CODE status: FULL     Alexandru was noted to previously be AND/DNI and changed his mind in the ED, confirmed by several teams after speaking with him consecutively    Goals of Care: Disease-oriented    Another care conference held today with palliative, SICU, Neuro surgery, trauma, and son-in-law Erv's children (aka Alexandru's granddaughter and grandson).     Family has good understanding of Alexandru's options at this time and state that they discussed options with Alexandru and he chose no surgery, but to proceed with trach/PEG. They feel he understands this would mean living in a facility for the remainder of his life and needing to wear the C collar permanently. They understand that his fracture is very unstable and that there are risks even to attempting to place the trach. They also have good understanding of the risks and benefits of surgery to stabilize the fracture. Discussed briefly the other option of comfort focused care. Family feel that patient was able to make decision as above and are confident that proceeding with trach/PEG are in alignment with his wishes.    Disposition: Inpatient per primary     Palliative medicine will follow peripherally and plan to check in with SICU next week once trach/PEG have been placed.     These recommendations have been discussed with primary/consulting teams.    Total time spent was >35 minutes,  >50% of time was spent counseling and/or coordination of care regarding goals of care.    Bailee Raymond PA-C / Palliative Medicine / Text me via  [employer].     Team Consult Pager [phone] (answered 8am-430pm M-F) - ok to text page via [employer] / After-Hours Answering Service [phone] / Palliative Clinic in the [facility] at the [facility] – [place] - [phone] (scheduling); [phone] (triage).        Assessments          Alexandru Still is a 89 year old male who was admitted on 9/15/2022 due to an unwitnessed fall  PMHx: Lambert-Eaton syndrome, TAVR, complete heart block with pacemaker dependency, DM2, arthritis, BPH, HTN, osteoporosis  He was found to have a small frontal intracranial hemorrhage (SDH) and odontoid fracture with displacement and associated epidural hemorrhage. He was intubated for airway protection. Care conference held on 9/16/2022 and again on 9/17/22 with trauma, SICU, neurosurgery, son-in-law, and palliative     Today, the patient was seen for:  Goals of Care  Encounter for palliative care    Prognosis, Goals, or Advance Care Planning was addressed today with: Yes.  Mood, coping, and/or meaning in the context of serious illness were addressed today: No.  Summary/Comments: as above            Interval History:     Chart review/discussion with unit or clinical team members:   Pre-conference discussion with NSG, neurocritical care, trauma team and unit SW.    Per patient or family/caregivers today:  Visited patient, but he was asleep, intubated. Did not attempt to arouse. Per primary team, he is able to follow requests, but they do not feel he has full capacity at this time. Continues on precedex.   Care conference held with SICU, neurosurgery, family, and palliative. Detailed Above.     Key Palliative Symptoms:  We are not helping to manage these symptoms currently in this patient.    Patient is on opioids: assessed and bowels ok/no needed changes to plan of care today.           Review of Systems:     Besides above, >4 ROS was reviewed and is unremarkable          Medications:     I have reviewed this patient's medication profile  and medications during this hospitalization.    Noted meds:    Precedex             Physical Exam:   Vitals were reviewed  Temp: 97.9  F (36.6  C) Temp src: Axillary BP: 138/71 Pulse: (!) 49   Resp: 11 SpO2: 99 % O2 Device: Mechanical Ventilator    General: Not in acute distress.  Head: Left frontal temporal trauma with bruising   Eyes: Eyes closed  Ear, Nose, and Throat: No neck masses noted. Intubated. In C-collar  Pulmonary: Equal chest rise. Intubated.   Skin: Warm. Dry.   Musculoskeletal: Muscle atrophy  Neuro: Deferred.             Data Reviewed:     Reviewed recent pertinent imaging, comments:   XR Feeding Tube Placement [[id number]] Resulted: 09/17/22 1115   Impression: Unsuccessful feeding tube placement.     XR Chest Port 1 View [[id number]] Resulted: 09/16/22 8201   IMPRESSION:   1. Endotracheal tube tip overlying the mid thoracic trachea   approximately 4.5 cm above the taylor.   2. Increased perihilar and bibasilar opacities suspicious for   pulmonary edema and atelectasis versus infection.   3. Question trace pleural effusions.     Echocardiogram Complete [[id number]] Collected: 09/16/22 1500   Interpretation Summary   Global and regional left ventricular function is normal with an EF of 60-65%.   Global right ventricular function is normal.   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 Doppler interrogation of the aortic valve is normal. The   peak velocity across the aortic valve is 1.6 m/sec. The mean gradient is 5   mmHg.   No pericardial effusion is present.         Reviewed recent labs, comments:   ROUTINE ICU LABS (Last four results)  CMP  Recent Labs   Lab 09/19/22  1055 09/19/22  0452 09/18/22  1426 09/18/22  0409 09/17/22  0523 09/16/22  1105 09/16/22  1052 09/16/22  0927 09/15/22  1959 09/15/22  1617   NA  --  142  --  142 141 140  --   --   --  138   POTASSIUM 4.0 3.4 3.5 3.2* 3.5 3.4  --   --   --  3.8   CHLORIDE  --  108*  --  106 105 103  --   --   --  100   CO2  --  25  --  26 24 24  --   --   --  28   ANIONGAP   --  9  --  10 12 13  --   --   --  10   GLC  --  122*  --  121* 137* 125*   < >  --    < > 138*   BUN  --  35.5*  --  33.8* 25.0* 18.4  --   --   --  16.5   CR  --  0.85  --  0.91 0.88 0.86  --   --   --  0.83   GFRESTIMATED  --  82  --  79 81 81  --   --   --  82   TITA  --  8.8  --  8.9 9.1 9.4  --   --   --  9.1   MAG  --  2.4*  --  2.3 2.4*  --   --  1.6*  --   --    PHOS  --  2.9  --  3.0 3.8  --   --  1.5*  --   --    PROTTOTAL  --   --   --   --   --   --   --   --   --  7.0   ALBUMIN  --   --   --   --   --   --   --   --   --  3.8   BILITOTAL  --   --   --   --   --   --   --   --   --  0.6   ALKPHOS  --   --   --   --   --   --   --   --   --  70   AST  --   --   --   --   --   --   --   --   --  20   ALT  --   --   --   --   --   --   --   --   --  11    < > = values in this interval not displayed.     CBC  Recent Labs   Lab 09/19/22  0452 09/18/22  0409 09/17/22  0523 09/16/22  0927   WBC 11.3* 12.3* 11.5* 9.8   RBC 4.14* 4.10* 4.08* 3.35*   HGB 12.8* 12.5* 12.5* 10.4*   HCT 39.4* 37.9* 38.3* 32.6*   MCV 95 92 94 97   MCH 30.9 30.5 30.6 31.0   MCHC 32.5 33.0 32.6 31.9   RDW 15.3* 15.3* 15.4* 15.2*    233 251 213     INR  Recent Labs   Lab 09/15/22  0521   INR 1.06     Arterial Blood Gas  Recent Labs   Lab 09/15/22  1733 09/15/22  1526   PH 7.33 7.41

## 2022-09-20 ENCOUNTER — APPOINTMENT (OUTPATIENT)
Dept: GENERAL RADIOLOGY | Facility: CLINIC | Age: 87
DRG: 004 | End: 2022-09-20
Payer: MEDICARE

## 2022-09-20 ENCOUNTER — APPOINTMENT (OUTPATIENT)
Dept: PHYSICAL THERAPY | Facility: CLINIC | Age: 87
DRG: 004 | End: 2022-09-20
Attending: PHYSICIAN ASSISTANT
Payer: MEDICARE

## 2022-09-20 ENCOUNTER — ANESTHESIA (OUTPATIENT)
Dept: SURGERY | Facility: CLINIC | Age: 87
DRG: 004 | End: 2022-09-20
Payer: MEDICARE

## 2022-09-20 LAB
ANION GAP SERPL CALCULATED.3IONS-SCNC: 9 MMOL/L (ref 7–15)
BACTERIA BLD CULT: NO GROWTH
BACTERIA BLD CULT: NO GROWTH
BUN SERPL-MCNC: 30.7 MG/DL (ref 8–23)
CALCIUM SERPL-MCNC: 8.8 MG/DL (ref 8.2–9.6)
CHLORIDE SERPL-SCNC: 111 MMOL/L (ref 98–107)
CREAT SERPL-MCNC: 0.82 MG/DL (ref 0.67–1.17)
DEPRECATED HCO3 PLAS-SCNC: 25 MMOL/L (ref 22–29)
ERYTHROCYTE [DISTWIDTH] IN BLOOD BY AUTOMATED COUNT: 15.2 % (ref 10–15)
GFR SERPL CREATININE-BSD FRML MDRD: 82 ML/MIN/1.73M2
GLUCOSE SERPL-MCNC: 113 MG/DL (ref 70–99)
HCT VFR BLD AUTO: 37.4 % (ref 40–53)
HGB BLD-MCNC: 12 G/DL (ref 13.3–17.7)
MAGNESIUM SERPL-MCNC: 2.5 MG/DL (ref 1.7–2.3)
MCH RBC QN AUTO: 30.6 PG (ref 26.5–33)
MCHC RBC AUTO-ENTMCNC: 32.1 G/DL (ref 31.5–36.5)
MCV RBC AUTO: 95 FL (ref 78–100)
PHOSPHATE SERPL-MCNC: 2.6 MG/DL (ref 2.5–4.5)
PLATELET # BLD AUTO: 205 10E3/UL (ref 150–450)
POTASSIUM SERPL-SCNC: 3.5 MMOL/L (ref 3.4–5.3)
RBC # BLD AUTO: 3.92 10E6/UL (ref 4.4–5.9)
SODIUM SERPL-SCNC: 145 MMOL/L (ref 136–145)
WBC # BLD AUTO: 9.6 10E3/UL (ref 4–11)

## 2022-09-20 PROCEDURE — 94003 VENT MGMT INPAT SUBQ DAY: CPT

## 2022-09-20 PROCEDURE — 71045 X-RAY EXAM CHEST 1 VIEW: CPT | Mod: 26 | Performed by: RADIOLOGY

## 2022-09-20 PROCEDURE — 200N000002 HC R&B ICU UMMC

## 2022-09-20 PROCEDURE — 99232 SBSQ HOSP IP/OBS MODERATE 35: CPT | Mod: GC | Performed by: NEUROLOGICAL SURGERY

## 2022-09-20 PROCEDURE — 250N000011 HC RX IP 250 OP 636

## 2022-09-20 PROCEDURE — 258N000003 HC RX IP 258 OP 636

## 2022-09-20 PROCEDURE — 84100 ASSAY OF PHOSPHORUS: CPT | Performed by: SURGERY

## 2022-09-20 PROCEDURE — 80048 BASIC METABOLIC PNL TOTAL CA: CPT

## 2022-09-20 PROCEDURE — 71045 X-RAY EXAM CHEST 1 VIEW: CPT

## 2022-09-20 PROCEDURE — 0DH63UZ INSERTION OF FEEDING DEVICE INTO STOMACH, PERCUTANEOUS APPROACH: ICD-10-PCS | Performed by: SURGERY

## 2022-09-20 PROCEDURE — 97530 THERAPEUTIC ACTIVITIES: CPT | Mod: GP

## 2022-09-20 PROCEDURE — 97163 PT EVAL HIGH COMPLEX 45 MIN: CPT | Mod: GP

## 2022-09-20 PROCEDURE — 999N000157 HC STATISTIC RCP TIME EA 10 MIN

## 2022-09-20 PROCEDURE — 250N000011 HC RX IP 250 OP 636: Performed by: STUDENT IN AN ORGANIZED HEALTH CARE EDUCATION/TRAINING PROGRAM

## 2022-09-20 PROCEDURE — 43246 EGD PLACE GASTROSTOMY TUBE: CPT | Mod: GC | Performed by: SURGERY

## 2022-09-20 PROCEDURE — 250N000011 HC RX IP 250 OP 636: Performed by: PHYSICIAN ASSISTANT

## 2022-09-20 PROCEDURE — 43246 EGD PLACE GASTROSTOMY TUBE: CPT | Performed by: SURGERY

## 2022-09-20 PROCEDURE — 250N000013 HC RX MED GY IP 250 OP 250 PS 637: Performed by: PHYSICIAN ASSISTANT

## 2022-09-20 PROCEDURE — 250N000009 HC RX 250: Performed by: SURGERY

## 2022-09-20 PROCEDURE — 99291 CRITICAL CARE FIRST HOUR: CPT | Mod: 25 | Performed by: SURGERY

## 2022-09-20 PROCEDURE — 250N000009 HC RX 250: Performed by: NURSE PRACTITIONER

## 2022-09-20 PROCEDURE — 258N000003 HC RX IP 258 OP 636: Performed by: SURGERY

## 2022-09-20 PROCEDURE — 250N000011 HC RX IP 250 OP 636: Performed by: SURGERY

## 2022-09-20 PROCEDURE — 85027 COMPLETE CBC AUTOMATED: CPT

## 2022-09-20 PROCEDURE — 250N000013 HC RX MED GY IP 250 OP 250 PS 637: Performed by: NURSE PRACTITIONER

## 2022-09-20 PROCEDURE — 36415 COLL VENOUS BLD VENIPUNCTURE: CPT

## 2022-09-20 PROCEDURE — 83735 ASSAY OF MAGNESIUM: CPT | Performed by: SURGERY

## 2022-09-20 PROCEDURE — 97110 THERAPEUTIC EXERCISES: CPT | Mod: GP

## 2022-09-20 PROCEDURE — C9113 INJ PANTOPRAZOLE SODIUM, VIA: HCPCS | Performed by: PHYSICIAN ASSISTANT

## 2022-09-20 RX ORDER — FENTANYL CITRATE 50 UG/ML
200 INJECTION, SOLUTION INTRAMUSCULAR; INTRAVENOUS
Status: COMPLETED | OUTPATIENT
Start: 2022-09-20 | End: 2022-09-20

## 2022-09-20 RX ORDER — ACETAMINOPHEN 650 MG/1
650 SUPPOSITORY RECTAL EVERY 4 HOURS PRN
Status: DISCONTINUED | OUTPATIENT
Start: 2022-09-20 | End: 2022-09-22 | Stop reason: HOSPADM

## 2022-09-20 RX ORDER — DEXTROSE MONOHYDRATE, SODIUM CHLORIDE, AND POTASSIUM CHLORIDE 50; 1.49; 4.5 G/1000ML; G/1000ML; G/1000ML
INJECTION, SOLUTION INTRAVENOUS CONTINUOUS
Status: DISCONTINUED | OUTPATIENT
Start: 2022-09-20 | End: 2022-09-22

## 2022-09-20 RX ORDER — POTASSIUM CHLORIDE 7.45 MG/ML
10 INJECTION INTRAVENOUS
Status: DISCONTINUED | OUTPATIENT
Start: 2022-09-20 | End: 2022-09-20

## 2022-09-20 RX ORDER — POTASSIUM CHLORIDE 7.45 MG/ML
10 INJECTION INTRAVENOUS
Status: COMPLETED | OUTPATIENT
Start: 2022-09-20 | End: 2022-09-20

## 2022-09-20 RX ADMIN — HYDROMORPHONE HYDROCHLORIDE 0.3 MG: 0.2 INJECTION, SOLUTION INTRAMUSCULAR; INTRAVENOUS; SUBCUTANEOUS at 20:08

## 2022-09-20 RX ADMIN — BISACODYL 10 MG: 10 SUPPOSITORY RECTAL at 18:10

## 2022-09-20 RX ADMIN — PANTOPRAZOLE SODIUM 40 MG: 40 INJECTION, POWDER, FOR SOLUTION INTRAVENOUS at 07:58

## 2022-09-20 RX ADMIN — ACETAMINOPHEN 650 MG: 325 SOLUTION ORAL at 23:58

## 2022-09-20 RX ADMIN — POTASSIUM CHLORIDE, DEXTROSE MONOHYDRATE AND SODIUM CHLORIDE: 150; 5; 450 INJECTION, SOLUTION INTRAVENOUS at 21:19

## 2022-09-20 RX ADMIN — DEXMEDETOMIDINE HYDROCHLORIDE 0.3 MCG/KG/HR: 400 INJECTION INTRAVENOUS at 10:07

## 2022-09-20 RX ADMIN — HEPARIN SODIUM 5000 UNITS: 5000 INJECTION, SOLUTION INTRAVENOUS; SUBCUTANEOUS at 23:58

## 2022-09-20 RX ADMIN — POTASSIUM CHLORIDE 10 MEQ: 7.46 INJECTION, SOLUTION INTRAVENOUS at 10:04

## 2022-09-20 RX ADMIN — POTASSIUM CHLORIDE 10 MEQ: 7.46 INJECTION, SOLUTION INTRAVENOUS at 16:02

## 2022-09-20 RX ADMIN — OXYCODONE HYDROCHLORIDE 5 MG: 5 SOLUTION ORAL at 22:23

## 2022-09-20 RX ADMIN — HYDROMORPHONE HYDROCHLORIDE 0.3 MG: 0.2 INJECTION, SOLUTION INTRAMUSCULAR; INTRAVENOUS; SUBCUTANEOUS at 23:59

## 2022-09-20 RX ADMIN — HYDROMORPHONE HYDROCHLORIDE 0.3 MG: 0.2 INJECTION, SOLUTION INTRAMUSCULAR; INTRAVENOUS; SUBCUTANEOUS at 08:22

## 2022-09-20 RX ADMIN — MIDAZOLAM HYDROCHLORIDE 4 MG: 1 INJECTION, SOLUTION INTRAMUSCULAR; INTRAVENOUS at 12:38

## 2022-09-20 RX ADMIN — MIDAZOLAM HYDROCHLORIDE 2 MG: 1 INJECTION, SOLUTION INTRAMUSCULAR; INTRAVENOUS at 13:34

## 2022-09-20 RX ADMIN — OXYCODONE HYDROCHLORIDE 5 MG: 5 SOLUTION ORAL at 18:10

## 2022-09-20 RX ADMIN — POTASSIUM PHOSPHATE, MONOBASIC AND POTASSIUM PHOSPHATE, DIBASIC 9 MMOL: 224; 236 INJECTION, SOLUTION INTRAVENOUS at 12:25

## 2022-09-20 RX ADMIN — ACETAMINOPHEN 650 MG: 325 SOLUTION ORAL at 18:10

## 2022-09-20 RX ADMIN — SENNOSIDES 8.6 MG: 8.6 TABLET, FILM COATED ORAL at 20:10

## 2022-09-20 RX ADMIN — HEPARIN SODIUM 5000 UNITS: 5000 INJECTION, SOLUTION INTRAVENOUS; SUBCUTANEOUS at 07:58

## 2022-09-20 RX ADMIN — FENTANYL CITRATE 150 MCG: 50 INJECTION, SOLUTION INTRAMUSCULAR; INTRAVENOUS at 12:39

## 2022-09-20 RX ADMIN — HEPARIN SODIUM 5000 UNITS: 5000 INJECTION, SOLUTION INTRAVENOUS; SUBCUTANEOUS at 15:09

## 2022-09-20 RX ADMIN — POTASSIUM CHLORIDE, DEXTROSE MONOHYDRATE AND SODIUM CHLORIDE: 150; 5; 450 INJECTION, SOLUTION INTRAVENOUS at 07:58

## 2022-09-20 RX ADMIN — HYDROMORPHONE HYDROCHLORIDE 0.3 MG: 0.2 INJECTION, SOLUTION INTRAMUSCULAR; INTRAVENOUS; SUBCUTANEOUS at 04:06

## 2022-09-20 ASSESSMENT — ACTIVITIES OF DAILY LIVING (ADL)
ADLS_ACUITY_SCORE: 50
ADLS_ACUITY_SCORE: 50
ADLS_ACUITY_SCORE: 52
ADLS_ACUITY_SCORE: 50
ADLS_ACUITY_SCORE: 50
ADLS_ACUITY_SCORE: 52
ADLS_ACUITY_SCORE: 50
ADLS_ACUITY_SCORE: 52
ADLS_ACUITY_SCORE: 50

## 2022-09-20 NOTE — PROGRESS NOTES
9/20  Discussed risks and benefits of trach and PEG, possible laparoscopically placed gastrostomy tube with patient's HCA, Erv, including risks of bleeding, infection, tube dislodgement and need for subsequent operations as well as further spinal cord injury with neck manipulation during open trachestomy. He and patient have discussed and agreed to proceed.     Manan Burrell MD  Surgical Critical Care Fellow

## 2022-09-20 NOTE — PROCEDURES
PEG Tube Placement Procedure Note  9/20/22    Resident: none    Fellow: Manan Burrell MD    Staff: Dr Dill    Indication: Continued tube feedings, high Cspine fracture    Complications: none    Medications used: versed, fentanyl    After informed consent was obtained from health care agent, the patient was sedated, appropriate Cspine protection, and prepped in the usual sterile fashion of the abdomen. EGD was performed and duodenum visualized and the stomach was insufflated. The PEG site was chosen after light from the EGD was visualized through the skin and was palpated with simultaneous 1:1 stomach palpation visualization on the scope. There area was anesthetized with 1% lidocaine. A 0.5cm skin incision was made, and needle was introduced under endoscopic visualization into the stomach. Guidewire was introduced through the needle using Seldinger technique and was grasped by the endoscope. The guidewire was brought out through the stomach and PEG tube was looped onto the guidewire. The PEG was brought into the stomach and out through the skin under endoscopic visualization. The PEG tubing was noted to be at the 2.5 cm altagracia at the skin. The inner flange was noted to be flush with the stomach wall by endoscope. The outer flange was then secured to the PEG. Three T-fastener sutures were used to secure the stomach under direct visualization.The patient tolerated the procedure well.    Dr. Dill was present for the procedure.    OK for meds via PEG now  OK for tube feeds starting 1700 today  Continue SQH.    Manan Burrell MD  Surgical Critical Care Fellow

## 2022-09-20 NOTE — OR NURSING
EGD with PEG placement at bedside. ICU RN administered medications for sedation and monitored VS. Patient tolerated well. Left in the care of the ICU.

## 2022-09-20 NOTE — PLAN OF CARE
Major Shift Events:  C-collar on at all times. Precedex for sedation. RASS -1. Can follow simple commands and move all extremities. Prn dilaudid given per MAR for pain. Tolerating CMV settings- suctioned with minimal, tan secretions. VSS, NPO. Escudero in place.   Plan: Start MIVF. Trach/PEG placement.   For vital signs and complete assessments, please see documentation flowsheets.

## 2022-09-20 NOTE — PROGRESS NOTES
CLINICAL NUTRITION SERVICES - BRIEF NOTE    Nutrition Prescription    RECOMMENDATIONS FOR MDs/PROVIDERS TO ORDER:  - Advance EN via NEW access/PEG as medically able     Recommendations already ordered by Registered Dietitian (RD):  - RD modified orders to reflect NEW access/PEG  - Initiate @ trophic feeds and HOLD at trophic feeds per team     Future/Additional Recommendations:  - EN initiation and tolerance via NEW access/PEG     Pt had difficulty with enteral access placement (attempted by several services). Access was obtained via endoscopy, but became clogged. Pt received PEG today.     Nutrition Progress Note - f/u for progress towards previous nutrition POC (see previous reassessment for details)     Dusty Robison RD, LD, [facility]  Neuro ICU  Pager: [phone]

## 2022-09-20 NOTE — ANESTHESIA PREPROCEDURE EVALUATION
Anesthesia Pre-Procedure Evaluation    Patient: Alexandru Still   MRN: [id number] : 1930        Procedure : Procedure(s):  CREATION, TRACHEOSTOMY  INSERTION, PEG TUBE          Past Medical History:   Diagnosis Date     Anemia      Aortic stenosis      BPH (benign prostatic hyperplasia)      Closed T12 fracture      Complete heart block     s/p dual chamber pacemaker     Hypertension      Hypothyroidism      Kidney stone      Lambert-Eaton myasthenic syndrome      Lower extremity edema       Past Surgical History:   Procedure Laterality Date     CV TRANSCATHETER AORTIC VALVE REPLACEMENT TRANSAORTIC APPROACH       EP PACEMAKER       ESOPHAGOGASTRODUODENOSCOPY, WITH NASOGASTRIC TUBE INSERTION N/A 2022    Procedure: ESOPHAGOGASTRODUODENOSCOPY, WITH NASOJEJUNAL TUBE INSERTION ;  Surgeon: Nils Drew MD;  Location: [facility] GI     HERNIA REPAIR       IR PICC PLACEMENT > 5 YRS OF AGE  6/15/2022     LASER HOLMIUM LITHOTRIPSY URETER(S), INSERT STENT, COMBINED N/A 6/15/2022    Procedure: 1. Cystourethroscopy 2. Laser lithotripsy of a urethral stone 3. Basketing of a urethral stone 4. Complex hannah catheter placement over a wire;  Surgeon: Beny Ha MD;  Location: [facility] OR      Allergies   Allergen Reactions     Cefuroxime Hives     Contrast Dye      Gadodiamide Hives      Social History     Tobacco Use     Smoking status: Former Smoker     Packs/day: 1.00     Years: 20.00     Pack years: 20.00     Quit date:      Years since quittin.7     Smokeless tobacco: Never Used   Substance Use Topics     Alcohol use: Not Currently      Wt Readings from Last 1 Encounters:   22 82.6 kg (182 lb 1.6 oz)        Anesthesia Evaluation   Pt has had prior anesthetic. Type: General.        ROS/MED HX  ENT/Pulmonary: Comment: Intubated     (+) tobacco use, Past use,     Neurologic: Comment: Lambert eaton disease  Traumatic SDH with unstable C1 & C2 fracture    (+) delerium, resolved No. dementia,      Cardiovascular:     (+) hypertension-----pacemaker, Reason placed: CHB. - Patient is dependent on pacemaker. dysrhythmias, a-fib, valvular problems/murmurs type: AS s/p TAVR. Previous cardiac testing   Echo: Date: 9/16/22 Results:  Interpretation Summary  Global and regional left ventricular function is normal with an EF of 60-65%.  Global right ventricular function is normal.  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 Doppler interrogation of the aortic valve is normal. The  peak velocity across the aortic valve is 1.6 m/sec. The mean gradient is 5  mmHg.  No pericardial effusion is present.  Stress Test: Date: Results:    ECG Reviewed: Date: 9/15/22 Results:  Sinus with PVCs, [id number],   Cath: Date: Results:      METS/Exercise Tolerance:     Hematologic:     (+) anemia,     Musculoskeletal:       GI/Hepatic:     (+) hiatal hernia,     Renal/Genitourinary:     (+) renal disease, Nephrolithiasis , BPH,     Endo:     (+) thyroid problem, hypothyroidism, Chronic steroid usage for Other. Obesity,     Psychiatric/Substance Use:       Infectious Disease: Comment: Severe sepsis    (+) Recent Fever,     Malignancy:       Other:            Physical Exam    Airway   unable to assess          Respiratory Devices and Support         Dental    unable to assess        Cardiovascular          Rhythm and rate: regular and normal     Pulmonary   pulmonary exam normal        breath sounds clear to auscultation           OUTSIDE LABS:  CBC:   Lab Results   Component Value Date    WBC 11.3 (H) 09/19/2022    WBC 12.3 (H) 09/18/2022    HGB 12.8 (L) 09/19/2022    HGB 12.5 (L) 09/18/2022    HCT 39.4 (L) 09/19/2022    HCT 37.9 (L) 09/18/2022     09/19/2022     09/18/2022     BMP:   Lab Results   Component Value Date     09/19/2022     09/18/2022    POTASSIUM 4.0 09/19/2022    POTASSIUM 3.4 09/19/2022    CHLORIDE 108 (H) 09/19/2022    CHLORIDE 106 09/18/2022    CO2 25 09/19/2022    CO2 26 09/18/2022    BUN 35.5 (H) 09/19/2022    BUN  33.8 (H) 09/18/2022    CR 0.85 09/19/2022    CR 0.91 09/18/2022     (H) 09/19/2022     (H) 09/19/2022     COAGS:   Lab Results   Component Value Date    PTT 29 09/15/2022    INR 1.06 09/15/2022     POC: No results found for: BGM, HCG, HCGS  HEPATIC:   Lab Results   Component Value Date    ALBUMIN 3.8 09/15/2022    PROTTOTAL 7.0 09/15/2022    ALT 11 09/15/2022    AST 20 09/15/2022    ALKPHOS 70 09/15/2022    BILITOTAL 0.6 09/15/2022     OTHER:   Lab Results   Component Value Date    PH 7.33 09/15/2022    LACT 1.3 09/15/2022    A1C 6.2 (H) 09/16/2022    TITA 8.8 09/19/2022    PHOS 2.9 09/19/2022    MAG 2.4 (H) 09/19/2022    TSH 0.54 06/15/2022       Anesthesia Plan    ASA Status:  4   NPO Status:  NPO Appropriate    Anesthesia Type: General.     - Airway: ETT   Induction: Intravenous.   Maintenance: Balanced.   Techniques and Equipment:     - Lines/Monitors: 2nd IV     Consents    Anesthesia Plan(s) and associated risks, benefits, and realistic alternatives discussed. Questions answered and patient/representative(s) expressed understanding.    - Discussed:     - Discussed with:  Patient      - Extended Intubation/Ventilatory Support Discussed: No.      - Patient is DNR/DNI Status: No    Use of blood products discussed: No .     Postoperative Care    Pain management: IV analgesics.        Comments:                Marcel Ovalle MD

## 2022-09-20 NOTE — PROGRESS NOTES
"   09/20/22 1119   Quick Adds   Type of Visit Initial PT Evaluation   Living Environment   Current Living Arrangements condominium   Living Environment Comments Pt intubated, no family present, information from chart review. Per chart, pt lives alone in condo, PCA assist 2-3 hours daily   Self-Care   Equipment Currently Used at Home walker, rolling   Fall history within last six months yes   Activity/Exercise/Self-Care Comment Per chart, PCA assist for ADLs in AM and PM. High falls risk due to Lambert-Eaton syndrome. Pt was in TCU this summer and was recommended to have 24/7 assist, which pt decided against per chart   General Information   Onset of Illness/Injury or Date of Surgery 09/15/22   Referring Physician Tammy Velásquez PA-C   Patient/Family Therapy Goals Statement (PT) not specifically stated   Pertinent History of Current Problem (include personal factors and/or comorbidities that impact the POC) Per chart: Alexandru Still is a 89 year old male who was admitted to the SICU on 9/15/2022 after experiencing an unwitnessed fall and striking his head. He was transferred to the ED and was found to have a left frontal SDH (5mm) + C1 fx + Type II C2 odontoid fx w/ 1cm posterior displacement. Intubated for airway protection in the ED given high c-spine injury and difficulty swallowing. Family not planning on proceeding with C-spine stabilization surgery but will likely desire trach/PEG. Lambert-Eaton myasthenic syndrome resulting in frequent falls   Existing Precautions/Restrictions spinal  (c collar at all times)   General Observations Activity: \"Up with assist as long as he has his C-collar in place at all times\". No HOB restrictions   Cognition   Affect/Mental Status (Cognition) flat/blunted affect   Orientation Status (Cognition) unable/difficult to assess   Follows Commands (Cognition) over 90% accuracy;delayed response/completion   Pain Assessment   Patient Currently in Pain Yes, see Vital Sign " flowsheet   Integumentary/Edema   Integumentary/Edema Comments facial abrasions   Posture    Posture   (elevated shoulders)   Range of Motion (ROM)   ROM Comment c collar on at all times; limited AROM of BUE   Strength (Manual Muscle Testing)   Strength Comments grossly deconditioned; partial anti gravity movement of all extremities   Bed Mobility   Comment, (Bed Mobility) rolling max A   Balance   Balance Comments did not assess due to pt's medical status, anticipating impaired due to frequent falls   Clinical Impression   Criteria for Skilled Therapeutic Intervention Yes, treatment indicated   PT Diagnosis (PT) impaired functional mobility   Influenced by the following impairments cervical fracture, weakness, impaired activity tolerance, impaired balance, pain   Functional limitations due to impairments bed mobility, transfers, amb   Clinical Presentation (PT Evaluation Complexity) Unstable/Unpredictable   Clinical Presentation Rationale >4 body structure and functional impairments   Clinical Decision Making (Complexity) high complexity   Planned Therapy Interventions (PT) balance training;bed mobility training;gait training;home exercise program;neuromuscular re-education;strengthening;transfer training   Risk & Benefits of therapy have been explained evaluation/treatment results reviewed;care plan/treatment goals reviewed;risks/benefits reviewed  (will also review with family when able)   PT Discharge Planning   PT Discharge Recommendation (DC Rec) Transitional Care Facility;LTACH, pending meets medical criteria   PT Rationale for DC Rec Pt's medical status likely to determine discharge recs. Pending medical course, may progress mobility towards PLOF, would recommend TCU to progress mobility. Pt will not be safe to return home.   PT Brief overview of current status OH lift to chair   Plan of Care Review   Plan of Care Reviewed With patient   Total Evaluation Time   Total Evaluation Time (Minutes) 10   Physical  Therapy Goals   PT Frequency 5x/week   PT Predicted Duration/Target Date for Goal Attainment 10/28/22   PT Goals Bed Mobility;Transfers;Gait   PT: Bed Mobility Supine to/from sit;Supervision/stand-by assist   PT: Transfers Supervision/stand-by assist;Sit to/from stand;Assistive device   PT: Gait Minimal assist;Assistive device;100 feet

## 2022-09-20 NOTE — PROGRESS NOTES
[facility], [place]  Neurosurgery Progress Note:  09/20/2022      Interval History: Plan for Trach/PEG today; family confirmed no desire to pursue neurosurgical intervention    Assessment:  Alexandru Still is a 89 year old male with unwitnessed mechanical fall resulting in C1 fracture, and a type II C2 odontoid fracture with 1 cm of posterior displacement and left frontal 5mm SDH.     Clinically Significant Risk Factors Present on Admission             # Severe Malnutrition: based on nutrition assessment     Plan:  Ok to resume ASA today  Serial neurological examinations  C collar at all times- keep neck flat, no pillows  Ok to pursue trach/PEG, no hyperextension during tracheostomy  Platelets >100,000  INR < 1.5  Hemoglobin > 8  DVT: SCDs while in bed, okay for SQ from neurosurgery perspective  Repeat upright XR in 6 weeks and follow up- arranged for you  CT cervical spine in 3 months and follow up- arranged for you  Neurosurgery will follow peripherally at this time; please page back with any new concerns    -----------------------------------  Reginaldo Still MD  Neurosurgery resident  -----------------------------------    General: awake and alert  HEENT: Aspen collar, mild neck tenderness  PULM: breathing comfortably on room air  : rectal tone intact     NEUROLOGIC:  -- Intubated and sedated  -- Follows commands briskly in all 4 extremities  -- PERRL, tracks, face symmetric  -- 4/5 strength in all extremities       Objective:   Temp:  [97  F (36.1  C)-97.9  F (36.6  C)] 97.6  F (36.4  C)  Pulse:  [49] 49  Resp:  [8-18] 18  BP: (101-153)/(68-78) 101/71  FiO2 (%):  [40 %] 40 %  SpO2:  [98 %-100 %] 99 %  I/O last 3 completed shifts:  In: 656.9 [I.V.:656.9]  Out: 985 [Urine:985]        LABS:  Recent Labs   Lab 09/20/22  0405 09/19/22  1821 09/19/22  1055 09/19/22  0452 09/18/22  1426 09/18/22  0409     --   --  142  --  142   POTASSIUM 3.5  --  4.0 3.4   < > 3.2*   CHLORIDE  111*  --   --  108*  --  106   CO2 25  --   --  25  --  26   ANIONGAP 9  --   --  9  --  10   * 120*  --  122*  --  121*   BUN 30.7*  --   --  35.5*  --  33.8*   CR 0.82  --   --  0.85  --  0.91   TITA 8.8  --   --  8.8  --  8.9    < > = values in this interval not displayed.       Recent Labs   Lab 09/20/22  0405   WBC 9.6   RBC 3.92*   HGB 12.0*   HCT 37.4*   MCV 95   MCH 30.6   MCHC 32.1   RDW 15.2*          IMAGING:  No results found for this or any previous visit (from the past 24 hour(s)).

## 2022-09-20 NOTE — PROGRESS NOTES
[facility]  Trauma Service Progress Note    Date of Service (when I saw the patient): 09/20/2022     Assessment & Plan   Trauma mechanism: Fall while going to urinal    Time/date of injury: 9/15/22  Known Injuries:  1. Anterior left frontal SDH, 5 mm  2. Posteriorly displaced Tyle II odontoid fx, ~1cm posterior dislocation   3. C1 anterior arch fx   4. Epidural hemorrhage up to 7mm along posterior dens   5. Questionable acute anterior left 6th rib fx  6. Right forehead abrasion      Neuro/Pain/Psych:  # Traumatic SDH, along left frontal lobe up to 5mm in thickness.   # Posteriorly displaced Tyle II odontoid fx, ~1cm posterior dislocation   # C1 anterior arch fx   # Epidural hemorrhage up to 7mm along posterior dens   # Sensorineural Hearing loss   - Repeat Head CT completed stable  - Upright xrays: Redemonstrated type II odontoid fracture with 8 mm of retrolisthesis and hyperextension.  - Neurosurgery following: No plan surgical plan per pt preference  - Serial neurological examinations: C collar at all times (no pillows)  - C collar at all times- keep neck flat, no pillows  - Ok to pursue trach/PEG, no hyperextension during tracheostomy  - SCDs while in bed, okay for subcutaneous heparin per neurosurgery for DVT prophylaxis  - Follow up plan: Upright XR in 6 weeks and CT cervical spine in 3 months, to be arranged by Neurosurgery     # Acute traumatic neck pain  # Chronic hip and shoulder pain  Sedation management per SICU  Added tylenol supp with no enteral access  - Prn: Dilaudid 0.5-1mg     EENT:  # Laryngeal Edema 2/2 traumatic cervical fracture/injury  OG was attempted in ED on 9/15, unsuccessful after 2 attempts, 9/16 Radiology was unable to place NG tube d/t laryngeal edema. Recommend PEG for enteral access.   - 9/16 3 doses Dexamethasone q8hrs, completed  - Underwent an EGD with NG placement 09/18 unfortunately became dislodged overnight now  removed.     Pulmonary:  # Acute hypoxic respiratory failure required intubated shortly after admission  # PB  - SICU managing MV, minimal settings  - Holding PTA: Flonase, Duoneb     Cardiovascular:    # Hypertension   # Complete heart block s/p PM placement  # Nonrheumatic aortic valve stenosis s/p TAVR 2019  - Monitor hemodynamic status.   - PTA Lasix on hold     GI/Nutrition:    # Hx of dysphagia   # Hiatal Hernia    - Failed bed side OG placement and radiology attempts at placing NG. Had an EGD 09/18 with feeding tube placement. Unfortunately became dislodged.  Tentative plan for PEG in the OR     Renal/ Fluids/Electrolytes:  # Hypomagnesia, improved   # Hypokalemia, improved with replacements per protocol  # Lactic Acidosis, improved with fluids   - ectrolyte replacement protocol in place.      Endocrine:  # Hypothyroidism   - PTA Levothyroxine, currently on hold due to lack of access     Infectious disease:   # stress Leukocytosis   - Admitted to [facility] for Septic Shock, CAP, Streptococcus dysgalactiae (Group G Strep) Bacteremia, Urinary retention with urethral stone s/p ureteroscopy and lithotripsy  WBC peaked at 12.3, afebrile  - LA: 2.2 ? 1.3, improved with fluids, Procal: 0.04  - 9/15: BCx2: NGTD  - UA unremarkable for infection  - No indications for antibiotics, monitor fever curve     Hematology:    # Anemia of chronic illness   - Hgb 12.5, stable   - Threshold for transfusion if hgb <7.0 or signs/symptoms of hypoperfusion.      # DVT Prophylaxis: pcd, no pharmacologic prophylaxis at this time d/t SDH and possible epidural hemorrhage.      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     Skin:  # Abrasion to forehead   - dilgent  cares to prevent skin breakdown and wound formation.    Palliative     Lines/ tubes/ drains:  - PIV/ ETT/ Escudero      General Cares:                 PPI/H2 blocker:  Protonix                 DVT prophylaxis: pcd                Bowel Regimen/Date of last stool: in place, unable to take orals                 Pulmonary toilet:Vent management    Interval History   Care conference held yesterday with SICU,, neurosurgery, Palliative and trauma with the patient's son in law and 2 grand children. Plan and goals of care review. Family re-iterated proceeding with a tracheostomy, PEG with subsequent discharge to a skill nursing family per the patients wishes. Will require the cervical collar most likely life long. Will most likely require LTACH with possible LTC post discharge.  No acute issues overnight. On Precedex for sedation, awake and able to follow simple commands. Nods appropriately. Pt is an add on for a trach and PEG today in the OR.    ROS x 8 negative with exception of those things listed in interval hx    Physical Exam   Temp: 97.6  F (36.4  C) Temp src: Axillary BP: 101/71 Pulse: (!) 49   Resp: 18 SpO2: 99 % O2 Device: Mechanical Ventilator    Vitals:    09/18/22 0500 09/19/22 0200 09/20/22 0400   Weight: 80.4 kg (177 lb 4 oz) 82.6 kg (182 lb 1.6 oz) 82.9 kg (182 lb 12.2 oz)     Vital Signs with Ranges  Temp:  [97  F (36.1  C)-97.9  F (36.6  C)] 97.6  F (36.4  C)  Pulse:  [49] 49  Resp:  [8-18] 18  BP: (101-153)/(68-78) 101/71  FiO2 (%):  [40 %] 40 %  SpO2:  [98 %-100 %] 99 %  I/O last 3 completed shifts:  In: 656.9 [I.V.:656.9]  Out: 985 [Urine:985]  [place] Coma Scale - Total 11/T/15  Eye Response (E): 4   4= spontaneous, 3= to verbal/voice, 2= to pain, 1= No response   Verbal Response (V): 1/T   5= Orientated, converses, 4= Confused, converses, 3= Inappropriate words, 2= Incomprehensible sounds, 1=No response   Motor Response (M): 6   6= Obeys commands, 5= Localizes to pain, 4= Withdrawal to pain, 3=Fexion to  pain, 2= Extension to pain, 1= No response   Constitutional: Awake and alert  HEENT: Normocephalic, scabbed abrasion to right forehead/brow, PERRL, Hoh-J with good fit  Respiratory: ETT secured  Cardiovascular:  Paced rate, bradycardia  GI: Abdomen soft, non-distended,   Genitourinary:  Escudero in place   Skin:  Warm & dry, ecchymosis in various stages of healing over body   Musculoskeletal: There is no redness, warmth, or swelling of the joints.  Pedal pulse palpated.  Neurologic:  Strength and sensory is intact. No new, focal deficits.able to follow commands.   Psych: calm,     Afanfloresita Burciaga, NATASHA CNP  To contact the trauma service use job code pager [phone],   Numeric texts or alpha text through [facility]

## 2022-09-20 NOTE — PLAN OF CARE
Major Shift Events: PEG tube placed at bedside tolerated well. Precedex stopped post PEG tube procedure and HR now 60s and not paced.       D/I/A: Alert and following commands. LS coarse/diminished with moderate creamy/purulent/thick sputum. BS active and flatus present; no BM since admission suppository given. Urine output adequate see I&O. Up to chair tolerated well.  VSS and afebrile    P: Negative CPOT/non-verbal. Q 2hour turns. Fall risk precautions. Pulmonary toilet. Trach placement in AM. TF @ goal 10 ml/hr       For vital signs and complete assessments, please see documentation flowsheets.

## 2022-09-20 NOTE — PROGRESS NOTES
SURGICAL ICU PROGRESS NOTE  September 20, 2022        PRIMARY TEAM: Trauma  PRIMARY PHYSICIAN: Dr. Nils Drew MD  REASON FOR CRITICAL CARE ADMISSION: Airway management/mechanical ventilation   ADMITTING PHYSICIAN: Dr. Kash Hendricks MD  Date of Service (when I saw the patient): 09/19/2022       CO-MORBIDITIES:   Closed fracture of cervical vertebra, unspecified cervical vertebral level, initial encounter (H)  SDH (subdural hematoma) (H)  Traumatic subdural hemorrhage without loss of consciousness, initial encounter (H)  Oth disp fx of first cervical vertebra, init for clos fx (H)  Closed fracture of second cervical vertebra without spinal cord injury, initial encounter (H)  Accidental fall, initial encounter    PMHx: Lambert-Eaton syndrome, TAVR, complete heart block with pacemaker dependency, DM2, arthritis, BPH, HTN, osteoporosis    ASSESSMENT: Alexandru Still is a 89 year old male who was admitted to the SICU on 9/15/2022 after experiencing an unwitnessed fall and striking his head. He was transferred to the ED and was found to have a left frontal SDH (5mm) + C1 fx + Type II C2 odontoid fx w/ 1cm posterior displacement. Intubated for airway protection in the ED given high c-spine injury and difficulty swallowing. Family not planning on proceeding with C-spine stabilization surgery but will likely desire trach/PEG.    TODAY'S PROGRESS/PLANS:   - Most recent family discussion with decision to pursue trach and PEG but not any neurosurgical intervention.   - Minimize vent settings as able  - Started D5 1/2NS +  mL/hr for low UOP     PLAN:  Neurological:  # Acute pain   # Subdural Hematoma, stable  # C1, C2 Cervical Spine Injury  # hx of Lambert Eaton Myasthenic syndrome  - Monitor neurological status. Delirium preventions and precautions  - q4h neurochecks  - Last Head CT (9/16): 2 mm SDH.  Safety:  - c-collar to remain on at all times, no pillows behind head, head straight, HOB not to exceed 30  degrees  - ROSY ROSALES at all times  - Family care conference yesterday -> Trach and PEG today  - Pain: prn dilaudid. Some pain reported, well managed.  - Sedation plan: precedex (0.3-0.4 mcg/kg/hr); RASS of -1 to drowsy.  GCS of 10. CPOT: 3. Continue.     Pulmonary:   # Mechanical Ventilatory Support  # Acute hypoxic respiratory support   # Pulmonary edema vs atelectasis  - VENT: CMV/AC: FiO2 40%, RR 12, Tv 450 mL, PEEP 10    Ventilatory bundle. No vent changes made  - Lung protective ventilator settings  - Supplemental O2, goal SpO2 > 92%.  - Continue PST after procedure, ok to stay on this setting indefinitely if he does well  - Two days ago, tube was replaced due to copious brown secretion. Bronchoscopy showed blooddy secretions per note. Yesterday with copious pink secretions. Patient was aspirated with good O2 sats (O2 %)  - Today: minimal to moderate thick tan/creamy green secretions, and is currently with good O2 sats (98-99%). Continue suctioning     Cardiovascular:    # hx of TAVR (Echo 9/16: normal EF 60-65%, TAVR with appropriate function)  # hx of Complete Heart Block s/p Pacemaker  # hx of HTN  - Monitor hemodynamic status  - Pulse 49  - -153, DBP 61-78.   - PO Doxasozin was held.      Gastroenterology/Nutrition:  # Hiatal Hernia  - NPO  - No enteral access  - NGT was found coiled inside oral cavity and was pulled out. Oral medications and feeding were held.   - Bowel reg and orals will start after enteral access obtained via PEG planned for today     Renal/Fluids/Electrolytes/:   # Hypokalemia, replaced  # Hypomagnesemia, replaced  # Hypophosphatemia, replaced  # Lactic acidosis, resolved  # Elevated BUN  # BPH  - Will cont to monitor I/O (0.47 ml/kg/hr UOP yesterday)  - Started D5 1/2NS +  mL/hr for low UOP   - Electrolyte replacement protocol (K, Mg, Phos)  - Escudero to remain in place while intubated  - Cr 0.82, BUN 30.7, Na 145, K 3.5,  Mg 2.5    Endocrine:  # Hypothyroidism (PTA  Levothyroxine)  # At risk of stress-induced hyperglycemia  # At risk of steroid-induced hyperglycemia  # Pre-diabetes  - PTA levothyroxine 112 mcg, will restart with enteral access  - Hgb A1C 6.2  - Goal blood glucose < 180  - Glucose 113-120. Without need of insulin units.   - Continue glucose checks, no indication for insulin at this time     ID:  # Leukocytosis, resolved  - Afebrile, leukocytes WNL (9.6)  - f/up BCx (NGTD) & UA (unremarkable)     Heme:     # Normocytic anemia, stable   - Hgb 12.0 above transfusion goal (Hb<8.0); Hgb 13 on arrival; no signs/symptoms of hypoperfusion.  - Monitor and trend     MSK:  # Facial Abrasion   # Right Lower Leg Abrasion  # Weakness and deconditioning  - Physical and occupational therapy consult   - local wound care     General Cares/Prophylaxis:    DVT Prophylaxis: SCDs; subcutaneous heparin  GI Prophylaxis: PPI  Restraints: Restraints for medical healing needed: only if pt gets agitated and pulling at lines after chemical sedation     LDAs:  - PIVx2   - ETT  - Escudero     Disposition / Code status:  - SICU.   - Full code      ====================================    SUBJECTIVE:   - No acute events overnight.   - Lower UOP, monitor   - minimal tan to moderate thick creamy/green secretions today  - Oral meds still held      OBJECTIVE:   1. VITAL SIGNS:   Temp:  [97  F (36.1  C)-98  F (36.7  C)] 97.5  F (36.4  C)  Pulse:  [49] 49  Resp:  [9-14] 11  BP: (112-153)/(61-78) 152/78  FiO2 (%):  [40 %] 40 %  SpO2:  [98 %-100 %] 99 %  Vent Mode: CMV/AC  (Continuous Mandatory Ventilation/ Assist Control)  FiO2 (%): 40 %  Resp Rate (Set): 12 breaths/min  Tidal Volume (Set, mL): 450 mL  PEEP (cm H2O): 10 cmH2O  Resp: 11      2. INTAKE/ OUTPUT:   I/O last 3 completed shifts:  In: 589.3 [I.V.:589.3]  Out: 985 [Urine:985]    3. PHYSICAL EXAMINATION:   General: intubated and on 0.3 precedex  HEENT: abrasion to right forehead, ETT in place  Neck: MIAMI-J  in place   Neuro: sedated  iatrogenically, EOM  Pulm/Resp: Lungs CTAB, intubated and on mechanical ventilation  CV: bradycardic on monitor, paced rhythm  Abdomen: Soft, no grimace to palpation, nd  : hannah catheter in place with clear yellow urine  MSK/Extremities: no peripheral edema, extremities wwp    4. INVESTIGATIONS:   Arterial Blood Gases   Recent Labs   Lab 09/15/22  1733 09/15/22  1526   PH 7.33 7.41     Complete Blood Count   Recent Labs   Lab 09/20/22  0405 09/19/22  0452 09/18/22  0409 09/17/22  0523   WBC 9.6 11.3* 12.3* 11.5*   HGB 12.0* 12.8* 12.5* 12.5*    203 233 251     Basic Metabolic Panel  Recent Labs   Lab 09/20/22  0405 09/19/22  1821 09/19/22  1055 09/19/22  0452 09/18/22  1426 09/18/22  0409 09/17/22  0523     --   --  142  --  142 141   POTASSIUM 3.5  --  4.0 3.4 3.5 3.2* 3.5   CHLORIDE 111*  --   --  108*  --  106 105   CO2 25  --   --  25  --  26 24   BUN 30.7*  --   --  35.5*  --  33.8* 25.0*   CR 0.82  --   --  0.85  --  0.91 0.88   * 120*  --  122*  --  121* 137*     Liver Function Tests  Recent Labs   Lab 09/15/22  1617 09/15/22  0521   AST 20  --    ALT 11  --    ALKPHOS 70  --    BILITOTAL 0.6  --    ALBUMIN 3.8  --    INR  --  1.06     Pancreatic Enzymes  No lab results found in last 7 days.  Coagulation Profile  Recent Labs   Lab 09/15/22  0521   INR 1.06   PTT 29     Lactate  Invalid input(s): LACTATE    5. RADIOLOGY:   No results found for this or any previous visit (from the past 24 hour(s)).    ========================================      Patient seen, findings and plan discussed with surgical ICU staff Dr. Bob Dill.      - - - - - - - - - - - - - - - - - -  Bob King, MS4  [employer] [employer]  09/20/2022     Resident Attestation   I, Wilbert Mccray MD, was present with the medical student who participated in the service and in the documentation of the note. I have verified the history and personally performed the physical exam and medical decision making. Addenda have been  made to the note as appropriate. I agree with the assessment and plan of care as documented in the note.      MD Neisha Carty/DANISH Preliminary Resident  Department of Surgery

## 2022-09-21 ENCOUNTER — APPOINTMENT (OUTPATIENT)
Dept: GENERAL RADIOLOGY | Facility: CLINIC | Age: 87
DRG: 004 | End: 2022-09-21
Payer: MEDICARE

## 2022-09-21 LAB
ABO/RH(D): NORMAL
ANION GAP SERPL CALCULATED.3IONS-SCNC: 8 MMOL/L (ref 7–15)
ANTIBODY SCREEN: NEGATIVE
BUN SERPL-MCNC: 27.2 MG/DL (ref 8–23)
CALCIUM SERPL-MCNC: 8.6 MG/DL (ref 8.2–9.6)
CHLORIDE SERPL-SCNC: 115 MMOL/L (ref 98–107)
CREAT SERPL-MCNC: 0.78 MG/DL (ref 0.67–1.17)
DEPRECATED HCO3 PLAS-SCNC: 24 MMOL/L (ref 22–29)
ERYTHROCYTE [DISTWIDTH] IN BLOOD BY AUTOMATED COUNT: 15.3 % (ref 10–15)
GFR SERPL CREATININE-BSD FRML MDRD: 84 ML/MIN/1.73M2
GLUCOSE BLDC GLUCOMTR-MCNC: 129 MG/DL (ref 70–99)
GLUCOSE BLDC GLUCOMTR-MCNC: 161 MG/DL (ref 70–99)
GLUCOSE SERPL-MCNC: 138 MG/DL (ref 70–99)
HCT VFR BLD AUTO: 36.9 % (ref 40–53)
HGB BLD-MCNC: 11.9 G/DL (ref 13.3–17.7)
MAGNESIUM SERPL-MCNC: 2.4 MG/DL (ref 1.7–2.3)
MCH RBC QN AUTO: 30.9 PG (ref 26.5–33)
MCHC RBC AUTO-ENTMCNC: 32.2 G/DL (ref 31.5–36.5)
MCV RBC AUTO: 96 FL (ref 78–100)
PHOSPHATE SERPL-MCNC: 1.6 MG/DL (ref 2.5–4.5)
PHOSPHATE SERPL-MCNC: 2.7 MG/DL (ref 2.5–4.5)
PLATELET # BLD AUTO: 213 10E3/UL (ref 150–450)
POTASSIUM SERPL-SCNC: 3.7 MMOL/L (ref 3.4–5.3)
RBC # BLD AUTO: 3.85 10E6/UL (ref 4.4–5.9)
SODIUM SERPL-SCNC: 147 MMOL/L (ref 136–145)
SPECIMEN EXPIRATION DATE: NORMAL
WBC # BLD AUTO: 11.5 10E3/UL (ref 4–11)

## 2022-09-21 PROCEDURE — 272N000001 HC OR GENERAL SUPPLY STERILE: Performed by: SURGERY

## 2022-09-21 PROCEDURE — 258N000003 HC RX IP 258 OP 636: Performed by: SURGERY

## 2022-09-21 PROCEDURE — 258N000003 HC RX IP 258 OP 636

## 2022-09-21 PROCEDURE — 250N000011 HC RX IP 250 OP 636

## 2022-09-21 PROCEDURE — C9113 INJ PANTOPRAZOLE SODIUM, VIA: HCPCS | Performed by: PHYSICIAN ASSISTANT

## 2022-09-21 PROCEDURE — 31600 PLANNED TRACHEOSTOMY: CPT | Mod: GC | Performed by: SURGERY

## 2022-09-21 PROCEDURE — 250N000013 HC RX MED GY IP 250 OP 250 PS 637: Performed by: SURGERY

## 2022-09-21 PROCEDURE — 84100 ASSAY OF PHOSPHORUS: CPT | Performed by: SURGERY

## 2022-09-21 PROCEDURE — 258N000003 HC RX IP 258 OP 636: Performed by: NURSE ANESTHETIST, CERTIFIED REGISTERED

## 2022-09-21 PROCEDURE — 250N000009 HC RX 250: Performed by: NURSE ANESTHETIST, CERTIFIED REGISTERED

## 2022-09-21 PROCEDURE — 36415 COLL VENOUS BLD VENIPUNCTURE: CPT | Performed by: SURGERY

## 2022-09-21 PROCEDURE — 80048 BASIC METABOLIC PNL TOTAL CA: CPT

## 2022-09-21 PROCEDURE — 250N000009 HC RX 250: Performed by: NURSE PRACTITIONER

## 2022-09-21 PROCEDURE — 250N000025 HC SEVOFLURANE, PER MIN: Performed by: SURGERY

## 2022-09-21 PROCEDURE — 0B113F4 BYPASS TRACHEA TO CUTANEOUS WITH TRACHEOSTOMY DEVICE, PERCUTANEOUS APPROACH: ICD-10-PCS | Performed by: SURGERY

## 2022-09-21 PROCEDURE — 85027 COMPLETE CBC AUTOMATED: CPT

## 2022-09-21 PROCEDURE — 83735 ASSAY OF MAGNESIUM: CPT | Performed by: SURGERY

## 2022-09-21 PROCEDURE — 250N000009 HC RX 250: Performed by: SURGERY

## 2022-09-21 PROCEDURE — 71045 X-RAY EXAM CHEST 1 VIEW: CPT | Mod: 26 | Performed by: RADIOLOGY

## 2022-09-21 PROCEDURE — 370N000017 HC ANESTHESIA TECHNICAL FEE, PER MIN: Performed by: SURGERY

## 2022-09-21 PROCEDURE — 250N000011 HC RX IP 250 OP 636: Performed by: PHYSICIAN ASSISTANT

## 2022-09-21 PROCEDURE — 250N000013 HC RX MED GY IP 250 OP 250 PS 637: Performed by: NURSE PRACTITIONER

## 2022-09-21 PROCEDURE — 250N000013 HC RX MED GY IP 250 OP 250 PS 637: Performed by: PHYSICIAN ASSISTANT

## 2022-09-21 PROCEDURE — 250N000011 HC RX IP 250 OP 636: Performed by: NURSE ANESTHETIST, CERTIFIED REGISTERED

## 2022-09-21 PROCEDURE — 250N000011 HC RX IP 250 OP 636: Performed by: STUDENT IN AN ORGANIZED HEALTH CARE EDUCATION/TRAINING PROGRAM

## 2022-09-21 PROCEDURE — 71045 X-RAY EXAM CHEST 1 VIEW: CPT

## 2022-09-21 PROCEDURE — 200N000002 HC R&B ICU UMMC

## 2022-09-21 PROCEDURE — 360N000075 HC SURGERY LEVEL 2, PER MIN: Performed by: SURGERY

## 2022-09-21 PROCEDURE — 999N000157 HC STATISTIC RCP TIME EA 10 MIN

## 2022-09-21 PROCEDURE — 36415 COLL VENOUS BLD VENIPUNCTURE: CPT

## 2022-09-21 PROCEDURE — 86901 BLOOD TYPING SEROLOGIC RH(D): CPT

## 2022-09-21 PROCEDURE — 94003 VENT MGMT INPAT SUBQ DAY: CPT

## 2022-09-21 RX ORDER — PROPOFOL 10 MG/ML
INJECTION, EMULSION INTRAVENOUS PRN
Status: DISCONTINUED | OUTPATIENT
Start: 2022-09-21 | End: 2022-09-21

## 2022-09-21 RX ORDER — FENTANYL CITRATE 50 UG/ML
INJECTION, SOLUTION INTRAMUSCULAR; INTRAVENOUS PRN
Status: DISCONTINUED | OUTPATIENT
Start: 2022-09-21 | End: 2022-09-21

## 2022-09-21 RX ORDER — DEXTROSE MONOHYDRATE 50 MG/ML
INJECTION, SOLUTION INTRAVENOUS CONTINUOUS
Status: CANCELLED | OUTPATIENT
Start: 2022-09-21

## 2022-09-21 RX ORDER — POTASSIUM CHLORIDE 1.5 G/1.58G
20 POWDER, FOR SOLUTION ORAL ONCE
Status: COMPLETED | OUTPATIENT
Start: 2022-09-21 | End: 2022-09-21

## 2022-09-21 RX ORDER — DEXAMETHASONE SODIUM PHOSPHATE 4 MG/ML
INJECTION, SOLUTION INTRA-ARTICULAR; INTRALESIONAL; INTRAMUSCULAR; INTRAVENOUS; SOFT TISSUE PRN
Status: DISCONTINUED | OUTPATIENT
Start: 2022-09-21 | End: 2022-09-21

## 2022-09-21 RX ORDER — LEVOTHYROXINE SODIUM 112 UG/1
112 TABLET ORAL
Status: DISCONTINUED | OUTPATIENT
Start: 2022-09-22 | End: 2022-09-22 | Stop reason: HOSPADM

## 2022-09-21 RX ORDER — ACETAMINOPHEN 325 MG/1
650 TABLET ORAL EVERY 6 HOURS
Status: DISCONTINUED | OUTPATIENT
Start: 2022-09-21 | End: 2022-09-22 | Stop reason: HOSPADM

## 2022-09-21 RX ORDER — SODIUM CHLORIDE, SODIUM LACTATE, POTASSIUM CHLORIDE, CALCIUM CHLORIDE 600; 310; 30; 20 MG/100ML; MG/100ML; MG/100ML; MG/100ML
INJECTION, SOLUTION INTRAVENOUS CONTINUOUS PRN
Status: DISCONTINUED | OUTPATIENT
Start: 2022-09-21 | End: 2022-09-21

## 2022-09-21 RX ADMIN — POTASSIUM CHLORIDE, DEXTROSE MONOHYDRATE AND SODIUM CHLORIDE: 150; 5; 450 INJECTION, SOLUTION INTRAVENOUS at 17:13

## 2022-09-21 RX ADMIN — Medication 2 PACKET: at 10:31

## 2022-09-21 RX ADMIN — POTASSIUM PHOSPHATE, MONOBASIC AND POTASSIUM PHOSPHATE, DIBASIC 9 MMOL: 224; 236 INJECTION, SOLUTION, CONCENTRATE INTRAVENOUS at 07:38

## 2022-09-21 RX ADMIN — HEPARIN SODIUM 5000 UNITS: 5000 INJECTION, SOLUTION INTRAVENOUS; SUBCUTANEOUS at 11:19

## 2022-09-21 RX ADMIN — POTASSIUM CHLORIDE 20 MEQ: 1.5 POWDER, FOR SOLUTION ORAL at 09:29

## 2022-09-21 RX ADMIN — SODIUM CHLORIDE, POTASSIUM CHLORIDE, SODIUM LACTATE AND CALCIUM CHLORIDE: 600; 310; 30; 20 INJECTION, SOLUTION INTRAVENOUS at 07:48

## 2022-09-21 RX ADMIN — ACETAMINOPHEN 650 MG: 325 TABLET, FILM COATED ORAL at 18:02

## 2022-09-21 RX ADMIN — OXYCODONE HYDROCHLORIDE 5 MG: 5 SOLUTION ORAL at 19:45

## 2022-09-21 RX ADMIN — FENTANYL CITRATE 50 MCG: 50 INJECTION, SOLUTION INTRAMUSCULAR; INTRAVENOUS at 08:19

## 2022-09-21 RX ADMIN — Medication 2 PACKET: at 19:45

## 2022-09-21 RX ADMIN — HYDROMORPHONE HYDROCHLORIDE 0.3 MG: 0.2 INJECTION, SOLUTION INTRAMUSCULAR; INTRAVENOUS; SUBCUTANEOUS at 22:35

## 2022-09-21 RX ADMIN — SENNOSIDES 8.6 MG: 8.6 TABLET, FILM COATED ORAL at 10:30

## 2022-09-21 RX ADMIN — SENNOSIDES 8.6 MG: 8.6 TABLET, FILM COATED ORAL at 19:45

## 2022-09-21 RX ADMIN — HYDROMORPHONE HYDROCHLORIDE 0.3 MG: 0.2 INJECTION, SOLUTION INTRAMUSCULAR; INTRAVENOUS; SUBCUTANEOUS at 09:59

## 2022-09-21 RX ADMIN — OXYCODONE HYDROCHLORIDE 5 MG: 5 SOLUTION ORAL at 01:53

## 2022-09-21 RX ADMIN — ACETAMINOPHEN 650 MG: 325 SOLUTION ORAL at 11:19

## 2022-09-21 RX ADMIN — POTASSIUM PHOSPHATE, MONOBASIC AND POTASSIUM PHOSPHATE, DIBASIC 9 MMOL: 224; 236 INJECTION, SOLUTION, CONCENTRATE INTRAVENOUS at 09:45

## 2022-09-21 RX ADMIN — OXYCODONE HYDROCHLORIDE 5 MG: 5 SOLUTION ORAL at 05:46

## 2022-09-21 RX ADMIN — SODIUM CHLORIDE, POTASSIUM CHLORIDE, SODIUM LACTATE AND CALCIUM CHLORIDE 500 ML: 600; 310; 30; 20 INJECTION, SOLUTION INTRAVENOUS at 07:38

## 2022-09-21 RX ADMIN — DOXAZOSIN 1 MG: 4 TABLET ORAL at 09:33

## 2022-09-21 RX ADMIN — POTASSIUM & SODIUM PHOSPHATES POWDER PACK 280-160-250 MG 1 PACKET: 280-160-250 PACK at 15:11

## 2022-09-21 RX ADMIN — MULTIVITAMIN 15 ML: LIQUID ORAL at 10:30

## 2022-09-21 RX ADMIN — HYDROMORPHONE HYDROCHLORIDE 0.2 MG: 0.2 INJECTION, SOLUTION INTRAMUSCULAR; INTRAVENOUS; SUBCUTANEOUS at 04:18

## 2022-09-21 RX ADMIN — OXYCODONE HYDROCHLORIDE 5 MG: 5 SOLUTION ORAL at 09:59

## 2022-09-21 RX ADMIN — POTASSIUM & SODIUM PHOSPHATES POWDER PACK 280-160-250 MG 1 PACKET: 280-160-250 PACK at 11:19

## 2022-09-21 RX ADMIN — POTASSIUM PHOSPHATE, MONOBASIC AND POTASSIUM PHOSPHATE, DIBASIC 9 MMOL: 224; 236 INJECTION, SOLUTION, CONCENTRATE INTRAVENOUS at 18:02

## 2022-09-21 RX ADMIN — Medication 50 MG: at 08:07

## 2022-09-21 RX ADMIN — PANTOPRAZOLE SODIUM 40 MG: 40 INJECTION, POWDER, FOR SOLUTION INTRAVENOUS at 10:30

## 2022-09-21 RX ADMIN — LEVOTHYROXINE SODIUM 112 MCG: 100 SOLUTION ORAL at 10:30

## 2022-09-21 RX ADMIN — SUGAMMADEX 200 MG: 100 INJECTION, SOLUTION INTRAVENOUS at 09:04

## 2022-09-21 RX ADMIN — PROPOFOL 30 MG: 10 INJECTION, EMULSION INTRAVENOUS at 07:56

## 2022-09-21 RX ADMIN — POTASSIUM CHLORIDE, DEXTROSE MONOHYDRATE AND SODIUM CHLORIDE: 150; 5; 450 INJECTION, SOLUTION INTRAVENOUS at 05:10

## 2022-09-21 RX ADMIN — POTASSIUM & SODIUM PHOSPHATES POWDER PACK 280-160-250 MG 1 PACKET: 280-160-250 PACK at 19:45

## 2022-09-21 RX ADMIN — ACETAMINOPHEN 650 MG: 325 SOLUTION ORAL at 05:46

## 2022-09-21 RX ADMIN — PROPOFOL 30 MG: 10 INJECTION, EMULSION INTRAVENOUS at 07:53

## 2022-09-21 RX ADMIN — HEPARIN SODIUM 5000 UNITS: 5000 INJECTION, SOLUTION INTRAVENOUS; SUBCUTANEOUS at 19:45

## 2022-09-21 RX ADMIN — OXYCODONE HYDROCHLORIDE 5 MG: 5 SOLUTION ORAL at 15:55

## 2022-09-21 RX ADMIN — DEXAMETHASONE SODIUM PHOSPHATE 8 MG: 4 INJECTION, SOLUTION INTRA-ARTICULAR; INTRALESIONAL; INTRAMUSCULAR; INTRAVENOUS; SOFT TISSUE at 08:30

## 2022-09-21 RX ADMIN — Medication 10 MG: at 08:37

## 2022-09-21 RX ADMIN — FENTANYL CITRATE 50 MCG: 50 INJECTION, SOLUTION INTRAMUSCULAR; INTRAVENOUS at 08:36

## 2022-09-21 ASSESSMENT — ACTIVITIES OF DAILY LIVING (ADL)
ADLS_ACUITY_SCORE: 50

## 2022-09-21 NOTE — ANESTHESIA POSTPROCEDURE EVALUATION
Patient: Alexandru Still    Procedure: Procedure(s):  CREATION, TRACHEOSTOMY       Anesthesia Type:  General    Note:  Disposition: ICU            ICU Sign Out: Anesthesiologist/ICU physician sign out WAS performed   Postop Pain Control: Uneventful            Sign Out: Well controlled pain   PONV: No   Neuro/Psych: Uneventful            Sign Out: Acceptable/Baseline neuro status   Airway/Respiratory: Uneventful            Sign Out: AIRWAY IN SITU/Resp. Support   CV/Hemodynamics: Uneventful            Sign Out: Acceptable CV status; No obvious hypovolemia; No obvious fluid overload   Other NRE: NONE   DID A NON-ROUTINE EVENT OCCUR? No           Last vitals:  Vitals:    09/21/22 1400 09/21/22 1500 09/21/22 1600   BP: 130/66 (!) 149/74    Pulse: 55 56    Resp: 11 17 13   Temp:   36.7  C (98.1  F)   SpO2: 100% 100%        Electronically Signed By: Zeb Morrison DO  September 21, 2022  3:58 PM

## 2022-09-21 NOTE — PROGRESS NOTES
CLINICAL NUTRITION SERVICES - BRIEF NOTE    Nutrition Prescription    RECOMMENDATIONS FOR MDs/PROVIDERS TO ORDER:  - none additional from prior assessments/brf notes. Lyte replacement. Continue enteral phos replacement     Recommendations already ordered by Registered Dietitian (RD):  - Per team, okay to advance EN to goal via new access/PEG  - Advance by 10 ml q6hr to to gaol: Osmolite 1.5 Kali @ goal of  60ml/hr  (1440ml/day) + 2 pkts ProSource TID (6 pkts) will provide: 2400 kcals (30 kcal/kg), 156 g PRO (1.9 g pro/kg), 1097 ml free H20, 293 g CHO, and 0 g fiber daily.   - Do not start or advance until lytes (Mg++,K+) WNL and phos>1.9    Future/Additional Recommendations:  - EN advancement/tolerance; lytes      Phos low -- on replacement + enteral replacement. Pt received PEG and EN was initiated at trophic feeds 9/20. Pt received trach today.     Nutrition Progress Note - f/u for progress towards previous nutrition POC (see previous reassessment for details)     Dusty Robison RD, LD, [facility]  Neuro ICU  Pager: [phone]

## 2022-09-21 NOTE — CONSULTS
Care Management Follow Up    Length of Stay (days): 6    Expected Discharge Date: 09/27/2022     Concerns to be Addressed:   Discharge planning     Patient plan of care discussed at interdisciplinary rounds: Yes    Anticipated Discharge Disposition: Skilled Nursing Facility     Anticipated Discharge Services:  LTACH   Anticipated Discharge DME:      Patient/family educated on Medicare website which has current facility and service quality ratings: yes   Education Provided on the Discharge Plan:  yes  Patient/Family in Agreement with the Plan:  yes    Referrals Placed by CM/SW:  [facility]   Additional Information:  Patient was trached today, writer made referrals to [facility]. LTACH is aware and following. SICU will need to let writer know when patient is ready for transfer. Writer will continue to follow.    Caridad Wu, RN, BSN  RN Care Coordinator   MICU/SICU  [phone]           Caridad Wu, RN

## 2022-09-21 NOTE — PROGRESS NOTES
SURGICAL ICU PROGRESS NOTE  September 21, 2022      CO-MORBIDITIES:   Closed fracture of cervical vertebra, unspecified cervical vertebral level, initial encounter (H)  SDH (subdural hematoma) (H)  Traumatic subdural hemorrhage without loss of consciousness, initial encounter (H)  Oth disp fx of first cervical vertebra, init for clos fx (H)  Closed fracture of second cervical vertebra without spinal cord injury, initial encounter (H)  Accidental fall, initial encounter     PMHx: Lambert-Eaton syndrome, TAVR, complete heart block with pacemaker dependency, DM2, arthritis, BPH, HTN, osteoporosis     ASSESSMENT: Alexandru Still is a 89 year old male who was admitted to the SICU on 9/15/2022 after experiencing an unwitnessed fall and striking his head. He was transferred to the ED and was found to have a left frontal SDH (5mm) + C1 fx + Type II C2 odontoid fx w/ 1cm posterior displacement. Intubated for airway protection in the ED given high c-spine injury and difficulty swallowing. Family not planning on proceeding with C-spine stabilization surgery but will likely desire trach/PEG. PEG placed 9/20. Trach placed on 9/21.       TODAY'S PROGRESS/PLANS:   - Trach placed in AM  - Doxasozin 2 mg daily  - FWF 60 q4h   - glu checks q4h  - Scheduled potassium & sodium phosphates packet  - Unhold SubQ heparin post-trach procedure    PLAN:  Neuro / Pain / Sedation  # Acute pain   # Subdural Hematoma, stable  # C1, C2 Cervical Spine Injury  # hx of Lambert Eaton Myasthenic syndrome  Pain/ Sedation  - Pain: Off fentanyl post-tracheostomy. PRNs dilaudid, oxy, and tylenol, some pain and hypertension reported, well managed.  - Sedation plan: Off precedex and propofol post-tracheostomy; On no sedation. GCS of 11. CPOT: negative (0)  Neuro  - Monitor neurological status. Delirium preventions and precautions  - q4h neurochecks  - Last Head CT (9/16): 2 mm SDH.  Safety:  - c-collar to remain on at all times, no pillows behind head,  head straight, HOB not to exceed 30 degrees  - ROSY ROSALES at all times  - Family care conference 9/19 -> Decided on Trach and PEG; consented& completed    Pulmonary:   # Mechanical Ventilatory Support  # Acute hypoxic respiratory support   # Pulmonary edema vs atelectasis  #s/p Tracheostomy 9/21  S/p Tracheostomy  - Tracheostomy this AM, w/o complications  VENT:   - CMV/AC: FiO2 40%, RR 12, Tv 450 mL, PEEP 10  - Continue PST after procedure, ok to stay on this setting indefinitely if he does well  - Lung protective ventilator settings  - Supplemental O2, goal SpO2 > 92%.  Secretions:  - Two days ago, tube was replaced due to copious brown secretion. Bronchoscopy showed bloody secretions per note. Followed by copious pink secretions. Patient was aspirated with good O2 sats (O2 %). Transitioned to moderate tan secretions.  - Today: large amounts of thick tan secretions, and is currently with good O2 sats (100%). Continue suctioning prn  - CXR (9/21): Unchanged perihilar and bibasilar streaky and left basilar opacities, likely atelectasis. Multiple chronic bilateral rib fractures.    Cardiovascular:    # hx of TAVR (Echo 9/16: normal EF 60-65%, TAVR with appropriate function)  # hx of Complete Heart Block s/p Pacemaker  # hx of HTN  - Monitor hemodynamic status  - Pulse 57 (49-71)  - -157 (Goal SBP< 180)  - Doxasozin 2 mg daily for HTN     Gastroenterology/Nutrition:  # Hiatal Hernia  # PEG tube placement (9/20)  - Tolerated PEG tube placement well.   - Restarted meds, bowel regimen, and tube feeds @ 10 mL/hr, advance by 10 ml q6h to goal of 60 ml/hr. Following Nutrition recs.  - Continue bowel regimen (senna, Dulcolax supp)    Renal/Fluids/Electrolytes/:   # Mild hyperchloremic hypernatremia  # Hypermagnesemia, mild  # Hypophosphatemia, replaced  # Lactic acidosis, resolved  # Elevated BUN  # BPH  - Will cont to monitor I/O (0.34 ml/kg/hr UOP yesterday)  - Hyperchloremic mild hypernatremia. Free water  deficit of 2.1 L    - Gave LR bolus 500 mL this AM   - Assess need for water replacement via PEG for low UOP    - Discontinue D5 1/2NS + KCl 20   - FWF 60 q4h   - Mg 2.4, Phos 1.6; On electrolyte replacement protocol  - Escudero to remain in place while intubated    Endocrine:  # Hypothyroidism (PTA Levothyroxine)  # At risk of stress-induced hyperglycemia  # At risk of steroid-induced hyperglycemia  # Intra-op steroid use, decadron (8mg)   # Pre-diabetes  - Restarted PTA levothyroxine since PEG placed  - Hgb A1C 6.2  - Glucose 113-138 (Goal < 180). Without need of insulin units.   - Continue glucose checks q4h, no indication for insulin at this time     ID:  # Leukocytosis, mild  - Afebrile, mild leukocytosis 11.5 (9.6); likely related to stress from PEG placement or oral secretions. Monitor   - f/up BCx (NGTD) & UA (unremarkable, 9/16)  - Unasyn completed on 9/19 for CAP     Heme:     # Normocytic anemia, stable   - Hgb above transfusion goal (Hb<8.0) no signs/symptoms of hypoperfusion.  - Monitor and trend     MSK:  # Facial Abrasion   # Right Lower Leg Abrasion  # Weakness and deconditioning  - PT/OT consult - PT recs: LTACH, pending meets medical criteria.  - local wound care     General Cares/Prophylaxis:    DVT Prophylaxis: SCDs; subcutaneous heparin   GI Prophylaxis: PPI  Restraints: Restraints for medical healing needed: only if pt gets agitated and pulling at lines after chemical sedation     LDAs:  - PIVx2   - PEG tube  - ETT  - Escudero - discontinue  - Trach     Disposition / Code status:  - Dispo: SICU today, connected with care coordinator about LTACH placement referral. Anticipate discharge to LTACH tomorrow.  - Full code      ====================================    SUBJECTIVE:   - No acute events overnight.   - Low UOP (15 ml/hr) Bladder scan, low urine  - Large amount of thick tan secretions suctioned out  - No BM since admission, given suppository, with BM in AM.    OBJECTIVE:   1. VITAL SIGNS:   Temp:   [97.2  F (36.2  C)-99.5  F (37.5  C)] 97.2  F (36.2  C)  Pulse:  [49-71] 50  Resp:  [9-23] 9  BP: (104-162)/(56-94) 113/56  FiO2 (%):  [40 %] 40 %  SpO2:  [97 %-100 %] 100 %  Vent Mode: CMV/AC  (Continuous Mandatory Ventilation/ Assist Control)  FiO2 (%): 40 %  Resp Rate (Set): 12 breaths/min  Tidal Volume (Set, mL): 450 mL  PEEP (cm H2O): 10 cmH2O  Resp: 9      2. INTAKE/ OUTPUT:   I/O last 3 completed shifts:  In: 3220.97 [I.V.:2780.97; NG/GT:310]  Out: 635 [Urine:630; Emesis/NG output:5]    3. PHYSICAL EXAMINATION:   General: NAD. Awake in bed recovering from tracheostomy.  Neuro: EOM intact,  HEENT: Trach in place, abrasion to right forehead  Resp: Breathing non-labored on room /tracheostomy. ETT in place. Mechanically ventillated on CMV/AC, then PST 5/5 post-operative with adequate Tv 500-600s. CTAB.  CV: RR, paced bradycardia  Abdomen: Soft, non-distended, non-tender. Right abdomen slightly firm.  No guarding or rigidity. PEG in place.   : hannah catheter in place with clear yellow urine  Extremities: warm and well perfused. No LE edema. SCD on bilateral lower extremities. PIV in place    4. INVESTIGATIONS:   Arterial Blood Gases   Recent Labs   Lab 09/15/22  1733 09/15/22  1526   PH 7.33 7.41     Complete Blood Count   Recent Labs   Lab 09/21/22  0531 09/20/22  0405 09/19/22  0452 09/18/22  0409   WBC 11.5* 9.6 11.3* 12.3*   HGB 11.9* 12.0* 12.8* 12.5*    205 203 233     Basic Metabolic Panel  Recent Labs   Lab 09/21/22  1024 09/21/22  0531 09/20/22  0405 09/19/22  1821 09/19/22  1055 09/19/22  0452 09/18/22  1426 09/18/22  0409   NA  --  147* 145  --   --  142  --  142   POTASSIUM  --  3.7 3.5  --  4.0 3.4   < > 3.2*   CHLORIDE  --  115* 111*  --   --  108*  --  106   CO2  --  24 25  --   --  25  --  26   BUN  --  27.2* 30.7*  --   --  35.5*  --  33.8*   CR  --  0.78 0.82  --   --  0.85  --  0.91   * 138* 113* 120*  --  122*  --  121*    < > = values in this interval not displayed.     Liver  Function Tests  Recent Labs   Lab 09/15/22  1617 09/15/22  0521   AST 20  --    ALT 11  --    ALKPHOS 70  --    BILITOTAL 0.6  --    ALBUMIN 3.8  --    INR  --  1.06     Pancreatic Enzymes  No lab results found in last 7 days.  Coagulation Profile  Recent Labs   Lab 09/15/22  0521   INR 1.06   PTT 29     Lactate  Invalid input(s): LACTATE    5. RADIOLOGY:   No results found for this or any previous visit (from the past 24 hour(s)).    ========================================      Patient seen, findings and plan discussed with surgical ICU staff Dr. Bob Dill.      - - - - - - - - - - - - - - - - - -  Bob King, MS4  [facility] [employer]  09/21/2022  ------    I saw and evaluated the patient independently and agree with the student's assessment/plan as written above. I was present at all times for any mentioned procedures.      Eloy Lorenz MD   General Surgery Resident      See [facility] for on-call pager information: [facility] Paging/Directory

## 2022-09-21 NOTE — OP NOTE
TRACHEOSTOMY    Date of Procedure: 09/21/2022       FELLOW: Dr. Burrell    SURGEON: Bob Dill MD          ESTIMATED BLOOD LOSS: Minimal       COMPLICATIONS: None.       TRACHEOSTOMY: 7.5/6F cuffed Shiley      INDICATIONS FOR PROCEDURE:  He is status post C1/C2 fracture after a fall, and has been unable to safely handle his secretions and ventilate while in his required Ccollar. Need for formal tracheostomy is indicated for this reason. The risks, benefits and alternatives were described to the patient and his family, and they were willing to proceed.       DESCRIPTION OF PROCEDURE: This procedure was performed in the OR due to the inability to extend his neck and unfavorable anatomy. A surgical time-out was undertaken to verify the patient s procedure and site. The patient was adequately sedated and paralyzed by anesthesia team.      The patient was positioned supine without extension of the neck due to his fracture- the neck was prepped with Chloroprep solution and draped in a sterile fashion. Attention was directed at the midline trachea, where a transverse skin incision was made at the level of the palpable cricothyroid cartilage.  We dissected through the platysma and the strap muscles were  in the midline.  This exposed the cricoid cartilage.  A trach hook was used to elevate this superiorly and expose the first and second tracheal rings. The flexible bronchosope was introduced via the endotracheal tube and tube was retracted proximally past cricothyroid membrane until the upper airway was noted to flare radially. The trachea was than accessed with a needle and sheath at the second tracheal ring, and a Seldinger technique was employed, under visualization with fiberoptic bronchoscopy. Once endotracheal position of the sheath and guidewire were confirmed, the tracheostomy site was serially dilated with Blue Rhino dilator. Next, a 7.5/6 cuffed Shiley tracheostomy tube was inserted over a tracheal  dilator, over the guidewire, into the trachea. The Shiley cuff was inflated and the dilator and guidewire were removed. The inner cannula was introduced and the ventilator was connected to the newly formed tracheostomy. Intra tracheal placement confirmed with bronchoscopy. The previous orotracheal tube was removed, and the patient was ventilated adequately through the percutaneous tracheostomy.  Skin was closed with 3.0 vicryl.      The tracheostomy itself was secured with loose stitches x2. No air leak was noted. Tidal volumes were appropriate for the ventilator settings. The patient tolerated the procedure well. All instrument and needle counts were correct at the end of the case.      I was present for the entire procedure.

## 2022-09-21 NOTE — DISCHARGE SUMMARY
[facility]    Discharge Summary  Trauma Surgery Service    Date of Admission:  9/15/2022  Date of Discharge:  9/22/2022  Attending Physician: Dr. Damion Cardozo  Discharging Provider: Sheridan Burciaga CNP  Date of Service (when I saw the patient): 09/22/22    Primary Provider: [facility], [facility]-  Primary Care clinic: [address] [facility] [address]  Phone: [phone]  Fax number: [phone]     Discharge Diagnoses   Closed fracture of cervical vertebra, unspecified cervical vertebral level, initial encounter (H)  Traumatic subdural hemorrhage without loss of consciousness, initial encounter (H)  Posteriorly displaced Tyle II odontoid fx, ~1cm posterior dislocation   C1 anterior arch fracture  Epidural hemorrhage up to 7mm along posterior dens   Acute hypoxic respiratory failure    Procedures:  09/20/2022 PEG tube placement : Dr. Dill  09/21/2022 Tracheostomy Dr. Dill    Hospital Course   Alexandru Still is a 89 year old male who was admitted to the SICU on 9/15/2022 following an unwitnessed fall and striking his head. He was initially seen at an OSH and underwent a comprehensive trauma work up found to have a left frontal SDH (5mm) + C1 fx + Type II C2 odontoid fracture with a 1cm posterior displacement. He was  intubated for airway protection in the ED given high c-spine injury and difficulty managing his secretions. On admission he requested all cares without surgical cervical spine stabilization, agreeable to a tracheostomy and PEG. Multiple care conferences held with his family who confirmed his wishes.  His hospital course and discharge by system plan below:    # Traumatic 5mm left frontal lobe SDH  # Posteriorly displaced Tyle II odontoid fx, ~1cm posterior dislocation   # C1 anterior arch fracture   # Epidural hemorrhage up to 7mm along posterior dens   He was seen and evaluated by Neurosurgery. A repeat head CT was obtained  with a stable subdural hematoma. He was placed in a cervical collar for the cervical fracture. No surgical intervention per patient preference. He should wear the cervical collar at all times. Neurosurgery recommends follow up in 6 weeks with an upright XR of the cervical spine and a CT cervical spine in 3 months.      # Acute traumatic neck pain  # Chronic hip and shoulder pain  Managed with PRN Tylenol and Hydromorphone for breakthrough pain. Anticipate being able to wean off over the next few days.     # Laryngeal edema 2/2 traumatic cervical fracture/injury  He was intubated shortly after arrival to the ED due to difficulty managing his secretions. Failed bedside and radiology naso and orogastric feeding tube placement attempts. He completed 3 doses of Decadron for laryngeal edema.     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 Continue vent and tracheostomy weaning per facility protocol.  Trach suture removal in 4 days.(09/26/2022)    # Hypertension   # Complete heart block s/p PM placement  # Nonrheumatic aortic valve stenosis s/p TAVR 2019  Resume PTA Furosemide      # Hx of dysphagia worsened by recent cervical injury S/P PEG placement 09/20/22  # Moderate protein calorie malnutrition  # Hiatal hernia  Prolonged NPO status post trauma due to traumatic cervical injury and difficult access, PEG tube placed 09/20. Started on enteral nutrition, plan to advance to goal prior to discharge. Bowel regimen as needed.  PEG tube fasteners can be removed in 8 days (09/29/2022)  # Hypomagnesia, improved with electrolyte replacements  # Hypokalemia, improved with replacements per protocol  # Lactic Acidosis, improved with fluids   Replacements per electrolyte protocol     # Hypothyroidism   Continue Levothyroxine    # Anemia of chronic illness   Hemoglobin on admission 12.5g/dL stable, without the need for  transfusions.  - Threshold for transfusion if hgb <7.0 or signs/symptoms of hypoperfusion.      Palliative Care Consult  The palliative care team was consulted to assist in the care and future life planning regarding the changes the above injuries have created. Often this sort of injury is a clear marker of general decline in the aged population.  During their time with the patient and family, these are the things they focused on this admission:  discussion of individual needs of patient with new traumatic injuries and life style changes , discussion of goals of care:  and discussion of health care directive/ POLST   Therapy Recommendations:   Current status of physical and occupation therapies prior discharge:   LTACH    Significant Results and Procedures   DATE: 09/21/2022  Procedure(s):  CREATION, TRACHEOSTOMY  Surgeon(s): Surgeon(s) and Role:     * Bob Dill MD - Primary     * Manan Burrell MD - Resident - Assisting    Code Status   Full Code    SUBJECTIVE: No acute issues overnight. Tolerating PST, pain is well controlled    Physical Exam   Temp: 98.6  F (37  C) Temp src: Axillary BP: 125/53 Pulse: 71   Resp: 18 SpO2: 98 % O2 Device: Trach dome Oxygen Delivery: 40 LPM  Vitals:    09/20/22 0400 09/21/22 0000 09/22/22 0000   Weight: 82.9 kg (182 lb 12.2 oz) 82.2 kg (181 lb 3.5 oz) 83 kg (182 lb 15.7 oz)     Vital Signs with Ranges  Temp:  [97.2  F (36.2  C)-100  F (37.8  C)] 98.6  F (37  C)  Pulse:  [50-71] 71  Resp:  [9-18] 18  BP: (113-154)/(53-75) 125/53  FiO2 (%):  [30 %-40 %] 40 %  SpO2:  [97 %-100 %] 98 %  I/O last 3 completed shifts:  In: 3623.44 [I.V.:2538.44; NG/GT:705]  Out: 532 [Urine:530; Blood:2]    Constitutional: Awake and alert  HEENT: Normocephalic, scabbed abrasion to right forehead/brow, PERRL, [place] with good fit  Respiratory: Trached, trach tube is secured  Cardiovascular:  Paced rate, bradycardia  GI: Abdomen soft, non-distended, PEG tube site clean and dry, fastners intact  sutured, no bleeding   Genitourinary:  clear yellow  Skin:  Warm & dry, scattered healing ecchymotic areas  Musculoskeletal: There is no redness, warmth, or swelling of the joints.  Pedal pulse palpated.  Neurologic:  Strength and sensory is intact. No new, focal deficits.able to follow commands.   Psych: calm,     Discharge Disposition   Discharged to LTACH  Condition at discharge: Stable  Discharge VS: Blood pressure 125/53, pulse 71, temperature 98.6  F (37  C), temperature source Axillary, resp. rate 18, weight 83 kg (182 lb 15.7 oz), SpO2 98 %.    Consultations This Hospital Stay   PALLIATIVE CARE ADULT IP CONSULT  ORTHOSIS CERVICAL COLLAR IP CONSULT  GI LUMINAL ADULT IP CONSULT  NUTRITION SERVICES ADULT IP CONSULT  PHYSICAL THERAPY ADULT IP CONSULT  OCCUPATIONAL THERAPY ADULT IP CONSULT    Discharge Orders      X-ray Cervical spine 2-3 vws    Upright cervical XR AP/Lat views     CT Cervical spine w/o contrast*     General info for SNF    Length of Stay Estimate: LTACH length of stay  Condition at Discharge: Stable  Level of care:skilled   Rehabilitation Potential: Good  Admission H&P remains valid and up-to-date: Yes  Recent Chemotherapy: N/A  Use Nursing Home Standing Orders: Yes     Mantoux instructions    Give two-step Mantoux (PPD) Per Facility Policy Yes     Follow Up and recommended labs and tests    Follow up with your primary care provider for continued medical care and hospital follow up in 5-10 days.    Neurosurgery Clinic in 6 weeks with XR's of the cervical spine and 3 months with a CT cervical spine  [facility]  Floor [address]   [address] [place] [id number]  Appointments: [phone]    You have been involved in a recent trauma incident resulting in an injury.  Studies show us that people affected by trauma have higher levels of post-traumatic stress disorder (PTSD) and/or depressive symptoms during the year following an injury.     Please consider the following.   "Have you:  Had migraines about the event(s) or thought about the event(s) when you didn't want to?  Tried hard not to think about the event(s) or went out of your way to avoid situations that reminded you of the event(s)?  Been constantly on guard, watchful, or easily startled?  Felt numb or detached from people, activities, or your surroundings?   Felt guilty or unable to stop blaming yourself or others for the event(s) or any problems the event (s) may have caused?    If you answered \"yes\" to 3 or more of these questions, or if you simply want to discuss any of your feelings further, we recommend that you talk with your Primary Care Provider or a mental health professional.     Reason for your hospital stay    Fall 09/15 with injuries  1. Anterior left frontal SDH, 5 mm  2. Posteriorly displaced Tyle II odontoid fx, ~1cm posterior dislocation   3. C1 anterior arch fx   4. Epidural hemorrhage up to 7mm along posterior dens   5. Questionable acute anterior left 6th rib fx  6. Right forehead abrasion      Procedures:  09/20/2022 PEG tube placement : Dr. Dill  09/21/2022 Tracheostomy Dr. Dill     Activity - Up with nursing assistance    Cervical collar at all times  No pillow behind the head  Routine skin checks around the cervical spine  OK to remove for skin checks and hygiene     Tracheostomy care by nursing    Standard Cares  Vent weaning per facility protocol     Additional Discharge Instructions     Wound care (specify)    Site:   Neck   Instructions:  Remove Trach ties in 4 days ( 9/26/22)     Wound care    Site:   PEG   Instructions:  Remove T fasteners from PEG in 8 days ( 9/29/22)     Additional Discharge Instructions    PEG tube care per facility protocol  PEG tube fasteners can be removed in 8 days(10days from placement), 09/29/2022     Full Code     Nutrition Services Adult IP Consult    Reason:  Tube feeding management per facility protocol     Physical Therapy Adult Consult    Evaluate and treat as " clinically indicated.    Reason:  Falls, deconditioned, traumatic cervical fracture     Occupational Therapy Adult Consult    Evaluate and treat as clinically indicated.    Reason:  Fall, traumatic cervical fracture     Adult Formula Tube Feeding    Follow this regimen upon discharge: Orders Placed This Encounter      Adult Formula Drip Feeding: Continuous Osmolite 1.5; Gastrostomy; Goal Rate: 60 ml   Water flushes 30ml every 4 hours      NPO for Medical/Clinical Reasons Except for: No Exceptions     Oxygen (SNF/TCU) Discharge     Fall precautions     Invasive Ventilator    I, the undersigned, certify that the above prescribed supplies are medically necessary for this patient and is both reasonable and necessary in reference to accepted standards of medical and necessary in reference to accepted standards of medical practice in the treatment of this patient's condition and is not prescribed as a convenience.      Discharge Medications   Current Discharge Medication List      START taking these medications    Details   bisacodyl (DULCOLAX) 10 MG suppository Place 1 suppository (10 mg) rectally daily as needed for constipation    Associated Diagnoses: Closed fracture of second cervical vertebra without spinal cord injury, initial encounter (H)      calcium carbonate (OS-TITA) 500 MG tablet Take 1 tablet (500 mg) by mouth 2 times daily    Associated Diagnoses: Closed fracture of second cervical vertebra without spinal cord injury, initial encounter (H)      doxazosin (CARDURA) 0.6 mg/mL SUSP suspension 3.33 mLs (2 mg) by Oral or Feeding Tube route daily    Associated Diagnoses: Benign prostatic hyperplasia without lower urinary tract symptoms      Multiple Vitamins-Minerals (MULTIVITAMINS W/MINERALS) liquid 15 mLs by Per Feeding Tube route daily    Associated Diagnoses: Closed fracture of second cervical vertebra without spinal cord injury, initial encounter (H)      oxyCODONE (ROXICODONE) 5 MG/5ML solution 5 mLs (5 mg)  by Oral or Feeding Tube route every 4 hours as needed for moderate to severe pain  Qty: 180 mL, Refills: 0    Associated Diagnoses: Closed fracture of second cervical vertebra without spinal cord injury, initial encounter (H)      pantoprazole (PROTONIX) 2 mg/mL SUSP suspension 20 mLs (40 mg) by Per Feeding Tube route every morning (before breakfast)    Associated Diagnoses: Closed fracture of second cervical vertebra without spinal cord injury, initial encounter (H)      protein modular (PROSOURCE TF) LIQD 2 packets by Per Feeding Tube route every 8 hours    Associated Diagnoses: Closed fracture of second cervical vertebra without spinal cord injury, initial encounter (H)      sennosides (SENOKOT) 8.6 MG tablet 1 tablet by Oral or Feeding Tube route 2 times daily    Associated Diagnoses: Closed fracture of second cervical vertebra without spinal cord injury, initial encounter (H)      Vitamin D3 (CHOLECALCIFEROL) 25 mcg (1000 units) tablet Take 1 tablet (25 mcg) by mouth daily    Associated Diagnoses: Closed fracture of second cervical vertebra without spinal cord injury, initial encounter (H)         CONTINUE these medications which have CHANGED    Details   acetaminophen (TYLENOL) 325 MG tablet 2 tablets (650 mg) by Oral or Feeding Tube route every 6 hours    Associated Diagnoses: Closed fracture of second cervical vertebra without spinal cord injury, initial encounter (H)      levothyroxine (SYNTHROID/LEVOTHROID) 112 MCG tablet 1 tablet (112 mcg) by Oral or Feeding Tube route every morning (before breakfast)    Associated Diagnoses: Hypothyroidism, unspecified type         CONTINUE these medications which have NOT CHANGED    Details   diclofenac (VOLTAREN) 1 % topical gel Apply 4 g topically 4 times daily as needed for moderate pain    Associated Diagnoses: Community acquired pneumonia of left lower lobe of lung      fluticasone (FLONASE) 50 MCG/ACT nasal spray Spray 1 spray into both nostrils daily       furosemide (LASIX) 40 MG tablet Take 40 mg by mouth 2 times daily      ipratropium - albuterol 0.5 mg/2.5 mg/3 mL (DUONEB) 0.5-2.5 (3) MG/3ML neb solution Take 1 vial (3 mLs) by nebulization every 4 hours as needed for wheezing or shortness of breath / dyspnea  Qty: 1620 mL    Associated Diagnoses: Community acquired pneumonia of left lower lobe of lung         STOP taking these medications       Calcium Carb-Cholecalciferol (CALCIUM-VITAMIN D) 500-400 MG-UNIT TABS Comments:   Reason for Stopping:         finasteride (PROSCAR) 5 MG tablet Comments:   Reason for Stopping:         nystatin (MYCOSTATIN) 677510 UNIT/GM external cream Comments:   Reason for Stopping:         potassium chloride ER (KLOR-CON M) 20 MEQ CR tablet Comments:   Reason for Stopping:         senna-docusate (SENOKOT-S/PERICOLACE) 8.6-50 MG tablet Comments:   Reason for Stopping:         tamsulosin (FLOMAX) 0.4 MG capsule Comments:   Reason for Stopping:             Allergies   Allergies   Allergen Reactions     Cefuroxime Hives     Contrast Dye      Gadodiamide Hives     Data   Most Recent 3 CBC's:  Recent Labs   Lab Test 09/22/22  0435 09/21/22  0531 09/20/22  0405   WBC 13.4* 11.5* 9.6   HGB 10.7* 11.9* 12.0*   MCV 97 96 95    213 205      Most Recent 3 BMP's:  Recent Labs   Lab Test 09/22/22  0435 09/22/22  0400 09/22/22  0012 09/21/22  1024 09/21/22  0531 09/20/22  0405     --   --   --  147* 145   POTASSIUM 4.4  --   --   --  3.7 3.5   CHLORIDE 110*  --   --   --  115* 111*   CO2 19*  --   --   --  24 25   BUN 20.9  --   --   --  27.2* 30.7*   CR 0.67  --   --   --  0.78 0.82   ANIONGAP 10  --   --   --  8 9   TITA 8.2  --   --   --  8.6 8.8   * 116* 167*   < > 138* 113*    < > = values in this interval not displayed.     Most Recent 2 LFT's:  Recent Labs   Lab Test 09/15/22  1617 06/17/22  0648   AST 20 45   ALT 11 40   ALKPHOS 70 71   BILITOTAL 0.6 0.8     Most Recent INR's and Anticoagulation Dosing  History:  Anticoagulation Dose History     Recent Dosing and Labs Latest Ref Rng & Units 9/15/2022    INR 0.85 - 1.15 1.06        Most Recent 3 Troponin's:No lab results found.  Most Recent 6 Bacteria Isolates From Any Culture (See EPIC Reports for Culture Details):No lab results found.  Most Recent TSH, T4 and A1c Labs:  Recent Labs   Lab Test 09/16/22  0927 06/15/22  0809   TSH  --  0.54   A1C 6.2*  --      Results for orders placed or performed during the hospital encounter of 09/15/22   XR Chest Port 1 View    Narrative    Exam: XR CHEST PORT 1 VIEW, 9/15/2022 3:50 PM    Comparison: 6/19/2022    History: fall, hypoxia, c spine fx, sdh eval rib fx/aspiration, ptx    Findings:  Single AP portable supine view of the chest. Left chest wall pacemaker  with leads projecting over the right atrium and ventricle. TAVR.    Trachea is midline. Stable enlarged cardiac silhouette. Perihilar and  bibasilar mixed opacities. No pneumothorax. Right shoulder  arthroplasty. Chronic appearing left posterior rib fracture.      Impression    Impression: Perihilar and bibasilar mixed interstitial and airspace  opacities could represent infection, and atelectasis/edema.    I have personally reviewed the examination and initial interpretation  and I agree with the findings.    JEANNE LOW MD         SYSTEM ID:  [id number]   CT Chest Abdomen Pelvis w/o Contrast    Narrative    EXAMINATION: CT CHEST ABDOMEN PELVIS W/O CONTRAST, 9/15/2022 4:18 PM    TECHNIQUE:  Helical CT images from the thoracic inlet through the  symphysis pubis were obtained without IV contrast.    COMPARISON: Same day chest x-ray. Soft tissue pelvis CT 7/20/2022 and  abdomen and pelvis CT 6/15/2022    HISTORY: fall    FINDINGS:  CHEST:  LUNGS: Central tracheobronchial tree is patent. No pneumothorax or  pleural effusion. Lower lobe bronchial wall thickening with multifocal  plaquing at the lung bases with streaky consolidative opacities at the  lung bases. Probable  intrafissural lymph node measuring 4 mm along the  right minor fissure (series 5, image 141). No suspicious pulmonary  nodule.    MEDIASTINUM: Left chest wall ICD/pacemaker with leads in the right  atrium and right ventricle. Postoperative changes aortic valve  replacement. Bulky mitral annular calcifications. Heart size is within  normal limits. No pericardial effusion. Ascending aorta and main  pulmonary artery diameters are within normal limits. Normal appearance  and configuration of the great vessels off of the aortic arch. All  prominent mediastinal lymph nodes for example 5 mm prevascular node  (series 2, image 88) no suspicious hilar or axillary lymph nodes.    Visualized thyroid and esophagus are unremarkable.    ABDOMEN/PELVIS:  LIVER: No suspicious focal hepatic lesion.    BILIARY: Layering calcified gallstones. No gallbladder wall thickening  or pericholecystic fluid to suggest cholecystitis. No intrahepatic or  extrahepatic biliary ductal dilation.    PANCREAS: Moderate fatty atrophy of the pancreatic parenchyma. No  focal pancreatic lesion. The main pancreatic duct is not dilated.    SPLEEN: Within normal limits.    ADRENAL GLANDS: No focal adrenal nodule.    URINARY TRACT: Scattered hypodense cysts in both kidneys some of which  are simple in attenuation and others intermediate density and are  unchanged since 6/15/2022. Additional cystic hypodensity in the right  kidney likely representing a benign proteinaceous or hemorrhagic cyst.  Prominent right extrarenal pelvis. No hydronephrosis or ureter. No  renal stone. Urinary bladder is within normal limits.    REPRODUCTIVE ORGANS: Coarse calcifications in the prostate and seminal  vesicles. Right scrotal pump with penile prosthesis.     STOMACH: Within normal limits.    BOWEL: Colonic diverticulosis without evidence for acute  diverticulitis. Normal caliber large and small bowel. No abnormal  bowel wall thickening or enhancement. Appendix is  unremarkable.    PERITONEUM/FLUID: No ascites or pelvic free fluid.    VESSELS: Calcifications of the abdominal aorta and iliac arteries.  Ectatic dilation of the left common iliac artery measuring 1.8 cm.    LYMPH NODES: No lymphadenopathy.    BONES/SOFT TISSUES: Chronic and healed left second through fourth rib  fractures. Age indeterminant anterior left sixth rib fracture. Chronic  appearing anterior right third, fourth, fifth, seventh, ninth, and  10th rib fractures. Postoperative changes to the right shoulder.  Advanced degenerative change to the left shoulder. Interspinous fusion  device at L4-5. Unchanged superior endplate compression deformity at  L3. Grade 1 anterolisthesis of L4 on L5 and L5 on S1. Chronic  bilateral pars interarticularis defects at L5. Chronic appearing  anterior wedge compression deformity at T12 and T9. Chronic sternal  body fracture.      Impression    IMPRESSION:   1. Question acute anterior left sixth rib fracture. Multiple chronic  appearing bilateral rib fractures and chronic appearing compression  deformities in the thoracolumbar spine.  2. No acute visceral injury in the chest, abdomen, or pelvis.    I have personally reviewed the examination and initial interpretation  and I agree with the findings.    JEANNE LOW MD         SYSTEM ID:  [id number]   CTA Head Neck w Contrast    Narrative    CTA HEAD NECK W CONTRAST 9/16/2022 12:09 AM    CT angiogram of the neck   CT angiogram of the base of the brain with contrast  Reconstruction on the 3D workstation    Provided History:  c spine factor  ICD-10:    Comparison:  Head CT 9/15/2022 4:18 PM. Cervical spine CT 9/15/2022    Technique:  HEAD and NECK CTA: During rapid bolus intravenous injection of  nonionic contrast material, axial images were obtained using thin  collimation multidetector helical technique from the base of the upper  aortic arch through the [place]. This CT angiogram data was  reconstructed at thin  intervals with mild overlap. Images were sent to  the 3D workstation, and 3D reconstructions were obtained. The axial  source images, multiplanar reformations, 3D reconstructions in both  maximum intensity projection display and volume rendered models were  reviewed, with reconstructions performed by the technologist.    Contrast: 75 ml isovue 370     Findings:    Head CTA demonstrates no aneurysm or stenosis of the major  intracranial arteries. However, is relative oligemia in the occipital  lobes as well as posterior parietal lobes which is likely secondary to  contrast bolus timing. Mildly hypoplastic left A1 KRYSTIN segments. Fetal  origin of the left PCA. Anterior communicating artery is present.  Right posterior commuting artery is not well-visualized. No large  vessel occlusion.    Neck CTA demonstrates Approximately 60-70 % stenosis of the left and  40-50% stenosis of the right carotid bulbs. . The origins of the great  vessels from the aortic arch are patent. The normal distal right  internal carotid artery measures 5 mm. The normal distal left internal  carotid artery measures 5 mm.Right dominant vertebrobasilar system.    Again is demonstrated displaced complete fracture is through the body  of the dens and the anterior arch of C1. Ventral epidural hyperdensity  measures up to 7 mm in greatest maximal thickness. Nondisplaced right  anterior rib fracture deformity.    No mass within the visualized portions of the cervical soft tissues or  lung apices. Endotracheal tube tip terminates in the low thoracic  trachea.      Impression    Impression:    1. Head CTA demonstrates no aneurysm or stenosis of the major  intracranial arteries. Relative oligemia of the posterior circulation  likely due to contrast bolus timing.  2. Neck CTA demonstrates no definite acute arterial injury.  No  convincingly identified intramural hematoma, dissection flap, or  arteria occlusion. Approximately 40-50 % right and 60-70% left  carotid  bulb stenoses.  3. Unchanged anterior arch C1 and base of dens fractures.  Unchanged 7  mm probable epidural hematoma.    I have personally reviewed the examination and initial interpretation  and I agree with the findings.    EMIR LINDSEY MD         SYSTEM ID:  [id number]   CT Head w/o Contrast    Narrative    EXAM: CT HEAD W/O CONTRAST  9/15/2022 4:18 PM     HISTORY:  fu on SDH       COMPARISON:  Earlier same day head CT obtained at 0419 hours    TECHNIQUE: Using multidetector thin collimation helical acquisition  technique, axial, coronal and sagittal CT images from the skull base  to the vertex were obtained without intravenous contrast.   (topogram) image(s) also obtained and reviewed.    FINDINGS:  Unchanged size of the subdural hematoma overlying the left frontal  lobe convexity measuring 4 mm in greatest thickness without  significant mass effect upon the underlying frontal lobe. Moderate  diffuse cerebral atrophy with patchy periventricular and subcortical  white matter hypoattenuation. No acute loss of gray-white  differentiation. No midline shift. Basal cisterns are clear.     Type II odontoid fracture with the dens posteriorly dislocated  approximately 9 mm. Displaced comminuted fracture of the anterior arch  of C1. The bony calvaria and the bones of the skull base are normal.  The visualized portions of the paranasal sinuses and mastoid air cells  are clear. Grossly normal orbits.       Impression    IMPRESSION:   1. Slightly decreased size of the left frontal subdural hematoma  measuring 4 mm without significant mass effect. No new intracranial  hemorrhage.  2. Stable displaced fractures of the anterior arch of C1 and type II  odontoid fracture.   3. Moderate diffuse cerebral atrophy with patchy periventricular and  subcortical white matter hypoattenuation, likely sequelae of chronic  small vessel ischemic disease.    I have personally reviewed the examination and initial  interpretation  and I agree with the findings.    MEDINA NICOLE MD         SYSTEM ID:  [id number]   XR Chest Port 1 View    Narrative    XR CHEST PORT 1 VIEW  9/15/2022 7:19 PM      HISTORY: intubation    COMPARISON: 9/15/2022    FINDINGS:   Single AP view of the chest. Endotracheal tube tip overlying the mid  to lower thoracic trachea approximately 3 cm above the taylor. Stable  left chest wall pacer leads. TAVR. Stable mediastinum. No pneumothorax  or significant pleural effusion. Largely unchanged perihilar and  bibasilar streaky opacities.       Impression    IMPRESSION:   1. Endotracheal tube tip overlying the mid to lower thoracic trachea  approximately 3 cm above the taylor.  2. Largely unchanged perihilar and bibasilar opacities consistent with  atelectasis and suspected aspiration given the CT findings.    I have personally reviewed the examination and initial interpretation  and I agree with the findings.    JEANNE LOW MD         SYSTEM ID:  [id number]   XR Abdomen Port 1 View     Value    Radiologist flags Malpositioned enteric tube (Urgent)    Narrative    EXAM: XR ABDOMEN PORT 1 VIEW  9/15/2022 7:33 PM     HISTORY:  og verification       COMPARISON:  Same day chest abdomen and pelvis CT    FINDINGS: Supine radiograph of the abdomen.  Enteric tube with tip  overlying the left mainstem bronchus. Aortic valve replacement.  Endotracheal tube projecting over the midthoracic trachea with the tip  approximately 4.4 cm above the taylor. Left chest wall ICD/pacemaker  with leads overlying the right atrium and right ventricle.  Nonobstructive bowel gas pattern. Cardiomediastinal silhouette is  within normal limits. Mixed interstitial and patchy perihilar airspace  opacities.       Impression    IMPRESSION:  1. Enteric tube with tip overlying the left mainstem bronchus versus  within the thoracic esophagus.  2. Endotracheal tube projecting over the midthoracic trachea.  3. Perihilar patchy airspace opacities  possibly representing pulmonary  edema and/or atelectasis. Infection is difficult to entirely exclude.  4. Nonobstructive bowel gas pattern.    [Urgent Result: Malpositioned enteric tube]    Finding was identified on 9/15/2022 7:35 PM.     Dr. Morrison was contacted by Dr. Guy at 9/15/2022 7:49 PM and  verbalized understanding of the urgent finding.     I have personally reviewed the examination and initial interpretation  and I agree with the findings.    JEANNE LOW MD         SYSTEM ID:  [id number]   XR Abdomen Port 1 View    Narrative    EXAM: XR ABDOMEN PORT 1 VIEW  9/15/2022 7:48 PM     HISTORY:  new OG       COMPARISON:  Earlier same day abdominal radiograph obtained at 1916  hours.    FINDINGS: Advanced enteric tube with tip now overlying the cardiac  silhouette and is likely within the patient's large hiatal hernia.  Nonobstructive bowel gas pattern. Unchanged mixed interstitial and  patchy perihilar airspace opacities. Vascular calcifications overlying  the pelvis. Postoperative changes of the spine.      Impression    IMPRESSION:  Advanced enteric tube with tip now likely within the patient's hiatal  hernia.    I have personally reviewed the examination and initial interpretation  and I agree with the findings.    JEANNE LOW MD         SYSTEM ID:  [id number]   XR Chest Port 1 View    Narrative    XR CHEST PORT 1 VIEW  9/15/2022 8:58 PM      HISTORY: Endotracheal tube positioning    COMPARISON: 9/15/2022    FINDINGS:   Single AP view of the chest. Endotracheal tube tip overlying the mid  thoracic trachea approximately 3.3 cm above the taylor. Stable left  chest wall pacer leads. TAVR. Stable mediastinum. No pneumothorax. No  significant pleural effusion. Largely unchanged perihilar and  bibasilar streaky opacities.      Impression    IMPRESSION:   1. Endotracheal tube tip overlying the mid trachea approximately 3.3  cm above the taylor.  2. Largely unchanged perihilar and bibasilar opacities.    I have  personally reviewed the examination and initial interpretation  and I agree with the findings.    JEANNE LOW MD         SYSTEM ID:  [id number]   CT Head w/o Contrast    Narrative    CT HEAD W/O CONTRAST 9/16/2022 4:58 AM    History: No response in exam,  new from prior   ICD-10:    Comparison: Head CT 9/15/2022 at 4:18 PM    Technique: Using multidetector thin collimation helical acquisition  technique, axial, coronal and sagittal CT images from the skull base  to the vertex were obtained without intravenous contrast.   (topogram) image(s) also obtained and reviewed.    Findings: Thin subdural hemorrhage overlying the anterior left frontal  lobe measures up to 2 mm best appreciated on series 4 image 161.  Otherwise, no new or worsening intracranial hemorrhage. Incompletely  imaged hyperdensity in the ventral cervical spinal canal measures up  to 6 mm. No mass effect or midline shift. Gray-white differentiation  is preserved.     Ventricles are proportionate to the cerebral sulci. The basal cisterns  are clear.    The bony calvaria and the bones of the skull base are normal.      Impression    Impression:  1. Unchanged thin 2 mm subdural overlying the anterior left frontal  lobe.  2. No new or worsening acute intracranial findings.  3. Partially imaged C1 and C2 fracture deformities with up to 6 mm  epidural hyperdensity thought to represent hematoma.         I have personally reviewed the examination and initial interpretation  and I agree with the findings.    EMIR LINDSEY MD         SYSTEM ID:  [id number]   XR Feeding Tube Placement    Narrative    Feeding tube placement.  9/16/22    Comparison: X-ray chest 9/15/2022 Abdomen 9/15/2022    History: Feeding tube needed for nutrition.     Fluoro time: 57.5 seconds    Technique: After injection of Xylocaine gel into the right nostril, a  feeding tube was advanced under fluoroscopic guidance. After  unsuccessful attempt in the right nostril, Xylocaine gel was  injected  into the left nostril, and the feeding tube was advanced under  fluoroscopic guidance.    Findings: The feeding tube was advanced with inability to negotiate  the nasopharynx there was significant resistance to advancing the  feeding tube, likely secondary to edema and additional indwelling  tubes and lines. The other nostril was attempted without success.       Impression    Impression: Unsuccessful feeding tube placement.    I, ASHIA VAZQUEZ MD, attest that I was present for all critical  portions of the procedure and was immediately available to provide  guidance and assistance during the remainder of the procedure.     I have personally reviewed the examination and initial interpretation  and I agree with the findings.    ASHIA VAZQUEZ MD         SYSTEM ID:  [id number]   XR Chest Port 1 View    Narrative    XR CHEST PORT 1 VIEW  9/16/2022 9:05 PM      HISTORY: ETT migration    COMPARISON: 9/15/2022    FINDINGS:   Single AP view of the chest. Endotracheal tube tip overlying the mid  thoracic trachea approximately 4.5 cm above the taylor. Stable left  chest wall pacer leads. TAVR. Stable mediastinum. No pneumothorax.  Question trace pleural effusions. Increased left greater than right  perihilar and bibasilar interstitial and airspace opacities.      Impression    IMPRESSION:   1. Endotracheal tube tip overlying the mid thoracic trachea  approximately 4.5 cm above the taylor.  2. Increased perihilar and bibasilar opacities suspicious for  pulmonary edema and atelectasis versus infection.  3. Question trace pleural effusions.    I have personally reviewed the examination and initial interpretation  and I agree with the findings.    ANICETO QUIGLEY MD         SYSTEM ID:  [id number]   XR Chest Port 1 View    Narrative    Exam: XR CHEST PORT 1 VIEW, 9/18/2022 2:01 AM    Indication: Rib fracture(s) in trauma patient    Comparison: Chest x-ray 9/16/2022    Findings:   ET tube tip over the mid thoracic  trachea. Cardiac device with leads  projecting over the right atrium and right ventricle. Transcatheter  aortic valve replacement. Cardiac borders are partially obscured. No  appreciable pneumothorax. No appreciable effusions. No substantial  interval change in perihilar interstitial opacities and left  basilar/retrocardiac atelectasis/consolidation. No appreciable rib  fracture deformities by chest radiograph. Partially imaged right  shoulder arthroplasty.      Impression    Impression:   1. Support devices in place as above.  2. Unchanged perihilar interstitial opacities representing  edema/atelectasis.  3. Unchanged left basilar/retrocardiac atelectasis/consolidation.    I have personally reviewed the examination and initial interpretation  and I agree with the findings.    JEANNE LOW MD         SYSTEM ID:  [id number]   XR Cervical Spine Port 2/3 Views    Narrative    EXAM: XR CERVICAL SPINE PORT 2/3 VIEWS 9/18/2022 11:06 AM    HISTORY: Upright in bed with [place]-J     COMPARISON: 9/15/2022    FINDINGS: AP and lateral views of the cervical spine were obtained.  Redemonstrated type II odontoid fracture with roughly 8 mm of  retrolisthesis and hyperextension. Multilevel cervical spondylosis  with grade 1 C4-5 anterolisthesis. Endotracheal tube is present. Lower  cervical vertebral bodies are well visualized. Multilevel disc height  loss, facet arthropathy      Impression    IMPRESSION: Redemonstrated type II odontoid fracture with 8 mm of  retrolisthesis and hyperextension.    I have personally reviewed the examination and initial interpretation  and I agree with the findings.    DEEPAK TRAN MD         SYSTEM ID:  [id number]   XR Abdomen Port 1 View    Narrative    XR ABDOMEN PORT 1 VIEW  9/18/2022 5:28 PM      HISTORY: new nasogastric feeding tube    COMPARISON: 9/18/2022, 9/15/2022    FINDINGS:   Single AP view of the abdomen and pelvis. TAVR.  Stable pacer leads.  Feeding tube tip overlying the proximal  duodenum. Nonobstructive bowel  gas pattern. No pneumatosis.      Impression    IMPRESSION:   Feeding tube tip overlying the proximal duodenum.    I have personally reviewed the examination and initial interpretation  and I agree with the findings.    ANICETO QUIGLEY MD         SYSTEM ID:  [id number]   XR Chest Port 1 View    Narrative    EXAM: XR CHEST PORT 1 VIEW  9/18/2022 7:32 PM     HISTORY:  reintubated       COMPARISON:  Earlier same day chest x-ray obtained at 0042 hours    FINDINGS: AP radiograph of the chest. Endotracheal tube tip likely in  the mid trachea but not well-visualized as it is overlapping the  enteric tube. Projecting 2.5 cm cephalad to the taylor. Left chest  wall ICD/pacemaker with leads overlying the right atrium and right  ventricle. Aortic valve replacement. Partly visualized feeding tube  coursing below the diaphragm and inferior to the field of view.    Stable cardiomediastinal silhouette. Asymmetric elevation of the left  hemidiaphragm. Low lung volumes. Unchanged perihilar interstitial  opacities and streaky bibasilar opacities. No significant pleural  effusion. No pneumothorax. Postoperative changes to the right  shoulder. Visualized upper abdomen is unremarkable.      Impression    IMPRESSION:  1. Endotracheal tube with tip likely in the mid trachea but not well  visualized as it is overlapping the enteric tube.   2. Unchanged perihilar interstitial and streaky bibasilar opacities  likely representing pulmonary edema and/or atelectasis.  3. Low lung volumes.    I have personally reviewed the examination and initial interpretation  and I agree with the findings.    ANICETO QUIGLEY MD         SYSTEM ID:  [id number]   XR Abdomen Port 1 View    Narrative    EXAM: XR ABDOMEN PORT 1 VIEW  9/18/2022 7:31 PM     HISTORY:  not able to push fluids through NGT - rechecking positioning        COMPARISON:  Earlier same day abdominal radiograph obtained at 1654  hours    FINDINGS: Supine  radiograph of the abdomen. Feeding tube with tip in  the right side of the abdomen possibly within the stomach versus first  portion of the duodenum. Nonspecific, nonobstructive bowel gas  pattern. No pneumatosis or portal venous gas. The visualized lung  bases are clear. No acute osseous abnormality. Degenerative changes to  the spine with spinal fusion device overlying the L4-5.      Impression    IMPRESSION: Feeding tube with tip overlying the right side of the  abdomen with the tip possibly within the stomach versus first portion  of the duodenum, consider further advancement if postpyloric position  is desired.  Non obstructive bowel gas pattern.    I have personally reviewed the examination and initial interpretation  and I agree with the findings.    ANICETO QUIGLEY MD         SYSTEM ID:  [id number]   XR Chest Port 1 View    Narrative    EXAM: XR CHEST PORT 1 VIEW  9/20/2022 12:44 AM     HISTORY:  Rib fracture(s) in trauma patient       COMPARISON:  Chest x-ray 9/18/2022    FINDINGS: AP radiograph of the chest. Stable positioning of the  endotracheal tube. Left chest wall ICD/pacemaker with leads overlying  the right atrium and right ventricle. Aortic valve replacement.  Feeding tube has been removed.    Stable cardiomediastinal silhouette. Improved lung volumes. Slightly  decreased perihilar interstitial opacities and streaky bibasilar  opacities. Stable left basilar opacity. No significant pleural  effusion. No pneumothorax.     Postoperative changes to the right shoulder. Unchanged chronic  bilateral rib fractures. The previously questioned acute anterior left  sixth rib fracture is better visualized on CT. Chronic appearing lower  thoracic compression fracture deformities and sternal fracture are  seen to better advantage on prior CT 09/15/2022. Visualized upper  abdomen is unremarkable.       Impression    IMPRESSION:    1. Improved lung volumes with slightly decreased perihilar and  bibasilar streaky  and left basilar opacities, likely atelectasis.  2. Stable positioning of the endotracheal tube. Feeding tube has been  removed.  3. Multiple chronic-appearing bilateral rib fractures, the previously  questioned acute anterior left sixth rib fracture is better visualized  on CT 09/15/2022.    I have personally reviewed the examination and initial interpretation  and I agree with the findings.    TREVA VELEZ MD         SYSTEM ID:  [id number]   XR Chest Port 1 View    Narrative    EXAM: XR CHEST PORT 1 VIEW  9/21/2022 1:00 AM     HISTORY:  Rib fracture(s) in trauma patient       COMPARISON:  Chest x-ray 9/20/2022    FINDINGS: AP radiograph of the chest. Stable positioning of the  endotracheal tube. Left chest wall ICD/pacemaker with leads overlying  the right atrium and right ventricle. Aortic valve replacement.     Stable cardiomediastinal silhouette. Atherosclerotic calcifications of  the aortic arch. Asymmetric elevation of the left hemidiaphragm.  Unchanged perihilar interstitial opacities and streaky bibasilar  opacities. Stable left basilar opacity. No significant pleural  effusion. No pneumothorax.     Postoperative changes to the right shoulder. Unchanged chronic  bilateral rib fractures. The previously questioned acute anterior left  sixth rib fracture is better visualized on CT. Chronic appearing lower  thoracic compression fracture deformities and sternal fracture are  seen to better advantage on prior CT 09/15/2022. Visualized upper  abdomen is unremarkable.       Impression    IMPRESSION:    1. Unchanged perihilar and bibasilar streaky and left basilar  opacities, likely atelectasis.  2. Stable positioning of the endotracheal tube.   3. Multiple chronic-appearing bilateral rib fractures, the previously  questioned acute anterior left sixth rib fracture is better visualized  on CT 09/15/2022.    I have personally reviewed the examination and initial interpretation  and I agree with the  findings.    NICKI ISLAS MD         SYSTEM ID:  [id number]   Echocardiogram Complete     Value    LVEF  60-65%    [facility]    [id number]  [id number]  [id number]  [id number]^VLADIMIR FOSTER^EV^ANAHY     [facility],[place]  Echocardiography Laboratory  [address] [place] [id number]     Name: LORENA GALVAN  MRN: [id number]  : 1930  Study Date: 2022 03:09 PM  Age: 89 yrs  Gender: Male  Patient Location: [facility]  Reason For Study: Cardiac Device, Unspecified  Ordering Physician: EV MARCH  Performed By: Ally Bower     BSA: 1.9 m2  Height: 68 in  Weight: 177 lb  HR: 50  BP: 127/74 mmHg  ______________________________________________________________________________  Procedure  Complete Portable Echo Adult. Contrast Optison. Technically difficult  study.Extremely poor acoustic windows. Optison (NDC #8847-2656-15) given  intravenously. Patient was given 5 ml mixture of 3 ml Optison and 6 ml saline.  4 ml wasted.  ______________________________________________________________________________  Interpretation Summary  Global and regional left ventricular function is normal with an EF of 60-65%.  Global right ventricular function is normal.  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 Doppler interrogation of the aortic valve is normal. The  peak velocity across the aortic valve is 1.6 m/sec. The mean gradient is 5  mmHg.  No pericardial effusion is present.  ______________________________________________________________________________  Left Ventricle  Left ventricular size is normal. Global and regional left ventricular function  is normal with an EF of 60-65%.     Right Ventricle  The right ventricle is normal size. Global right ventricular function is  normal.     Mitral Valve  Mild to moderate mitral annular calcification is present.     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 Doppler interrogation of the aortic valve is normal. The  peak velocity across the aortic  valve is 1.6 m/sec. The mean gradient is 5  mmHg.     Tricuspid Valve  Trace to mild tricuspid insufficiency is present. Estimated pulmonary artery  systolic pressure is 17 mmHg plus right atrial pressure.     Pulmonic Valve  On Doppler interrogation, there is no significant stenosis or regurgitation.     Vessels  The aorta root cannot be assessed. The inferior vena cava cannot be assessed.     Pericardium  No pericardial effusion is present.     Compared to Previous Study  There is no prior study for direct comparison.     ______________________________________________________________________________  MMode/2D Measurements & Calculations  asc Aorta Diam: 2.6 cm  LVOT diam: 2.3 cm  LVOT area: 4.2 cm2     Doppler Measurements & Calculations  MV E max apollo: 71.6 cm/sec  MV A max apollo: 94.3 cm/sec  MV E/A: 0.76  MV dec slope: 217.0 cm/sec2  MV dec time: 0.33 sec  Ao V2 max: 156.1 cm/sec  Ao max P.7 mmHg  Ao V2 mean: 109.4 cm/sec  Ao mean P.5 mmHg  Ao V2 VTI: 30.4 cm  JUAN(I,D): 2.5 cm2  JUAN(V,D): 1.8 cm2  LV V1 max P.8 mmHg  LV V1 max: 67.9 cm/sec  LV V1 VTI: 18.3 cm  SV(LVOT): 76.1 ml  SI(LVOT): 39.2 ml/m2  AV Apollo Ratio (DI): 0.44  JUAN Index (cm2/m2): 1.3  E/E' av.0  Lateral E/e': 10.6  Medial E/e': 11.3     ______________________________________________________________________________  Report approved by: Homer Cobb 2022 04:17 PM             Time Spent on this Encounter   I, NATASHA Morse CNP, personally saw the patient today and spent greater than 30 minutes discharging this patient.    We appreciate the opportunity to care for your patient while in the hospital.  Should you have any questions about their injuries or this discharge summary our contact information is below.    Trauma Services  [facility]   Department of Critical Care and Acute Care Surgery  [address] [place] [address]  Office: [phone]

## 2022-09-21 NOTE — ANESTHESIA CARE TRANSFER NOTE
Patient: Alexandru Still    Procedure: Procedure(s):  CREATION, TRACHEOSTOMY       Diagnosis: Closed fracture of cervical vertebra, unspecified cervical vertebral level, initial encounter (H) [S12.9XXA]  Closed fracture of second cervical vertebra without spinal cord injury, initial encounter (H) [S12.100A]  Diagnosis Additional Information: No value filed.    Anesthesia Type:   General     Note:    Oropharynx: oropharynx clear of all foreign objects and ventilatory support  Level of Consciousness: iatrogenic sedation      Independent Airway: airway patency not satisfactory and stable  Dentition: dentition unchanged  Vital Signs Stable: post-procedure vital signs reviewed and stable  Report to RN Given: handoff report given  Patient transferred to: ICU  Comments: Pt transferred to ICU with new trach in place. VSS throughout. Report shared with RN at bedside.  ICU Handoff: Call for PAUSE to initiate/utilize ICU HANDOFF, Identified Patient, Identified Responsible Provider, Reviewed the Pertinent Medical History, Discussed Surgical Course, Reviewed Intra-OP Anesthesia Management and Issues during Anesthesia, Set Expectations for Post Procedure Period and Allowed Opportunity for Questions and Acknowledgement of Understanding      Vitals:  Vitals Value Taken Time   BP     Temp     Pulse     Resp     SpO2         Electronically Signed By: NATASHA Robert CRNA  September 21, 2022  9:12 AM

## 2022-09-21 NOTE — PLAN OF CARE
Major Shift Events: Having large amounts of thick tan secretions. A&O following commands. Managing pain with PRN dilaudid and oxy. Had BM this shift after suppository was given previous shift. Low UO this morning, SICU aware. Will pass on to day shift.     Plan: Trach in AM    For vital signs and complete assessments, please see documentation flowsheets.

## 2022-09-21 NOTE — BRIEF OP NOTE
[facility]    Brief Operative Note    Pre-operative diagnosis: Closed fracture of cervical vertebra, unspecified cervical vertebral level, initial encounter (H) [S12.9XXA]  Closed fracture of second cervical vertebra without spinal cord injury, initial encounter (H) [S12.100A]  Post-operative diagnosis Same as pre-operative diagnosis    Procedure: Procedure(s):  CREATION, TRACHEOSTOMY  Surgeon: Surgeon(s) and Role:     * Bob Dill MD - Primary     * Manan Burrell MD - Resident - Assisting  Anesthesia: General   Estimated Blood Loss: Minimal    Drains: None  Specimens: * No specimens in log *  Findings:   None.  Complications: None.  Implants: * No implants in log *    Successful trach placement    Manan Burrell MD  Surgical Critical Care Fellow

## 2022-09-21 NOTE — PROGRESS NOTES
[facility]  Trauma Service Progress Note    Date of Service (when I saw the patient): 09/21/2022     Assessment & Plan   Trauma mechanism: Fall while going to urinal    Time/date of injury: 9/15/22  Known Injuries:  1. Anterior left frontal SDH, 5 mm  2. Posteriorly displaced Tyle II odontoid fx, ~1cm posterior dislocation   3. C1 anterior arch fx   4. Epidural hemorrhage up to 7mm along posterior dens   5. Questionable acute anterior left 6th rib fx  6. Right forehead abrasion     Procedures:  09/20/2022 PEG tube placement : Dr. Dill  09/21/2022 Tracheostomy Dr. Dill    SICU care appreciated  Plan:  Planned PST, tolerating so far @ 5/5 and 40% Fi02  Remove hannah  Titrate tubefeed to goal, electrolyte replacement protocol  Care coordinator consult for LTACH placement    Interval History   Trachea stomy today.  Unable to complete ROS    Physical Exam   Temp: 97.2  F (36.2  C) Temp src: Axillary BP: 126/75 Pulse: 56   Resp: 14 SpO2: 100 % O2 Device: Mechanical Ventilator    Vitals:    09/19/22 0200 09/20/22 0400 09/21/22 0000   Weight: 82.6 kg (182 lb 1.6 oz) 82.9 kg (182 lb 12.2 oz) 82.2 kg (181 lb 3.5 oz)     Vital Signs with Ranges  Temp:  [97.2  F (36.2  C)-99.5  F (37.5  C)] 97.2  F (36.2  C)  Pulse:  [49-71] 56  Resp:  [9-23] 14  BP: (104-162)/(56-94) 126/75  FiO2 (%):  [40 %] 40 %  SpO2:  [97 %-100 %] 100 %  I/O last 3 completed shifts:  In: 3220.97 [I.V.:2780.97; NG/GT:310]  Out: 635 [Urine:630; Emesis/NG output:5]  [place] Coma Scale - Total 12/trach/15  Eye Response (E): 4   4= spontaneous, 3= to verbal/voice, 2= to pain, 1= No response   Verbal Response (V): 2 (mouths word)   5= Orientated, converses, 4= Confused, converses, 3= Inappropriate words, 2= Incomprehensible sounds, 1=No response   Motor Response (M): 6   6= Obeys commands, 5= Localizes to pain, 4= Withdrawal to pain, 3=Fexion to pain, 2= Extension to pain, 1= No response   Constitutional: Awake,  alert, follows commands  HEENT: Normocephalic, scabbed abrasion to right forehead/brow, PERRL, [place]-J with good fit  Respiratory: Tracheostomy tube is secured  Cardiovascular:  Paced rate, bradycardia  GI: Abdomen soft, non-distended,   Genitourinary:  Escudero in place, clear yellow urine  Skin:  Warm & dry, ecchymosis in various stages of healing over body   Musculoskeletal: There is no redness, warmth, or swelling of the joints.  Pedal pulse palpated.  Neurologic:  Non focal, follows commands  Psych: calm,     Afanwi OZZY Burciaga, NATASHA CNP  To contact the trauma service use job code pager [phone],   Numeric texts or alpha text through [facility]     No

## 2022-09-21 NOTE — PLAN OF CARE
Neuro: Alert, following simple commands, able to lift extremities off bed for 3-5 seconds, appears to have no sensation deficits.   Cardiac: AV paced -150/60-70.   Respiratory: Sating >92% on CPAP/PS 5/5 30% Fi02 via new as of 0900 this a.m shiley 6 with cuff inflated.  Small amount of thick/creamy/red-streaked sputum via trach and small amount via mouth.  RR 10-14  GI/: 20-40 mL/hr of yogesh urine via hannah.  Hannah pulled at 1500, yet to void with primafit, will bladder scan and assess as needed. BM overnight  Diet/appetite: NPO, TF via PEG at 20 mL/hr  Activity:  Assist of 2 for turns;  HOOB less than 30, no pillows.  Pain: At acceptable level on current regimen. Prn oxy and dilaudid given.   Skin: No new deficits noted.  Mult bruises/abrasions. R shin with large abrasion, cleansed, covered with xeroform and wrapped with kerlix.   LDA's:  PIV x2    Plan: SW to start working on LTAC, possible transfer to 6D tomorrow.  Continue with POC. Notify primary team with changes.

## 2022-09-22 ENCOUNTER — HOSPITAL ENCOUNTER (INPATIENT)
Facility: CLINIC | Age: 87
LOS: 36 days | Discharge: NURSING, REHAB OR LONG-TERM CARE | DRG: 208 | End: 2022-10-28
Attending: INTERNAL MEDICINE | Admitting: HOSPITALIST
Payer: MEDICARE

## 2022-09-22 ENCOUNTER — APPOINTMENT (OUTPATIENT)
Dept: GENERAL RADIOLOGY | Facility: CLINIC | Age: 87
DRG: 004 | End: 2022-09-22
Payer: MEDICARE

## 2022-09-22 VITALS
SYSTOLIC BLOOD PRESSURE: 139 MMHG | BODY MASS INDEX: 27.82 KG/M2 | WEIGHT: 182.98 LBS | TEMPERATURE: 98.3 F | OXYGEN SATURATION: 97 % | HEART RATE: 83 BPM | RESPIRATION RATE: 24 BRPM | DIASTOLIC BLOOD PRESSURE: 66 MMHG

## 2022-09-22 DIAGNOSIS — E03.9 HYPOTHYROIDISM, UNSPECIFIED TYPE: ICD-10-CM

## 2022-09-22 DIAGNOSIS — K21.9 GASTROESOPHAGEAL REFLUX DISEASE, UNSPECIFIED WHETHER ESOPHAGITIS PRESENT: ICD-10-CM

## 2022-09-22 DIAGNOSIS — I10 HYPERTENSION, UNSPECIFIED TYPE: ICD-10-CM

## 2022-09-22 DIAGNOSIS — K59.00 CONSTIPATION, UNSPECIFIED CONSTIPATION TYPE: ICD-10-CM

## 2022-09-22 DIAGNOSIS — R45.1 AGITATION: ICD-10-CM

## 2022-09-22 DIAGNOSIS — E16.2 HYPOGLYCEMIA: ICD-10-CM

## 2022-09-22 DIAGNOSIS — Z00.00 ROUTINE ADULT HEALTH MAINTENANCE: ICD-10-CM

## 2022-09-22 DIAGNOSIS — R52 PAIN: Primary | ICD-10-CM

## 2022-09-22 DIAGNOSIS — J96.01 ACUTE HYPOXEMIC RESPIRATORY FAILURE (H): ICD-10-CM

## 2022-09-22 DIAGNOSIS — B49 FUNGAL INFECTION: ICD-10-CM

## 2022-09-22 PROBLEM — S06.5XAA SUBDURAL HEMATOMA (H): Status: ACTIVE | Noted: 2022-09-22

## 2022-09-22 LAB
ANION GAP SERPL CALCULATED.3IONS-SCNC: 10 MMOL/L (ref 7–15)
BUN SERPL-MCNC: 20.9 MG/DL (ref 8–23)
CALCIUM SERPL-MCNC: 8.2 MG/DL (ref 8.2–9.6)
CHLORIDE SERPL-SCNC: 110 MMOL/L (ref 98–107)
CREAT SERPL-MCNC: 0.67 MG/DL (ref 0.67–1.17)
DEPRECATED HCO3 PLAS-SCNC: 19 MMOL/L (ref 22–29)
ERYTHROCYTE [DISTWIDTH] IN BLOOD BY AUTOMATED COUNT: 15.3 % (ref 10–15)
GFR SERPL CREATININE-BSD FRML MDRD: 88 ML/MIN/1.73M2
GLUCOSE BLDC GLUCOMTR-MCNC: 112 MG/DL (ref 70–99)
GLUCOSE BLDC GLUCOMTR-MCNC: 116 MG/DL (ref 70–99)
GLUCOSE BLDC GLUCOMTR-MCNC: 167 MG/DL (ref 70–99)
GLUCOSE SERPL-MCNC: 124 MG/DL (ref 70–99)
HCT VFR BLD AUTO: 32.7 % (ref 40–53)
HGB BLD-MCNC: 10.7 G/DL (ref 13.3–17.7)
MAGNESIUM SERPL-MCNC: 2 MG/DL (ref 1.7–2.3)
MCH RBC QN AUTO: 31.6 PG (ref 26.5–33)
MCHC RBC AUTO-ENTMCNC: 32.7 G/DL (ref 31.5–36.5)
MCV RBC AUTO: 97 FL (ref 78–100)
PHOSPHATE SERPL-MCNC: 2.5 MG/DL (ref 2.5–4.5)
PLATELET # BLD AUTO: 196 10E3/UL (ref 150–450)
POTASSIUM SERPL-SCNC: 4.4 MMOL/L (ref 3.4–5.3)
RBC # BLD AUTO: 3.39 10E6/UL (ref 4.4–5.9)
SODIUM SERPL-SCNC: 139 MMOL/L (ref 136–145)
WBC # BLD AUTO: 13.4 10E3/UL (ref 4–11)

## 2022-09-22 PROCEDURE — 250N000013 HC RX MED GY IP 250 OP 250 PS 637: Performed by: PHYSICIAN ASSISTANT

## 2022-09-22 PROCEDURE — 999N000127 HC STATISTIC PERIPHERAL IV START W US GUIDANCE

## 2022-09-22 PROCEDURE — 250N000011 HC RX IP 250 OP 636

## 2022-09-22 PROCEDURE — 250N000009 HC RX 250: Performed by: PHYSICIAN ASSISTANT

## 2022-09-22 PROCEDURE — 99233 SBSQ HOSP IP/OBS HIGH 50: CPT | Performed by: PHYSICIAN ASSISTANT

## 2022-09-22 PROCEDURE — 999N000157 HC STATISTIC RCP TIME EA 10 MIN

## 2022-09-22 PROCEDURE — 250N000011 HC RX IP 250 OP 636: Performed by: STUDENT IN AN ORGANIZED HEALTH CARE EDUCATION/TRAINING PROGRAM

## 2022-09-22 PROCEDURE — 83735 ASSAY OF MAGNESIUM: CPT | Performed by: SURGERY

## 2022-09-22 PROCEDURE — 99239 HOSP IP/OBS DSCHRG MGMT >30: CPT | Performed by: NURSE PRACTITIONER

## 2022-09-22 PROCEDURE — 84100 ASSAY OF PHOSPHORUS: CPT | Performed by: SURGERY

## 2022-09-22 PROCEDURE — 36415 COLL VENOUS BLD VENIPUNCTURE: CPT

## 2022-09-22 PROCEDURE — 120N000017 HC R&B RESPIRATORY CARE

## 2022-09-22 PROCEDURE — 250N000013 HC RX MED GY IP 250 OP 250 PS 637: Performed by: NURSE PRACTITIONER

## 2022-09-22 PROCEDURE — 250N000011 HC RX IP 250 OP 636: Performed by: SURGERY

## 2022-09-22 PROCEDURE — 71045 X-RAY EXAM CHEST 1 VIEW: CPT

## 2022-09-22 PROCEDURE — 250N000013 HC RX MED GY IP 250 OP 250 PS 637: Performed by: SURGERY

## 2022-09-22 PROCEDURE — 258N000003 HC RX IP 258 OP 636

## 2022-09-22 PROCEDURE — 80048 BASIC METABOLIC PNL TOTAL CA: CPT

## 2022-09-22 PROCEDURE — 94003 VENT MGMT INPAT SUBQ DAY: CPT

## 2022-09-22 PROCEDURE — 5A1945Z RESPIRATORY VENTILATION, 24-96 CONSECUTIVE HOURS: ICD-10-PCS | Performed by: NURSE PRACTITIONER

## 2022-09-22 PROCEDURE — 71045 X-RAY EXAM CHEST 1 VIEW: CPT | Mod: 26 | Performed by: RADIOLOGY

## 2022-09-22 PROCEDURE — 250N000013 HC RX MED GY IP 250 OP 250 PS 637: Performed by: INTERNAL MEDICINE

## 2022-09-22 PROCEDURE — 82962 GLUCOSE BLOOD TEST: CPT

## 2022-09-22 PROCEDURE — 85027 COMPLETE CBC AUTOMATED: CPT

## 2022-09-22 RX ORDER — FUROSEMIDE 20 MG
40 TABLET ORAL 2 TIMES DAILY
Status: DISCONTINUED | OUTPATIENT
Start: 2022-09-22 | End: 2022-09-22

## 2022-09-22 RX ORDER — AMINO AC/PROTEIN HYDR/WHEY PRO 10G-100/30
2 LIQUID (ML) ORAL EVERY 8 HOURS
DISCHARGE
Start: 2022-09-22

## 2022-09-22 RX ORDER — CALCIUM CARBONATE 500(1250)
1 TABLET ORAL 2 TIMES DAILY
Status: ON HOLD | DISCHARGE
Start: 2022-09-22 | End: 2022-10-28

## 2022-09-22 RX ORDER — GUAIFENESIN 600 MG/1
15 TABLET, EXTENDED RELEASE ORAL DAILY
Status: ON HOLD | DISCHARGE
Start: 2022-09-23 | End: 2022-10-28

## 2022-09-22 RX ORDER — LEVOTHYROXINE SODIUM 112 UG/1
112 TABLET ORAL
Status: DISCONTINUED | OUTPATIENT
Start: 2022-09-23 | End: 2022-10-10

## 2022-09-22 RX ORDER — ACETAMINOPHEN 650 MG/20.3ML
650 LIQUID ORAL EVERY 4 HOURS PRN
Status: DISCONTINUED | OUTPATIENT
Start: 2022-09-22 | End: 2022-09-23

## 2022-09-22 RX ORDER — SODIUM CHLORIDE, SODIUM LACTATE, POTASSIUM CHLORIDE, CALCIUM CHLORIDE 600; 310; 30; 20 MG/100ML; MG/100ML; MG/100ML; MG/100ML
INJECTION, SOLUTION INTRAVENOUS CONTINUOUS
Status: DISCONTINUED | OUTPATIENT
Start: 2022-09-22 | End: 2022-09-22

## 2022-09-22 RX ORDER — NICOTINE POLACRILEX 4 MG
15-30 LOZENGE BUCCAL
Status: DISCONTINUED | OUTPATIENT
Start: 2022-09-22 | End: 2022-10-28 | Stop reason: HOSPADM

## 2022-09-22 RX ORDER — ACETAMINOPHEN 325 MG/1
650 TABLET ORAL EVERY 6 HOURS
Status: ON HOLD | DISCHARGE
Start: 2022-09-22 | End: 2022-10-28

## 2022-09-22 RX ORDER — OXYCODONE HCL 5 MG/5 ML
5 SOLUTION, ORAL ORAL EVERY 4 HOURS PRN
Status: DISCONTINUED | OUTPATIENT
Start: 2022-09-22 | End: 2022-10-15

## 2022-09-22 RX ORDER — IPRATROPIUM BROMIDE AND ALBUTEROL SULFATE 2.5; .5 MG/3ML; MG/3ML
3 SOLUTION RESPIRATORY (INHALATION) EVERY 4 HOURS PRN
Status: DISCONTINUED | OUTPATIENT
Start: 2022-09-22 | End: 2022-09-23

## 2022-09-22 RX ORDER — GUAIFENESIN 600 MG/1
15 TABLET, EXTENDED RELEASE ORAL DAILY
Status: DISCONTINUED | OUTPATIENT
Start: 2022-09-23 | End: 2022-09-30

## 2022-09-22 RX ORDER — DEXTROSE MONOHYDRATE 25 G/50ML
25-50 INJECTION, SOLUTION INTRAVENOUS
Status: DISCONTINUED | OUTPATIENT
Start: 2022-09-22 | End: 2022-10-28 | Stop reason: HOSPADM

## 2022-09-22 RX ORDER — SENNOSIDES 8.6 MG
1 TABLET ORAL 2 TIMES DAILY
Status: ON HOLD | DISCHARGE
Start: 2022-09-22 | End: 2022-10-28

## 2022-09-22 RX ORDER — MAGNESIUM SULFATE HEPTAHYDRATE 40 MG/ML
2 INJECTION, SOLUTION INTRAVENOUS ONCE
Status: COMPLETED | OUTPATIENT
Start: 2022-09-22 | End: 2022-09-22

## 2022-09-22 RX ORDER — ACETAMINOPHEN 325 MG/1
650 TABLET ORAL EVERY 6 HOURS
Status: DISCONTINUED | OUTPATIENT
Start: 2022-09-22 | End: 2022-09-23

## 2022-09-22 RX ORDER — NALOXONE HYDROCHLORIDE 0.4 MG/ML
0.2 INJECTION, SOLUTION INTRAMUSCULAR; INTRAVENOUS; SUBCUTANEOUS
Status: DISCONTINUED | OUTPATIENT
Start: 2022-09-22 | End: 2022-09-23

## 2022-09-22 RX ORDER — ACETAMINOPHEN 325 MG/1
650 TABLET ORAL EVERY 4 HOURS PRN
Status: DISCONTINUED | OUTPATIENT
Start: 2022-09-22 | End: 2022-09-23

## 2022-09-22 RX ORDER — BISACODYL 10 MG
10 SUPPOSITORY, RECTAL RECTAL DAILY PRN
Status: DISCONTINUED | OUTPATIENT
Start: 2022-09-22 | End: 2022-10-28 | Stop reason: HOSPADM

## 2022-09-22 RX ORDER — NALOXONE HYDROCHLORIDE 0.4 MG/ML
0.4 INJECTION, SOLUTION INTRAMUSCULAR; INTRAVENOUS; SUBCUTANEOUS
Status: DISCONTINUED | OUTPATIENT
Start: 2022-09-22 | End: 2022-09-23

## 2022-09-22 RX ORDER — OXYCODONE HCL 5 MG/5 ML
5 SOLUTION, ORAL ORAL EVERY 4 HOURS PRN
Qty: 180 ML | Refills: 0 | Status: ON HOLD | OUTPATIENT
Start: 2022-09-22 | End: 2022-10-28

## 2022-09-22 RX ORDER — NALOXONE HYDROCHLORIDE 0.4 MG/ML
0.4 INJECTION, SOLUTION INTRAMUSCULAR; INTRAVENOUS; SUBCUTANEOUS
Status: DISCONTINUED | OUTPATIENT
Start: 2022-09-22 | End: 2022-10-28 | Stop reason: HOSPADM

## 2022-09-22 RX ORDER — NALOXONE HYDROCHLORIDE 0.4 MG/ML
0.2 INJECTION, SOLUTION INTRAMUSCULAR; INTRAVENOUS; SUBCUTANEOUS
Status: DISCONTINUED | OUTPATIENT
Start: 2022-09-22 | End: 2022-10-28 | Stop reason: HOSPADM

## 2022-09-22 RX ORDER — BISACODYL 10 MG
10 SUPPOSITORY, RECTAL RECTAL DAILY PRN
DISCHARGE
Start: 2022-09-22

## 2022-09-22 RX ORDER — LEVOTHYROXINE SODIUM 112 UG/1
112 TABLET ORAL
Status: ON HOLD | DISCHARGE
Start: 2022-09-23 | End: 2022-10-28

## 2022-09-22 RX ORDER — VITAMIN B COMPLEX
25 TABLET ORAL DAILY
DISCHARGE
Start: 2022-09-22

## 2022-09-22 RX ADMIN — OXYCODONE HYDROCHLORIDE 5 MG: 5 SOLUTION ORAL at 01:47

## 2022-09-22 RX ADMIN — SENNOSIDES 8.6 MG: 8.6 TABLET, FILM COATED ORAL at 08:37

## 2022-09-22 RX ADMIN — OXYCODONE HYDROCHLORIDE 5 MG: 5 SOLUTION ORAL at 05:38

## 2022-09-22 RX ADMIN — ACETAMINOPHEN 650 MG: 325 TABLET, FILM COATED ORAL at 00:05

## 2022-09-22 RX ADMIN — HYDROMORPHONE HYDROCHLORIDE 0.2 MG: 0.2 INJECTION, SOLUTION INTRAMUSCULAR; INTRAVENOUS; SUBCUTANEOUS at 06:30

## 2022-09-22 RX ADMIN — MULTIVITAMIN 15 ML: LIQUID ORAL at 08:37

## 2022-09-22 RX ADMIN — ACETAMINOPHEN 650 MG: 325 TABLET ORAL at 21:55

## 2022-09-22 RX ADMIN — DOXAZOSIN 2 MG: 4 TABLET ORAL at 08:38

## 2022-09-22 RX ADMIN — SODIUM CHLORIDE, POTASSIUM CHLORIDE, SODIUM LACTATE AND CALCIUM CHLORIDE: 600; 310; 30; 20 INJECTION, SOLUTION INTRAVENOUS at 00:48

## 2022-09-22 RX ADMIN — Medication 40 MG: at 08:38

## 2022-09-22 RX ADMIN — POTASSIUM & SODIUM PHOSPHATES POWDER PACK 280-160-250 MG 1 PACKET: 280-160-250 PACK at 11:50

## 2022-09-22 RX ADMIN — HEPARIN SODIUM 5000 UNITS: 5000 INJECTION, SOLUTION INTRAVENOUS; SUBCUTANEOUS at 11:50

## 2022-09-22 RX ADMIN — HEPARIN SODIUM 5000 UNITS: 5000 INJECTION, SOLUTION INTRAVENOUS; SUBCUTANEOUS at 04:02

## 2022-09-22 RX ADMIN — ACETAMINOPHEN 650 MG: 325 TABLET, FILM COATED ORAL at 05:38

## 2022-09-22 RX ADMIN — Medication 2 PACKET: at 04:06

## 2022-09-22 RX ADMIN — LEVOTHYROXINE SODIUM 112 MCG: 0.11 TABLET ORAL at 08:37

## 2022-09-22 RX ADMIN — POTASSIUM & SODIUM PHOSPHATES POWDER PACK 280-160-250 MG 1 PACKET: 280-160-250 PACK at 08:37

## 2022-09-22 RX ADMIN — POTASSIUM & SODIUM PHOSPHATES POWDER PACK 280-160-250 MG 1 PACKET: 280-160-250 PACK at 06:30

## 2022-09-22 RX ADMIN — MAGNESIUM SULFATE IN WATER 2 G: 40 INJECTION, SOLUTION INTRAVENOUS at 06:30

## 2022-09-22 RX ADMIN — ACETAMINOPHEN 650 MG: 325 TABLET, FILM COATED ORAL at 11:50

## 2022-09-22 RX ADMIN — Medication 2 PACKET: at 11:51

## 2022-09-22 RX ADMIN — OXYCODONE HYDROCHLORIDE 5 MG: 5 SOLUTION ORAL at 17:09

## 2022-09-22 ASSESSMENT — ACTIVITIES OF DAILY LIVING (ADL)
ADLS_ACUITY_SCORE: 50
ADLS_ACUITY_SCORE: 35
ADLS_ACUITY_SCORE: 47
ADLS_ACUITY_SCORE: 50

## 2022-09-22 NOTE — PROGRESS NOTES
Alexandru is s/p trach and PEG. Plan to discharge today to LTACH. No further palliative needs noted but if any arise, feel free to reach out. Palliative medicine will SIGN-OFF. No outpatient follow-up needed.    This is a non-billable note.    Mello Antoine DO / Palliative Medicine / Text me via [facility].     Team Consult Pager [phone] (answered 8am-430pm M-F) - ok to text page via [employer] / After-Hours Answering Service [phone] / Palliative Clinic in the [facility] at the [facility] - [phone] (scheduling); [phone] (triage).

## 2022-09-22 NOTE — PROGRESS NOTES
SURGICAL ICU PROGRESS NOTE  09/22/2022        Date of Service (when I saw the patient): 09/22/2022    ASSESSMENT:   Alexandru Still is a 89 year old male who was admitted to the SICU on 9/15/2022 after experiencing an unwitnessed fall and striking his head. He was transferred to the ED and was found to have a left frontal SDH (5mm) + C1 fx + Type II C2 odontoid fx w/ 1cm posterior displacement. Intubated for airway protection in the ED given high c-spine injury and difficulty swallowing. Family decided against C-spine stabilization surgery. Instead, he had a PEG placed 9/20 and trach placed on 9/21. Clinically stable. Will discharge today to LTACH 9/22/22.     PMHx: Lambert-Eaton syndrome, TAVR, complete heart block with pacemaker dependency, DM2, arthritis, BPH, HTN, osteoporosis    CHANGES and MAJOR THINGS TODAY:   - Discharge to LTACH ~3:45pm  - Tolerating PST so far @ 5/5 and 30% Fi02; transition to trach dome  - Advance tube feeds at the faster frequency (10 mL q4h) to reach goal of 60 mL/hr.   - Restart PTA finasteride  - Discontinue PPI prophylaxis (used for positive pressure ventilation)  - Discontinued precedex gtt (off since 9/21/22)     PLAN:  Neuro / Pain / Sedation  # Acute pain   # Subdural Hematoma, stable  # C1, C2 Cervical Spine Injury  # hx of Lambert Eaton Myasthenic syndrome  Pain/ Sedation  - Pain: PRNs dilaudid, oxy, and tylenol, pain well managed.  - Sedation plan: None. GCS of 11. CPOT: 1              - Discontinued precedex (off since 9/21/22)  Neuro  - Monitor neurological status. Delirium preventions and precautions.     Safety:  - c-collar to remain on at all times, no pillows behind head, head straight, HOB not to exceed 30 degrees  - RUSSELLMI J at all times  - Neurosurgery arranging followup      Pulmonary:   # Mechanical ventilatory support for airway protection  # Pulmonary edema vs atelectasis  # s/p Tracheostomy 9/21   PPD#1 s/p Tracheostomy  - Small amount of  thick/creamy/red-streaked sputum via trach and small amount via mouth overnight, monitor.    VENT:   - CPAP/PS: FiO2 30%, PS 5/5 tolerating well, passed PST               - Start trach dome FiO2 40%, 40 LPM O2.  - Supplemental O2, goal SpO2 > 92%.  - CXR (9/21): Unchanged perihilar and bibasilar streaky and left basilar opacities, likely atelectasis. Multiple chronic bilateral rib fractures.     Cardiovascular:    # hx of TAVR (Echo 9/16: normal EF 60-65%, TAVR with appropriate function)  # hx of Complete Heart Block s/p Pacemaker (AV paced)  # hx of HTN  - Monitor hemodynamic status  - Pulse 63 (50-68); AV paced  - -154 (Goal SBP< 180)      Gastroenterology/Nutrition:  # Hiatal Hernia  # PEG tube placement (9/20)  - Advance tube feeds at the faster frequency (10 mL q4h) to reach goal of 60 mL/hr. Currently at 30 mLhr. Following Nutrition recs.  - Continue bowel regimen (senna, Dulcolax supp)  - fasteners to be removed from PEG on 9/29/22      Renal/Fluids/Electrolytes/:   # Mild hyperchloremia  # Elevated BUN, resolved  # Lactic acidosis, resolved  # BPH  I/O  - Will cont to monitor I/O (0.22 ml/kg/hr UOP yesterday), via primafit. Likely from  but on doxasozin. With mild hyperchloremia (110). Na 139.   -On electrolyte replacement protocol (Mg 2.0, Phos 2.5)   BPH:  - Bladder scan with 53 mL volume this morning. Patient urinates on his own with a primafit.              - on doxasozin (replacement for finasteride)      Endocrine:  # Hypothyroidism (PTA Levothyroxine)  # At risk of stress-induced hyperglycemia  # At risk of steroid-induced hyperglycemia  # Intra-op steroid use, decadron (8mg)   # Pre-diabetes  - PTA levothyroxine resumed.   - Hgb A1C 6.2  - Glucose 113-138 (Goal < 180). Without need of insulin units.      ID:  # Leukocytosis, mild  - Afebrile, increased leukocytosis; likely related to stress from PEG placement, trach placement or oral secretions. Monitor   - f/up BCx (NGTD) & UA  (unremarkable, 9/16)  - Unasyn completed on 9/19 for CAP      Heme:     # Normocytic anemia, stable   - Hgb above transfusion goal (Hb<8.0) no signs/symptoms of hypoperfusion.  - Monitor and trend     MSK:  # Facial Abrasion   # Right Lower Leg Abrasion  # Weakness and deconditioning  - Multiple bruises/abraisons  - PT/OT consult - PT recs: LTACH, pending meets medical criteria.  - Local wound cares     General Cares/Prophylaxis:    DVT Prophylaxis: SCDs; subcutaneous heparin   GI Prophylaxis:               - Discontinue PPI GI prophylaxis (used for positive pressure ventilation)  Restraints: Restraints for medical healing needed: only if pt gets agitated and pulling at lines after chemical sedation     LDAs:  - PIVx2   - PEG tube  - Trach   - Primofit      Disposition / Code status:  - Dispo: Discharge today to [facility], scheduled for 3:45pm  - Full code    Time spent on this Encounter   Billing:  I spent 35 minutes bedside and on the inpatient unit today managing the care of Alexandru Still in relation to the issues listed in this note.      Jacoby Jones PA-C    ====================================  INTERVAL HISTORY: Course reviewed. No acute events overnight. Remained on CPAP/PS overnight, this am plan to transition to [facility]. Followed simple commands correctly. Denies chest pain or SOB.  Reports some neck discomfort.     OBJECTIVE:   1. VITAL SIGNS:   Temp:  [97.2  F (36.2  C)-100  F (37.8  C)] 98.6  F (37  C)  Pulse:  [50-71] 71  Resp:  [9-18] 18  BP: (113-154)/(53-75) 125/53  FiO2 (%):  [30 %-40 %] 30 %  SpO2:  [96 %-100 %] 96 %  Vent Mode: CPAP/PS  (Continuous positive airway pressure with Pressure Support)  FiO2 (%): 30 %  Resp Rate (Set): 12 breaths/min  Tidal Volume (Set, mL): 450 mL  PEEP (cm H2O): 5 cmH2O  Pressure Support (cm H2O): 5 cmH2O  Resp: 18    2. INTAKE/ OUTPUT:   I/O last 3 completed shifts:  In: 3623.44 [I.V.:2538.44; NG/GT:705]  Out: 532 [Urine:530; Blood:2]    3. PHYSICAL  EXAMINATION:  General: NAD. Awake in bed.  Neuro: EOM intact, Able to follow commands,  strength equal and symmetric.    HEENT: Trach in place on mech vent PST, abrasion to right forehead--healed   Resp: Breathing non-labored - Mechanically ventillated on PST 5/5 30% FiO2 post-operative with adequate Tv 400s. BBS, lungs clear   CV: Paced rhythm on monitor,  S1/S2   Abdomen: Soft, non-distended, non-tender.No guarding or rigidity. PEG in place, fasteners in place.   : Primo fit external catheter in place draining yellow urine.  Extremities: warm and well perfused. No bilateral lower extremity edema. SCD on bilateral lower extremities.     4. INVESTIGATIONS:   Arterial Blood Gases   Recent Labs   Lab 09/15/22  1733 09/15/22  1526   PH 7.33 7.41     Complete Blood Count   Recent Labs   Lab 09/22/22  0435 09/21/22  0531 09/20/22  0405 09/19/22  0452   WBC 13.4* 11.5* 9.6 11.3*   HGB 10.7* 11.9* 12.0* 12.8*    213 205 203     Basic Metabolic Panel  Recent Labs   Lab 09/22/22  0435 09/22/22  0400 09/22/22  0012 09/21/22  1529 09/21/22  1024 09/21/22  0531 09/20/22  0405 09/19/22  1821 09/19/22  1055 09/19/22  0452     --   --   --   --  147* 145  --   --  142   POTASSIUM 4.4  --   --   --   --  3.7 3.5  --  4.0 3.4   CHLORIDE 110*  --   --   --   --  115* 111*  --   --  108*   CO2 19*  --   --   --   --  24 25  --   --  25   BUN 20.9  --   --   --   --  27.2* 30.7*  --   --  35.5*   CR 0.67  --   --   --   --  0.78 0.82  --   --  0.85   * 116* 167* 161*   < > 138* 113*   < >  --  122*    < > = values in this interval not displayed.     Liver Function Tests  Recent Labs   Lab 09/15/22  1617   AST 20   ALT 11   ALKPHOS 70   BILITOTAL 0.6   ALBUMIN 3.8

## 2022-09-22 NOTE — PROGRESS NOTES
Discharge note: Transferred to [facility] via EMS. RN to RN report given to JAILENE Walker at [facility]. Short summary of stay given to EMS. Traveled on [facility] dome. Prescription and PCS form sent with EMS. Suctioned and pain managed prior to travel.    Flaco Lemon RN on 9/22/2022 at 6:35 PM

## 2022-09-22 NOTE — PLAN OF CARE
Major Shift Events: A&O following commands. Having moderate amounts of thick oral secretions. Pressure supported for duration of shift, tolerating well. Managing pain with PRN dilaudid and oxy.     Plan: Continue to monitor and notify team as needed.    For vital signs and complete assessments, please see documentation flowsheets.

## 2022-09-22 NOTE — PROGRESS NOTES
[facility]    Alexandru Still has been accepted for admission to [facility] today.    Ride: 3:45 PM    RN to RN report: Please call Unit [address] at [phone] for nurse to nurse report before the patient discharges.     Accepting MD: Please contact Dr. Neli Rubio via pager [phone] provider to provider report before the patient discharges.    Documentation needed prior to discharge: Discharge summary and discharge/readmission orders.     NO Transfer packet needed.           Sae Roa RN,BSN,[facility] Referral Specialist    [facility]  [address] [address]  Office: [phone] Fax: [phone]     CONFIDENTIAL Protected under [place] Statute  145.61 et seq

## 2022-09-23 ENCOUNTER — APPOINTMENT (OUTPATIENT)
Dept: SPEECH THERAPY | Facility: CLINIC | Age: 87
DRG: 208 | End: 2022-09-23
Attending: INTERNAL MEDICINE
Payer: MEDICARE

## 2022-09-23 ENCOUNTER — APPOINTMENT (OUTPATIENT)
Dept: OCCUPATIONAL THERAPY | Facility: CLINIC | Age: 87
DRG: 208 | End: 2022-09-23
Attending: HOSPITALIST
Payer: MEDICARE

## 2022-09-23 ENCOUNTER — APPOINTMENT (OUTPATIENT)
Dept: PHYSICAL THERAPY | Facility: CLINIC | Age: 87
DRG: 208 | End: 2022-09-23
Attending: HOSPITALIST
Payer: MEDICARE

## 2022-09-23 LAB
ANION GAP SERPL CALCULATED.3IONS-SCNC: 9 MMOL/L (ref 7–15)
BUN SERPL-MCNC: 18.5 MG/DL (ref 8–23)
CALCIUM SERPL-MCNC: 8.3 MG/DL (ref 8.2–9.6)
CHLORIDE SERPL-SCNC: 108 MMOL/L (ref 98–107)
CREAT SERPL-MCNC: 0.7 MG/DL (ref 0.67–1.17)
DEPRECATED HCO3 PLAS-SCNC: 25 MMOL/L (ref 22–29)
ERYTHROCYTE [DISTWIDTH] IN BLOOD BY AUTOMATED COUNT: 15.3 % (ref 10–15)
GFR SERPL CREATININE-BSD FRML MDRD: 86 ML/MIN/1.73M2
GLUCOSE BLDC GLUCOMTR-MCNC: 126 MG/DL (ref 70–99)
GLUCOSE BLDC GLUCOMTR-MCNC: 139 MG/DL (ref 70–99)
GLUCOSE BLDC GLUCOMTR-MCNC: 145 MG/DL (ref 70–99)
GLUCOSE SERPL-MCNC: 148 MG/DL (ref 70–99)
HCT VFR BLD AUTO: 32.9 % (ref 40–53)
HGB BLD-MCNC: 10.9 G/DL (ref 13.3–17.7)
MAGNESIUM SERPL-MCNC: 2 MG/DL (ref 1.7–2.3)
MCH RBC QN AUTO: 31.7 PG (ref 26.5–33)
MCHC RBC AUTO-ENTMCNC: 33.1 G/DL (ref 31.5–36.5)
MCV RBC AUTO: 96 FL (ref 78–100)
PHOSPHATE SERPL-MCNC: 2 MG/DL (ref 2.5–4.5)
PLATELET # BLD AUTO: 211 10E3/UL (ref 150–450)
POTASSIUM SERPL-SCNC: 4.3 MMOL/L (ref 3.4–5.3)
RBC # BLD AUTO: 3.44 10E6/UL (ref 4.4–5.9)
SARS-COV-2 RNA RESP QL NAA+PROBE: NEGATIVE
SODIUM SERPL-SCNC: 142 MMOL/L (ref 136–145)
TROPONIN I SERPL-MCNC: 0.04 NG/ML (ref 0–0.29)
WBC # BLD AUTO: 13 10E3/UL (ref 4–11)

## 2022-09-23 PROCEDURE — 84100 ASSAY OF PHOSPHORUS: CPT | Performed by: HOSPITALIST

## 2022-09-23 PROCEDURE — 999N000123 HC STATISTIC OXYGEN O2DAILY TECH TIME

## 2022-09-23 PROCEDURE — 258N000003 HC RX IP 258 OP 636: Performed by: HOSPITALIST

## 2022-09-23 PROCEDURE — 92610 EVALUATE SWALLOWING FUNCTION: CPT | Mod: GN | Performed by: SPEECH-LANGUAGE PATHOLOGIST

## 2022-09-23 PROCEDURE — 97110 THERAPEUTIC EXERCISES: CPT | Mod: GP | Performed by: PHYSICAL THERAPIST

## 2022-09-23 PROCEDURE — 250N000013 HC RX MED GY IP 250 OP 250 PS 637: Performed by: HOSPITALIST

## 2022-09-23 PROCEDURE — 36415 COLL VENOUS BLD VENIPUNCTURE: CPT | Performed by: HOSPITALIST

## 2022-09-23 PROCEDURE — 80048 BASIC METABOLIC PNL TOTAL CA: CPT | Performed by: HOSPITALIST

## 2022-09-23 PROCEDURE — 999N000253 HC STATISTIC WEANING TRIALS

## 2022-09-23 PROCEDURE — 250N000011 HC RX IP 250 OP 636: Performed by: HOSPITALIST

## 2022-09-23 PROCEDURE — 93010 ELECTROCARDIOGRAM REPORT: CPT | Mod: HIP | Performed by: INTERNAL MEDICINE

## 2022-09-23 PROCEDURE — 94640 AIRWAY INHALATION TREATMENT: CPT

## 2022-09-23 PROCEDURE — 999N000054 HC STATISTIC EKG NON-CHARGEABLE

## 2022-09-23 PROCEDURE — 97162 PT EVAL MOD COMPLEX 30 MIN: CPT | Mod: GP | Performed by: PHYSICAL THERAPIST

## 2022-09-23 PROCEDURE — 84484 ASSAY OF TROPONIN QUANT: CPT | Performed by: HOSPITALIST

## 2022-09-23 PROCEDURE — 999N000009 HC STATISTIC AIRWAY CARE

## 2022-09-23 PROCEDURE — 94002 VENT MGMT INPAT INIT DAY: CPT

## 2022-09-23 PROCEDURE — 97166 OT EVAL MOD COMPLEX 45 MIN: CPT | Mod: GO | Performed by: OCCUPATIONAL THERAPIST

## 2022-09-23 PROCEDURE — 99221 1ST HOSP IP/OBS SF/LOW 40: CPT | Performed by: NURSE PRACTITIONER

## 2022-09-23 PROCEDURE — 250N000012 HC RX MED GY IP 250 OP 636 PS 637: Performed by: HOSPITALIST

## 2022-09-23 PROCEDURE — 250N000013 HC RX MED GY IP 250 OP 250 PS 637: Performed by: INTERNAL MEDICINE

## 2022-09-23 PROCEDURE — U0005 INFEC AGEN DETEC AMPLI PROBE: HCPCS | Performed by: HOSPITALIST

## 2022-09-23 PROCEDURE — 999N000157 HC STATISTIC RCP TIME EA 10 MIN

## 2022-09-23 PROCEDURE — 97535 SELF CARE MNGMENT TRAINING: CPT | Mod: GO | Performed by: OCCUPATIONAL THERAPIST

## 2022-09-23 PROCEDURE — 92526 ORAL FUNCTION THERAPY: CPT | Mod: GN | Performed by: SPEECH-LANGUAGE PATHOLOGIST

## 2022-09-23 PROCEDURE — 250N000009 HC RX 250: Performed by: NURSE PRACTITIONER

## 2022-09-23 PROCEDURE — 85027 COMPLETE CBC AUTOMATED: CPT | Performed by: HOSPITALIST

## 2022-09-23 PROCEDURE — 93005 ELECTROCARDIOGRAM TRACING: CPT | Performed by: NURSE PRACTITIONER

## 2022-09-23 PROCEDURE — 97530 THERAPEUTIC ACTIVITIES: CPT | Mod: GP | Performed by: PHYSICAL THERAPIST

## 2022-09-23 PROCEDURE — 83735 ASSAY OF MAGNESIUM: CPT | Performed by: HOSPITALIST

## 2022-09-23 PROCEDURE — 120N000017 HC R&B RESPIRATORY CARE

## 2022-09-23 PROCEDURE — 87081 CULTURE SCREEN ONLY: CPT | Performed by: INTERNAL MEDICINE

## 2022-09-23 PROCEDURE — G0463 HOSPITAL OUTPT CLINIC VISIT: HCPCS

## 2022-09-23 PROCEDURE — 250N000009 HC RX 250: Performed by: HOSPITALIST

## 2022-09-23 PROCEDURE — 99223 1ST HOSP IP/OBS HIGH 75: CPT | Performed by: HOSPITALIST

## 2022-09-23 PROCEDURE — 250N000013 HC RX MED GY IP 250 OP 250 PS 637: Performed by: NURSE PRACTITIONER

## 2022-09-23 RX ORDER — CALCIUM CARBONATE 1250 MG/5ML
1250 SUSPENSION ORAL 2 TIMES DAILY WITH MEALS
Status: DISCONTINUED | OUTPATIENT
Start: 2022-09-23 | End: 2022-10-11

## 2022-09-23 RX ORDER — BISACODYL 10 MG
10 SUPPOSITORY, RECTAL RECTAL DAILY PRN
Status: DISCONTINUED | OUTPATIENT
Start: 2022-09-23 | End: 2022-09-23

## 2022-09-23 RX ORDER — VITAMIN B COMPLEX
25 TABLET ORAL DAILY
Status: DISCONTINUED | OUTPATIENT
Start: 2022-09-23 | End: 2022-09-23 | Stop reason: CLARIF

## 2022-09-23 RX ORDER — CALCIUM CARBONATE 500(1250)
1 TABLET ORAL 2 TIMES DAILY
Status: DISCONTINUED | OUTPATIENT
Start: 2022-09-23 | End: 2022-09-23 | Stop reason: CLARIF

## 2022-09-23 RX ORDER — ACETYLCYSTEINE 200 MG/ML
2 SOLUTION ORAL; RESPIRATORY (INHALATION)
Status: DISCONTINUED | OUTPATIENT
Start: 2022-09-23 | End: 2022-10-03

## 2022-09-23 RX ORDER — AMINO AC/PROTEIN HYDR/WHEY PRO 10G-100/30
2 LIQUID (ML) ORAL 3 TIMES DAILY
Status: DISCONTINUED | OUTPATIENT
Start: 2022-09-23 | End: 2022-09-30

## 2022-09-23 RX ORDER — ACETAMINOPHEN 325 MG/1
650 TABLET ORAL EVERY 6 HOURS PRN
Status: DISCONTINUED | OUTPATIENT
Start: 2022-09-23 | End: 2022-09-23 | Stop reason: CLARIF

## 2022-09-23 RX ORDER — ALBUTEROL SULFATE 0.83 MG/ML
2.5 SOLUTION RESPIRATORY (INHALATION) EVERY 6 HOURS PRN
Status: DISCONTINUED | OUTPATIENT
Start: 2022-09-23 | End: 2022-10-25

## 2022-09-23 RX ORDER — HEPARIN SODIUM 5000 [USP'U]/.5ML
5000 INJECTION, SOLUTION INTRAVENOUS; SUBCUTANEOUS EVERY 8 HOURS
Status: DISCONTINUED | OUTPATIENT
Start: 2022-09-23 | End: 2022-09-29

## 2022-09-23 RX ORDER — ACETAMINOPHEN 650 MG/20.3ML
650 LIQUID ORAL EVERY 6 HOURS PRN
Status: DISCONTINUED | OUTPATIENT
Start: 2022-09-23 | End: 2022-10-05

## 2022-09-23 RX ORDER — DOXAZOSIN 1 MG/1
2 TABLET ORAL ONCE
Status: COMPLETED | OUTPATIENT
Start: 2022-09-23 | End: 2022-09-23

## 2022-09-23 RX ORDER — SENNOSIDES 8.6 MG
1 TABLET ORAL 2 TIMES DAILY
Status: DISCONTINUED | OUTPATIENT
Start: 2022-09-23 | End: 2022-09-23 | Stop reason: CLARIF

## 2022-09-23 RX ORDER — ALBUTEROL SULFATE 0.83 MG/ML
2.5 SOLUTION RESPIRATORY (INHALATION)
Status: DISCONTINUED | OUTPATIENT
Start: 2022-09-23 | End: 2022-10-03

## 2022-09-23 RX ORDER — BUMETANIDE 0.5 MG/1
0.5 TABLET ORAL DAILY
Status: DISPENSED | OUTPATIENT
Start: 2022-09-23 | End: 2022-09-26

## 2022-09-23 RX ORDER — AMINO AC/PROTEIN HYDR/WHEY PRO 10G-100/30
2 LIQUID (ML) ORAL EVERY 8 HOURS
Status: DISCONTINUED | OUTPATIENT
Start: 2022-09-23 | End: 2022-09-23

## 2022-09-23 RX ORDER — MORPHINE SULFATE 2 MG/ML
2 INJECTION, SOLUTION INTRAMUSCULAR; INTRAVENOUS EVERY 4 HOURS PRN
Status: DISCONTINUED | OUTPATIENT
Start: 2022-09-23 | End: 2022-09-28

## 2022-09-23 RX ORDER — DEXTROSE MONOHYDRATE 50 MG/ML
INJECTION, SOLUTION INTRAVENOUS CONTINUOUS
Status: DISCONTINUED | OUTPATIENT
Start: 2022-09-23 | End: 2022-09-24

## 2022-09-23 RX ADMIN — ACETYLCYSTEINE 2 ML: 200 SOLUTION ORAL; RESPIRATORY (INHALATION) at 17:08

## 2022-09-23 RX ADMIN — ACETAMINOPHEN 650 MG: 325 TABLET ORAL at 04:31

## 2022-09-23 RX ADMIN — CALCIUM CARBONATE 1250 MG: 1250 SUSPENSION ORAL at 17:35

## 2022-09-23 RX ADMIN — ALBUTEROL SULFATE 2.5 MG: 2.5 SOLUTION RESPIRATORY (INHALATION) at 17:07

## 2022-09-23 RX ADMIN — HEPARIN SODIUM 5000 UNITS: 5000 INJECTION, SOLUTION INTRAVENOUS; SUBCUTANEOUS at 14:22

## 2022-09-23 RX ADMIN — HEPARIN SODIUM 5000 UNITS: 5000 INJECTION, SOLUTION INTRAVENOUS; SUBCUTANEOUS at 19:38

## 2022-09-23 RX ADMIN — BUMETANIDE 0.5 MG: 0.5 TABLET ORAL at 18:59

## 2022-09-23 RX ADMIN — CALCIUM CARBONATE 1250 MG: 1250 SUSPENSION ORAL at 10:16

## 2022-09-23 RX ADMIN — DOXAZOSIN 2 MG: 1 TABLET ORAL at 10:15

## 2022-09-23 RX ADMIN — SODIUM PHOSPHATE, MONOBASIC, MONOHYDRATE 9 MMOL: 276; 142 INJECTION, SOLUTION INTRAVENOUS at 14:21

## 2022-09-23 RX ADMIN — MORPHINE SULFATE 2 MG: 2 INJECTION, SOLUTION INTRAMUSCULAR; INTRAVENOUS at 22:11

## 2022-09-23 RX ADMIN — SENNOSIDES 5 ML: 8.8 LIQUID ORAL at 10:16

## 2022-09-23 RX ADMIN — Medication 2 PACKET: at 10:15

## 2022-09-23 RX ADMIN — Medication 2 PACKET: at 16:06

## 2022-09-23 RX ADMIN — Medication 25 MCG: at 10:16

## 2022-09-23 RX ADMIN — DEXTROSE MONOHYDRATE: 50 INJECTION, SOLUTION INTRAVENOUS at 21:58

## 2022-09-23 RX ADMIN — Medication 40 MG: at 06:36

## 2022-09-23 RX ADMIN — MULTIVIT AND MINERALS-FERROUS GLUCONATE 9 MG IRON/15 ML ORAL LIQUID 15 ML: at 10:16

## 2022-09-23 RX ADMIN — LEVOTHYROXINE SODIUM 112 MCG: 0.11 TABLET ORAL at 06:36

## 2022-09-23 RX ADMIN — INSULIN ASPART 1 UNITS: 100 INJECTION, SOLUTION INTRAVENOUS; SUBCUTANEOUS at 19:33

## 2022-09-23 ASSESSMENT — ACTIVITIES OF DAILY LIVING (ADL)
ADLS_ACUITY_SCORE: 49
ADLS_ACUITY_SCORE: 49
ADLS_ACUITY_SCORE: 57
ADLS_ACUITY_SCORE: 49
IADL_COMMENTS: UNCLEAR
ADLS_ACUITY_SCORE: 57
ADLS_ACUITY_SCORE: 47
ADLS_ACUITY_SCORE: 49

## 2022-09-23 NOTE — PROGRESS NOTES
"   09/23/22 1003   Quick Adds   Type of Visit Initial PT Evaluation   Living Environment   People in Home alone   Current Living Arrangements condominium   Home Accessibility other (see comments)  (elevator)   Living Environment Comments Pt lives in a condo alone and had private PCA care. Pt has walker and wc   Self-Care   Usual Activity Tolerance good   Current Activity Tolerance poor   Equipment Currently Used at Home wheelchair, manual;walker, rolling   Fall history within last six months yes   Activity/Exercise/Self-Care Comment Per chart review: \"High falls risk due to Lambert-Eaton syndrome. Pt was in TCU this summer and was recommended to have 24/7 assist, which pt decided against per chart\"   General Information   Onset of Illness/Injury or Date of Surgery 09/15/22   Referring Physician Neli Rubio MD   Pertinent History of Current Problem (include personal factors and/or comorbidities that impact the POC) Per MD H&P: 89 year old male who was admitted to the SICU on 9/15/2022 after experiencing an unwitnessed fall and striking his head. He was transferred to the ED and was found to have a left frontal SDH (5mm) + C1 fx + Type II C2 odontoid fx w/ 1cm posterior displacement. Intubated for airway protection in the ED given high c-spine injury and difficulty swallowing. Family decided against C-spine stabilization surgery. Instead, he had a PEG placed 9/20 and trach placed on 9/21.  PMHX: Lambert-Eaton syndrome, TAVR, complete heart block with pacemaker dependency, DM2, arthritis, BPH, HTN, osteoporosis   Existing Precautions/Restrictions spinal  ([place] J collar on at all times)   Weight-Bearing Status - LUE full weight-bearing   Weight-Bearing Status - RUE full weight-bearing   Weight-Bearing Status - LLE full weight-bearing   Weight-Bearing Status - RLE full weight-bearing   Cognition   Affect/Mental Status (Cognition) flat/blunted affect   Follows Commands (Cognition) follows one-step commands;over " 90% accuracy   Pain Assessment   Patient Currently in Pain Yes, see Vital Sign flowsheet  (Cervical)   Posture    Posture Forward head position;Protracted shoulders;Kyphosis   Range of Motion (ROM)   ROM Comment C Collar on at all time.  B LE PROM: Hip flexion grossly WFL - pt initially resisted to range. B ankles WFL with prolonged stretching   Strength (Manual Muscle Testing)   Strength Comments B LEs: hip flex 3-/5, hip ext 4/5, knee ext 3-/5, DF 3-/5   Bed Mobility   Rolling Left [place] (Bed Mobility) maximum assist (25% patient effort)   Rolling Right [place] (Bed Mobility) maximum assist (25% patient effort)   Scooting/Bridging [place] (Bed Mobility) dependent (less than 25% patient effort);2 person assist   Supine-Sit [place] (Bed Mobility) maximum assist (25% patient effort)   Sit-Supine [place] (Bed Mobility) maximum assist (25% patient effort)   Bed Mobility Limitations decreased ability to use arms for pushing/pulling;decreased ability to use legs for bridging/pushing   Impairments Contributing to Impaired Bed Mobility decreased strength;pain;decreased flexibility   Assistive Device (Bed Mobility) bed rails   Transfers   Comment, (Transfers) Pt refused due to cervical pain   Balance   Balance Comments Sittng balance - close SBA - CGA   Sensory Examination   Sensory Perception Comments Light touch - question pt's ability to follow testing. Appeared impaired along L foot   Coordination   Coordination no deficits were identified   Muscle Tone   Muscle Tone no deficits were identified   Clinical Impression   Criteria for Skilled Therapeutic Intervention Yes, treatment indicated   PT Diagnosis (PT) Impaired Functional Mobility   Influenced by the following impairments strength, balance, c spine fx, respiratory status, cognitive status, pain   Functional limitations due to impairments bed mobility, transfers, gait   Clinical Presentation (PT Evaluation Complexity) Evolving/Changing    Clinical Presentation Rationale Per clinical judgement and clinical findings   Clinical Decision Making (Complexity) moderate complexity   Planned Therapy Interventions (PT) balance training;bed mobility training;gait training;manual therapy techniques;neuromuscular re-education;orthotic fitting/training;patient/family education;postural re-education;ROM (range of motion);strengthening;stretching;transfer training;progressive activity/exercise   Anticipated Equipment Needs at Discharge (PT)   (TBD)   Risk & Benefits of therapy have been explained care plan/treatment goals reviewed;evaluation/treatment results reviewed;patient   PT Discharge Planning   PT Discharge Recommendation (DC Rec) Transitional Care Facility;LTACH, pending meets medical criteria   PT Rationale for DC Rec Pt lived alone prior to admission. Currently requires assistance with all mobility   Plan of Care Review   Plan of Care Reviewed With patient   Physical Therapy Goals   PT Frequency 5x/week   PT Predicted Duration/Target Date for Goal Attainment 11/04/22   PT: Bed Mobility Supine to/from sit;Rolling;Minimal assist   PT: Transfers Minimal assist;Sit to/from stand;Bed to/from chair   PT: Gait Minimal assist;150 feet;Rolling walker

## 2022-09-23 NOTE — PROGRESS NOTES
"SPEECH PATHOLOGY: BLUE DYE TEST       09/23/22 0905   General Information   Onset of Illness/Injury or Date of Surgery 09/15/22   Referring Physician Neli Rubio MD   Patient/Family Therapy Goal Statement (SLP) Patient not able to state.   Pertinent History of Current Problem Per MD discharge summary at acute hospital: \"Alexandru Still is a 89 year old male who was admitted to the SICU on 9/15/2022 following an unwitnessed fall and striking his head. He was initially seen at an OSH and underwent a comprehensive trauma work up found to have a left frontal SDH (5mm) + C1 fx + Type II C2 odontoid fracture with a 1cm posterior displacement. He was  intubated for airway protection in the ED given high c-spine injury and difficulty managing his secretions. On admission he requested all cares without surgical cervical spine stabilization, agreeable to a tracheostomy and PEG. Multiple care conferences held with his family who confirmed his wishes.\"   General Observations Patient awake, eye contact maintained. Not attempting to mouth words. Follows simple commands. C collar in place. [place] #6 trach placed 9/21/2022. Patient on HFTD with cuff inflated prior to exam.   Type of Evaluation   Type of Evaluation Swallow Evaluation   Vocal Quality/Secretion Management (Oral Motor)   Vocal Quality (Oral Motor) aphonia  (due to cuffed tracheostomy)   Secretion Management (Oral Motor) difficulty swallowing secretions   General Swallowing Observations   Respiratory Support (General Swallowing Observations) trach collar   Current Diet/Method of Nutritional Intake (General Swallowing Observations, NIS) NPO;gastrostomy tube (PEG)  (PEG placed 9/20)   Swallowing Evaluation   (Blue Dye Test)   Swallowing Recommendations   Diet Consistency Recommendations NPO  (PEG; oral swabs for comfort (excess water squeezed out))   Supervision Level for Intake 1:1 supervision needed   Medication Administration Recommendations, Swallowing " (SLP) via PEG   General Therapy Interventions   Planned Therapy Interventions Dysphagia Treatment   Dysphagia treatment Oropharyngeal exercise training   Clinical Impression   Criteria for Skilled Therapeutic Interventions Met (SLP Eval) Yes, treatment indicated   SLP Diagnosis Severe oropharyngeal dysphagia   Risks & Benefits of therapy have been explained evaluation/treatment results reviewed;care plan/treatment goals reviewed;risks/benefits reviewed;current/potential barriers reviewed;participants voiced agreement with care plan;participants included;patient   Clinical Impression Comments Patient seen in conjunction with RT to assess oral secretion management. Blue dye applied to oral cavity. Patient able to close lips around straw when 2 drops blue dye piped to tongue given cue. Note tongue movement to lick lips but no swallow motion visualized despite cues to swallow. RT provided tracheal suctioning wtih cuff deflated for large amount of bloody and blue tracheal secretions. Cuff re-inflated. Severe oropharyngeal dysphagia. Recommend continue NPO status with PEG tube feeding and with frequent oral cares.   SLP Discharge Planning   SLP Discharge Recommendation Transitional Care Facility;Acute Rehab Center-Motivated patient will benefit from intensive, interdisciplinary therapy.  Anticipate will be able to tolerate 3 hours of therapy per day;home with home care speech therapy   SLP Rationale for DC Rec Well below baseline for swallowing and communication.   SLP Brief overview of current status  Recommend continue NPO status with frequent oral cares.    Total Evaluation Time   Total Evaluation Time (Minutes) 8   SLP Goals   Therapy Frequency (SLP Eval) 5 times/wk   SLP Goals Swallow

## 2022-09-23 NOTE — CONSULTS
[facility]  WOC Nurse Inpatient Assessment     Consulted for: Face, RLE, Trach, G-tube    Patient History (according to provider note(s):        Areas Assessed:      Trach and G-tube sites assessed - WNL. 3 buttons noted and can be removed on 10/4/22 (fourteen days after placement noted to be 9/20/22).   Face with scattered abrasions all covered in dried drainage.   Orders entered for all above and will s/o these three areas at this time.    RLE noted to have 2 skin tears without flap remaining.  Measurements: 6.5 cm x 1.5 cm and 7.5 cm x 1.5 cm  Wound beds: Pink, viable tissue  Drainage: Scant amount of serous exudate  Periwound: Skin intact and WNL      Treatment Plan:     RLE wound care - every 5 days  Cleanse wounds with wound cleanser, gently dry.  Cover open wounds with a strip of oil emulsion gauze and cover with Mepilex sacral dressing.     Orders: Written    RECOMMEND PRIMARY TEAM ORDER: None, at this time  Education provided: plan of care  Discussed plan of care with: Patient and CNA  WOC nurse follow-up plan: weekly  Notify WOC if wound(s) deteriorate.  Nursing to notify the Provider(s) and re-consult the WOC Nurse if new skin concern.    DATA:     Current support surface: Standard  Foam mattress  Containment of urine/stool: Incontinence Protocol  BMI: Body mass index is 29.7 kg/m .   Active diet order: None     Output: I/O last 3 completed shifts:  In: 669 [NG/GT:669]  Out: 100 [Urine:100]     Labs: Recent Labs   Lab 09/23/22  0833   HGB 10.9*   WBC 13.0*     Pressure injury risk assessment:   Sensory Perception: 3-->slightly limited  Moisture: 4-->rarely moist  Activity: 2-->chairfast  Mobility: 2-->very limited  Nutrition: 2-->probably inadequate  Friction and Shear: 2-->potential problem  Bakari Score: 15    Mariela Avitia RN CWOCN

## 2022-09-23 NOTE — PROGRESS NOTES
Alexandru Still is a 89 year old male with unwitnessed mechanical fall resulting in C1 fracture, and a type II C2 odontoid fracture with 1 cm of posterior displacement and left frontal 5mm SDH.   Patient underwent tracheostomy and feeding tube placement.  Apparently there was a care conference that was held and patient has opted for quantity of life versus quality of life.  Noteworthy, patient used to be a DNR/DNI but opted to be a full code when presented to the emergency department.      Patient arrived at the LTAC at approximately 7:45 PM, holding orders had been placed which are being released, admission H&P will be addressed by the daytime hospitalist

## 2022-09-23 NOTE — PROGRESS NOTES
Social Work Note:  Patient chart reviewed.  Patient discussed in morning rounds.  Barriers to discharge:   * Joe Dumont @ [facility].  * Will likely need placement: SNF vs TCU.    Per chart review, patient is a .  Patient was living in his own condo when he recently fell, which has resulted in this hospitalization. Per chart review, patient's condo has elevator.  Patient had rolling wheeled walker and wheelchair at home/.  Per chart review, patient left the TCU he had been cared for at earlier this summer with a promise that he was going home with 24/hr care by his privately hired PCA, however this was not the case. His care attendant does NOT provide 24 hour care for him. He spends the day alone despite not being well enough to be without assistance. This was Alexandru's choice despite strong medical advice to the contrary.       Bob Reynolds, [facility]  [phone]

## 2022-09-23 NOTE — PLAN OF CARE
Physical Therapy Discharge Summary    Reason for therapy discharge:    Discharged to LTACH    Progress towards therapy goal(s). See goals on Care Plan in [facility] electronic health record for goal details.  Goals partially met.  Barriers to achieving goals:   discharge from facility.    Therapy recommendation(s):    Continued therapy is recommended.  Rationale/Recommendations:  Continue to progress functional mobility in LTACH setting.

## 2022-09-23 NOTE — PLAN OF CARE
Problem: Device-Related Complication Risk (Mechanical Ventilation, Invasive)  Goal: Optimal Device Function  Outcome: Ongoing, Progressing  Intervention: Optimize Device Care and Function  Recent Flowsheet Documentation  Taken 9/23/2022 1127 by Radha Mcclelland RT  Airway Safety Measures: all equipment/monitors on and audible  Taken 9/23/2022 0805 by Radha Mcclelland RT  Airway Safety Measures: all equipment/monitors on and audible     Problem: Inability to Wean (Mechanical Ventilation, Invasive)  Goal: Mechanical Ventilation Liberation  Outcome: Ongoing, Progressing     Problem: Skin and Tissue Injury (Mechanical Ventilation, Invasive)  Goal: Absence of Device-Related Skin and Tissue Injury  Outcome: Ongoing, Progressing     Problem: Ventilator-Induced Lung Injury (Mechanical Ventilation, Invasive)  Goal: Absence of Ventilator-Induced Lung Injury  Outcome: Ongoing, Progressing   RT PROGRESS NOTE   [id number]  DATA:     CURRENT SETTINGS:             TRACH TYPE / SIZE: #6 Shiley Taper guard, placed on 9/21, has sutures. Okayed by CNP to have #7 Bivona as a back up trach.             MODE:  AC 12 450 +5 40%              FIO2:        ACTION:             THERAPIES:                SUCTION: X4 for lg to mod brown thick, Blue after BDT . Pt coughed out around 1600  lg dark blue and at 1815 lg dark green (converted blue dye color)                         FREQUENCY:                           AMOUNT:                           CONSISTENCY:                           COLOR:                SPONTANEOUS COUGH EFFORT/STRENGTH OF EFFORT (not elicited by suctioning):                               WEANING PHASE:  2                         WEAN MODE: TM 35% to 30% 40L started at 0805                           WEAN TIME:                           END WEAN REASON:        RESPONSE:             BS:                VITAL SIGNS:                EMOTIONAL NEEDS / CONCERNS:                  RISK FOR SELF DECANNULATION:                           RISK DUE TO:                          INTERVENTION/S IN PLACE IS/ARE:         NOTE / PLAN:   Goal Outcome Evaluation:     Pt had BDT by Speech, had immediate asp.      Trach still sutured in, TC feels sore. Pt has neck color.  Cont with current POC

## 2022-09-23 NOTE — H&P
LTACH    History and Physical - Hospitalist Service       Date of Admission:  9/22/2022    Brief History:    Alexandru Still is a 89 year old man w/ PMH of Lambert-Eaton syndrome, hypothyroidism,  complete heart block s/p pacemaker, HTN, BPH,  PB, and DM type 2 who was admitted to the SICU on 9/15/2022 following an unwitnessed fall and striking his head. He was initially seen at an outside hospital and underwent a comprehensive trauma work up found to have a left frontal SDH (5mm) + C1 fx + Type II C2 odontoid fracture with a 1cm posterior displacement. He was  intubated for airway protection in the ED given high c-spine injury and difficulty managing his secretions. On admission he requested all cares without surgical cervical spine stabilization, agreeable to a tracheostomy and PEG. Multiple care conferences held with his family who confirmed his wishes.        Assessment & Plan      Traumatic 5mm left frontal lobe SDH  Posteriorly displaced (1 cm) Type II odontoid fx  C1 anterior arch fracture   Epidural hemorrhage of 7mm  Acute traumatic neck pain  Acute L 6th rib fracture  S/p fall  Facial abrasions with ecchymoses  RLE wound  He was seen and evaluated by Neurosurgery.  Pt refused surgical intervention.   A repeat head CT was obtained with a stable subdural hematoma. He was placed in a cervical collar for the cervical fracture. No surgical intervention per patient preference.    -cont Jefferson J -keep in place at all times  -tylenol and oxy prn pain  -Neurosurg ok w/ heparin q8h for dvt prophy  -wound care consult     -f/u Neurosurgery in 6 weeks with an upright XR of the cervical spine   -CT cervical spine in 3 months.     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 Failed bedside and radiology swallow. He completed 3 doses of Decadron for laryngeal edema.      -S/P tracheostomy 09/21/2022, Trach suture removal on 09/26  -pulm consult  -RT consult  -wean vent as per pulm   -bronchial hygeine    Hypertension   Complete heart block s/p PM placement  Nonrheumatic aortic valve stenosis s/p TAVR 2019  -monitor  -pt is currently normotensive so will hold off on antihypertensives  -echo on 9/16:  EF: 60-65%, no LV dysfunction       Hx of dysphagia worsened by recent cervical injury S/P PEG   Moderate protein calorie malnutrition  Hiatal hernia  Prolonged NPO status post trauma due to traumatic cervical injury and difficult access,     -PEG tube placed 09/20. PEG tube fasteners can be removed on 9/29 .  -TF as per dietitian  -protein modular q8h  -senna and dulcolax prn   -PPI for GI prophy    Hypophosphatemia   Hypomagnesia  Hypokalemia  S/p Lactic Acidosis  -Na phos IV 9mmol x 1 today  -monitor  -replete prn      Hypothyroidism   -Continue Levothyroxine     Anemia of chronic disease  -hg trending down past few days  -cont to monitor     BPH  -cont doxazosin    DM type 2  -sliding scale insulin  -last HbA1:  6.2   -hypoglycemic protocol    Deconditioning  -PT/OT       Diet: Adult Formula Drip Feeding: Continuous Osmolite 1.5; Gastrostomy; Goal Rate: 65; mL/hr; Medication - Feeding Tube Flush Frequency: At least 15-30 mL water before and after medication administration and with tube clogging    DVT Prophylaxis: Heparin SQ  Escudero Catheter: Not present  Central Lines: None  Cardiac Monitoring: None  Code Status: Full Code      Clinically Significant Risk Factors Present on Admission                 # Hypertension: home medication list includes antihypertensive(s)           Disposition Plan      Expected Discharge Date: 09/29/2022    Discharge Delays: Complex Discharge  Placement - LTC            The patient's care was discussed with the Bedside Nurse and Patient.    Neli Rubio MD  Hospitalist Service  LTACH  Securely message with the Vocera Web Console (learn more here)  Text page via [facility] Paging/Directory         ______________________________________________________________________    Chief Complaint   S/p fall      History of Present Illness   Alexandru Still is a 89 year old man w/ PMH of Lambert-Eaton syndrome, hypothyroidism,  complete heart block s/p pacemaker, HTN, BPH,  PB, and DM type 2 who was admitted to the SICU on 9/15/2022 following an unwitnessed fall and striking his head. He was initially seen at an outside hospital and underwent a comprehensive trauma work up found to have a left frontal SDH (5mm) + C1 fx + Type II C2 odontoid fracture with a 1cm posterior displacement. He was  intubated for airway protection in the ED given high c-spine injury and difficulty managing his secretions. On admission he requested all cares without surgical cervical spine stabilization, agreeable to a tracheostomy and PEG. Multiple care conferences held with his family who confirmed his wishes.  He is having neck pain.      Review of Systems     ROS: 10 point ROS neg other than the symptoms noted above in the HPI.    Past Medical History    I have reviewed this patient's medical history and updated it with pertinent information if needed.   Past Medical History:   Diagnosis Date     Anemia      Aortic stenosis      BPH (benign prostatic hyperplasia)      Closed T12 fracture      Complete heart block     s/p dual chamber pacemaker     Hypertension      Hypothyroidism      Kidney stone      Lambert-Eaton myasthenic syndrome      Lower extremity edema        Past Surgical History   I have reviewed this patient's surgical history and updated it with pertinent information if  needed.  Past Surgical History:   Procedure Laterality Date     CV TRANSCATHETER AORTIC VALVE REPLACEMENT TRANSAORTIC APPROACH       EP PACEMAKER       ESOPHAGOGASTRODUODENOSCOPY, WITH NASOGASTRIC TUBE INSERTION N/A 2022    Procedure: ESOPHAGOGASTRODUODENOSCOPY, WITH NASOJEJUNAL TUBE INSERTION ;  Surgeon: Nils Drew MD;  Location: [facility] GI     HERNIA REPAIR       IR PICC PLACEMENT > 5 YRS OF AGE  6/15/2022     LASER HOLMIUM LITHOTRIPSY URETER(S), INSERT STENT, COMBINED N/A 6/15/2022    Procedure: 1. Cystourethroscopy 2. Laser lithotripsy of a urethral stone 3. Basketing of a urethral stone 4. Complex hannah catheter placement over a wire;  Surgeon: Beny Ha MD;  Location: [facility] OR     TRACHEOSTOMY N/A 2022    Procedure: CREATION, TRACHEOSTOMY;  Surgeon: Bob Dill MD;  Location: [facility] OR       Social History   I have reviewed this patient's social history and updated it with pertinent information if needed.  Social History     Tobacco Use     Smoking status: Former Smoker     Packs/day: 1.00     Years: 20.00     Pack years: 20.00     Quit date:      Years since quittin.7     Smokeless tobacco: Never Used   Substance Use Topics     Alcohol use: Not Currently     Drug use: Never       Family History   I have reviewed this patient's family history and updated it with pertinent information if needed.  Family History   Problem Relation Age of Onset     Cerebrovascular Disease Mother      Chronic Obstructive Pulmonary Disease Father        Prior to Admission Medications   Prior to Admission Medications   Prescriptions Last Dose Informant Patient Reported? Taking?   Multiple Vitamins-Minerals (MULTIVITAMINS W/MINERALS) liquid   No No   Sig: 15 mLs by Per Feeding Tube route daily   Vitamin D3 (CHOLECALCIFEROL) 25 mcg (1000 units) tablet   No No   Sig: Take 1 tablet (25 mcg) by mouth daily   acetaminophen (TYLENOL) 325 MG tablet   No No   Si tablets (650 mg) by Oral or Feeding  Tube route every 6 hours   bisacodyl (DULCOLAX) 10 MG suppository   No No   Sig: Place 1 suppository (10 mg) rectally daily as needed for constipation   calcium carbonate (OS-TITA) 500 MG tablet   No No   Sig: Take 1 tablet (500 mg) by mouth 2 times daily   diclofenac (VOLTAREN) 1 % topical gel   No No   Sig: Apply 4 g topically 4 times daily as needed for moderate pain   doxazosin (CARDURA) 0.6 mg/mL SUSP suspension   No No   Sig: 3.33 mLs (2 mg) by Oral or Feeding Tube route daily   fluticasone (FLONASE) 50 MCG/ACT nasal spray   Yes No   Sig: Spray 1 spray into both nostrils daily   furosemide (LASIX) 40 MG tablet   Yes No   Sig: Take 40 mg by mouth 2 times daily   ipratropium - albuterol 0.5 mg/2.5 mg/3 mL (DUONEB) 0.5-2.5 (3) MG/3ML neb solution   No No   Sig: Take 1 vial (3 mLs) by nebulization every 4 hours as needed for wheezing or shortness of breath / dyspnea   levothyroxine (SYNTHROID/LEVOTHROID) 112 MCG tablet   No No   Si tablet (112 mcg) by Oral or Feeding Tube route every morning (before breakfast)   oxyCODONE (ROXICODONE) 5 MG/5ML solution   No No   Si mLs (5 mg) by Oral or Feeding Tube route every 4 hours as needed for moderate to severe pain   pantoprazole (PROTONIX) 2 mg/mL SUSP suspension   No No   Si mLs (40 mg) by Per Feeding Tube route every morning (before breakfast)   protein modular (PROSOURCE TF) LIQD   No No   Si packets by Per Feeding Tube route every 8 hours   sennosides (SENOKOT) 8.6 MG tablet   No No   Si tablet by Oral or Feeding Tube route 2 times daily      Facility-Administered Medications: None     Allergies   Allergies   Allergen Reactions     Cefuroxime Hives     Contrast Dye      Gadodiamide Hives       Physical Exam   Vital Signs: Temp: 99.1  F (37.3  C) Temp src: Axillary BP: (!) 133/95 Pulse: 76   Resp: (!) 32 SpO2: 97 % O2 Device: Trach dome Oxygen Delivery: 40 LPM  Weight: 195 lbs 4.8 oz  Physical Exam:  General:  No acute distress,awake and  alert  Eyes:  Sclera non icteric, normal conjuctiva  Neck :  [place] J in place, trach in place  Lungs:  Clear to auscultation bilaterally, air entry equal bilaterally, no rhonchi or rales  CVS:  S1 and S2, regular rate and rhythem, systolic murmur L sternal border  Abdomen:  Soft, nontender, PEG in place   Musculoskeletal:  Swelling of R forearm and hand,   Neuro:  Alert and oriented No acute deficit,   Skin:  R supra orbital Abrasions and ecchymoses, RLE wound     Data   Data reviewed today: I reviewed all medications, new labs and imaging results over the last 24 hours    Recent Labs   Lab 09/23/22  0833 09/22/22  1956 09/22/22  0435 09/21/22  1024 09/21/22  0531   WBC 13.0*  --  13.4*  --  11.5*   HGB 10.9*  --  10.7*  --  11.9*   MCV 96  --  97  --  96     --  196  --  213     --  139  --  147*   POTASSIUM 4.3  --  4.4  --  3.7   CHLORIDE 108*  --  110*  --  115*   CO2 25  --  19*  --  24   BUN 18.5  --  20.9  --  27.2*   CR 0.70  --  0.67  --  0.78   ANIONGAP 9  --  10  --  8   TITA 8.3  --  8.2  --  8.6   * 112* 124*   < > 138*    < > = values in this interval not displayed.     No results found for this or any previous visit (from the past 24 hour(s)).

## 2022-09-23 NOTE — PHARMACY-ADMISSION MEDICATION HISTORY
Pharmacy Note: Admission Drug Regimen Review    Admission drug regimen review has been completed. The following medication issues were identified.    1. Orders were not signed and held upon admission but the PTA medication list was updated with the discharge medications from [facility].  2. Patient was on scheduled acetaminophen, doxazosin, heparin, levothyroxine, multivitamin, pantoprazole, prosource, and senna. These have not been reordered yet on LTACH admission.  3. PRN IV hydromorphone has been weaned over the last few days and transitioned to oral oxycodone. A switch from Q3H prn to Q4H prn was made with the oxycodone at transfer. Patient is also on scheduled acetaminophen which should help reduce opioid usage.  4. Patient has received multiple doses of neutra-phos and potassium over the last couple of days. The patient was on 40meq of KCL PTA and might benefit from starting again if consistently low/requiring the potassium protocol.     Thank you, Jenaro rTipathi [facility] 9/22/2022 7:06 PM

## 2022-09-23 NOTE — CONSULTS
CLINICAL NUTRITION SERVICES  -  ASSESSMENT NOTE      RECOMMENDATIONS FOR MD/PROVIDER TO ORDER:  Adjust water flushes as needed. TF + fluid flush 90 mL Q4 hours = 1637 mL per day   Recommendations Ordered by Registered Dietitian (RD):   Recommend TF as follows:   Type of Feeding Tube: PEG  Enteral Frequency: Continuous  Enteral Regimen: Osmolite 1.5 at 60 mL/hr x24 hours  Modulars: 6 pkts prosource/day  Total Enteral Provisions: 1440 mL daily provides 2400 kcal (30 kcal per kg), 157g protein (1.9 g per kg), 293g CHO, 0g fiber and 1097mL free water. Meets 100% of DRI's.  Free Water Flush: 90 mL q4 hours  TF + fluid flush = 1637 mL per day    Daily electrolyte check, Mg and Phos add on  Daily weights   Malnutrition:   % Weight Loss:  > 7.5% in 3 months (severe malnutrition)  % Intake:  </= 50% for >/= 5 days (severe malnutrition) - trophic feeds at [facility] x5 days  Subcutaneous Fat Loss:  Orbital region mild depletion and Upper arm region mild depletion  Muscle Loss:  Clavicle bone region moderate depletion, Acromion bone region moderate depletion, Dorsal hand region moderate depletion, Anterior thigh region mild depletion and Posterior calf region mild depletion  Fluid Retention:  Mild noted in arms and legs    Malnutrition Diagnosis: Severe malnutrition  In Context of:  Acute illness or injury     REASON FOR ASSESSMENT  Alexandru Still is a 89 year old male seen by Registered Dietitian for Provider Order - Registered Dietitian to Assess and Order TF per Medical Nutrition Therapy Protocol    PMH:  - Lambert-Eaton syndrome, hypothyroidism, T2DM  - admitted d/t mechanical fall resulting in C1 fracture, and a type II C2 odontoid fracture     NUTRITION HISTORY  - Information obtained from chart  - Unable to obtain nutrition history from patient, on vent  PEG placed 9/20  Osmolite 1.5 Kali @ goal of  60ml/hr  (1440ml/day) + 2 pkts ProSource TID (6 pkts)     CURRENT NUTRITION ORDERS  Diet Order:     None    Current  Intake/Tolerance:  - saw patient today, he indicated that he is tolerating the TF well, able to mouth words.   Kcal Protein (g) CHO (g) Fiber (g) Water (mL)   1440 mL Osmolite 1.5 2160 91 294 0 1097   Prosource (6 pkts) 240 66      Total 2400 157 294 0 1097     NUTRITION FOCUSED PHYSICAL ASSESSMENT FOR DIAGNOSING MALNUTRITION)  Yes         Observed:    Muscle wasting (refer to documentation in Malnutrition section), Subcutaneous fat loss (refer to documentation in Malnutrition section) and Fluid retention (refer to documentation in Malnutrition section)    ANTHROPOMETRICS  Height: 172.7 cm  Weight: 195 lbs 4.8 oz  Body mass index is 29.7 kg/m .  Weight Status:  Overweight BMI 25-29.9  Weight History: 13% weight loss in 3 months using 6/19 at 9/15 weight.     09/15/22 80.7 kg (177 lb 14.6 oz)     Wt Readings from Last 10 Encounters:   09/22/22 88.6 kg (195 lb 4.8 oz)   09/22/22 83 kg (182 lb 15.7 oz)   07/20/22 86.2 kg (190 lb)   06/19/22 92.9 kg (204 lb 11.2 oz)     LABS  Labs reviewed  Labs:  Electrolytes  Potassium (mmol/L)   Date Value   09/23/2022 4.3   09/22/2022 4.4   09/21/2022 3.7   06/23/2022 4.0   06/22/2022 3.9   06/21/2022 3.7     Phosphorus (mg/dL)   Date Value   09/23/2022 2.0 (L)   09/22/2022 2.5   09/21/2022 2.7   09/21/2022 1.6 (L)   09/20/2022 2.6    Blood Glucose  Glucose (mg/dL)   Date Value   09/23/2022 148 (H)   09/22/2022 124 (H)   09/21/2022 138 (H)   06/22/2022 92   06/20/2022 87   06/19/2022 91   06/18/2022 91   06/17/2022 99     GLUCOSE BY METER POCT (mg/dL)   Date Value   09/22/2022 112 (H)   09/22/2022 116 (H)   09/22/2022 167 (H)   09/21/2022 161 (H)   09/21/2022 129 (H)     Hemoglobin A1C (%)   Date Value   09/16/2022 6.2 (H)    Inflammatory Markers  WBC Count (10e3/uL)   Date Value   09/23/2022 13.0 (H)   09/22/2022 13.4 (H)   09/21/2022 11.5 (H)     Albumin (g/dL)   Date Value   09/15/2022 3.8   06/17/2022 2.3 (L)   06/16/2022 2.4 (L)   06/15/2022 3.6      Magnesium (mg/dL)   Date  Value   09/23/2022 2.0   09/22/2022 2.0   09/21/2022 2.4 (H)     Sodium (mmol/L)   Date Value   09/23/2022 142   09/22/2022 139   09/21/2022 147 (H)    Renal  Urea Nitrogen (mg/dL)   Date Value   09/23/2022 18.5   09/22/2022 20.9   09/21/2022 27.2 (H)   06/22/2022 24   06/20/2022 22   06/19/2022 23     Creatinine (mg/dL)   Date Value   09/23/2022 0.70   09/22/2022 0.67   09/21/2022 0.78     Additional  Ketones Urine (mg/dL)   Date Value   09/16/2022 Negative        MEDICATIONS  Medications reviewed    calcium carbonate  1,250 mg Oral or Feeding Tube BID w/meals     cholecalciferol  25 mcg Oral or Feeding Tube Daily     [START ON 9/24/2022] doxazosin  2 mg Oral or Feeding Tube Daily     heparin ANTICOAGULANT  5,000 Units Subcutaneous Q8H     insulin aspart  1-4 Units Subcutaneous Q4H     levothyroxine  112 mcg Oral or Feeding Tube QAM AC     multivitamins w/minerals  15 mL Per Feeding Tube Daily     pantoprazole  40 mg Per Feeding Tube QAM AC     protein modular  2 packet Per Feeding Tube Q8H     sennosides  5 mL Oral or Feeding Tube BID     sodium phosphate  9 mmol Intravenous Once        - MEDICATION INSTRUCTIONS -        acetaminophen, bisacodyl, glucose **OR** dextrose **OR** glucagon, ipratropium - albuterol 0.5 mg/2.5 mg/3 mL, miconazole, naloxone **OR** naloxone **OR** [DISCONTINUED] naloxone **OR** [DISCONTINUED] naloxone, oxyCODONE, - MEDICATION INSTRUCTIONS -     ASSESSED NUTRITION NEEDS PER APPROVED PRACTICE GUIDELINES:  Dosing Weight 81 kg   Estimated Energy Needs: 1183-5858 kcals/day (25 - 30 kcals/kg)  Justification: Maintenance  Estimated Protein Needs: 122-162 grams protein/day (1.5 - 2 grams of pro/kg)  Justification: Hypercatabolism with critical illness  Estimated Fluid Needs: 1 mL/kcal   Justification: Maintenance or Per provider pending fluid status    NUTRITION DIAGNOSIS:  Inadequate oral intake related to respiratory status, NPO as evidenced by need for enteral nutrition to meet 100% nutrition  needs.    NUTRITION INTERVENTIONS  Recommendations / Nutrition Prescription  See top of note.    Implementation  Nutrition education: Provided education on role of RD, tube feeding  EN Composition, EN Schedule and Feeding Tube Flush    Nutrition Goals  total avg nutritional intake to meet a minimum of 25 kcal/kg and 1.5 g PRO/kg daily (per dosing wt 81 kg).    MONITORING AND EVALUATION:  Progress towards goals will be monitored and evaluated per protocol and Practice Guidelines      Serene Rosenbaum RDN, LD  Clinical Dietitian

## 2022-09-23 NOTE — PLAN OF CARE
Problem: Skin and Tissue Injury (Mechanical Ventilation, Invasive)  Goal: Absence of Device-Related Skin and Tissue Injury  Outcome: Ongoing, Progressing       Problem: Inability to Wean (Mechanical Ventilation, Invasive)  Goal: Mechanical Ventilation Liberation  Outcome: Ongoing, Progressing      Patient is alert, oriented x3 complained of chest pain at the early part of shift which was followed up with troponin lab draw. Patient [place] -J collar in place at all times. Patient remains on NPO, tube feeding running continuously. Patient uncooperative with some procedures, but was re-approached with some success. Patient remains in bed and was repositioned every 2hrs. Patient appears comfortable in bed at reporting time. Will continue to monitor.

## 2022-09-23 NOTE — PLAN OF CARE
Problem: Device-Related Complication Risk (Mechanical Ventilation, Invasive)  Goal: Optimal Device Function  Outcome: Ongoing, Progressing     Problem: Inability to Wean (Mechanical Ventilation, Invasive)  Goal: Mechanical Ventilation Liberation  Outcome: Ongoing, Progressing     Problem: Skin and Tissue Injury (Mechanical Ventilation, Invasive)  Goal: Absence of Device-Related Skin and Tissue Injury  Outcome: Ongoing, Progressing     Problem: Ventilator-Induced Lung Injury (Mechanical Ventilation, Invasive)  Goal: Absence of Ventilator-Induced Lung Injury  Outcome: Ongoing, Progressing                RT PROGRESS NOTE     DATA:     CURRENT SETTINGS:             TRACH TYPE / SIZE:  # 6 Shiley placed 9/21             MODE:   AC 12/450/+5             FIO2:   40%     ACTION:             THERAPIES:                SUCTION:                           FREQUENCY:   x3                        AMOUNT:   large                        CONSISTENCY:   thickk                         COLOR:   bloody secretions              SPONTANEOUS COUGH EFFORT/STRENGTH OF EFFORT (not elicited by suctioning):                               WEANING PHASE:   2                        WEAN MODE:    tm                        WEAN TIME:   vent at night                        END WEAN REASON:        RESPONSE:             BS:   crs             VITAL SIGNS:   Blood pressure (!) 140/65, pulse 85, temperature 98.8  F (37.1  C), temperature source Axillary, resp. rate 26, weight 88.6 kg (195 lb 4.8 oz), SpO2 98 %.               EMOTIONAL NEEDS / CONCERNS:                  RISK FOR SELF DECANNULATION:  no                        RISK DUE TO:                          INTERVENTION/S IN PLACE IS/ARE:         NOTE / PLAN:   tolerating vent well. Tm days.

## 2022-09-23 NOTE — CONSULTS
Consultation - Pulmonary Medicine  Alexandru ROSSY Still,  1930, MRN [id number]    Subdural hematoma [S06.5X9A]  Acute hypoxemic respiratory failure (H) [J96.01]   Code status:  Full Code       Extended Emergency Contact Information  Primary Emergency Contact: Abhijit Rao  Address: [address] [place], [place]  Mobile Phone: [phone]  Relation: Son-in-Law  Secondary Emergency Contact: Zhou Rao  Mobile Phone: [phone]  Relation: Grandchild       Impressions:   Active Problems:    Subdural hematoma (H)    Acute hypoxemic respiratory failure (H)    89 yoM admitted 9/15/2022 following an unwitnessed fall and striking his head and found to have a left frontal SDH and C1/C2 cervical spine fracture.  Intubated for airway protection in the ED given high c-spine injury and difficulty managing his secretions. On admission he requested all cares without surgical cervical spine stabilization, agreeable to a tracheostomy and PEG. Multiple care conferences held with his family who confirmed his wishes.  Discharged to [facility] 22.    PMHx: Lambert-Eaton syndrome, TAVR, complete heart block with pacemaker dependency, DM2, arthritis, BPH, HTN, osteoporosis     Discussion of pertinent problems:  1. Acute hypoxic/hypercapnic respiratory failure s/p trach in setting of traumatic cervical injury after a fall.  History of PB   2. Tracheostomy: 6 Shiley placed 2022  3. Dysphagia s/p PEG   4. Laryngeal edema 2/2 traumatic cervical fracture/injury s/p 3 doses of decadron  5. GOC: full cares after discussion with palliative  6. Acute neck pain, chronic hip/shoulder pain  7. Traumatic SDH, stable  8. C1/C2 CSI     Recommendations and Plans:   1. Phase 2 weans: TM/cuff up(failed BDT) during the day with vent night.  Wean TM slowly into the night   2. Remove sutures on   3. Cervical collar at all times, no pillows behind head - repeat imaging in 6 wks and f/u with NSG  4. Plan for VBG when off  vent  5. First trach change not until 10/1 at the earliest.  Have 7 Bivona and 4 Shiley at bedside in case needs emergent change    6. ECG and troponin given subjective CP(first time meeting patient who does have multiple chronic b/l rib fractures)  7. CxR 9/22 reviewed and new b/l pleural effusions and pulmonary edema - start gentle diuresis with PO bumex 0.5mg daily and follow response   8. QTc elevated - have pharm review meds  9. Albuterol/mucomsyt nebs BID for bronchial hygiene   10. PPI while remains on vent(only QTc prolonging med I see)         Chief Complaint respiratory failure      HPI   I have been asked by Dr. Rubio to see Alexandru Still in consultation for trach and ventilator management.    Alexandru Still is a 89 year old year old male admitted to [facility] on 9/22/2022 for ongoing treatment of respiratory failure.    The referring facility is [facility].  Records from that facility are available to assist in historical details.       89 yoM admitted 9/15/2022 following an unwitnessed fall and striking his head and found to have a left frontal SDH and C1/C2 cervical spine fracture.  Intubated for airway protection in the ED given high c-spine injury and difficulty managing his secretions. On admission he requested all cares without surgical cervical spine stabilization, agreeable to a tracheostomy and PEG. Multiple care conferences held with his family who confirmed his wishes.  Discharged to [facility] 9/22/22.  He was on PS 5/5 all day yesterday and currently tolerating trach dome well today.      Chest pain complaints during my visit.  Does have multiple chronic b/l rib fractures.  Seems more musculoskeletal on exam.      Large thick, bloody secretoins suctioned overnight.  More brown today per RT.  Trach is fairly new.  Cuff is up d/t immediate aspiration on BDT.  Seems to be tolerating 40L/35%TD without issue.   Denies dyspnea, n/v.        Medical  History  [unfilled]  @[facility]@ Social History  Reviewed, and he  reports that he quit smoking about 53 years ago. He has a 20.00 pack-year smoking history. He has never used smokeless tobacco. He reports previous alcohol use. He reports that he does not use drugs.    Smoking history:   History   Smoking Status     Former Smoker     Packs/day: 1.00     Years: 20.00     Quit date: 1969   Smokeless Tobacco     Never Used    Family History  Reviewed, and family history includes Cerebrovascular Disease in his mother; Chronic Obstructive Pulmonary Disease in his father.   Allergies  Allergies   Allergen Reactions     Cefuroxime Hives     Contrast Dye      Gadodiamide Hives              Current Medications:     calcium carbonate  1,250 mg Oral or Feeding Tube BID w/meals     cholecalciferol  25 mcg Oral or Feeding Tube Daily     [START ON 9/24/2022] doxazosin  2 mg Oral or Feeding Tube Daily     heparin ANTICOAGULANT  5,000 Units Subcutaneous Q8H     insulin aspart  1-4 Units Subcutaneous Q4H     levothyroxine  112 mcg Oral or Feeding Tube QAM AC     multivitamins w/minerals  15 mL Per Feeding Tube Daily     pantoprazole  40 mg Per Feeding Tube QAM AC     protein modular  2 packet Per Feeding Tube Q8H     sennosides  5 mL Oral or Feeding Tube BID     sodium phosphate  9 mmol Intravenous Once          Review of Systems:  A 10-system review was obtained and is negative with the exception of the symptoms noted above. Physical Exam:  Temp:  [98.8  F (37.1  C)-99.2  F (37.3  C)] 99.1  F (37.3  C)  Pulse:  [73-88] 73  Resp:  [19-32] 27  BP: (133-145)/(65-95) 143/67  FiO2 (%):  [30 %-40 %] 30 %  SpO2:  [96 %-99 %] 96 %  [unfilled]  Ventilator settings: Vent Mode: Trach collar  FiO2 (%): 30 %  Resp Rate (Set): 12 breaths/min  Tidal Volume (Set, mL): 450 mL  PEEP (cm H2O): 5 cmH2O  Pressure Support (cm H2O): 5 cmH2O  Resp: 27      EXAM:  Physical Exam  Gen: no distress on trach dome   HEENT: NT, trach midline/intact,  c-collar on  CV: RRR, no m/g/r  Resp: CTAB; non-labored   Abd: soft, nontender, BS+  Skin: no rashes or lesions  Ext: no edema  Neuro: PERRL, nonfocal exam       Pertinent Labs:  Lab Results: personally reviewed.   Recent Labs   Lab 09/23/22  0833   WBC 13.0*   HGB 10.9*   HCT 32.9*        Recent Labs   Lab 09/23/22  0833 09/22/22  0435 09/21/22  0531    139 147*   CO2 25 19* 24   BUN 18.5 20.9 27.2*        Pertinent Radiology:  Radiology results: images and reports personally viewed    XR CHEST PORT 1 VIEW  9/22/2022 12:45 AM      HISTORY:  Rib fracture(s) in trauma patient        COMPARISON:  Chest x-ray 9/21/2022     FINDINGS: AP radiograph of the chest. Tracheostomy tube projecting  over the high thoracic trachea. Left chest wall ICD/pacemaker with  leads overlying the right atrium and right ventricle. Aortic valve  replacement.      Stable cardiomediastinal silhouette. Atherosclerotic calcifications of  the aortic arch. New layering bilateral pleural effusions. No  pneumothorax. Increased bibasilar opacities. Postoperative changes to  the right shoulder. Unchanged chronic bilateral rib fractures. No new  acute osseous abnormality. Visualized upper abdomen is unremarkable.                                                                       IMPRESSION:     1. New layering bilateral pleural effusions.  2. Increased bibasilar opacities possibly representing pulmonary edema  and/or atelectasis, difficult to exclude infection including aspiration.  --------------------  CT CHEST ABDOMEN PELVIS W/O CONTRAST, 9/15/2022 4:18 PM     TECHNIQUE:  Helical CT images from the thoracic inlet through the  symphysis pubis were obtained without IV contrast.     COMPARISON: Same day chest x-ray. Soft tissue pelvis CT 7/20/2022 and  abdomen and pelvis CT 6/15/2022     HISTORY: fall     FINDINGS:  CHEST:  LUNGS: Central tracheobronchial tree is patent. No pneumothorax or  pleural effusion. Lower lobe bronchial wall  thickening with multifocal  plaquing at the lung bases with streaky consolidative opacities at the  lung bases. Probable intrafissural lymph node measuring 4 mm along the  right minor fissure (series 5, image 141). No suspicious pulmonary  nodule.     MEDIASTINUM: Left chest wall ICD/pacemaker with leads in the right  atrium and right ventricle. Postoperative changes aortic valve  replacement. Bulky mitral annular calcifications. Heart size is within  normal limits. No pericardial effusion. Ascending aorta and main  pulmonary artery diameters are within normal limits. Normal appearance  and configuration of the great vessels off of the aortic arch. All  prominent mediastinal lymph nodes for example 5 mm prevascular node  (series 2, image 88) no suspicious hilar or axillary lymph nodes.     Visualized thyroid and esophagus are unremarkable.     ABDOMEN/PELVIS:  LIVER: No suspicious focal hepatic lesion.     BILIARY: Layering calcified gallstones. No gallbladder wall thickening  or pericholecystic fluid to suggest cholecystitis. No intrahepatic or  extrahepatic biliary ductal dilation.     PANCREAS: Moderate fatty atrophy of the pancreatic parenchyma. No  focal pancreatic lesion. The main pancreatic duct is not dilated.     SPLEEN: Within normal limits.     ADRENAL GLANDS: No focal adrenal nodule.     URINARY TRACT: Scattered hypodense cysts in both kidneys some of which  are simple in attenuation and others intermediate density and are  unchanged since 6/15/2022. Additional cystic hypodensity in the right  kidney likely representing a benign proteinaceous or hemorrhagic cyst.  Prominent right extrarenal pelvis. No hydronephrosis or ureter. No  renal stone. Urinary bladder is within normal limits.     REPRODUCTIVE ORGANS: Coarse calcifications in the prostate and seminal  vesicles. Right scrotal pump with penile prosthesis.      STOMACH: Within normal limits.     BOWEL: Colonic diverticulosis without evidence for  acute  diverticulitis. Normal caliber large and small bowel. No abnormal  bowel wall thickening or enhancement. Appendix is unremarkable.     PERITONEUM/FLUID: No ascites or pelvic free fluid.     VESSELS: Calcifications of the abdominal aorta and iliac arteries.  Ectatic dilation of the left common iliac artery measuring 1.8 cm.     LYMPH NODES: No lymphadenopathy.     BONES/SOFT TISSUES: Chronic and healed left second through fourth rib  fractures. Age indeterminant anterior left sixth rib fracture. Chronic  appearing anterior right third, fourth, fifth, seventh, ninth, and  10th rib fractures. Postoperative changes to the right shoulder.  Advanced degenerative change to the left shoulder. Interspinous fusion  device at L4-5. Unchanged superior endplate compression deformity at  L3. Grade 1 anterolisthesis of L4 on L5 and L5 on S1. Chronic  bilateral pars interarticularis defects at L5. Chronic appearing  anterior wedge compression deformity at T12 and T9. Chronic sternal  body fracture.                                                                      IMPRESSION:   1. Question acute anterior left sixth rib fracture. Multiple chronic  appearing bilateral rib fractures and chronic appearing compression  deformities in the thoracolumbar spine.  2. No acute visceral injury in the chest, abdomen, or pelvis.     Other pertinent data:  Echocardiogram 9/16/2022  Interpretation Summary  Global and regional left ventricular function is normal with an EF of 60-65%.  Global right ventricular function is normal.  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 Doppler interrogation of the aortic valve is normal. The  peak velocity across the aortic valve is 1.6 m/sec. The mean gradient is 5  mmHg.  No pericardial effusion is present.     Tracheostomy tube data:  Date of initial placement: 9/21/2022  Current tube - type: Shiley , size: 6       Extensive record review is performed.  Key information about patient is reviewed in detail.  The respiratory  plan of care is discussed with RT.  This patient will be rounded on regularly while requiring mechanical ventilator support.  Please contact us with questions or concerns.  Total time spent on pt examination and coordination of care was 60min   Sawyer Capone CNP  Pulmonary Medicine  [facility]  Pager [phone]

## 2022-09-23 NOTE — PLAN OF CARE
Problem: Plan of Care - These are the overarching goals to be used throughout the patient stay.    Goal: Optimal Comfort and Wellbeing  Outcome: Ongoing, Progressing  Intervention: Provide Person-Centered Care  Recent Flowsheet Documentation  Taken 9/23/2022 0100 by Danny Valdivia RN  Trust Relationship/Rapport: care explained     Problem: Skin and Tissue Injury (Mechanical Ventilation, Invasive)  Goal: Absence of Device-Related Skin and Tissue Injury  Outcome: Ongoing, Progressing   Goal Outcome Evaluation:           Pt is alert, pain is manage by schedule tylenol, pt is on [place] j collar in place at all time. Tolerated well. Pt on continues tube feeding osmolite 1.5 at rate 65. Incontinent of urine and BM x1, (smear) brown in color. Slept 5 to 6 hrs. Continue to monitor.

## 2022-09-24 ENCOUNTER — APPOINTMENT (OUTPATIENT)
Dept: SPEECH THERAPY | Facility: CLINIC | Age: 87
DRG: 208 | End: 2022-09-24
Attending: INTERNAL MEDICINE
Payer: MEDICARE

## 2022-09-24 LAB
ALBUMIN SERPL BCG-MCNC: 2.6 G/DL (ref 3.5–5.2)
ALP SERPL-CCNC: 116 U/L (ref 40–129)
ALT SERPL W P-5'-P-CCNC: 61 U/L (ref 10–50)
ANION GAP SERPL CALCULATED.3IONS-SCNC: 6 MMOL/L (ref 7–15)
AST SERPL W P-5'-P-CCNC: 38 U/L (ref 10–50)
ATRIAL RATE - MUSE: 81 BPM
BILIRUB SERPL-MCNC: 0.7 MG/DL
BUN SERPL-MCNC: 16 MG/DL (ref 8–23)
CALCIUM SERPL-MCNC: 8.2 MG/DL (ref 8.2–9.6)
CHLORIDE SERPL-SCNC: 104 MMOL/L (ref 98–107)
CREAT SERPL-MCNC: 0.61 MG/DL (ref 0.67–1.17)
DEPRECATED HCO3 PLAS-SCNC: 27 MMOL/L (ref 22–29)
DIASTOLIC BLOOD PRESSURE - MUSE: NORMAL MMHG
ERYTHROCYTE [DISTWIDTH] IN BLOOD BY AUTOMATED COUNT: 15.1 % (ref 10–15)
GFR SERPL CREATININE-BSD FRML MDRD: 90 ML/MIN/1.73M2
GLUCOSE BLDC GLUCOMTR-MCNC: 100 MG/DL (ref 70–99)
GLUCOSE BLDC GLUCOMTR-MCNC: 101 MG/DL (ref 70–99)
GLUCOSE BLDC GLUCOMTR-MCNC: 102 MG/DL (ref 70–99)
GLUCOSE BLDC GLUCOMTR-MCNC: 104 MG/DL (ref 70–99)
GLUCOSE BLDC GLUCOMTR-MCNC: 95 MG/DL (ref 70–99)
GLUCOSE SERPL-MCNC: 100 MG/DL (ref 70–99)
HCT VFR BLD AUTO: 31.3 % (ref 40–53)
HGB BLD-MCNC: 10.6 G/DL (ref 13.3–17.7)
INTERPRETATION ECG - MUSE: NORMAL
MAGNESIUM SERPL-MCNC: 1.9 MG/DL (ref 1.7–2.3)
MCH RBC QN AUTO: 31.7 PG (ref 26.5–33)
MCHC RBC AUTO-ENTMCNC: 33.9 G/DL (ref 31.5–36.5)
MCV RBC AUTO: 94 FL (ref 78–100)
P AXIS - MUSE: 42 DEGREES
PHOSPHATE SERPL-MCNC: 2.2 MG/DL (ref 2.5–4.5)
PLATELET # BLD AUTO: 182 10E3/UL (ref 150–450)
POTASSIUM SERPL-SCNC: 4.1 MMOL/L (ref 3.4–5.3)
PR INTERVAL - MUSE: 212 MS
PROT SERPL-MCNC: 5.9 G/DL (ref 6.4–8.3)
QRS DURATION - MUSE: 150 MS
QT - MUSE: 446 MS
QTC - MUSE: 518 MS
R AXIS - MUSE: 62 DEGREES
RBC # BLD AUTO: 3.34 10E6/UL (ref 4.4–5.9)
SODIUM SERPL-SCNC: 137 MMOL/L (ref 136–145)
SYSTOLIC BLOOD PRESSURE - MUSE: NORMAL MMHG
T AXIS - MUSE: -25 DEGREES
VENTRICULAR RATE- MUSE: 81 BPM
WBC # BLD AUTO: 11.9 10E3/UL (ref 4–11)

## 2022-09-24 PROCEDURE — 250N000013 HC RX MED GY IP 250 OP 250 PS 637: Performed by: HOSPITALIST

## 2022-09-24 PROCEDURE — 85027 COMPLETE CBC AUTOMATED: CPT | Performed by: HOSPITALIST

## 2022-09-24 PROCEDURE — 258N000001 HC RX 258: Performed by: HOSPITALIST

## 2022-09-24 PROCEDURE — C9113 INJ PANTOPRAZOLE SODIUM, VIA: HCPCS | Performed by: HOSPITALIST

## 2022-09-24 PROCEDURE — 250N000011 HC RX IP 250 OP 636: Performed by: HOSPITALIST

## 2022-09-24 PROCEDURE — 999N000009 HC STATISTIC AIRWAY CARE

## 2022-09-24 PROCEDURE — 92523 SPEECH SOUND LANG COMPREHEN: CPT | Mod: GN

## 2022-09-24 PROCEDURE — 250N000009 HC RX 250: Performed by: HOSPITALIST

## 2022-09-24 PROCEDURE — 99233 SBSQ HOSP IP/OBS HIGH 50: CPT | Performed by: HOSPITALIST

## 2022-09-24 PROCEDURE — 120N000017 HC R&B RESPIRATORY CARE

## 2022-09-24 PROCEDURE — 94640 AIRWAY INHALATION TREATMENT: CPT | Mod: 76

## 2022-09-24 PROCEDURE — 36415 COLL VENOUS BLD VENIPUNCTURE: CPT | Performed by: HOSPITALIST

## 2022-09-24 PROCEDURE — 258N000003 HC RX IP 258 OP 636: Performed by: HOSPITALIST

## 2022-09-24 PROCEDURE — 999N000157 HC STATISTIC RCP TIME EA 10 MIN

## 2022-09-24 PROCEDURE — 999N000123 HC STATISTIC OXYGEN O2DAILY TECH TIME

## 2022-09-24 PROCEDURE — 84100 ASSAY OF PHOSPHORUS: CPT | Performed by: HOSPITALIST

## 2022-09-24 PROCEDURE — 999N000253 HC STATISTIC WEANING TRIALS

## 2022-09-24 PROCEDURE — 94003 VENT MGMT INPAT SUBQ DAY: CPT

## 2022-09-24 PROCEDURE — 250N000009 HC RX 250: Performed by: NURSE PRACTITIONER

## 2022-09-24 PROCEDURE — 80053 COMPREHEN METABOLIC PANEL: CPT | Performed by: HOSPITALIST

## 2022-09-24 PROCEDURE — 94640 AIRWAY INHALATION TREATMENT: CPT

## 2022-09-24 PROCEDURE — 83735 ASSAY OF MAGNESIUM: CPT | Performed by: HOSPITALIST

## 2022-09-24 RX ORDER — ACETAMINOPHEN 650 MG/1
650 SUPPOSITORY RECTAL EVERY 4 HOURS PRN
Status: DISCONTINUED | OUTPATIENT
Start: 2022-09-24 | End: 2022-10-05

## 2022-09-24 RX ORDER — QUETIAPINE FUMARATE 25 MG/1
25 TABLET, FILM COATED ORAL EVERY 6 HOURS PRN
Status: DISCONTINUED | OUTPATIENT
Start: 2022-09-24 | End: 2022-10-28 | Stop reason: HOSPADM

## 2022-09-24 RX ADMIN — ALBUTEROL SULFATE 2.5 MG: 2.5 SOLUTION RESPIRATORY (INHALATION) at 08:14

## 2022-09-24 RX ADMIN — DEXTROSE AND SODIUM CHLORIDE: 5; 450 INJECTION, SOLUTION INTRAVENOUS at 11:50

## 2022-09-24 RX ADMIN — PANTOPRAZOLE SODIUM 40 MG: 40 INJECTION, POWDER, FOR SOLUTION INTRAVENOUS at 16:16

## 2022-09-24 RX ADMIN — MORPHINE SULFATE 2 MG: 2 INJECTION, SOLUTION INTRAMUSCULAR; INTRAVENOUS at 02:27

## 2022-09-24 RX ADMIN — SODIUM PHOSPHATE, MONOBASIC, MONOHYDRATE 15 MMOL: 276; 142 INJECTION, SOLUTION INTRAVENOUS at 12:56

## 2022-09-24 RX ADMIN — HEPARIN SODIUM 5000 UNITS: 5000 INJECTION, SOLUTION INTRAVENOUS; SUBCUTANEOUS at 18:59

## 2022-09-24 RX ADMIN — ACETYLCYSTEINE 2 ML: 200 SOLUTION ORAL; RESPIRATORY (INHALATION) at 18:14

## 2022-09-24 RX ADMIN — HEPARIN SODIUM 5000 UNITS: 5000 INJECTION, SOLUTION INTRAVENOUS; SUBCUTANEOUS at 03:57

## 2022-09-24 RX ADMIN — ACETAMINOPHEN 650 MG: 650 SUPPOSITORY RECTAL at 18:01

## 2022-09-24 RX ADMIN — HEPARIN SODIUM 5000 UNITS: 5000 INJECTION, SOLUTION INTRAVENOUS; SUBCUTANEOUS at 14:29

## 2022-09-24 RX ADMIN — ALBUTEROL SULFATE 2.5 MG: 2.5 SOLUTION RESPIRATORY (INHALATION) at 18:14

## 2022-09-24 RX ADMIN — ACETYLCYSTEINE 2 ML: 200 SOLUTION ORAL; RESPIRATORY (INHALATION) at 08:14

## 2022-09-24 ASSESSMENT — ACTIVITIES OF DAILY LIVING (ADL)
ADLS_ACUITY_SCORE: 55
ADLS_ACUITY_SCORE: 55
ADLS_ACUITY_SCORE: 59
ADLS_ACUITY_SCORE: 55
ADLS_ACUITY_SCORE: 59
ADLS_ACUITY_SCORE: 55

## 2022-09-24 NOTE — SIGNIFICANT EVENT
Overnight Coverage MD ALBRIGHT tube pulled out. Escudero inserted to keep tract patent. DM2 on sliding scale insulin. Started on D5 infusion at 75cc/ hour with continued Accuchecks Q6H.     Will need replacement in the AM.   Bilateral upper extremity soft restraints ordered, as pulling on lines  Morphine IV ordered prn for neck pain.

## 2022-09-24 NOTE — PROGRESS NOTES
Writer is gillian nurse who was asked by primary nurse to come see patient following pulling out of PEG tube. Replaced removed peg tube with standard hannah catheter and taped down with gauze. Checked both PIVs and they are patent. Will continue to check in on patient overnight.

## 2022-09-24 NOTE — PLAN OF CARE
Problem: Device-Related Complication Risk (Mechanical Ventilation, Invasive)  Goal: Optimal Device Function  Outcome: Ongoing, Progressing     Problem: Inability to Wean (Mechanical Ventilation, Invasive)  Goal: Mechanical Ventilation Liberation  Outcome: Ongoing, Progressing     Problem: Skin and Tissue Injury (Mechanical Ventilation, Invasive)  Goal: Absence of Device-Related Skin and Tissue Injury  Outcome: Ongoing, Progressing     Problem: Ventilator-Induced Lung Injury (Mechanical Ventilation, Invasive)  Goal: Absence of Ventilator-Induced Lung Injury  Outcome: Ongoing, Progressing                RT PROGRESS NOTE     DATA:     CURRENT SETTINGS:             TRACH TYPE / SIZE:  # 6 Shiley placed 9/21             MODE:   AC 12/450/+5             FIO2:   40%     ACTION:             THERAPIES:                SUCTION:                           FREQUENCY:   2                        AMOUNT:   small                        CONSISTENCY:   thickk                         COLOR:   white-green secretions              SPONTANEOUS COUGH EFFORT/STRENGTH OF EFFORT (not elicited by suctioning):                               WEANING PHASE:   2                        WEAN MODE:    tm                        WEAN TIME:   placed on vent at night @ 0103 pt wean on tm for 16hrs       END WEAN REASON:  Rest at night     RESPONSE:             BS:   crs             VITAL SIGNS:   Blood pressure (!) 146/65, pulse 76, temperature 99.7  F (37.6  C), temperature source Oral, resp. rate 16, weight 88.6 kg (195 lb 4.8 oz), SpO2 99 %.               EMOTIONAL NEEDS / CONCERNS:                  RISK FOR SELF DECANNULATION:  no                        RISK DUE TO:                          INTERVENTION/S IN PLACE IS/ARE:         NOTE / PLAN:   tolerating vent well. Tm days.

## 2022-09-24 NOTE — PLAN OF CARE
Problem: Plan of Care - These are the overarching goals to be used throughout the patient stay.    Goal: Plan of Care Review/Shift Note  Outcome: Ongoing, Not Progressing  Flowsheets (Taken 9/23/2022 2850)  Plan of Care Reviewed With: patient  Overall Patient Progress: declining     Problem: Malnutrition  Goal: Improved Nutritional Intake  Outcome: Ongoing, Not Progressing   Goal Outcome Evaluation:    Plan of Care Reviewed With: patient     Overall Patient Progress: declining    At the beginning of the shift, when Alexandru was asked about pain, he nodded his head no, that he didn't have pain. He dozed intermittently. This writer held Alexandru's head while the RT and an RN working as a nursing assistant tonight removed the neck brace to do his trach care and assess his skin. There appears to be some edema above the faceplate of the trach, and redness also above the trach faceplate, which does not appear to al. Alexandru has a strong productive cough, and was able to cough up some of his thick secretions, all colored blue due to his swallow study. He was also suctioned for thick secretions, both in oral cavity with yankauer and endotracheally.     Alexandru pulled his tube feeding out of his abdomen tonight with the balloon intact. The doctor ordered IV fluid and his senna could not be given, though he had large very soft BM after this; prosource also couldn't be given. When asked if he was in pain, Alexandru nodded yes, and RN asked him about various body parts. Alexandru nodded yes when asked about his neck, so an order for morphine IV was obtained and given, though when asked if it was helpful, Alexandru shook his head no. He did appear to doze after getting this and being cleaned after his large, soft BM. Soft wrist restraints, ordered by the hospitalist, were used to prevent further pulling out tubes, as he also wrapped his hand around the tubing for his external urinary catheter while RN was applying pressure to tube  feeding site to prevent bleeding.

## 2022-09-24 NOTE — PROGRESS NOTES
[facility]    Medicine Progress Note - Hospitalist Service    Date of Admission:  9/22/2022    Brief History:    Alexandru Still is a 89 year old man w/ PMH of Lambert-Eaton syndrome, hypothyroidism,  complete heart block s/p pacemaker, HTN, BPH,  PB, and DM type 2 who was admitted to the SICU on 9/15/2022 following an unwitnessed fall and striking his head. He was initially seen at an outside hospital and underwent a comprehensive trauma work up found to have a left frontal SDH (5mm) + C1 fx + Type II C2 odontoid fracture with a 1cm posterior displacement. He was  intubated for airway protection in the ED given high c-spine injury and difficulty managing his secretions. On admission he requested all cares without surgical cervical spine stabilization, agreeable to a tracheostomy and PEG. Multiple care conferences held with his family who confirmed his wishes.    [facility] course:    9/24-9/26:  Speech evaluated patient and recommends NPO, cont tube feeds via PEG due to severe oropharyngeal dysphasia.  PT/OT, dietitian, woc nurse saw pt on 9/23.  Pulled out G tube during night of 9/23.  Pt now on soft wrist restraints.  IVF changed to D51/2 NS at low rate as pt has TAVR.  Morphine IV ordered prn pain.    Spoke w/ Dr. Brown- IR - recommends no hannah tube as this is a brand new PEG and placing blind it will likely not be in correct position.  He will not do a PEG placement on the weekend, recommended to schedule for Monday.  Pt cannot be fed via NG tube as it is contraindicated with C1 and C2 fx.        Assessment & Plan               Traumatic 5mm left frontal lobe SDH  Posteriorly displaced (1 cm) Type II odontoid fx  C1 anterior arch fracture   Epidural hemorrhage of 7mm  Acute traumatic neck pain  Acute L 6th rib fracture  S/p fall  Facial abrasions with ecchymoses  RLE wound  He was seen and evaluated by Neurosurgery.  Pt refused surgical intervention.   A repeat head CT was obtained with a stable subdural  hematoma. He was placed in a cervical collar for the cervical fracture. No surgical intervention per patient preference.    -cont [place] J -keep in place at all times  -tylenol, oxy, and morphine prn pain  -Neurosurg ok w/ heparin q8h for dvt prophy  -wound care following      -f/u Neurosurgery in 6 weeks with an upright XR of the cervical spine   -CT cervical spine in 3 months.     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 Failed bedside and radiology swallow. He completed 3 doses of Decadron for laryngeal edema.      -S/P tracheostomy 09/21/2022, Trach suture removal on 09/26  -pulm consult  -RT consult  -wean vent as per pulm   -bronchial hygeine w/ albuterol/mucomyst  -gentle diuresis with bumex     Hypertension   Complete heart block s/p PM placement  Nonrheumatic aortic valve stenosis s/p TAVR 2019  -monitor  -pt is currently normotensive so will hold off on antihypertensives  -echo on 9/16:  EF: 60-65%, no LV dysfunction     Severe orophayngeal dysphagia    S/P PEG   Severe protein calorie malnutrition  Hiatal hernia    -PEG tube placed 09/20. Pt ripped out PEG tube on night of 9/23  -hold TF : osmolite 1.5 and 60 ml/hr   dietitian following  -free water flush:  90 q4h  -protein modular q8h  -senna and dulcolax prn   -PPI for GI prophy  -IVF D51/2 NS at 50ml/hr    Hypophosphatemia   Hypomagnesia  Hypokalemia  S/p Lactic Acidosis  -Na phos IV 15mmol x 1 today, given 9mmol yesterday with little imrovement  -monitor  -replete prn       Hypothyroidism   -Continue Levothyroxine     Anemia of chronic disease  -hg trending down past few days  -cont to monitor     BPH  -cont doxazosin    DM type 2  -sliding scale insulin  -last HbA1:  6.2   -hypoglycemic protocol    Deconditioning  -PT/OT         Diet: Adult Formula Drip Feeding: Continuous Osmolite 1.5; Gastrostomy; Goal Rate: 60; mL/hr; Medication - Feeding Tube Flush Frequency: At least 15-30 mL water before and after medication administration and with tube clogging; Amount to Send (Nutrition...    DVT Prophylaxis: Heparin SQ  Escudero Catheter: Not present  Central Lines: None  Cardiac Monitoring: None  Code Status: Full Code      Disposition Plan     Expected Discharge Date: 09/29/2022    Discharge Delays: Complex Discharge  Placement - LTC            The patient's care was discussed with the Bedside Nurse and Patient.    Neli Rubio MD  Hospitalist Service  [facility]  Securely message with the Vocera Web Console (learn more here)  Text page via [employer] Paging/Directory         Clinically Significant Risk Factors Present on Admission                 # Severe Malnutrition: based on nutrition assessment     ______________________________________________________________________    Interval History   Pt is complaining of pain at abdominal site where he pulled out PEG.  He has no dyspnea, chest pain.  No nausea or vomiting    Data reviewed today: I reviewed all medications, new labs and imaging results over the last 24 hours.     Physical Exam   Vital Signs: Temp: 98.5  F (36.9  C) Temp src: Axillary BP: 135/65 Pulse: 72   Resp: 23 SpO2: 95 % O2 Device: (S) Trach dome Oxygen Delivery: 40 LPM  Weight: 191 lbs 9.6 oz  General:  No acute distress,awake and alert  Eyes:  Sclera non icteric, normal conjuctiva  Neck :  Kinder J in place, trach in place  Lungs:  Clear to auscultation bilaterally, air entry equal bilaterally, no rhonchi or rales  CVS:  S1 and S2, regular rate and rhythem, systolic murmur L  sternal border  Abdomen:  Soft, nontender, tenderness w/ swelling around prior PEG tube site.     Musculoskeletal:  Swelling of R forearm and hand,   Neuro:  Alert and oriented No acute deficit,   Skin:  R supra orbital Abrasions and ecchymoses, RLE wound     Data   Recent Labs   Lab 09/24/22  0638 09/24/22  0350 09/23/22  2353 09/23/22  1931 09/23/22  1259 09/23/22  0833 09/22/22  1956 09/22/22  0435 09/21/22  1024 09/21/22  0531   WBC 11.9*  --   --   --   --  13.0*  --  13.4*  --  11.5*   HGB 10.6*  --   --   --   --  10.9*  --  10.7*  --  11.9*   MCV 94  --   --   --   --  96  --  97  --  96     --   --   --   --  211  --  196  --  213   NA  --   --   --   --   --  142  --  139  --  147*   POTASSIUM  --   --   --   --   --  4.3  --  4.4  --  3.7   CHLORIDE  --   --   --   --   --  108*  --  110*  --  115*   CO2  --   --   --   --   --  25  --  19*  --  24   BUN  --   --   --   --   --  18.5  --  20.9  --  27.2*   CR  --   --   --   --   --  0.70  --  0.67  --  0.78   ANIONGAP  --   --   --   --   --  9  --  10  --  8   TITA  --   --   --   --   --  8.3  --  8.2  --  8.6   GLC  --  100* 102* 145*   < > 148*   < > 124*   < > 138*    < > = values in this interval not displayed.     No results found for this or any previous visit (from the past 24 hour(s)).  Medications     D5W 75 mL/hr at 09/23/22 2158     - MEDICATION INSTRUCTIONS -         acetylcysteine  2 mL Nebulization 2 times daily     albuterol  2.5 mg Nebulization 2 times daily     bumetanide  0.5 mg Per Feeding Tube Daily     calcium carbonate  1,250 mg Oral or Feeding Tube BID w/meals     cholecalciferol  25 mcg Oral or Feeding Tube Daily     doxazosin  2 mg Oral or Feeding Tube Daily     heparin ANTICOAGULANT  5,000 Units Subcutaneous Q8H     insulin aspart  1-4 Units Subcutaneous Q4H     levothyroxine  112 mcg Oral or Feeding Tube QAM AC     multivitamins w/minerals  15 mL Per Feeding Tube Daily     pantoprazole  40 mg Per Feeding Tube QAM AC      protein modular  2 packet Per Feeding Tube TID     sennosides  5 mL Oral or Feeding Tube BID

## 2022-09-24 NOTE — PROGRESS NOTES
"Speech-Language Pathology: Speech, Language, and Cognitive Evaluation     09/24/22 0900   General Information   Onset of Illness/Injury or Date of Surgery 09/15/22   Referring Physician Neli Rubio MD   Patient/Family Therapy Goal Statement (SLP) Patient not able to state.   Pertinent History of Current Problem Per MD discharge summary at acute hospital: \"Alexandru Still is a 89 year old male who was admitted to the SICU on 9/15/2022 following an unwitnessed fall and striking his head. He was initially seen at an OSH and underwent a comprehensive trauma work up found to have a left frontal SDH (5mm) + C1 fx + Type II C2 odontoid fracture with a 1cm posterior displacement. He was  intubated for airway protection in the ED given high c-spine injury and difficulty managing his secretions. On admission he requested all cares without surgical cervical spine stabilization, agreeable to a tracheostomy and PEG. Multiple care conferences held with his family who confirmed his wishes.\"   General Observations Patient awake, eye contact maintained. C collar in place. Shiley #6 trach placed 9/21/2022. Patient on HFTD with blueish green secretions oozing around stoma and trach.   Type of Evaluation   Type of Evaluation Speech, Language, Cognition   General Swallowing Observations   Comment, Secretions/Suctioning Copious secretions in oral cavity. Assissted with oral suction, patient somewhat resistive.   Speech   Speech Intelligibility (Motor Speech) unable/difficult to assess  (No attempts despite max cues)   Comment, Motor Speech Assessment No attempts made to mouth words despite max cues.   Auditory Comprehension   Follows Commands (Auditory Comprehension) 1-step command   Comment, Assessment (Auditory Comprehension) Patient answered simple questions related to pain and breathing accurately but was incorrect for questions related to time and place.   Yes/No Questions (Auditory Comprehension) simple/factual " questions;biographical/personal questions   1 Step, Follows Commands (Auditory Comprehension) 0-24% accuracy  (minimal participation)   Biographical/Personal Questions (Auditory Comprehension) over 90% accuracy   Simple/Factual Questions (Auditory Comprehension) 0-24% accuracy;achieved with cues   Verbal Expression   Comment, Assesment (Verbal Expression) No attempts at verbal communication despite max cues.   Confrontational Naming (Verbal Expression) unable/difficult to assess   Conversational Speech (Verbal Expression) unable/difficult to assess   Automatic Speech (Verbal Expression) unable/difficult to assess   Repetition Skills (Verbal Expression) unable/difficult to assess   Pragmatic Language   Nonverbal Skills (Pragmatic Language) eye contact   Eye Contact, Nonverbal Skills (Pragmatic Language)   (Patient attended to therapist about 50% of the time, otherwise his eyes were closed.)   Augmentative/Alternative Communication (AAC)   Gesture Mode (AAC) hand gestures;head nods/shakes;pointing   Comment, Assessment (AAC) Patient independently used hand gestures and pointing to indicate level and location of pain. He answered y/n questions via head nod/shake to confirm answers.   Hand Gestures (AAC) used independently  (to indicate pain level)   Head Nods and Shakes, Gestures (AAC) used independently  (to answer questions related to pain and breathing)   Pointing, Gestures (AAC) used independently  (to indicate location of pain)   Cognition   Orientation Status (Cognition) disoriented to;place;time   General Therapy Interventions   Planned Therapy Interventions Cognitive Treatment;Language;Communication   Clinical Impression   Criteria for Skilled Therapeutic Interventions Met (SLP Nancy) Yes, treatment indicated   Risks & Benefits of therapy have been explained evaluation/treatment results reviewed;care plan/treatment goals reviewed;risks/benefits reviewed;patient;participants voiced agreement with care  plan  (Patient nodded in agreement)   Clinical Impression Comments Patient's communication, language, and cognition were assessed in the setting of trach with mechanical ventilation. Pt is aphonic d/t trach. He answered simple personal questions related to pain, severity, and location via head nod/shake, hand gestures, and pointing. He was not accurate for y/n questions that were unrelated to current needs. He attended to therapist visually for about 50% of the session, eyes were closed the rest of the time. No attempts to follow OM commands or mouth words despite max cues. This could have been impacted by pain level. RN and RT notified. Patient is appropriate for ongoing ST to address communication needs and dysphagia.   SLP Discharge Planning   SLP Discharge Recommendation Transitional Care Facility;Acute Rehab Center-Motivated patient will benefit from intensive, interdisciplinary therapy.  Anticipate will be able to tolerate 3 hours of therapy per day;home with home care speech therapy   SLP Rationale for DC Rec Well below baseline for swallowing and communication.   SLP Brief overview of current status  Recommend continue NPO status with frequent oral cares.    Total Evaluation Time   Total Evaluation Time (Minutes) 25   SLP Goals   Therapy Frequency (SLP Eval) 5 times/wk   SLP Predicted Duration/Target Date for Goal Attainment 10/28/22   SLP Goals Communication;Language Comprehension   SLP: Improve language comprehension for interaction with caregivers/environment with visual and/or verbal support;minimal assist;auditory and written   SLP: Communicate basic wants and needs using communication board;using gestures;verbal and written;minimal assist

## 2022-09-24 NOTE — PLAN OF CARE
Problem: Plan of Care - These are the overarching goals to be used throughout the patient stay.    Goal: Plan of Care Review/Shift Note  Description: The Plan of Care Review/Shift note should be completed every shift.  The Outcome Evaluation is a brief statement about your assessment that the patient is improving, declining, or no change.  This information will be displayed automatically on your shift note.  Outcome: Ongoing, Progressing     Problem: Restraint, Nonbehavioral (Nonviolent)  Goal: Absence of Harm or Injury  Outcome: Ongoing, Progressing  Intervention: Protect Dignity, Rights, and Personal Wellbeing  Recent Flowsheet Documentation  Taken 9/24/2022 0000 by Lou Garcia RN  Trust Relationship/Rapport: care explained  Intervention: Protect Skin and Joint Integrity  Recent Flowsheet Documentation  Taken 9/24/2022 0000 by Lou Garcia RN  Range of Motion: active ROM (range of motion) encouraged     Problem: Pain Acute  Goal: Acceptable Pain Control and Functional Ability  Outcome: Ongoing, Progressing  Intervention: Develop Pain Management Plan  Recent Flowsheet Documentation  Taken 9/24/2022 0227 by Lou Garcia RN  Pain Management Interventions: medication (see MAR)  Intervention: Prevent or Manage Pain  Recent Flowsheet Documentation  Taken 9/24/2022 0000 by Lou Garcia RN  Medication Review/Management: medications reviewed   Goal Outcome Evaluation:    Pt alter, oriented to self only, pt follows commands, /79, 157/74 at the beginning of shift, Dr Brower notified at 0125, no new orders, primo fit replaced for incontinent of urine, pt reported right leg pain, pt rated pain 6/10, morphine 2 mg IV given x 1 with effective, pt denied pain this AM, pt has bilateral upper extremities edema and bruises PIV  patent and no change in edema, pt is NPO and on vent at night, pt is on bilateral wrist soft restraints for pulling tubes, pt is turned Q 2 hours and will continue to monitor.

## 2022-09-24 NOTE — PHARMACY-CONSULT NOTE
Pharmacy consulted by Sawyer Capone APRN CNP to review medications for medications that could potentially cause a prolonged Qtc interval.    Medication list was reviewed and albuterol (nebs) was the only medication found that could possibly lead to QTc interval changes. It is a relatively rare adverse effect but has been reported/studied.    Jenaro Tripathi RPH on 9/23/2022 at 9:01 PM

## 2022-09-24 NOTE — PLAN OF CARE
Problem: Pain Acute  Goal: Acceptable Pain Control and Functional Ability  Outcome: Ongoing, Progressing     Problem: Malnutrition  Goal: Improved Nutritional Intake  Outcome: Ongoing, Progressing       Patient was alert, oriented x3 denies any pain or discomfort when assessed. Patient napped off and on through out the shift. NPO maintained. Patient is for reinsertion of feeding tube on 9/26/22. Iv 5% dextrose with 0.45% NS running continuously at 50 ml/hr. [place] J collar intact, kept on at all time. Patient is largely incontinent of urine, able to mouth words, but other needs anticipated and met. Bilateral soft wrist restraints maintained to keep patient from pulling on his trach/vent. Will continue to monitor.

## 2022-09-24 NOTE — PLAN OF CARE
Problem: Device-Related Complication Risk (Mechanical Ventilation, Invasive)  Goal: Optimal Device Function  Outcome: Ongoing, Progressing  Intervention: Optimize Device Care and Function  Recent Flowsheet Documentation  Taken 9/23/2022 1127 by Radha Mcclelland RT  Airway Safety Measures: all equipment/monitors on and audible  Taken 9/23/2022 0805 by Radha Mcclelland RT  Airway Safety Measures: all equipment/monitors on and audible     Problem: Inability to Wean (Mechanical Ventilation, Invasive)  Goal: Mechanical Ventilation Liberation  Outcome: Ongoing, Progressing     Problem: Skin and Tissue Injury (Mechanical Ventilation, Invasive)  Goal: Absence of Device-Related Skin and Tissue Injury  Outcome: Ongoing, Progressing     Problem: Ventilator-Induced Lung Injury (Mechanical Ventilation, Invasive)  Goal: Absence of Ventilator-Induced Lung Injury  Outcome: Ongoing, Progressing   RT PROGRESS NOTE   [id number]  DATA:     CURRENT SETTINGS:             TRACH TYPE / SIZE: #6 Shiley Taper guard, placed on 9/21, has sutures. Okayed by CNP to have #7 [place] as a back up trach.             MODE:  AC 12 450 +5 40% to 35%             FIO2:        ACTION:             THERAPIES: Alb/ Mucomyst neb BID               SUCTION: X4 for lg to mod brown thick, light Blue light green.                         FREQUENCY:                           AMOUNT:                           CONSISTENCY:                           COLOR:                SPONTANEOUS COUGH EFFORT/STRENGTH OF EFFORT (not elicited by suctioning):                               WEANING PHASE:  2                         WEAN MODE: TM 35% to 30% yo 40% 40L started at 0815                           WEAN TIME:                           END WEAN REASON:        RESPONSE:             BS:                VITAL SIGNS:                EMOTIONAL NEEDS / CONCERNS:                  RISK FOR SELF DECANNULATION:                          RISK DUE TO:                          INTERVENTION/S  IN PLACE IS/ARE:         NOTE / PLAN:   Goal Outcome Evaluation:     Pt had BDT by Speech yesterday, had immediate asp.      Trach still sutured in, TC feels sore. Pt has neck color.    Pt weaned on TM 30% 40L for 15hrs yesterday, plan tonight to extend couple hours more into noc.  Cont with current POC

## 2022-09-25 LAB
ALBUMIN SERPL BCG-MCNC: 2.6 G/DL (ref 3.5–5.2)
ALP SERPL-CCNC: 108 U/L (ref 40–129)
ALT SERPL W P-5'-P-CCNC: 41 U/L (ref 10–50)
ANION GAP SERPL CALCULATED.3IONS-SCNC: 9 MMOL/L (ref 7–15)
AST SERPL W P-5'-P-CCNC: 26 U/L (ref 10–50)
BACTERIA SPEC CULT: NORMAL
BILIRUB SERPL-MCNC: 0.8 MG/DL
BUN SERPL-MCNC: 12.3 MG/DL (ref 8–23)
CALCIUM SERPL-MCNC: 8.4 MG/DL (ref 8.2–9.6)
CHLORIDE SERPL-SCNC: 102 MMOL/L (ref 98–107)
CREAT SERPL-MCNC: 0.64 MG/DL (ref 0.67–1.17)
DEPRECATED HCO3 PLAS-SCNC: 25 MMOL/L (ref 22–29)
ERYTHROCYTE [DISTWIDTH] IN BLOOD BY AUTOMATED COUNT: 14.9 % (ref 10–15)
GFR SERPL CREATININE-BSD FRML MDRD: 89 ML/MIN/1.73M2
GLUCOSE BLDC GLUCOMTR-MCNC: 102 MG/DL (ref 70–99)
GLUCOSE BLDC GLUCOMTR-MCNC: 104 MG/DL (ref 70–99)
GLUCOSE BLDC GLUCOMTR-MCNC: 85 MG/DL (ref 70–99)
GLUCOSE BLDC GLUCOMTR-MCNC: 87 MG/DL (ref 70–99)
GLUCOSE BLDC GLUCOMTR-MCNC: 93 MG/DL (ref 70–99)
GLUCOSE BLDC GLUCOMTR-MCNC: 95 MG/DL (ref 70–99)
GLUCOSE SERPL-MCNC: 95 MG/DL (ref 70–99)
HCT VFR BLD AUTO: 33.2 % (ref 40–53)
HGB BLD-MCNC: 11.1 G/DL (ref 13.3–17.7)
MAGNESIUM SERPL-MCNC: 2 MG/DL (ref 1.7–2.3)
MCH RBC QN AUTO: 31.5 PG (ref 26.5–33)
MCHC RBC AUTO-ENTMCNC: 33.4 G/DL (ref 31.5–36.5)
MCV RBC AUTO: 94 FL (ref 78–100)
PHOSPHATE SERPL-MCNC: 2.5 MG/DL (ref 2.5–4.5)
PLATELET # BLD AUTO: 208 10E3/UL (ref 150–450)
POTASSIUM SERPL-SCNC: 4.4 MMOL/L (ref 3.4–5.3)
PROT SERPL-MCNC: 6.2 G/DL (ref 6.4–8.3)
RBC # BLD AUTO: 3.52 10E6/UL (ref 4.4–5.9)
SODIUM SERPL-SCNC: 136 MMOL/L (ref 136–145)
WBC # BLD AUTO: 11.2 10E3/UL (ref 4–11)

## 2022-09-25 PROCEDURE — 94003 VENT MGMT INPAT SUBQ DAY: CPT

## 2022-09-25 PROCEDURE — 250N000009 HC RX 250: Performed by: NURSE PRACTITIONER

## 2022-09-25 PROCEDURE — 250N000013 HC RX MED GY IP 250 OP 250 PS 637: Performed by: HOSPITALIST

## 2022-09-25 PROCEDURE — C9113 INJ PANTOPRAZOLE SODIUM, VIA: HCPCS | Performed by: HOSPITALIST

## 2022-09-25 PROCEDURE — 250N000011 HC RX IP 250 OP 636: Performed by: HOSPITALIST

## 2022-09-25 PROCEDURE — 258N000001 HC RX 258: Performed by: HOSPITALIST

## 2022-09-25 PROCEDURE — 83735 ASSAY OF MAGNESIUM: CPT | Performed by: HOSPITALIST

## 2022-09-25 PROCEDURE — 999N000009 HC STATISTIC AIRWAY CARE

## 2022-09-25 PROCEDURE — 999N000157 HC STATISTIC RCP TIME EA 10 MIN

## 2022-09-25 PROCEDURE — 36415 COLL VENOUS BLD VENIPUNCTURE: CPT | Performed by: HOSPITALIST

## 2022-09-25 PROCEDURE — 99233 SBSQ HOSP IP/OBS HIGH 50: CPT | Performed by: HOSPITALIST

## 2022-09-25 PROCEDURE — 120N000017 HC R&B RESPIRATORY CARE

## 2022-09-25 PROCEDURE — 80053 COMPREHEN METABOLIC PANEL: CPT | Performed by: HOSPITALIST

## 2022-09-25 PROCEDURE — 94640 AIRWAY INHALATION TREATMENT: CPT | Mod: 76

## 2022-09-25 PROCEDURE — 85027 COMPLETE CBC AUTOMATED: CPT | Performed by: HOSPITALIST

## 2022-09-25 PROCEDURE — 999N000253 HC STATISTIC WEANING TRIALS

## 2022-09-25 PROCEDURE — 84100 ASSAY OF PHOSPHORUS: CPT | Performed by: HOSPITALIST

## 2022-09-25 RX ADMIN — HEPARIN SODIUM 5000 UNITS: 5000 INJECTION, SOLUTION INTRAVENOUS; SUBCUTANEOUS at 03:50

## 2022-09-25 RX ADMIN — ALBUTEROL SULFATE 2.5 MG: 2.5 SOLUTION RESPIRATORY (INHALATION) at 18:05

## 2022-09-25 RX ADMIN — HEPARIN SODIUM 5000 UNITS: 5000 INJECTION, SOLUTION INTRAVENOUS; SUBCUTANEOUS at 11:11

## 2022-09-25 RX ADMIN — DEXTROSE AND SODIUM CHLORIDE: 5; 450 INJECTION, SOLUTION INTRAVENOUS at 05:53

## 2022-09-25 RX ADMIN — HEPARIN SODIUM 5000 UNITS: 5000 INJECTION, SOLUTION INTRAVENOUS; SUBCUTANEOUS at 19:47

## 2022-09-25 RX ADMIN — ACETYLCYSTEINE 2 ML: 200 SOLUTION ORAL; RESPIRATORY (INHALATION) at 06:23

## 2022-09-25 RX ADMIN — ACETAMINOPHEN 650 MG: 650 SUPPOSITORY RECTAL at 11:38

## 2022-09-25 RX ADMIN — MORPHINE SULFATE 2 MG: 2 INJECTION, SOLUTION INTRAMUSCULAR; INTRAVENOUS at 04:03

## 2022-09-25 RX ADMIN — PANTOPRAZOLE SODIUM 40 MG: 40 INJECTION, POWDER, FOR SOLUTION INTRAVENOUS at 08:25

## 2022-09-25 RX ADMIN — ALBUTEROL SULFATE 2.5 MG: 2.5 SOLUTION RESPIRATORY (INHALATION) at 06:20

## 2022-09-25 ASSESSMENT — ACTIVITIES OF DAILY LIVING (ADL)
ADLS_ACUITY_SCORE: 55

## 2022-09-25 NOTE — PLAN OF CARE
Problem: Plan of Care - These are the overarching goals to be used throughout the patient stay.    Goal: Plan of Care Review/Shift Note  9/24/2022 2314 by Consuelo Romero RN  Outcome: Ongoing, Not Progressing  Flowsheets (Taken 9/24/2022 2314)  Plan of Care Reviewed With:   patient   other (see comments)  Overall Patient Progress: declining  9/24/2022 2313 by Consuelo Romero RN  Outcome: Ongoing, Progressing     Problem: Malnutrition  Goal: Improved Nutritional Intake  9/24/2022 2314 by Consuelo Romero, RN  Outcome: Ongoing, Not Progressing  9/24/2022 2313 by Consuelo Romero RN  Outcome: Ongoing, Not Progressing   Goal Outcome Evaluation:    Plan of Care Reviewed With: patient, other (see comments)     Overall Patient Progress: declining     Alexandru continues to be without GT access, so received IV fluid today, but no tube feeding. He continues to have difficulty managing oral secretions, which need to be suctioned from his oral cavity. He is often drowsy, though this is his hospital baseline, per charge RN. He nods yes when awake and asked about pain, though his pain rating is difficult to ascertain. He nodded yes, that it was over 5, and then nodded yes when asked if it was 6/10. However, he appeared to doze soon after. He was given a tylenol suppository. His temp was 99.6 orally at the beginning of the shift, down to 99.1 before tylenol and then normal after tylenol. See VS flowsheet. Alexandru squeezes hands on command, etc, but also seems possibly confused at times, pulling at lines when his hands are untied for exercise.    Admission assessment could not be finished again tonight, because Alexandru had difficulty communicating and sustaining attention.

## 2022-09-25 NOTE — PLAN OF CARE
Goal Outcome Evaluation:                      Problem: Malnutrition  Goal: Improved Nutritional Intake  Outcome: Ongoing, Progressing     Problem: Restraint, Nonbehavioral (Nonviolent)  Goal: Absence of Harm or Injury  Outcome: Ongoing, Progressing  Intervention: Protect Dignity, Rights, and Personal Wellbeing  Recent Flowsheet Documentation  Taken 9/25/2022 1116 by Mary Chappell RN  Trust Relationship/Rapport: care explained  Intervention: Protect Skin and Joint Integrity  Recent Flowsheet Documentation  Taken 9/25/2022 1116 by Mary Chappell RN  Range of Motion: active ROM (range of motion) encouraged     Problem: Pain Acute  Goal: Acceptable Pain Control and Functional Ability  Outcome: Ongoing, Progressing  Patient slept on and off throughout the shift. All scheduled medications held awaiting GT replacement. Pt on IV fluids, tolerating well. Denied pain or discomfort, will continue plan of care. Bilateral soft restraints in place for safety.

## 2022-09-25 NOTE — PROGRESS NOTES
[facility]    Medicine Progress Note - Hospitalist Service    Date of Admission:  9/22/2022    Brief History:    Alexandru Still is a 89 year old man w/ PMH of Lambert-Eaton syndrome, hypothyroidism,  complete heart block s/p pacemaker, HTN, BPH,  PB, and DM type 2 who was admitted to the SICU on 9/15/2022 following an unwitnessed fall and striking his head. He was initially seen at an outside hospital and underwent a comprehensive trauma work up found to have a left frontal SDH (5mm) + C1 fx + Type II C2 odontoid fracture with a 1cm posterior displacement. He was  intubated for airway protection in the ED given high c-spine injury and difficulty managing his secretions. On admission he requested all cares without surgical cervical spine stabilization, agreeable to a tracheostomy and PEG. Multiple care conferences held with his family who confirmed his wishes.    [facility]:    9/24-9/26:  Speech evaluated patient and recommends NPO, cont tube feeds via PEG due to severe oropharyngeal dysphasia.  PT/OT, dietitian, woc nurse saw pt on 9/23.  Pulled out G tube during night of 9/23.  Pt now on soft wrist restraints.  IVF changed to D51/2 NS at low rate as pt has TAVR.  Morphine IV ordered prn pain.    Spoke w/ Dr. Brown- IR - recommends no hannah tube as this is a brand new PEG and placing blind it will likely not be in correct position.  He will not do a PEG placement on the weekend, scheduled for Monday.  Pt cannot be fed via NG tube as it is contraindicated with C1 and C2 fx.        Assessment & Plan               Traumatic 5mm left frontal lobe SDH  Posteriorly displaced (1 cm) Type II odontoid fx  C1 anterior arch fracture   Epidural hemorrhage of 7mm  Acute traumatic neck pain  Acute L 6th rib fracture  S/p fall  Facial abrasions with ecchymoses  RLE wound  He was seen and evaluated by Neurosurgery.  Pt refused surgical intervention.   A repeat head CT was obtained with a stable subdural hematoma. He was  placed in a cervical collar for the cervical fracture. No surgical intervention per patient preference.    -cont Bynum J -keep in place at all times  -tylenol, oxy, and morphine prn pain  -Neurosurg ok w/ heparin q8h for dvt prophy  -wound care following      -f/u Neurosurgery in 6 weeks with an upright XR of the cervical spine   -CT cervical spine in 3 months.     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 Failed bedside and radiology swallow. He completed 3 doses of Decadron for laryngeal edema.      -S/P tracheostomy 09/21/2022, Trach suture removal on 09/26  -pulm consult  -RT consult  -wean vent as per pulm   -bronchial hygeine w/ albuterol/mucomyst  -gentle diuresis with bumex     Hypertension   Complete heart block s/p PM placement  Nonrheumatic aortic valve stenosis s/p TAVR 2019  -monitor  -pt is currently normotensive so will hold off on antihypertensives  -echo on 9/16:  EF: 60-65%, no LV dysfunction     Severe orophayngeal dysphagia    S/P PEG   Severe protein calorie malnutrition  Hiatal hernia    -PEG tube placed 09/20. Pt ripped out PEG tube on night of 9/23  -hold TF : osmolite 1.5 and 60 ml/hr   dietitian following  -free water flush:  90 q4h  -protein modular q8h  -senna and dulcolax prn   -PPI for GI prophy  -IVF D51/2 NS at 50ml/hr    Hypophosphatemia   Hypomagnesia  Hypokalemia  S/p Lactic Acidosis  -Na phos IV 15mmol x 1 today, given 9mmol yesterday with little imrovement  -monitor  -replete prn       Hypothyroidism   -Continue Levothyroxine     Anemia of chronic disease  -hg trending down past few days  -cont to monitor     BPH  -cont doxazosin    DM type 2  -sliding scale insulin  -last HbA1:  6.2   -hypoglycemic protocol    Deconditioning  -PT/OT         Diet: Adult Formula Drip Feeding: Continuous Osmolite 1.5; Gastrostomy; Goal Rate: 60; mL/hr; Medication - Feeding Tube Flush Frequency: At least 15-30 mL water before and after medication administration and with tube clogging; Amount to Send (Nutrition...    DVT Prophylaxis: Heparin SQ  Escudero Catheter: Not present  Central Lines: None  Cardiac Monitoring: None  Code Status: Full Code      Disposition Plan     Expected Discharge Date: 10/11/2022,  6:00 PM  Discharge Delays: Complex Discharge  Placement - LTC    Discharge Comments: pt is complex with C1 and C2 fx.  Andreafski J collar on at all times, trach on vent, PEG tube was pulled out  at night on 9/23        The patient's care was discussed with the Bedside Nurse and Patient.    Neli Rubio MD  Hospitalist Service  [facility]  Securely message with the Vocera Web Console (learn more here)  Text page via [employer] Paging/Directory         Clinically Significant Risk Factors Present on Admission                 # Severe Malnutrition: based on nutrition assessment     ______________________________________________________________________    Interval History   Pt is complaining of pain at abdominal site where he pulled out PEG, wants pain meds.  He has no dyspnea, chest pain.  No nausea or vomiting    Data reviewed today: I reviewed all medications, new labs and imaging results over the last 24 hours.     Physical Exam   Vital Signs: Temp: 98.4  F (36.9  C) Temp src: Oral BP: (!) 147/65 Pulse: 71   Resp: 22 SpO2: 95 % O2 Device: (S) Trach dome Oxygen Delivery: 40 LPM  Weight: 191 lbs 12.8 oz  General:  No acute distress,awake and alert  Eyes:  Sclera non icteric, normal conjuctiva  Neck :  Andreafski J in place,  trach in place  Lungs:  Clear to auscultation bilaterally, air entry equal bilaterally, no rhonchi or rales  CVS:  S1 and S2, regular rate and rhythem, systolic murmur L sternal border  Abdomen:  Soft, nontender, tenderness w/ swelling around prior PEG tube site.     Musculoskeletal:  Swelling of R forearm and hand,   Neuro:  Alert and oriented No acute deficit,   Skin:  R supra orbital Abrasions and ecchymoses, RLE wound     Data   Recent Labs   Lab 09/25/22  0634 09/25/22  0558 09/25/22  0353 09/25/22  0002 09/24/22  1144 09/24/22  0638 09/23/22  1259 09/23/22  0833 09/22/22  1956 09/22/22  0435   WBC 11.2*  --   --   --   --  11.9*  --  13.0*  --  13.4*   HGB 11.1*  --   --   --   --  10.6*  --  10.9*  --  10.7*   MCV 94  --   --   --   --  94  --  96  --  97     --   --   --   --  182  --  211  --  196   NA  --   --   --   --   --  137  --  142  --  139   POTASSIUM  --   --   --   --   --  4.1  --  4.3  --  4.4   CHLORIDE  --   --   --   --   --  104  --  108*  --  110*   CO2  --   --   --   --   --  27  --  25  --  19*   BUN  --   --   --   --   --  16.0  --  18.5  --  20.9   CR  --   --   --   --   --  0.61*  --  0.70  --  0.67   ANIONGAP  --   --   --   --   --  6*  --  9  --  10   TITA  --   --   --   --   --  8.2  --  8.3  --  8.2   GLC  --  85 93 87   < > 100*   < > 148*   < > 124*   ALBUMIN  --   --   --   --   --  2.6*  --   --   --   --    PROTTOTAL  --   --   --   --   --  5.9*  --   --   --   --    BILITOTAL  --   --   --   --   --  0.7  --   --   --   --    ALKPHOS  --   --   --   --   --  116  --   --   --   --    ALT  --   --   --   --   --  61*  --   --   --   --    AST  --   --   --   --   --  38  --   --   --   --     < > = values in this interval not displayed.     No results found for this or any previous visit (from the past 24 hour(s)).  Medications     dextrose 5% and 0.45% NaCl 50 mL/hr at 09/25/22 0553     - MEDICATION INSTRUCTIONS -         acetylcysteine  2 mL Nebulization 2 times  daily     albuterol  2.5 mg Nebulization 2 times daily     [Held by provider] bumetanide  0.5 mg Per Feeding Tube Daily     calcium carbonate  1,250 mg Oral or Feeding Tube BID w/meals     cholecalciferol  25 mcg Oral or Feeding Tube Daily     doxazosin  2 mg Oral or Feeding Tube Daily     heparin ANTICOAGULANT  5,000 Units Subcutaneous Q8H     insulin aspart  1-6 Units Subcutaneous Q6H     levothyroxine  112 mcg Oral or Feeding Tube QAM AC     multivitamins w/minerals  15 mL Per Feeding Tube Daily     pantoprazole  40 mg Intravenous Daily with breakfast     protein modular  2 packet Per Feeding Tube TID     sennosides  5 mL Oral or Feeding Tube BID

## 2022-09-25 NOTE — PROGRESS NOTES
More edema, low grade temps, discussed with MD.  Awaiting placement of tube. Also, discussed with Son in law and patient would want a feeding tube he said.  Stacey Bui RN

## 2022-09-25 NOTE — PLAN OF CARE
Problem: Device-Related Complication Risk (Mechanical Ventilation, Invasive)  Goal: Optimal Device Function  Outcome: Ongoing, Progressing     Problem: Inability to Wean (Mechanical Ventilation, Invasive)  Goal: Mechanical Ventilation Liberation  Outcome: Ongoing, Progressing     Problem: Skin and Tissue Injury (Mechanical Ventilation, Invasive)  Goal: Absence of Device-Related Skin and Tissue Injury  Outcome: Ongoing, Progressing     Problem: Ventilator-Induced Lung Injury (Mechanical Ventilation, Invasive)  Goal: Absence of Ventilator-Induced Lung Injury  Outcome: Ongoing, Progressing     RT PROGRESS NOTE     DATA:     CURRENT SETTINGS:             TRACH TYPE / SIZE:  # 6 Shiley placed 9/21             MODE:   AC 12/450/+5             FIO2:   40%     ACTION:             THERAPIES:                SUCTION:                           FREQUENCY:   3                        AMOUNT:   moderate to large                        CONSISTENCY:   thickk                         COLOR:   thick white--              SPONTANEOUS COUGH EFFORT/STRENGTH OF EFFORT (not elicited by suctioning):  Had very good cough brings up most of the secretions on his own.                              WEANING PHASE:   2                        WEAN MODE: TM 40%/40L- with cuff up.                        WEAN TIME: since this morning at 0701 AM.     END WEAN REASON:  ongoing,      RESPONSE:             BS:   crs             VITAL SIGNS: Blood pressure (!) 142/67, pulse 74, temperature 99.2  F (37.3  C), temperature source Axillary, resp. rate 20, weight 87 kg (191 lb 12.8 oz), SpO2 99 %.                   EMOTIONAL NEEDS / CONCERNS:                  RISK FOR SELF DECANNULATION:  no                        RISK DUE TO:                          INTERVENTION/S IN PLACE IS/ARE:         NOTE / PLAN: Patient has been weaning on TM/PMV since this morning at 0701 AM and is tolerating well. Plan: per order:TM/cuff up days, vent at night.  Wean slowly into the  night.

## 2022-09-25 NOTE — PLAN OF CARE
Goal Outcome Evaluation: VSS, pt sleeping well at start of shift, incontinent of lg soft BM & lg amount of urine. Kelle care done, kelle area skin is fragile and intact. Alert during care and denied pain. Trach & Wythe J collar which is Definite barrier to communication. Pt admitted to pain in his neck & generalized. Rated pain 7 via head nod/ shake. Pt's other pain med is tylenol suppositories.  Per Dr Beatty's note of 9/22/22. Charge RN informed. Bilateral wrist restraints in place. Generalized edema.  G-tube needs replacement vs other. Continue to monitor. Decisions need to be made by pt and family. Continue to monitor

## 2022-09-25 NOTE — PLAN OF CARE
Problem: Device-Related Complication Risk (Mechanical Ventilation, Invasive)  Goal: Optimal Device Function  Outcome: Ongoing, Progressing     Problem: Inability to Wean (Mechanical Ventilation, Invasive)  Goal: Mechanical Ventilation Liberation  Outcome: Ongoing, Progressing     Problem: Skin and Tissue Injury (Mechanical Ventilation, Invasive)  Goal: Absence of Device-Related Skin and Tissue Injury  Outcome: Ongoing, Progressing     Problem: Ventilator-Induced Lung Injury (Mechanical Ventilation, Invasive)  Goal: Absence of Ventilator-Induced Lung Injury  Outcome: Ongoing, Progressing                RT PROGRESS NOTE     DATA:     CURRENT SETTINGS:             TRACH TYPE / SIZE:  # 6 Shiley placed 9/21             MODE:   AC 12/450/+5             FIO2:   40%     ACTION:             THERAPIES:                SUCTION:                           FREQUENCY:   3                        AMOUNT:   large                         CONSISTENCY:   thickk                         COLOR:   thick white--              SPONTANEOUS COUGH EFFORT/STRENGTH OF EFFORT (not elicited by suctioning):  Had very good cough brings up most of the secretions on his own.                              WEANING PHASE:   2                        WEAN MODE:    tm                        WEAN TIME:   placed on vent at night @ 0103 pt wean on tm for 18hrs  and 15mis    END WEAN REASON:  Rest at night     RESPONSE:             BS:   crs             VITAL SIGNS:  Blood pressure (!) 147/65, pulse 69, temperature 98.4  F (36.9  C), temperature source Oral, resp. rate 24, weight 87 kg (191 lb 12.8 oz), SpO2 98 %.                 EMOTIONAL NEEDS / CONCERNS:                  RISK FOR SELF DECANNULATION:  no                        RISK DUE TO:                          INTERVENTION/S IN PLACE IS/ARE:         NOTE / PLAN:   tolerating vent well. Tm days.

## 2022-09-26 ENCOUNTER — APPOINTMENT (OUTPATIENT)
Dept: OCCUPATIONAL THERAPY | Facility: CLINIC | Age: 87
DRG: 208 | End: 2022-09-26
Attending: INTERNAL MEDICINE
Payer: MEDICARE

## 2022-09-26 ENCOUNTER — MEDICAL CORRESPONDENCE (OUTPATIENT)
Dept: HEALTH INFORMATION MANAGEMENT | Facility: CLINIC | Age: 87
End: 2022-09-26

## 2022-09-26 ENCOUNTER — APPOINTMENT (OUTPATIENT)
Dept: PHYSICAL THERAPY | Facility: CLINIC | Age: 87
DRG: 208 | End: 2022-09-26
Attending: INTERNAL MEDICINE
Payer: MEDICARE

## 2022-09-26 ENCOUNTER — ANCILLARY PROCEDURE (OUTPATIENT)
Dept: GENERAL RADIOLOGY | Facility: CLINIC | Age: 87
DRG: 208 | End: 2022-09-26
Attending: NURSE PRACTITIONER
Payer: MEDICARE

## 2022-09-26 LAB
ALBUMIN SERPL BCG-MCNC: 2.8 G/DL (ref 3.5–5.2)
ALP SERPL-CCNC: 124 U/L (ref 40–129)
ALT SERPL W P-5'-P-CCNC: 34 U/L (ref 10–50)
ANION GAP SERPL CALCULATED.3IONS-SCNC: 9 MMOL/L (ref 7–15)
AST SERPL W P-5'-P-CCNC: 21 U/L (ref 10–50)
BILIRUB SERPL-MCNC: 1 MG/DL
BUN SERPL-MCNC: 10.2 MG/DL (ref 8–23)
CALCIUM SERPL-MCNC: 8.6 MG/DL (ref 8.2–9.6)
CHLORIDE SERPL-SCNC: 97 MMOL/L (ref 98–107)
CREAT SERPL-MCNC: 0.63 MG/DL (ref 0.67–1.17)
DEPRECATED HCO3 PLAS-SCNC: 25 MMOL/L (ref 22–29)
ERYTHROCYTE [DISTWIDTH] IN BLOOD BY AUTOMATED COUNT: 14.4 % (ref 10–15)
GFR SERPL CREATININE-BSD FRML MDRD: 89 ML/MIN/1.73M2
GLUCOSE BLDC GLUCOMTR-MCNC: 100 MG/DL (ref 70–99)
GLUCOSE BLDC GLUCOMTR-MCNC: 104 MG/DL (ref 70–99)
GLUCOSE BLDC GLUCOMTR-MCNC: 97 MG/DL (ref 70–99)
GLUCOSE SERPL-MCNC: 98 MG/DL (ref 70–99)
HCT VFR BLD AUTO: 34.6 % (ref 40–53)
HGB BLD-MCNC: 11.6 G/DL (ref 13.3–17.7)
MAGNESIUM SERPL-MCNC: 2 MG/DL (ref 1.7–2.3)
MCH RBC QN AUTO: 31.4 PG (ref 26.5–33)
MCHC RBC AUTO-ENTMCNC: 33.5 G/DL (ref 31.5–36.5)
MCV RBC AUTO: 94 FL (ref 78–100)
PHOSPHATE SERPL-MCNC: 2.4 MG/DL (ref 2.5–4.5)
PLATELET # BLD AUTO: 271 10E3/UL (ref 150–450)
POTASSIUM SERPL-SCNC: 3.9 MMOL/L (ref 3.4–5.3)
PROT SERPL-MCNC: 6.5 G/DL (ref 6.4–8.3)
RBC # BLD AUTO: 3.7 10E6/UL (ref 4.4–5.9)
SODIUM SERPL-SCNC: 131 MMOL/L (ref 136–145)
WBC # BLD AUTO: 10.9 10E3/UL (ref 4–11)

## 2022-09-26 PROCEDURE — 999N000157 HC STATISTIC RCP TIME EA 10 MIN

## 2022-09-26 PROCEDURE — 999N000123 HC STATISTIC OXYGEN O2DAILY TECH TIME

## 2022-09-26 PROCEDURE — 97530 THERAPEUTIC ACTIVITIES: CPT | Mod: GP | Performed by: PHYSICAL THERAPIST

## 2022-09-26 PROCEDURE — 36569 INSJ PICC 5 YR+ W/O IMAGING: CPT

## 2022-09-26 PROCEDURE — 258N000003 HC RX IP 258 OP 636: Performed by: HOSPITALIST

## 2022-09-26 PROCEDURE — C9113 INJ PANTOPRAZOLE SODIUM, VIA: HCPCS | Performed by: HOSPITALIST

## 2022-09-26 PROCEDURE — 999N000009 HC STATISTIC AIRWAY CARE

## 2022-09-26 PROCEDURE — 250N000009 HC RX 250: Performed by: NURSE PRACTITIONER

## 2022-09-26 PROCEDURE — 250N000011 HC RX IP 250 OP 636: Performed by: HOSPITALIST

## 2022-09-26 PROCEDURE — 94003 VENT MGMT INPAT SUBQ DAY: CPT

## 2022-09-26 PROCEDURE — 94640 AIRWAY INHALATION TREATMENT: CPT

## 2022-09-26 PROCEDURE — 80053 COMPREHEN METABOLIC PANEL: CPT | Performed by: HOSPITALIST

## 2022-09-26 PROCEDURE — 250N000009 HC RX 250: Performed by: FAMILY MEDICINE

## 2022-09-26 PROCEDURE — 84100 ASSAY OF PHOSPHORUS: CPT | Performed by: HOSPITALIST

## 2022-09-26 PROCEDURE — 94003 VENT MGMT INPAT SUBQ DAY: CPT | Performed by: NURSE PRACTITIONER

## 2022-09-26 PROCEDURE — 97530 THERAPEUTIC ACTIVITIES: CPT | Mod: GO | Performed by: OCCUPATIONAL THERAPIST

## 2022-09-26 PROCEDURE — 94640 AIRWAY INHALATION TREATMENT: CPT | Mod: 76

## 2022-09-26 PROCEDURE — 85027 COMPLETE CBC AUTOMATED: CPT | Performed by: HOSPITALIST

## 2022-09-26 PROCEDURE — 99233 SBSQ HOSP IP/OBS HIGH 50: CPT | Performed by: HOSPITALIST

## 2022-09-26 PROCEDURE — 36415 COLL VENOUS BLD VENIPUNCTURE: CPT | Performed by: HOSPITALIST

## 2022-09-26 PROCEDURE — 120N000017 HC R&B RESPIRATORY CARE

## 2022-09-26 PROCEDURE — 97110 THERAPEUTIC EXERCISES: CPT | Mod: GO | Performed by: OCCUPATIONAL THERAPIST

## 2022-09-26 PROCEDURE — 250N000009 HC RX 250: Performed by: HOSPITALIST

## 2022-09-26 PROCEDURE — 83735 ASSAY OF MAGNESIUM: CPT | Performed by: HOSPITALIST

## 2022-09-26 PROCEDURE — 97112 NEUROMUSCULAR REEDUCATION: CPT | Mod: GP | Performed by: PHYSICAL THERAPIST

## 2022-09-26 PROCEDURE — 71045 X-RAY EXAM CHEST 1 VIEW: CPT

## 2022-09-26 PROCEDURE — 999N000253 HC STATISTIC WEANING TRIALS

## 2022-09-26 PROCEDURE — 258N000001 HC RX 258: Performed by: HOSPITALIST

## 2022-09-26 PROCEDURE — 272N000727 HC KIT, CATH 5FR  DUAL LUMEN POWERMIDLINE

## 2022-09-26 PROCEDURE — 250N000013 HC RX MED GY IP 250 OP 250 PS 637: Performed by: HOSPITALIST

## 2022-09-26 RX ORDER — LIDOCAINE 40 MG/G
CREAM TOPICAL
Status: DISCONTINUED | OUTPATIENT
Start: 2022-09-26 | End: 2022-09-28

## 2022-09-26 RX ORDER — LIDOCAINE 40 MG/G
CREAM TOPICAL
Status: DISCONTINUED | OUTPATIENT
Start: 2022-09-26 | End: 2022-10-28 | Stop reason: HOSPADM

## 2022-09-26 RX ORDER — HYDRALAZINE HYDROCHLORIDE 20 MG/ML
5 INJECTION INTRAMUSCULAR; INTRAVENOUS EVERY 4 HOURS PRN
Status: DISCONTINUED | OUTPATIENT
Start: 2022-09-26 | End: 2022-10-28 | Stop reason: HOSPADM

## 2022-09-26 RX ADMIN — DEXTROSE AND SODIUM CHLORIDE: 5; 450 INJECTION, SOLUTION INTRAVENOUS at 03:36

## 2022-09-26 RX ADMIN — SODIUM PHOSPHATE, MONOBASIC, MONOHYDRATE 15 MMOL: 276; 142 INJECTION, SOLUTION INTRAVENOUS at 12:46

## 2022-09-26 RX ADMIN — HYDROCORTISONE SODIUM SUCCINATE 200 MG: 100 INJECTION, POWDER, FOR SOLUTION INTRAMUSCULAR; INTRAVENOUS at 20:24

## 2022-09-26 RX ADMIN — MORPHINE SULFATE 2 MG: 2 INJECTION, SOLUTION INTRAMUSCULAR; INTRAVENOUS at 11:31

## 2022-09-26 RX ADMIN — ACETYLCYSTEINE 2 ML: 200 SOLUTION ORAL; RESPIRATORY (INHALATION) at 19:55

## 2022-09-26 RX ADMIN — ACETYLCYSTEINE 2 ML: 200 SOLUTION ORAL; RESPIRATORY (INHALATION) at 07:16

## 2022-09-26 RX ADMIN — PANTOPRAZOLE SODIUM 40 MG: 40 INJECTION, POWDER, FOR SOLUTION INTRAVENOUS at 10:18

## 2022-09-26 RX ADMIN — ACETAMINOPHEN 650 MG: 650 SUPPOSITORY RECTAL at 10:22

## 2022-09-26 RX ADMIN — ALBUTEROL SULFATE 2.5 MG: 2.5 SOLUTION RESPIRATORY (INHALATION) at 07:15

## 2022-09-26 RX ADMIN — LIDOCAINE HYDROCHLORIDE 2 ML: 10 INJECTION, SOLUTION EPIDURAL; INFILTRATION; INTRACAUDAL; PERINEURAL at 18:08

## 2022-09-26 RX ADMIN — ALBUTEROL SULFATE 2.5 MG: 2.5 SOLUTION RESPIRATORY (INHALATION) at 19:54

## 2022-09-26 ASSESSMENT — ACTIVITIES OF DAILY LIVING (ADL)
ADLS_ACUITY_SCORE: 55

## 2022-09-26 NOTE — PLAN OF CARE
Problem: Device-Related Complication Risk (Mechanical Ventilation, Invasive)  Goal: Optimal Device Function  Outcome: Ongoing, Progressing  Intervention: Optimize Device Care and Function  Recent Flowsheet Documentation  Taken 9/25/2022 2100 by Mary Chappell RN  Airway Safety Measures: all equipment/monitors on and audible  Taken 9/25/2022 1116 by Mary Chappell RN  Airway Safety Measures: all equipment/monitors on and audible     Problem: Malnutrition  Goal: Improved Nutritional Intake  Outcome: Ongoing, Progressing     Problem: Restraint, Nonbehavioral (Nonviolent)  Goal: Absence of Harm or Injury  9/25/2022 2232 by Mary Chappell RN  Outcome: Ongoing, Progressing  9/25/2022 2232 by Mary Chappell RN  Outcome: Ongoing, Progressing  9/25/2022 1712 by Mary Chappell RN  Outcome: Ongoing, Progressing  Intervention: Protect Dignity, Rights, and Personal Wellbeing  Recent Flowsheet Documentation  Taken 9/25/2022 2100 by Mary Chappell RN  Trust Relationship/Rapport: care explained  Taken 9/25/2022 1116 by Mary Chappell RN  Trust Relationship/Rapport: care explained  Intervention: Protect Skin and Joint Integrity  Recent Flowsheet Documentation  Taken 9/25/2022 2100 by Mary Chappell RN  Range of Motion: active ROM (range of motion) encouraged  Taken 9/25/2022 1116 by Mary Chappell RN  Range of Motion: active ROM (range of motion) encouraged     Problem: Pain Acute  Goal: Acceptable Pain Control and Functional Ability  9/25/2022 2232 by Mary Chappell RN  Outcome: Ongoing, Progressing  9/25/2022 2232 by Mary Chappell RN  Outcome: Ongoing, Progressing  9/25/2022 1712 by Mary Chappell RN  Outcome: Ongoing, Progressing  Intervention: Prevent or Manage Pain  Recent Flowsheet Documentation  Taken 9/25/2022 2100 by Mary Chappell RN  Medication Review/Management: medications reviewed   Goal  Outcome Evaluation:  Patient stable, remain on IV fluids pending GT tube replacement.  Generalized edema which is not new, Md aware. Vital signs stable except temp of 99.2, rechecked for 98.2. No acute distress noted, will continue to monitor.

## 2022-09-26 NOTE — PROGRESS NOTES
09/23/22 1105   Quick Adds   Type of Visit Initial Occupational Therapy Evaluation   Living Environment   People in Home alone   Current Living Arrangements condominium   Home Accessibility   (elevator)   Transportation Anticipated agency   Living Environment Comments Per chart, walker, cane and PCA prior to hosp   Self-Care   Usual Activity Tolerance good   Current Activity Tolerance poor   Equipment Currently Used at Home wheelchair, manual   Fall history within last six months yes   Number of times patient has fallen within last six months 3   Activity/Exercise/Self-Care Comment Per chart, PCA assist for ADLs in AM and PM. High falls risk due to Lambert-Eaton syndrome. Pt was in TCU this summer and was recommended to have 24/7 assist, which pt decided against per chart   Instrumental Activities of Daily Living (IADL)   IADL Comments unclear   General Information   Onset of Illness/Injury or Date of Surgery 09/15/22   Referring Physician Neli Rubio MD   Patient/Family Therapy Goal Statement (OT) unable to state- uncooperative   Additional Occupational Profile Info/Pertinent History of Current Problem 89 year old man w/ PMH of Lambert-Eaton syndrome, hypothyroidism,  complete heart block s/p pacemaker, HTN, BPH,  PB, and DM type 2 who was admitted to the SICU on 9/15/2022 following an unwitnessed fall and striking his head. He was initially seen at an outside hospital and underwent a comprehensive trauma work up found to have a left frontal SDH (5mm) + C1 fx + Type II C2 odontoid fracture with a 1cm posterior displacement. He was  intubated for airway protection in the ED given high c-spine injury and difficulty managing his secretions. On admission he requested all cares without surgical cervical spine stabilization, agreeable to a tracheostomy and PEG. Multiple care conferences held with his family who confirmed his wishes.   Existing Precautions/Restrictions brace worn when out of  bed;fall;pacemaker  (rib fx, chronic hip/spine pain)   Limitations/Impairments safety/cognitive   Left Upper Extremity (Weight-bearing Status) full weight-bearing (FWB)   Right Upper Extremity (Weight-bearing Status) full weight-bearing (FWB)   Left Lower Extremity (Weight-bearing Status) full weight-bearing (FWB)   Right Lower Extremity (Weight-bearing Status) full weight-bearing (FWB)   General Observations and Info Pt presenting w/ very limited functional ability d/t age, multiple truama(fx througout UB). Needs total A at this time w/ ADL   Cognitive Status Examination   Orientation Status place   Behavioral Issues uncooperative;verbal outbursts;other (see comments)  (pt frustrated with eval tasks- squeezing TH arm. Redirected easily)   Affect/Mental Status (Cognitive) agitated;confused   Follows Commands follows one-step commands;0-24% accuracy   Safety Deficit unable/difficult to assess   Cognitive Status Comments will cont to assess   Visual Perception   Visual Impairment/Limitations WFL   Sensory   Sensory Comments appears WFL   Posture   Posture Comments poor posture, limited by brace   Range of Motion Comprehensive   Comment, General Range of Motion BUE PROM/AROM WFL, however limited by neck brace for shld flex   Strength Comprehensive (MMT)   Comment, General Manual Muscle Testing (MMT) Assessment Grossly BUE 3-4/5   Muscle Tone Assessment   Muscle Tone Quick Adds No deficits were identified   Coordination   Coordination Comments will need to assess further when more cooperative   Bed Mobility   Comment (Bed Mobility) total A for rolling   Transfers   Transfer Comments dep   Clinical Impression   Criteria for Skilled Therapeutic Interventions Met (OT) Yes, treatment indicated   OT Diagnosis decreaed IND for ADL   OT Problem List-Impairments impacting ADL problems related to;balance;cognition;coordination;fear & anxiety;flexibility;mobility;range of motion (ROM);strength;pain;post-surgical precautions    Assessment of Occupational Performance 3-5 Performance Deficits   Identified Performance Deficits dressing, bathing, toileting, bed mobility, basic trf   Planned Therapy Interventions (OT) ADL retraining;balance training;bed mobility training;cognition;motor coordination training;ROM;strengthening;transfer training;progressive activity/exercise   Clinical Decision Making Complexity (OT) moderate complexity   Risk & Benefits of therapy have been explained evaluation/treatment results reviewed;patient   Clinical Impression Comments Pt presenting below baseline for ADL and safe functional mobility. Limited by decresaed strength/activity tolerance and cognition. Approp for skilled OT   OT Discharge Planning   OT Discharge Recommendation (DC Rec) Transitional Care Facility   OT Rationale for DC Rec Pt presenting below baseline with functional skills and cognition. Has support at home. Approp for skilled OT trial.   OT Brief overview of current status OT: Eval completed. Preseting w/ decreased strength/activity tolerance and cognitive skills for daily tasks. Approp for skilled OT to addressthese areas.   Total Evaluation Time (Minutes)   Total Evaluation Time (Minutes) 10   OT Goals   Therapy Frequency (OT) 5 times/wk   OT Predicted Duration/Target Date for Goal Attainment 10/28/22   OT Goals Hygiene/Grooming;Upper Body Dressing;Upper Body Bathing;Bed Mobility;Toilet Transfer/Toileting;Cognition;OT Goal 1   OT: Hygiene/Grooming minimal assist;from wheelchair   OT: Upper Body Dressing Minimal assist;from wheelchair   OT: Upper Body Bathing Minimal assist   OT: Bed Mobility Minimal assist;rolling;supine to/from sitting   OT: Toilet Transfer/Toileting Moderate assist;using adaptive equipment;Maximum assist;toilet transfer   OT: Cognitive Patient/caregiver will verbalize understanding of cognitive assessment results/recommendations as needed for safe discharge planning   OT: Goal 1 Pt to tolerate 20+ minutes of  activity/exerc to increase IND for ADL/safe trf skills with report of only minimal fatigue.

## 2022-09-26 NOTE — PROGRESS NOTES
Attempted to place PIV x2 after warm pack but unsuccessful, Pt is a very difficult start has bilateral  edema in both arms. Primary nurse notified MD per text.

## 2022-09-26 NOTE — PROGRESS NOTES
D:Pt has a Pacer in the left side and unable to place in the right since 6- had a PICC on right that we could not place in the PICC so IR did it   A: Will come over and do a IV  R:Pt nurse will inform Dr and get IR placed PICC

## 2022-09-26 NOTE — PLAN OF CARE
Problem: Device-Related Complication Risk (Mechanical Ventilation, Invasive)  Goal: Optimal Device Function  Outcome: Ongoing, Progressing  Intervention: Optimize Device Care and Function  Recent Flowsheet Documentation  Taken 9/25/2022 2255 by Tim Reno RT  Airway Safety Measures: all equipment/monitors on and audible  Taken 9/25/2022 2034 by Tim Reno RT  Airway Safety Measures: all equipment/monitors on and audible     Problem: Inability to Wean (Mechanical Ventilation, Invasive)  Goal: Mechanical Ventilation Liberation  Outcome: Ongoing, Progressing     Problem: Skin and Tissue Injury (Mechanical Ventilation, Invasive)  Goal: Absence of Device-Related Skin and Tissue Injury  Outcome: Ongoing, Progressing     Problem: Ventilator-Induced Lung Injury (Mechanical Ventilation, Invasive)  Goal: Absence of Ventilator-Induced Lung Injury  Outcome: Ongoing, Progressing       RT PROGRESS NOTE     DATA:     CURRENT SETTINGS: 12, 450, +5             TRACH TYPE / SIZE: #6 Shiley Taper Guard, placed on 09/21.             MODE: AC              FIO2: 40%     ACTION:             THERAPIES: Alb/Mucomyst BID              SUCTION:                           FREQUENCY:  X4                        AMOUNT: Small                        CONSISTENCY: thick                        COLOR: white yellow              SPONTANEOUS COUGH EFFORT/STRENGTH OF EFFORT (not elicited by suctioning): good productive                            WEANING PHASE: 2                        WEAN MODE: 30%/35L TM/Cuff up days, Vent @noc                         WEAN TIME: 21 hours and 20 min                        END WEAN REASON:  end of designated trial      RESPONSE:             BS: coarse              VITAL SIGNS: BP (!) 144/67 (BP Location: Right arm)   Pulse 65   Temp 99.3  F (37.4  C) (Oral)   Resp 20   Wt 87 kg (191 lb 12.8 oz)   SpO2 94%   BMI 29.16 kg/m               EMOTIONAL NEEDS / CONCERNS:                  RISK FOR SELF  DECANNULATION:                          RISK DUE TO:                          INTERVENTION/S IN PLACE IS/ARE:         NOTE / PLAN:  Pt on vent. Wean on TM till 4:20 AM, tolerating it well. Will cont to monitor.

## 2022-09-26 NOTE — CONSULTS
Interventional Radiology - CHART REVIEW consultation Note:  Inpatient - [facility]: Interventional Radiology   [phone]  9/26/2022    Reason for Consult:  Pulled gastrostomy tube   Requesting Provider:  Neli Rubio MD    HPI:  Alexandru Still is a 89 year old male admitted after fall. Underwent G tube placement with surgery 9/20. Notes to have pulled G tube out during the night of 9/23 in need of replacement.       IMAGING:    EXAM: XR ABDOMEN PORT 1 VIEW  9/18/2022 7:31 PM      HISTORY:  not able to push fluids through NGT - rechecking positioning         COMPARISON:  Earlier same day abdominal radiograph obtained at 1654  hours     FINDINGS: Supine radiograph of the abdomen. Feeding tube with tip in  the right side of the abdomen possibly within the stomach versus first  portion of the duodenum. Nonspecific, nonobstructive bowel gas  pattern. No pneumatosis or portal venous gas. The visualized lung  bases are clear. No acute osseous abnormality. Degenerative changes to  the spine with spinal fusion device overlying the L4-5.                                                                      IMPRESSION: Feeding tube with tip overlying the right side of the  abdomen with the tip possibly within the stomach versus first portion  of the duodenum, consider further advancement if postpyloric position  is desired.  Non obstructive bowel gas pattern.     I have personally reviewed the examination and initial interpretation  and I agree with the findings.     ANICETO QUIGLEY MD       PAST MEDICAL HISTORY:  Past Medical History:   Diagnosis Date     Anemia      Aortic stenosis      BPH (benign prostatic hyperplasia)      Closed T12 fracture      Complete heart block     s/p dual chamber pacemaker     Hypertension      Hypothyroidism      Kidney stone      Lambert-Eaton myasthenic syndrome      Lower extremity edema        PAST SURGICAL HISTORY:  Past Surgical History:   Procedure  Laterality Date     CV TRANSCATHETER AORTIC VALVE REPLACEMENT TRANSAORTIC APPROACH       EP PACEMAKER       ESOPHAGOGASTRODUODENOSCOPY, WITH NASOGASTRIC TUBE INSERTION N/A 9/18/2022    Procedure: ESOPHAGOGASTRODUODENOSCOPY, WITH NASOJEJUNAL TUBE INSERTION ;  Surgeon: Nils Drew MD;  Location: [facility] GI     HERNIA REPAIR       IR PICC PLACEMENT > 5 YRS OF AGE  6/15/2022     LASER HOLMIUM LITHOTRIPSY URETER(S), INSERT STENT, COMBINED N/A 6/15/2022    Procedure: 1. Cystourethroscopy 2. Laser lithotripsy of a urethral stone 3. Basketing of a urethral stone 4. Complex hannah catheter placement over a wire;  Surgeon: Beny Ha MD;  Location: [facility] OR     TRACHEOSTOMY N/A 9/21/2022    Procedure: CREATION, TRACHEOSTOMY;  Surgeon: Bob Dill MD;  Location: [facility] OR       ALLERGIES:  Allergies   Allergen Reactions     Cefuroxime Hives     Contrast Dye      Gadodiamide Hives        MEDICATIONS:  Current Facility-Administered Medications   Medication     acetaminophen (TYLENOL) solution 650 mg     acetaminophen (TYLENOL) Suppository 650 mg     acetylcysteine (MUCOMYST) 20 % nebulizer solution 2 mL     albuterol (PROVENTIL) neb solution 2.5 mg     albuterol (PROVENTIL) neb solution 2.5 mg     bisacodyl (DULCOLAX) suppository 10 mg     calcium carbonate suspension 1,250 mg     cholecalciferol (D-VI-SOL, Vitamin D3) 10 mcg/mL (400 units/mL) liquid 25 mcg     dextrose 5% and 0.45% NaCl infusion     glucose gel 15-30 g    Or     dextrose 50 % injection 25-50 mL    Or     glucagon injection 1 mg     doxazosin (CARDURA) suspension 2 mg     [Held by provider] heparin ANTICOAGULANT injection 5,000 Units     hydrALAZINE (APRESOLINE) injection 5 mg     insulin aspart (NovoLOG) injection (RAPID ACTING)     levothyroxine (SYNTHROID/LEVOTHROID) tablet 112 mcg     miconazole (MICATIN) 2 % powder     morphine (PF) injection 2 mg     multivitamins w/minerals liquid 15 mL     naloxone (NARCAN) injection 0.2 mg    Or      naloxone (NARCAN) injection 0.4 mg     oxyCODONE (ROXICODONE) solution 5 mg     pantoprazole (PROTONIX) IV push injection 40 mg     Patient is already receiving anticoagulation with heparin, enoxaparin (LOVENOX), warfarin (COUMADIN)  or other anticoagulant medication     protein modular (PROSOURCE TF) 2 packet     QUEtiapine (SEROquel) tablet 25 mg     sennosides (SENOKOT) syrup 5 mL     sodium phosphate 15 mmol in D5W 250 mL intermittent infusion        LABS:  INR   Date Value Ref Range Status   09/15/2022 1.06 0.85 - 1.15 Final      Hemoglobin   Date Value Ref Range Status   09/26/2022 11.6 (L) 13.3 - 17.7 g/dL Final     Platelet Count   Date Value Ref Range Status   09/26/2022 271 150 - 450 10e3/uL Final     Creatinine   Date Value Ref Range Status   09/26/2022 0.63 (L) 0.67 - 1.17 mg/dL Final     Potassium   Date Value Ref Range Status   09/26/2022 3.9 3.4 - 5.3 mmol/L Final   06/23/2022 4.0 3.4 - 5.3 mmol/L Final         ASSESSMENT:  89yo male with surgically placed G tube 9/20 - pulled out 9/23    - Reviewed case and imaging with Dr. Santiago.  - IR does not have window for percutaneous feeding tube placement 2/2 patient's large hiatal hernia.      PLAN:    IR unable to replace gastrostomy tube 2/2 pt large hiatal hernia.     Recommend proceeding with surgery consultation for replacement.    Discussed recommendations with Dr. Rubio.      Thank you for kindly for this consultation.     Total time spent on the date of the encounter is 25 minutes, including time spent counseling the patient, performing a medically appropriate evaluation, reviewing prior medical history, ordering medications and tests, documenting clinical information in the medical record, and communication of results.    Rosalia Beal, APRN CNP  Interventional Radiology  [phone]      E/M codes for reference only:  93359

## 2022-09-26 NOTE — PROGRESS NOTES
09/26/22 1600   Name of Certified Therapeutic Rec Specialist   Name of Certified Therapeutic Rec Specialist JOB Morelos   Appointment Type   Type of Therapeutic Rec Session Therapeutic Rec Assessment   General Information   Patient Profile Review See Profile for full history and prior level of function   Community Involvement   Community Involvement Retired   Treatment Plan   Assessment Assessment partially completed, pt had eyes closed, no response to questions or to his name, did squeeze therapist's hand. will call family to obtain leisure and personal history.

## 2022-09-26 NOTE — PROGRESS NOTES
Pulmonary Progress Note    Admit Date: 9/22/2022  CODE: Full Code    Assessment/Plan:   89 yoM admitted 9/15/2022 following an unwitnessed fall and striking his head and found to have a left frontal SDH and C1/C2 cervical spine fracture.  Intubated for airway protection in the ED given high c-spine injury and difficulty managing his secretions. On admission he requested all cares without surgical cervical spine stabilization, agreeable to a tracheostomy and PEG. Multiple care conferences held with his family who confirmed his wishes.  Discharged to LTACH 9/22/22.     PMHx: Lambert-Eaton syndrome, TAVR, complete heart block with pacemaker dependency, DM2, arthritis, BPH, HTN, osteoporosis     Discussion of pertinent problems:  1. Acute hypoxic/hypercapnic respiratory failure s/p trach in setting of traumatic cervical injury after a fall.  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  B/l pleural effusions on CxR 9/22, bumex x1   2. Tracheostomy: 6 Shiley placed 9/21/2022  3. Dysphagia s/p PEG 9/20: Failed BDT 9/23 with immediate aspiration   4. Laryngeal edema 2/2 traumatic cervical fracture/injury s/p 3 doses of decadron  5. GOC: full cares after discussion with palliative  6. Acute neck and rib cage pain(known rib fractures), chronic hip/shoulder pain  7. Traumatic SDH, stable  8. C1/C2 CSI: [place] J collar at all times, repeat imaging per NSG    Plan:  - Phase 2 weans.  TM/cuff up continuous d/t failed BDT.  Check VBG in the morning off vent  - First trach change not until 10/1 at the earliest.  - Albuterol/mucomsyt nebs BID for bronchial hygiene  - Ok to remove sutures tomorrow, 9/27  - Repeat BDT end of week     Sawyer Capone CNP  Pulmonary Medicine  [facility]  Pager [phone]    Subjective/Interim Events:   - down to 20L/21% trach dome   - to have PEG tube placed today   - continues to have b/l rib cage pain   - denies dyspnea    Tracheal secretions:  Overnight - x4, small, thick  Yesterday - x3, mod/large, thick    Cough strength:  strong, productive     Current phase of ventilator weaning pathway:  Phase 2    Ventilator weaning results  9/25: 35L/30% TM/cuff up for 21hr 20min     Clinical status discussed today with respiratory therapist    Medications:       dextrose 5% and 0.45% NaCl 50 mL/hr at 09/26/22 0336     - MEDICATION INSTRUCTIONS -         acetylcysteine  2 mL Nebulization 2 times daily     albuterol  2.5 mg Nebulization 2 times daily     calcium carbonate  1,250 mg Oral or Feeding Tube BID w/meals     cholecalciferol  25 mcg Oral or Feeding Tube Daily     doxazosin  2 mg Oral or Feeding Tube Daily     [Held by provider] heparin ANTICOAGULANT  5,000 Units Subcutaneous Q8H     insulin aspart  1-6 Units Subcutaneous Q6H     levothyroxine  112 mcg Oral or Feeding Tube QAM AC     multivitamins w/minerals  15 mL Per Feeding Tube Daily     pantoprazole  40 mg Intravenous Daily with breakfast     protein modular  2 packet Per Feeding Tube TID     sennosides  5 mL Oral or Feeding Tube BID         Exam/Data:   Vitals  BP (!) 175/83 (BP Location: Right arm, Patient Position: Supine, Cuff Size: Adult Regular)   Pulse 71   Temp 99.5  F (37.5  C) (Axillary)   Resp 24   Wt 87.6 kg (193 lb 1.6 oz)   SpO2 95%   BMI 29.36 kg/m       I/O last 3 completed shifts:  In: 855.83 [I.V.:855.83]  Out: -   Weight change: 0.59 kg (1 lb 4.8 oz)    Vent Mode: CMV/AC  (Continuous Mandatory Ventilation/ Assist Control)  FiO2 (%): 21 %  Resp Rate (Set): 12 breaths/min  Tidal Volume (Set, mL): 450 mL  PEEP (cm H2O): 5 cmH2O  Resp: 24      EXAM:  Gen:  no distress on trach dome   HEENT: NT, trach midline/intact, sutures in place, c-collar on   CV: RRR, no m/g/r  Resp: CTAB; non-labored   Abd: soft, nontender, BS+  Skin: no rashes or lesions  Ext: no edema  Neuro: alert, follows commands, mouths some words     ROS:  A 10-system review was obtained and is negative with the exception of the symptoms noted above.    Labs:  Complete Blood Count   Recent Labs   Lab  09/26/22  0624 09/25/22  0634 09/24/22  0638 09/23/22  0833   WBC 10.9 11.2* 11.9* 13.0*   HGB 11.6* 11.1* 10.6* 10.9*    208 182 211     Basic Metabolic Panel  Recent Labs   Lab 09/26/22  0624 09/26/22  0604 09/25/22  2337 09/25/22  1751 09/25/22  1153 09/25/22  0634 09/24/22  1144 09/24/22  0638 09/23/22  1259 09/23/22  0833   *  --   --   --   --  136  --  137  --  142   POTASSIUM 3.9  --   --   --   --  4.4  --  4.1  --  4.3   CHLORIDE 97*  --   --   --   --  102  --  104  --  108*   CO2 25  --   --   --   --  25  --  27  --  25   BUN 10.2  --   --   --   --  12.3  --  16.0  --  18.5   CR 0.63*  --   --   --   --  0.64*  --  0.61*  --  0.70   GLC 98 104* 102* 104*   < > 95   < > 100*   < > 148*    < > = values in this interval not displayed.     Liver Function Tests  Recent Labs   Lab 09/26/22  0624 09/25/22  0634 09/24/22  0638   AST 21 26 38   ALT 34 41 61*   ALKPHOS 124 108 116   BILITOTAL 1.0 0.8 0.7   ALBUMIN 2.8* 2.6* 2.6*     RADIOLOGY:   XR Chest Port 1 View    Result Date: 9/22/2022  EXAM: XR CHEST PORT 1 VIEW  9/22/2022 12:45 AM HISTORY:  Rib fracture(s) in trauma patient   COMPARISON:  Chest x-ray 9/21/2022 FINDINGS: AP radiograph of the chest. Tracheostomy tube projecting over the high thoracic trachea. Left chest wall ICD/pacemaker with leads overlying the right atrium and right ventricle. Aortic valve replacement. Stable cardiomediastinal silhouette. Atherosclerotic calcifications of the aortic arch. New layering bilateral pleural effusions. No pneumothorax. Increased bibasilar opacities. Postoperative changes to the right shoulder. Unchanged chronic bilateral rib fractures. No new acute osseous abnormality. Visualized upper abdomen is unremarkable.     IMPRESSION: 1. New layering bilateral pleural effusions. 2. Increased bibasilar opacities possibly representing pulmonary edema and/or atelectasis, difficult to exclude infection including aspiration. I have personally reviewed the  examination and initial interpretation and I agree with the findings. JEANNE LOW MD   SYSTEM ID:  [id number]

## 2022-09-26 NOTE — PROGRESS NOTES
LTACH    Medicine Progress Note - Hospitalist Service    Date of Admission:  9/22/2022    Brief History:    Alexandru Still is a 89 year old man w/ PMH of Lambert-Eaton syndrome, hypothyroidism,  complete heart block s/p pacemaker, HTN, BPH,  PB, and DM type 2 who was admitted to the SICU on 9/15/2022 following an unwitnessed fall and striking his head. He was initially seen at an outside hospital and underwent a comprehensive trauma work up found to have a left frontal SDH (5mm) + C1 fx + Type II C2 odontoid fracture with a 1cm posterior displacement. He was  intubated for airway protection in the ED given high c-spine injury and difficulty managing his secretions. On admission he requested all cares without surgical cervical spine stabilization, agreeable to a tracheostomy and PEG. Multiple care conferences held with his family who confirmed his wishes.    LTACH course:    9/24-9/26:  Speech evaluated patient and recommends NPO, cont tube feeds via PEG due to severe oropharyngeal dysphasia.  PT/OT, dietitian, woc nurse saw pt on 9/23.  Pulled out G tube during night of 9/23.  Pt now on soft wrist restraints.  IVF changed to D51/2 NS at low rate as pt has TAVR.  Morphine IV ordered prn pain.    Spoke w/ Dr. Brown- IR - recommends no hannah tube as this is a brand new PEG and placing it blind will likely lead it to not be in correct position.  He will not do a PEG placement on the weekend, scheduled for Monday.  Pt cannot be fed via NG tube as it is contraindicated with C1 and C2 fx.  For PEG tube replacement (herpain on hold, place ICD)on 9/26.  Will discontinue IVF once PEG tube is placed and restart po meds.  Given NaPhos 9/26      Assessment & Plan               Traumatic 5mm left frontal lobe SDH  Posteriorly displaced (1 cm) Type II odontoid fx  C1 anterior arch fracture   Epidural hemorrhage of 7mm  Acute traumatic neck pain  Acute L 6th rib fracture  S/p fall  Facial abrasions with ecchymoses  RLE  wound  He was seen and evaluated by Neurosurgery.  Pt refused surgical intervention.   A repeat head CT was obtained with a stable subdural hematoma. He was placed in a cervical collar for the cervical fracture. No surgical intervention per patient preference.    -cont [place] J -keep in place at all times  -tylenol, oxy, and morphine prn pain  -Neurosurg ok w/ heparin q8h for dvt prophy  -wound care following      -f/u Neurosurgery in 6 weeks with an upright XR of the cervical spine   -CT cervical spine in 3 months.     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 Failed bedside and radiology swallow. He completed 3 doses of Decadron for laryngeal edema.      -S/P tracheostomy 09/21/2022, Trach suture removal on 09/26  -pulm following   -RT following   -wean vent as per pulm   -bronchial hygeine w/ albuterol/mucomyst  -will decrease IVF due to increasing pulm edema- can't stop until PEG replaced due to hypoglycemia    Hypertension   Complete heart block s/p PM placement  Nonrheumatic aortic valve stenosis s/p TAVR 2019  -monitor  -hydralazine prn   -echo on 9/16:  EF: 60-65%, no LV dysfunction     Severe orophayngeal dysphagia    S/P PEG -s/p self removal due to agitation  Severe protein calorie malnutrition  Hiatal hernia    -PEG tube placed 09/20. Pt ripped out PEG tube on night of 9/23- IR said they would place on 9/26, they don't place them over the weekend per Dr. Brown   -hold TF : osmolite 1.5 and 60 ml/hr until PEG placed    dietitian following  -free water flush:  90 q4h  -protein modular q8h  -senna and dulcolax prn   -PPI for GI prophy  -IVF D51/2 NS at 50ml/hr- cont until tube feeds resumed due to hypoglycemia  -cont seroquel prn  -cont soft wrist restraints prn     Hypophosphatemia   Hypomagnesia  Hypokalemia  S/p Lactic Acidosis  -Na phos IV 15mmol x 1 9/26, x 1 9/25, given 9mmol on 9/24   -monitor  -replete prn      Hypothyroidism   -Continue Levothyroxine     Anemia of chronic disease  -hg trending down past few days  -cont to monitor     BPH  -cont doxazosin    DM type 2  Hypoglycemia   -cont IVF w/ D51/2 NS due to hypoglycemia- can discontinue when PEG placed and TF resumed  -sliding scale insulin  -last HbA1:  6.2   -hypoglycemic protocol    Deconditioning  -PT/OT         Diet: Adult Formula Drip Feeding: Continuous Osmolite 1.5; Gastrostomy; Goal Rate: 60; mL/hr; Medication - Feeding Tube Flush Frequency: At least 15-30 mL water before and after medication administration and with tube clogging; Amount to Send (Nutrition...    DVT Prophylaxis: Heparin SQ  [place] Catheter: Not present  Central Lines: None  Cardiac Monitoring: None  Code Status: Full Code      Disposition Plan     Expected Discharge Date: 10/11/2022,  6:00 PM  Discharge Delays: Complex Discharge  Placement - LTC    Discharge Comments: pt is complex with C1 and C2 fx.  [place] J collar on at all times, trach on vent, PEG tube was pulled out  at night on 9/23        The patient's care was discussed with the Bedside Nurse and Patient.    Neli Rubio MD  Hospitalist Service  LTACH  Securely message with the Vocera Web Console (learn more here)  Text page via [employer] Paging/Directory         Clinically Significant Risk Factors Present on Admission                 # Severe Malnutrition: based on nutrition assessment     ______________________________________________________________________    Interval History   Pt is complaining of pain at abdominal site where  he pulled out PEG, wants pain meds.  He has no dyspnea, chest pain.  No nausea or vomiting    Data reviewed today: I reviewed all medications, new labs and imaging results over the last 24 hours.     Physical Exam   Vital Signs: Temp: 99.3  F (37.4  C) Temp src: Oral BP: (!) 144/67 Pulse: 65   Resp: 18 SpO2: 97 % O2 Device: Mechanical Ventilator Oxygen Delivery: 35 LPM  Weight: 193 lbs 1.6 oz  General:  No acute distress,awake and alert  Eyes:  Sclera non icteric, normal conjuctiva  Neck :  [place] J in place, trach in place  Lungs:  Clear to auscultation bilaterally, air entry equal bilaterally, no rhonchi or rales  CVS:  S1 and S2, regular rate and rhythem, systolic murmur L sternal border  Abdomen:  Soft, nontender, tenderness w/ swelling around prior PEG tube site.     Musculoskeletal:  Swelling of R forearm and hand,   Neuro:  awake and Alert , follows commands intermittently   Skin:  R supra orbital Abrasions and ecchymoses, RLE wound     Data   Recent Labs   Lab 09/26/22  0624 09/26/22  0604 09/25/22  2337 09/25/22  1751 09/25/22  1153 09/25/22  0634 09/24/22  1144 09/24/22  0638 09/23/22  1259 09/23/22  0833   WBC 10.9  --   --   --   --  11.2*  --  11.9*  --  13.0*   HGB 11.6*  --   --   --   --  11.1*  --  10.6*  --  10.9*   MCV 94  --   --   --   --  94  --  94  --  96     --   --   --   --  208  --  182  --  211   NA  --   --   --   --   --  136  --  137  --  142   POTASSIUM  --   --   --   --   --  4.4  --  4.1  --  4.3   CHLORIDE  --   --   --   --   --  102  --  104  --  108*   CO2  --   --   --   --   --  25  --  27  --  25   BUN  --   --   --   --   --  12.3  --  16.0  --  18.5   CR  --   --   --   --   --  0.64*  --  0.61*  --  0.70   ANIONGAP  --   --   --   --   --  9  --  6*  --  9   TITA  --   --   --   --   --  8.4  --  8.2  --  8.3   GLC  --  104* 102* 104*   < > 95   < > 100*   < > 148*   ALBUMIN  --   --   --   --   --  2.6*  --  2.6*  --   --    PROTTOTAL  --   --   --   --   --  6.2*   --  5.9*  --   --    BILITOTAL  --   --   --   --   --  0.8  --  0.7  --   --    ALKPHOS  --   --   --   --   --  108  --  116  --   --    ALT  --   --   --   --   --  41  --  61*  --   --    AST  --   --   --   --   --  26  --  38  --   --     < > = values in this interval not displayed.     No results found for this or any previous visit (from the past 24 hour(s)).  Medications     dextrose 5% and 0.45% NaCl 50 mL/hr at 09/26/22 0336     - MEDICATION INSTRUCTIONS -         acetylcysteine  2 mL Nebulization 2 times daily     albuterol  2.5 mg Nebulization 2 times daily     [Held by provider] bumetanide  0.5 mg Per Feeding Tube Daily     calcium carbonate  1,250 mg Oral or Feeding Tube BID w/meals     cholecalciferol  25 mcg Oral or Feeding Tube Daily     doxazosin  2 mg Oral or Feeding Tube Daily     heparin ANTICOAGULANT  5,000 Units Subcutaneous Q8H     insulin aspart  1-6 Units Subcutaneous Q6H     levothyroxine  112 mcg Oral or Feeding Tube QAM AC     multivitamins w/minerals  15 mL Per Feeding Tube Daily     pantoprazole  40 mg Intravenous Daily with breakfast     protein modular  2 packet Per Feeding Tube TID     sennosides  5 mL Oral or Feeding Tube BID

## 2022-09-26 NOTE — PLAN OF CARE
Problem: Pain Acute  Goal: Acceptable Pain Control and Functional Ability  Outcome: Ongoing, Progressing  Intervention: Prevent or Manage Pain  Recent Flowsheet Documentation  Taken 9/26/2022 1100 by Hailey Patterson RN  Bowel Elimination Promotion: adequate fluid intake promoted  Medication Review/Management: medications reviewed   Goal Outcome Evaluation:           Alert and awake, elevated BP, complained of pain, managed by PRN medications, still no G/J tube in place, on 2 soft restraints, agitated during cares, eyes are close most of the shift.  Hailey Patterson, RN

## 2022-09-26 NOTE — PROGRESS NOTES
Interventional Radiology - Pre-Procedure Note:  9/26/2022    Procedure Requested: gastrostomy tube placement  Requested by: Neli Rubio MD    History and Physical Reviewed: H&P documented within 30 days (by  Neli Rubio MD on 9/23/22).  I have personally reviewed the patient's medical history and have updated the medical record as necessary.    Brief HPI: Alexandru Still is a 89 year old male PMHx lambert-eaton syndrome, hypothyroid, completed heart block s/p pacemaker, HTN, BPH, PSA, DMII. Admitted to SICU 9/15 after fall with head strike. Found to have fractures. Intubated given high C-spine injury w/ difficulty managing secretions. On admission pt requested all cares be received without surgical cervical spine stabilization. S/p tracheostomy 9/21 and surgical placed G-tube 9/20.     Now with reports that patient has pulled out gastrostomy tube the night of 9/23. Per chart review patient is unable to be fed with NG 2/2 C1 and C2 fracture.     Case previously discussed/reviewed with Dr. Santiago - patient is with hiatal hernia and no safe access for IR placement.     Upon further discussion between Dr. Dill (sugery) and Dr. Santiago (IR). T-fasteners still in place so potential that track may still be utilized for replacement. Will attempt contrast administration through track and potential replacement in IR if window appears to be open for placement.       IMAGING:  EXAM: XR ABDOMEN PORT 1 VIEW  9/18/2022 7:31 PM      HISTORY:  not able to push fluids through NGT - rechecking positioning         COMPARISON:  Earlier same day abdominal radiograph obtained at 1654  hours     FINDINGS: Supine radiograph of the abdomen. Feeding tube with tip in  the right side of the abdomen possibly within the stomach versus first  portion of the duodenum. Nonspecific, nonobstructive bowel gas  pattern. No pneumatosis or portal venous gas. The visualized lung  bases are clear. No acute osseous abnormality.  Degenerative changes to  the spine with spinal fusion device overlying the L4-5.                                                                      IMPRESSION: Feeding tube with tip overlying the right side of the  abdomen with the tip possibly within the stomach versus first portion  of the duodenum, consider further advancement if postpyloric position  is desired.  Non obstructive bowel gas pattern.     I have personally reviewed the examination and initial interpretation  and I agree with the findings.       NPO: midnight  ANTICOAGULANTS: SC heparin LD 9/25 @1947 - 6hr hold required  ANTIBIOTICS: if new placement recommend Ancef 2g    ALLERGIES  Allergies   Allergen Reactions     Cefuroxime Hives     Contrast Dye      Gadodiamide Hives       LABS:  INR   Date Value Ref Range Status   09/15/2022 1.06 0.85 - 1.15 Final      Hemoglobin   Date Value Ref Range Status   09/26/2022 11.6 (L) 13.3 - 17.7 g/dL Final     Platelet Count   Date Value Ref Range Status   09/26/2022 271 150 - 450 10e3/uL Final     Creatinine   Date Value Ref Range Status   09/26/2022 0.63 (L) 0.67 - 1.17 mg/dL Final     Potassium   Date Value Ref Range Status   09/26/2022 3.9 3.4 - 5.3 mmol/L Final   06/23/2022 4.0 3.4 - 5.3 mmol/L Final       EXAM:  BP (!) 149/75 (BP Location: Right arm, Patient Position: Supine, Cuff Size: Adult Regular)   Pulse 72   Temp 98.8  F (37.1  C) (Axillary)   Resp 18   Wt 87.6 kg (193 lb 1.6 oz)   SpO2 93%   BMI 29.36 kg/m        Code Status: Full Code intra procedure, per discussion with patient's family.       ASSESSMENT/PLAN:   -Attempted transport from [facility] to [facility] today 9/26 and unable to be accommodated today.     - Will schedule patient for IR procedure 9/27 -- at this time transport is scheduled at 0730 for 9/27 with tentative 0900 procedure time at [facility].    -Patient to remain NPO 8hrs prior to procedure.   -Please HOLD SubQ heparin 6hr prior to procedure    -Patient with contrast allergy and  unable to take oral medication. Recommend Hydrocortisone sodium succinate 200mg IV at 13 hours, 6 hours and 1 hour prior to procedure.     --ordered for:      9/26 @ 1800      9/27 @ 0300      9/27 @ 0800 (ok to be given 0730 prior to transport to [facility])    Tentative attempt at percutaneous gastrostomy tube replacement 9/27 with sedation.    Procedure, risks/benefits, details, alternatives, and sedation reviewed with family (Erv Maira - son in law) and family verbalized understanding. All questions answered. OK to proceed with above radiology procedure.     NATASHA Bautista CNP  Interventional Radiology'

## 2022-09-26 NOTE — PROCEDURES
"Procedures Midline Insertion Procedure Note        Pt. Name: Alexandru Still   MRN:        [id number]        Procedure:  Insertion of a  Duel Lumen  5 fr  BARD Power MIDLINE (non-valved) catheter.       Lot number [id number].       Indications: IV meds and fluids   UNABLE to place PICC due to IR only we failed last time on right 6- and left has pacer in so needs fluid both arm serverly swollen with past infiltration so placed midline      Contraindication(s): none        Procedure Details       Patient identified with 2 identifiers and \"Time Out\" conducted.          Central line insertion bundle followed: hand hygiene performed prior to procedure, site cleansed with Cholraprep, hat, mask, sterile gloves, sterile gown worn, patient draped with maximum barrier head to toe drape, sterile field maintained.      Lidocaine 1% 2 ml administered sq to the insertion site.         A 5 Fr catheter was inserted into the brachial vein of the right arm with ultrasound guidance.           1 attempt(s) required to access vein.   Catheter threaded without difficulty. Good blood return noted.      Modified Seldinger Technique used for insertion. Mid circumference 33cm     Tip placement verified by ultrasound placement and good blood return.       Catheter secured with Statlock, Biopatch and Tegaderm dressing applied.      Catheter was flushed with 20 cc NS. Patient  tolerated procedure well.        Findings:        Total catheter length  20 cm, with 0 cm exposed.       Patient's primary RN notified catheter is a MIDLINE catheter and is ready for use.        Comments:        A midline catheter is a form of peripheral venous access. Not recommended for the infusion of vesicants (Vancomycin, Vasopressors, TPN, etc.)        Reta Huber RN BSN, [facility]   Vascular Access - [facility]               "

## 2022-09-26 NOTE — PLAN OF CARE
Problem: Restraint, Nonbehavioral (Nonviolent)  Goal: Absence of Harm or Injury  Outcome: Ongoing, Progressing  Intervention: Protect Dignity, Rights, and Personal Wellbeing  Recent Flowsheet Documentation  Taken 9/26/2022 0018 by Bob Jeter RN  Trust Relationship/Rapport:   care explained   questions encouraged     Problem: Pain Acute  Goal: Acceptable Pain Control and Functional Ability  Outcome: Ongoing, Progressing  Intervention: Prevent or Manage Pain  Recent Flowsheet Documentation  Taken 9/26/2022 0018 by Bob Jeter RN  Medication Review/Management: medications reviewed   Goal Outcome Evaluation:      Pt is alert but inconsistently mouths words to answer some yes or no questions. No signs of pain noted; however  he was a  little agitated each time with repositioning tonight. He is on trach dome and oxygen saturation has been fine. Writer called the house officer to get an ok to hold his heparin this morning in preparation for his GJ replacement later this morning. Still has the bilateral restraints for his safety. Will continue to monitor.Bob Jeter RN.

## 2022-09-26 NOTE — PLAN OF CARE
Problem: Device-Related Complication Risk (Mechanical Ventilation, Invasive)  Goal: Optimal Device Function  Outcome: Ongoing, Not Progressing     Problem: Skin and Tissue Injury (Mechanical Ventilation, Invasive)  Goal: Absence of Device-Related Skin and Tissue Injury  Outcome: Ongoing, Not Progressing     Problem: Ventilator-Induced Lung Injury (Mechanical Ventilation, Invasive)  Goal: Absence of Ventilator-Induced Lung Injury  Outcome: Ongoing, Not Progressing     Problem: Inability to Wean (Mechanical Ventilation, Invasive)  Goal: Mechanical Ventilation Liberation  Outcome: Ongoing, Progressing   Goal Outcome Evaluation:           RT PROGRESS NOTE     DATA:     CURRENT SETTINGS:             TRACH TYPE / SIZE:  #6 shiley taper guard placed 9/21             MODE:   ac 12, 450, peep 5, 30%           ACTION:             THERAPIES:   albuterol and mucomyst bid             SUCTION:                           FREQUENCY:   x3                        AMOUNT:   large x2, moderate x1                        CONSISTENCY:   thick                        COLOR:   white to yellow             SPONTANEOUS COUGH EFFORT/STRENGTH OF EFFORT (not elicited by suctioning): strong                              WEANING PHASE:   2                        WEAN MODE:    tm with cuff inflated as tolerated                        WEAN TIME:   started at 08:15                         RESPONSE:             BS:   clear and diminished after suciton             VITAL SIGNS:   Blood pressure (!) 156/72, pulse 65, temperature 98.6  F (37  C), temperature source Oral, resp. rate 24, weight 87.6 kg (193 lb 1.6 oz), SpO2 95 %.                 EMOTIONAL NEEDS / CONCERNS:  pain             RISK FOR SELF DECANNULATION:  yes                        RISK DUE TO:  impulsive                        INTERVENTION/S IN PLACE IS/ARE:  Bilateral wrist restraints       NOTE / PLAN:   tm as tolerated.  VBG in AM.

## 2022-09-27 ENCOUNTER — APPOINTMENT (OUTPATIENT)
Dept: INTERVENTIONAL RADIOLOGY/VASCULAR | Facility: HOSPITAL | Age: 87
End: 2022-09-27
Attending: NURSE PRACTITIONER
Payer: COMMERCIAL

## 2022-09-27 ENCOUNTER — APPOINTMENT (OUTPATIENT)
Dept: OCCUPATIONAL THERAPY | Facility: CLINIC | Age: 87
DRG: 208 | End: 2022-09-27
Attending: INTERNAL MEDICINE
Payer: MEDICARE

## 2022-09-27 ENCOUNTER — APPOINTMENT (OUTPATIENT)
Dept: PHYSICAL THERAPY | Facility: CLINIC | Age: 87
DRG: 208 | End: 2022-09-27
Attending: INTERNAL MEDICINE
Payer: MEDICARE

## 2022-09-27 LAB
ALBUMIN SERPL BCG-MCNC: 2.7 G/DL (ref 3.5–5.2)
ALP SERPL-CCNC: 103 U/L (ref 40–129)
ALT SERPL W P-5'-P-CCNC: 25 U/L (ref 10–50)
ANION GAP SERPL CALCULATED.3IONS-SCNC: 9 MMOL/L (ref 7–15)
AST SERPL W P-5'-P-CCNC: 18 U/L (ref 10–50)
BASE EXCESS BLDV CALC-SCNC: 2.8 MMOL/L (ref -8.1–1.9)
BILIRUB SERPL-MCNC: 0.6 MG/DL
BUN SERPL-MCNC: 11.1 MG/DL (ref 8–23)
CA-I BLD-MCNC: 1.17 MMOL/L (ref 1.11–1.3)
CALCIUM SERPL-MCNC: 8.3 MG/DL (ref 8.2–9.6)
CHLORIDE SERPL-SCNC: 102 MMOL/L (ref 98–107)
CREAT SERPL-MCNC: 0.69 MG/DL (ref 0.67–1.17)
DEPRECATED HCO3 PLAS-SCNC: 23 MMOL/L (ref 22–29)
ERYTHROCYTE [DISTWIDTH] IN BLOOD BY AUTOMATED COUNT: 14.4 % (ref 10–15)
GFR SERPL CREATININE-BSD FRML MDRD: 87 ML/MIN/1.73M2
GLUCOSE BLD-MCNC: 134 MG/DL (ref 70–125)
GLUCOSE BLDC GLUCOMTR-MCNC: 113 MG/DL (ref 70–99)
GLUCOSE BLDC GLUCOMTR-MCNC: 145 MG/DL (ref 70–99)
GLUCOSE BLDC GLUCOMTR-MCNC: 154 MG/DL (ref 70–99)
GLUCOSE SERPL-MCNC: 135 MG/DL (ref 70–99)
HCO3 BLDV-SCNC: 27 MMOL/L (ref 24–30)
HCT VFR BLD AUTO: 29.1 % (ref 40–53)
HGB BLD-MCNC: 11.2 G/DL (ref 13.3–17.7)
HGB BLD-MCNC: 9.9 G/DL (ref 13.3–17.7)
LACTATE BLD-SCNC: 0.9 MMOL/L (ref 0.7–2)
MAGNESIUM SERPL-MCNC: 2 MG/DL (ref 1.7–2.3)
MCH RBC QN AUTO: 31.3 PG (ref 26.5–33)
MCHC RBC AUTO-ENTMCNC: 34 G/DL (ref 31.5–36.5)
MCV RBC AUTO: 92 FL (ref 78–100)
PCO2 BLDV: 36 MM HG (ref 35–50)
PH BLDV: 7.47 [PH] (ref 7.35–7.45)
PHOSPHATE SERPL-MCNC: 3.2 MG/DL (ref 2.5–4.5)
PLATELET # BLD AUTO: 267 10E3/UL (ref 150–450)
PO2 BLDV: 41 MM HG (ref 25–47)
POTASSIUM BLD-SCNC: 3.8 MMOL/L (ref 3.5–5)
POTASSIUM SERPL-SCNC: 3.9 MMOL/L (ref 3.4–5.3)
PROT SERPL-MCNC: 5.8 G/DL (ref 6.4–8.3)
RBC # BLD AUTO: 3.16 10E6/UL (ref 4.4–5.9)
SATV LHE POCT: 79.2 % (ref 70–75)
SODIUM BLD-SCNC: 135 MMOL/L (ref 136–145)
SODIUM SERPL-SCNC: 134 MMOL/L (ref 136–145)
WBC # BLD AUTO: 7.3 10E3/UL (ref 4–11)

## 2022-09-27 PROCEDURE — 999N000123 HC STATISTIC OXYGEN O2DAILY TECH TIME

## 2022-09-27 PROCEDURE — 999N000157 HC STATISTIC RCP TIME EA 10 MIN

## 2022-09-27 PROCEDURE — C1769 GUIDE WIRE: HCPCS

## 2022-09-27 PROCEDURE — 82330 ASSAY OF CALCIUM: CPT

## 2022-09-27 PROCEDURE — 250N000009 HC RX 250: Performed by: NURSE PRACTITIONER

## 2022-09-27 PROCEDURE — 94003 VENT MGMT INPAT SUBQ DAY: CPT | Performed by: NURSE PRACTITIONER

## 2022-09-27 PROCEDURE — 94640 AIRWAY INHALATION TREATMENT: CPT

## 2022-09-27 PROCEDURE — 999N000009 HC STATISTIC AIRWAY CARE

## 2022-09-27 PROCEDURE — 94640 AIRWAY INHALATION TREATMENT: CPT | Mod: 76

## 2022-09-27 PROCEDURE — 258N000003 HC RX IP 258 OP 636: Performed by: HOSPITALIST

## 2022-09-27 PROCEDURE — 250N000011 HC RX IP 250 OP 636: Performed by: NURSE PRACTITIONER

## 2022-09-27 PROCEDURE — 255N000002 HC RX 255 OP 636: Performed by: HOSPITALIST

## 2022-09-27 PROCEDURE — 272N000119 HC CATH CR4

## 2022-09-27 PROCEDURE — 250N000013 HC RX MED GY IP 250 OP 250 PS 637: Performed by: HOSPITALIST

## 2022-09-27 PROCEDURE — 250N000011 HC RX IP 250 OP 636: Performed by: HOSPITALIST

## 2022-09-27 PROCEDURE — 83735 ASSAY OF MAGNESIUM: CPT | Performed by: HOSPITALIST

## 2022-09-27 PROCEDURE — 360N000006 HC INTERCOMPANY SERVICE, OPERATIVE 360 OPNP

## 2022-09-27 PROCEDURE — 120N000017 HC R&B RESPIRATORY CARE

## 2022-09-27 PROCEDURE — 49440 PLACE GASTROSTOMY TUBE PERC: CPT

## 2022-09-27 PROCEDURE — 97110 THERAPEUTIC EXERCISES: CPT | Mod: GO | Performed by: OCCUPATIONAL THERAPIST

## 2022-09-27 PROCEDURE — 84132 ASSAY OF SERUM POTASSIUM: CPT | Performed by: HOSPITALIST

## 2022-09-27 PROCEDURE — 99233 SBSQ HOSP IP/OBS HIGH 50: CPT | Performed by: HOSPITALIST

## 2022-09-27 PROCEDURE — 84100 ASSAY OF PHOSPHORUS: CPT | Performed by: HOSPITALIST

## 2022-09-27 PROCEDURE — 84132 ASSAY OF SERUM POTASSIUM: CPT

## 2022-09-27 PROCEDURE — 258N000001 HC RX 258: Performed by: HOSPITALIST

## 2022-09-27 PROCEDURE — 85027 COMPLETE CBC AUTOMATED: CPT | Performed by: HOSPITALIST

## 2022-09-27 PROCEDURE — 272N000006 HC INTERCOMPANY SERVICE, SUPPLY 272 OPNP

## 2022-09-27 PROCEDURE — 97530 THERAPEUTIC ACTIVITIES: CPT | Mod: GP

## 2022-09-27 PROCEDURE — 250N000007 HC INTERCOMPANY SERVICE, DRUGS 250 OPNP

## 2022-09-27 PROCEDURE — 0DH63UZ INSERTION OF FEEDING DEVICE INTO STOMACH, PERCUTANEOUS APPROACH: ICD-10-PCS | Performed by: RADIOLOGY

## 2022-09-27 RX ORDER — NALOXONE HYDROCHLORIDE 0.4 MG/ML
0.2 INJECTION, SOLUTION INTRAMUSCULAR; INTRAVENOUS; SUBCUTANEOUS
Status: DISCONTINUED | OUTPATIENT
Start: 2022-09-27 | End: 2022-09-27

## 2022-09-27 RX ORDER — SODIUM CHLORIDE 9 MG/ML
INJECTION, SOLUTION INTRAVENOUS CONTINUOUS
Status: ACTIVE | OUTPATIENT
Start: 2022-09-27 | End: 2022-09-28

## 2022-09-27 RX ORDER — NALOXONE HYDROCHLORIDE 0.4 MG/ML
0.4 INJECTION, SOLUTION INTRAMUSCULAR; INTRAVENOUS; SUBCUTANEOUS
Status: DISCONTINUED | OUTPATIENT
Start: 2022-09-27 | End: 2022-09-27

## 2022-09-27 RX ORDER — FENTANYL CITRATE 50 UG/ML
25-50 INJECTION, SOLUTION INTRAMUSCULAR; INTRAVENOUS EVERY 5 MIN PRN
Status: DISCONTINUED | OUTPATIENT
Start: 2022-09-27 | End: 2022-09-27

## 2022-09-27 RX ORDER — CLINDAMYCIN PHOSPHATE 900 MG/50ML
900 INJECTION, SOLUTION INTRAVENOUS
Status: COMPLETED | OUTPATIENT
Start: 2022-09-27 | End: 2022-09-27

## 2022-09-27 RX ORDER — FLUMAZENIL 0.1 MG/ML
0.2 INJECTION, SOLUTION INTRAVENOUS
Status: DISCONTINUED | OUTPATIENT
Start: 2022-09-27 | End: 2022-09-27

## 2022-09-27 RX ADMIN — HYDROCORTISONE SODIUM SUCCINATE 200 MG: 100 INJECTION, POWDER, FOR SOLUTION INTRAMUSCULAR; INTRAVENOUS at 03:06

## 2022-09-27 RX ADMIN — Medication 2 PACKET: at 21:53

## 2022-09-27 RX ADMIN — ACETYLCYSTEINE 2 ML: 200 SOLUTION ORAL; RESPIRATORY (INHALATION) at 07:34

## 2022-09-27 RX ADMIN — INSULIN ASPART 1 UNITS: 100 INJECTION, SOLUTION INTRAVENOUS; SUBCUTANEOUS at 17:34

## 2022-09-27 RX ADMIN — ALBUTEROL SULFATE 2.5 MG: 2.5 SOLUTION RESPIRATORY (INHALATION) at 07:34

## 2022-09-27 RX ADMIN — ALBUTEROL SULFATE 2.5 MG: 2.5 SOLUTION RESPIRATORY (INHALATION) at 19:30

## 2022-09-27 RX ADMIN — HYDROCORTISONE SODIUM SUCCINATE 200 MG: 100 INJECTION, POWDER, FOR SOLUTION INTRAMUSCULAR; INTRAVENOUS at 07:17

## 2022-09-27 RX ADMIN — MORPHINE SULFATE 2 MG: 2 INJECTION, SOLUTION INTRAMUSCULAR; INTRAVENOUS at 12:08

## 2022-09-27 RX ADMIN — Medication 2 PACKET: at 15:54

## 2022-09-27 RX ADMIN — ACETYLCYSTEINE 2 ML: 200 SOLUTION ORAL; RESPIRATORY (INHALATION) at 19:30

## 2022-09-27 RX ADMIN — CALCIUM CARBONATE 1250 MG: 1250 SUSPENSION ORAL at 17:34

## 2022-09-27 RX ADMIN — IOHEXOL 10 ML: 350 INJECTION, SOLUTION INTRAVENOUS at 09:56

## 2022-09-27 RX ADMIN — LIDOCAINE HYDROCHLORIDE 10 ML: 10 INJECTION, SOLUTION INFILTRATION; PERINEURAL at 09:51

## 2022-09-27 RX ADMIN — INSULIN ASPART 1 UNITS: 100 INJECTION, SOLUTION INTRAVENOUS; SUBCUTANEOUS at 12:26

## 2022-09-27 RX ADMIN — DEXTROSE AND SODIUM CHLORIDE: 5; 450 INJECTION, SOLUTION INTRAVENOUS at 07:50

## 2022-09-27 RX ADMIN — ACETAMINOPHEN 650 MG: 650 SOLUTION ORAL at 21:53

## 2022-09-27 RX ADMIN — SENNOSIDES 5 ML: 8.8 LIQUID ORAL at 21:53

## 2022-09-27 RX ADMIN — SODIUM CHLORIDE: 9 INJECTION, SOLUTION INTRAVENOUS at 18:09

## 2022-09-27 RX ADMIN — CLINDAMYCIN PHOSPHATE 900 MG: 900 INJECTION, SOLUTION INTRAVENOUS at 09:50

## 2022-09-27 ASSESSMENT — ACTIVITIES OF DAILY LIVING (ADL)
ADLS_ACUITY_SCORE: 55
ADLS_ACUITY_SCORE: 55
ADLS_ACUITY_SCORE: 51
ADLS_ACUITY_SCORE: 55
ADLS_ACUITY_SCORE: 51
ADLS_ACUITY_SCORE: 51
ADLS_ACUITY_SCORE: 55
ADLS_ACUITY_SCORE: 55
ADLS_ACUITY_SCORE: 51
ADLS_ACUITY_SCORE: 51
ADLS_ACUITY_SCORE: 55
ADLS_ACUITY_SCORE: 51

## 2022-09-27 NOTE — PROGRESS NOTES
Social Work Note:  Patient chart reviewed.  Patient discussed in morning rounds.  Barriers to discharge:   * Trach. Phase 2.  WEAN MODE:   25% and 20 lpm TM with cuff down.  * Suction x3.  * SLR x2.      Patient going to [facility] today for tube feeding replacement.    At baseline, patient was living alone in a condo.  [facility] has elevator.    Patient's exact discharge date, time, disposition TBD  SW will continue to follow for psychosocial and emotional support of patient and family. SW to facilitate discharge to TCU when pt no longer requires LTACH level of care.    Bob Reynolds, Northern [facility] HospitalSW  [facility] [place]  [phone]

## 2022-09-27 NOTE — PROGRESS NOTES
Pulmonary Progress Note    Admit Date: 9/22/2022  CODE: Full Code    Assessment/Plan:   89 yoM admitted 9/15/2022 following an unwitnessed fall and striking his head and found to have a left frontal SDH and C1/C2 cervical spine fracture.  Intubated for airway protection in the ED given high c-spine injury and difficulty managing his secretions. On admission he requested all cares without surgical cervical spine stabilization, agreeable to a tracheostomy and PEG. Multiple care conferences held with his family who confirmed his wishes.  Discharged to LTACH 9/22/22.     PMHx: Lambert-Eaton syndrome, TAVR, complete heart block with pacemaker dependency, DM2, arthritis, BPH, HTN, osteoporosis     Discussion of pertinent problems:  1. Acute hypoxic/hypercapnic respiratory failure s/p trach in setting of traumatic cervical injury after a fall.  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  B/l pleural effusions on CxR 9/22, bumex x1   2. Tracheostomy: 6 Shiley placed 9/21/2022  3. Dysphagia s/p PEG 9/20: Failed BDT 9/23 with immediate aspiration   4. Laryngeal edema 2/2 traumatic cervical fracture/injury s/p 3 doses of decadron  5. GOC: full cares after discussion with palliative  6. Acute neck and rib cage pain(known rib fractures), chronic hip/shoulder pain  7. Traumatic SDH, stable  8. C1/C2 CSI: [place] J collar at all times, repeat imaging per NSG    Plan:  - Phase 2 weans.  TM/cuff up continuous d/t failed BDT.    - First trach change not until 10/1 at the earliest.  - Remove trach sutures   - Albuterol/mucomsyt nebs BID for bronchial hygiene  - Repeat BDT tomorrow    Sawyer Capone, STEPHANIE  Pulmonary Medicine  [facility]  Pager [phone]    I called and updated son-in-law Erv via telephone   Subjective/Interim Events:   - stayed off vent last night with acceptable blood gas   - to have PEG tube placed yesterday   - continues to have b/l rib cage pain - no change   - denies dyspnea    Tracheal secretions:  Overnight - x3, mod/large,  thick  Yesterday - x3, mod/large, thick    Cough strength: strong, productive     Current phase of ventilator weaning pathway:  Phase 2    Ventilator weaning results  9/25: 35L/30% TM/cuff up for 21hr 20min  9/26: TM/cuff up continuously      Clinical status discussed today with respiratory therapist    Medications:       dextrose 5% and 0.45% NaCl 30 mL/hr at 09/27/22 0750     - MEDICATION INSTRUCTIONS -         acetylcysteine  2 mL Nebulization 2 times daily     albuterol  2.5 mg Nebulization 2 times daily     calcium carbonate  1,250 mg Oral or Feeding Tube BID w/meals     cholecalciferol  25 mcg Oral or Feeding Tube Daily     clindamycin  900 mg Intravenous Pre-Op/Pre-procedure x 1 dose     doxazosin  2 mg Oral or Feeding Tube Daily     [Held by provider] heparin ANTICOAGULANT  5,000 Units Subcutaneous Q8H     insulin aspart  1-6 Units Subcutaneous Q6H     levothyroxine  112 mcg Oral or Feeding Tube QAM AC     multivitamins w/minerals  15 mL Per Feeding Tube Daily     pantoprazole  40 mg Intravenous Daily with breakfast     protein modular  2 packet Per Feeding Tube TID     sennosides  5 mL Oral or Feeding Tube BID     sodium chloride (PF)  10 mL Intracatheter Q8H         Exam/Data:   Vitals  BP (!) 151/77 (BP Location: Left arm)   Pulse 73   Temp 98.5  F (36.9  C) (Axillary)   Resp 18   Wt 85.8 kg (189 lb 3.2 oz)   SpO2 93%   BMI 28.77 kg/m       I/O last 3 completed shifts:  In: 755 [I.V.:755]  Out: 600 [Urine:600]  Weight change: -1.769 kg (-3 lb 14.4 oz)    Vent Mode: Trach collar  FiO2 (%): 50 %  Resp Rate (Set): 12 breaths/min  Tidal Volume (Set, mL): 450 mL  PEEP (cm H2O): 5 cmH2O  Resp: 18      EXAM:  Gen:  no distress on trach dome   HEENT: NT, trach midline/intact, sutures in place, c-collar on   CV: RRR, no m/g/r  Resp: CTAB; non-labored   Abd: soft, nontender, BS+  Skin: no rashes or lesions  Ext: no edema  Neuro: alert, follows commands, mouths some words     ROS:  A 10-system review was  obtained and is negative with the exception of the symptoms noted above.    Labs:  Complete Blood Count   Recent Labs   Lab 09/27/22  0648 09/27/22  0611 09/26/22  0624 09/25/22  0634 09/24/22  0638   WBC 7.3  --  10.9 11.2* 11.9*   HGB 9.9* 11.2* 11.6* 11.1* 10.6*     --  271 208 182     Basic Metabolic Panel  Recent Labs   Lab 09/27/22  0648 09/27/22  0611 09/27/22  0031 09/26/22  1805 09/26/22  1150 09/26/22  0624 09/25/22  1153 09/25/22  0634 09/24/22  1144 09/24/22  0638   * 135*  --   --   --  131*  --  136  --  137   POTASSIUM 3.9 3.8  --   --   --  3.9  --  4.4  --  4.1   CHLORIDE 102  --   --   --   --  97*  --  102  --  104   CO2 23  --   --   --   --  25  --  25  --  27   BUN 11.1  --   --   --   --  10.2  --  12.3  --  16.0   CR 0.69  --   --   --   --  0.63*  --  0.64*  --  0.61*   * 134* 113* 97   < > 98   < > 95   < > 100*    < > = values in this interval not displayed.     Liver Function Tests  Recent Labs   Lab 09/27/22  0648 09/26/22  0624 09/25/22  0634 09/24/22  0638   AST 18 21 26 38   ALT 25 34 41 61*   ALKPHOS 103 124 108 116   BILITOTAL 0.6 1.0 0.8 0.7   ALBUMIN 2.7* 2.8* 2.6* 2.6*     Venous Blood Gas  Recent Labs   Lab 09/27/22  0611   PHV 7.47*   PCO2V 36   PO2V 41   HCO3V 27   JOSIAH 2.8*       RADIOLOGY:   XR Chest Port 1 View    Result Date: 9/22/2022  EXAM: XR CHEST PORT 1 VIEW  9/22/2022 12:45 AM HISTORY:  Rib fracture(s) in trauma patient   COMPARISON:  Chest x-ray 9/21/2022 FINDINGS: AP radiograph of the chest. Tracheostomy tube projecting over the high thoracic trachea. Left chest wall ICD/pacemaker with leads overlying the right atrium and right ventricle. Aortic valve replacement. Stable cardiomediastinal silhouette. Atherosclerotic calcifications of the aortic arch. New layering bilateral pleural effusions. No pneumothorax. Increased bibasilar opacities. Postoperative changes to the right shoulder. Unchanged chronic bilateral rib fractures. No new acute osseous  abnormality. Visualized upper abdomen is unremarkable.     IMPRESSION: 1. New layering bilateral pleural effusions. 2. Increased bibasilar opacities possibly representing pulmonary edema and/or atelectasis, difficult to exclude infection including aspiration. I have personally reviewed the examination and initial interpretation and I agree with the findings. JEANNE LOW MD   SYSTEM ID:  [id number]

## 2022-09-27 NOTE — IR NOTE
Phone report given to Hailey Patterson RN, no questions at the end of report. Pt transported back to [facility].

## 2022-09-27 NOTE — PROGRESS NOTES
[facility]    Medicine Progress Note - Hospitalist Service    Date of Admission:  9/22/2022    Brief History:    Alexandru Still is a 89 year old man w/ PMH of Lambert-Eaton syndrome, hypothyroidism,  complete heart block s/p pacemaker, HTN, BPH,  PB, and DM type 2 who was admitted to the SICU on 9/15/2022 following an unwitnessed fall and striking his head. He was initially seen at an outside hospital and underwent a comprehensive trauma work up found to have a left frontal SDH (5mm) + C1 fx + Type II C2 odontoid fracture with a 1cm posterior displacement. He was  intubated for airway protection in the ED given high c-spine injury and difficulty managing his secretions. On admission he requested all cares without surgical cervical spine stabilization, agreeable to a tracheostomy and PEG. Multiple care conferences held with his family who confirmed his wishes.     [facility]    [facility]-9/26:  Speech evaluated patient and recommends NPO, cont tube feeds via PEG due to severe oropharyngeal dysphasia.  PT/OT, dietitian, woc nurse saw pt on 9/23.  Pulled out G tube during night of 9/23.  Pt now on soft wrist restraints.  IVF changed to D51/2 NS at low rate as pt has TAVR.  Morphine IV ordered prn pain.    Spoke w/ Dr. Brown- IR - recommends no hannah tube as this is a brand new PEG and placing it blind will likely lead it to not be in correct position.  He will not do a PEG placement on the weekend, scheduled for Monday.  Pt cannot be fed via NG tube as it is contraindicated with C1 and C2 fx.  For PEG tube replacement (herpain on hold, place ICD)on 9/26.  Will discontinue IVF once PEG tube is placed and restart po meds.  Given NaPhos 9/26 9/27-10/3:  9/27 patient had PEG tube replaced after patient himself removed it on 9/23.  Was a started on tube feeding.  P.O. medication were restarted.  IV fluids were discontinued.  He still needs soft restraint x2.  Is off ventilator and stable.      Assessment & Plan           Traumatic 5mm left frontal lobe SDH  Posteriorly displaced (1 cm) Type II odontoid fx  C1 anterior arch fracture   Epidural hemorrhage of 7mm  Acute traumatic neck pain  Acute L 6th rib fracture  S/p fall  Facial abrasions with ecchymoses  RLE wound  He was seen and evaluated by Neurosurgery.  Pt refused surgical intervention.   A repeat head CT was obtained with a stable subdural hematoma. He was placed in a cervical collar for the cervical fracture. No surgical intervention per patient preference.     -cont [place] J -keep in place at all times  -tylenol, oxy, and morphine prn pain  -Neurosurg ok w/ heparin q8h for dvt prophy  -wound care following       -f/u Neurosurgery in 6 weeks with an upright XR of the cervical spine   -CT cervical spine in 3 months.      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 Failed bedside and radiology swallow. He completed 3 doses of Decadron for laryngeal edema.      -S/P tracheostomy 09/21/2022, Trach suture removal on 09/26  -pulm following   -RT following   -wean vent as per pulm   -bronchial hygeine w/ albuterol/mucomyst  -will decrease IVF due to increasing pulm edema- can't stop until PEG replaced due to hypoglycemia     Hypertension   Complete heart block s/p PM placement  Nonrheumatic aortic valve stenosis s/p TAVR 2019  -monitor  -hydralazine prn   -echo on 9/16:  EF: 60-65%, no LV dysfunction     Severe orophayngeal dysphagia   S/P PEG -s/p self removal due to agitation  Severe protein  calorie malnutrition  Hiatal hernia     -9/27 patient had PEG tube replaced after patient himself removed it on 9/23.  Was a started on tube feeding.  P.O. medication were restarted.  IV fluids were discontinued.  He still needs soft restraint x2.  Is off ventilator and stable.  -hold TF : osmolite 1.5 and 60 ml/hr until PEG placed   dietitian following  -free water flush:  90 q4h  -protein modular q8h  -senna and dulcolax prn   -PPI for GI prophy  -IVF D51/2 NS at 50ml/hr- cont until tube feeds resumed due to hypoglycemia  -cont seroquel prn  -cont soft wrist restraints prn      Hypophosphatemia   Hypomagnesia  Hypokalemia  S/p Lactic Acidosis  -Na phos IV 15mmol x 1 9/26, x 1 9/25, given 9mmol on 9/24   -monitor  -replete prn      Hypothyroidism   -Continue Levothyroxine     Anemia of chronic disease  -hg trending down past few days  -cont to monitor      BPH  -cont doxazosin     DM type 2  Hypoglycemia   -cont IVF w/ D51/2 NS due to hypoglycemia- can discontinue when PEG placed and TF resumed  -sliding scale insulin  -last HbA1:  6.2   -hypoglycemic protocol     Deconditioning  -PT/OT       Diet: Adult Formula Drip Feeding: Continuous Osmolite 1.5; Gastrostomy; Goal Rate: 60; mL/hr; Medication - Feeding Tube Flush Frequency: At least 15-30 mL water before and after medication administration and with tube clogging; Amount to Send (Nutrition...    DVT Prophylaxis: Heparin SQ  [place] Catheter: Not present  Central Lines: PRESENT     Cardiac Monitoring: None  Code Status: Full Code      Disposition Plan      Expected Discharge Date: 10/06/2022,  6:00 PM  Discharge Delays: Complex Discharge  Placement - LTC    Discharge Comments: pt is complex with C1 and C2 fx.  [place] J collar on at all times, trach on vent, PEG tube was pulled out  at night on 9/23        The patient's care was discussed with the Bedside Nurse and Patient.    Laurita Cardenas MD  Hospitalist Service  [facility]  Securely message with the Vocera Web  Console (learn more here)  Text page via [facility] Paging/Directory         Clinically Significant Risk Factors Present on Admission                    ___________________________________________________________________    Interval History   Patient had PEG tube placed again today. Feeding through FT started. Serum Na+ is low, started on NS at 75 ml/hr x 24 hr. Recheck Na+ tomorrow.     Data reviewed today: I reviewed all medications, new labs and imaging results over the last 24 hours. I personally reviewed no images or EKG's today.    Physical Exam   Vital Signs: Temp: 98.6  F (37  C) Temp src: Oral BP: 129/63 Pulse: 57   Resp: 22 SpO2: 93 % O2 Device: Trach dome Oxygen Delivery: 30 LPM  Weight: 189 lbs 3.2 oz  General:  No acute distress,awake and alert  Eyes:  Sclera non icteric, normal conjuctiva  Neck :  [place] J in place, trach in place  Lungs:  Clear to auscultation bilaterally, air entry equal bilaterally, no rhonchi or rales  CVS:  S1 and S2, regular rate and rhythem, systolic murmur L sternal border  Abdomen:  Soft, nontender, PEG tube is replaced and in place and functional    Musculoskeletal:  Swelling of R forearm and hand,   Neuro:  awake and Alert , follows commands intermittently   Skin:  Right supraorbital abrasions and ecchymoses, RLE wound      Data   Recent Labs   Lab 09/27/22  1725 09/27/22  1223 09/27/22  0648 09/27/22  0611 09/26/22  1150 09/26/22  0624 09/25/22  1153 09/25/22  0634   WBC  --   --  7.3  --   --  10.9  --  11.2*   HGB  --   --  9.9* 11.2*  --  11.6*  --  11.1*   MCV  --   --  92  --   --  94  --  94   PLT  --   --  267  --   --  271  --  208   NA  --   --  134* 135*  --  131*  --  136   POTASSIUM  --   --  3.9 3.8  --  3.9  --  4.4   CHLORIDE  --   --  102  --   --  97*  --  102   CO2  --   --  23  --   --  25  --  25   BUN  --   --  11.1  --   --  10.2  --  12.3   CR  --   --  0.69  --   --  0.63*  --  0.64*   ANIONGAP  --   --  9  --   --  9  --  9   TITA  --   --  8.3  --   --   8.6  --  8.4   * 145* 135* 134*   < > 98   < > 95   ALBUMIN  --   --  2.7*  --   --  2.8*  --  2.6*   PROTTOTAL  --   --  5.8*  --   --  6.5  --  6.2*   BILITOTAL  --   --  0.6  --   --  1.0  --  0.8   ALKPHOS  --   --  103  --   --  124  --  108   ALT  --   --  25  --   --  34  --  41   AST  --   --  18  --   --  21  --  26    < > = values in this interval not displayed.

## 2022-09-27 NOTE — PLAN OF CARE
Problem: Device-Related Complication Risk (Mechanical Ventilation, Invasive)  Goal: Optimal Device Function  Outcome: Ongoing, Not Progressing     Problem: Skin and Tissue Injury (Mechanical Ventilation, Invasive)  Goal: Absence of Device-Related Skin and Tissue Injury  Outcome: Ongoing, Not Progressing     Problem: Ventilator-Induced Lung Injury (Mechanical Ventilation, Invasive)  Goal: Absence of Ventilator-Induced Lung Injury  Outcome: Ongoing, Not Progressing     Problem: Inability to Wean (Mechanical Ventilation, Invasive)  Goal: Mechanical Ventilation Liberation  Outcome: Ongoing, Progressing   Goal Outcome Evaluation:      RT PROGRESS NOTE     DATA:     CURRENT SETTINGS:             TRACH TYPE / SIZE:  #6 shiley taperguard placed 9/21             MODE:   27% and 20l tm with cuff inflated (ac 12 450, peep 5, 30%)              ACTION:             THERAPIES:   albuterol and mucomyst bid             SUCTION:                           FREQUENCY:   x4                        AMOUNT:   small to moderate                        CONSISTENCY:   thick/thin                        COLOR:   white to yellow             SPONTANEOUS COUGH EFFORT/STRENGTH OF EFFORT (not elicited by suctioning): strong                              WEANING PHASE:   2                        WEAN MODE:    tm continuous since 9/26                                             RESPONSE:             BS:   clear and diminished             VITAL SIGNS:   Blood pressure 129/63, pulse 57, temperature 98.6  F (37  C), temperature source Oral, resp. rate 22, weight 85.8 kg (189 lb 3.2 oz), SpO2 93 %.                   EMOTIONAL NEEDS / CONCERNS:  pain                RISK FOR SELF DECANNULATION:  yes                        RISK DUE TO:  confusion, impulsive                        INTERVENTION/S IN PLACE IS/ARE:  bilateral wrist restraints.       NOTE / PLAN:   pulmonary toilet.  Trach sutures removed today.

## 2022-09-27 NOTE — PRE-PROCEDURE
GENERAL PRE-PROCEDURE:   Procedure:  GT placement  Date/Time:  9/27/2022 9:30 AM    Written consent obtained?: Yes    Risks and benefits: Risks, benefits and alternatives were discussed    Consent given by:  Patient  Patient states understanding of procedure being performed: Yes    Patient's understanding of procedure matches consent: Yes    Procedure consent matches procedure scheduled: Yes    Expected level of sedation:  Moderate  Appropriately NPO:  Yes  ASA Class:  3  Mallampati  :  N/A- Alternate secured airway  Lungs:  Other (comment)  Lung exam comment:  Clear, diminshed  Heart:  Normal heart sounds and rate  History & Physical reviewed:  History and physical reviewed and no updates needed  Statement of review:  I have reviewed the lab findings, diagnostic data, medications, and the plan for sedation

## 2022-09-27 NOTE — PLAN OF CARE
Problem: Skin and Tissue Injury (Mechanical Ventilation, Invasive)  Goal: Absence of Device-Related Skin and Tissue Injury  Outcome: Ongoing, Progressing     Problem: Pain Acute  Goal: Acceptable Pain Control and Functional Ability  Outcome: Ongoing, Progressing  Intervention: Develop Pain Management Plan  Recent Flowsheet Documentation  Taken 9/27/2022 0120 by Sandra Chilel, RN  Pain Management Interventions: medication (see MAR)  Intervention: Prevent or Manage Pain  Recent Flowsheet Documentation  Taken 9/27/2022 0200 by Sandra Chilel RN  Medication Review/Management: medications reviewed   Goal Outcome Evaluation:    Patient appeared to rest well during noc shift, vss, blood sugar stable, see flowsheets, continues on IV dextrose 5% due to temporary movement of g/tube, patient has appt for replacement at 0730 on 9/27/22, will continue to monitor.

## 2022-09-27 NOTE — PLAN OF CARE
Problem: Plan of Care - These are the overarching goals to be used throughout the patient stay.    Goal: Optimal Comfort and Wellbeing  Outcome: Ongoing, Progressing  Intervention: Provide Person-Centered Care  Recent Flowsheet Documentation  Taken 9/27/2022 1300 by Hailey Patterson, RN  Trust Relationship/Rapport: care explained   Goal Outcome Evaluation:      Alert and awake, complained of pain, managed with PRN medications, VS stable, on soft restraints on the wrist, G-tube in place, resumed TF and medications, shows no signs of discomfort.    Hailey Patterson, RN

## 2022-09-27 NOTE — DISCHARGE INSTRUCTIONS
Gastrostomy (G) Tube Discharge Instructions:   You had a gastrostomy (stomach) tube also known as a G tube placed on 9/27/2022. This tube is often used for nutritional support and medication administration or it can be used for stomach venting.     Care instructions:  - If you received sedation for your procedure, do not drive or operate heavy machinery for the rest of the day.  - Avoid soaking in stagnant water (tub baths, Jacuzzis, pools, lake, or ocean).   - You may shower beginning the day after the tube was placed.   - Clean under the disc daily with soap and water. Pat dry under disc and apply new split gauze dressing under disc. Cleaning tube site daily will help prevent infection and skin irritation.  - A small amount of clear tan drainage from the tube exit site can be normal.  - Make sure the disc on the tube fits slightly snug against the skin so that the tube does not move in or out of the body easily.  - Flush your tube with 60cc of water twice a day (using a cath tip syringe) to prevent tube from clogging (or follow recommendations from your doctor or dietician if given).    Giving Feedings and Medications:  - Follow-up with your dietician, primary care provider, or oncologist for instructions on tube feedings and medication administration.    - ONLY use specific enteric feedings with your G tube (unless otherwise discussed with your dietitian).    - Flush tube at least twice daily with 60ml of water using cath tip syringe unless otherwise instructed by your doctor or dietician.  - Flush the tube before and after administrating medications and bolus feedings with 60cc of water.    Follow Up:  -Recommend routine 3 month exchanges of feeding tube. Please contact your primary care provider or speak with your dietitian to obtain an order to have your G tube exchanged. Then contact [facility] scheduling department at [phone] to schedule your G tube exchange appointment.  - G tubes CANNOT  be removed until 6 weeks after date of tube placement (Tube placed 9/27/2022).  - T-fasteners/Buttons (suture) should be removed 7-10 days after tube placement. This may have been done while you were still and inpatient, or can be completed in your outpatient clinic or care facility. Please contact [facility] for T fastener questions or concerns at [phone].    Please seek medical evaluation for:  - Fever (greater than 101 F (38.3C)).  - Purulent (yellow/green/foul smelling) drainage from tube exit site.   - Significant or worsening abdominal pain.   - Skin that is hot to the touch or significantly reddened at the tube exit site.   - Bleeding at tube exit site.    Call [facility] at [phone] with questions or if you have any of the following symptoms:  - Tube falls out or felt to be out of position.  - Unable to flush tube.  - Significant leakage around tube site (tube feeding, medications or drainage).  - Significant bleeding at the tube exit site.  - Severe pain at tube exit site.

## 2022-09-27 NOTE — PROVIDER NOTIFICATION
Pt arrived from [facility], no tube in place. 3 t tacks are in places. The area is red and scabbed over. Dr. Luis at bedside to review site.

## 2022-09-27 NOTE — PLAN OF CARE
Problem: Skin and Tissue Injury (Mechanical Ventilation, Invasive)  Goal: Absence of Device-Related Skin and Tissue Injury  Outcome: Ongoing, Progressing     Problem: Restraint, Nonbehavioral (Nonviolent)  Goal: Absence of Harm or Injury  Outcome: Ongoing, Progressing     Pt was found to have an infiltrated IV at beginning of shift.  IV fluids were stopped and the IV was removed.  Do to swelling in both arms, another peripheral IV was not able to be started.  Dr. Rubio was notified.  Order received for Vascular Access Consult.  PICC RN came to access pt and was able to start a midline in right arm.      Pt was fidgeting with hands when first assessed.  He was pulling at the lift shift underneath of him and picking at scab on his right knee.  After IV infiltration was identified/IV removed and pt was placed back into bed, he became more calm and relaxed for the remainder of the shift.  Occasionally during reposition, he would quickly reach for his trach,  but with bilateral wrist restraints in place, he was not able to reach his trach.  Once repositioning was completed, pt would again relax and return to sleep.

## 2022-09-27 NOTE — PROGRESS NOTES
Patient placed venti mask 50% to trach dome for procedure.  Sao2 97% throughout procedure.  Vent on stand-by but not used.  Orion Choi, RT

## 2022-09-27 NOTE — H&P
Interventional Radiology - History and Physical  9/27/2022    Procedure Requested: gastrostomy tube placement  Requested by: Neli Rubio MD    HPI: Per chart reivew:   Alexandru Still is a 89 year old male PMHx lambert-eaton syndrome, hypothyroid, completed heart block s/p pacemaker, HTN, BPH, PSA, DMII. Admitted to SICU 9/15 after fall with head strike. Found to have fractures. Intubated given high C-spine injury w/ difficulty managing secretions. On admission pt requested all cares be received without surgical cervical spine stabilization. S/p tracheostomy 9/21 and surgical placed G-tube 9/20.      Now with reports that patient has pulled out gastrostomy tube the night of 9/23. Per chart review patient is unable to be fed with NG 2/2 C1 and C2 fracture.      Case previously discussed/reviewed with Dr. Santiago - patient is with hiatal hernia and no safe access for IR placement.      Upon further discussion between Dr. Dill (sugery) and Dr. Santiago (IR). T-fasteners still in place so potential that track may still be utilized for replacement. Will attempt contrast administration through track and potential replacement in IR if window appears to be open for placement.      Imaging:   EXAM: XR ABDOMEN PORT 1 VIEW  9/18/2022 7:31 PM      HISTORY:  not able to push fluids through NGT - rechecking positioning         COMPARISON:  Earlier same day abdominal radiograph obtained at 1654  hours     FINDINGS: Supine radiograph of the abdomen. Feeding tube with tip in  the right side of the abdomen possibly within the stomach versus first  portion of the duodenum. Nonspecific, nonobstructive bowel gas  pattern. No pneumatosis or portal venous gas. The visualized lung  bases are clear. No acute osseous abnormality. Degenerative changes to  the spine with spinal fusion device overlying the L4-5.                                                                      IMPRESSION: Feeding tube with tip overlying the right  side of the  abdomen with the tip possibly within the stomach versus first portion  of the duodenum, consider further advancement if postpyloric position  is desired.  Non obstructive bowel gas pattern.     I have personally reviewed the examination and initial interpretation  and I agree with the findings.    NPO Status: MN   Anticoagulation/Antiplatelets/Bleeding tendencies: Heparin subcutaneous- HELD- last dose 9/25/2022 @ 1947  Antibiotics: Clindamycin 900mg IV x1 ordered for procedure - to be given if fresh poke/tract created    Review of Systems: A comprehensive 10-point review of systems was performed. All systems were reviewed and negative with exception to those reported in the HPI.    PMH:  Past Medical History:   Diagnosis Date     Anemia      Aortic stenosis      BPH (benign prostatic hyperplasia)      Closed T12 fracture      Complete heart block     s/p dual chamber pacemaker     Hypertension      Hypothyroidism      Kidney stone      Lambert-Eaton myasthenic syndrome      Lower extremity edema        PSH:  Past Surgical History:   Procedure Laterality Date     CV TRANSCATHETER AORTIC VALVE REPLACEMENT TRANSAORTIC APPROACH       EP PACEMAKER       ESOPHAGOGASTRODUODENOSCOPY, WITH NASOGASTRIC TUBE INSERTION N/A 9/18/2022    Procedure: ESOPHAGOGASTRODUODENOSCOPY, WITH NASOJEJUNAL TUBE INSERTION ;  Surgeon: Nils Drew MD;  Location: [facility]     HERNIA REPAIR       IR PICC PLACEMENT > 5 YRS OF AGE  6/15/2022     LASER HOLMIUM LITHOTRIPSY URETER(S), INSERT STENT, COMBINED N/A 6/15/2022    Procedure: 1. Cystourethroscopy 2. Laser lithotripsy of a urethral stone 3. Basketing of a urethral stone 4. Complex hannah catheter placement over a wire;  Surgeon: Beny Ha MD;  Location: [facility]     TRACHEOSTOMY N/A 9/21/2022    Procedure: CREATION, TRACHEOSTOMY;  Surgeon: Bob Dill MD;  Location: [facility]       ALLERGIES:  Allergies   Allergen Reactions     Cefuroxime Hives     Contrast Dye       Gadodiamide Hives       MEDICATIONS:  Current Facility-Administered Medications   Medication     acetaminophen (TYLENOL) solution 650 mg     acetaminophen (TYLENOL) Suppository 650 mg     acetylcysteine (MUCOMYST) 20 % nebulizer solution 2 mL     albuterol (PROVENTIL) neb solution 2.5 mg     albuterol (PROVENTIL) neb solution 2.5 mg     bisacodyl (DULCOLAX) suppository 10 mg     calcium carbonate suspension 1,250 mg     cholecalciferol (D-VI-SOL, Vitamin D3) 10 mcg/mL (400 units/mL) liquid 25 mcg     dextrose 5% and 0.45% NaCl infusion     glucose gel 15-30 g    Or     dextrose 50 % injection 25-50 mL    Or     glucagon injection 1 mg     doxazosin (CARDURA) suspension 2 mg     [Held by provider] heparin ANTICOAGULANT injection 5,000 Units     hydrALAZINE (APRESOLINE) injection 5 mg     insulin aspart (NovoLOG) injection (RAPID ACTING)     levothyroxine (SYNTHROID/LEVOTHROID) tablet 112 mcg     lidocaine (LMX4) kit     lidocaine (LMX4) kit     lidocaine 1 % 0.1-1 mL     lidocaine 1 % 0.1-5 mL     miconazole (MICATIN) 2 % powder     morphine (PF) injection 2 mg     multivitamins w/minerals liquid 15 mL     naloxone (NARCAN) injection 0.2 mg    Or     naloxone (NARCAN) injection 0.4 mg     oxyCODONE (ROXICODONE) solution 5 mg     pantoprazole (PROTONIX) IV push injection 40 mg     Patient is already receiving anticoagulation with heparin, enoxaparin (LOVENOX), warfarin (COUMADIN)  or other anticoagulant medication     protein modular (PROSOURCE TF) 2 packet     QUEtiapine (SEROquel) tablet 25 mg     sennosides (SENOKOT) syrup 5 mL     sodium chloride (PF) 0.9% PF flush 10 mL     sodium chloride (PF) 0.9% PF flush 10-20 mL     sodium chloride (PF) 0.9% PF flush 10-40 mL         LABS:  INR   Date Value Ref Range Status   09/15/2022 1.06 0.85 - 1.15 Final      Hemoglobin   Date Value Ref Range Status   09/27/2022 9.9 (L) 13.3 - 17.7 g/dL Final     Hemoglobin POCT   Date Value Ref Range Status   09/27/2022 11.2 (L)  13.3 - 17.7 g/dL Final   ]  Platelet Count   Date Value Ref Range Status   09/27/2022 267 150 - 450 10e3/uL Final     Creatinine   Date Value Ref Range Status   09/26/2022 0.63 (L) 0.67 - 1.17 mg/dL Final     Potassium   Date Value Ref Range Status   06/23/2022 4.0 3.4 - 5.3 mmol/L Final     Potassium POCT   Date Value Ref Range Status   09/27/2022 3.8 3.5 - 5.0 mmol/L Final         EXAM:  BP (!) 142/90 (BP Location: Left arm, Patient Position: Supine, Cuff Size: Adult Regular)   Pulse 63   Temp 99.5  F (37.5  C) (Axillary)   Resp 18   Wt 85.8 kg (189 lb 3.2 oz)   SpO2 93%   BMI 28.77 kg/m    General:  Stable.  In no acute distress.    Neuro:  NVBL.  Minimally interactive  Resp:  Lungs clear to auscultation bilaterally.  Trach with dome.  Diminished, particularly in bases.  Unlabored  Cardio:  S1S2 and reg, without murmur, clicks or rubs  Abdomen:  Soft, non-distended, non-tender, positive bowel sounds.    Three tfastners appreciated.  Former tube insertion site appears to have healed over.      Pre-Sedation Assessment:  Mallampati Airway Classification: trach  Previous reaction to anesthesia/sedation:  No  Sedation plan based on assessment: Moderate (conscious) sedation  ASA Classification: Class 3 - SEVERE SYSTEMIC DISEASE, DEFINITE FUNCTIONAL LIMITATIONS.   Code Status: FULL CODE      ASSESSMENT:  89 yo M    - Surgically placed GT 9/20/2022  - Tube dislodged, Tfastners still in place  - Contrast allergy- premedicated as prescribed   - Labs reviewed, acceptable to proceed with IR procedure, per clinical practice guidelines      Presents for percutaneous gastrostomy tube PLACEMENT    PLAN:    Proceed with percutaneous gastrostomy tube PLACEMENT, with sedation    - Clindamycin 900mg IV x1 ordered for procedure   - consented yesterday    Procedure, risks/benefits, details, alternatives, and sedation reviewed with patient/family, consent obtained yesterday and family member verbalized understanding. All  questions answered. OK to proceed with above radiology procedure.       TAYLOR THOMAS, TIMOTHY  Interventional Radiology  [phone]

## 2022-09-27 NOTE — PLAN OF CARE
Problem: Device-Related Complication Risk (Mechanical Ventilation, Invasive)  Goal: Optimal Device Function  Outcome: Ongoing, Progressing     Problem: Inability to Wean (Mechanical Ventilation, Invasive)  Goal: Mechanical Ventilation Liberation  Outcome: Ongoing, Progressing     Problem: Skin and Tissue Injury (Mechanical Ventilation, Invasive)  Goal: Absence of Device-Related Skin and Tissue Injury  Outcome: Ongoing, Progressing      RT PROGRESS NOTE     DATA:     CURRENT SETTINGS: AC 12/450/+5/30 (STBY)             TRACH TYPE / SIZE:  #6 Shiley TG 9/21/22             MODE: TM (cuff up)             FIO2:   25% and 20 lpm      ACTION:             THERAPIES:   ALB BID/ MUCOMYST BID             SUCTION:                           FREQUENCY:  X3                        AMOUNT:   Moderate to large                        CONSISTENCY:  Thick                        COLOR:   White/yellow             SPONTANEOUS COUGH EFFORT/STRENGTH OF EFFORT (not elicited by suctioning): Yes:Strong                              WEANING PHASE: #2                        WEAN MODE:   25% and 20 lpm TM with cuff down as                         WEAN TIME:  Since 08:15 am                        END WEAN REASON:   Cont     RESPONSE:             BS:   Coarse             VITAL SIGNS:   SAT 91-93%, HR 57-70, RR 16-22             EMOTIONAL NEEDS / CONCERNS: N/A              RISK FOR SELF DECANNULATION: N/A                        RISK DUE TO:                          INTERVENTION/S IN PLACE IS/ARE: N/A      NOTE / PLAN:   Pt is on 1st night off the vent and is on 25% and 20 lpm tm with cuff up, chichi well. Cont current therapy and keep sat >/=90%. VBG done 7.47/36/41/26/79%.. Pt is going [place] to [facility] FT P/U 7:30 am

## 2022-09-27 NOTE — PLAN OF CARE
Problem: Device-Related Complication Risk (Mechanical Ventilation, Invasive)  Goal: Optimal Device Function  Outcome: Ongoing, Progressing  Intervention: Optimize Device Care and Function  Recent Flowsheet Documentation  Taken 9/23/2022 1127 by Radha Mcclelland RT  Airway Safety Measures: all equipment/monitors on and audible  Taken 9/23/2022 0805 by Radha Mcclelland RT  Airway Safety Measures: all equipment/monitors on and audible     Problem: Inability to Wean (Mechanical Ventilation, Invasive)  Goal: Mechanical Ventilation Liberation  Outcome: Ongoing, Progressing     Problem: Skin and Tissue Injury (Mechanical Ventilation, Invasive)  Goal: Absence of Device-Related Skin and Tissue Injury  Outcome: Ongoing, Progressing     Problem: Ventilator-Induced Lung Injury (Mechanical Ventilation, Invasive)  Goal: Absence of Ventilator-Induced Lung Injury  Outcome: Ongoing, Progressing   RT PROGRESS NOTE   [id number]  DATA:     CURRENT SETTINGS:             TRACH TYPE / SIZE: #6 Shiley Taper guard, placed on 9/21,sutures removed today, on 09/27.   Okayed by CNP to have #7 Bivona as a back up trach.             MODE:TM 27% 30L from 20L ( AC 12 450 +5 35%)             FIO2:        ACTION:             THERAPIES: Alb/ Mucomyst neb BID               SUCTION: X3 in 4 hrs for  mod tp small p-yello wthick to thin                         FREQUENCY:                           AMOUNT:                           CONSISTENCY:                           COLOR:                SPONTANEOUS COUGH EFFORT/STRENGTH OF EFFORT (not elicited by suctioning):                               WEANING PHASE:  2                         WEAN MODE: TM cont since yesterday, 9/26 a.m.                        WEAN TIME:                           END WEAN REASON:        RESPONSE:             BS:                VITAL SIGNS:                EMOTIONAL NEEDS / CONCERNS:                  RISK FOR SELF DECANNULATION:                          RISK DUE TO:                           INTERVENTION/S IN PLACE IS/ARE:         NOTE / PLAN:   Goal Outcome Evaluation:    Last noc pt was the 1st noc off vent, chichi well  Pt went for procedure to [facility]  Cont with current POC

## 2022-09-28 ENCOUNTER — APPOINTMENT (OUTPATIENT)
Dept: SPEECH THERAPY | Facility: CLINIC | Age: 87
DRG: 208 | End: 2022-09-28
Attending: NURSE PRACTITIONER
Payer: MEDICARE

## 2022-09-28 ENCOUNTER — APPOINTMENT (OUTPATIENT)
Dept: OCCUPATIONAL THERAPY | Facility: CLINIC | Age: 87
DRG: 208 | End: 2022-09-28
Attending: INTERNAL MEDICINE
Payer: MEDICARE

## 2022-09-28 ENCOUNTER — APPOINTMENT (OUTPATIENT)
Dept: PHYSICAL THERAPY | Facility: CLINIC | Age: 87
DRG: 208 | End: 2022-09-28
Attending: INTERNAL MEDICINE
Payer: MEDICARE

## 2022-09-28 LAB
ANION GAP SERPL CALCULATED.3IONS-SCNC: 9 MMOL/L (ref 7–15)
BUN SERPL-MCNC: 22 MG/DL (ref 8–23)
CALCIUM SERPL-MCNC: 8.2 MG/DL (ref 8.2–9.6)
CHLORIDE SERPL-SCNC: 104 MMOL/L (ref 98–107)
CREAT SERPL-MCNC: 0.73 MG/DL (ref 0.67–1.17)
DEPRECATED HCO3 PLAS-SCNC: 25 MMOL/L (ref 22–29)
GFR SERPL CREATININE-BSD FRML MDRD: 85 ML/MIN/1.73M2
GLUCOSE BLDC GLUCOMTR-MCNC: 134 MG/DL (ref 70–99)
GLUCOSE BLDC GLUCOMTR-MCNC: 141 MG/DL (ref 70–99)
GLUCOSE BLDC GLUCOMTR-MCNC: 144 MG/DL (ref 70–99)
GLUCOSE BLDC GLUCOMTR-MCNC: 146 MG/DL (ref 70–99)
GLUCOSE SERPL-MCNC: 146 MG/DL (ref 70–99)
POTASSIUM SERPL-SCNC: 3.6 MMOL/L (ref 3.4–5.3)
SODIUM SERPL-SCNC: 138 MMOL/L (ref 136–145)

## 2022-09-28 PROCEDURE — 92526 ORAL FUNCTION THERAPY: CPT | Mod: GN

## 2022-09-28 PROCEDURE — 250N000013 HC RX MED GY IP 250 OP 250 PS 637: Performed by: HOSPITALIST

## 2022-09-28 PROCEDURE — 250N000009 HC RX 250: Performed by: NURSE PRACTITIONER

## 2022-09-28 PROCEDURE — 97530 THERAPEUTIC ACTIVITIES: CPT | Mod: GP | Performed by: PHYSICAL THERAPIST

## 2022-09-28 PROCEDURE — 999N000157 HC STATISTIC RCP TIME EA 10 MIN

## 2022-09-28 PROCEDURE — 250N000011 HC RX IP 250 OP 636: Performed by: HOSPITALIST

## 2022-09-28 PROCEDURE — 94640 AIRWAY INHALATION TREATMENT: CPT

## 2022-09-28 PROCEDURE — 82310 ASSAY OF CALCIUM: CPT | Performed by: HOSPITALIST

## 2022-09-28 PROCEDURE — 99233 SBSQ HOSP IP/OBS HIGH 50: CPT | Performed by: HOSPITALIST

## 2022-09-28 PROCEDURE — G0463 HOSPITAL OUTPT CLINIC VISIT: HCPCS

## 2022-09-28 PROCEDURE — 250N000012 HC RX MED GY IP 250 OP 636 PS 637: Performed by: HOSPITALIST

## 2022-09-28 PROCEDURE — 999N000123 HC STATISTIC OXYGEN O2DAILY TECH TIME

## 2022-09-28 PROCEDURE — 97535 SELF CARE MNGMENT TRAINING: CPT | Mod: GO | Performed by: OCCUPATIONAL THERAPIST

## 2022-09-28 PROCEDURE — 120N000017 HC R&B RESPIRATORY CARE

## 2022-09-28 PROCEDURE — 94640 AIRWAY INHALATION TREATMENT: CPT | Mod: 76

## 2022-09-28 PROCEDURE — 999N000009 HC STATISTIC AIRWAY CARE

## 2022-09-28 RX ADMIN — SENNOSIDES 5 ML: 8.8 LIQUID ORAL at 08:14

## 2022-09-28 RX ADMIN — ACETYLCYSTEINE 2 ML: 200 SOLUTION ORAL; RESPIRATORY (INHALATION) at 19:53

## 2022-09-28 RX ADMIN — INSULIN ASPART 1 UNITS: 100 INJECTION, SOLUTION INTRAVENOUS; SUBCUTANEOUS at 06:26

## 2022-09-28 RX ADMIN — INSULIN ASPART 1 UNITS: 100 INJECTION, SOLUTION INTRAVENOUS; SUBCUTANEOUS at 00:11

## 2022-09-28 RX ADMIN — MULTIVIT AND MINERALS-FERROUS GLUCONATE 9 MG IRON/15 ML ORAL LIQUID 15 ML: at 08:13

## 2022-09-28 RX ADMIN — Medication 2 PACKET: at 08:14

## 2022-09-28 RX ADMIN — ACETYLCYSTEINE 2 ML: 200 SOLUTION ORAL; RESPIRATORY (INHALATION) at 08:09

## 2022-09-28 RX ADMIN — ACETAMINOPHEN 650 MG: 650 SOLUTION ORAL at 08:29

## 2022-09-28 RX ADMIN — OXYCODONE HYDROCHLORIDE 5 MG: 5 SOLUTION ORAL at 11:52

## 2022-09-28 RX ADMIN — LEVOTHYROXINE SODIUM 112 MCG: 0.11 TABLET ORAL at 06:51

## 2022-09-28 RX ADMIN — Medication 2 PACKET: at 21:51

## 2022-09-28 RX ADMIN — OXYCODONE HYDROCHLORIDE 5 MG: 5 SOLUTION ORAL at 21:52

## 2022-09-28 RX ADMIN — CALCIUM CARBONATE 1250 MG: 1250 SUSPENSION ORAL at 17:45

## 2022-09-28 RX ADMIN — HEPARIN SODIUM 5000 UNITS: 5000 INJECTION, SOLUTION INTRAVENOUS; SUBCUTANEOUS at 20:49

## 2022-09-28 RX ADMIN — Medication 40 MG: at 06:51

## 2022-09-28 RX ADMIN — Medication 2 MG: at 08:14

## 2022-09-28 RX ADMIN — ALBUTEROL SULFATE 2.5 MG: 2.5 SOLUTION RESPIRATORY (INHALATION) at 08:09

## 2022-09-28 RX ADMIN — Medication 25 MCG: at 08:14

## 2022-09-28 RX ADMIN — CALCIUM CARBONATE 1250 MG: 1250 SUSPENSION ORAL at 08:14

## 2022-09-28 RX ADMIN — ALBUTEROL SULFATE 2.5 MG: 2.5 SOLUTION RESPIRATORY (INHALATION) at 19:53

## 2022-09-28 RX ADMIN — OXYCODONE HYDROCHLORIDE 5 MG: 5 SOLUTION ORAL at 17:45

## 2022-09-28 RX ADMIN — INSULIN ASPART 1 UNITS: 100 INJECTION, SOLUTION INTRAVENOUS; SUBCUTANEOUS at 13:31

## 2022-09-28 RX ADMIN — QUETIAPINE 25 MG: 25 TABLET, FILM COATED ORAL at 21:52

## 2022-09-28 RX ADMIN — Medication 2 PACKET: at 14:05

## 2022-09-28 ASSESSMENT — ACTIVITIES OF DAILY LIVING (ADL)
ADLS_ACUITY_SCORE: 53
ADLS_ACUITY_SCORE: 55
ADLS_ACUITY_SCORE: 53
ADLS_ACUITY_SCORE: 55
ADLS_ACUITY_SCORE: 53
ADLS_ACUITY_SCORE: 55
ADLS_ACUITY_SCORE: 53

## 2022-09-28 NOTE — PROGRESS NOTES
"  Interventional Radiology- Chart Check  9/28/2022      SUBJECTIVE:  Alexandru Still is s/p Gastrostomy tube placement in interventional radiology 9/27/22.  Uneventful procedure, tolerated well.    DAVIAN overnight, stable.    Toleration initiation of tube feedings.        OBJECTIVE:    Vital signs:  Temp: 98.2  F (36.8  C) Temp src: Axillary BP: 122/57 Pulse: 58   Resp: 20 SpO2: 96 % O2 Device: Trach dome Oxygen Delivery: 30 LPM   Weight: 87.9 kg (193 lb 12.8 oz)  Estimated body mass index is 29.47 kg/m  as calculated from the following:    Height as of 7/20/22: 1.727 m (5' 8\").    Weight as of this encounter: 87.9 kg (193 lb 12.8 oz).    IMAGING    INR   Date Value Ref Range Status   09/15/2022 1.06 0.85 - 1.15 Final      Lab Results   Component Value Date    WBC 7.3 09/27/2022     Lab Results   Component Value Date    RBC 3.16 09/27/2022     Lab Results   Component Value Date    HGB 9.9 09/27/2022    HGB 11.2 09/27/2022     Lab Results   Component Value Date    HCT 29.1 09/27/2022     No components found for: MCT  Lab Results   Component Value Date    MCV 92 09/27/2022     Lab Results   Component Value Date    MCH 31.3 09/27/2022     Lab Results   Component Value Date    MCHC 34.0 09/27/2022     Lab Results   Component Value Date    RDW 14.4 09/27/2022     Lab Results   Component Value Date     09/27/2022       ASSESSMENT:  Alexandru Still is a 89 year old yo male who is s/p percutaneous gastrojejunostomy tube placement by interventional radiology on 9/27/22     - Tolerated procedure well  - DAVIAN, stable  - Tolerating advancing feedings      PLAN:    - Pain and cramping at feeding tube exit site is expected following tube placement. Patient's symptoms within normal limits following tube placement. Consider muscle relaxer  -Nutrition recommendation per registered dietitian.     - Patient should have T-fasteners removed in 7-10 days. Instructions in D/C navigator or [facility] can be contacted with questions or " concerns at [phone].  - This tube will need to be maintained for a minimum of 6 weeks to allow for track maturation prior to possible removal.  - Recommend routine exchange every 3 months.  This has been ordered and called into [facility].   - Discharge instructions have been placed in the discharge navigator.    - Please contact [facility] at [phone] with any feeding tube related questions or concerns.    - Interventional Radiology will sign off.        Thank you for the consultation.  It was a pleasure being a part of this patient's care.         TAYLOR THOMAS NP on 9/28/2022 at 7:53 AM

## 2022-09-28 NOTE — PLAN OF CARE
Problem: Plan of Care - These are the overarching goals to be used throughout the patient stay.    Goal: Absence of Hospital-Acquired Illness or Injury  Intervention: Identify and Manage Fall Risk  Recent Flowsheet Documentation  Taken 9/28/2022 0800 by Stacey Vargas RN  Safety Promotion/Fall Prevention:   activity supervised   bed alarm on   clutter free environment maintained   fall prevention program maintained   lighting adjusted   nonskid shoes/slippers when out of bed   patient and family education   safety round/check completed  Intervention: Prevent Skin Injury  Recent Flowsheet Documentation  Taken 9/28/2022 1200 by Stacey Vargas RN  Body Position: (up into the chair)   position changed independently   other (see comments)  Taken 9/28/2022 0830 by Stacey Vargas RN  Body Position: position maintained  Taken 9/28/2022 0800 by Stacey Vargas RN  Body Position: position maintained  Intervention: Prevent and Manage VTE (Venous Thromboembolism) Risk  Recent Flowsheet Documentation  Taken 9/28/2022 0830 by Stacey Vargas RN  Activity Management: bedrest  Taken 9/28/2022 0800 by Stacey Vargas RN  Activity Management: bedrest  Intervention: Prevent Infection  Recent Flowsheet Documentation  Taken 9/28/2022 0800 by Stacey Vargas RN  Infection Prevention:   equipment surfaces disinfected   hand hygiene promoted   personal protective equipment utilized   rest/sleep promoted   single patient room provided  Goal: Optimal Comfort and Wellbeing  Intervention: Monitor Pain and Promote Comfort  Recent Flowsheet Documentation  Taken 9/28/2022 1152 by Stacey Vargas RN  Pain Management Interventions:   medication (see MAR)   distraction   emotional support   pillow support provided   repositioned  Taken 9/28/2022 0830 by Stacey Vargas RN  Pain Management Interventions:   medication (see MAR)   distraction   emotional support  Intervention: Provide Person-Centered Care  Recent Flowsheet Documentation  Taken 9/28/2022 0800 by  Stacey Vargas RN  Trust Relationship/Rapport:   care explained   questions answered   thoughts/feelings acknowledged     Problem: Device-Related Complication Risk (Mechanical Ventilation, Invasive)  Goal: Optimal Device Function  Intervention: Optimize Device Care and Function  Recent Flowsheet Documentation  Taken 9/28/2022 0800 by Stacey Vargas RN  Airway Safety Measures: all equipment/monitors on and audible     Problem: Inability to Wean (Mechanical Ventilation, Invasive)  Goal: Mechanical Ventilation Liberation  Intervention: Promote Extubation and Mechanical Ventilation Liberation  Recent Flowsheet Documentation  Taken 9/28/2022 0800 by Stacey Vargas RN  Medication Review/Management: medications reviewed     Problem: Ventilator-Induced Lung Injury (Mechanical Ventilation, Invasive)  Goal: Absence of Ventilator-Induced Lung Injury  Intervention: Prevent Ventilator-Associated Pneumonia  Recent Flowsheet Documentation  Taken 9/28/2022 1200 by Stacey Vargas RN  Head of Bed (HOB) Positioning: HOB at 30 degrees  Taken 9/28/2022 0830 by Staecy Vargas RN  Oral Care: swabbed with sterile water  Head of Bed (HOB) Positioning: HOB at 30 degrees  Taken 9/28/2022 0800 by Stacey Vargas RN  Oral Care: swabbed with sterile water  Head of Bed (HOB) Positioning: HOB at 30 degrees     Problem: Restraint, Nonbehavioral (Nonviolent)  Goal: Absence of Harm or Injury  Intervention: Protect Dignity, Rights, and Personal Wellbeing  Recent Flowsheet Documentation  Taken 9/28/2022 0800 by Stacey Vargas RN  Trust Relationship/Rapport:   care explained   questions answered   thoughts/feelings acknowledged  Intervention: Protect Skin and Joint Integrity  Recent Flowsheet Documentation  Taken 9/28/2022 1200 by Stacey Vargas RN  Body Position: (up into the chair)   position changed independently   other (see comments)  Taken 9/28/2022 0830 by Stacey Vargas RN  Body Position: position maintained  Taken 9/28/2022 0800 by Stacey Vargas RN  Body  Position: position maintained     Problem: Pain Acute  Goal: Acceptable Pain Control and Functional Ability  Intervention: Develop Pain Management Plan  Recent Flowsheet Documentation  Taken 9/28/2022 1152 by Stacey Vargas RN  Pain Management Interventions:   medication (see MAR)   distraction   emotional support   pillow support provided   repositioned  Taken 9/28/2022 0830 by Stacey Vargas RN  Pain Management Interventions:   medication (see MAR)   distraction   emotional support  Intervention: Prevent or Manage Pain  Recent Flowsheet Documentation  Taken 9/28/2022 0800 by Stacey Vargas RN  Medication Review/Management: medications reviewed     Problem: Communication Impairment (Artificial Airway)  Goal: Effective Communication  Intervention: Ensure Effective Communication  Recent Flowsheet Documentation  Taken 9/28/2022 0800 by Stacey Vargas RN  Trust Relationship/Rapport:   care explained   questions answered   thoughts/feelings acknowledged     Problem: Device-Related Complication Risk (Artificial Airway)  Goal: Optimal Device Function  Intervention: Optimize Device Care and Function  Recent Flowsheet Documentation  Taken 9/28/2022 0830 by Stacey Vargas RN  Oral Care: swabbed with sterile water  Taken 9/28/2022 0800 by Stacey Vargas RN  Airway Safety Measures: all equipment/monitors on and audible  Oral Care: swabbed with sterile water   Goal Outcome Evaluation:      Patient did complain of headache and neck pain rating 8 out of 10. Patient was medicated with Tylenol which did not help much and then Oxycodone which did work. Pain level down to a 3. Patient sat up in chair and tolerated well. Will continue with current plan of care,

## 2022-09-28 NOTE — PROGRESS NOTES
09/28/22 1400   Name of Certified Therapeutic Rec Specialist   Name of Certified Therapeutic Rec Specialist Maday Naylor, JOB   Appointment Type   Type of Therapeutic Rec Session Therapeutic Rec Assessment   General Information   Daily Contact with Relatives or Friends Visit   Community Involvement   Community Involvement Retired   Media   Computer Has own at home   TV / Movies TV   Sports / Physical Activities   Outdoor Activities Lawn / yard care;Other (see comments)  (spending time at cabin)   Impression   Open to Socializing with Others Unable (please describe in comments);Other (comments)  (decreased cognition, trached and vented)   Treatment Plan   Assessment Called family for leisure and personal history and information written on poster for other staff to use during tx/cares. Will monitor pt's progress and see as appropriate.

## 2022-09-28 NOTE — PLAN OF CARE
Problem: Restraint, Nonbehavioral (Nonviolent)  Goal: Absence of Harm or Injury  Outcome: Ongoing, Progressing  Intervention: Protect Dignity, Rights, and Personal Wellbeing  Recent Flowsheet Documentation  Taken 9/28/2022 0208 by Melani Keith RN  Trust Relationship/Rapport: care explained  Intervention: Protect Skin and Joint Integrity  Recent Flowsheet Documentation  Taken 9/28/2022 0630 by Melani Keith RN  Body Position:   turned   right   heels elevated     Problem: Plan of Care - These are the overarching goals to be used throughout the patient stay.    Goal: Optimal Comfort and Wellbeing  Outcome: Ongoing, Progressing  Intervention: Provide Person-Centered Care  Recent Flowsheet Documentation  Taken 9/28/2022 0208 by Melani Keith RN  Trust Relationship/Rapport: care explained     Problem: Plan of Care - These are the overarching goals to be used throughout the patient stay.    Goal: Absence of Hospital-Acquired Illness or Injury  Outcome: Ongoing, Progressing  Intervention: Identify and Manage Fall Risk  Recent Flowsheet Documentation  Taken 9/28/2022 0208 by Melani Keith RN  Safety Promotion/Fall Prevention: bed alarm on  Intervention: Prevent Skin Injury  Recent Flowsheet Documentation  Taken 9/28/2022 0630 by Melani Keith RN  Body Position:   turned   right   heels elevated  Intervention: Prevent and Manage VTE (Venous Thromboembolism) Risk  Recent Flowsheet Documentation  Taken 9/28/2022 0208 by Melani Keith RN  VTE Prevention/Management: SCDs (sequential compression devices) on  Activity Management: bedrest  Intervention: Prevent Infection  Recent Flowsheet Documentation  Taken 9/28/2022 0208 by Melani Keith RN  Infection Prevention: rest/sleep promoted   Goal Outcome Evaluation:    Slept 5-6 hours the whole night, no complaints of pain, bilateral soft limb restraints intact for safety, monitored.

## 2022-09-28 NOTE — PROGRESS NOTES
[facility]  WOC Nurse Inpatient Assessment     Consulted for: Face, RLE, Trach, G-tube    Patient History (according to provider note(s):        Areas Assessed:        Face with scattered abrasions all healed      RLE noted to have 2 skin tears without flap remaining.  Measurements: 6 cm x 1.5 cm and 6.3 cm x 1.5 cm  Wound beds: Pink, viable tissue  Drainage: Scant amount of serous exudate  Periwound: Skin intact and WNL  Improving      Treatment Plan:     RLE wound care - every 5 days  Cleanse wounds with wound cleanser, gently dry.  Cover open wounds with a strip of oil emulsion gauze and cover with Mepilex sacral dressing.     Orders: Written    RECOMMEND PRIMARY TEAM ORDER: None, at this time  Education provided: plan of care  Discussed plan of care with: Patient and CNA  WOC nurse follow-up plan: weekly  Notify WOC if wound(s) deteriorate.  Nursing to notify the Provider(s) and re-consult the WOC Nurse if new skin concern.    DATA:     Current support surface: Standard  Foam mattress  Containment of urine/stool: Incontinence Protocol  BMI: Body mass index is 29.47 kg/m .   Active diet order: None     Output: I/O last 3 completed shifts:  In: 2422.25 [I.V.:1155.25; NG/GT:1267]  Out: 450 [Urine:400; Emesis/NG output:50]     Labs:   Recent Labs   Lab 09/27/22  0648   ALBUMIN 2.7*   HGB 9.9*   WBC 7.3     Pressure injury risk assessment:   Sensory Perception: 3-->slightly limited  Moisture: 3-->occasionally moist  Activity: 2-->chairfast  Mobility: 1-->completely immobile  Nutrition: 3-->adequate  Friction and Shear: 2-->potential problem  Bakari Score: 14    Hilary Smith, [place], RN, PHN, HNB-BC, CWOCN

## 2022-09-28 NOTE — PLAN OF CARE
Problem: Device-Related Complication Risk (Mechanical Ventilation, Invasive)  Goal: Optimal Device Function  Outcome: Ongoing, Progressing     Problem: Inability to Wean (Mechanical Ventilation, Invasive)  Goal: Mechanical Ventilation Liberation  Outcome: Ongoing, Progressing     Problem: Skin and Tissue Injury (Mechanical Ventilation, Invasive)  Goal: Absence of Device-Related Skin and Tissue Injury  Outcome: Ongoing, Progressing      RT PROGRESS NOTE     DATA:     CURRENT SETTINGS: AC 12/450/+5/30 (STBY)             TRACH TYPE / SIZE:  #6 Shiley TG 9/21/22             MODE: TM (cuff up)             FIO2:   26% and 30 lpm      ACTION:             THERAPIES:   ALB BID/ MUCOMYST BID             SUCTION:                           FREQUENCY:  X4                        AMOUNT:   Moderate to small                         CONSISTENCY:  Thick                        COLOR:   White/yellow             SPONTANEOUS COUGH EFFORT/STRENGTH OF EFFORT (not elicited by suctioning): Yes:Strong                              WEANING PHASE: #2                        WEAN MODE:   25% and 20 lpm TM with cuff down as                         WEAN TIME:  Since 08:15 am @ 09/27                        END WEAN REASON:   Cont     RESPONSE:             BS:   Coarse             VITAL SIGNS:   SAT 92-96%, HR 59-75, RR 20-22             EMOTIONAL NEEDS / CONCERNS: N/A              RISK FOR SELF DECANNULATION: N/A                        RISK DUE TO:                          INTERVENTION/S IN PLACE IS/ARE: N/A      NOTE / PLAN:   Pt is on 2nd night off the vent and is on 26% and 30 lpm tm with cuff up, chichi well. Cont current therapy and keep sat >/=90%.     9/27 VBG done 7.47/36/41/27/79.2%

## 2022-09-28 NOTE — PROGRESS NOTES
Speech-Language Pathology: Repeat Blue Dye Test         09/28/22 1500   Signing Clinician's Name / Credentials   Signing clinician's name / credentials Sailaja Marte MA, CCC-SLP   Quick Adds   Rehab Discipline SLP   SLP - Dysphagia/Swallow   Minutes of Treatment 20 minutes   Treatment Detail Blue Dye Test (BDT): #6 Remington, last BDT on 9/23. Patient seen with RTLena to assess oral secretion management. Blue dye applied to oral cavity. Patient completed swallow with verbal cue. RT provided tracheal suctioning with cuff deflated. Pt had immediate blue dye suctioned. Cuff re-inflated. Severe oropharyngeal dysphagia. Recommend continue NPO status with PEG tube feeding and with frequent oral cares.   SLP Discharge Planning   SLP Discharge Recommendation Transitional Care Facility;Acute Rehab Center-Motivated patient will benefit from intensive, interdisciplinary therapy.  Anticipate will be able to tolerate 3 hours of therapy per day;home with home care speech therapy   SLP Rationale for DC Rec Well below baseline for swallowing and communication.   SLP Brief overview of current status  Recommend continue NPO status with frequent oral cares. Immediate aspiration of secretions with repeat BDT.

## 2022-09-28 NOTE — PROGRESS NOTES
RT PROGRESS NOTE     DATA:     CURRENT SETTINGS:             TRACH TYPE / SIZE:  #6 Remington (placed 9/21)             MODE:   TM (cuff up)             FIO2:   25%/30L     ACTION:             THERAPIES:   Albuterol/Mucomyst BID             SUCTION:                           FREQUENCY:   x5                        AMOUNT:   Small to Moderate                        CONSISTENCY:   Thick/Thin                        COLOR:   Pale Yellow/Blue/Red Streaked             SPONTANEOUS COUGH EFFORT/STRENGTH OF EFFORT (not elicited by suctioning): Moderate Spontaneous Cough                           WEANING PHASE:   Phase 2                        WEAN MODE:    25%/30L TM (cuff up)                        WEAN TIME:   Continuous                        END WEAN REASON:   NA     RESPONSE:             BS:   Coarse/Diminished             VITAL SIGNS:   Sating 93-94%, HR 55-60, RR 22-24             EMOTIONAL NEEDS / CONCERNS:  NA                RISK FOR SELF DECANNULATION:  Yes             RISK DUE TO:  Pulling At Lines             INTERVENTION/S IN PLACE IS/ARE:  Restrained       NOTE / PLAN:   BDT today with immediate aspiration.  RT will continue to monitor.

## 2022-09-28 NOTE — PROGRESS NOTES
Social Work Note:    Spoke with patient's brother Abhijit.  Scheduled care conference for Monday 0/03/22 @11:00    [facility] Reynolds [facility]  [phone]

## 2022-09-28 NOTE — PLAN OF CARE
Problem: Communication Impairment (Artificial Airway)  Goal: Effective Communication  Outcome: Ongoing, Progressing     Problem: Device-Related Complication Risk (Artificial Airway)  Goal: Optimal Device Function  Outcome: Ongoing, Progressing     Problem: Skin and Tissue Injury (Artificial Airway)  Goal: Absence of Device-Related Skin or Tissue Injury  Outcome: Ongoing, Progressing   Goal Outcome Evaluation:

## 2022-09-28 NOTE — PLAN OF CARE
Problem: Device-Related Complication Risk (Mechanical Ventilation, Invasive)  Goal: Optimal Device Function  Outcome: Met  Intervention: Optimize Device Care and Function  Recent Flowsheet Documentation  Taken 9/28/2022 1038 by Lena Zavala, RT  Airway Safety Measures: all equipment/monitors on and audible  Taken 9/28/2022 0809 by Lena Zavala, RT  Airway Safety Measures: all equipment/monitors on and audible     Problem: Inability to Wean (Mechanical Ventilation, Invasive)  Goal: Mechanical Ventilation Liberation  Outcome: Met     Problem: Skin and Tissue Injury (Mechanical Ventilation, Invasive)  Goal: Absence of Device-Related Skin and Tissue Injury  Outcome: Met     Problem: Ventilator-Induced Lung Injury (Mechanical Ventilation, Invasive)  Goal: Absence of Ventilator-Induced Lung Injury  Outcome: Met   Goal Outcome Evaluation:

## 2022-09-28 NOTE — PROGRESS NOTES
Coding Modification Note  What was modified:  # Metabolic encephalopathy and delirium:  Due to traumatic intracranial hemorrhage and hospitalization. He was monitored closely with routine neurological exams. Delirium precautions implemented with improvement prior to discharge.    Sheridan Burciaga CNP

## 2022-09-28 NOTE — PLAN OF CARE
Goal Outcome Evaluation:    Plan of Care Reviewed With: patient     Overall Patient Progress: improving     Patient's blood pressures  ranged  between  129 - 158 mm Hg this evening. No PRN BP medication was necessary per parameter . He was medicated for generalized discomfort with Tylenol  PRN  at bedtime with good effect. He was noted  sound asleep on reassessment. He is still impulsive and was noted pulling at his lines/tubings from time to time. He was maintained on continuous  bilateral soft wrist  restraints and monitored accordingly. Will keep evaluating patient's progress and safety.

## 2022-09-29 ENCOUNTER — APPOINTMENT (OUTPATIENT)
Dept: SPEECH THERAPY | Facility: CLINIC | Age: 87
DRG: 208 | End: 2022-09-29
Attending: NURSE PRACTITIONER
Payer: MEDICARE

## 2022-09-29 ENCOUNTER — APPOINTMENT (OUTPATIENT)
Dept: PHYSICAL THERAPY | Facility: CLINIC | Age: 87
DRG: 208 | End: 2022-09-29
Attending: INTERNAL MEDICINE
Payer: MEDICARE

## 2022-09-29 LAB
GLUCOSE BLDC GLUCOMTR-MCNC: 109 MG/DL (ref 70–99)
GLUCOSE BLDC GLUCOMTR-MCNC: 110 MG/DL (ref 70–99)
GLUCOSE BLDC GLUCOMTR-MCNC: 110 MG/DL (ref 70–99)
GLUCOSE BLDC GLUCOMTR-MCNC: 134 MG/DL (ref 70–99)

## 2022-09-29 PROCEDURE — 99232 SBSQ HOSP IP/OBS MODERATE 35: CPT | Performed by: NURSE PRACTITIONER

## 2022-09-29 PROCEDURE — 250N000009 HC RX 250: Performed by: NURSE PRACTITIONER

## 2022-09-29 PROCEDURE — 250N000011 HC RX IP 250 OP 636: Performed by: NURSE PRACTITIONER

## 2022-09-29 PROCEDURE — 120N000017 HC R&B RESPIRATORY CARE

## 2022-09-29 PROCEDURE — 250N000011 HC RX IP 250 OP 636: Performed by: HOSPITALIST

## 2022-09-29 PROCEDURE — 92597 ORAL SPEECH DEVICE EVAL: CPT | Mod: GN | Performed by: SPEECH-LANGUAGE PATHOLOGIST

## 2022-09-29 PROCEDURE — 250N000013 HC RX MED GY IP 250 OP 250 PS 637: Performed by: HOSPITALIST

## 2022-09-29 PROCEDURE — 99233 SBSQ HOSP IP/OBS HIGH 50: CPT | Performed by: HOSPITALIST

## 2022-09-29 PROCEDURE — 97530 THERAPEUTIC ACTIVITIES: CPT | Mod: GP

## 2022-09-29 PROCEDURE — 999N000123 HC STATISTIC OXYGEN O2DAILY TECH TIME

## 2022-09-29 PROCEDURE — 999N000009 HC STATISTIC AIRWAY CARE

## 2022-09-29 PROCEDURE — 94640 AIRWAY INHALATION TREATMENT: CPT

## 2022-09-29 PROCEDURE — 999N000157 HC STATISTIC RCP TIME EA 10 MIN

## 2022-09-29 PROCEDURE — 94640 AIRWAY INHALATION TREATMENT: CPT | Mod: 76

## 2022-09-29 PROCEDURE — 92507 TX SP LANG VOICE COMM INDIV: CPT | Mod: GN | Performed by: SPEECH-LANGUAGE PATHOLOGIST

## 2022-09-29 RX ORDER — BUMETANIDE 0.25 MG/ML
1 INJECTION INTRAMUSCULAR; INTRAVENOUS DAILY
Status: COMPLETED | OUTPATIENT
Start: 2022-09-29 | End: 2022-10-03

## 2022-09-29 RX ORDER — ENOXAPARIN SODIUM 100 MG/ML
40 INJECTION SUBCUTANEOUS EVERY 24 HOURS
Status: DISCONTINUED | OUTPATIENT
Start: 2022-09-29 | End: 2022-10-28 | Stop reason: HOSPADM

## 2022-09-29 RX ADMIN — ALBUTEROL SULFATE 2.5 MG: 2.5 SOLUTION RESPIRATORY (INHALATION) at 07:18

## 2022-09-29 RX ADMIN — SENNOSIDES 5 ML: 8.8 LIQUID ORAL at 09:08

## 2022-09-29 RX ADMIN — Medication 2 PACKET: at 20:30

## 2022-09-29 RX ADMIN — CALCIUM CARBONATE 1250 MG: 1250 SUSPENSION ORAL at 09:07

## 2022-09-29 RX ADMIN — ACETAMINOPHEN 650 MG: 650 SOLUTION ORAL at 14:27

## 2022-09-29 RX ADMIN — Medication 2 PACKET: at 14:27

## 2022-09-29 RX ADMIN — Medication 25 MCG: at 09:08

## 2022-09-29 RX ADMIN — OXYCODONE HYDROCHLORIDE 5 MG: 5 SOLUTION ORAL at 09:34

## 2022-09-29 RX ADMIN — ALBUTEROL SULFATE 2.5 MG: 2.5 SOLUTION RESPIRATORY (INHALATION) at 20:09

## 2022-09-29 RX ADMIN — HEPARIN SODIUM 5000 UNITS: 5000 INJECTION, SOLUTION INTRAVENOUS; SUBCUTANEOUS at 03:46

## 2022-09-29 RX ADMIN — SENNOSIDES 5 ML: 8.8 LIQUID ORAL at 20:30

## 2022-09-29 RX ADMIN — OXYCODONE HYDROCHLORIDE 5 MG: 5 SOLUTION ORAL at 14:26

## 2022-09-29 RX ADMIN — BUMETANIDE 1 MG: 0.25 INJECTION INTRAMUSCULAR; INTRAVENOUS at 12:21

## 2022-09-29 RX ADMIN — Medication 2 PACKET: at 09:08

## 2022-09-29 RX ADMIN — MULTIVIT AND MINERALS-FERROUS GLUCONATE 9 MG IRON/15 ML ORAL LIQUID 15 ML: at 09:07

## 2022-09-29 RX ADMIN — HEPARIN SODIUM 5000 UNITS: 5000 INJECTION, SOLUTION INTRAVENOUS; SUBCUTANEOUS at 12:21

## 2022-09-29 RX ADMIN — Medication 2 MG: at 09:08

## 2022-09-29 RX ADMIN — CALCIUM CARBONATE 1250 MG: 1250 SUSPENSION ORAL at 17:27

## 2022-09-29 RX ADMIN — ENOXAPARIN SODIUM 40 MG: 100 INJECTION SUBCUTANEOUS at 19:16

## 2022-09-29 RX ADMIN — ACETYLCYSTEINE 2 ML: 200 SOLUTION ORAL; RESPIRATORY (INHALATION) at 07:18

## 2022-09-29 RX ADMIN — LEVOTHYROXINE SODIUM 112 MCG: 0.11 TABLET ORAL at 06:35

## 2022-09-29 RX ADMIN — Medication 40 MG: at 06:35

## 2022-09-29 RX ADMIN — ACETYLCYSTEINE 2 ML: 200 SOLUTION ORAL; RESPIRATORY (INHALATION) at 20:10

## 2022-09-29 ASSESSMENT — ACTIVITIES OF DAILY LIVING (ADL)
ADLS_ACUITY_SCORE: 51
ADLS_ACUITY_SCORE: 51
ADLS_ACUITY_SCORE: 49
ADLS_ACUITY_SCORE: 55
ADLS_ACUITY_SCORE: 53
ADLS_ACUITY_SCORE: 53
ADLS_ACUITY_SCORE: 55
ADLS_ACUITY_SCORE: 53
ADLS_ACUITY_SCORE: 53
ADLS_ACUITY_SCORE: 55
ADLS_ACUITY_SCORE: 55
ADLS_ACUITY_SCORE: 53

## 2022-09-29 NOTE — PLAN OF CARE
Problem: Plan of Care - These are the overarching goals to be used throughout the patient stay.    Goal: Optimal Comfort and Wellbeing  Outcome: Ongoing, Progressing     Problem: Restraint, Nonbehavioral (Nonviolent)  Goal: Absence of Harm or Injury  Outcome: Ongoing, Progressing     Problem: Pain Acute  Goal: Acceptable Pain Control and Functional Ability  Outcome: Ongoing, Progressing  Intervention: Prevent or Manage Pain  Recent Flowsheet Documentation  Taken 9/28/2022 1800 by Peg Smith, RN  Bowel Elimination Promotion: adequate fluid intake promoted  Medication Review/Management: medications reviewed   Goal Outcome Evaluation:      Pt c/o neck pain, oxy prn givenx2.continue bilateral restraints to protect tubes.iv fluid ended.inc x1. Cares met.

## 2022-09-29 NOTE — PROGRESS NOTES
"Speech pathology: Speaking valve evaluation     09/29/22 1418   General Information   Onset of Illness/Injury or Date of Surgery 09/15/22   Referring Physician Capone   Pertinent History of Current Problem Per referring provider note (dated today), \"89 yoM admitted 9/15/2022 following an unwitnessed fall found to have a left frontal SDH and C1/C2 cervical spine fracture.  Intubated for airway protection in the ED given high c-spine injury and difficulty managing his secretions. On admission he requested all cares without surgical cervical spine stabilization, agreeable to a tracheostomy and PEG.\"   Type of Evaluation   Type of Evaluation Artificial Airway (Speaking Valve)   Tracheostomy Assessment (Speaking Valve)   Cuff, Tracheostomy Tube cuffed, inflated   Date of Tracheostomy 09/21/22   Tube Size, Tracheostomy 6   Participation Ability (Speaking Valve) awake/alert;attempts to communicate, mouthing words   Type, Tracheostomy Tube Shiley   Respiratory Status (Speaking Valve)   Oxygen Supply Trach Dome   Oral/Tracheal Secretions (Speaking Valve)   Oral Secretions (Speaking Valve Assessment) minimal secretions   Tracheal Secretions (Speaking Valve Assessment) moderate secretions;other (see comments)  (Notable for persistent faint blue dye)   Speaking Valve Trials (Speaking Valve)   Outcome of Trial (Speaking Valve) tolerance is adequate;persistent/excessive cough;trials discontinued   Voice Production (Speaking Valve Trial) voicing achieved;good strength/quality   Breath Support (Speaking Valve Trial) exhales through mouth   Set-up/Cuff Deflation (Speaking Valve Trial) cuff deflated, total;tolerance, adequate airflow   Recommendations (Speaking Valve Trials) continue speaking valve trials with SLP present;decrease trach tube size, allow upper airway airflow   Cough Production (Speaking Valve Trial) strong   Secretions/Suction, Cuff Deflation (Speaking Valve) oral suctioning post cuff deflation;tracheal suctioning prior " to trial;tracheal suctioning post cuff deflation   Secretions During Valve Use (Speaking Valve Trial) secretions stable during valve use   Airflow/Phonation Air flow around trach adequate with finger occlusion   Speaking Valve placed on tracheostomy tube   Cuff Inflated at Onset of Evaluation Yes   Orders received to deflate cuff for PMSV trial Yes   Oxygen saturation before cuff deflation 97 %   Oxygen saturation after cuff deflation 96 %   Oxygen saturation with PMSV placement 93 %   Total amount of time with PMSV placement: 17   Respiratory Rate with PMSV placement 26 Per Minute   General Therapy Interventions   Planned Therapy Interventions Communication   Communication Speaking valve instruction   Clinical Impression   Criteria for Skilled Therapeutic Interventions Met (SLP Eval) Yes, treatment indicated   SLP Diagnosis Dysphonia   Risks & Benefits of therapy have been explained evaluation/treatment results reviewed;care plan/treatment goals reviewed;participants voiced agreement with care plan;participants included;patient   SLP Discharge Planning   SLP Discharge Recommendation Transitional Care Facility;Acute Rehab Center-Motivated patient will benefit from intensive, interdisciplinary therapy.  Anticipate will be able to tolerate 3 hours of therapy per day;home with home care speech therapy   SLP Rationale for DC Rec Well below baseline for swallowing and communication.   SLP Brief overview of current status  Decent tolerance of speaking valve initially but did have increased coughing as trial progressed as well as mildly increased work of breathing.  Sats remained stable.  Would likely benefit from trach downsize.    Total Evaluation Time   Total Evaluation Time (Minutes) 23

## 2022-09-29 NOTE — PROGRESS NOTES
Pulmonary Progress Note    Admit Date: 9/22/2022  CODE: Full Code    Assessment/Plan:   89 yoM admitted 9/15/2022 following an unwitnessed fall found to have a left frontal SDH and C1/C2 cervical spine fracture.  Intubated for airway protection in the ED given high c-spine injury and difficulty managing his secretions. On admission he requested all cares without surgical cervical spine stabilization, agreeable to a tracheostomy and PEG. Discharged to [facility] 9/22/22.     PMHx: Lambert-Eaton syndrome, TAVR, complete heart block with pacemaker dependency, DM2, arthritis, BPH, HTN, osteoporosis     Discussion of pertinent problems:  1. Acute hypoxic/hypercapnic respiratory failure s/p trach in setting of traumatic cervical injury after a fall.  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  Liberated from vent on 9/26.  Small b/l pleural effusions with pulm edema on CxR 9/26, FiO2 needs up slightly, crackles on exam   - Phase 2 weans.  TM/cuff up continuous d/t serial failed BDT.  - Albuterol/mucomsyt nebs BID for bronchial hygiene   2. Tracheostomy: 6 Shiley placed 9/21/2022  - First trach change not until 10/1 at the earliest.  Likely plan for Monday   3. Dysphagia s/p PEG: Failed BDT 9/23 with immediate aspiration.  Failed BDT 9/28 with immediate aspiration but improved from 9/23   - SLP following  4. Laryngeal edema 2/2 traumatic cervical fracture/injury s/p 3 doses of decadron  5. Acute neck and rib cage pain(known rib fractures), chronic hip/shoulder pain  6. Traumatic SDH, stable  7. C1/C2 CSI: [place] J collar at all times, repeat imaging per NSG    Changes today:  - Ok for Speaking valve trials with SLP today given improved BDT results    - Start diuresis: IV bumex 1mg daily for 3 days and monitor response.  Check BMP tmrw    Sawyer Capone, STEPHANIE  Pulmonary Medicine  [facility]  Pager [phone]    Subjective/Interim Events:   - remains off vent  - no overnight events  - Denies dyspnea, n/v.  Still with similar pain complaints     Tracheal  secretions:  Overnight - x3, small/mod, thick  Yesterday - x5, small/mod, thick    Cough strength: strong, productive     Current phase of ventilator weaning pathway:  Phase 2    Ventilator weaning results  9/25: 35L/30% TM/cuff up for 21hr 20min  9/26-present: TM/cuff up continuously      Clinical status discussed today with respiratory therapist    Medications:       - MEDICATION INSTRUCTIONS -         acetylcysteine  2 mL Nebulization 2 times daily     albuterol  2.5 mg Nebulization 2 times daily     calcium carbonate  1,250 mg Oral or Feeding Tube BID w/meals     cholecalciferol  25 mcg Oral or Feeding Tube Daily     doxazosin  2 mg Oral or Feeding Tube Daily     heparin ANTICOAGULANT  5,000 Units Subcutaneous Q8H     insulin aspart  1-6 Units Subcutaneous Q6H     levothyroxine  112 mcg Oral or Feeding Tube QAM AC     multivitamins w/minerals  15 mL Per Feeding Tube Daily     pantoprazole  40 mg Oral or Feeding Tube QAM AC     protein modular  2 packet Per Feeding Tube TID     sennosides  5 mL Oral or Feeding Tube BID     sodium chloride (PF)  10 mL Intracatheter Q8H         Exam/Data:   Vitals  /63 (BP Location: Left arm)   Pulse 80   Temp 98.9  F (37.2  C) (Axillary)   Resp 22   Wt 96.2 kg (212 lb)   SpO2 (!) 86%   BMI 32.23 kg/m       I/O last 3 completed shifts:  In: 3070 [I.V.:670; NG/GT:2400]  Out: -   Weight change: 8.255 kg (18 lb 3.2 oz)    Vent Mode: Trach collar  FiO2 (%): 25 % (turned up to 30% at this time)  Resp: 22      EXAM:  Gen:  no distress on trach dome   HEENT: NT, trach midline/intact, c-collar on   CV: RRR, no m/g/r  Resp: b/l crackles to auscultation posteriorly; non-labored   Abd: soft, nontender, BS+  Skin: no rashes or lesions  Ext: mild edema  Neuro: alert, follows commands, mouths some words     ROS:  A 10-system review was obtained and is negative with the exception of the symptoms noted above.    Labs:  Complete Blood Count   Recent Labs   Lab 09/27/22  0648  09/27/22  0611 09/26/22  0624 09/25/22  0634 09/24/22  0638   WBC 7.3  --  10.9 11.2* 11.9*   HGB 9.9* 11.2* 11.6* 11.1* 10.6*     --  271 208 182     Basic Metabolic Panel  Recent Labs   Lab 09/29/22  0637 09/29/22  0059 09/28/22  1727 09/28/22  1207 09/28/22  0625 09/27/22  1223 09/27/22  0648 09/27/22  0611 09/26/22  1150 09/26/22  0624 09/25/22  1153 09/25/22  0634   NA  --   --   --   --  138  --  134* 135*  --  131*  --  136   POTASSIUM  --   --   --   --  3.6  --  3.9 3.8  --  3.9  --  4.4   CHLORIDE  --   --   --   --  104  --  102  --   --  97*  --  102   CO2  --   --   --   --  25  --  23  --   --  25  --  25   BUN  --   --   --   --  22.0  --  11.1  --   --  10.2  --  12.3   CR  --   --   --   --  0.73  --  0.69  --   --  0.63*  --  0.64*   * 109* 134* 141* 146*   < > 135* 134*   < > 98   < > 95    < > = values in this interval not displayed.     Liver Function Tests  Recent Labs   Lab 09/27/22  0648 09/26/22  0624 09/25/22  0634 09/24/22  0638   AST 18 21 26 38   ALT 25 34 41 61*   ALKPHOS 103 124 108 116   BILITOTAL 0.6 1.0 0.8 0.7   ALBUMIN 2.7* 2.8* 2.6* 2.6*     Venous Blood Gas  Recent Labs   Lab 09/27/22  0611   PHV 7.47*   PCO2V 36   PO2V 41   HCO3V 27   JOSIAH 2.8*       RADIOLOGY:   XR CHEST PORT 1 VIEW  LOCATION: [facility]  DATE/TIME: 9/26/2022 9:09 AM     INDICATION: Follow up on b l opacities, pleural effusions  COMPARISON: 9/22/2022                                                                      IMPRESSION: No change in dual-lead cardiac pacer, tracheostomy tube, or right shoulder arthroplasty. Heart size is prominent. There is pulmonary vascular congestion, progressed from prior study. Bibasilar opacities are not significantly changed. Probable   small bilateral pleural effusions, stable to slightly increased.

## 2022-09-29 NOTE — PROGRESS NOTES
RT PROGRESS NOTE     DATA:     CURRENT SETTINGS:             TRACH TYPE / SIZE:  #6 Remington (placed 9/21)             MODE:   TM (cuff up)             FIO2:   30%/30L     ACTION:             THERAPIES:   Albuterol/Mucomyst BID             SUCTION:                           FREQUENCY:   x3                        AMOUNT:   Small to Moderate                        CONSISTENCY:   Thick/Thin                        COLOR:   Pale Yellow/Blue             SPONTANEOUS COUGH EFFORT/STRENGTH OF EFFORT (not elicited by suctioning): Moderate Spontaneous Cough                           WEANING PHASE:   Phase 2                        WEAN MODE:    30%/30L TM (cuff up)                        WEAN TIME:   Continuous                        END WEAN REASON:   NA     RESPONSE:             BS:   Coarse/Diminished             VITAL SIGNS:   Sating 86-93%, HR 59-92, RR 22-26             EMOTIONAL NEEDS / CONCERNS:  NA                RISK FOR SELF DECANNULATION:  Yes             RISK DUE TO:  Pulling At Lines             INTERVENTION/S IN PLACE IS/ARE:  Restrained       NOTE / PLAN:   Pt used PMV with SLP, see their note for details.  RT will continue to monitor.

## 2022-09-29 NOTE — PLAN OF CARE
Problem: Plan of Care - These are the overarching goals to be used throughout the patient stay.    Goal: Plan of Care Review/Shift Note  Description: The Plan of Care Review/Shift note should be completed every shift.  The Outcome Evaluation is a brief statement about your assessment that the patient is improving, declining, or no change.  This information will be displayed automatically on your shift note.  Outcome: Ongoing, Progressing     Problem: Plan of Care - These are the overarching goals to be used throughout the patient stay.    Goal: Absence of Hospital-Acquired Illness or Injury  Intervention: Identify and Manage Fall Risk  Recent Flowsheet Documentation  Taken 9/29/2022 0900 by Stacey Vargas RN  Safety Promotion/Fall Prevention:   bed alarm on   fall prevention program maintained   lighting adjusted   safety round/check completed   supervised activity  Intervention: Prevent Skin Injury  Recent Flowsheet Documentation  Taken 9/29/2022 1430 by Stacey Vargas RN  Body Position:   turned   left  Taken 9/29/2022 1200 by Stacey Vargas RN  Body Position:   turned   supine  Taken 9/29/2022 1100 by Stacey Vargas RN  Body Position:   turned   right  Taken 9/29/2022 0900 by Stacey Vargas RN  Body Position:   turned   left  Intervention: Prevent and Manage VTE (Venous Thromboembolism) Risk  Recent Flowsheet Documentation  Taken 9/29/2022 1100 by Stacey Vargas RN  Activity Management: bedrest  Taken 9/29/2022 0900 by Stacey Vargas RN  Range of Motion: active ROM (range of motion) encouraged  Activity Management: bedrest  Intervention: Prevent Infection  Recent Flowsheet Documentation  Taken 9/29/2022 0900 by Stacey Vargas RN  Infection Prevention:   equipment surfaces disinfected   hand hygiene promoted   rest/sleep promoted   single patient room provided  Goal: Optimal Comfort and Wellbeing  Intervention: Monitor Pain and Promote Comfort  Recent Flowsheet Documentation  Taken 9/29/2022 1426 by Stacey Vargas  RN  Pain Management Interventions:   medication (see MAR)   distraction   emotional support   repositioned  Taken 9/29/2022 1030 by Stacey Vargas RN  Pain Management Interventions:   distraction   emotional support   repositioned  Taken 9/29/2022 0934 by Stacey Vargas RN  Pain Management Interventions:   medication (see MAR)   distraction   emotional support   repositioned  Intervention: Provide Person-Centered Care  Recent Flowsheet Documentation  Taken 9/29/2022 0900 by Stacey Vargas RN  Trust Relationship/Rapport:   care explained   reassurance provided     Problem: Device-Related Complication Risk (Mechanical Ventilation, Invasive)  Goal: Optimal Device Function  Intervention: Optimize Device Care and Function  Recent Flowsheet Documentation  Taken 9/29/2022 0900 by Stacey Vargas RN  Airway Safety Measures: all equipment/monitors on and audible     Problem: Inability to Wean (Mechanical Ventilation, Invasive)  Goal: Mechanical Ventilation Liberation  Intervention: Promote Extubation and Mechanical Ventilation Liberation  Recent Flowsheet Documentation  Taken 9/29/2022 0900 by Stacey Vargas RN  Medication Review/Management: medications reviewed     Problem: Ventilator-Induced Lung Injury (Mechanical Ventilation, Invasive)  Goal: Absence of Ventilator-Induced Lung Injury  Intervention: Prevent Ventilator-Associated Pneumonia  Recent Flowsheet Documentation  Taken 9/29/2022 1430 by Stacey Vargas RN  Head of Bed (HOB) Positioning: HOB at 30 degrees  Taken 9/29/2022 1200 by Stacey Vargas RN  Head of Bed (HOB) Positioning: HOB at 30 degrees  Taken 9/29/2022 1100 by Stacey Vargas RN  Oral Care: swabbed with sterile water  Taken 9/29/2022 0900 by Stacey Vargas RN  Head of Bed (HOB) Positioning: HOB at 30 degrees     Problem: Restraint, Nonbehavioral (Nonviolent)  Goal: Absence of Harm or Injury  Intervention: Protect Dignity, Rights, and Personal Wellbeing  Recent Flowsheet Documentation  Taken 9/29/2022 0900 by Sam  JAILENE Ibarra  Trust Relationship/Rapport:   care explained   reassurance provided  Intervention: Protect Skin and Joint Integrity  Recent Flowsheet Documentation  Taken 9/29/2022 1430 by Stacey Vargas RN  Body Position:   turned   left  Taken 9/29/2022 1200 by Stacey Vargas RN  Body Position:   turned   supine  Taken 9/29/2022 1100 by Stacey Vargas RN  Body Position:   turned   right  Taken 9/29/2022 0900 by Stacey Vargas RN  Body Position:   turned   left  Range of Motion: active ROM (range of motion) encouraged     Problem: Pain Acute  Goal: Acceptable Pain Control and Functional Ability  Intervention: Develop Pain Management Plan  Recent Flowsheet Documentation  Taken 9/29/2022 1426 by Stacey Vargas RN  Pain Management Interventions:   medication (see MAR)   distraction   emotional support   repositioned  Taken 9/29/2022 1030 by Stacey Vargas RN  Pain Management Interventions:   distraction   emotional support   repositioned  Taken 9/29/2022 0934 by Stacey Vargas RN  Pain Management Interventions:   medication (see MAR)   distraction   emotional support   repositioned  Intervention: Prevent or Manage Pain  Recent Flowsheet Documentation  Taken 9/29/2022 0900 by Stacey Vargas RN  Medication Review/Management: medications reviewed     Problem: Communication Impairment (Artificial Airway)  Goal: Effective Communication  Intervention: Ensure Effective Communication  Recent Flowsheet Documentation  Taken 9/29/2022 0900 by Stacey Vargas RN  Trust Relationship/Rapport:   care explained   reassurance provided     Problem: Device-Related Complication Risk (Artificial Airway)  Goal: Optimal Device Function  Intervention: Optimize Device Care and Function  Recent Flowsheet Documentation  Taken 9/29/2022 1100 by Stacey Vargas RN  Oral Care: swabbed with sterile water  Taken 9/29/2022 0900 by Stacey Vargas RN  Airway Safety Measures: all equipment/monitors on and audible   Goal Outcome Evaluation:        Patient complained of  head and neck pain. Medicated with 5 mg Oxycodone at 0930 and 1430. Tylenol also given at 1430. Patient rated his pain level 10 out of 10. Will continue with plan of care,

## 2022-09-29 NOTE — PROGRESS NOTES
[facility]    Medicine Progress Note - Hospitalist Service    Date of Admission:  9/22/2022    Assessment & Plan          Brief History:    Alexandru Still is a 89 year old man w/ PMH of Lambert-Eaton syndrome, hypothyroidism,  complete heart block s/p pacemaker, HTN, BPH,  PB, and DM type 2 who was admitted to the SICU on 9/15/2022 following an unwitnessed fall and striking his head. He was initially seen at an outside hospital and underwent a comprehensive trauma work up found to have a left frontal SDH (5mm) + C1 fx + Type II C2 odontoid fracture with a 1cm posterior displacement. He was  intubated for airway protection in the ED given high c-spine injury and difficulty managing his secretions. On admission he requested all cares without surgical cervical spine stabilization, agreeable to a tracheostomy and PEG. Multiple care conferences held with his family who confirmed his wishes.     LTACH course:    9/24-9/26:  Speech evaluated patient and recommends NPO, cont tube feeds via PEG due to severe oropharyngeal dysphasia.  PT/OT, dietitian, [facility] nurse saw pt on 9/23.  Pulled out G tube during night of 9/23.  Pt now on soft wrist restraints.  IVF changed to D51/2 NS at low rate as pt has TAVR.  Morphine IV ordered prn pain.    Spoke w/ Dr. Brown- IR - recommends no hannah tube as this is a brand new PEG and placing it blind will likely lead it to not be in correct position.  He will not do a PEG placement on the weekend, scheduled for Monday.  Pt cannot be fed via NG tube as it is contraindicated with C1 and C2 fx.  For PEG tube replacement (herpain on hold, place ICD)on 9/26.  Will discontinue IVF once PEG tube is placed and restart po meds.  Given NaPhos 9/26 9/27-10/3:  9/27 patient had PEG tube replaced after patient himself removed it on 9/23.  Was a started on tube feeding.  P.O. medication were restarted.  IV fluids were discontinued.  He still needs soft restraint x2.  Is off ventilator and  stable.  9/28 Failed blue dye test, Continue NPO      Assessment & Plan          Traumatic 5mm left frontal lobe SDH  Posteriorly displaced (1 cm) Type II odontoid fx  C1 anterior arch fracture   Epidural hemorrhage of 7mm  Acute traumatic neck pain  Acute L 6th rib fracture  S/p fall  Facial abrasions with ecchymoses  RLE wound  He was seen and evaluated by Neurosurgery.  Pt refused surgical intervention.   A repeat head CT was obtained with a stable subdural hematoma. He was placed in a cervical collar for the cervical fracture. No surgical intervention per patient preference.     -cont [place] J -keep in place at all times  -tylenol, oxy, and morphine prn pain  -Neurosurg ok w/ heparin q8h for dvt prophy  -wound care following       -f/u Neurosurgery in 6 weeks with an upright XR of the cervical spine   -CT cervical spine in 3 months.      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 Failed bedside and radiology swallow. He completed 3 doses of Decadron for laryngeal edema.      -S/P tracheostomy 09/21/2022, Trach suture removal on 09/26  -pulm following   -RT following   -weaned off vent by per pulm   - bronchial hygeine w/ albuterol/mucomyst  - 9/28 failed blue dye test     Hypertension   Complete heart block s/p PM placement  Nonrheumatic aortic valve stenosis s/p TAVR 2019  -monitor  -hydralazine prn   -echo on 9/16:  EF: 60-65%, no LV dysfunction     Severe orophayngeal dysphagia   S/P PEG -s/p self removal due to  agitation  Severe protein calorie malnutrition  Hiatal hernia     -9/27 patient had PEG tube replaced after patient himself removed it on 9/23.  Was a started on tube feeding.  P.O. medication were restarted.  IV fluids were discontinued.  He still needs soft restraint x2.  Is off ventilator and stable.  -hold TF : osmolite 1.5 and 60 ml/hr until PEG placed   dietitian following  -free water flush:  90 q4h  -protein modular q8h  -senna and dulcolax prn   -PPI for GI prophy  -cont seroquel prn  -cont soft wrist restraints prn      Hypophosphatemia   Hypomagnesia  Hypokalemia  S/p Lactic Acidosis  -Na phos IV 15mmol x 1 9/26, x 1 9/25, given 9mmol on 9/24   -monitor  -replete prn      Hypothyroidism   -Continue Levothyroxine     Anemia of chronic disease  -hg trending down past few days  -cont to monitor      BPH  -cont doxazosin     DM type 2  Hypoglycemia   -sliding scale insulin  -last HbA1:  6.2  -hypoglycemic protocol     Deconditioning  -PT/OTNAL -- use to select A&P section   :[id number]}         Diet: Adult Formula Drip Feeding: Continuous Osmolite 1.5; Gastrostomy; Goal Rate: 60; mL/hr; Medication - Feeding Tube Flush Frequency: At least 15-30 mL water before and after medication administration and with tube clogging; Amount to Send (Nutrition...    DVT Prophylaxis: Heparin SQ  Escudero Catheter: Not present  Central Lines: PRESENT     Cardiac Monitoring: None  Code Status: Full Code      Disposition Plan      Expected Discharge Date: 10/07/2022,  6:00 PM  Discharge Delays: Complex Discharge  Placement - LTC    Discharge Comments: pt is complex with C1 and C2 fx.  [place] J collar on at all times, trach on vent, PEG tube was pulled out  at night on 9/23        The patient's care was discussed with the Bedside Nurse and Patient.    Laurita Cardenas MD  Hospitalist Service  LTACH  Securely message with the Vocera Web Console (learn more here)  Text page via [place] Paging/Directory         Clinically Significant Risk  Factors Present on Admission                      ______________________________________________________________________    Interval History   No change or new c/o.     Data reviewed today: I reviewed all medications, new labs and imaging results over the last 24 hours. I personally reviewed no images or EKG's today.    Physical Exam   Vital Signs: Temp: 97.2  F (36.2  C) Temp src: Axillary BP: 122/58 Pulse: 60   Resp: 15 SpO2: 93 % O2 Device: Trach dome Oxygen Delivery: 30 LPM  Weight: 212 lbs 0 oz  General:  No acute distress, awake and alert  Eyes:  Sclera non icteric, normal conjuctiva  Neck :  [place] J in place, trach in place  Lungs:  Clear to auscultation bilaterally, air entry equal bilaterally, no rhonchi or rales  CVS:  S1 and S2, regular rate and rhythem, systolic murmur L sternal border  Abdomen:  Soft, nontender, PEG tube is replaced and in place and functional    Musculoskeletal: Improved swelling of R forearm and hand  Neuro:  awake and Alert , follows commands intermittently   Skin: Improved right supraorbital abrasions and ecchymoses, RLE wound     Data   Recent Labs   Lab 09/29/22  1836 09/29/22  1152 09/29/22  0637 09/28/22  1207 09/28/22  0625 09/27/22  1223 09/27/22  0648 09/27/22  0611 09/26/22  1150 09/26/22  0624 09/25/22  1153 09/25/22  0634   WBC  --   --   --   --   --   --  7.3  --   --  10.9  --  11.2*   HGB  --   --   --   --   --   --  9.9* 11.2*  --  11.6*  --  11.1*   MCV  --   --   --   --   --   --  92  --   --  94  --  94   PLT  --   --   --   --   --   --  267  --   --  271  --  208   NA  --   --   --   --  138  --  134* 135*  --  131*  --  136   POTASSIUM  --   --   --   --  3.6  --  3.9 3.8  --  3.9  --  4.4   CHLORIDE  --   --   --   --  104  --  102  --   --  97*  --  102   CO2  --   --   --   --  25  --  23  --   --  25  --  25   BUN  --   --   --   --  22.0  --  11.1  --   --  10.2  --  12.3   CR  --   --   --   --  0.73  --  0.69  --   --  0.63*  --  0.64*   ANIONGAP  --    --   --   --  9  --  9  --   --  9  --  9   TITA  --   --   --   --  8.2  --  8.3  --   --  8.6  --  8.4   * 110* 110*   < > 146*   < > 135* 134*   < > 98   < > 95   ALBUMIN  --   --   --   --   --   --  2.7*  --   --  2.8*  --  2.6*   PROTTOTAL  --   --   --   --   --   --  5.8*  --   --  6.5  --  6.2*   BILITOTAL  --   --   --   --   --   --  0.6  --   --  1.0  --  0.8   ALKPHOS  --   --   --   --   --   --  103  --   --  124  --  108   ALT  --   --   --   --   --   --  25  --   --  34  --  41   AST  --   --   --   --   --   --  18  --   --  21  --  26    < > = values in this interval not displayed.

## 2022-09-29 NOTE — PROGRESS NOTES
[facility]    Medicine Progress Note - Hospitalist Service    Date of Admission:  9/22/2022    Assessment & Plan        Brief History:    Alexandru Still is a 89 year old man w/ PMH of Lambert-Eaton syndrome, hypothyroidism,  complete heart block s/p pacemaker, HTN, BPH,  PB, and DM type 2 who was admitted to the SICU on 9/15/2022 following an unwitnessed fall and striking his head. He was initially seen at an outside hospital and underwent a comprehensive trauma work up found to have a left frontal SDH (5mm) + C1 fx + Type II C2 odontoid fracture with a 1cm posterior displacement. He was  intubated for airway protection in the ED given high c-spine injury and difficulty managing his secretions. On admission he requested all cares without surgical cervical spine stabilization, agreeable to a tracheostomy and PEG. Multiple care conferences held with his family who confirmed his wishes.     LTACH course:    9/24-9/26:  Speech evaluated patient and recommends NPO, cont tube feeds via PEG due to severe oropharyngeal dysphasia.  PT/OT, dietitian, [facility] nurse saw pt on 9/23.  Pulled out G tube during night of 9/23.  Pt now on soft wrist restraints.  IVF changed to D51/2 NS at low rate as pt has TAVR.  Morphine IV ordered prn pain.    Spoke w/ Dr. Brown- IR - recommends no hannah tube as this is a brand new PEG and placing it blind will likely lead it to not be in correct position.  He will not do a PEG placement on the weekend, scheduled for Monday.  Pt cannot be fed via NG tube as it is contraindicated with C1 and C2 fx.  For PEG tube replacement (herpain on hold, place ICD)on 9/26.  Will discontinue IVF once PEG tube is placed and restart po meds.  Given NaPhos 9/26 9/27-10/3:  9/27 patient had PEG tube replaced after patient himself removed it on 9/23.  Was a started on tube feeding.  P.O. medication were restarted.  IV fluids were discontinued.  He still needs soft restraint x2.  Is off ventilator and stable.  9/28  Failed blue dye test, Continue NPO      Assessment & Plan          Traumatic 5mm left frontal lobe SDH  Posteriorly displaced (1 cm) Type II odontoid fx  C1 anterior arch fracture   Epidural hemorrhage of 7mm  Acute traumatic neck pain  Acute L 6th rib fracture  S/p fall  Facial abrasions with ecchymoses  RLE wound  He was seen and evaluated by Neurosurgery.  Pt refused surgical intervention.   A repeat head CT was obtained with a stable subdural hematoma. He was placed in a cervical collar for the cervical fracture. No surgical intervention per patient preference.     -cont [place] J -keep in place at all times  -tylenol, oxy, and morphine prn pain  -Neurosurg ok w/ heparin q8h for dvt prophy  -wound care following       -f/u Neurosurgery in 6 weeks with an upright XR of the cervical spine   -CT cervical spine in 3 months.      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 Failed bedside and radiology swallow. He completed 3 doses of Decadron for laryngeal edema.      -S/P tracheostomy 09/21/2022, Trach suture removal on 09/26  -pulm following   -RT following   -weaned off vent by per pulm   - bronchial hygeine w/ albuterol/mucomyst  - 9/28 failed blue dye test     Hypertension   Complete heart block s/p PM placement  Nonrheumatic aortic valve stenosis s/p TAVR 2019  -monitor  -hydralazine prn   -echo on 9/16:  EF: 60-65%, no LV dysfunction     Severe orophayngeal dysphagia   S/P PEG -s/p self removal due to agitation  Severe protein  calorie malnutrition  Hiatal hernia     -9/27 patient had PEG tube replaced after patient himself removed it on 9/23.  Was a started on tube feeding.  P.O. medication were restarted.  IV fluids were discontinued.  He still needs soft restraint x2.  Is off ventilator and stable.  -hold TF : osmolite 1.5 and 60 ml/hr until PEG placed   dietitian following  -free water flush:  90 q4h  -protein modular q8h  -senna and dulcolax prn   -PPI for GI prophy  -IVF D51/2 NS at 50ml/hr- cont until tube feeds resumed due to hypoglycemia  -cont seroquel prn  -cont soft wrist restraints prn      Hypophosphatemia   Hypomagnesia  Hypokalemia  S/p Lactic Acidosis  -Na phos IV 15mmol x 1 9/26, x 1 9/25, given 9mmol on 9/24   -monitor  -replete prn      Hypothyroidism   -Continue Levothyroxine     Anemia of chronic disease  -hg trending down past few days  -cont to monitor      BPH  -cont doxazosin     DM type 2  Hypoglycemia   -sliding scale insulin  -last HbA1:  6.2  -hypoglycemic protocol     Deconditioning  -PT/OTNAL -- use to select A&P section   :[id number]}       Diet: Adult Formula Drip Feeding: Continuous Osmolite 1.5; Gastrostomy; Goal Rate: 60; mL/hr; Medication - Feeding Tube Flush Frequency: At least 15-30 mL water before and after medication administration and with tube clogging; Amount to Send (Nutrition...    DVT Prophylaxis: Heparin SQ  Escudero Catheter: Not present  Central Lines: PRESENT     Cardiac Monitoring: None  Code Status: Full Code      Disposition Plan     Expected Discharge Date: 10/06/2022,  6:00 PM  Discharge Delays: Complex Discharge  Placement - LTC    Discharge Comments: pt is complex with C1 and C2 fx.  [place] J collar on at all times, trach on vent, PEG tube was pulled out  at night on 9/23        The patient's care was discussed with the Patient and Patient's Family.    Laurita Cardenas MD  Hospitalist Service  LTACH  Securely message with the Vocera Web Console (learn more here)  Text page via [employer]  Paging/Directory         Clinically Significant Risk Factors Present on Admission                      ______________________________________________________________________    Interval History   PEG is functional. Off Ventilator and stable. Failed blue dye test. Still needs soft restraint.     Data reviewed today: I reviewed all medications, new labs and imaging results over the last 24 hours. I personally reviewed no images or EKG's today.    Physical Exam   Vital Signs: Temp: 98.4  F (36.9  C) Temp src: Oral BP: 129/63 Pulse: 60   Resp: 20 SpO2: 93 % O2 Device: Trach dome Oxygen Delivery: 30 LPM  Weight: 193 lbs 12.8 oz  General:  No acute distress,awake and alert  Eyes:  Sclera non icteric, normal conjuctiva  Neck :  [place] J in place, trach in place  Lungs:  Clear to auscultation bilaterally, air entry equal bilaterally, no rhonchi or rales  CVS:  S1 and S2, regular rate and rhythem, systolic murmur L sternal border  Abdomen:  Soft, nontender, PEG tube is replaced and in place and functional    Musculoskeletal:  Swelling of R forearm and hand,   Neuro:  awake and Alert , follows commands intermittently   Skin:  Right supraorbital abrasions and ecchymoses, RLE wound     Data   Recent Labs   Lab 09/28/22  1727 09/28/22  1207 09/28/22  0625 09/27/22  1223 09/27/22  0648 09/27/22  0611 09/26/22  1150 09/26/22  0624 09/25/22  1153 09/25/22  0634   WBC  --   --   --   --  7.3  --   --  10.9  --  11.2*   HGB  --   --   --   --  9.9* 11.2*  --  11.6*  --  11.1*   MCV  --   --   --   --  92  --   --  94  --  94   PLT  --   --   --   --  267  --   --  271  --  208   NA  --   --  138  --  134* 135*  --  131*  --  136   POTASSIUM  --   --  3.6  --  3.9 3.8  --  3.9  --  4.4   CHLORIDE  --   --  104  --  102  --   --  97*  --  102   CO2  --   --  25  --  23  --   --  25  --  25   BUN  --   --  22.0  --  11.1  --   --  10.2  --  12.3   CR  --   --  0.73  --  0.69  --   --  0.63*  --  0.64*   ANIONGAP  --   --  9  --  9  --    --  9  --  9   TITA  --   --  8.2  --  8.3  --   --  8.6  --  8.4   * 141* 146*   < > 135* 134*   < > 98   < > 95   ALBUMIN  --   --   --   --  2.7*  --   --  2.8*  --  2.6*   PROTTOTAL  --   --   --   --  5.8*  --   --  6.5  --  6.2*   BILITOTAL  --   --   --   --  0.6  --   --  1.0  --  0.8   ALKPHOS  --   --   --   --  103  --   --  124  --  108   ALT  --   --   --   --  25  --   --  34  --  41   AST  --   --   --   --  18  --   --  21  --  26    < > = values in this interval not displayed.

## 2022-09-29 NOTE — PLAN OF CARE
Problem: Device-Related Complication Risk (Mechanical Ventilation, Invasive)  Goal: Optimal Device Function  Outcome: Ongoing, Progressing     Problem: Inability to Wean (Mechanical Ventilation, Invasive)  Goal: Mechanical Ventilation Liberation  Outcome: Ongoing, Progressing     Problem: Skin and Tissue Injury (Mechanical Ventilation, Invasive)  Goal: Absence of Device-Related Skin and Tissue Injury  Outcome: Ongoing, Progressing      RT PROGRESS NOTE     DATA:     CURRENT SETTINGS: AC 12/450/+5/30 (STBY)             TRACH TYPE / SIZE:  #6 Shiley TG 9/21/22             MODE: TM (cuff up)             FIO2:   25% and 30 lpm      ACTION:             THERAPIES:   ALB BID/ MUCOMYST BID             SUCTION:                           FREQUENCY:  X3                        AMOUNT:   Moderate to small                         CONSISTENCY:  Thick                        COLOR:   White/yellow             SPONTANEOUS COUGH EFFORT/STRENGTH OF EFFORT (not elicited by suctioning): Yes:Strong                              WEANING PHASE: #2                        WEAN MODE:   25% and 20 lpm TM with cuff down as                         WEAN TIME:  Since 08:15 am @ 09/27                        END WEAN REASON:   Cont     RESPONSE:             BS:   Coarse             VITAL SIGNS:   SAT 92-95%, HR 61-65, RR 20-22             EMOTIONAL NEEDS / CONCERNS: N/A              RISK FOR SELF DECANNULATION: N/A                        RISK DUE TO:                          INTERVENTION/S IN PLACE IS/ARE: N/A      NOTE / PLAN:   Pt is on 3rd night off the vent and is on 25% and 30 lpm tm with cuff up, chichi well. Cont current therapy and keep sat >/=90%.     9/27 VBG done 7.47/36/41/27/79.2%

## 2022-09-29 NOTE — PLAN OF CARE
Problem: Communication Impairment (Artificial Airway)  Goal: Effective Communication  Outcome: Ongoing, Progressing     Problem: Device-Related Complication Risk (Artificial Airway)  Goal: Optimal Device Function  Outcome: Ongoing, Progressing  Intervention: Optimize Device Care and Function  Recent Flowsheet Documentation  Taken 9/29/2022 1524 by Lena Zavala RT  Airway Safety Measures: all equipment/monitors on and audible  Taken 9/29/2022 1130 by Lena Zavala RT  Airway Safety Measures: all equipment/monitors on and audible  Taken 9/29/2022 1014 by Lena Zavala RT  Airway Safety Measures: all equipment/monitors on and audible  Taken 9/29/2022 0718 by Lena Zavala RT  Airway Safety Measures: all equipment/monitors on and audible     Problem: Skin and Tissue Injury (Artificial Airway)  Goal: Absence of Device-Related Skin or Tissue Injury  Outcome: Ongoing, Progressing   Goal Outcome Evaluation:     Menopause

## 2022-09-29 NOTE — PLAN OF CARE
Problem: Plan of Care - These are the overarching goals to be used throughout the patient stay.    Goal: Plan of Care Review/Shift Note  Description: The Plan of Care Review/Shift note should be completed every shift.  The Outcome Evaluation is a brief statement about your assessment that the patient is improving, declining, or no change.  This information will be displayed automatically on your shift note.  Outcome: Ongoing, Progressing       Pt remains NPO on continuous TF. Blood sugars stable, no sliding scale coverage required t/o the night. On trach dome @ 30 liters/25%, LS are coarse. Writer suctioned trach once for a moderate amt of thick, tan/yellow secretions. Continues to wear bilateral soft wrist restraints to prevent pt from pulling @ newly placed G-tube. Abd binder is in place as well. Midline (R) arm flushes well, but has no blood return. Has some edema in both arms.  Pt has been groggy & slept soundly t/o the night. Offers no c/o pain.

## 2022-09-29 NOTE — PROGRESS NOTES
Social Work Note:  Patient chart reviewed.  Patient discussed in morning rounds.  Barriers to discharge:   * SLRx2  * Trach. Phase 2-25% and 20 lpm TM with cuff down   * Suction x3.    Patient on trach.  Patient on nebs:ALB BID/ MUCOMYST BID.  Patient wears a J-collar   Spoke with patient's brother Abhijit.  Scheduled care conference for Monday 0/03/22 @11:00.    Patient's exact discharge date, time, disposition TBD  SW will continue to follow for psychosocial and emotional support of patient and family. SW to facilitate discharge to TCU when pt no longer requires LTACH level of care.       Bob Reynolds, [facility] HospitalSW  [facility]  [phone]

## 2022-09-30 ENCOUNTER — APPOINTMENT (OUTPATIENT)
Dept: SPEECH THERAPY | Facility: CLINIC | Age: 87
DRG: 208 | End: 2022-09-30
Attending: INTERNAL MEDICINE
Payer: MEDICARE

## 2022-09-30 ENCOUNTER — APPOINTMENT (OUTPATIENT)
Dept: OCCUPATIONAL THERAPY | Facility: CLINIC | Age: 87
DRG: 208 | End: 2022-09-30
Attending: INTERNAL MEDICINE
Payer: MEDICARE

## 2022-09-30 ENCOUNTER — APPOINTMENT (OUTPATIENT)
Dept: PHYSICAL THERAPY | Facility: CLINIC | Age: 87
DRG: 208 | End: 2022-09-30
Attending: INTERNAL MEDICINE
Payer: MEDICARE

## 2022-09-30 LAB
ANION GAP SERPL CALCULATED.3IONS-SCNC: 7 MMOL/L (ref 7–15)
BUN SERPL-MCNC: 23.3 MG/DL (ref 8–23)
CALCIUM SERPL-MCNC: 8.1 MG/DL (ref 8.2–9.6)
CHLORIDE SERPL-SCNC: 105 MMOL/L (ref 98–107)
CREAT SERPL-MCNC: 0.64 MG/DL (ref 0.67–1.17)
DEPRECATED HCO3 PLAS-SCNC: 27 MMOL/L (ref 22–29)
GFR SERPL CREATININE-BSD FRML MDRD: 89 ML/MIN/1.73M2
GLUCOSE BLDC GLUCOMTR-MCNC: 112 MG/DL (ref 70–99)
GLUCOSE BLDC GLUCOMTR-MCNC: 126 MG/DL (ref 70–99)
GLUCOSE BLDC GLUCOMTR-MCNC: 129 MG/DL (ref 70–99)
GLUCOSE BLDC GLUCOMTR-MCNC: 137 MG/DL (ref 70–99)
GLUCOSE SERPL-MCNC: 139 MG/DL (ref 70–99)
POTASSIUM SERPL-SCNC: 4.2 MMOL/L (ref 3.4–5.3)
SARS-COV-2 RNA RESP QL NAA+PROBE: NEGATIVE
SODIUM SERPL-SCNC: 139 MMOL/L (ref 136–145)

## 2022-09-30 PROCEDURE — 250N000013 HC RX MED GY IP 250 OP 250 PS 637: Performed by: HOSPITALIST

## 2022-09-30 PROCEDURE — 999N000123 HC STATISTIC OXYGEN O2DAILY TECH TIME

## 2022-09-30 PROCEDURE — 999N000157 HC STATISTIC RCP TIME EA 10 MIN

## 2022-09-30 PROCEDURE — 92507 TX SP LANG VOICE COMM INDIV: CPT | Mod: GN | Performed by: SPEECH-LANGUAGE PATHOLOGIST

## 2022-09-30 PROCEDURE — 97530 THERAPEUTIC ACTIVITIES: CPT | Mod: GP | Performed by: PHYSICAL THERAPIST

## 2022-09-30 PROCEDURE — 250N000011 HC RX IP 250 OP 636: Performed by: HOSPITALIST

## 2022-09-30 PROCEDURE — 999N000253 HC STATISTIC WEANING TRIALS

## 2022-09-30 PROCEDURE — 99233 SBSQ HOSP IP/OBS HIGH 50: CPT | Performed by: HOSPITALIST

## 2022-09-30 PROCEDURE — 97535 SELF CARE MNGMENT TRAINING: CPT | Mod: GO

## 2022-09-30 PROCEDURE — 94640 AIRWAY INHALATION TREATMENT: CPT | Mod: 76

## 2022-09-30 PROCEDURE — 999N000009 HC STATISTIC AIRWAY CARE

## 2022-09-30 PROCEDURE — 36415 COLL VENOUS BLD VENIPUNCTURE: CPT | Performed by: NURSE PRACTITIONER

## 2022-09-30 PROCEDURE — 94640 AIRWAY INHALATION TREATMENT: CPT

## 2022-09-30 PROCEDURE — 82374 ASSAY BLOOD CARBON DIOXIDE: CPT | Performed by: NURSE PRACTITIONER

## 2022-09-30 PROCEDURE — 250N000011 HC RX IP 250 OP 636: Performed by: NURSE PRACTITIONER

## 2022-09-30 PROCEDURE — 99233 SBSQ HOSP IP/OBS HIGH 50: CPT | Performed by: NURSE PRACTITIONER

## 2022-09-30 PROCEDURE — 120N000017 HC R&B RESPIRATORY CARE

## 2022-09-30 PROCEDURE — 250N000009 HC RX 250: Performed by: NURSE PRACTITIONER

## 2022-09-30 PROCEDURE — U0005 INFEC AGEN DETEC AMPLI PROBE: HCPCS | Performed by: HOSPITALIST

## 2022-09-30 RX ORDER — AMINO AC/PROTEIN HYDR/WHEY PRO 10G-100/30
1 LIQUID (ML) ORAL 3 TIMES DAILY
Status: DISCONTINUED | OUTPATIENT
Start: 2022-09-30 | End: 2022-10-07

## 2022-09-30 RX ADMIN — CALCIUM CARBONATE 1250 MG: 1250 SUSPENSION ORAL at 18:14

## 2022-09-30 RX ADMIN — ACETAMINOPHEN 650 MG: 650 SOLUTION ORAL at 10:49

## 2022-09-30 RX ADMIN — LEVOTHYROXINE SODIUM 112 MCG: 0.11 TABLET ORAL at 06:53

## 2022-09-30 RX ADMIN — ALBUTEROL SULFATE 2.5 MG: 2.5 SOLUTION RESPIRATORY (INHALATION) at 19:17

## 2022-09-30 RX ADMIN — MULTIVIT AND MINERALS-FERROUS GLUCONATE 9 MG IRON/15 ML ORAL LIQUID 15 ML: at 11:37

## 2022-09-30 RX ADMIN — Medication 1 PACKET: at 20:13

## 2022-09-30 RX ADMIN — SENNOSIDES 5 ML: 8.8 LIQUID ORAL at 08:36

## 2022-09-30 RX ADMIN — ALBUTEROL SULFATE 2.5 MG: 2.5 SOLUTION RESPIRATORY (INHALATION) at 08:29

## 2022-09-30 RX ADMIN — Medication 25 MCG: at 08:36

## 2022-09-30 RX ADMIN — BUMETANIDE 1 MG: 0.25 INJECTION INTRAMUSCULAR; INTRAVENOUS at 08:35

## 2022-09-30 RX ADMIN — Medication 40 MG: at 06:54

## 2022-09-30 RX ADMIN — ENOXAPARIN SODIUM 40 MG: 100 INJECTION SUBCUTANEOUS at 19:10

## 2022-09-30 RX ADMIN — ACETYLCYSTEINE 2 ML: 200 SOLUTION ORAL; RESPIRATORY (INHALATION) at 08:30

## 2022-09-30 RX ADMIN — Medication 2 MG: at 08:36

## 2022-09-30 RX ADMIN — Medication 2 PACKET: at 13:06

## 2022-09-30 RX ADMIN — Medication 2 PACKET: at 08:35

## 2022-09-30 RX ADMIN — OXYCODONE HYDROCHLORIDE 5 MG: 5 SOLUTION ORAL at 09:08

## 2022-09-30 RX ADMIN — CALCIUM CARBONATE 1250 MG: 1250 SUSPENSION ORAL at 11:28

## 2022-09-30 RX ADMIN — SENNOSIDES 5 ML: 8.8 LIQUID ORAL at 20:13

## 2022-09-30 RX ADMIN — ACETYLCYSTEINE 2 ML: 200 SOLUTION ORAL; RESPIRATORY (INHALATION) at 19:18

## 2022-09-30 ASSESSMENT — ACTIVITIES OF DAILY LIVING (ADL)
ADLS_ACUITY_SCORE: 55
ADLS_ACUITY_SCORE: 51
ADLS_ACUITY_SCORE: 55

## 2022-09-30 NOTE — PROGRESS NOTES
Pulmonary Progress Note    Admit Date: 9/22/2022  CODE: Full Code    Assessment/Plan:   89 yoM admitted 9/15/2022 following an unwitnessed fall found to have a left frontal SDH and C1/C2 cervical spine fracture.  Intubated for airway protection in the ED given high c-spine injury and difficulty managing his secretions. On admission he requested all cares without surgical cervical spine stabilization, agreeable to a tracheostomy and PEG. Discharged to LTACH 9/22/22.     PMHx: Lambert-Eaton syndrome, TAVR, complete heart block with pacemaker dependency, DM2, arthritis, BPH, HTN, osteoporosis     Discussion of pertinent problems:  1. Acute hypoxic/hypercapnic respiratory failure s/p trach in setting of traumatic cervical injury after a fall.  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  Liberated from vent on 9/26 and tolerating continuous trach dome.  Small b/l pleural effusions with pulm edema on CxR 9/26 started on IV bumex   - Phase 2 weans.  TM/cuff up continuous d/t serial failed BDT.  - Albuterol/mucomsyt nebs BID for bronchial hygiene   2. Tracheostomy: 6 Shiley placed 9/21/2022  - First trach change not until 10/1 at the earliest.  Likely plan for Monday   3. Dysphagia s/p PEG: Failed BDT 9/23 with immediate aspiration.  Failed BDT 9/28 with immediate aspiration but improved from 9/23   - PMV trials with SLP   4. Laryngeal edema 2/2 traumatic cervical fracture/injury s/p 3 doses of decadron  5. Acute neck and rib cage pain(known rib fractures), chronic hip/shoulder pain  6. Traumatic SDH, stable  7. C1/C2 CSI: [place] J collar at all times, repeat imaging per NSG    Changes today:  - Continue IV bumex 1mg daily: adjust as clinically indicated  - Repeat CxR on Monday or sooner if see increase in FiO2 needs & may need to increase diuretic   - Likely plan for trach downsize on Monday to 7 Bivona    Sawyer Capone CNP  Pulmonary Medicine  [facility]  Pager [phone]    Subjective/Interim Events:   - remains off vent, FiO2 needs up  slightly to 40%  - no overnight events  - Denies dyspnea, n/v    Tracheal secretions:  Overnight - x3, small/mod, thick  Yesterday - x5, small/mod, thick    Cough strength: strong, productive     Current phase of ventilator weaning pathway:  Phase 2    Ventilator weaning results  9/25: 35L/30% TM/cuff up for 21hr 20min  9/26-present: TM/cuff up continuously      Clinical status discussed today with respiratory therapist, hospitalist     Medications:       - MEDICATION INSTRUCTIONS -         acetylcysteine  2 mL Nebulization 2 times daily     albuterol  2.5 mg Nebulization 2 times daily     bumetanide  1 mg Intravenous Daily     calcium carbonate  1,250 mg Oral or Feeding Tube BID w/meals     cholecalciferol  25 mcg Oral or Feeding Tube Daily     doxazosin  2 mg Oral or Feeding Tube Daily     enoxaparin ANTICOAGULANT  40 mg Subcutaneous Q24H     insulin aspart  1-6 Units Subcutaneous Q6H     levothyroxine  112 mcg Oral or Feeding Tube QAM AC     multivitamins w/minerals  15 mL Per Feeding Tube Daily     pantoprazole  40 mg Oral or Feeding Tube QAM AC     protein modular  2 packet Per Feeding Tube TID     sennosides  5 mL Oral or Feeding Tube BID     sodium chloride (PF)  10 mL Intracatheter Q8H         Exam/Data:   Vitals  /67 (BP Location: Left arm, Patient Position: Right side, Cuff Size: Adult Regular)   Pulse 62   Temp 99.2  F (37.3  C) (Axillary)   Resp 24   Wt 92.6 kg (204 lb 3.2 oz)   SpO2 95%   BMI 31.05 kg/m       I/O last 3 completed shifts:  In: 2030 [I.V.:40; NG/GT:1990]  Out: -   Weight change: -5.715 kg (-12 lb 9.6 oz)    Vent Mode: Trach collar  FiO2 (%): 40 %  Resp: 24      EXAM:  Gen:  no distress on trach dome   HEENT: NT, trach midline/intact, c-collar on   CV: RRR, no m/g/r  Resp: b/l crackles to auscultation posteriorly; non-labored   Abd: soft, nontender, BS+  Skin: no rashes or lesions  Ext: no edema  Neuro: alert, follows commands, mouths some words     ROS:  A 10-system review was  obtained and is negative with the exception of the symptoms noted above.    Labs:  Complete Blood Count   Recent Labs   Lab 09/27/22  0648 09/27/22  0611 09/26/22  0624 09/25/22  0634 09/24/22  0638   WBC 7.3  --  10.9 11.2* 11.9*   HGB 9.9* 11.2* 11.6* 11.1* 10.6*     --  271 208 182     Basic Metabolic Panel  Recent Labs   Lab 09/30/22  1135 09/30/22  0638 09/30/22  0622 09/30/22  0006 09/28/22  1207 09/28/22  0625 09/27/22  1223 09/27/22  0648 09/27/22  0611 09/26/22  1150 09/26/22 0624   NA  --  139  --   --   --  138  --  134* 135*  --  131*   POTASSIUM  --  4.2  --   --   --  3.6  --  3.9 3.8  --  3.9   CHLORIDE  --  105  --   --   --  104  --  102  --   --  97*   CO2  --  27  --   --   --  25  --  23  --   --  25   BUN  --  23.3*  --   --   --  22.0  --  11.1  --   --  10.2   CR  --  0.64*  --   --   --  0.73  --  0.69  --   --  0.63*   * 139* 129* 126*   < > 146*   < > 135* 134*   < > 98    < > = values in this interval not displayed.     Liver Function Tests  Recent Labs   Lab 09/27/22  0648 09/26/22  0624 09/25/22  0634 09/24/22 0638   AST 18 21 26 38   ALT 25 34 41 61*   ALKPHOS 103 124 108 116   BILITOTAL 0.6 1.0 0.8 0.7   ALBUMIN 2.7* 2.8* 2.6* 2.6*     Venous Blood Gas  Recent Labs   Lab 09/27/22 0611   PHV 7.47*   PCO2V 36   PO2V 41   HCO3V 27   JOSIAH 2.8*       RADIOLOGY:   XR CHEST PORT 1 VIEW  LOCATION: [facility]  DATE/TIME: 9/26/2022 9:09 AM     INDICATION: Follow up on b l opacities, pleural effusions  COMPARISON: 9/22/2022                                                                      IMPRESSION: No change in dual-lead cardiac pacer, tracheostomy tube, or right shoulder arthroplasty. Heart size is prominent. There is pulmonary vascular congestion, progressed from prior study. Bibasilar opacities are not significantly changed. Probable   small bilateral pleural effusions, stable to slightly increased.

## 2022-09-30 NOTE — PROGRESS NOTES
S: Pt. seen at [facility]. Male. Dr. Cardenas. RX: Replacement pads for a [place] J collar.   O:A: Today I delivered replacement pads for a [place] J 300 collar. Donning doffing wear and care instructions given to Nurse.  P: Pt. to be seen as needed.    Maco FUENTES,SAMPSON

## 2022-09-30 NOTE — PLAN OF CARE
Goal Outcome Evaluation:      Patient slept most of the night. Open his eyes during cares and calling his names. Continuous on non violent bilateral wrist restraint. Continuous TF running and pt tolerated well. Both arms are edematous.  and 129 and no coverage given.  Problem: Plan of Care - These are the overarching goals to be used throughout the patient stay.    Goal: Absence of Hospital-Acquired Illness or Injury  Intervention: Identify and Manage Fall Risk  Recent Flowsheet Documentation  Taken 9/30/2022 0020 by Carrie Lawson RN  Safety Promotion/Fall Prevention:    bed alarm on    room door open    fall prevention program maintained  Intervention: Prevent and Manage VTE (Venous Thromboembolism) Risk  Recent Flowsheet Documentation  Taken 9/30/2022 0020 by Carrie Lawson RN  Range of Motion: active ROM (range of motion) encouraged  VTE Prevention/Management: SCDs (sequential compression devices) off  Activity Management: bedrest  Intervention: Prevent Infection  Recent Flowsheet Documentation  Taken 9/30/2022 0020 by Carrie Lawson RN  Infection Prevention: equipment surfaces disinfected  Goal: Optimal Comfort and Wellbeing  Intervention: Provide Person-Centered Care  Recent Flowsheet Documentation  Taken 9/30/2022 0020 by Carrie Lawson RN  Trust Relationship/Rapport:    care explained    choices provided     Problem: Device-Related Complication Risk (Mechanical Ventilation, Invasive)  Goal: Optimal Device Function  Intervention: Optimize Device Care and Function  Recent Flowsheet Documentation  Taken 9/30/2022 0020 by Carrie Lawson RN  Airway Safety Measures: all equipment/monitors on and audible     Problem: Inability to Wean (Mechanical Ventilation, Invasive)  Goal: Mechanical Ventilation Liberation  Intervention: Promote Extubation and Mechanical Ventilation Liberation  Recent Flowsheet Documentation  Taken 9/30/2022 0020 by Carrie Lawson RN  Medication Review/Management:  medications reviewed     Problem: Ventilator-Induced Lung Injury (Mechanical Ventilation, Invasive)  Goal: Absence of Ventilator-Induced Lung Injury  Intervention: Prevent Ventilator-Associated Pneumonia  Recent Flowsheet Documentation  Taken 9/30/2022 0020 by Carrie Lawson RN  Oral Care: swabbed with sterile water     Problem: Restraint, Nonbehavioral (Nonviolent)  Goal: Absence of Harm or Injury  Intervention: Protect Dignity, Rights, and Personal Wellbeing  Recent Flowsheet Documentation  Taken 9/30/2022 0020 by Carrie Lawson RN  Trust Relationship/Rapport:    care explained    choices provided  Intervention: Protect Skin and Joint Integrity  Recent Flowsheet Documentation  Taken 9/30/2022 0020 by Carrie Lawson RN  Range of Motion: active ROM (range of motion) encouraged     Problem: Pain Acute  Goal: Acceptable Pain Control and Functional Ability  Intervention: Prevent or Manage Pain  Recent Flowsheet Documentation  Taken 9/30/2022 0020 by Carrie Lawsno RN  Bowel Elimination Promotion: (G tube Flushes) adequate fluid intake promoted  Medication Review/Management: medications reviewed     Problem: Communication Impairment (Artificial Airway)  Goal: Effective Communication  Intervention: Ensure Effective Communication  Recent Flowsheet Documentation  Taken 9/30/2022 0020 by Carrie Lawson RN  Family/Support System Care: presence promoted  Trust Relationship/Rapport:    care explained    choices provided     Problem: Device-Related Complication Risk (Artificial Airway)  Goal: Optimal Device Function  Intervention: Optimize Device Care and Function  Recent Flowsheet Documentation  Taken 9/30/2022 0020 by Carrie Lawson RN  Airway Safety Measures: all equipment/monitors on and audible  Oral Care: swabbed with sterile water

## 2022-09-30 NOTE — PLAN OF CARE
Goal Outcome Evaluation:      Patient calm and confused. Open his eyes during cares. Continues on Bilateral non violent soft wrist restraints due Pulling medical equipments. Continuous TF and pt tolerated well. No sign symptom of pain. Pt incontinent for urine x 3. Vitals stable.  Problem: Plan of Care - These are the overarching goals to be used throughout the patient stay.    Goal: Absence of Hospital-Acquired Illness or Injury  Intervention: Identify and Manage Fall Risk  Recent Flowsheet Documentation  Taken 9/30/2022 0020 by Carrie Lawson RN  Safety Promotion/Fall Prevention:   bed alarm on   room door open   fall prevention program maintained  Taken 9/29/2022 1600 by Carrie Lawson RN  Safety Promotion/Fall Prevention:   bed alarm on   fall prevention program maintained   room door open   supervised activity  Intervention: Prevent and Manage VTE (Venous Thromboembolism) Risk  Recent Flowsheet Documentation  Taken 9/30/2022 0020 by Carrie Lawson RN  Range of Motion: active ROM (range of motion) encouraged  VTE Prevention/Management: SCDs (sequential compression devices) off  Activity Management: bedrest  Taken 9/29/2022 1600 by Carrie Lawson RN  Range of Motion: active ROM (range of motion) encouraged  Activity Management: bedrest  Intervention: Prevent Infection  Recent Flowsheet Documentation  Taken 9/30/2022 0020 by Carrie Lawson RN  Infection Prevention: equipment surfaces disinfected  Taken 9/29/2022 1600 by Carrie Lawson RN  Infection Prevention:   equipment surfaces disinfected   hand hygiene promoted   rest/sleep promoted   single patient room provided  Goal: Optimal Comfort and Wellbeing  Intervention: Provide Person-Centered Care  Recent Flowsheet Documentation  Taken 9/30/2022 0020 by Carrie Lawson RN  Trust Relationship/Rapport:   care explained   choices provided  Taken 9/29/2022 1600 by Carrie Lawson RN  Trust Relationship/Rapport:   care explained   choices  provided     Problem: Device-Related Complication Risk (Mechanical Ventilation, Invasive)  Goal: Optimal Device Function  Intervention: Optimize Device Care and Function  Recent Flowsheet Documentation  Taken 9/30/2022 0020 by Carrie Lawson RN  Airway Safety Measures: all equipment/monitors on and audible  Taken 9/29/2022 1600 by Carrie Lawson RN  Airway Safety Measures: all equipment/monitors on and audible     Problem: Inability to Wean (Mechanical Ventilation, Invasive)  Goal: Mechanical Ventilation Liberation  Intervention: Promote Extubation and Mechanical Ventilation Liberation  Recent Flowsheet Documentation  Taken 9/30/2022 0020 by Carrie Lawson RN  Medication Review/Management: medications reviewed  Taken 9/29/2022 1600 by Carrie Lawson RN  Medication Review/Management: medications reviewed     Problem: Ventilator-Induced Lung Injury (Mechanical Ventilation, Invasive)  Goal: Absence of Ventilator-Induced Lung Injury  Intervention: Prevent Ventilator-Associated Pneumonia  Recent Flowsheet Documentation  Taken 9/30/2022 0020 by Carrie Lawson RN  Oral Care: swabbed with sterile water     Problem: Restraint, Nonbehavioral (Nonviolent)  Goal: Absence of Harm or Injury  Intervention: Protect Dignity, Rights, and Personal Wellbeing  Recent Flowsheet Documentation  Taken 9/30/2022 0020 by Carrie Lawson RN  Trust Relationship/Rapport:   care explained   choices provided  Taken 9/29/2022 1600 by Carrie Lawson RN  Trust Relationship/Rapport:   care explained   choices provided  Intervention: Protect Skin and Joint Integrity  Recent Flowsheet Documentation  Taken 9/30/2022 0020 by Carrie Lawson RN  Range of Motion: active ROM (range of motion) encouraged  Taken 9/29/2022 1600 by Carrie Lawson RN  Range of Motion: active ROM (range of motion) encouraged     Problem: Pain Acute  Goal: Acceptable Pain Control and Functional Ability  Intervention: Prevent or Manage Pain  Recent Flowsheet  Documentation  Taken 9/30/2022 0020 by Carrie Lawson RN  Bowel Elimination Promotion: (G tube Flushes) adequate fluid intake promoted  Medication Review/Management: medications reviewed  Taken 9/29/2022 1600 by Carrie Lawson RN  Bowel Elimination Promotion: adequate fluid intake promoted  Medication Review/Management: medications reviewed     Problem: Communication Impairment (Artificial Airway)  Goal: Effective Communication  Intervention: Ensure Effective Communication  Recent Flowsheet Documentation  Taken 9/30/2022 0020 by Carrie Lawson RN  Family/Support System Care: presence promoted  Trust Relationship/Rapport:   care explained   choices provided  Taken 9/29/2022 1600 by Carrie Lawson RN  Family/Support System Care: involvement promoted  Trust Relationship/Rapport:   care explained   choices provided     Problem: Device-Related Complication Risk (Artificial Airway)  Goal: Optimal Device Function  Intervention: Optimize Device Care and Function  Recent Flowsheet Documentation  Taken 9/30/2022 0020 by Carrie Lawson RN  Airway Safety Measures: all equipment/monitors on and audible  Oral Care: swabbed with sterile water  Taken 9/29/2022 1600 by Carrie Lawson RN  Airway Safety Measures: all equipment/monitors on and audible

## 2022-09-30 NOTE — PLAN OF CARE
Problem: Device-Related Complication Risk (Mechanical Ventilation, Invasive)  Goal: Optimal Device Function  Outcome: Ongoing, Progressing     Problem: Inability to Wean (Mechanical Ventilation, Invasive)  Goal: Mechanical Ventilation Liberation  Outcome: Ongoing, Progressing     Problem: Skin and Tissue Injury (Mechanical Ventilation, Invasive)  Goal: Absence of Device-Related Skin and Tissue Injury  Outcome: Ongoing, Progressing      RT PROGRESS NOTE     DATA:     CURRENT SETTINGS: AC 12/450/+5/30 (STBY)             TRACH TYPE / SIZE:  #6 Shiley TG 9/21/22             MODE: TM (cuff up)             FIO2:   40% and 40 lpm      ACTION:             THERAPIES:   ALB BID/ MUCOMYST BID             SUCTION:                           FREQUENCY:  X4                        AMOUNT:   Moderate to small                         CONSISTENCY:  Thick                        COLOR:   White/yellow             SPONTANEOUS COUGH EFFORT/STRENGTH OF EFFORT (not elicited by suctioning): Yes:Strong                              WEANING PHASE: #2                        WEAN MODE:   40% and 40 lpm TM with cuff down as                         WEAN TIME:  Since 08:15 am @ 09/27                        END WEAN REASON:   Cont     RESPONSE:             BS:   Coarse             VITAL SIGNS:   SAT 93-95%, HR 61-65, RR 20-22             EMOTIONAL NEEDS / CONCERNS: N/A              RISK FOR SELF DECANNULATION: N/A                        RISK DUE TO:                          INTERVENTION/S IN PLACE IS/ARE: N/A      NOTE / PLAN:   Pt is on 25% and 30 lpm tm with cuff up, chichi well. Cont current therapy and keep sat >/=90%.     9/27 VBG done 7.47/36/41/27/79.2%

## 2022-09-30 NOTE — PLAN OF CARE
Problem: Device-Related Complication Risk (Mechanical Ventilation, Invasive)  Goal: Optimal Device Function  Outcome: Ongoing, Progressing  Intervention: Optimize Device Care and Function  Recent Flowsheet Documentation  Taken 9/23/2022 1127 by Radha Mcclelland RT  Airway Safety Measures: all equipment/monitors on and audible  Taken 9/23/2022 0805 by Radha Mcclelland RT  Airway Safety Measures: all equipment/monitors on and audible     Problem: Inability to Wean (Mechanical Ventilation, Invasive)  Goal: Mechanical Ventilation Liberation  Outcome: Ongoing, Progressing     Problem: Skin and Tissue Injury (Mechanical Ventilation, Invasive)  Goal: Absence of Device-Related Skin and Tissue Injury  Outcome: Ongoing, Progressing     Problem: Ventilator-Induced Lung Injury (Mechanical Ventilation, Invasive)  Goal: Absence of Ventilator-Induced Lung Injury  Outcome: Ongoing, Progressing   RT PROGRESS NOTE   [id number]  DATA:     CURRENT SETTINGS:             TRACH TYPE / SIZE: #6 Shiley Taper guard, placed on 9/21,sutures removed on 09/27.   Okayed by CNP to have #7 Bivona as a back up trach.             MODE:TM 40% 40L  ( AC 12 450 +5 30%)             FIO2:        ACTION:             THERAPIES: Alb/ Mucomyst neb BID               SUCTION: X5 in 16 hrs for  mod tp small p-yellow thick to thin                         FREQUENCY:                           AMOUNT:                           CONSISTENCY:                           COLOR:                SPONTANEOUS COUGH EFFORT/STRENGTH OF EFFORT (not elicited by suctioning):                               WEANING PHASE:  2                         WEAN MODE: PMV started by Speech at 1040, Okayed to cont as chichi,   TM cont since  9/26                        WEAN TIME:                           END WEAN REASON:        RESPONSE:             BS:                VITAL SIGNS:                EMOTIONAL NEEDS / CONCERNS:                  RISK FOR SELF DECANNULATION:                           RISK DUE TO:                          INTERVENTION/S IN PLACE IS/ARE:         NOTE / PLAN:   Goal Outcome Evaluation:    Cont with current POC

## 2022-09-30 NOTE — PLAN OF CARE
Problem: Plan of Care - These are the overarching goals to be used throughout the patient stay.    Goal: Absence of Hospital-Acquired Illness or Injury  Intervention: Identify and Manage Fall Risk  Recent Flowsheet Documentation  Taken 9/30/2022 0800 by Stacey Vargas RN  Safety Promotion/Fall Prevention:   bed alarm on   activity supervised   fall prevention program maintained   lighting adjusted   safety round/check completed   supervised activity  Intervention: Prevent Skin Injury  Recent Flowsheet Documentation  Taken 9/30/2022 1330 by Stacey Vargas RN  Body Position: (back to bed)   turned   left  Taken 9/30/2022 0945 by Stacey Vargas RN  Body Position: weight shifting  Taken 9/30/2022 0800 by Stacey Vargas RN  Body Position: supine  Intervention: Prevent and Manage VTE (Venous Thromboembolism) Risk  Recent Flowsheet Documentation  Taken 9/30/2022 0800 by Stacey Vargas RN  Range of Motion: active ROM (range of motion) encouraged  VTE Prevention/Management: SCDs (sequential compression devices) on  Activity Management: bedrest  Intervention: Prevent Infection  Recent Flowsheet Documentation  Taken 9/30/2022 0800 by Stacey Vargas RN  Infection Prevention:   equipment surfaces disinfected   hand hygiene promoted   personal protective equipment utilized   rest/sleep promoted   single patient room provided  Goal: Optimal Comfort and Wellbeing  Intervention: Monitor Pain and Promote Comfort  Recent Flowsheet Documentation  Taken 9/30/2022 1145 by Stacey Vargas RN  Pain Management Interventions:   distraction   emotional support   pillow support provided   repositioned  Taken 9/30/2022 1045 by Stacey Vargas RN  Pain Management Interventions:   medication (see MAR)   distraction   emotional support   pillow support provided   repositioned  Taken 9/30/2022 1000 by Stacey Vargas RN  Pain Management Interventions:   distraction   emotional support   pillow support provided   repositioned  Taken 9/30/2022 0900 by Sam  JAILENE Ibarra  Pain Management Interventions:   medication (see MAR)   distraction   emotional support   repositioned  Intervention: Provide Person-Centered Care  Recent Flowsheet Documentation  Taken 9/30/2022 0800 by Stacey Vargas RN  Trust Relationship/Rapport:   care explained   emotional support provided   questions encouraged   thoughts/feelings acknowledged     Problem: Device-Related Complication Risk (Mechanical Ventilation, Invasive)  Goal: Optimal Device Function  Intervention: Optimize Device Care and Function  Recent Flowsheet Documentation  Taken 9/30/2022 0800 by Stacey Vargas RN  Airway Safety Measures:   all equipment/monitors on and audible   suction at bedside     Problem: Inability to Wean (Mechanical Ventilation, Invasive)  Goal: Mechanical Ventilation Liberation  Intervention: Promote Extubation and Mechanical Ventilation Liberation  Recent Flowsheet Documentation  Taken 9/30/2022 0800 by Stacey Vargas RN  Medication Review/Management: medications reviewed     Problem: Ventilator-Induced Lung Injury (Mechanical Ventilation, Invasive)  Goal: Absence of Ventilator-Induced Lung Injury  Intervention: Prevent Ventilator-Associated Pneumonia  Recent Flowsheet Documentation  Taken 9/30/2022 0945 by Stacey Vargas RN  Head of Bed (HOB) Positioning: HOB at 30-45 degrees  Taken 9/30/2022 0800 by Stacey Vargas RN  Head of Bed (HOB) Positioning: HOB at 30 degrees     Problem: Restraint, Nonbehavioral (Nonviolent)  Goal: Absence of Harm or Injury  Intervention: Protect Dignity, Rights, and Personal Wellbeing  Recent Flowsheet Documentation  Taken 9/30/2022 0800 by Stacey Vargas RN  Trust Relationship/Rapport:   care explained   emotional support provided   questions encouraged   thoughts/feelings acknowledged  Intervention: Protect Skin and Joint Integrity  Recent Flowsheet Documentation  Taken 9/30/2022 1330 by Stacey Vargas RN  Body Position: (back to bed)   turned   left  Taken 9/30/2022 0945 by Stacey Vargas  RN  Body Position: weight shifting  Taken 9/30/2022 0800 by Stacey Vargas RN  Body Position: supine  Range of Motion: active ROM (range of motion) encouraged     Problem: Pain Acute  Goal: Acceptable Pain Control and Functional Ability  Intervention: Develop Pain Management Plan  Recent Flowsheet Documentation  Taken 9/30/2022 1145 by Stacey Vargas RN  Pain Management Interventions:   distraction   emotional support   pillow support provided   repositioned  Taken 9/30/2022 1045 by Stacey Vargas RN  Pain Management Interventions:   medication (see MAR)   distraction   emotional support   pillow support provided   repositioned  Taken 9/30/2022 1000 by Stacey Vargas RN  Pain Management Interventions:   distraction   emotional support   pillow support provided   repositioned  Taken 9/30/2022 0900 by Stacey Vargas RN  Pain Management Interventions:   medication (see MAR)   distraction   emotional support   repositioned  Intervention: Prevent or Manage Pain  Recent Flowsheet Documentation  Taken 9/30/2022 0800 by Stacey Vargas RN  Medication Review/Management: medications reviewed     Problem: Communication Impairment (Artificial Airway)  Goal: Effective Communication  Intervention: Ensure Effective Communication  Recent Flowsheet Documentation  Taken 9/30/2022 0800 by Stacey Vargas RN  Trust Relationship/Rapport:   care explained   emotional support provided   questions encouraged   thoughts/feelings acknowledged     Problem: Device-Related Complication Risk (Artificial Airway)  Goal: Optimal Device Function  Intervention: Optimize Device Care and Function  Recent Flowsheet Documentation  Taken 9/30/2022 0800 by Stacey Vargas RN  Airway Safety Measures:   all equipment/monitors on and audible   suction at bedside   Goal Outcome Evaluation:        Patient continues to complain of head and neck pain. Mediated with Oxycodone and Tylenol prn. Patient was up in the chair today and tolerated well. Premofit applied and patient  voided 750cc yellow urine. Patient did have x2 medium soft to loose stools and 1 smear. Will continue with current plan of care.

## 2022-09-30 NOTE — PROGRESS NOTES
CLINICAL NUTRITION SERVICES - REASSESSMENT NOTE     Nutrition Prescription    RECOMMENDATIONS FOR MDs/PROVIDERS TO ORDER:      Malnutrition Status:    Previously noted severe    Recommendations already ordered by Registered Dietitian (RD):      Modified enteral feeding rate to 55 ml/h     Decreased prosource packets to 1 pkt TID    D/nani MVI as formula meeting RDI's  ( provides the following w/ modulars to meet estimated needs: 1320 ml formula,2100 kcal, 116 g protein (1.7 g/kg dose wt), 269 g carbohydrate, 0 g fiber, 1106 ml free fluid +540 ml from flushes =1546 ml/d,132 % RDI    Future/Additional Recommendations:       EVALUATION OF PROGRESS TOWARD GOALS/NEW FINDINGS   Progress towards goals will be monitored and evaluated per protocol and Practice Guidelines      Diet order: None   Enteral: via PEG placed 22  CONTINUOUS, Starting on 22 at 1630, Until Specified  Adult Formula: Osmolite 1.5  Route: Gastrostomy  Goal Rate: 60  Goal Units: mL/hr  Medication - Feeding Tube Flush Frequency: At least 15-30 mL water before and after medication administration and with tube clogging  Amount to Send (Nutrition use): 6 pkts prosource  Flushes: water 90 ml q 4 h  Enteral at goal rate w/ modulars provides:   1440 mL  Formula, 2400 kcal , 157g protein , 293g CHO, 0g fiber and 1097mL free water.+540 ml from rcrxpuj=4998 ml/d. Meets 100% of DRI's.  Total EN volume received:  2 day average of completed per I/0: 1234mL,  >90% of prescribed volume recieved      Last BM per nursin22.     Last SLP: 22 remains NPO    Skin: neck incision, facial abrasion noted per nursing     Bakari nutrition score: 3; total score: 15    I/O last 3 completed shifts:    In:  [I.V.:40; NG/GT:]    Out: -     Labs:    Electrolytes  Potassium (mmol/L)   Date Value   2022 4.2   2022 3.6   2022 3.9   2022 4.0   2022 3.9   2022 3.7     Potassium POCT (mmol/L)   Date Value   2022 3.8      Phosphorus (mg/dL)   Date Value   09/27/2022 3.2   09/26/2022 2.4 (L)   09/25/2022 2.5   09/24/2022 2.2 (L)   09/23/2022 2.0 (L)        Blood Glucose  Glucose (mg/dL)   Date Value   09/30/2022 139 (H)   06/22/2022 92   06/20/2022 87   06/19/2022 91   06/18/2022 91   06/17/2022 99     GLUCOSE BY METER POCT (mg/dL)   Date Value   09/30/2022 137 (H)   09/30/2022 129 (H)   09/30/2022 126 (H)   09/29/2022 134 (H)   09/29/2022 110 (H)     Hemoglobin A1C (%)   Date Value   09/16/2022 6.2 (H)        Inflammatory Markers  WBC Count (10e3/uL)   Date Value   09/27/2022 7.3   09/26/2022 10.9   09/25/2022 11.2 (H)     Albumin (g/dL)   Date Value   09/27/2022 2.7 (L)   09/26/2022 2.8 (L)   09/25/2022 2.6 (L)   06/17/2022 2.3 (L)   06/16/2022 2.4 (L)   06/15/2022 3.6        Magnesium (mg/dL)   Date Value   09/27/2022 2.0   09/26/2022 2.0   09/25/2022 2.0     Sodium (mmol/L)   Date Value   09/30/2022 139   09/28/2022 138   09/27/2022 134 (L)     Sodium POCT (mmol/L)   Date Value   09/27/2022 135 (L)        Renal  Urea Nitrogen (mg/dL)   Date Value   09/30/2022 23.3 (H)   09/28/2022 22.0   09/27/2022 11.1   06/22/2022 24   06/20/2022 22   06/19/2022 23     Creatinine (mg/dL)   Date Value   09/30/2022 0.64 (L)   09/28/2022 0.73   09/27/2022 0.69         Additional  Ketones Urine (mg/dL)   Date Value   09/16/2022 Negative        Meds:    acetylcysteine  2 mL Nebulization 2 times daily     albuterol  2.5 mg Nebulization 2 times daily     bumetanide  1 mg Intravenous Daily     calcium carbonate  1,250 mg Oral or Feeding Tube BID w/meals     cholecalciferol  25 mcg Oral or Feeding Tube Daily     doxazosin  2 mg Oral or Feeding Tube Daily     enoxaparin ANTICOAGULANT  40 mg Subcutaneous Q24H     insulin aspart  1-6 Units Subcutaneous Q6H     levothyroxine  112 mcg Oral or Feeding Tube QAM AC     multivitamins w/minerals  15 mL Per Feeding Tube Daily     pantoprazole  40 mg Oral or Feeding Tube QAM AC     protein modular  2 packet Per  "Feeding Tube TID     sennosides  5 mL Oral or Feeding Tube BID     sodium chloride (PF)  10 mL Intracatheter Q8H        - MEDICATION INSTRUCTIONS -        acetaminophen, acetaminophen, albuterol, bisacodyl, glucose **OR** dextrose **OR** glucagon, hydrALAZINE, lidocaine 4%, miconazole, naloxone **OR** naloxone **OR** [DISCONTINUED] naloxone **OR** [DISCONTINUED] naloxone, oxyCODONE, - MEDICATION INSTRUCTIONS -, QUEtiapine, sodium chloride (PF)          ANTHROPOMETRICS  Height: 68\"  Weight: 92 kg   Body mass index is 31.05 kg/m .  Wt Readings from Last 10 Encounters:   09/30/22 92.6 kg (204 lb 3.2 oz)   09/22/22 83 kg (182 lb 15.7 oz)   07/20/22 86.2 kg (190 lb)   06/19/22 92.9 kg (204 lb 11.2 oz)     09/30/22 0527 92.6 kg (204 lb 3.2 oz) Bed scale   09/29/22 1200 90.4 kg (199 lb 6.4 oz) Bed scale   09/29/22 0624 96.2 kg (212 lb) Bed scale   09/28/22 0500 87.9 kg (193 lb 12.8 oz) Bed scale   09/27/22 0510 85.8 kg (189 lb 3.2 oz) Bed scale   09/26/22 0627 87.6 kg (193 lb 1.6 oz) Bed scale   09/25/22 0016 87 kg (191 lb 12.8 oz) Bed scale   09/24/22 0555 86.9 kg (191 lb 9.6 oz)          Weight Status:  Obesity Grade I BMI 30-34.9  Weight changes since admission : 7% gain in 1 week  No edema per MD note  Net I/0 +8234 ml    ASSESSED NUTRITION NEEDS PER APPROVED PRACTICE GUIDELINES:  Dosing Weight  70 kg  IBW  Estimated Energy Needs: 4153-7261 kcals/day (25 - 30 kcals/kg)  Justification: Obese vs age, s/p Fx  Estimated Protein Needs: 105-140 grams protein/day (1.5 - 2 grams of pro/kg)  Justification: Obesity guidlelines   Estimated Fluid Needs: 1 mL/kcal   Justification: Maintenance or Per provider pending fluid status      NEW FINDINGS     Previous Goals   total avg nutritional intake to meet a minimum of 25 kcal/kg and 1.5 g PRO/kg daily    Evaluation:  Met    Previous Nutrition Diagnosis  Inadequate oral intake related to respiratory status, NPO as evidenced by need for enteral nutrition to meet 100% nutrition " needs  Evaluation: Improving

## 2022-10-01 ENCOUNTER — APPOINTMENT (OUTPATIENT)
Dept: OCCUPATIONAL THERAPY | Facility: CLINIC | Age: 87
DRG: 208 | End: 2022-10-01
Attending: INTERNAL MEDICINE
Payer: MEDICARE

## 2022-10-01 ENCOUNTER — APPOINTMENT (OUTPATIENT)
Dept: SPEECH THERAPY | Facility: CLINIC | Age: 87
DRG: 208 | End: 2022-10-01
Attending: INTERNAL MEDICINE
Payer: MEDICARE

## 2022-10-01 LAB
GLUCOSE BLDC GLUCOMTR-MCNC: 115 MG/DL (ref 70–99)
GLUCOSE BLDC GLUCOMTR-MCNC: 118 MG/DL (ref 70–99)
GLUCOSE BLDC GLUCOMTR-MCNC: 125 MG/DL (ref 70–99)
GLUCOSE BLDC GLUCOMTR-MCNC: 137 MG/DL (ref 70–99)

## 2022-10-01 PROCEDURE — 250N000011 HC RX IP 250 OP 636: Performed by: NURSE PRACTITIONER

## 2022-10-01 PROCEDURE — 250N000013 HC RX MED GY IP 250 OP 250 PS 637: Performed by: HOSPITALIST

## 2022-10-01 PROCEDURE — 250N000011 HC RX IP 250 OP 636: Performed by: HOSPITALIST

## 2022-10-01 PROCEDURE — 999N000009 HC STATISTIC AIRWAY CARE

## 2022-10-01 PROCEDURE — 999N000157 HC STATISTIC RCP TIME EA 10 MIN

## 2022-10-01 PROCEDURE — 999N000123 HC STATISTIC OXYGEN O2DAILY TECH TIME

## 2022-10-01 PROCEDURE — 250N000009 HC RX 250: Performed by: NURSE PRACTITIONER

## 2022-10-01 PROCEDURE — 94640 AIRWAY INHALATION TREATMENT: CPT | Mod: 76

## 2022-10-01 PROCEDURE — 120N000017 HC R&B RESPIRATORY CARE

## 2022-10-01 PROCEDURE — 99233 SBSQ HOSP IP/OBS HIGH 50: CPT | Performed by: HOSPITALIST

## 2022-10-01 PROCEDURE — 92526 ORAL FUNCTION THERAPY: CPT | Mod: GN | Performed by: SPEECH-LANGUAGE PATHOLOGIST

## 2022-10-01 PROCEDURE — 97110 THERAPEUTIC EXERCISES: CPT | Mod: GO

## 2022-10-01 PROCEDURE — 94640 AIRWAY INHALATION TREATMENT: CPT

## 2022-10-01 RX ADMIN — ALBUTEROL SULFATE 2.5 MG: 2.5 SOLUTION RESPIRATORY (INHALATION) at 08:30

## 2022-10-01 RX ADMIN — ENOXAPARIN SODIUM 40 MG: 100 INJECTION SUBCUTANEOUS at 20:05

## 2022-10-01 RX ADMIN — Medication 25 MCG: at 08:10

## 2022-10-01 RX ADMIN — Medication 1 PACKET: at 20:05

## 2022-10-01 RX ADMIN — ALBUTEROL SULFATE 2.5 MG: 2.5 SOLUTION RESPIRATORY (INHALATION) at 19:46

## 2022-10-01 RX ADMIN — Medication 1 PACKET: at 08:10

## 2022-10-01 RX ADMIN — OXYCODONE HYDROCHLORIDE 5 MG: 5 SOLUTION ORAL at 09:43

## 2022-10-01 RX ADMIN — ACETYLCYSTEINE 2 ML: 200 SOLUTION ORAL; RESPIRATORY (INHALATION) at 08:31

## 2022-10-01 RX ADMIN — Medication 1 PACKET: at 15:12

## 2022-10-01 RX ADMIN — ACETAMINOPHEN 650 MG: 650 SOLUTION ORAL at 02:13

## 2022-10-01 RX ADMIN — BUMETANIDE 1 MG: 0.25 INJECTION INTRAMUSCULAR; INTRAVENOUS at 08:10

## 2022-10-01 RX ADMIN — OXYCODONE HYDROCHLORIDE 5 MG: 5 SOLUTION ORAL at 17:46

## 2022-10-01 RX ADMIN — Medication 2 MG: at 08:10

## 2022-10-01 RX ADMIN — LEVOTHYROXINE SODIUM 112 MCG: 0.11 TABLET ORAL at 07:30

## 2022-10-01 RX ADMIN — ACETYLCYSTEINE 2 ML: 200 SOLUTION ORAL; RESPIRATORY (INHALATION) at 19:46

## 2022-10-01 RX ADMIN — CALCIUM CARBONATE 1250 MG: 1250 SUSPENSION ORAL at 11:59

## 2022-10-01 RX ADMIN — Medication 40 MG: at 06:33

## 2022-10-01 RX ADMIN — CALCIUM CARBONATE 1250 MG: 1250 SUSPENSION ORAL at 17:32

## 2022-10-01 ASSESSMENT — ACTIVITIES OF DAILY LIVING (ADL)
ADLS_ACUITY_SCORE: 53
ADLS_ACUITY_SCORE: 51
ADLS_ACUITY_SCORE: 53
ADLS_ACUITY_SCORE: 51
ADLS_ACUITY_SCORE: 49
ADLS_ACUITY_SCORE: 51

## 2022-10-01 NOTE — PLAN OF CARE
Problem: Plan of Care - These are the overarching goals to be used throughout the patient stay.    Goal: Plan of Care Review/Shift Note  Description: The Plan of Care Review/Shift note should be completed every shift.  The Outcome Evaluation is a brief statement about your assessment that the patient is improving, declining, or no change.  This information will be displayed automatically on your shift note.  Outcome: Ongoing, Progressing     Problem: Restraint, Nonbehavioral (Nonviolent)  Goal: Absence of Harm or Injury  Intervention: Protect Dignity, Rights, and Personal Wellbeing  Recent Flowsheet Documentation  Taken 10/1/2022 0835 by Margarita Tijerina RN  Trust Relationship/Rapport:    care explained    questions encouraged    reassurance provided    thoughts/feelings acknowledged     Problem: Pain Acute  Goal: Acceptable Pain Control and Functional Ability  Intervention: Prevent or Manage Pain  Recent Flowsheet Documentation  Taken 10/1/2022 0835 by Margarita Tijerina RN  Bowel Elimination Promotion: adequate fluid intake promoted  Medication Review/Management: medications reviewed   Goal Outcome Evaluation:      Patient alert and cooperative. Pleasant on approach. C/O pain to neck/head area throughout the day. Given prn pain medication x2 which was effective. TF running continuous and tolerating well. Primofit in place for urinary incontinence. Loose stool x 2 in am; bowel medication held per parameters. Cervical collar in place and pads changed this am. SL x 2 discontinued on evening shift; no pulling at tubes noted and patient verbalized understanding. Pt resting at this time. Will continue to monitor.  Margarita Tijerina RN

## 2022-10-01 NOTE — PLAN OF CARE
Problem: Plan of Care - These are the overarching goals to be used throughout the patient stay.    Goal: Optimal Comfort and Wellbeing  Outcome: Ongoing, Progressing  Intervention: Monitor Pain and Promote Comfort  Recent Flowsheet Documentation  Taken 10/1/2022 0213 by Mignon Mann RN  Pain Management Interventions:   medication (see MAR)   repositioned  Intervention: Provide Person-Centered Care  Recent Flowsheet Documentation  Taken 10/1/2022 0139 by Mignon Mann RN  Trust Relationship/Rapport: care explained     Problem: Restraint, Nonbehavioral (Nonviolent)  Goal: Absence of Harm or Injury  Outcome: Ongoing, Progressing  Intervention: Protect Dignity, Rights, and Personal Wellbeing  Recent Flowsheet Documentation  Taken 10/1/2022 0139 by Mignon Mann RN  Trust Relationship/Rapport: care explained     Problem: Pain Acute  Goal: Acceptable Pain Control and Functional Ability  Outcome: Ongoing, Progressing  Intervention: Develop Pain Management Plan  Recent Flowsheet Documentation  Taken 10/1/2022 0213 by Mignon Mann RN  Pain Management Interventions:   medication (see MAR)   repositioned  Intervention: Prevent or Manage Pain  Recent Flowsheet Documentation  Taken 10/1/2022 0240 by Mignon Mann RN  Medication Review/Management: medications reviewed     Problem: Communication Impairment (Artificial Airway)  Goal: Effective Communication  Outcome: Ongoing, Progressing  Intervention: Ensure Effective Communication  Recent Flowsheet Documentation  Taken 10/1/2022 0139 by Mignon Mann RN  Trust Relationship/Rapport: care explained   Goal Outcome Evaluation:  Patient complained of left leg pain during repositioning; was grimacing with touch.  PRN tylenol was given; patient sleeping after medication; cooperative with cares.  Soft limb restraints in place; removed with cares.  Patient slept most of the shift; refused to remove eyeglasses; skin check around eyeglasses checked q 2 hours.  GT site  button intact.

## 2022-10-01 NOTE — PROGRESS NOTES
[facility]    [facility] - Hospitalist Service    Date of Admission:  9/22/2022    Assessment & Plan          Brief History:    Alexandru Still is a 89 year old man w/ PMH of Lambert-Eaton syndrome, hypothyroidism,  complete heart block s/p pacemaker, HTN, BPH,  PB, and DM type 2 who was admitted to the SICU on 9/15/2022 following an unwitnessed fall and striking his head. He was initially seen at an outside hospital and underwent a comprehensive trauma work up found to have a left frontal SDH (5mm) + C1 fx + Type II C2 odontoid fracture with a 1cm posterior displacement. He was  intubated for airway protection in the ED given high c-spine injury and difficulty managing his secretions. On admission he requested all cares without surgical cervical spine stabilization, agreeable to a tracheostomy and PEG. Multiple care conferences held with his family who confirmed his wishes.     LTACH course:    9/24-9/26:  Speech evaluated patient and recommends NPO, cont tube feeds via PEG due to severe oropharyngeal dysphasia.  PT/OT, dietitian, [facility] nurse saw pt on 9/23.  Pulled out G tube during night of 9/23.  Pt now on soft wrist restraints.  IVF changed to D51/2 NS at low rate as pt has TAVR.  Morphine IV ordered prn pain.    Spoke w/ Dr. Brown- IR - recommends no hannah tube as this is a brand new PEG and placing it blind will likely lead it to not be in correct position.  He will not do a PEG placement on the weekend, scheduled for Monday.  Pt cannot be fed via NG tube as it is contraindicated with C1 and C2 fx.  For PEG tube replacement (herpain on hold, place ICD)on 9/26.  Will discontinue IVF once PEG tube is placed and restart po meds.  Given NaPhos 9/26 9/27-10/3:  9/27 patient had PEG tube replaced after patient himself removed it on 9/23.  Was a started on tube feeding.  P.O. medication were restarted.  IV fluids were discontinued.  He still needs soft restraint x2.  Is off ventilator and  stable.  9/28 Failed blue dye test, Continue NPO      Assessment & Plan          Traumatic 5mm left frontal lobe SDH  Posteriorly displaced (1 cm) Type II odontoid fx  C1 anterior arch fracture   Epidural hemorrhage of 7mm  Acute traumatic neck pain  Acute L 6th rib fracture  S/p fall  Facial abrasions with ecchymoses  RLE wound  He was seen and evaluated by Neurosurgery.  Pt refused surgical intervention.   A repeat head CT was obtained with a stable subdural hematoma. He was placed in a cervical collar for the cervical fracture. No surgical intervention per patient preference.     -cont [place] J -keep in place at all times  -tylenol, oxy, and morphine prn pain  -Neurosurg ok w/ heparin q8h for dvt prophy  -wound care following    -f/u Neurosurgery in 6 weeks with an upright XR of the cervical spine   -CT cervical spine in 3 months.      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 Failed bedside and radiology swallow. He completed 3 doses of Decadron for laryngeal edema.      -S/P tracheostomy 09/21/2022, Trach suture removal on 09/26  -pulm following   -RT following   -weaned off vent by per pulm   - bronchial hygeine w/ albuterol/mucomyst  - 9/28 failed blue dye test     Hypertension   Complete heart block s/p PM placement  Nonrheumatic aortic valve stenosis s/p TAVR 2019  -monitor  -hydralazine prn   -echo on 9/16:  EF: 60-65%, no LV dysfunction     Severe orophayngeal dysphagia   S/P PEG -s/p self removal due to  agitation  Severe protein calorie malnutrition  Hiatal hernia     -9/27 patient had PEG tube replaced after patient himself removed it on 9/23.  Was a started on tube feeding.  P.O. medication were restarted.  IV fluids were discontinued.  He still needs soft restraint x2.  Is off ventilator and stable.  -hold TF : osmolite 1.5 and 60 ml/hr until PEG placed   dietitian following  -free water flush:  90 q4h  -protein modular q8h  -senna and dulcolax prn   -PPI for GI prophy  -cont seroquel prn  -cont soft wrist restraints prn      Hypophosphatemia   Hypomagnesia  Hypokalemia  S/p Lactic Acidosis  -Na phos IV 15mmol x 1 9/26, x 1 9/25, given 9mmol on 9/24   -monitor  -replete prn      Hypothyroidism   -Continue Levothyroxine     Anemia of chronic disease  -hg trending down past few days  -cont to monitor      BPH  -cont doxazosin     DM type 2  Hypoglycemia   -sliding scale insulin  -last HbA1:  6.2  -hypoglycemic protocol     Deconditioning  -PT/OT    Diet: Adult Formula Drip Feeding: Continuous Osmolite 1.5; Gastrostomy; Goal Rate: 55; mL/hr; Medication - Feeding Tube Flush Frequency: At least 15-30 mL water before and after medication administration and with tube clogging; Amount to Send (Nutrition...    DVT Prophylaxis: Heparin SQ  [place] Catheter: Not present  Central Lines: PRESENT     Cardiac Monitoring: None  Code Status: Full Code      Disposition Plan     Expected Discharge Date: 10/07/2022,  6:00 PM  Discharge Delays: Complex Discharge  Placement - LTC    Discharge Comments: pt is complex with C1 and C2 fx.  [place] J collar on at all times, trach on vent, PEG tube was pulled out  at night on 9/23        The patient's care was discussed with the Bedside Nurse and Patient.    Laurita Cardenas MD  Hospitalist Service  LTACH  Securely message with the Vocera Web Console (learn more here)  Text page via [employer] Paging/Directory     Clinically Significant Risk Factors Present on Admission                     __________________________________________________________________    Interval History   No new event reported.  Patient is stable.  Patient stays calm and confused however still needs soft restraint.  On tube feeding.  Denies any pain.    Data reviewed today: I reviewed all medications, new labs and imaging results over the last 24 hours. I personally reviewed no images or EKG's today.    Physical Exam   Vital Signs: Temp: 97.4  F (36.3  C) Temp src: Axillary BP: 121/58 Pulse: 59   Resp: 22 SpO2: 95 % O2 Device: Trach dome Oxygen Delivery: 40 LPM  Weight: 204 lbs 3.2 oz  General:  No acute distress, awake and alert  Eyes:  Sclera non icteric, normal conjuctiva  Neck :  [place] J in place, trach in place  Lungs:  Clear to auscultation bilaterally, air entry equal bilaterally, no rhonchi or rales  CVS:  S1 and S2, regular rate and rhythem, systolic murmur L sternal border  Abdomen:  Soft, nontender, PEG tube is replaced and in place and functional    Musculoskeletal: Improved swelling of R forearm and hand  Neuro:  awake and Alert , follows commands intermittently   Skin: Improved right supraorbital abrasions and ecchymoses, RLE wound        Data   Recent Labs   Lab 09/30/22  1824 09/30/22  1135 09/30/22  0638 09/28/22  1207 09/28/22  0625 09/27/22  1223 09/27/22  0648 09/27/22  0611 09/26/22  1150 09/26/22  0624 09/25/22  1153 09/25/22  0634   WBC  --   --   --   --   --   --  7.3  --   --  10.9  --  11.2*   HGB  --   --   --   --   --   --  9.9* 11.2*  --  11.6*  --  11.1*   MCV  --   --   --   --   --   --  92  --   --  94  --  94   PLT  --   --   --   --   --   --  267  --   --  271  --  208   NA  --   --  139  --  138  --  134* 135*  --  131*  --  136   POTASSIUM  --   --  4.2  --  3.6  --  3.9 3.8  --  3.9  --  4.4   CHLORIDE  --   --  105  --  104  --  102  --   --  97*  --  102   CO2  --   --  27  --  25  --  23  --   --  25  --  25   BUN  --   --  23.3*  --  22.0  --  11.1  --   --  10.2  --  12.3   CR  --    --  0.64*  --  0.73  --  0.69  --   --  0.63*  --  0.64*   ANIONGAP  --   --  7  --  9  --  9  --   --  9  --  9   TITA  --   --  8.1*  --  8.2  --  8.3  --   --  8.6  --  8.4   * 137* 139*   < > 146*   < > 135* 134*   < > 98   < > 95   ALBUMIN  --   --   --   --   --   --  2.7*  --   --  2.8*  --  2.6*   PROTTOTAL  --   --   --   --   --   --  5.8*  --   --  6.5  --  6.2*   BILITOTAL  --   --   --   --   --   --  0.6  --   --  1.0  --  0.8   ALKPHOS  --   --   --   --   --   --  103  --   --  124  --  108   ALT  --   --   --   --   --   --  25  --   --  34  --  41   AST  --   --   --   --   --   --  18  --   --  21  --  26    < > = values in this interval not displayed.

## 2022-10-01 NOTE — PLAN OF CARE
Goal Outcome Evaluation:      Patient more alert today. Denies pain. No sign/symptom or complain of pain during the shift.  Stayed in bed. Continuous TF 55 ml/hr. Primo fit intact and 450 ml out. Bilateral hands edematous. ROM performed a couple of times during restraint assessment. Vitals stable.   Problem: Plan of Care - These are the overarching goals to be used throughout the patient stay.    Goal: Absence of Hospital-Acquired Illness or Injury  Intervention: Identify and Manage Fall Risk  Recent Flowsheet Documentation  Taken 9/30/2022 1600 by Carrie Lawson RN  Safety Promotion/Fall Prevention:    bed alarm on    fall prevention program maintained    room door open  Intervention: Prevent and Manage VTE (Venous Thromboembolism) Risk  Recent Flowsheet Documentation  Taken 9/30/2022 1600 by Carrie Lawson RN  Range of Motion: active ROM (range of motion) encouraged  Activity Management: up in chair  Intervention: Prevent Infection  Recent Flowsheet Documentation  Taken 9/30/2022 1600 by Carrie Lawson RN  Infection Prevention: equipment surfaces disinfected  Goal: Optimal Comfort and Wellbeing  Intervention: Provide Person-Centered Care  Recent Flowsheet Documentation  Taken 9/30/2022 1600 by Carrie Lawson RN  Trust Relationship/Rapport:    care explained    choices provided    emotional support provided     Problem: Device-Related Complication Risk (Mechanical Ventilation, Invasive)  Goal: Optimal Device Function  Intervention: Optimize Device Care and Function  Recent Flowsheet Documentation  Taken 9/30/2022 1600 by Carrie Lawson RN  Airway Safety Measures: all equipment/monitors on and audible     Problem: Inability to Wean (Mechanical Ventilation, Invasive)  Goal: Mechanical Ventilation Liberation  Intervention: Promote Extubation and Mechanical Ventilation Liberation  Recent Flowsheet Documentation  Taken 9/30/2022 1600 by Carrie Lawson RN  Medication Review/Management: medications  reviewed     Problem: Restraint, Nonbehavioral (Nonviolent)  Goal: Absence of Harm or Injury  Intervention: Protect Dignity, Rights, and Personal Wellbeing  Recent Flowsheet Documentation  Taken 9/30/2022 1600 by Carrie Lawson RN  Trust Relationship/Rapport:    care explained    choices provided    emotional support provided  Intervention: Protect Skin and Joint Integrity  Recent Flowsheet Documentation  Taken 9/30/2022 1600 by Carrie Lawson RN  Range of Motion: active ROM (range of motion) encouraged     Problem: Pain Acute  Goal: Acceptable Pain Control and Functional Ability  Intervention: Prevent or Manage Pain  Recent Flowsheet Documentation  Taken 9/30/2022 1600 by Carrie Lawson RN  Medication Review/Management: medications reviewed     Problem: Communication Impairment (Artificial Airway)  Goal: Effective Communication  Intervention: Ensure Effective Communication  Recent Flowsheet Documentation  Taken 9/30/2022 1600 by Carrie Lawson RN  Family/Support System Care: presence promoted  Trust Relationship/Rapport:    care explained    choices provided    emotional support provided     Problem: Device-Related Complication Risk (Artificial Airway)  Goal: Optimal Device Function  Intervention: Optimize Device Care and Function  Recent Flowsheet Documentation  Taken 9/30/2022 1600 by Carrie Lawson RN  Airway Safety Measures: all equipment/monitors on and audible

## 2022-10-01 NOTE — PLAN OF CARE
Problem: Device-Related Complication Risk (Mechanical Ventilation, Invasive)  Goal: Optimal Device Function  Intervention: Optimize Device Care and Function  Recent Flowsheet Documentation  Taken 10/1/2022 1626 by Brandan Delgado, RT  Airway Safety Measures: all equipment/monitors on and audible  Taken 10/1/2022 1225 by Brandan Delgado, RT  Airway Safety Measures: all equipment/monitors on and audible  Taken 10/1/2022 0835 by Brandan Delgado, RT  Airway Safety Measures: all equipment/monitors on and audible     Problem: Inability to Wean (Mechanical Ventilation, Invasive)  Goal: Mechanical Ventilation Liberation  Intervention: Promote Extubation and Mechanical Ventilation Liberation  Recent Flowsheet Documentation  Taken 10/1/2022 1626 by Brandan Delgado, RT  Environmental Support: calm environment promoted  Taken 10/1/2022 1225 by Brandan Delgado, RT  Environmental Support: calm environment promoted  Taken 10/1/2022 0835 by Brandan Delgado, RT  Environmental Support: calm environment promoted     Problem: Communication Impairment (Artificial Airway)  Goal: Effective Communication  Outcome: Ongoing, Progressing     Problem: Device-Related Complication Risk (Artificial Airway)  Goal: Optimal Device Function  Outcome: Ongoing, Progressing  Intervention: Optimize Device Care and Function  Recent Flowsheet Documentation  Taken 10/1/2022 1626 by Brandan Delgado, RT  Airway Safety Measures: all equipment/monitors on and audible  Airway/Ventilation Management:   airway patency maintained   humidification applied  Taken 10/1/2022 1225 by Brandan Delgado, RT  Airway Safety Measures: all equipment/monitors on and audible  Airway/Ventilation Management:   airway patency maintained   humidification applied   pulmonary hygiene promoted  Taken 10/1/2022 0835 by Brandan Delgado, RT  Airway Safety Measures: all equipment/monitors on and audible  Airway/Ventilation Management:   airway patency maintained    humidification applied   pulmonary hygiene promoted   RT PROGRESS NOTE     DATA:     CURRENT SETTINGS: hftm             TRACH TYPE / SIZE:  [place], # 6 trach              MODE:   high flow trch dome              FIO2:   35%     ACTION:             THERAPIES:   Albuterol / Mucomyst twice a day             SUCTION:  yes                         FREQUENCY:   x1                        AMOUNT:   small                         CONSISTENCY:   thin                         COLOR:   yellow              SPONTANEOUS COUGH EFFORT/STRENGTH OF EFFORT (not elicited by suctioning): good                              WEANING PHASE:   phase two vent pathway                         WEAN MODE:    hftm / pmv                        WEAN TIME:   continuous                         END WEAN REASON:   ongoing      RESPONSE:             BS:   clear with cough              VITAL SIGNS:   97/62-89/22-25             EMOTIONAL NEEDS / CONCERNS:  none                RISK FOR SELF DECANNULATION:  none                        RISK DUE TO:  n/a                        INTERVENTION/S IN PLACE IS/ARE:  n/a       NOTE / PLAN:    Goal Outcome Evaluation:  Patient continues to do well weaning on hftm / pmv continuous. Patient can be assessed for next phase in wean.

## 2022-10-01 NOTE — PLAN OF CARE
7 PM to 7 AM  RT PROGRESS NOTE     DATA:     CURRENT SETTINGS:             TRACH TYPE / SIZE:  6 Shiley Taper Guard placed 9/21/22             MODE:   35% @ 40 LPM HFTM/PMV.   This is pts first time wearing PMV continuously. He statrted at 1040 Friday 9/30/22. He seems to be tolerating.    ACTION:             THERAPIES:                SUCTION:                           FREQUENCY: None this shift                         AMOUNT:                           CONSISTENCY:                           COLOR:                SPONTANEOUS COUGH EFFORT/STRENGTH OF EFFORT (not elicited by suctioning): Not observed                              WEANING PHASE:   2                        WEAN MODE:                            WEAN TIME:                           END WEAN REASON:        RESPONSE:             BS:   Clear and dim             VITAL SIGNS:   SATs 93-95%, HR 58-61, RR 20             RISK FOR SELF DECANNULATION:  Yes             RISK DUE TO:  Pulling lines              INTERVENTION/S IN PLACE IS/ARE:  Bilateral soft limb restraints.       NOTE / PLAN:   Continue with same.     Problem: Communication Impairment (Artificial Airway)  Goal: Effective Communication  Outcome: Ongoing, Progressing     Problem: Device-Related Complication Risk (Artificial Airway)  Goal: Optimal Device Function  Outcome: Ongoing, Progressing     Problem: Skin and Tissue Injury (Artificial Airway)  Goal: Absence of Device-Related Skin or Tissue Injury  Outcome: Ongoing, Progressing

## 2022-10-01 NOTE — PROGRESS NOTES
"Speech pathology: Clinical swallow evaluation     10/01/22 1404   General Information   Onset of Illness/Injury or Date of Surgery 09/15/22   Pertinent History of Current Problem Per referring provider note (dated today), \"89 yoM admitted 9/15/2022 following an unwitnessed fall found to have a left frontal SDH and C1/C2 cervical spine fracture.  Intubated for airway protection in the ED given high c-spine injury and difficulty managing his secretions. On admission he requested all cares without surgical cervical spine stabilization, agreeable to a tracheostomy and PEG.\"   General Observations Cervical collar in place, trach with speaking valve   Past History of Dysphagia Patient completed clinical swallow evaluation in June of this year prior to tracheostomy and fall.  He was recommended for soft and bite sized food textures and mildly thick liquids.   Type of Evaluation   Type of Evaluation Swallow Evaluation   Tracheostomy Assessment (Speaking Valve)   Tube Size, Tracheostomy 6   Type, Tracheostomy Tube Sussyley   Oral Motor   Oral Musculature generally intact   Structural Abnormalities none present   Dentition (Oral Motor)   Dentition (Oral Motor) dental appliance/dentures   Facial Symmetry (Oral Motor)   Facial Symmetry (Oral Motor) WNL   Lip Function (Oral Motor)   Lip Range of Motion (Oral Motor) WNL   Tongue Function (Oral Motor)   Tongue ROM (Oral Motor) WNL   Cough/Swallow/Gag Reflex (Oral Motor)   Volitional Swallow (Oral Motor) WNL   Vocal Quality/Secretion Management (Oral Motor)   Vocal Quality (Oral Motor) strained/strangled   General Swallowing Observations   Respiratory Support (General Swallowing Observations) trach collar   Current Diet/Method of Nutritional Intake (General Swallowing Observations, NIS) NPO;gastrostomy tube (PEG)   Swallowing Evaluation Clinical swallow evaluation   Clinical Swallow Evaluation   Feeding Assistance frequent cues/help required   Clinical Swallow Evaluation Textures " Trialed thin liquids;mildly thick liquids;pureed   Clinical Swallow Eval: Thin Liquid Texture Trial   Mode of Presentation, Thin Liquids straw   Volume of Liquid or Food Presented 5 sips   Oral Phase of Swallow WFL   Pharyngeal Phase of Swallow coughing/choking   Diagnostic Statement Patient presented with overt signs and symptoms of aspiration with thin liquids.  Liquids were administered via straw as cup was difficult due to cervical collar   Clinical Swallow Eval: Mildly Thick Liquids   Mode of Presentation cup   Volume Presented 4 sips   Oral Phase WFL   Pharyngeal Phase intact   Clinical Swallow Evaluation: Puree Solid Texture Trial   Mode of Presentation, Puree spoon   Volume of Puree Presented 2 ounces   Oral Phase, Puree WFL   Pharyngeal Phase, Puree feeling of something stuck in throat;repeated swallows   Diagnostic Statement Patient presented with globus sensation, needing multiple swallows to clear sensation of pharyngeal stasis.   Esophageal Phase of Swallow   Patient reports or presents with symptoms of esophageal dysphagia No   Swallowing Recommendations   Diet Consistency Recommendations NPO   Medication Administration Recommendations, Swallowing (SLP) FT   Instrumental Assessment Recommendations VFSS (videofluoroscopic swallowing study)   Comment, Swallowing Recommendations Patient presents with inconsistent signs and symptoms of aspiration with multiple consistencies and clinical swallow evaluation.  Suspect presence of trach and cervical collar impact swallow safety.  Consideration for trach downsized prior to instrumental evaluation may be appropriate.  Until then continue n.p.o. with exception of ice chips on a limited basis.   General Therapy Interventions   Planned Therapy Interventions Dysphagia Treatment   Dysphagia treatment Compensatory strategies for swallowing;Instruction of safe swallow strategies   Clinical Impression   Criteria for Skilled Therapeutic Interventions Met (SLP Eval) Yes,  treatment indicated   SLP Diagnosis Dysphagia   Risks & Benefits of therapy have been explained evaluation/treatment results reviewed;care plan/treatment goals reviewed;participants voiced agreement with care plan;participants included;patient   Clinical Impression Comments Patient presents with suspected pharyngeal dysphagia.  Given risk for silent aspiration would not be appropriate to initiate diet at this time until instrumental evaluation can be completed   SLP Discharge Planning   SLP Discharge Recommendation Transitional Care Facility;Acute Rehab Center-Motivated patient will benefit from intensive, interdisciplinary therapy.  Anticipate will be able to tolerate 3 hours of therapy per day;home with home care speech therapy   SLP Rationale for DC Rec Well below baseline for swallowing, cognition and communication.   SLP Brief overview of current status  N.p.o. except ice chips    Total Evaluation Time   Total Evaluation Time (Minutes) 23

## 2022-10-02 ENCOUNTER — APPOINTMENT (OUTPATIENT)
Dept: SPEECH THERAPY | Facility: CLINIC | Age: 87
DRG: 208 | End: 2022-10-02
Attending: INTERNAL MEDICINE
Payer: MEDICARE

## 2022-10-02 LAB
GLUCOSE BLDC GLUCOMTR-MCNC: 118 MG/DL (ref 70–99)
GLUCOSE BLDC GLUCOMTR-MCNC: 126 MG/DL (ref 70–99)
GLUCOSE BLDC GLUCOMTR-MCNC: 137 MG/DL (ref 70–99)
GLUCOSE BLDC GLUCOMTR-MCNC: 140 MG/DL (ref 70–99)
GLUCOSE BLDC GLUCOMTR-MCNC: 147 MG/DL (ref 70–99)
GLUCOSE BLDC GLUCOMTR-MCNC: 150 MG/DL (ref 70–99)

## 2022-10-02 PROCEDURE — 99233 SBSQ HOSP IP/OBS HIGH 50: CPT | Performed by: HOSPITALIST

## 2022-10-02 PROCEDURE — 92507 TX SP LANG VOICE COMM INDIV: CPT | Mod: GN | Performed by: SPEECH-LANGUAGE PATHOLOGIST

## 2022-10-02 PROCEDURE — 250N000009 HC RX 250: Performed by: NURSE PRACTITIONER

## 2022-10-02 PROCEDURE — 94640 AIRWAY INHALATION TREATMENT: CPT

## 2022-10-02 PROCEDURE — 250N000013 HC RX MED GY IP 250 OP 250 PS 637: Performed by: HOSPITALIST

## 2022-10-02 PROCEDURE — 999N000009 HC STATISTIC AIRWAY CARE

## 2022-10-02 PROCEDURE — 999N000123 HC STATISTIC OXYGEN O2DAILY TECH TIME

## 2022-10-02 PROCEDURE — 999N000157 HC STATISTIC RCP TIME EA 10 MIN

## 2022-10-02 PROCEDURE — 250N000011 HC RX IP 250 OP 636: Performed by: NURSE PRACTITIONER

## 2022-10-02 PROCEDURE — 120N000017 HC R&B RESPIRATORY CARE

## 2022-10-02 PROCEDURE — 92526 ORAL FUNCTION THERAPY: CPT | Mod: GN | Performed by: SPEECH-LANGUAGE PATHOLOGIST

## 2022-10-02 PROCEDURE — 250N000011 HC RX IP 250 OP 636: Performed by: HOSPITALIST

## 2022-10-02 PROCEDURE — 94640 AIRWAY INHALATION TREATMENT: CPT | Mod: 76

## 2022-10-02 RX ADMIN — INSULIN ASPART 1 UNITS: 100 INJECTION, SOLUTION INTRAVENOUS; SUBCUTANEOUS at 06:29

## 2022-10-02 RX ADMIN — CALCIUM CARBONATE 1250 MG: 1250 SUSPENSION ORAL at 18:00

## 2022-10-02 RX ADMIN — INSULIN ASPART 1 UNITS: 100 INJECTION, SOLUTION INTRAVENOUS; SUBCUTANEOUS at 12:24

## 2022-10-02 RX ADMIN — Medication 1 PACKET: at 09:23

## 2022-10-02 RX ADMIN — OXYCODONE HYDROCHLORIDE 5 MG: 5 SOLUTION ORAL at 23:35

## 2022-10-02 RX ADMIN — BUMETANIDE 1 MG: 0.25 INJECTION INTRAMUSCULAR; INTRAVENOUS at 09:01

## 2022-10-02 RX ADMIN — ACETYLCYSTEINE 2 ML: 200 SOLUTION ORAL; RESPIRATORY (INHALATION) at 08:52

## 2022-10-02 RX ADMIN — CALCIUM CARBONATE 1250 MG: 1250 SUSPENSION ORAL at 10:41

## 2022-10-02 RX ADMIN — Medication 2 MG: at 09:01

## 2022-10-02 RX ADMIN — SENNOSIDES 5 ML: 8.8 LIQUID ORAL at 20:02

## 2022-10-02 RX ADMIN — Medication 1 PACKET: at 20:02

## 2022-10-02 RX ADMIN — Medication 1 PACKET: at 14:44

## 2022-10-02 RX ADMIN — Medication 40 MG: at 06:32

## 2022-10-02 RX ADMIN — ALBUTEROL SULFATE 2.5 MG: 2.5 SOLUTION RESPIRATORY (INHALATION) at 08:52

## 2022-10-02 RX ADMIN — OXYCODONE HYDROCHLORIDE 5 MG: 5 SOLUTION ORAL at 09:01

## 2022-10-02 RX ADMIN — Medication 25 MCG: at 09:01

## 2022-10-02 RX ADMIN — ALBUTEROL SULFATE 2.5 MG: 2.5 SOLUTION RESPIRATORY (INHALATION) at 20:03

## 2022-10-02 RX ADMIN — SENNOSIDES 5 ML: 8.8 LIQUID ORAL at 09:01

## 2022-10-02 RX ADMIN — ENOXAPARIN SODIUM 40 MG: 100 INJECTION SUBCUTANEOUS at 18:00

## 2022-10-02 RX ADMIN — LEVOTHYROXINE SODIUM 112 MCG: 0.11 TABLET ORAL at 06:32

## 2022-10-02 RX ADMIN — ACETYLCYSTEINE 2 ML: 200 SOLUTION ORAL; RESPIRATORY (INHALATION) at 20:03

## 2022-10-02 ASSESSMENT — ACTIVITIES OF DAILY LIVING (ADL)
ADLS_ACUITY_SCORE: 51
ADLS_ACUITY_SCORE: 53
ADLS_ACUITY_SCORE: 51
ADLS_ACUITY_SCORE: 53
ADLS_ACUITY_SCORE: 51
ADLS_ACUITY_SCORE: 53
ADLS_ACUITY_SCORE: 51
ADLS_ACUITY_SCORE: 53
ADLS_ACUITY_SCORE: 51
ADLS_ACUITY_SCORE: 53

## 2022-10-02 NOTE — PLAN OF CARE
Problem: Device-Related Complication Risk (Artificial Airway)  Goal: Optimal Device Function  Outcome: Ongoing, Not Progressing     Problem: Skin and Tissue Injury (Artificial Airway)  Goal: Absence of Device-Related Skin or Tissue Injury  Outcome: Ongoing, Not Progressing     Problem: Communication Impairment (Artificial Airway)  Goal: Effective Communication  Outcome: Ongoing, Progressing   Goal Outcome Evaluation:        RT PROGRESS NOTE     DATA:     CURRENT SETTINGS:             TRACH TYPE / SIZE:  #6 shiley taper guard placed 9/21             MODE:   30% and 30L tm pmv on continuous             ACTION:             THERAPIES:   albuerol and mucomyst bid             SUCTION:                           FREQUENCY:   x2                        AMOUNT:   moderate x1, small x1                        CONSISTENCY:   thick                        COLOR:   white to pale yellow             SPONTANEOUS COUGH EFFORT/STRENGTH OF EFFORT (not elicited by suctioning): moderate to strong                              WEANING PHASE:   2                        WEAN MODE:    30L 30% tm with pmv continous                                       RESPONSE:             BS:   clear and diminished             VITAL SIGNS:   Blood pressure 126/59, pulse 68, temperature 98.6  F (37  C), temperature source Oral, resp. rate 22, weight 87.4 kg (192 lb 9.6 oz), SpO2 93 %.                 EMOTIONAL NEEDS / CONCERNS:  pain                RISK FOR SELF DECANNULATION:  no                                               NOTE / PLAN:   Recommend trach downsize.  Pt has face plate on current trach that is pushing on skin at 12 and 6 o'clock causing non blanchable redness.  RN aware.  Pt had neck collar on this morning that caused face plate to be pushed into skin especially at 12 o'clock.  Neck brace changed to a different type and this is not causing as much pressure on faceplate.

## 2022-10-02 NOTE — PLAN OF CARE
Problem: Communication Impairment (Artificial Airway)  Goal: Effective Communication  Outcome: Ongoing, Progressing     Problem: Device-Related Complication Risk (Artificial Airway)  Goal: Optimal Device Function  Intervention: Optimize Device Care and Function  Recent Flowsheet Documentation  Taken 10/1/2022 1946 by Darryl Moeller, RT  Airway Safety Measures: all equipment/monitors on and audible   Goal Outcome Evaluation:      RT PROGRESS NOTE     DATA:     CURRENT SETTINGS: Vent Mode: Trach collar  FiO2 (%): 35 %  Resp: 18               TRACH TYPE / SIZE:  #6 Shiley T-guard placed in 9/21             MODE:   TMM/PMV             FIO2:   35%     ACTION:             THERAPIES:   Albuterol/Mucomyst X 1             SUCTION:                           FREQUENCY:   X 2                        AMOUNT:   small to moderate                        CONSISTENCY:   thick                        COLOR:   white yellow             SPONTANEOUS COUGH EFFORT/STRENGTH OF EFFORT (not elicited by suctioning): fair                              WEANING PHASE:   2                        WEAN MODE:    HFTM                        WEAN TIME:   24/7 as tols.                         END WEAN REASON:   ongoing     RESPONSE:             BS:   rhonchi             VITAL SIGNS:   Blood pressure 133/61, pulse 74, temperature 98.5  F (36.9  C), temperature source Oral, resp. rate 18, weight 87.4 kg (192 lb 9.6 oz), SpO2 96 %.               EMOTIONAL NEEDS / CONCERNS:                  RISK FOR SELF DECANNULATION:                          RISK DUE TO:                          INTERVENTION/S IN PLACE IS/ARE:         NOTE / PLAN:   continue current orders and monitoring.

## 2022-10-02 NOTE — PLAN OF CARE
"Goal Outcome Evaluation:        Pt noted to have red slightly opened area noted above trach plate. [place] J collar pushes up on trach tube which is noted to cause pressure to \"[place]\" area. 1/2 mepilex placed above and below trach plate to off-load pressure; patient has two different collars in room; collar changed and this was helpful. WOC notified. Will continue to monitor.  Margarita Tijerina RN              "

## 2022-10-02 NOTE — PLAN OF CARE
Problem: Plan of Care - These are the overarching goals to be used throughout the patient stay.    Goal: Optimal Comfort and Wellbeing  Intervention: Monitor Pain and Promote Comfort  Recent Flowsheet Documentation  Taken 10/2/2022 0901 by Margarita Tijerina RN  Pain Management Interventions: medication (see MAR)     Problem: Pain Acute  Goal: Acceptable Pain Control and Functional Ability  Outcome: Ongoing, Progressing  Intervention: Develop Pain Management Plan  Recent Flowsheet Documentation  Taken 10/2/2022 0901 by Margarita Tijerina RN  Pain Management Interventions: medication (see MAR)  Intervention: Prevent or Manage Pain  Recent Flowsheet Documentation  Taken 10/2/2022 1634 by Margarita Tijerina RN  Medication Review/Management: medications reviewed  Taken 10/2/2022 0800 by Margarita Tijerina RN  Medication Review/Management: medications reviewed     Problem: Skin and Tissue Injury (Artificial Airway)  Goal: Absence of Device-Related Skin or Tissue Injury  Outcome: Ongoing, Progressing   Goal Outcome Evaluation:        Patient alert and cooperative; irritable at times. In AM C/O pain to neck and given prn which was helpful. OOB in am and resting in bed at this time. [place] J collar in place except for skin inspection/cares. TF running continuous and tolerating well. Midline to right arm; dressing changed. Primofit in place and patent. Uneventful shift. Will continue to monitor.  Margarita Tijerina RN

## 2022-10-02 NOTE — PROGRESS NOTES
[facility]    Medicine Progress Note - Hospitalist Service    Date of Admission:  9/22/2022    Assessment & Plan          Brief History:    Alexandru Still is a 89 year old man w/ PMH of Lambert-Eaton syndrome, hypothyroidism,  complete heart block s/p pacemaker, HTN, BPH,  PB, and DM type 2 who was admitted to the SICU on 9/15/2022 following an unwitnessed fall and striking his head. He was initially seen at an outside hospital and underwent a comprehensive trauma work up found to have a left frontal SDH (5mm) + C1 fx + Type II C2 odontoid fracture with a 1cm posterior displacement. He was  intubated for airway protection in the ED given high c-spine injury and difficulty managing his secretions. On admission he requested all cares without surgical cervical spine stabilization, agreeable to a tracheostomy and PEG. Multiple care conferences held with his family who confirmed his wishes.     LTACH course:    9/24-9/26:  Speech evaluated patient and recommends NPO, cont tube feeds via PEG due to severe oropharyngeal dysphasia.  PT/OT, dietitian, [facility] nurse saw pt on 9/23.  Pulled out G tube during night of 9/23.  Pt now on soft wrist restraints.  IVF changed to D51/2 NS at low rate as pt has TAVR.  Morphine IV ordered prn pain.    Spoke w/ Dr. Brown- IR - recommends no hannah tube as this is a brand new PEG and placing it blind will likely lead it to not be in correct position.  He will not do a PEG placement on the weekend, scheduled for Monday.  Pt cannot be fed via NG tube as it is contraindicated with C1 and C2 fx.  For PEG tube replacement (herpain on hold, place ICD)on 9/26.  Will discontinue IVF once PEG tube is placed and restart po meds.  Given NaPhos 9/26 9/27-10/3:  9/27 patient had PEG tube replaced after patient himself removed it on 9/23.  Was a started on tube feeding.  P.O. medication were restarted.  IV fluids were discontinued.  He still needs soft restraint x2.  Is off ventilator and  stable.  9/28 Failed blue dye test, Continue NPO   9/30 patient had [place] 300 collar pads delivered  10/1 SLP found patient well below baseline for swallowing, cognition and communication.  Patient stays n.p.o. except ice chips     Assessment & Plan          Traumatic 5mm left frontal lobe SDH  Posteriorly displaced (1 cm) Type II odontoid fx  C1 anterior arch fracture   Epidural hemorrhage of 7mm  Acute traumatic neck pain  Acute L 6th rib fracture  S/p fall  Facial abrasions with ecchymoses  RLE wound  He was seen and evaluated by Neurosurgery.  Pt refused surgical intervention.   A repeat head CT was obtained with a stable subdural hematoma. He was placed in a cervical collar for the cervical fracture. No surgical intervention per patient preference.     -cont [place] J -keep in place at all times  -tylenol, oxy, and morphine prn pain  -Neurosurg ok w/ heparin q8h for dvt prophy  -wound care following    -f/u Neurosurgery in 6 weeks with an upright XR of the cervical spine   -CT cervical spine in 3 months.      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 Failed bedside and radiology swallow. He completed 3 doses of Decadron for laryngeal edema.      -S/P tracheostomy 09/21/2022, Trach suture removal on 09/26  -pulm following   -RT following   -weaned off vent by per pulm   - bronchial hygeine w/ albuterol/mucomyst  - 9/28 failed blue dye test     Hypertension   Complete heart block s/p PM placement  Nonrheumatic aortic valve  stenosis s/p TAVR 2019  -monitor  -hydralazine prn   -echo on 9/16:  EF: 60-65%, no LV dysfunction     Severe orophayngeal dysphagia   S/P PEG -s/p self removal due to agitation  Severe protein calorie malnutrition  Hiatal hernia     -9/27 patient had PEG tube replaced after patient himself removed it on 9/23.  Was a started on tube feeding.  P.O. medication were restarted.  IV fluids were discontinued.  He still needs soft restraint x2.  Is off ventilator and stable.  -hold TF : osmolite 1.5 and 60 ml/hr until PEG placed   dietitian following  -free water flush:  90 q4h  -protein modular q8h  -senna and dulcolax prn   -PPI for GI prophy  -cont seroquel prn  -cont soft wrist restraints prn      Hypophosphatemia   Hypomagnesia  Hypokalemia  S/p Lactic Acidosis  -Na phos IV 15mmol x 1 9/26, x 1 9/25, given 9mmol on 9/24   -monitor  -replete prn      Hypothyroidism   -Continue Levothyroxine     Anemia of chronic disease  -hg trending down past few days  -cont to monitor      BPH  -cont doxazosin     DM type 2  Hypoglycemia resolved  -sliding scale insulin  -last HbA1:  6.2  -hypoglycemic protocol     Deconditioning  -PT/OT    Diet: Adult Formula Drip Feeding: Continuous Osmolite 1.5; Gastrostomy; Goal Rate: 55; mL/hr; Medication - Feeding Tube Flush Frequency: At least 15-30 mL water before and after medication administration and with tube clogging; Amount to Send (Nutrition...  NPO for Medical/Clinical Reasons Except for: Ice Chips, NPO but receiving Tube Feeding    DVT Prophylaxis: Heparin SQ  [place] Catheter: Not present  Central Lines: PRESENT       Cardiac Monitoring: None  Code Status: Full Code      Disposition Plan     Expected Discharge Date: 10/07/2022,  6:00 PM  Discharge Delays: Complex Discharge  Placement - LTC    Discharge Comments: pt is complex with C1 and C2 fx.  [place] J collar on at all times, trach on vent, PEG tube was pulled out  at night on 9/23        The patient's care was discussed with the  Bedside Nurse and Patient.    Laurita Cardenas MD  Hospitalist Service  LTACH  Securely message with the [employer] Web Console (learn more here)  Text page via [employer] Paging/Directory     Clinically Significant Risk Factors Present on Admission                    __________________________________________________________________    Interval History   No new event reported.  Patient is stable.  Patient stays calm and confused however still needs soft restraint.  On tube feeding.  Denies any pain.    Data reviewed today: I reviewed all medications, new labs and imaging results over the last 24 hours. I personally reviewed no images or EKG's today.    Physical Exam   Vital Signs: Temp: 97.5  F (36.4  C) Temp src: Oral BP: (!) 145/68 Pulse: 71   Resp: 21 SpO2: 97 % O2 Device: Trach dome Oxygen Delivery: 40 LPM  Weight: 197 lbs 6.4 oz  General:  No acute distress, awake and alert  Eyes:  Sclera non icteric, normal conjuctiva  Neck :  [place] J in place, trach in place  Lungs:  Clear to auscultation bilaterally, air entry equal bilaterally, no rhonchi or rales  CVS:  S1 and S2, regular rate and rhythem, systolic murmur L sternal border  Abdomen:  Soft, nontender, PEG tube is replaced and in place and functional    Musculoskeletal: Improved swelling of R forearm and hand  Neuro:  awake and Alert , follows commands intermittently   Skin: Improved right supraorbital abrasions and ecchymoses, RLE wound        Data   Recent Labs   Lab 10/01/22  1742 10/01/22  1158 10/01/22  0555 09/30/22  1135 09/30/22  0638 09/28/22  1207 09/28/22  0625 09/27/22  1223 09/27/22  0648 09/27/22  0611 09/26/22  1150 09/26/22  0624 09/25/22  1153 09/25/22  0634   WBC  --   --   --   --   --   --   --   --  7.3  --   --  10.9  --  11.2*   HGB  --   --   --   --   --   --   --   --  9.9* 11.2*  --  11.6*  --  11.1*   MCV  --   --   --   --   --   --   --   --  92  --   --  94  --  94   PLT  --   --   --   --   --   --   --   --  267  --   --  271  --  208    NA  --   --   --   --  139  --  138  --  134* 135*  --  131*  --  136   POTASSIUM  --   --   --   --  4.2  --  3.6  --  3.9 3.8  --  3.9  --  4.4   CHLORIDE  --   --   --   --  105  --  104  --  102  --   --  97*  --  102   CO2  --   --   --   --  27  --  25  --  23  --   --  25  --  25   BUN  --   --   --   --  23.3*  --  22.0  --  11.1  --   --  10.2  --  12.3   CR  --   --   --   --  0.64*  --  0.73  --  0.69  --   --  0.63*  --  0.64*   ANIONGAP  --   --   --   --  7  --  9  --  9  --   --  9  --  9   TITA  --   --   --   --  8.1*  --  8.2  --  8.3  --   --  8.6  --  8.4   * 125* 118*   < > 139*   < > 146*   < > 135* 134*   < > 98   < > 95   ALBUMIN  --   --   --   --   --   --   --   --  2.7*  --   --  2.8*  --  2.6*   PROTTOTAL  --   --   --   --   --   --   --   --  5.8*  --   --  6.5  --  6.2*   BILITOTAL  --   --   --   --   --   --   --   --  0.6  --   --  1.0  --  0.8   ALKPHOS  --   --   --   --   --   --   --   --  103  --   --  124  --  108   ALT  --   --   --   --   --   --   --   --  25  --   --  34  --  41   AST  --   --   --   --   --   --   --   --  18  --   --  21  --  26    < > = values in this interval not displayed.

## 2022-10-02 NOTE — PLAN OF CARE
Problem: Restraint, Nonbehavioral (Nonviolent)  Goal: Absence of Harm or Injury  Outcome: Met  Intervention: Protect Dignity, Rights, and Personal Wellbeing  Recent Flowsheet Documentation  Taken 10/1/2022 1621 by Margarita Tijerina RN  Trust Relationship/Rapport:   care explained   questions encouraged   reassurance provided   thoughts/feelings acknowledged  Taken 10/1/2022 0835 by Margarita Tijerina RN  Trust Relationship/Rapport:   care explained   questions encouraged   reassurance provided   thoughts/feelings acknowledged   Goal Outcome Evaluation:      Soft limb x 2 discontinued. No attempts at pulling lines or tubes. Will continue to monitor.  Margarita Tijerina RN

## 2022-10-03 ENCOUNTER — ANCILLARY PROCEDURE (OUTPATIENT)
Dept: GENERAL RADIOLOGY | Facility: CLINIC | Age: 87
DRG: 208 | End: 2022-10-03
Attending: NURSE PRACTITIONER
Payer: MEDICARE

## 2022-10-03 ENCOUNTER — APPOINTMENT (OUTPATIENT)
Dept: SPEECH THERAPY | Facility: CLINIC | Age: 87
DRG: 208 | End: 2022-10-03
Attending: INTERNAL MEDICINE
Payer: MEDICARE

## 2022-10-03 ENCOUNTER — TELEPHONE (OUTPATIENT)
Dept: UROLOGY | Facility: CLINIC | Age: 87
End: 2022-10-03

## 2022-10-03 ENCOUNTER — APPOINTMENT (OUTPATIENT)
Dept: OCCUPATIONAL THERAPY | Facility: CLINIC | Age: 87
DRG: 208 | End: 2022-10-03
Attending: INTERNAL MEDICINE
Payer: MEDICARE

## 2022-10-03 LAB
ANION GAP SERPL CALCULATED.3IONS-SCNC: 7 MMOL/L (ref 7–15)
BUN SERPL-MCNC: 21.3 MG/DL (ref 8–23)
CALCIUM SERPL-MCNC: 8.6 MG/DL (ref 8.2–9.6)
CHLORIDE SERPL-SCNC: 99 MMOL/L (ref 98–107)
CREAT SERPL-MCNC: 0.64 MG/DL (ref 0.67–1.17)
DEPRECATED HCO3 PLAS-SCNC: 28 MMOL/L (ref 22–29)
GFR SERPL CREATININE-BSD FRML MDRD: 89 ML/MIN/1.73M2
GLUCOSE BLDC GLUCOMTR-MCNC: 112 MG/DL (ref 70–99)
GLUCOSE BLDC GLUCOMTR-MCNC: 114 MG/DL (ref 70–99)
GLUCOSE BLDC GLUCOMTR-MCNC: 125 MG/DL (ref 70–99)
GLUCOSE SERPL-MCNC: 114 MG/DL (ref 70–99)
MAGNESIUM SERPL-MCNC: 2.1 MG/DL (ref 1.7–2.3)
PHOSPHATE SERPL-MCNC: 3.3 MG/DL (ref 2.5–4.5)
POTASSIUM SERPL-SCNC: 4.6 MMOL/L (ref 3.4–5.3)
SODIUM SERPL-SCNC: 134 MMOL/L (ref 136–145)

## 2022-10-03 PROCEDURE — 71045 X-RAY EXAM CHEST 1 VIEW: CPT

## 2022-10-03 PROCEDURE — 120N000017 HC R&B RESPIRATORY CARE

## 2022-10-03 PROCEDURE — 99232 SBSQ HOSP IP/OBS MODERATE 35: CPT | Performed by: NURSE PRACTITIONER

## 2022-10-03 PROCEDURE — 99233 SBSQ HOSP IP/OBS HIGH 50: CPT | Performed by: HOSPITALIST

## 2022-10-03 PROCEDURE — 250N000011 HC RX IP 250 OP 636: Performed by: NURSE PRACTITIONER

## 2022-10-03 PROCEDURE — 250N000009 HC RX 250: Performed by: NURSE PRACTITIONER

## 2022-10-03 PROCEDURE — 94640 AIRWAY INHALATION TREATMENT: CPT

## 2022-10-03 PROCEDURE — 999N000157 HC STATISTIC RCP TIME EA 10 MIN

## 2022-10-03 PROCEDURE — 94640 AIRWAY INHALATION TREATMENT: CPT | Mod: 76

## 2022-10-03 PROCEDURE — 250N000011 HC RX IP 250 OP 636: Performed by: HOSPITALIST

## 2022-10-03 PROCEDURE — 83735 ASSAY OF MAGNESIUM: CPT | Performed by: HOSPITALIST

## 2022-10-03 PROCEDURE — 250N000013 HC RX MED GY IP 250 OP 250 PS 637: Performed by: HOSPITALIST

## 2022-10-03 PROCEDURE — 999N000125 HC STATISTIC PATIENT MED CONFERENCE < 30 MIN: Performed by: OCCUPATIONAL THERAPIST

## 2022-10-03 PROCEDURE — 92526 ORAL FUNCTION THERAPY: CPT | Mod: GN

## 2022-10-03 PROCEDURE — 999N000009 HC STATISTIC AIRWAY CARE

## 2022-10-03 PROCEDURE — 36415 COLL VENOUS BLD VENIPUNCTURE: CPT | Performed by: HOSPITALIST

## 2022-10-03 PROCEDURE — 999N000123 HC STATISTIC OXYGEN O2DAILY TECH TIME

## 2022-10-03 PROCEDURE — 0B21XFZ CHANGE TRACHEOSTOMY DEVICE IN TRACHEA, EXTERNAL APPROACH: ICD-10-PCS | Performed by: NURSE PRACTITIONER

## 2022-10-03 PROCEDURE — 80048 BASIC METABOLIC PNL TOTAL CA: CPT | Performed by: HOSPITALIST

## 2022-10-03 PROCEDURE — 999N000125 HC STATISTIC PATIENT MED CONFERENCE < 30 MIN

## 2022-10-03 PROCEDURE — 97530 THERAPEUTIC ACTIVITIES: CPT | Mod: GO | Performed by: OCCUPATIONAL THERAPIST

## 2022-10-03 PROCEDURE — 84100 ASSAY OF PHOSPHORUS: CPT | Performed by: HOSPITALIST

## 2022-10-03 PROCEDURE — 97129 THER IVNTJ 1ST 15 MIN: CPT | Mod: GO | Performed by: OCCUPATIONAL THERAPIST

## 2022-10-03 PROCEDURE — 92507 TX SP LANG VOICE COMM INDIV: CPT | Mod: GN

## 2022-10-03 PROCEDURE — 99366 TEAM CONF W/PAT BY HC PROF: CPT | Performed by: PHYSICAL THERAPIST

## 2022-10-03 RX ORDER — ALBUTEROL SULFATE 0.83 MG/ML
2.5 SOLUTION RESPIRATORY (INHALATION) 3 TIMES DAILY
Status: DISCONTINUED | OUTPATIENT
Start: 2022-10-03 | End: 2022-10-07

## 2022-10-03 RX ORDER — ACETYLCYSTEINE 200 MG/ML
2 SOLUTION ORAL; RESPIRATORY (INHALATION) 3 TIMES DAILY
Status: DISCONTINUED | OUTPATIENT
Start: 2022-10-03 | End: 2022-10-07

## 2022-10-03 RX ORDER — BUMETANIDE 1 MG/1
1 TABLET ORAL DAILY
Status: DISCONTINUED | OUTPATIENT
Start: 2022-10-04 | End: 2022-10-10

## 2022-10-03 RX ADMIN — ACETYLCYSTEINE 2 ML: 200 SOLUTION ORAL; RESPIRATORY (INHALATION) at 07:56

## 2022-10-03 RX ADMIN — Medication 1 PACKET: at 08:41

## 2022-10-03 RX ADMIN — SENNOSIDES 5 ML: 8.8 LIQUID ORAL at 08:41

## 2022-10-03 RX ADMIN — Medication 25 MCG: at 08:41

## 2022-10-03 RX ADMIN — ACETAMINOPHEN 650 MG: 650 SOLUTION ORAL at 10:25

## 2022-10-03 RX ADMIN — ACETYLCYSTEINE 2 ML: 200 SOLUTION ORAL; RESPIRATORY (INHALATION) at 20:31

## 2022-10-03 RX ADMIN — Medication 1 PACKET: at 13:18

## 2022-10-03 RX ADMIN — CALCIUM CARBONATE 1250 MG: 1250 SUSPENSION ORAL at 18:03

## 2022-10-03 RX ADMIN — CALCIUM CARBONATE 1250 MG: 1250 SUSPENSION ORAL at 12:41

## 2022-10-03 RX ADMIN — ALBUTEROL SULFATE 2.5 MG: 2.5 SOLUTION RESPIRATORY (INHALATION) at 20:31

## 2022-10-03 RX ADMIN — ALBUTEROL SULFATE 2.5 MG: 2.5 SOLUTION RESPIRATORY (INHALATION) at 07:56

## 2022-10-03 RX ADMIN — Medication 40 MG: at 06:30

## 2022-10-03 RX ADMIN — BUMETANIDE 1 MG: 0.25 INJECTION INTRAMUSCULAR; INTRAVENOUS at 08:41

## 2022-10-03 RX ADMIN — LEVOTHYROXINE SODIUM 112 MCG: 0.11 TABLET ORAL at 06:30

## 2022-10-03 RX ADMIN — Medication 1 PACKET: at 20:40

## 2022-10-03 RX ADMIN — ENOXAPARIN SODIUM 40 MG: 100 INJECTION SUBCUTANEOUS at 18:03

## 2022-10-03 RX ADMIN — OXYCODONE HYDROCHLORIDE 5 MG: 5 SOLUTION ORAL at 16:26

## 2022-10-03 RX ADMIN — Medication 2 MG: at 08:41

## 2022-10-03 RX ADMIN — SENNOSIDES 5 ML: 8.8 LIQUID ORAL at 20:40

## 2022-10-03 ASSESSMENT — ACTIVITIES OF DAILY LIVING (ADL)
ADLS_ACUITY_SCORE: 51
ADLS_ACUITY_SCORE: 53
ADLS_ACUITY_SCORE: 53
ADLS_ACUITY_SCORE: 55
ADLS_ACUITY_SCORE: 53
ADLS_ACUITY_SCORE: 55
ADLS_ACUITY_SCORE: 51
ADLS_ACUITY_SCORE: 53
ADLS_ACUITY_SCORE: 55
ADLS_ACUITY_SCORE: 51

## 2022-10-03 NOTE — PROGRESS NOTES
Pulmonary Progress Note    Admit Date: 9/22/2022  CODE: Full Code    Assessment/Plan:   89 yoM admitted 9/15/2022 following an unwitnessed fall found to have a left frontal SDH and C1/C2 cervical spine fracture.  Intubated for airway protection in the ED given high c-spine injury and difficulty managing his secretions. On admission he requested all cares without surgical cervical spine stabilization, agreeable to a tracheostomy and PEG. Discharged to LTACH 9/22/22.     PMHx: Lambert-Eaton syndrome, TAVR, complete heart block with pacemaker dependency, DM2, arthritis, BPH, HTN, osteoporosis     Discussion of pertinent problems:  1. Acute hypoxic/hypercapnic respiratory failure s/p trach in setting of traumatic cervical injury after a fall.  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  Liberated from vent on 9/26.  Small b/l pleural effusions with pulm edema on CxR 9/26 started on IV bumex.  FiO2 needs trending down.  Repeat CxR today with improved RLL opacity and increased LLL opacity   - Albuterol/mucomsyt nebs BID for bronchial hygiene   2. Tracheostomy: 6 Shiley placed 9/21/2022  - First trach change not until 10/1 at the earliest.  Likely plan for Monday   3. Dysphagia s/p PEG: Failed BDT 9/23 with immediate aspiration.  Failed BDT 9/28 with immediate aspiration but improved from 9/23.  SLP blanche on 10/1 notes to keep NPO except ice chips.  Now tolerating PMV well   4. Laryngeal edema 2/2 traumatic cervical fracture/injury s/p 3 doses of decadron  5. Acute neck and rib cage pain(known rib fractures), chronic hip/shoulder pain  6. Traumatic SDH, stable  7. C1/C2 CSI: [place] J collar at all times, repeat imaging per NSG    Changes today:  - Continue PO bumex 1mg daily: adjust as clinically indicated.  Consider decreasing water flushes   - Increase nebs to TID given CxR results.  Avoid metaneb for now given CSI.    - Likely plan for trach downsize on Monday to 7 Bivona  - Repeat BDT - If immediate aspiration, put cuff up.  If no immediate or  delayed, then PMV as tolerated.If no immediate but delayed, PMV during day, cuff up at night.    - Downsize trach to 7 Bivona     Sawyer Capone CNP  Pulmonary Medicine  [facility]  Pager [phone]    Subjective/Interim Events:   - Denies dyspnea, n/v  - on prolonged PMV trials well over the weekend, looks like cuff was left down despite order saying cuff up. Repeat CxR shows increased LLL opacity likely combination of atelectasis and pleural effusion.  Remains off vent, FiO2 needs down to 30%.  Afebrile  - improved secretion burden    Tracheal secretions:  Overnight - x1, small, thick  Yesterday - x2, small/mod, thick    Cough strength: strong, productive     Current phase of ventilator weaning pathway:  Phase 2    Ventilator weaning results  - continuous TD since 9/26  - 10/2: PMV for ~12hr    Clinical status discussed today with respiratory therapist, hospitalist     Medications:       - MEDICATION INSTRUCTIONS -         acetylcysteine  2 mL Nebulization 2 times daily     albuterol  2.5 mg Nebulization 2 times daily     calcium carbonate  1,250 mg Oral or Feeding Tube BID w/meals     cholecalciferol  25 mcg Oral or Feeding Tube Daily     doxazosin  2 mg Oral or Feeding Tube Daily     enoxaparin ANTICOAGULANT  40 mg Subcutaneous Q24H     insulin aspart  1-6 Units Subcutaneous Q6H     levothyroxine  112 mcg Oral or Feeding Tube QAM AC     pantoprazole  40 mg Oral or Feeding Tube QAM AC     protein modular  1 packet Per Feeding Tube TID     sennosides  5 mL Oral or Feeding Tube BID     sodium chloride (PF)  10 mL Intracatheter Q8H         Exam/Data:   Vitals  /55 (BP Location: Left arm, Patient Position: Right side, Cuff Size: Adult Large)   Pulse 62   Temp 97.5  F (36.4  C) (Oral)   Resp 20   Wt 88.8 kg (195 lb 11.2 oz)   SpO2 93%   BMI 29.76 kg/m       I/O last 3 completed shifts:  In: 2182 [I.V.:40; NG/GT:2142]  Out: 2600 [Urine:2600]  Weight change: 1.406 kg (3 lb 1.6 oz)    Vent Mode: Trach  collar  FiO2 (%): 30 %  Resp: 20      EXAM:  Gen:  no distress on trach dome with PMV on with 7 Bivona trach  HEENT: NT, trach midline/intact, c-collar on, good voice, no stridor   CV: RRR, no m/g/r  Resp: CTAB; non-labored   Abd: soft, nontender, BS+  Skin: no rashes or lesions  Ext: no edema  Neuro: alert to voice, follows commands    ROS:  A 10-system review was obtained and is negative with the exception of the symptoms noted above.    Labs:  Complete Blood Count   Recent Labs   Lab 09/27/22  0648 09/27/22  0611   WBC 7.3  --    HGB 9.9* 11.2*     --      Basic Metabolic Panel  Recent Labs   Lab 10/03/22  1219 10/03/22  0628 10/03/22  0614 10/02/22  2335 09/30/22  1135 09/30/22  0638 09/28/22  1207 09/28/22  0625 09/27/22  1223 09/27/22  0648   NA  --   --  134*  --   --  139  --  138  --  134*   POTASSIUM  --   --  4.6  --   --  4.2  --  3.6  --  3.9   CHLORIDE  --   --  99  --   --  105  --  104  --  102   CO2  --   --  28  --   --  27  --  25  --  23   BUN  --   --  21.3  --   --  23.3*  --  22.0  --  11.1   CR  --   --  0.64*  --   --  0.64*  --  0.73  --  0.69   * 112* 114* 118*   < > 139*   < > 146*   < > 135*    < > = values in this interval not displayed.     Liver Function Tests  Recent Labs   Lab 09/27/22  0648   AST 18   ALT 25   ALKPHOS 103   BILITOTAL 0.6   ALBUMIN 2.7*     Venous Blood Gas  Recent Labs   Lab 09/27/22  0611   PHV 7.47*   PCO2V 36   PO2V 41   HCO3V 27   JOSIAH 2.8*       RADIOLOGY:   XR CHEST PORT 1 VIEW  LOCATION: [facility]  DATE/TIME: 10/3/2022 8:40 AM     INDICATION: hypoxic respiratory failure  COMPARISON: Chest x-ray 09/26/2022                                                                      IMPRESSION: Stable satisfactory tracheostomy tube position. TAVR. Stable dual-lead left-sided cardiac conduction device. Right shoulder arthroplasty. Bibasilar pulmonary opacities which may reflect atelectasis and/or infiltrates, increased on the left    and stable on the right. No definite pleural effusion. Stable cardiomegaly and central vascular congestion.

## 2022-10-03 NOTE — PLAN OF CARE
Problem: Plan of Care - These are the overarching goals to be used throughout the patient stay.    Goal: Optimal Comfort and Wellbeing  Intervention: Provide Person-Centered Care  Recent Flowsheet Documentation  Taken 10/3/2022 0853 by Allie Lebron RN  Trust Relationship/Rapport:    care explained    choices provided    questions encouraged    thoughts/feelings acknowledged     Problem: Plan of Care - These are the overarching goals to be used throughout the patient stay.    Goal: Optimal Comfort and Wellbeing  Outcome: Ongoing, Progressing  Intervention: Provide Person-Centered Care  Recent Flowsheet Documentation  Taken 10/3/2022 0853 by Allie Lebron RN  Trust Relationship/Rapport:    care explained    choices provided    questions encouraged    thoughts/feelings acknowledged   Goal Outcome Evaluation:  Patient alert, complains of neck pains, rated pain level 9/10, gave him tylenol 650mg with good effects (pt slept). Appears comfortable with no further complain of pain/discomfort. Tolerated sitting up  in the wheelchair for two hours.

## 2022-10-03 NOTE — PLAN OF CARE
Problem: Device-Related Complication Risk (Mechanical Ventilation, Invasive)  Goal: Optimal Device Function  Outcome: Ongoing, Progressing    Problem: Inability to Wean (Mechanical Ventilation, Invasive)  Goal: Mechanical Ventilation Liberation  Outcome: Ongoing, Progressing     Problem: Skin and Tissue Injury (Mechanical Ventilation, Invasive)  Goal: Absence of Device-Related Skin and Tissue Injury  Outcome: Ongoing, Progressing     Problem: Ventilator-Induced Lung Injury (Mechanical Ventilation, Invasive)  Goal: Absence of Ventilator-Induced Lung Injury  Outcome: Ongoing, Progressing   RT PROGRESS NOTE     DATA:     CURRENT SETTINGS:             TRACH TYPE / SIZE: #7 Bivona, placed on 10/3/22              MODE:TM 30L/ PMV              FIO2:   40%      ACTION:             THERAPIES: Alb/ Mucomyst neb BID               SUCTION: 3                         FREQUENCY:   3                        AMOUNT:  small                         CONSISTENCY:   thick                        COLOR:   white to yellow, NO BLUE             SPONTANEOUS COUGH EFFORT/STRENGTH OF EFFORT (not elicited by suctioning):                               WEANING PHASE:  2                         WEAN MODE:30% TM 30L/ PMV                        WEAN TIME:  cont as tolerated                         END WEAN REASON:        RESPONSE:             BS: coarse to slightly coarse - clear decreased after Sx               VITAL SIGNS:                EMOTIONAL NEEDS / CONCERNS:                  RISK FOR SELF DECANNULATION:                          RISK DUE TO:                          INTERVENTION/S IN PLACE IS/ARE:         NOTE / PLAN:  new order for neb: Albuterol/ Mucomyst neb TID. BDT done today at 1530. BDT done after trach change to #7 Bivona. No immediate or delay aspiration  aspiration - PMV as tolerated. If delayed aspiration - PMV during day, cuff up at night. If pt need to go on vent, settings are  AC 12, 450, +5, 30.

## 2022-10-03 NOTE — PROGRESS NOTES
Care Progression meeting 10/03/22 @ 11:00.  Staff: Elizabet-PT, Vita-OT, Serene-RD, Sandra-ST.    Family: Erkate (son in law).  Erv's daughter on speaker phone    Medical: Trach-HFTM.  J-collar.  PEG tube.  Nebs.      PT: Getting patient out of bed.  Having patient use commode. Patient with decreased flexibility.  Sit to stand needs 50% assist. Will progress to ambulation.     OT: Showing improvement.  Patient clearly cognitively, showing increased participation.  Working on cognitive assessments.  Increase overall UB strength and endurance.     ST: Working omn swallowing-ADT.  Will have VSS this week.  Working on cognition/thinking, alertness/particicpation.     RD:  Continuous tube feed.  Monitoring weight.  Monitoring bowel movements.  Weight stable.  No issues.     SW:  Writer discussed likely need for on-going rehab/TCU at discharge.  Family in support of TCU placement/on-going rehab.  Family did express concern patient may again declined recommendation of TCU.        Bob Reynolds, [facility]/[place]  [phone]

## 2022-10-03 NOTE — TELEPHONE ENCOUNTER
[facility] Call Center    Phone Message    May a detailed message be left on voicemail: no    Reason for Call: Taylor with [facility] term acute [facility] called stating Pt has been admitted and They are wondering if Pt should keep Cysto appt 10/7 or if this should be preformed after pt is released. Please call Taylor to discuss.     Action Taken: Message routed to:  Other: Uro    Travel Screening: Not Applicable

## 2022-10-03 NOTE — PLAN OF CARE
Problem: Communication Impairment (Artificial Airway)  Goal: Effective Communication  Outcome: Ongoing, Progressing     Problem: Device-Related Complication Risk (Artificial Airway)  Goal: Optimal Device Function  Intervention: Optimize Device Care and Function  Recent Flowsheet Documentation  Taken 10/1/2022 1946 by Darryl Moeller, RT  Airway Safety Measures: all equipment/monitors on and audible   Goal Outcome Evaluation:      RT PROGRESS NOTE     DATA:     CURRENT SETTINGS: Vent Mode: Trach collar  FiO2 (%): 21 %  Resp: 20                 TRACH TYPE / SIZE:  #6 Shiley T-guard placed in 9/21             MODE:   TMM/PMV             FIO2:   21%     ACTION:             THERAPIES:   Albuterol/Mucomyst X 1             SUCTION:                           FREQUENCY:   X 1                        AMOUNT:   small                         CONSISTENCY:   thick                        COLOR:   white yellow             SPONTANEOUS COUGH EFFORT/STRENGTH OF EFFORT (not elicited by suctioning): fair                              WEANING PHASE:   2                        WEAN MODE:    HFTM                        WEAN TIME:   24/7 as tols.                         END WEAN REASON:   ongoing     RESPONSE:             BS:   rhonchi             VITAL SIGNS:   Blood pressure 131/60, pulse 63, temperature 99  F (37.2  C), temperature source Oral, resp. rate 20, weight 87.4 kg (192 lb 9.6 oz), SpO2 98 %.               EMOTIONAL NEEDS / CONCERNS:                  RISK FOR SELF DECANNULATION:                          RISK DUE TO:                          INTERVENTION/S IN PLACE IS/ARE:         NOTE / PLAN:   continue current orders and monitoring.

## 2022-10-03 NOTE — PLAN OF CARE
Problem: Malnutrition  Goal: Improved Nutritional Intake  Outcome: Ongoing, Progressing     Problem: Pain Acute  Goal: Acceptable Pain Control and Functional Ability  Outcome: Ongoing, Progressing  Intervention: Develop Pain Management Plan  Recent Flowsheet Documentation  Taken 10/2/2022 2334 by Nichole Saini RN  Pain Management Interventions: medication (see MAR)  Intervention: Prevent or Manage Pain  Recent Flowsheet Documentation  Taken 10/3/2022 0009 by Nichole Saini RN  Medication Review/Management: medications reviewed   Goal Outcome Evaluation:    Patient slept through shift. Vital signs stable. Complained of 7/10 pain in back, PRN oxycodone given x1 with effect. [place], pt used pocket talker effectively. Primofit output 300mL this shift. Pt notes frequent discomfort with any degree of repositioning.

## 2022-10-03 NOTE — PROGRESS NOTES
[facility] - Hospitalist Service    Date of Admission:  9/22/2022    Assessment & Plan          Brief History:    Alexandru Still is a 89 year old man w/ PMH of Lambert-Eaton syndrome, hypothyroidism,  complete heart block s/p pacemaker, HTN, BPH,  PB, and DM type 2 who was admitted to the SICU on 9/15/2022 following an unwitnessed fall and striking his head. He was initially seen at an outside hospital and underwent a comprehensive trauma work up found to have a left frontal SDH (5mm) + C1 fx + Type II C2 odontoid fracture with a 1cm posterior displacement. He was  intubated for airway protection in the ED given high c-spine injury and difficulty managing his secretions. On admission he requested all cares without surgical cervical spine stabilization, agreeable to a tracheostomy and PEG. Multiple care conferences held with his family who confirmed his wishes.     LTACH course:    9/24-9/26:  Speech evaluated patient and recommends NPO, cont tube feeds via PEG due to severe oropharyngeal dysphasia.  PT/OT, dietitian, [facility] nurse saw pt on 9/23.  Pulled out G tube during night of 9/23.  Pt now on soft wrist restraints.  IVF changed to D51/2 NS at low rate as pt has TAVR.  Morphine IV ordered prn pain.    Spoke w/ Dr. Brown- IR - recommends no hannah tube as this is a brand new PEG and placing it blind will likely lead it to not be in correct position.  He will not do a PEG placement on the weekend, scheduled for Monday.  Pt cannot be fed via NG tube as it is contraindicated with C1 and C2 fx.  For PEG tube replacement (herpain on hold, place ICD)on 9/26.  Will discontinue IVF once PEG tube is placed and restart po meds.  Given NaPhos 9/26 9/27-10/3:  9/27 patient had PEG tube replaced after patient himself removed it on 9/23.  Was a started on tube feeding.  P.O. medication were restarted.  IV fluids were discontinued.  He still needs soft restraint x2.  Is off ventilator and  stable.  9/28 Failed blue dye test, Continue NPO   9/30 patient had [place] J 300 collar pads delivered  10/1 SLP found patient well below baseline for swallowing, cognition and communication.  Patient stays n.p.o. except ice chips     Assessment & Plan          Traumatic 5mm left frontal lobe SDH  Posteriorly displaced (1 cm) Type II odontoid fx  C1 anterior arch fracture   Epidural hemorrhage of 7mm  Acute traumatic neck pain  Acute L 6th rib fracture  S/p fall  Facial abrasions with ecchymoses  RLE wound  He was seen and evaluated by Neurosurgery.  Pt refused surgical intervention.   A repeat head CT was obtained with a stable subdural hematoma. He was placed in a cervical collar for the cervical fracture. No surgical intervention per patient preference.     -cont [place] J -keep in place at all times  -tylenol, oxy, and morphine prn pain  -Neurosurg ok w/ heparin q8h for dvt prophy  -wound care following    -f/u Neurosurgery in 6 weeks with an upright XR of the cervical spine   -CT cervical spine in 3 months.      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 Failed bedside and radiology swallow. He completed 3 doses of Decadron for laryngeal edema.      -S/P tracheostomy 09/21/2022, Trach suture removal on 09/26  -pulm following   -RT following   -weaned off vent by per pulm   - bronchial hygeine w/ albuterol/mucomyst  - 9/28 failed blue dye test     Hypertension   Complete heart block s/p PM placement  Nonrheumatic aortic valve  stenosis s/p TAVR 2019  -monitor  -hydralazine prn   -echo on 9/16:  EF: 60-65%, no LV dysfunction     Severe orophayngeal dysphagia   S/P PEG -s/p self removal due to agitation  Severe protein calorie malnutrition  Hiatal hernia     -9/27 patient had PEG tube replaced after patient himself removed it on 9/23.  Was a started on tube feeding.  P.O. medication were restarted.  IV fluids were discontinued.  He still needs soft restraint x2.  Is off ventilator and stable.  -hold TF : osmolite 1.5 and 60 ml/hr until PEG placed   dietitian following  -free water flush:  90 q4h  -protein modular q8h  -senna and dulcolax prn   -PPI for GI prophy  -cont seroquel prn  -cont soft wrist restraints prn      Hypophosphatemia   Hypomagnesia  Hypokalemia  S/p Lactic Acidosis  -Na phos IV 15mmol x 1 9/26, x 1 9/25, given 9mmol on 9/24   -monitor  -replete prn      Hypothyroidism   -Continue Levothyroxine     Anemia of chronic disease  -hg trending down past few days  -cont to monitor      BPH  -cont doxazosin     DM type 2  Hypoglycemia resolved  -sliding scale insulin  -last HbA1:  6.2  -hypoglycemic protocol     Deconditioning  -PT/OT    Diet: Adult Formula Drip Feeding: Continuous Osmolite 1.5; Gastrostomy; Goal Rate: 55; mL/hr; Medication - Feeding Tube Flush Frequency: At least 15-30 mL water before and after medication administration and with tube clogging; Amount to Send (Nutrition...  NPO for Medical/Clinical Reasons Except for: Ice Chips, NPO but receiving Tube Feeding    DVT Prophylaxis: Heparin SQ  [place] Catheter: Not present  Central Lines: PRESENT       Cardiac Monitoring: None  Code Status: Full Code      Disposition Plan     Expected Discharge Date: 10/07/2022,  6:00 PM  Discharge Delays: Complex Discharge  Placement - LTC    Discharge Comments: pt is complex with C1 and C2 fx.  [place] J collar on at all times, trach on vent, PEG tube was pulled out  at night on 9/23        The patient's care was discussed with the  Bedside Nurse and Patient.    Laurita Cardenas MD  Hospitalist Service  LTACH  Securely message with the [employer] Web Console (learn more here)  Text page via [employer] Paging/Directory     Clinically Significant Risk Factors Present on Admission                    __________________________________________________________________    Interval History   No new event reported.  Patient is stable.  Patient stays calm and confused however still needs soft restraint.  On tube feeding.  Denies any pain.    Data reviewed today: I reviewed all medications, new labs and imaging results over the last 24 hours. I personally reviewed no images or EKG's today.    Physical Exam   Vital Signs: Temp: 98.6  F (37  C) Temp src: Oral BP: 126/59 Pulse: 68   Resp: 22 SpO2: 93 % O2 Device: Trach dome Oxygen Delivery: 30 LPM  Weight: 192 lbs 9.6 oz  General:  No acute distress, awake and alert  Eyes:  Sclera non icteric, normal conjuctiva  Neck :  [place] J in place, trach in place  Lungs:  Clear to auscultation bilaterally, air entry equal bilaterally, no rhonchi or rales  CVS:  S1 and S2, regular rate and rhythem, systolic murmur L sternal border  Abdomen:  Soft, nontender, PEG tube is replaced and in place and functional    Musculoskeletal: Improved swelling of R forearm and hand  Neuro:  awake and Alert , follows commands intermittently   Skin: Improved right supraorbital abrasions and ecchymoses, RLE wound        Data   Recent Labs   Lab 10/02/22  1717 10/02/22  1243 10/02/22  1219 09/30/22  1135 09/30/22  0638 09/28/22  1207 09/28/22  0625 09/27/22  1223 09/27/22  0648 09/27/22  0611 09/26/22  1150 09/26/22  0624   WBC  --   --   --   --   --   --   --   --  7.3  --   --  10.9   HGB  --   --   --   --   --   --   --   --  9.9* 11.2*  --  11.6*   MCV  --   --   --   --   --   --   --   --  92  --   --  94   PLT  --   --   --   --   --   --   --   --  267  --   --  271   NA  --   --   --   --  139  --  138  --  134* 135*  --  131*   POTASSIUM   --   --   --   --  4.2  --  3.6  --  3.9 3.8  --  3.9   CHLORIDE  --   --   --   --  105  --  104  --  102  --   --  97*   CO2  --   --   --   --  27  --  25  --  23  --   --  25   BUN  --   --   --   --  23.3*  --  22.0  --  11.1  --   --  10.2   CR  --   --   --   --  0.64*  --  0.73  --  0.69  --   --  0.63*   ANIONGAP  --   --   --   --  7  --  9  --  9  --   --  9   TITA  --   --   --   --  8.1*  --  8.2  --  8.3  --   --  8.6   * 150* 147*   < > 139*   < > 146*   < > 135* 134*   < > 98   ALBUMIN  --   --   --   --   --   --   --   --  2.7*  --   --  2.8*   PROTTOTAL  --   --   --   --   --   --   --   --  5.8*  --   --  6.5   BILITOTAL  --   --   --   --   --   --   --   --  0.6  --   --  1.0   ALKPHOS  --   --   --   --   --   --   --   --  103  --   --  124   ALT  --   --   --   --   --   --   --   --  25  --   --  34   AST  --   --   --   --   --   --   --   --  18  --   --  21    < > = values in this interval not displayed.

## 2022-10-04 ENCOUNTER — APPOINTMENT (OUTPATIENT)
Dept: PHYSICAL THERAPY | Facility: CLINIC | Age: 87
DRG: 208 | End: 2022-10-04
Attending: INTERNAL MEDICINE
Payer: MEDICARE

## 2022-10-04 ENCOUNTER — APPOINTMENT (OUTPATIENT)
Dept: OCCUPATIONAL THERAPY | Facility: CLINIC | Age: 87
DRG: 208 | End: 2022-10-04
Attending: INTERNAL MEDICINE
Payer: MEDICARE

## 2022-10-04 LAB
GLUCOSE BLDC GLUCOMTR-MCNC: 108 MG/DL (ref 70–99)
GLUCOSE BLDC GLUCOMTR-MCNC: 115 MG/DL (ref 70–99)
GLUCOSE BLDC GLUCOMTR-MCNC: 120 MG/DL (ref 70–99)
GLUCOSE BLDC GLUCOMTR-MCNC: 124 MG/DL (ref 70–99)
GLUCOSE BLDC GLUCOMTR-MCNC: 134 MG/DL (ref 70–99)

## 2022-10-04 PROCEDURE — 97130 THER IVNTJ EA ADDL 15 MIN: CPT | Mod: GO | Performed by: OCCUPATIONAL THERAPIST

## 2022-10-04 PROCEDURE — 250N000009 HC RX 250: Performed by: NURSE PRACTITIONER

## 2022-10-04 PROCEDURE — 250N000011 HC RX IP 250 OP 636: Performed by: HOSPITALIST

## 2022-10-04 PROCEDURE — 250N000013 HC RX MED GY IP 250 OP 250 PS 637: Performed by: HOSPITALIST

## 2022-10-04 PROCEDURE — 999N000009 HC STATISTIC AIRWAY CARE

## 2022-10-04 PROCEDURE — 94640 AIRWAY INHALATION TREATMENT: CPT | Mod: 76

## 2022-10-04 PROCEDURE — 999N000253 HC STATISTIC WEANING TRIALS

## 2022-10-04 PROCEDURE — 94640 AIRWAY INHALATION TREATMENT: CPT

## 2022-10-04 PROCEDURE — 97129 THER IVNTJ 1ST 15 MIN: CPT | Mod: GO | Performed by: OCCUPATIONAL THERAPIST

## 2022-10-04 PROCEDURE — G0463 HOSPITAL OUTPT CLINIC VISIT: HCPCS

## 2022-10-04 PROCEDURE — 97110 THERAPEUTIC EXERCISES: CPT | Mod: GP | Performed by: PHYSICAL THERAPIST

## 2022-10-04 PROCEDURE — 97530 THERAPEUTIC ACTIVITIES: CPT | Mod: GP | Performed by: PHYSICAL THERAPIST

## 2022-10-04 PROCEDURE — 120N000017 HC R&B RESPIRATORY CARE

## 2022-10-04 PROCEDURE — 250N000013 HC RX MED GY IP 250 OP 250 PS 637: Performed by: NURSE PRACTITIONER

## 2022-10-04 PROCEDURE — 999N000123 HC STATISTIC OXYGEN O2DAILY TECH TIME

## 2022-10-04 PROCEDURE — 99233 SBSQ HOSP IP/OBS HIGH 50: CPT | Performed by: HOSPITALIST

## 2022-10-04 PROCEDURE — 999N000157 HC STATISTIC RCP TIME EA 10 MIN

## 2022-10-04 PROCEDURE — 99232 SBSQ HOSP IP/OBS MODERATE 35: CPT | Performed by: NURSE PRACTITIONER

## 2022-10-04 RX ADMIN — ACETYLCYSTEINE 2 ML: 200 SOLUTION ORAL; RESPIRATORY (INHALATION) at 08:58

## 2022-10-04 RX ADMIN — LEVOTHYROXINE SODIUM 112 MCG: 0.11 TABLET ORAL at 06:30

## 2022-10-04 RX ADMIN — Medication 1 PACKET: at 21:23

## 2022-10-04 RX ADMIN — Medication 40 MG: at 06:30

## 2022-10-04 RX ADMIN — ACETYLCYSTEINE 2 ML: 200 SOLUTION ORAL; RESPIRATORY (INHALATION) at 19:25

## 2022-10-04 RX ADMIN — SENNOSIDES 5 ML: 8.8 LIQUID ORAL at 21:23

## 2022-10-04 RX ADMIN — ALBUTEROL SULFATE 2.5 MG: 2.5 SOLUTION RESPIRATORY (INHALATION) at 08:58

## 2022-10-04 RX ADMIN — Medication 1 PACKET: at 08:47

## 2022-10-04 RX ADMIN — CALCIUM CARBONATE 1250 MG: 1250 SUSPENSION ORAL at 11:49

## 2022-10-04 RX ADMIN — Medication 2 MG: at 08:47

## 2022-10-04 RX ADMIN — CALCIUM CARBONATE 1250 MG: 1250 SUSPENSION ORAL at 18:40

## 2022-10-04 RX ADMIN — SENNOSIDES 5 ML: 8.8 LIQUID ORAL at 08:47

## 2022-10-04 RX ADMIN — OXYCODONE HYDROCHLORIDE 5 MG: 5 SOLUTION ORAL at 23:56

## 2022-10-04 RX ADMIN — ALBUTEROL SULFATE 2.5 MG: 2.5 SOLUTION RESPIRATORY (INHALATION) at 19:25

## 2022-10-04 RX ADMIN — ALBUTEROL SULFATE 2.5 MG: 2.5 SOLUTION RESPIRATORY (INHALATION) at 14:19

## 2022-10-04 RX ADMIN — BUMETANIDE 1 MG: 1 TABLET ORAL at 08:47

## 2022-10-04 RX ADMIN — OXYCODONE HYDROCHLORIDE 5 MG: 5 SOLUTION ORAL at 00:08

## 2022-10-04 RX ADMIN — ACETAMINOPHEN 650 MG: 650 SOLUTION ORAL at 06:14

## 2022-10-04 RX ADMIN — Medication 1 PACKET: at 14:05

## 2022-10-04 RX ADMIN — ENOXAPARIN SODIUM 40 MG: 100 INJECTION SUBCUTANEOUS at 18:41

## 2022-10-04 RX ADMIN — Medication 25 MCG: at 08:47

## 2022-10-04 RX ADMIN — ACETYLCYSTEINE 2 ML: 200 SOLUTION ORAL; RESPIRATORY (INHALATION) at 14:19

## 2022-10-04 ASSESSMENT — ACTIVITIES OF DAILY LIVING (ADL)
ADLS_ACUITY_SCORE: 51
ADLS_ACUITY_SCORE: 49
ADLS_ACUITY_SCORE: 55
ADLS_ACUITY_SCORE: 51
ADLS_ACUITY_SCORE: 49
ADLS_ACUITY_SCORE: 51
ADLS_ACUITY_SCORE: 51

## 2022-10-04 NOTE — PLAN OF CARE
Problem: Plan of Care - These are the overarching goals to be used throughout the patient stay.    Goal: Absence of Hospital-Acquired Illness or Injury  Intervention: Identify and Manage Fall Risk  Recent Flowsheet Documentation  Taken 10/4/2022 0855 by Allie Lebron RN  Safety Promotion/Fall Prevention:    assistive device/personal items within reach    bed alarm on    lighting adjusted    room door open    room near nurse's station     Problem: Plan of Care - These are the overarching goals to be used throughout the patient stay.    Goal: Optimal Comfort and Wellbeing  Intervention: Provide Person-Centered Care  Recent Flowsheet Documentation  Taken 10/4/2022 0855 by Allie Lebron RN  Trust Relationship/Rapport:    care explained    choices provided    questions answered    questions encouraged    thoughts/feelings acknowledged   Goal Outcome Evaluation:      Patient alert, calm, pleasant and cooperative with cares. Denies pains and discomfort, appears comfortable. Patient right foot wound cleansed with wound cleanser. Observed wound is dry and crusty, no drainage noted. Applied oil Emulsion guaze dressing to wound and covered with sacrum mepilex. Patient coccyx cleansed no open wound observed, small redness noted and mepilex changed.

## 2022-10-04 NOTE — PROGRESS NOTES
Pulmonary Progress Note    Admit Date: 9/22/2022  CODE: Full Code    Assessment/Plan:   89 yoM admitted 9/15/2022 following an unwitnessed fall found to have a left frontal SDH and C1/C2 cervical spine fracture.  Intubated for airway protection in the ED given high c-spine injury and difficulty managing his secretions. On admission he requested all cares without surgical cervical spine stabilization, agreeable to a tracheostomy and PEG. Discharged to LTACH 9/22/22.     PMHx: Lambert-Eaton syndrome, TAVR, complete heart block with pacemaker dependency, DM2, arthritis, BPH, HTN, osteoporosis     Discussion of pertinent problems:  1. Acute hypoxic/hypercapnic respiratory failure s/p trach in setting of traumatic cervical injury after a fall.  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  Liberated from vent on 9/26 with acceptable blood gas off vent.  Aspirating on BDTs but these are slowly improving.  Diuresing with bumex given opacities on CxR; likely combination of fluid and atelectasis.    - Albuterol/mucomsyt nebs TID for bronchial hygiene.  Avoiding metaneb for now given CSI  2. Tracheostomy: 6 Shiley placed 9/21/2022.  Downsized to 7 Bivona 10/3 without issue  3. Dysphagia s/p PEG: Repeat BDT 10/3 with no immediate and large delayed aspiration; this is improvement from previous tests. SLP blanche on 10/1 notes to keep NPO except ice chips.  Tolerating PMV well during the day with a good spontaneous cough per RT  4. Laryngeal edema 2/2 traumatic cervical fracture/injury s/p 3 doses of decadron  5. Acute neck and rib cage pain(known rib fractures), chronic hip/shoulder pain  6. Traumatic SDH, stable  7. C1/C2 CSI: [place] J collar at all times, repeat imaging per NSG    Changes today:  - Phase 2: TM/PMV day.  TM/no pmv/cuff up night  - Continue PO bumex 1mg daily: adjust as clinically indicated.     Sawyer Capone, STEPHANIE  Pulmonary Medicine  [facility]  Pager [phone]    Subjective/Interim Events:   - Denies dyspnea, n/v, pain    Tracheal  secretions:  Overnight - x3, small/large, thick(blue)  Yesterday - x2, small, thick    Cough strength: strong, productive     Current phase of ventilator weaning pathway:  Phase 2    Ventilator weaning results  - continuous TD since 9/26  - 10/2: PMV for ~12hr  - 10/3: PMV for ~14hr    Clinical status discussed today with respiratory therapist, hospitalist     Medications:       - MEDICATION INSTRUCTIONS -         acetylcysteine  2 mL Nebulization TID     albuterol  2.5 mg Nebulization TID     bumetanide  1 mg Oral or Feeding Tube Daily     calcium carbonate  1,250 mg Oral or Feeding Tube BID w/meals     cholecalciferol  25 mcg Oral or Feeding Tube Daily     doxazosin  2 mg Oral or Feeding Tube Daily     enoxaparin ANTICOAGULANT  40 mg Subcutaneous Q24H     insulin aspart  1-6 Units Subcutaneous Q6H     levothyroxine  112 mcg Oral or Feeding Tube QAM AC     pantoprazole  40 mg Oral or Feeding Tube QAM AC     protein modular  1 packet Per Feeding Tube TID     sennosides  5 mL Oral or Feeding Tube BID     sodium chloride (PF)  10 mL Intracatheter Q8H         Exam/Data:   Vitals  /51 (BP Location: Left arm)   Pulse 64   Temp 97.6  F (36.4  C) (Axillary)   Resp 20   Wt 91.2 kg (201 lb)   SpO2 92%   BMI 30.56 kg/m       I/O last 3 completed shifts:  In: 1829 [I.V.:20; NG/GT:1809]  Out: 2200 [Urine:2200]  Weight change: 2.404 kg (5 lb 4.8 oz)    Vent Mode: Trach collar  FiO2 (%): 30 %  Resp: 20      EXAM:  Gen:  no distress on TM/PMV sitting in chair  HEENT: NT, trach midline/intact, c-collar on, good voice, no stridor   CV: RRR, no m/g/r  Resp: CTAB; non-labored   Abd: soft, nontender, BS+  Skin: no rashes or lesions  Ext: no edema  Neuro: alert to voice, follows commands    ROS:  A 10-system review was obtained and is negative with the exception of the symptoms noted above.    Labs:  Basic Metabolic Panel  Recent Labs   Lab 10/04/22  1136 10/04/22  0612 10/04/22  0006 10/03/22  1749 10/03/22  0635  10/03/22  0614 09/30/22  1135 09/30/22  0638 09/28/22  1207 09/28/22  0625   NA  --   --   --   --   --  134*  --  139  --  138   POTASSIUM  --   --   --   --   --  4.6  --  4.2  --  3.6   CHLORIDE  --   --   --   --   --  99  --  105  --  104   CO2  --   --   --   --   --  28  --  27  --  25   BUN  --   --   --   --   --  21.3  --  23.3*  --  22.0   CR  --   --   --   --   --  0.64*  --  0.64*  --  0.73   * 115* 120* 114*   < > 114*   < > 139*   < > 146*    < > = values in this interval not displayed.     CBC RESULTS: Recent Labs   Lab Test 09/27/22  0648   WBC 7.3   RBC 3.16*   HGB 9.9*   HCT 29.1*   MCV 92   MCH 31.3   MCHC 34.0   RDW 14.4           RADIOLOGY:   XR CHEST PORT 1 VIEW  LOCATION: [facility]  DATE/TIME: 10/3/2022 8:40 AM     INDICATION: hypoxic respiratory failure  COMPARISON: Chest x-ray 09/26/2022                                                                      IMPRESSION: Stable satisfactory tracheostomy tube position. TAVR. Stable dual-lead left-sided cardiac conduction device. Right shoulder arthroplasty. Bibasilar pulmonary opacities which may reflect atelectasis and/or infiltrates, increased on the left   and stable on the right. No definite pleural effusion. Stable cardiomegaly and central vascular congestion.

## 2022-10-04 NOTE — PLAN OF CARE
Problem: Communication Impairment (Artificial Airway)  Goal: Effective Communication  Outcome: Ongoing, Progressing     Problem: Device-Related Complication Risk (Artificial Airway)  Goal: Optimal Device Function  Intervention: Optimize Device Care and Function  Recent Flowsheet Documentation  Taken 10/1/2022 1946 by Darryl Moeller, RT  Airway Safety Measures: all equipment/monitors on and audible   Goal Outcome Evaluation:      RT PROGRESS NOTE     DATA:     CURRENT SETTINGS: Vent Mode: Trach collar  FiO2 (%): 30 %  Resp: 20                 TRACH TYPE / SIZE:  #7 Bivona placed in 10/3/22             MODE:   TM (PMV off and cuff inflated because of delayed aspirations)             FIO2:   30%/30L (FiO2 increased to 30% because of low saturations 88%)     ACTION:             THERAPIES:   Albuterol/Mucomyst X 1             SUCTION:                           FREQUENCY:   X 3                        AMOUNT:   small, moderate and large                        CONSISTENCY:   thick                        COLOR:   blue (delayed aspiration)             SPONTANEOUS COUGH EFFORT/STRENGTH OF EFFORT (not elicited by suctioning): fair                              WEANING PHASE:   2                        WEAN MODE:    HFTM                        WEAN TIME:   24/7 as tols.                         END WEAN REASON:   ongoing     RESPONSE:             BS:   rhonchi             VITAL SIGNS:   Blood pressure 123/59, pulse 62, temperature 98.6  F (37  C), temperature source Oral, resp. rate 20, weight 88.8 kg (195 lb 11.2 oz), SpO2 92 %.               EMOTIONAL NEEDS / CONCERNS:                  RISK FOR SELF DECANNULATION:                          RISK DUE TO:                          INTERVENTION/S IN PLACE IS/ARE:         NOTE / PLAN:   pt. Had swallow study yesterday, no immediate aspiration but had posative delayed aspiration. sxn large, moderate and small blue thick secretions. Also, pt. Is coughing some blue secretions. Now, cuff is  inflated to protect airway. Will continue monitoring.

## 2022-10-04 NOTE — PROGRESS NOTES
Social Work Note:  Patient chart reviewed.  Patient discussed in morning rounds.  Barriers to discharge:   * Trach. Phase 2.  HFTM. 30%/30L.  * Suction x3.   * will likely need placement.    Patient's exact discharge date, time, disposition TBD.  Family in support of TCU placement/on-going rehab.  Family did express concern patient may again declined recommendation of TCU.      Bob Reynolds, [facility]  [phone]

## 2022-10-04 NOTE — PROGRESS NOTES
[facility]    Medicine Progress Note - Hospitalist Service    Date of Admission:  9/22/2022    Assessment & Plan          Brief History:    Alexandru Still is a 89 year old man w/ PMH of Lambert-Eaton syndrome, hypothyroidism,  complete heart block s/p pacemaker, HTN, BPH,  PB, and DM type 2 who was admitted to the SICU on 9/15/2022 following an unwitnessed fall and striking his head. He was initially seen at an outside hospital and underwent a comprehensive trauma work up found to have a left frontal SDH (5mm) + C1 fx + Type II C2 odontoid fracture with a 1cm posterior displacement. He was  intubated for airway protection in the ED given high c-spine injury and difficulty managing his secretions. On admission he requested all cares without surgical cervical spine stabilization, agreeable to a tracheostomy and PEG. Multiple care conferences held with his family who confirmed his wishes.     LTACH course:    9/24-9/26:  Speech evaluated patient and recommends NPO, cont tube feeds via PEG due to severe oropharyngeal dysphasia.  PT/OT, dietitian, [facility] nurse saw pt on 9/23.  Pulled out G tube during night of 9/23.  Pt now on soft wrist restraints.  IVF changed to D51/2 NS at low rate as pt has TAVR.  Morphine IV ordered prn pain.    Spoke w/ Dr. Brown- IR - recommends no hannah tube as this is a brand new PEG and placing it blind will likely lead it to not be in correct position.  He will not do a PEG placement on the weekend, scheduled for Monday.  Pt cannot be fed via NG tube as it is contraindicated with C1 and C2 fx.  For PEG tube replacement (herpain on hold, place ICD)on 9/26.  Will discontinue IVF once PEG tube is placed and restart po meds.  Given NaPhos 9/26 9/27-10/3:  9/27 patient had PEG tube replaced after patient himself removed it on 9/23.  Was a started on tube feeding.  P.O. medication were restarted.  IV fluids were discontinued.  He still needs soft restraint x2.  Is off ventilator and  stable.  9/28 Failed blue dye test, Continue NPO   9/30 patient had [place] J 300 collar pads delivered  10/1 SLP found patient well below baseline for swallowing, cognition and communication.  Patient stays n.p.o. except ice chips     Assessment & Plan          Traumatic 5mm left frontal lobe SDH  Posteriorly displaced (1 cm) Type II odontoid fx  C1 anterior arch fracture   Epidural hemorrhage of 7mm  Acute traumatic neck pain  Acute L 6th rib fracture  S/p fall  Facial abrasions with ecchymoses  RLE wound  He was seen and evaluated by Neurosurgery.  Pt refused surgical intervention.   A repeat head CT was obtained with a stable subdural hematoma. He was placed in a cervical collar for the cervical fracture. No surgical intervention per patient preference.     -cont [place] J -keep in place at all times  -tylenol, oxy, and morphine prn pain  -Neurosurg ok w/ heparin q8h for dvt prophy  -wound care following    -f/u Neurosurgery in 6 weeks with an upright XR of the cervical spine   -CT cervical spine in 3 months.      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 Failed bedside and radiology swallow. He completed 3 doses of Decadron for laryngeal edema.      -S/P tracheostomy 09/21/2022, Trach suture removal on 09/26  -pulm following   -RT following   -weaned off vent by per pulm   - bronchial hygeine w/ albuterol/mucomyst  - 9/28 failed blue dye test     Hypertension   Complete heart block s/p PM placement  Nonrheumatic aortic valve  stenosis s/p TAVR 2019  -monitor  -hydralazine prn   -echo on 9/16:  EF: 60-65%, no LV dysfunction     Severe orophayngeal dysphagia   S/P PEG -s/p self removal due to agitation  Severe protein calorie malnutrition  Hiatal hernia     -9/27 patient had PEG tube replaced after patient himself removed it on 9/23.  Was a started on tube feeding.  P.O. medication were restarted.  IV fluids were discontinued.  He still needs soft restraint x2.  Is off ventilator and stable.  -hold TF : osmolite 1.5 and 60 ml/hr until PEG placed   dietitian following  -free water flush:  90 q4h  -protein modular q8h  -senna and dulcolax prn   -PPI for GI prophy  -cont seroquel prn  -cont soft wrist restraints prn      Hypophosphatemia   Hypomagnesia  Hypokalemia  S/p Lactic Acidosis  -Na phos IV 15mmol x 1 9/26, x 1 9/25, given 9mmol on 9/24   -monitor  -replete prn      Hypothyroidism   -Continue Levothyroxine     Anemia of chronic disease  -hg trending down past few days  -cont to monitor      BPH  -cont doxazosin     DM type 2  Hypoglycemia resolved  -sliding scale insulin  -last HbA1:  6.2  -hypoglycemic protocol     Deconditioning  -PT/OT    Diet: Adult Formula Drip Feeding: Continuous Osmolite 1.5; Gastrostomy; Goal Rate: 55; mL/hr; Medication - Feeding Tube Flush Frequency: At least 15-30 mL water before and after medication administration and with tube clogging; Amount to Send (Nutrition...  NPO for Medical/Clinical Reasons Except for: Ice Chips, NPO but receiving Tube Feeding    DVT Prophylaxis: Heparin SQ  [place]udero Catheter: Not present  Central Lines: PRESENT       Cardiac Monitoring: None  Code Status: Full Code      Disposition Plan      Expected Discharge Date: 10/10/2022,  6:00 PM  Discharge Delays: Complex Discharge  Placement - LTC    Discharge Comments: pt is complex with C1 and C2 fx.  [place] J collar on at all times, trach on vent, PEG tube was pulled out  at night on 9/23        The patient's care was discussed with the  Bedside Nurse and Patient.    Laurita Cardenas MD  Hospitalist Service  LTACH  Securely message with the [employer] Web Console (learn more here)  Text page via [employer] Paging/Directory     Clinically Significant Risk Factors Present on Admission                    __________________________________________________________________    Interval History   No new event reported.  Patient is stable with wet cough. Pulm following and managing tracheostomy and diuresis.On PEG tubefeeding.  Denies any pain.    Data reviewed today: I reviewed all medications, new labs and imaging results over the last 24 hours. I personally reviewed no images or EKG's today.    Physical Exam   Vital Signs: Temp: 98.6  F (37  C) Temp src: Oral BP: 123/59 Pulse: 65   Resp: 20 SpO2: 93 % O2 Device: Trach dome Oxygen Delivery: 30 LPM  Weight: 195 lbs 11.2 oz  General:  No acute distress, awake and alert  Eyes:  Sclera non icteric, normal conjuctiva  Neck :  [place] J in place, trach in place  Lungs:  Clear to auscultation bilaterally, air entry equal bilaterally, no rhonchi or rales  CVS:  S1 and S2, regular rate and rhythem, systolic murmur L sternal border  Abdomen:  Soft, nontender, PEG tube is replaced and in place and functional    Musculoskeletal: Improved swelling of R forearm and hand  Neuro:  awake and Alert , follows commands intermittently   Skin: Improved right supraorbital abrasions and ecchymoses, RLE wound        Data   Recent Labs   Lab 10/03/22  1749 10/03/22  1219 10/03/22  0628 10/03/22  0614 09/30/22  1135 09/30/22  0638 09/28/22  1207 09/28/22  0625 09/27/22  1223 09/27/22  0648 09/27/22  0611   0000   WBC  --   --   --   --   --   --   --   --   --  7.3  --   --    HGB  --   --   --   --   --   --   --   --   --  9.9* 11.2*  --    MCV  --   --   --   --   --   --   --   --   --  92  --   --    PLT  --   --   --   --   --   --   --   --   --  267  --   --    NA  --   --   --  134*  --  139  --  138  --  134* 135*   < >   POTASSIUM   --   --   --  4.6  --  4.2  --  3.6  --  3.9 3.8   < >   CHLORIDE  --   --   --  99  --  105  --  104  --  102  --    < >   CO2  --   --   --  28  --  27  --  25  --  23  --    < >   BUN  --   --   --  21.3  --  23.3*  --  22.0  --  11.1  --    < >   CR  --   --   --  0.64*  --  0.64*  --  0.73  --  0.69  --    < >   ANIONGAP  --   --   --  7  --  7  --  9  --  9  --    < >   TITA  --   --   --  8.6  --  8.1*  --  8.2  --  8.3  --    < >   * 125* 112* 114*   < > 139*   < > 146*   < > 135* 134*   < >   ALBUMIN  --   --   --   --   --   --   --   --   --  2.7*  --   --    PROTTOTAL  --   --   --   --   --   --   --   --   --  5.8*  --   --    BILITOTAL  --   --   --   --   --   --   --   --   --  0.6  --   --    ALKPHOS  --   --   --   --   --   --   --   --   --  103  --   --    ALT  --   --   --   --   --   --   --   --   --  25  --   --    AST  --   --   --   --   --   --   --   --   --  18  --   --     < > = values in this interval not displayed.

## 2022-10-04 NOTE — PLAN OF CARE
Problem: Malnutrition  Goal: Improved Nutritional Intake  Outcome: Ongoing, Progressing     Problem: Pain Acute  Goal: Acceptable Pain Control and Functional Ability  Outcome: Ongoing, Progressing  Intervention: Develop Pain Management Plan  Recent Flowsheet Documentation  Taken 10/4/2022 0008 by Nichole Saini RN  Pain Management Interventions: medication (see MAR)   Goal Outcome Evaluation:    Alexandru slept through shift. PRN oxycodone given x1 and PRN tylenol given x1 for pain. [place], used pocket talker effectively to communicate. Tolerated repositioning q2h. No insulin coverage needed this shift. On continuous tube feeding overnight.

## 2022-10-04 NOTE — PROGRESS NOTES
[facility]    Medicine Progress Note - Hospitalist Service    Date of Admission:  9/22/2022  Brief Hospital Course Summary:    Alexandru Still is a 89 year old man w/ PMH of Lambert-Eaton syndrome, hypothyroidism,  complete heart block s/p pacemaker, HTN, BPH,  PB, and DM type 2 who was admitted to the SICU on 9/15/2022 following an unwitnessed fall and striking his head. He was initially seen at an outside hospital and underwent a comprehensive trauma work up found to have a left frontal SDH (5mm) + C1 fx + Type II C2 odontoid fracture with a 1cm posterior displacement. He was  intubated for airway protection in the ED given high c-spine injury and difficulty managing his secretions. On admission he requested all cares without surgical cervical spine stabilization, agreeable to a tracheostomy and PEG. Multiple care conferences held with his family who confirmed his wishes.     LTACH course:    9/24-9/26:  Speech evaluated patient and recommends NPO, cont tube feeds via PEG due to severe oropharyngeal dysphasia.  PT/OT, dietitian, wo nurse saw pt on 9/23.  Pulled out G tube during night of 9/23.  Pt now on soft wrist restraints.  IVF changed to D51/2 NS at low rate as pt has TAVR.  Morphine IV ordered prn pain.    Spoke w/ Dr. Brown- IR - recommends no hannah tube as this is a brand new PEG and placing it blind will likely lead it to not be in correct position.  He will not do a PEG placement on the weekend, scheduled for Monday.  Pt cannot be fed via NG tube as it is contraindicated with C1 and C2 fx.  For PEG tube replacement (herpain on hold, place ICD)on 9/26.  Will discontinue IVF once PEG tube is placed and restart po meds.  Given NaPhos 9/26 9/27-10/3:  9/27 patient had PEG tube replaced after patient himself removed it on 9/23.  Was a started on tube feeding.  P.O. medication were restarted.  IV fluids were discontinued.  He still needs soft restraint x2.  Is off ventilator and stable.  9/28 Failed  blue dye test, Continue NPO   9/30 patient had [place] J 300 collar pads delivered  10/1 SLP found patient well below baseline for swallowing, cognition and communication.  Patient stays n.p.o. except ice chips    10/4/22:   -No acute events, Vent weaning phase 2. Continue diuresis with bumex. Continue plan of care. Cognitive testing 10/4/22 (may need a few days, poor tolerance on patient's part for testing). May need neuropsych evaluation depending on cognitive evaluation.     Assessment & Plan        Traumatic 5mm left frontal lobe SDH  Posteriorly displaced (1 cm) Type II odontoid fx  C1 anterior arch fracture   Epidural hemorrhage of 7mm  Acute traumatic neck pain  Acute L 6th rib fracture  S/p fall  Facial abrasions with ecchymoses  RLE wound  He was seen and evaluated by Neurosurgery.  Pt refused surgical intervention.   A repeat head CT was obtained with a stable subdural hematoma. He was placed in a cervical collar for the cervical fracture. No surgical intervention per patient preference.  -cont [place] J -keep in place at all times  -tylenol, oxy, and morphine prn pain  -Neurosurg ok w/ heparin q8h for dvt prophy  -wound care following   -f/u Neurosurgery in 6 weeks with an upright XR of the cervical spine   -CT cervical spine in 3 months.      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 Failed bedside and radiology swallow. He completed 3 doses of Decadron for laryngeal edema.   -S/P tracheostomy 09/21/2022, Trach suture removal on  09/26  -9/28 failed blue dye test  -pulm consult  -RT consult  -wean vent as per pulm   -bronchial hygeine w/ albuterol/mucomyst  -gentle diuresis with bumex      Hypertension   Complete heart block s/p PM placement  Nonrheumatic aortic valve stenosis s/p TAVR 2019  -monitor  -pt is currently normotensive so will hold off on antihypertensives  -echo on 9/16:  EF: 60-65%, no LV dysfunction     Severe orophayngeal dysphagia   S/P PEG   Severe protein calorie malnutrition  Hiatal hernia  -PEG tube placed 09/20. Pt ripped out PEG tube on night of 9/23  -hold TF : osmolite 1.5 and 60 ml/hr   dietitian following  -free water flush:  90 q4h  -protein modular q8h  -senna and dulcolax prn   -PPI for GI prophy  -IVF D51/2 NS at 50ml/hr     Hypophosphatemia   Hypomagnesia  Hypokalemia  S/p Lactic Acidosis  -Na phos IV 15mmol x 1 today, given 9mmol yesterday with little imrovement  -monitor  -replete prn      Hypothyroidism   -Continue Levothyroxine     Anemia of chronic disease  -hg trending down past few days  -cont to monitor      BPH  -cont doxazosin     DM type 2  -sliding scale insulin  -last HbA1:  6.2   -hypoglycemic protocol     Deconditioning  -PT/OT           Diet: Adult Formula Drip Feeding: Continuous Osmolite 1.5; Gastrostomy; Goal Rate: 55; mL/hr; Medication - Feeding Tube Flush Frequency: At least 15-30 mL water before and after medication administration and with tube clogging; Amount to Send (Nutrition...  NPO for Medical/Clinical Reasons Except for: Ice Chips, NPO but receiving Tube Feeding    DVT Prophylaxis: Heparin SQ  [place]udero Catheter: Not present  Central Lines: PRESENT     Cardiac Monitoring: None  Code Status: Full Code      Disposition Plan     Expected Discharge Date: 10/10/2022,  6:00 PM  Discharge Delays: Complex Discharge  Placement - LTC    Discharge Comments: pt is complex with C1 and C2 fx.  [place] J collar on at all times, trach on vent, PEG tube was pulled out  at night on 9/23        The  patient's care was discussed with the Bedside Nurse.    Kiko Brower MD  Hospitalist Service  LTACH  Securely message with the [employer] Web Console (learn more here)  Text page via [employer] Paging/Directory         Clinically Significant Risk Factors Present on Admission                      ______________________________________________________________________    Interval History   No acute events.   Cognitive testing today.     ROS:  A 10-system review was obtained and is negative with the exception of the symptoms noted above    Data reviewed today: I reviewed all medications, new labs and imaging results over the last 24 hours.     Physical Exam   Vital Signs: Temp: 97.6  F (36.4  C) Temp src: Axillary BP: 106/51 Pulse: 61   Resp: 20 SpO2: 92 % O2 Device: Trach dome Oxygen Delivery: 30 LPM  Weight: 201 lbs 0 oz    General:  No acute distress,awake and alert  Eyes:  Sclera non icteric, normal conjuctiva  Neck :  [place] J in place, trach in place  Lungs:  Clear to auscultation bilaterally, air entry equal bilaterally, no rhonchi or rales  CVS:  S1 and S2, regular rate and rhythem, systolic murmur L sternal border  Abdomen:  Soft, nontender, tenderness w/ swelling around prior PEG tube site.     Musculoskeletal:  no edema  Neuro:  follows some commands  Skin:  please refer to nursing documentation for full skin assessment    Data   Recent Labs   Lab 10/04/22  1136 10/04/22  0612 10/04/22  0006 10/03/22  0628 10/03/22  0614 09/30/22  1135 09/30/22  0638 09/28/22  1207 09/28/22  0625   NA  --   --   --   --  134*  --  139  --  138   POTASSIUM  --   --   --   --  4.6  --  4.2  --  3.6   CHLORIDE  --   --   --   --  99  --  105  --  104   CO2  --   --   --   --  28  --  27  --  25   BUN  --   --   --   --  21.3  --  23.3*  --  22.0   CR  --   --   --   --  0.64*  --  0.64*  --  0.73   ANIONGAP  --   --   --   --  7  --  7  --  9   TITA  --   --   --   --  8.6  --  8.1*  --  8.2   * 115* 120*   < > 114*   < > 139*    < > 146*    < > = values in this interval not displayed.     No results found for this or any previous visit (from the past 24 hour(s)).  Medications     - MEDICATION INSTRUCTIONS -         acetylcysteine  2 mL Nebulization TID     albuterol  2.5 mg Nebulization TID     bumetanide  1 mg Oral or Feeding Tube Daily     calcium carbonate  1,250 mg Oral or Feeding Tube BID w/meals     cholecalciferol  25 mcg Oral or Feeding Tube Daily     doxazosin  2 mg Oral or Feeding Tube Daily     enoxaparin ANTICOAGULANT  40 mg Subcutaneous Q24H     insulin aspart  1-6 Units Subcutaneous Q6H     levothyroxine  112 mcg Oral or Feeding Tube QAM AC     pantoprazole  40 mg Oral or Feeding Tube QAM AC     protein modular  1 packet Per Feeding Tube TID     sennosides  5 mL Oral or Feeding Tube BID     sodium chloride (PF)  10 mL Intracatheter Q8H

## 2022-10-04 NOTE — PLAN OF CARE
Problem: Device-Related Complication Risk (Artificial Airway)  Goal: Optimal Device Function  Outcome: Ongoing, Not Progressing     Problem: Skin and Tissue Injury (Artificial Airway)  Goal: Absence of Device-Related Skin or Tissue Injury  Outcome: Ongoing, Not Progressing     Problem: Communication Impairment (Artificial Airway)  Goal: Effective Communication  Outcome: Ongoing, Progressing   Goal Outcome Evaluation:                RT PROGRESS NOTE     DATA:     CURRENT SETTINGS:             TRACH TYPE / SIZE:  #7 bivona changed 10/3             MODE:   30L 30% tm with cuff up at night and pmv daytime             ACTION:             THERAPIES:   albuterol and mucomyst tid             SUCTION:                           FREQUENCY:   x3                        AMOUNT:   small to large                        CONSISTENCY:   thick to thin                        COLOR:   pale yellow to green and blue             SPONTANEOUS COUGH EFFORT/STRENGTH OF EFFORT (not elicited by suctioning): moderate strong productive                              WEANING PHASE:   2                        WEAN MODE:    pmv                        WEAN TIME:   started at 09:15                       RESPONSE:             BS:   clear and diminished after suction             VITAL SIGNS:   Blood pressure 106/51, pulse 72, temperature 97.6  F (36.4  C), temperature source Axillary, resp. rate 20, weight 91.2 kg (201 lb), SpO2 92 %.                 EMOTIONAL NEEDS / CONCERNS:  no                RISK FOR SELF DECANNULATION:  no                                           NOTE / PLAN:   Continue current plan.

## 2022-10-04 NOTE — PROGRESS NOTES
[facility]  WOC Nurse Inpatient Assessment     Consulted for: Face, RLE, Trach, G-tube    Patient History (according to provider note(s):        Areas Assessed:      Trach - nursing concerned regarding skin change near 12 o'clock per phone message. Nothing noted as viewed through transparent trach face plate, however patient up in chair with [place] J collar in place so unable to perform thorough assessment. Will attempt to see again later this week hopefully when patient is in bed. Continue with routine cares.      RLE noted to have 2 skin tears without flap remaining, which are essentially healed. Small area of pink viable tissue noted. Will plan to reassess later this week and likely will be able to discontinue dressing at that time.      Treatment Plan:     RLE wound care - every 5 days  Cleanse wounds with wound cleanser, gently dry.  Cover open wounds with a strip of oil emulsion gauze and cover with Mepilex sacral dressing.     Orders: Reviewed    RECOMMEND PRIMARY TEAM ORDER: None, at this time  Education provided: plan of care  Discussed plan of care with: Patient  WOC nurse follow-up plan: Tuesday/Friday  Notify WOC if wound(s) deteriorate.  Nursing to notify the Provider(s) and re-consult the WOC Nurse if new skin concern.    DATA:     Current support surface: Standard  Foam mattress  Containment of urine/stool: Incontinence Protocol  BMI: Body mass index is 30.56 kg/m .   Active diet order: Orders Placed This Encounter      NPO for Medical/Clinical Reasons Except for: Ice Chips, NPO but receiving Tube Feeding     Output: I/O last 3 completed shifts:  In: 1829 [I.V.:20; NG/GT:1809]  Out: 2200 [Urine:2200]     Labs:   No lab results found in last 7 days.    Invalid input(s): GLUCOMBO  Pressure injury risk assessment:   Sensory Perception: 3-->slightly limited  Moisture: 3-->occasionally moist  Activity: 2-->chairfast  Mobility: 3-->slightly limited  Nutrition: 3-->adequate  Friction  and Shear: 1-->problem  Bakari Score: 15    Mariela Avitia RN CWOCN

## 2022-10-04 NOTE — PLAN OF CARE
Pt is alert and oriented X 3. Complained of neck pain and was given oxycodone 5 mg which was effective. Turned and repositioned to maintain skin integrity. [place] J on and aligned. Pt tolerating TF. Voiding well. No BM this shift. Will continue to monitor.  Problem: Plan of Care - These are the overarching goals to be used throughout the patient stay.    Goal: Absence of Hospital-Acquired Illness or Injury  Intervention: Identify and Manage Fall Risk  Recent Flowsheet Documentation  Taken 10/3/2022 1630 by Roberto Lockowod RN  Safety Promotion/Fall Prevention:   assistive device/personal items within reach   bed alarm on   lighting adjusted   room door open   room near nurse's station  Intervention: Prevent Skin Injury  Recent Flowsheet Documentation  Taken 10/3/2022 1630 by Roberto Lockwood RN  Body Position:   left   turned  Intervention: Prevent and Manage VTE (Venous Thromboembolism) Risk  Recent Flowsheet Documentation  Taken 10/3/2022 1630 by Roberto Lockwood RN  VTE Prevention/Management: SCDs (sequential compression devices) on  Activity Management: activity encouraged  Intervention: Prevent Infection  Recent Flowsheet Documentation  Taken 10/3/2022 1630 by Roberto Lockwood RN  Infection Prevention:   hand hygiene promoted   equipment surfaces disinfected  Goal: Optimal Comfort and Wellbeing  Intervention: Monitor Pain and Promote Comfort  Recent Flowsheet Documentation  Taken 10/3/2022 1629 by Roberto Lockwood RN  Pain Management Interventions: medication (see MAR)  Intervention: Provide Person-Centered Care  Recent Flowsheet Documentation  Taken 10/3/2022 1630 by Roberto Lockwood RN  Trust Relationship/Rapport:   care explained   choices provided   questions encouraged   thoughts/feelings acknowledged     Problem: Device-Related Complication Risk (Mechanical Ventilation, Invasive)  Goal: Optimal Device Function  Intervention: Optimize Device Care and Function  Recent Flowsheet Documentation  Taken 10/3/2022  1630 by Roberto Lockwood RN  Airway Safety Measures: all equipment/monitors on and audible     Problem: Inability to Wean (Mechanical Ventilation, Invasive)  Goal: Mechanical Ventilation Liberation  Intervention: Promote Extubation and Mechanical Ventilation Liberation  Recent Flowsheet Documentation  Taken 10/3/2022 1630 by Roberto Lockwood RN  Medication Review/Management: medications reviewed     Problem: Ventilator-Induced Lung Injury (Mechanical Ventilation, Invasive)  Goal: Absence of Ventilator-Induced Lung Injury  Intervention: Prevent Ventilator-Associated Pneumonia  Recent Flowsheet Documentation  Taken 10/3/2022 1630 by Roberto Lockwood RN  Head of Bed (HOB) Positioning: HOB at 30 degrees     Problem: Restraint, Nonbehavioral (Nonviolent)  Goal: Absence of Harm or Injury  Intervention: Protect Dignity, Rights, and Personal Wellbeing  Recent Flowsheet Documentation  Taken 10/3/2022 1630 by Roberto Lockwood RN  Trust Relationship/Rapport:   care explained   choices provided   questions encouraged   thoughts/feelings acknowledged  Intervention: Protect Skin and Joint Integrity  Recent Flowsheet Documentation  Taken 10/3/2022 1630 by Roberto Lockwood RN  Body Position:   left   turned     Problem: Pain Acute  Goal: Acceptable Pain Control and Functional Ability  Intervention: Develop Pain Management Plan  Recent Flowsheet Documentation  Taken 10/3/2022 1629 by Roberto Lockwood RN  Pain Management Interventions: medication (see MAR)  Intervention: Prevent or Manage Pain  Recent Flowsheet Documentation  Taken 10/3/2022 1630 by Roberto Lockwood RN  Medication Review/Management: medications reviewed     Problem: Communication Impairment (Artificial Airway)  Goal: Effective Communication  Intervention: Ensure Effective Communication  Recent Flowsheet Documentation  Taken 10/3/2022 1630 by Roberto Lockwood RN  Trust Relationship/Rapport:   care explained   choices provided   questions encouraged   thoughts/feelings  acknowledged     Problem: Device-Related Complication Risk (Artificial Airway)  Goal: Optimal Device Function  Intervention: Optimize Device Care and Function  Recent Flowsheet Documentation  Taken 10/3/2022 1630 by Roberto Lockwood RN  Airway Safety Measures: all equipment/monitors on and audible   Goal Outcome Evaluation:

## 2022-10-05 ENCOUNTER — APPOINTMENT (OUTPATIENT)
Dept: OCCUPATIONAL THERAPY | Facility: CLINIC | Age: 87
DRG: 208 | End: 2022-10-05
Attending: INTERNAL MEDICINE
Payer: MEDICARE

## 2022-10-05 ENCOUNTER — APPOINTMENT (OUTPATIENT)
Dept: PHYSICAL THERAPY | Facility: CLINIC | Age: 87
DRG: 208 | End: 2022-10-05
Attending: INTERNAL MEDICINE
Payer: MEDICARE

## 2022-10-05 ENCOUNTER — APPOINTMENT (OUTPATIENT)
Dept: SPEECH THERAPY | Facility: CLINIC | Age: 87
DRG: 208 | End: 2022-10-05
Attending: INTERNAL MEDICINE
Payer: MEDICARE

## 2022-10-05 LAB
GLUCOSE BLDC GLUCOMTR-MCNC: 102 MG/DL (ref 70–99)
GLUCOSE BLDC GLUCOMTR-MCNC: 116 MG/DL (ref 70–99)
GLUCOSE BLDC GLUCOMTR-MCNC: 119 MG/DL (ref 70–99)
GLUCOSE BLDC GLUCOMTR-MCNC: 128 MG/DL (ref 70–99)

## 2022-10-05 PROCEDURE — 120N000017 HC R&B RESPIRATORY CARE

## 2022-10-05 PROCEDURE — 250N000009 HC RX 250: Performed by: NURSE PRACTITIONER

## 2022-10-05 PROCEDURE — 999N000157 HC STATISTIC RCP TIME EA 10 MIN

## 2022-10-05 PROCEDURE — 97535 SELF CARE MNGMENT TRAINING: CPT | Mod: GO | Performed by: OCCUPATIONAL THERAPIST

## 2022-10-05 PROCEDURE — 250N000013 HC RX MED GY IP 250 OP 250 PS 637: Performed by: HOSPITALIST

## 2022-10-05 PROCEDURE — 250N000011 HC RX IP 250 OP 636: Performed by: INTERNAL MEDICINE

## 2022-10-05 PROCEDURE — 250N000011 HC RX IP 250 OP 636: Performed by: HOSPITALIST

## 2022-10-05 PROCEDURE — 999N000009 HC STATISTIC AIRWAY CARE

## 2022-10-05 PROCEDURE — 94640 AIRWAY INHALATION TREATMENT: CPT

## 2022-10-05 PROCEDURE — G0008 ADMIN INFLUENZA VIRUS VAC: HCPCS | Performed by: INTERNAL MEDICINE

## 2022-10-05 PROCEDURE — 999N000253 HC STATISTIC WEANING TRIALS

## 2022-10-05 PROCEDURE — 90662 IIV NO PRSV INCREASED AG IM: CPT | Performed by: INTERNAL MEDICINE

## 2022-10-05 PROCEDURE — 97129 THER IVNTJ 1ST 15 MIN: CPT | Mod: GO | Performed by: OCCUPATIONAL THERAPIST

## 2022-10-05 PROCEDURE — 250N000013 HC RX MED GY IP 250 OP 250 PS 637: Performed by: NURSE PRACTITIONER

## 2022-10-05 PROCEDURE — 92507 TX SP LANG VOICE COMM INDIV: CPT | Mod: GN

## 2022-10-05 PROCEDURE — 99233 SBSQ HOSP IP/OBS HIGH 50: CPT | Performed by: HOSPITALIST

## 2022-10-05 PROCEDURE — 94640 AIRWAY INHALATION TREATMENT: CPT | Mod: 76

## 2022-10-05 PROCEDURE — 97530 THERAPEUTIC ACTIVITIES: CPT | Mod: GP | Performed by: PHYSICAL THERAPIST

## 2022-10-05 PROCEDURE — 999N000123 HC STATISTIC OXYGEN O2DAILY TECH TIME

## 2022-10-05 RX ORDER — ACETAMINOPHEN 500 MG
1000 TABLET ORAL EVERY 6 HOURS PRN
Status: DISCONTINUED | OUTPATIENT
Start: 2022-10-05 | End: 2022-10-28 | Stop reason: HOSPADM

## 2022-10-05 RX ADMIN — ACETYLCYSTEINE 2 ML: 200 SOLUTION ORAL; RESPIRATORY (INHALATION) at 13:11

## 2022-10-05 RX ADMIN — OXYCODONE HYDROCHLORIDE 5 MG: 5 SOLUTION ORAL at 13:45

## 2022-10-05 RX ADMIN — OXYCODONE HYDROCHLORIDE 5 MG: 5 SOLUTION ORAL at 10:01

## 2022-10-05 RX ADMIN — CALCIUM CARBONATE 1250 MG: 1250 SUSPENSION ORAL at 11:39

## 2022-10-05 RX ADMIN — Medication 1 PACKET: at 10:02

## 2022-10-05 RX ADMIN — Medication 1 PACKET: at 13:45

## 2022-10-05 RX ADMIN — ALBUTEROL SULFATE 2.5 MG: 2.5 SOLUTION RESPIRATORY (INHALATION) at 13:11

## 2022-10-05 RX ADMIN — Medication 40 MG: at 07:01

## 2022-10-05 RX ADMIN — Medication 2 MG: at 10:02

## 2022-10-05 RX ADMIN — Medication 1 PACKET: at 22:35

## 2022-10-05 RX ADMIN — ALBUTEROL SULFATE 2.5 MG: 2.5 SOLUTION RESPIRATORY (INHALATION) at 09:27

## 2022-10-05 RX ADMIN — SENNOSIDES 5 ML: 8.8 LIQUID ORAL at 10:01

## 2022-10-05 RX ADMIN — Medication 25 MCG: at 10:01

## 2022-10-05 RX ADMIN — LEVOTHYROXINE SODIUM 112 MCG: 0.11 TABLET ORAL at 07:01

## 2022-10-05 RX ADMIN — ACETYLCYSTEINE 2 ML: 200 SOLUTION ORAL; RESPIRATORY (INHALATION) at 21:40

## 2022-10-05 RX ADMIN — INFLUENZA A VIRUS A/VICTORIA/2570/2019 IVR-215 (H1N1) ANTIGEN (FORMALDEHYDE INACTIVATED), INFLUENZA A VIRUS A/DARWIN/9/2021 SAN-010 (H3N2) ANTIGEN (FORMALDEHYDE INACTIVATED), INFLUENZA B VIRUS B/PHUKET/3073/2013 ANTIGEN (FORMALDEHYDE INACTIVATED), AND INFLUENZA B VIRUS B/MICHIGAN/01/2021 ANTIGEN (FORMALDEHYDE INACTIVATED) 0.7 ML: 60; 60; 60; 60 INJECTION, SUSPENSION INTRAMUSCULAR at 11:37

## 2022-10-05 RX ADMIN — ENOXAPARIN SODIUM 40 MG: 100 INJECTION SUBCUTANEOUS at 18:36

## 2022-10-05 RX ADMIN — CALCIUM CARBONATE 1250 MG: 1250 SUSPENSION ORAL at 18:36

## 2022-10-05 RX ADMIN — ALBUTEROL SULFATE 2.5 MG: 2.5 SOLUTION RESPIRATORY (INHALATION) at 21:39

## 2022-10-05 RX ADMIN — BUMETANIDE 1 MG: 1 TABLET ORAL at 10:02

## 2022-10-05 RX ADMIN — ACETYLCYSTEINE 2 ML: 200 SOLUTION ORAL; RESPIRATORY (INHALATION) at 09:27

## 2022-10-05 ASSESSMENT — ACTIVITIES OF DAILY LIVING (ADL)
ADLS_ACUITY_SCORE: 45
ADLS_ACUITY_SCORE: 49
ADLS_ACUITY_SCORE: 45
ADLS_ACUITY_SCORE: 49
ADLS_ACUITY_SCORE: 45
ADLS_ACUITY_SCORE: 49
ADLS_ACUITY_SCORE: 49

## 2022-10-05 NOTE — PLAN OF CARE
Problem: Plan of Care - These are the overarching goals to be used throughout the patient stay.    Goal: Optimal Comfort and Wellbeing  Outcome: Ongoing, Progressing  Intervention: Provide Person-Centered Care  Recent Flowsheet Documentation  Taken 10/4/2022 7035 by Мария Jarvis, RN  Trust Relationship/Rapport: choices provided   Goal Outcome Evaluation:      Pt alert coop with cares, [place] pocket talker on, anticipate needs cont to use primofit for incontinence, denies pain or discomfort

## 2022-10-05 NOTE — PLAN OF CARE
Problem: Plan of Care - These are the overarching goals to be used throughout the patient stay.    Goal: Plan of Care Review/Shift Note  Description: The Plan of Care Review/Shift note should be completed every shift.  The Outcome Evaluation is a brief statement about your assessment that the patient is improving, declining, or no change.  This information will be displayed automatically on your shift note.  Outcome: Ongoing, Progressing   Goal Outcome Evaluation:        Patient was calm and cooperative,complained of pain and received oxycodone 5 mg. Bs was 124 and 119 , no coverage.

## 2022-10-05 NOTE — PLAN OF CARE
7 PM to 7 AM  RT PROGRESS NOTE     DATA:     CURRENT SETTINGS:             TRACH TYPE / SIZE:  7 Bivona placed 10/3/22             MODE:   30% @ 30 LPM HFTM with cuff up  Titrated from 25% due to SATs 91% post cuff up.     ACTION:   THERAPIES: TID Albuterol and Mucomyst neb given              SUCTION:                           FREQUENCY:   X 3                        AMOUNT:   mod x 2, small x 1                        CONSISTENCY:   thick                        COLOR:   white             SPONTANEOUS COUGH EFFORT/STRENGTH OF EFFORT (not elicited by suctioning): Good                              WEANING PHASE:   PMV days, cuff up at night.                        WEAN MODE:    PMV                        WEAN TIME:   0928-2138, for 12 hours and 10 minutes. Pt seemed to have tolerated                        END WEAN REASON:   HS     RESPONSE:             BS:   Dim and clear post sxn             VITAL SIGNS:   SATs 91-94%, HR 64-67, RR 20             RISK FOR SELF DECANNULATION:  No     NOTE / PLAN:   Continue with same.       Problem: Communication Impairment (Artificial Airway)  Goal: Effective Communication  Outcome: Ongoing, Progressing     Problem: Device-Related Complication Risk (Artificial Airway)  Goal: Optimal Device Function  Outcome: Ongoing, Progressing     Problem: Skin and Tissue Injury (Artificial Airway)  Goal: Absence of Device-Related Skin or Tissue Injury  Outcome: Ongoing, Progressing

## 2022-10-05 NOTE — PROGRESS NOTES
[facility]    Medicine Progress Note - Hospitalist Service    Date of Admission:  9/22/2022  Brief Hospital Course Summary:    Alexandru Still is a 89 year old man w/ PMH of Lambert-Eaton syndrome, hypothyroidism,  complete heart block s/p pacemaker, HTN, BPH,  PB, and DM type 2 who was admitted to the SICU on 9/15/2022 following an unwitnessed fall and striking his head. He was initially seen at an outside hospital and underwent a comprehensive trauma work up found to have a left frontal SDH (5mm) + C1 fx + Type II C2 odontoid fracture with a 1cm posterior displacement. He was  intubated for airway protection in the ED given high c-spine injury and difficulty managing his secretions. On admission he requested all cares without surgical cervical spine stabilization, agreeable to a tracheostomy and PEG. Multiple care conferences held with his family who confirmed his wishes.     LTACH course:    9/24-9/26:  Speech evaluated patient and recommends NPO, cont tube feeds via PEG due to severe oropharyngeal dysphasia.  PT/OT, dietitian, wo nurse saw pt on 9/23.  Pulled out G tube during night of 9/23. Pt now on soft wrist restraints.  IVF changed to D51/2 NS at low rate as pt has TAVR.  Morphine IV ordered prn pain.    Spoke w/ Dr. Brown- IR - recommends no hannah tube as this is a brand new PEG and placing it blind will likely lead it to not be in correct position.  He will not do a PEG placement on the weekend, scheduled for Monday.  Pt cannot be fed via NG tube as it is contraindicated with C1 and C2 fx.  For PEG tube replacement (herpain on hold, place ICD)on 9/26.  Will discontinue IVF once PEG tube is placed and restart po meds.  Given NaPhos 9/26 9/27-10/3:  9/27 patient had PEG tube replaced after patient himself removed it on 9/23.  Was a started on tube feeding.  P.O. medication were restarted.  IV fluids were discontinued.  He still needs soft restraint x2.  Is off ventilator and stable.  9/28 Failed blue  dye test, Continue NPO   9/30 patient had [place] J 300 collar pads delivered  10/1 SLP found patient well below baseline for swallowing, cognition and communication.  Patient stays n.p.o. except ice chips    10/4/22:   -No acute events, Vent weaning phase 2. Continue diuresis with bumex. Continue plan of care. Cognitive testing 10/4/22 (may need a few days, poor tolerance on patient's part for testing). May need neuropsych evaluation depending on cognitive evaluation.     Assessment & Plan        Traumatic 5mm left frontal lobe SDH  Posteriorly displaced (1 cm) Type II odontoid fx  C1 anterior arch fracture   Epidural hemorrhage of 7mm  Acute traumatic neck pain  Acute L 6th rib fracture  S/p fall, Facial abrasions with ecchymoses  RLE wound  He was seen and evaluated by Neurosurgery.  Pt refused surgical intervention.   A repeat head CT was obtained with a stable subdural hematoma. He was placed in a cervical collar for the cervical fracture. No surgical intervention per patient preference.  -Cont [place] J -keep in place at all times  -Tylenol, oxy prn for pain, changed tylenol to extra strength  -Neurosurg ok lovenox for DVT ppx  -wound care following   -f/u Neurosurgery in 6 weeks with an upright XR of the cervical spine   -CT cervical spine in 3 months.      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 Failed bedside and radiology swallow. He completed 3 doses of Decadron for laryngeal edema.   -S/P tracheostomy 09/21/2022, Trach suture removal on  09/26  -9/28 failed blue dye test  -Pulm and RT following  -wean vent as per pulm , on phase 2 currently  -bronchial hygeine w/ albuterol/mucomyst  -Gentle diuresis with bumex      Hypertension   Complete heart block s/p PM placement  Nonrheumatic aortic valve stenosis s/p TAVR 2019  -monitor  -pt is currently normotensive , on bumex and cardura  -echo on 9/16:  EF: 60-65%, no LV dysfunction     Severe orophayngeal dysphagia   S/P PEG   Severe protein calorie malnutrition  Hiatal hernia  -PEG tube placed 09/20. Pt pulled out PEG tube on night of 9/23, replaced on 9/27  -On TF : osmolite 1.5 and 60 ml/hr   -RD following       Hypophosphatemia  Hypomagnesia  Hypokalemia  -Replete prn     Hypothyroidism   -Continue Levothyroxine     Anemia of chronic disease  -hg trending down past few days  -cont to monitor intermittently.     BPH  -cont doxazosin     DM type 2  -sliding scale insulin  -last HbA1:  6.2  -hypoglycemic protocol     Deconditioning  -PT/OT following           Diet: Adult Formula Drip Feeding: Continuous Osmolite 1.5; Gastrostomy; Goal Rate: 55; mL/hr; Medication - Feeding Tube Flush Frequency: At least 15-30 mL water before and after medication administration and with tube clogging; Amount to Send (Nutrition...  NPO for Medical/Clinical Reasons Except for: Ice Chips, NPO but receiving Tube Feeding    DVT Prophylaxis: Lovenox  [place] Catheter: Not present  Central Lines: PRESENT       Cardiac Monitoring: None  Code Status: Full Code      Disposition Plan     Discharge date: TBD, to TCU on discharge  Barriers: Placement     The patient's care was discussed with patient, RN    Brit Hodge MD  Hospitalist Service  LTACH  Securely message with the Vocera Web Console (learn more here)  Text page via [employer] Paging/Directory           ______________________________________________________________________    Interval History   .  Patient is new to me. Chart reviewed and events noted.  Patient seen and examined.    Reports pain, received oxy prior to I saw him. Pt states it doesn't help much. Denies any abd pain, N/V.     ROS:  A 10-system review was obtained and is negative with the exception of the symptoms noted above    Data reviewed today: I reviewed all medications, new labs and imaging results over the last 24 hours.     Physical Exam   Vital Signs: Temp: 98.8  F (37.1  C) Temp src: Axillary BP: 128/60 Pulse: 66   Resp: 20 SpO2: 91 % O2 Device: Trach dome Oxygen Delivery: 30 LPM  Weight: 202 lbs 14.4 oz    General:  No acute distress,awake and alert  Eyes:  Sclera non icteric, normal conjuctiva  Neck :  [place] J in place, trach in place  Lungs:  Clear to auscultation bilaterally  CVS:  S1 and S2, regular rate and rhythm, systolic murmur L sternal border  Abdomen:  Soft, nontender, PEG in place and abdominal binder in place.  Musculoskeletal:  no edema  Neuro:  follows some commands  Skin:  please refer to nursing documentation for full skin assessment    Data   Recent Labs   Lab 10/05/22  1146 10/05/22  0553 10/04/22  2351 10/03/22  0628 10/03/22  0614 09/30/22  1135 09/30/22  0638   NA  --   --   --   --  134*  --  139   POTASSIUM  --   --   --   --  4.6  --  4.2   CHLORIDE  --   --   --   --  99  --  105   CO2  --   --   --   --  28  --  27   BUN  --   --   --   --  21.3  --  23.3*   CR  --   --   --   --  0.64*  --  0.64*   ANIONGAP  --   --   --   --  7  --  7   TITA  --   --   --   --  8.6  --  8.1*   * 119* 124*   < > 114*   < > 139*    < > = values in this interval not displayed.     No results found for this or any previous visit (from the past 24 hour(s)).  Medications     - MEDICATION INSTRUCTIONS -         acetylcysteine  2 mL Nebulization TID     albuterol  2.5 mg Nebulization TID     bumetanide  1 mg Oral or Feeding Tube Daily     calcium carbonate  1,250 mg Oral or Feeding Tube BID w/meals     cholecalciferol  25 mcg Oral or Feeding Tube Daily     doxazosin  2 mg Oral or Feeding Tube Daily      enoxaparin ANTICOAGULANT  40 mg Subcutaneous Q24H     insulin aspart  1-6 Units Subcutaneous Q6H     levothyroxine  112 mcg Oral or Feeding Tube QAM AC     pantoprazole  40 mg Oral or Feeding Tube QAM AC     protein modular  1 packet Per Feeding Tube TID     sennosides  5 mL Oral or Feeding Tube BID     sodium chloride (PF)  10 mL Intracatheter Q8H

## 2022-10-05 NOTE — PLAN OF CARE
Problem: Communication Impairment (Artificial Airway)  Goal: Effective Communication  Outcome: Ongoing, Progressing     Problem: Skin and Tissue Injury (Artificial Airway)  Goal: Absence of Device-Related Skin or Tissue Injury  Outcome: Ongoing, Progressing   RT PROGRESS NOTE     DATA:     CURRENT SETTINGS:              TRACH TYPE / SIZE: #7 Bivona, placed on 10/3.             MODE: TM CUFF UP             FIO2: 30%/30L     ACTION:             THERAPIES: Albuterol/Mucomyst BID              SUCTION:                           FREQUENCY: X3                        AMOUNT: small to moderate                         CONSISTENCY: thin                        COLOR: white (blue tinged)             SPONTANEOUS COUGH EFFORT/STRENGTH OF EFFORT (not elicited by suctioning): good productive                               WEANING PHASE:  2                        WEAN MODE: 30%/30L TM/PMV                        WEAN TIME: days                        END WEAN REASON: for night      RESPONSE:             BS: coarse             VITAL SIGNS: BP (!) 140/64 (BP Location: Left arm)   Pulse 72   Temp 98.8  F (37.1  C) (Axillary)   Resp 20   Wt 91.2 kg (201 lb)   SpO2 94%   BMI 30.56 kg/m                  EMOTIONAL NEEDS / CONCERNS:                  RISK FOR SELF DECANNULATION:                          RISK DUE TO:                          INTERVENTION/S IN PLACE IS/ARE:       NOTE / PLAN: Pt on TM with cuff up, tolerating it well. Neb txs given as order. Will cont to monitor.

## 2022-10-05 NOTE — PLAN OF CARE
Problem: Communication Impairment (Artificial Airway)  Goal: Effective Communication  Outcome: Ongoing, Progressing     Problem: Device-Related Complication Risk (Artificial Airway)  Goal: Optimal Device Function  Outcome: Ongoing, Progressing  Intervention: Optimize Device Care and Function  Recent Flowsheet Documentation  Taken 10/5/2022 0917 by Mariza Solis, RT  Airway Safety Measures: all equipment/monitors on and audible     Problem: Skin and Tissue Injury (Artificial Airway)  Goal: Absence of Device-Related Skin or Tissue Injury  Outcome: Ongoing, Progressing   Goal Outcome Evaluation:      RT PROGRESS NOTE     DATA:     CURRENT SETTINGS:              TRACH TYPE / SIZE:  # 7 Bivona changed 10/03/22             MODE:   TM, PMV day cuff up at night.             FIO2:   25%, 30LPM     ACTION:             THERAPIES:   Alb x2, mucomyst x2             SUCTION:                           FREQUENCY:   x2                        AMOUNT:   mod                        CONSISTENCY:   thin                        COLOR:   white             SPONTANEOUS COUGH EFFORT/STRENGTH OF EFFORT (not elicited by suctioning): fair  No blue sx today.                              WEANING PHASE:   2                        WEAN MODE:    pmv on                        WEAN TIME:   started at 09:28 am                        END WEAN REASON:        RESPONSE:             BS:   clear and diminished after neb and sx             VITAL SIGNS:   SPO2 91-95%, HR 67-70, RR 18-20             EMOTIONAL NEEDS / CONCERNS:  No                RISK FOR SELF DECANNULATION:  No                            NOTE / PLAN:     Pt to cont on tm, pmv on during the day, cuff inflated at night.

## 2022-10-05 NOTE — PLAN OF CARE
Problem: Malnutrition  Goal: Improved Nutritional Intake  Outcome: Ongoing, Progressing   Goal Outcome Evaluation:                Alert and oriented X2, complained of pain on the neck and reported pain 7, managed by PRN Oxycodone, Vs stable, tolerated sitting on the chair for 2 hours, on tube feeding, G/J tube patent, pleasant and cooperative with care.  Hailey Patterson RN

## 2022-10-06 ENCOUNTER — APPOINTMENT (OUTPATIENT)
Dept: PHYSICAL THERAPY | Facility: CLINIC | Age: 87
DRG: 208 | End: 2022-10-06
Attending: INTERNAL MEDICINE
Payer: MEDICARE

## 2022-10-06 ENCOUNTER — APPOINTMENT (OUTPATIENT)
Dept: OCCUPATIONAL THERAPY | Facility: CLINIC | Age: 87
DRG: 208 | End: 2022-10-06
Attending: INTERNAL MEDICINE
Payer: MEDICARE

## 2022-10-06 ENCOUNTER — APPOINTMENT (OUTPATIENT)
Dept: SPEECH THERAPY | Facility: CLINIC | Age: 87
DRG: 208 | End: 2022-10-06
Attending: INTERNAL MEDICINE
Payer: MEDICARE

## 2022-10-06 ENCOUNTER — ANCILLARY PROCEDURE (OUTPATIENT)
Dept: GENERAL RADIOLOGY | Facility: CLINIC | Age: 87
DRG: 208 | End: 2022-10-06
Attending: HOSPITALIST
Payer: MEDICARE

## 2022-10-06 LAB
ANION GAP SERPL CALCULATED.3IONS-SCNC: 7 MMOL/L (ref 7–15)
BUN SERPL-MCNC: 26.7 MG/DL (ref 8–23)
CALCIUM SERPL-MCNC: 8.4 MG/DL (ref 8.2–9.6)
CHLORIDE SERPL-SCNC: 94 MMOL/L (ref 98–107)
CREAT SERPL-MCNC: 0.62 MG/DL (ref 0.67–1.17)
DEPRECATED HCO3 PLAS-SCNC: 28 MMOL/L (ref 22–29)
GFR SERPL CREATININE-BSD FRML MDRD: 90 ML/MIN/1.73M2
GLUCOSE BLDC GLUCOMTR-MCNC: 120 MG/DL (ref 70–99)
GLUCOSE BLDC GLUCOMTR-MCNC: 124 MG/DL (ref 70–99)
GLUCOSE BLDC GLUCOMTR-MCNC: 129 MG/DL (ref 70–99)
GLUCOSE SERPL-MCNC: 125 MG/DL (ref 70–99)
POTASSIUM SERPL-SCNC: 4.2 MMOL/L (ref 3.4–5.3)
SODIUM SERPL-SCNC: 129 MMOL/L (ref 136–145)

## 2022-10-06 PROCEDURE — 97535 SELF CARE MNGMENT TRAINING: CPT | Mod: GO | Performed by: OCCUPATIONAL THERAPIST

## 2022-10-06 PROCEDURE — 92611 MOTION FLUOROSCOPY/SWALLOW: CPT | Mod: GN | Performed by: SPEECH-LANGUAGE PATHOLOGIST

## 2022-10-06 PROCEDURE — 120N000017 HC R&B RESPIRATORY CARE

## 2022-10-06 PROCEDURE — 92526 ORAL FUNCTION THERAPY: CPT | Mod: GN | Performed by: SPEECH-LANGUAGE PATHOLOGIST

## 2022-10-06 PROCEDURE — 250N000009 HC RX 250: Performed by: NURSE PRACTITIONER

## 2022-10-06 PROCEDURE — 250N000013 HC RX MED GY IP 250 OP 250 PS 637: Performed by: HOSPITALIST

## 2022-10-06 PROCEDURE — 74230 X-RAY XM SWLNG FUNCJ C+: CPT

## 2022-10-06 PROCEDURE — 250N000013 HC RX MED GY IP 250 OP 250 PS 637: Performed by: NURSE PRACTITIONER

## 2022-10-06 PROCEDURE — G0463 HOSPITAL OUTPT CLINIC VISIT: HCPCS

## 2022-10-06 PROCEDURE — 99233 SBSQ HOSP IP/OBS HIGH 50: CPT | Performed by: HOSPITALIST

## 2022-10-06 PROCEDURE — 94640 AIRWAY INHALATION TREATMENT: CPT

## 2022-10-06 PROCEDURE — 36415 COLL VENOUS BLD VENIPUNCTURE: CPT | Performed by: HOSPITALIST

## 2022-10-06 PROCEDURE — 97530 THERAPEUTIC ACTIVITIES: CPT | Mod: GP

## 2022-10-06 PROCEDURE — 80048 BASIC METABOLIC PNL TOTAL CA: CPT | Performed by: HOSPITALIST

## 2022-10-06 PROCEDURE — 94640 AIRWAY INHALATION TREATMENT: CPT | Mod: 76

## 2022-10-06 PROCEDURE — 250N000011 HC RX IP 250 OP 636: Performed by: HOSPITALIST

## 2022-10-06 PROCEDURE — 999N000123 HC STATISTIC OXYGEN O2DAILY TECH TIME

## 2022-10-06 PROCEDURE — 999N000157 HC STATISTIC RCP TIME EA 10 MIN

## 2022-10-06 PROCEDURE — 97530 THERAPEUTIC ACTIVITIES: CPT | Mod: GO | Performed by: OCCUPATIONAL THERAPIST

## 2022-10-06 PROCEDURE — 999N000253 HC STATISTIC WEANING TRIALS

## 2022-10-06 PROCEDURE — 999N000009 HC STATISTIC AIRWAY CARE

## 2022-10-06 PROCEDURE — 99232 SBSQ HOSP IP/OBS MODERATE 35: CPT | Performed by: NURSE PRACTITIONER

## 2022-10-06 RX ORDER — BARIUM SULFATE 400 MG/ML
SUSPENSION ORAL ONCE
Status: COMPLETED | OUTPATIENT
Start: 2022-10-06 | End: 2022-10-06

## 2022-10-06 RX ORDER — BUMETANIDE 1 MG/1
1 TABLET ORAL ONCE
Status: COMPLETED | OUTPATIENT
Start: 2022-10-06 | End: 2022-10-06

## 2022-10-06 RX ADMIN — ACETYLCYSTEINE 2 ML: 200 SOLUTION ORAL; RESPIRATORY (INHALATION) at 20:01

## 2022-10-06 RX ADMIN — SENNOSIDES 5 ML: 8.8 LIQUID ORAL at 08:16

## 2022-10-06 RX ADMIN — SENNOSIDES 5 ML: 8.8 LIQUID ORAL at 20:38

## 2022-10-06 RX ADMIN — OXYCODONE HYDROCHLORIDE 5 MG: 5 SOLUTION ORAL at 16:04

## 2022-10-06 RX ADMIN — ALBUTEROL SULFATE 2.5 MG: 2.5 SOLUTION RESPIRATORY (INHALATION) at 14:07

## 2022-10-06 RX ADMIN — ENOXAPARIN SODIUM 40 MG: 100 INJECTION SUBCUTANEOUS at 18:26

## 2022-10-06 RX ADMIN — BUMETANIDE 1 MG: 1 TABLET ORAL at 20:37

## 2022-10-06 RX ADMIN — ACETYLCYSTEINE 2 ML: 200 SOLUTION ORAL; RESPIRATORY (INHALATION) at 14:07

## 2022-10-06 RX ADMIN — OXYCODONE HYDROCHLORIDE 5 MG: 5 SOLUTION ORAL at 10:39

## 2022-10-06 RX ADMIN — Medication 1 PACKET: at 13:26

## 2022-10-06 RX ADMIN — Medication 1 PACKET: at 20:38

## 2022-10-06 RX ADMIN — LEVOTHYROXINE SODIUM 112 MCG: 0.11 TABLET ORAL at 06:40

## 2022-10-06 RX ADMIN — BARIUM SULFATE: 400 SUSPENSION ORAL at 09:44

## 2022-10-06 RX ADMIN — Medication 2 MG: at 08:16

## 2022-10-06 RX ADMIN — CALCIUM CARBONATE 1250 MG: 1250 SUSPENSION ORAL at 18:26

## 2022-10-06 RX ADMIN — Medication 40 MG: at 06:40

## 2022-10-06 RX ADMIN — ALBUTEROL SULFATE 2.5 MG: 2.5 SOLUTION RESPIRATORY (INHALATION) at 20:00

## 2022-10-06 RX ADMIN — ACETAMINOPHEN 1000 MG: 500 TABLET ORAL at 08:16

## 2022-10-06 RX ADMIN — ALBUTEROL SULFATE 2.5 MG: 2.5 SOLUTION RESPIRATORY (INHALATION) at 08:26

## 2022-10-06 RX ADMIN — Medication 25 MCG: at 08:16

## 2022-10-06 RX ADMIN — ACETYLCYSTEINE 2 ML: 200 SOLUTION ORAL; RESPIRATORY (INHALATION) at 08:26

## 2022-10-06 RX ADMIN — Medication 1 PACKET: at 08:17

## 2022-10-06 RX ADMIN — BUMETANIDE 1 MG: 1 TABLET ORAL at 08:16

## 2022-10-06 RX ADMIN — CALCIUM CARBONATE 1250 MG: 1250 SUSPENSION ORAL at 10:39

## 2022-10-06 ASSESSMENT — ACTIVITIES OF DAILY LIVING (ADL)
TOILETING_ASSISTANCE: TOILETING DIFFICULTY, DEPENDENT
ADLS_ACUITY_SCORE: 49
SWALLOWING: 2-->DIFFICULTY SWALLOWING LIQUIDS/FOODS
TRANSFERRING: 2-->COMPLETELY DEPENDENT (NOT DEVELOPMENTALLY APPROPRIATE)
ADLS_ACUITY_SCORE: 77
DRESSING/BATHING_DIFFICULTY: YES
DRESS: 2-->COMPLETELY DEPENDENT
TOILETING: 2-->COMPLETELY DEPENDENT (NOT DEVELOPMENTALLY APPROPRIATE)
ADLS_ACUITY_SCORE: 45
CHANGE_IN_FUNCTIONAL_STATUS_SINCE_ONSET_OF_CURRENT_ILLNESS/INJURY: YES
EATING: 1-->ASSISTANCE (EQUIPMENT/PERSON) NEEDED
SWALLOWING: 2-->DIFFICULTY SWALLOWING LIQUIDS/FOODS
DRESSING/BATHING: DRESSING DIFFICULTY, DEPENDENT
TRANSFERRING: 2-->COMPLETELY DEPENDENT
DIFFICULTY_EATING/SWALLOWING: YES
WALKING_OR_CLIMBING_STAIRS: TRANSFERRING DIFFICULTY, DEPENDENT
EATING: 2-->COMPLETELY DEPENDENT (NOT DEVELOPMENTALLY APPROPRIATE)
ADLS_ACUITY_SCORE: 45
CONCENTRATING,_REMEMBERING_OR_MAKING_DECISIONS_DIFFICULTY: YES
WEAR_GLASSES_OR_BLIND: YES
ADLS_ACUITY_SCORE: 45
ADLS_ACUITY_SCORE: 49
ADLS_ACUITY_SCORE: 45
WALKING_OR_CLIMBING_STAIRS_DIFFICULTY: YES
COMMUNICATION: DIFFICULTY SPEAKING;DIFFICULTY UNDERSTANDING
BATHING: 2-->COMPLETELY DEPENDENT (NOT DEVELOPMENTALLY APPROPRIATE)
TOILETING: 2-->COMPLETELY DEPENDENT
TOILETING_ISSUES: YES
EQUIPMENT_CURRENTLY_USED_AT_HOME: WALKER, ROLLING
DOING_ERRANDS_INDEPENDENTLY_DIFFICULTY: YES
ADLS_ACUITY_SCORE: 49
ADLS_ACUITY_SCORE: 77
DRESS: 2-->COMPLETELY DEPENDENT (NOT DEVELOPMENTALLY APPROPRIATE)
ADLS_ACUITY_SCORE: 49
ADLS_ACUITY_SCORE: 45
FALL_HISTORY_WITHIN_LAST_SIX_MONTHS: YES
ADLS_ACUITY_SCORE: 45

## 2022-10-06 NOTE — PROGRESS NOTES
Speech pathology: Video swallow study     10/06/22 0847   General Information   Onset of Illness/Injury or Date of Surgery 09/15/22   Referring Physician Ronald   Pertinent History of Current Problem Per MD note, dated yesterday:Alexandru Still is a 89 year old man w/ PMH of Lambert-Eaton syndrome, hypothyroidism,  complete heart block s/p pacemaker, HTN, BPH,  PB, and DM type 2 who was admitted to the SICU on 9/15/2022 following an unwitnessed fall and striking his head. He was initially seen at an outside hospital and underwent a comprehensive trauma work up found to have a left frontal SDH (5mm) + C1 fx + Type II C2 odontoid fracture with a 1cm posterior displacement. He was  intubated for airway protection in the ED given high c-spine injury and difficulty managing his secretions. On admission he requested all cares without surgical cervical spine stabilization, agreeable to a tracheostomy and PEG.   General Observations Cervical collar in place, #7 Bivona trach with speaking valve   Past History of Dysphagia Patient completed clinical swallow evaluation in June of this year prior to tracheostomy and fall.  He was recommended for soft and bite sized food textures and mildly thick liquids.   Type of Evaluation   Type of Evaluation Swallow Evaluation   Respiratory Status (Speaking Valve)   Oxygen Supply Trach Dome   Oral Motor   Oral Musculature generally intact   Structural Abnormalities none present   Dentition (Oral Motor)   Dentition (Oral Motor) dental appliance/dentures   Dental Appliance/Denture (Oral Motor) upper and lower   Facial Symmetry (Oral Motor)   Facial Symmetry (Oral Motor) WNL   Lip Function (Oral Motor)   Lip Range of Motion (Oral Motor) WNL   Tongue Function (Oral Motor)   Tongue ROM (Oral Motor) WNL   Cough/Swallow/Gag Reflex (Oral Motor)   Volitional Throat Clear/Cough (Oral Motor) WNL   Volitional Swallow (Oral Motor) WNL   General Swallowing Observations   Current Diet/Method of  Nutritional Intake (General Swallowing Observations, NIS) NPO;gastrostomy tube (PEG)   Swallowing Evaluation Videofluoroscopic swallow study (VFSS)   VFSS Evaluation   Views Taken left lateral   VFSS Textures Trialed thin liquids;mildly thick liquids;moderately thick liquids/liquidized;pureed;solid foods   VFSS Eval: Thin Liquid Texture Trial   Mode of Presentation, Thin Liquid cup;spoon;straw   Preparatory Phase WFL   Oral Phase, Thin Liquid Premature pharyngeal entry   Pharyngeal Phase, Thin Liquid Delayed swallow reflex  (Swallow triggered past the tip of the epiglottis)   Rosenbek's Penetration Aspiration Scale: Thin Liquid Trial Results 3 - contrast remains above the vocal cords, visible residue remains (penetration)   Diagnostic Statement Small sips strategy was not effective in reducing entrance into the airway.  Patient noted to have delayed coughing in absence of observed aspiration.  Cannot rule out occurrence while fluoroscopy was off   VFSS Eval: Mildly Thick Liquids   Mode of Presentation cup   Preparatory Phase WFL   Oral Phase Premature pharyngeal entry   Pharyngeal Phase Delayed swallow reflex  (Swallow triggered past tip of the epiglottis inconsistently)   Rosenbek's Penetration Aspiration Scale 3 - contrast remains above the vocal cords, visible residue remains (penetration)   Diagnostic Statement Small sips strategy was not effective in reducing entrance into the airway.  Patient noted to have delayed coughing in absence of observed aspiration.  Cannot rule out occurrence while fluoroscopy was off   VFSS Eval: Moderately Thick Liquids   Mode of Presentation cup;spoon   Preparatory Phase WFL   Oral Phase Premature pharyngeal entry;Delayed AP movement   Pharyngeal Phase Delayed swallow reflex;Residue in valleculae  (Swallow triggered at valleculae.  Mild residue in vallecula)   Rosenbek's Penetration Aspiration Scale 1 - no aspiration, contrast does not enter airway   VFSS Evaluation: Puree Solid  Texture Trial   Mode of Presentation, Puree spoon   Preparatory Phase WFL   Oral Phase, Puree Delayed AP movement;Premature pharyngeal entry   Pharyngeal Phase, Puree Delayed swallow reflex  (Swallow triggered at vallecula)   Rosenbek's Penetration Aspiration Scale: Puree Food Trial Results 1 - no aspiration, contrast does not enter airway   VFSS Evaluation: Solid Food Texture Trial   Mode of Presentation, Solid fed by clinician   Preparatory Phase Insufficient mastication   Oral Phase, Solid Impaired mastication   Pharyngeal Phase, Solid Delayed swallow reflex  (Swallow triggered at valleculae)   Rosenbek's Penetration Aspiration Scale: Solid Food Trial Results 1 - no aspiration, contrast does not enter airway   Esophageal Phase of Swallow   Patient reports or presents with symptoms of esophageal dysphagia No   Swallowing Recommendations   Diet Consistency Recommendations pureed (level 4);moderately thick liquids/liquidized (level 3)   Supervision Level for Intake 1:1 supervision needed   Mode of Delivery Recommendations bolus size, small;slow rate of intake   Recommended Feeding/Eating Techniques (Swallow Eval) maintain upright sitting position for eating;maintain upright posture during/after eating for 30 minutes   Medication Administration Recommendations, Swallowing (SLP) Feeding tube or crushed in purée   Comment, Swallowing Recommendations Videofluoroscopic Swallow Study completed. Patient had no observed aspiration with any consistency and deep laryngeal penetration with mildly thick and thin liquids that did not clear laryngeal vestibule spontaneously.  This did seem to clear with delayed spontaneous cough. Oral motor function adequate and oral phase mildly impaired. Tongue base retraction was adequate. Swallow response was delayed inconsistently, more so with thin liquids and epiglottic inversion was incomplete, posterior-inferior to horizontal movement.  Mild vallecular stasis occurred with all  consistencies. Hyolaryngeal elevation was adequate and hyolaryngeal excursion was reduced. Recommend diet of puréed food textures and moderately thick liquids.   General Therapy Interventions   Planned Therapy Interventions Dysphagia Treatment   Dysphagia treatment Modified diet education;Instruction of safe swallow strategies;Compensatory strategies for swallowing   Clinical Impression   Criteria for Skilled Therapeutic Interventions Met (SLP Eval) Yes, treatment indicated   SLP Diagnosis Dysphagia   Risks & Benefits of therapy have been explained evaluation/treatment results reviewed;care plan/treatment goals reviewed;participants voiced agreement with care plan;participants included;patient   Clinical Impression Comments Patient presents with mild to moderate oral dysphagia and moderate pharyngeal dysphagia characterized by reduced oral phase and delayed and incomplete epiglottic inversion to protect airway inconsistently.  Use of small sip strategy was not effective in reducing aspiration risk with thinner liquids.   SLP Discharge Planning   SLP Discharge Recommendation Transitional Care Facility;Acute Rehab Center-Motivated patient will benefit from intensive, interdisciplinary therapy.  Anticipate will be able to tolerate 3 hours of therapy per day;home with home care speech therapy   SLP Rationale for DC Rec Well below baseline for swallowing, cognition   SLP Brief overview of current status  Appropriate to initiate diet of puréed food textures and moderately thick liquids.  May need assistance with feeding.    Total Evaluation Time   Total Evaluation Time (Minutes) 20

## 2022-10-06 NOTE — PLAN OF CARE
Problem: Communication Impairment (Artificial Airway)  Goal: Effective Communication  Outcome: Ongoing, Progressing     Problem: Device-Related Complication Risk (Artificial Airway)  Goal: Optimal Device Function  Outcome: Ongoing, Progressing  Intervention: Optimize Device Care and Function  Recent Flowsheet Documentation  Taken 10/6/2022 0827 by Mariza Solis, RT  Airway Safety Measures: all equipment/monitors on and audible     Problem: Skin and Tissue Injury (Artificial Airway)  Goal: Absence of Device-Related Skin or Tissue Injury  Outcome: Ongoing, Progressing   Goal Outcome Evaluation:    RT PROGRESS NOTE     DATA:     CURRENT SETTINGS:              TRACH TYPE / SIZE:  # 7 Bivona changed 10/03/22             MODE:   TM, PMV day cuff up at night.             FIO2:   21%, 25LPM     ACTION:             THERAPIES:   Alb x2, mucomyst x2             SUCTION:                           FREQUENCY:   x3                        AMOUNT:   mod to sm                        CONSISTENCY:   thick                        COLOR:   white             SPONTANEOUS COUGH EFFORT/STRENGTH OF EFFORT (not elicited by suctioning): fair                              WEANING PHASE:   2                        WEAN MODE:    pmv on                        WEAN TIME:   started at 08:58 am                        END WEAN REASON:        RESPONSE:             BS:   clear and diminished after neb and sx             VITAL SIGNS:   SPO2 97%, HR 67-70, RR 18-20             EMOTIONAL NEEDS / CONCERNS:  No                RISK FOR SELF DECANNULATION:  No                            NOTE / PLAN:     New Order  Start increasing PMV times into the night slowly  Pt had VSS today: puree foods and mod thick liquids  Pt to cont on tm, pmv on during the day until midnight, then cuff up.

## 2022-10-06 NOTE — PROGRESS NOTES
Pulmonary Progress Note    Admit Date: 9/22/2022  CODE: Full Code    Assessment/Plan:   89 yoM admitted 9/15/2022 following an unwitnessed fall found to have a left frontal SDH and C1/C2 cervical spine fracture.  Intubated for airway protection in the ED given high c-spine injury and difficulty managing his secretions. On admission he requested all cares without surgical cervical spine stabilization, agreeable to a tracheostomy and PEG. Discharged to [facility] 9/22/22.     PMHx: Lambert-Eaton syndrome, TAVR 2019, complete heart block with pacemaker dependency, DM2, arthritis, BPH, HTN, osteoporosis     Pertinent problems:  Acute hypoxic/hypercapnic respiratory failure s/p trach: in setting of traumatic cervical injury after a fall.  History of PB(no home CPAP per pt).  Liberated from vent 9/26 with acceptable blood gas off vent.  Aspirating on BDTs but these are slowly improving.  Diuresing with bumex given opacities on CxR; likely combination of fluid and atelectasis.  - Phase 2: TM/PMV day.  TM/no pmv/cuff up night.  Start increasing PMV time into the night slowly  - Albuterol/mucomsyt nebs TID for bronchial hygiene.  Avoiding metaneb for now given CSI  - continue gentle diuresis with Bumex.  Will given an extra dose this evening     Tracheostomy: 6 Shiley placed 9/21/2022.  Downsized to 7 Bivona 10/3 without issue    Dysphagia s/p PEG: Repeat BDT 10/3 with no immediate and large delayed aspiration; this is improvement from previous tests. SLP blanche on 10/1 notes to keep NPO except ice chips.  Tolerating PMV well during the day with a good spontaneous cough per RT  - VFSS(results pending)    Laryngeal edema 2/2 traumatic cervical fracture/injury s/p 3 doses of decadron in acute care    Other problems managed by primary team:  1. Acute neck and rib cage pain(known rib fractures), chronic hip/shoulder pain  2. Traumatic SDH, stable  3. C1/C2 CSI: [place] J collar at all times, repeat imaging per NSG  4. DVT ppx:  Lovenox  5. Hyponatremia: consider decreasing FWF    Sawyer Capone CNP  Pulmonary Medicine  [facility]  Pager [phone]    Subjective/Interim Events:   - Denies dyspnea, n/v, pain  - more oriented today  - VFSS pending  - 30L/25-30%TM    Tracheal secretions:  Overnight - x3, mod/large, thick  Yesterday - x2, small, thick    Cough strength: strong, productive     Current phase of ventilator weaning pathway:  Phase 2    Ventilator weaning results  - continuous TD since 9/26  - 10/2: PMV for ~12hr  - 10/3: PMV for ~14hr  -10/4:  PMV for ~12hr    Clinical status discussed today with respiratory therapist, hospitalist     Medications:       - MEDICATION INSTRUCTIONS -         acetylcysteine  2 mL Nebulization TID     albuterol  2.5 mg Nebulization TID     bumetanide  1 mg Oral or Feeding Tube Daily     calcium carbonate  1,250 mg Oral or Feeding Tube BID w/meals     cholecalciferol  25 mcg Oral or Feeding Tube Daily     doxazosin  2 mg Oral or Feeding Tube Daily     enoxaparin ANTICOAGULANT  40 mg Subcutaneous Q24H     insulin aspart  1-6 Units Subcutaneous Q6H     levothyroxine  112 mcg Oral or Feeding Tube QAM AC     pantoprazole  40 mg Oral or Feeding Tube QAM AC     protein modular  1 packet Per Feeding Tube TID     sennosides  5 mL Oral or Feeding Tube BID     sodium chloride (PF)  10 mL Intracatheter Q8H         Exam/Data:   Vitals  /61 (BP Location: Left arm, Patient Position: Supine, Cuff Size: Adult Regular)   Pulse 63   Temp 97.6  F (36.4  C) (Axillary)   Resp 24   Wt 91.2 kg (201 lb 1.6 oz)   SpO2 97%   BMI 30.58 kg/m       I/O last 3 completed shifts:  In: 1665 [NG/GT:1665]  Out: 1350 [Urine:1350]  Weight change: -0.816 kg (-1 lb 12.8 oz)    Vent Mode: Trach collar  FiO2 (%): 30 %  Resp: 24      EXAM:  Gen:  no distress on TM/PMV sitting in chair  HEENT: NT, trach midline/intact, c-collar on, good voice, no stridor   CV: RRR, no m/g/r  Resp: CTAB; non-labored   Abd: soft, nontender,  BS+  Skin: no rashes or lesions  Ext: mild b/l UE edema  Neuro: A&O x4, follows commands    ROS:  A 10-system review was obtained and is negative with the exception of the symptoms noted above.    Labs:  Basic Metabolic Panel  Recent Labs   Lab 10/06/22  0636 10/06/22  0602 10/05/22  2323 10/05/22  1745 10/03/22  0628 10/03/22  0614 09/30/22  1135 09/30/22  0638   *  --   --   --   --  134*  --  139   POTASSIUM 4.2  --   --   --   --  4.6  --  4.2   CHLORIDE 94*  --   --   --   --  99  --  105   CO2 28  --   --   --   --  28  --  27   BUN 26.7*  --   --   --   --  21.3  --  23.3*   CR 0.62*  --   --   --   --  0.64*  --  0.64*   * 129* 102* 116*   < > 114*   < > 139*    < > = values in this interval not displayed.     CBC RESULTS: Recent Labs   Lab Test 09/27/22  0648   WBC 7.3   RBC 3.16*   HGB 9.9*   HCT 29.1*   MCV 92   MCH 31.3   MCHC 34.0   RDW 14.4           RADIOLOGY:   XR CHEST PORT 1 VIEW  LOCATION: [facility]  DATE/TIME: 10/3/2022 8:40 AM     INDICATION: hypoxic respiratory failure  COMPARISON: Chest x-ray 09/26/2022                                                                      IMPRESSION: Stable satisfactory tracheostomy tube position. TAVR. Stable dual-lead left-sided cardiac conduction device. Right shoulder arthroplasty. Bibasilar pulmonary opacities which may reflect atelectasis and/or infiltrates, increased on the left   and stable on the right. No definite pleural effusion. Stable cardiomegaly and central vascular congestion.

## 2022-10-06 NOTE — PLAN OF CARE
Problem: Plan of Care - These are the overarching goals to be used throughout the patient stay.    Goal: Optimal Comfort and Wellbeing  Outcome: Ongoing, Progressing     Problem: Restraint, Nonbehavioral (Nonviolent)  Goal: Absence of Harm or Injury  Outcome: Ongoing, Progressing     Problem: Pain Acute  Goal: Acceptable Pain Control and Functional Ability  Outcome: Ongoing, Progressing  Intervention: Prevent or Manage Pain  Recent Flowsheet Documentation  Taken 10/5/2022 2000 by Peg Smith RN  Bowel Elimination Promotion: adequate fluid intake promoted  Medication Review/Management: medications reviewed   Goal Outcome Evaluation:      Pt denies pain.calm and cooperative with cares.VSS.cares met.

## 2022-10-06 NOTE — PLAN OF CARE
Problem: Malnutrition  Goal: Improved Nutritional Intake  Outcome: Ongoing, Progressing   Goal Outcome Evaluation:           Alert and oriented X4, complained of pain, managed by PRN Tylenol and Oxycodone, Vs stable, video swallow done, diet changed to pureed diet level 4 and mildly thickened liquid, on continuous TF, soft BM X2, Primofit draining clear yogesh urine, dressing intact.  Hailey Patterson RN

## 2022-10-06 NOTE — PROGRESS NOTES
[facility]    Medicine Progress Note - Hospitalist Service    Date of Admission:  9/22/2022  Brief Hospital Course Summary:    Alexandru Still is a 89 year old man w/ PMH of Lambert-Eaton syndrome, hypothyroidism,  complete heart block s/p pacemaker, HTN, BPH,  PB, and DM type 2 who was admitted to the SICU on 9/15/2022 following an unwitnessed fall and striking his head. He was initially seen at an outside hospital and underwent a comprehensive trauma work up found to have a left frontal SDH (5mm) + C1 fx + Type II C2 odontoid fracture with a 1cm posterior displacement. He was  intubated for airway protection in the ED given high c-spine injury and difficulty managing his secretions. On admission he requested all cares without surgical cervical spine stabilization, agreeable to a tracheostomy and PEG. Multiple care conferences held with his family who confirmed his wishes.     LTACH course:    9/24-9/26:  Speech evaluated patient and recommends NPO, cont tube feeds via PEG due to severe oropharyngeal dysphasia.  PT/OT, dietitian, wo nurse saw pt on 9/23.  Pulled out G tube during night of 9/23. Pt now on soft wrist restraints.  IVF changed to D51/2 NS at low rate as pt has TAVR.  Morphine IV ordered prn pain.    Spoke w/ Dr. Brown- IR - recommends no hannah tube as this is a brand new PEG and placing it blind will likely lead it to not be in correct position.  He will not do a PEG placement on the weekend, scheduled for Monday.  Pt cannot be fed via NG tube as it is contraindicated with C1 and C2 fx.  For PEG tube replacement (herpain on hold, place ICD)on 9/26.  Will discontinue IVF once PEG tube is placed and restart po meds.  Given NaPhos 9/26 9/27-10/3:  9/27 patient had PEG tube replaced after patient himself removed it on 9/23.  Was a started on tube feeding.  P.O. medication were restarted.  IV fluids were discontinued.  He still needs soft restraint x2.  Is off ventilator and stable.  9/28 Failed blue  dye test, Continue NPO   9/30 patient had [place] J 300 collar pads delivered  10/1 SLP found patient well below baseline for swallowing, cognition and communication.  Patient stays n.p.o. except ice chips    10/4/22:   -No acute events, Vent weaning phase 2. Continue diuresis with bumex. Continue plan of care. Cognitive testing 10/4/22 (may need a few days, poor tolerance on patient's part for testing). May need neuropsych evaluation depending on cognitive evaluation.     Assessment & Plan        Traumatic 5mm left frontal lobe SDH  Posteriorly displaced (1 cm) Type II odontoid fx  C1 anterior arch fracture   Epidural hemorrhage of 7mm  Acute traumatic neck pain  Acute L 6th rib fracture  S/p fall, Facial abrasions with ecchymoses  RLE wound  He was seen and evaluated by Neurosurgery.  Pt refused surgical intervention.   A repeat head CT was obtained with a stable subdural hematoma. He was placed in a cervical collar for the cervical fracture. No surgical intervention per patient preference.  -Cont [place] J -keep in place at all times  -Tylenol, oxy prn for pain, changed tylenol to extra strength  -Neurosurg ok lovenox for DVT ppx  -Wound care following   -f/u Neurosurgery in 6 weeks with an upright XR of the cervical spine   -CT cervical spine in 3 months.      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 Failed bedside and radiology swallow. He completed 3 doses of Decadron for laryngeal edema.   -S/P tracheostomy 09/21/2022, Trach suture removal on  09/26  -Pulm and RT following  -Wean vent as per pulm , on phase 2 currently  -Bronchial hygeine w/ albuterol/mucomyst  -Gentle diuresis with bumex      Hypertension   Complete heart block s/p PM placement  Nonrheumatic aortic valve stenosis s/p TAVR 2019  -Monitor  -Pt is currently normotensive , on bumex and cardura  -Echo on 9/16:  EF: 60-65%, no LV dysfunction     Severe orophayngeal dysphagia   S/P PEG   Severe protein calorie malnutrition  Hiatal hernia  -PEG tube placed 09/20. Pt pulled out PEG tube on night of 9/23, replaced on 9/27  -On TF : osmolite 1.5 and 60 ml/hr   -RD following  -S/p VFSS today, results pending.       Hypophosphatemia  Hypomagnesia  Hypokalemia  -Replete prn     Hypothyroidism   -Continue Levothyroxine     Anemia of chronic disease  -Hg trending down past few days  -Cont to monitor intermittently.     BPH  -Cont doxazosin     DM type 2  -Sliding scale insulin  -Last HbA1:  6.2  -Hypoglycemic protocol     Deconditioning  -PT/OT following           Diet: Adult Formula Drip Feeding: Continuous Osmolite 1.5; Gastrostomy; Goal Rate: 55; mL/hr; Medication - Feeding Tube Flush Frequency: At least 15-30 mL water before and after medication administration and with tube clogging; Amount to Send (Nutrition...  NPO for Medical/Clinical Reasons Except for: Ice Chips, NPO but receiving Tube Feeding    DVT Prophylaxis: Lovenox  [place] Catheter: Not present  Central Lines: PRESENT       Cardiac Monitoring: None  Code Status: Full Code      Disposition Plan     Discharge date: TBD, to TCU on discharge  Barriers: Placement     The patient's care was discussed with patient, RN, SW/CM    Brit Hodge MD  Hospitalist Service  LTACH  Securely message with the Vocera Web Console (learn more here)  Text page via Webstep Paging/Directory           ______________________________________________________________________    Interval History    Chart reviewed and events noted.  Patient seen and examined.   Sitting  up in chair, states feels ok. Pain slightly better today. Esha bd pain, N/V. Returned from his video swallow. No acute events overnight    ROS:  A 10-system review was obtained and is negative with the exception of the symptoms noted above    Data reviewed today: I reviewed all medications, new labs and imaging results over the last 24 hours.     Physical Exam   Vital Signs: Temp: 97.6  F (36.4  C) Temp src: Axillary BP: 127/61 Pulse: 63   Resp: 24 SpO2: 97 % O2 Device: Trach dome Oxygen Delivery: 30 LPM  Weight: 201 lbs 1.6 oz    General:  No acute distress,awake and alert  HEENT: [place] J in place, trach in place  Lungs:  Clear to auscultation bilaterally  CVS:  S1 and S2, RRR  Abdomen:  Soft, nontender, PEG in place and abdominal binder in place.  Musculoskeletal:  No edema  Neuro: Awake, alert, oriented, speech+, following commands.   Skin:  please refer to nursing documentation for full skin assessment    Data   Recent Labs   Lab 10/06/22  0636 10/06/22  0602 10/05/22  2323 10/03/22  0628 10/03/22  0614 09/30/22  1135 09/30/22  0638   *  --   --   --  134*  --  139   POTASSIUM 4.2  --   --   --  4.6  --  4.2   CHLORIDE 94*  --   --   --  99  --  105   CO2 28  --   --   --  28  --  27   BUN 26.7*  --   --   --  21.3  --  23.3*   CR 0.62*  --   --   --  0.64*  --  0.64*   ANIONGAP 7  --   --   --  7  --  7   TITA 8.4  --   --   --  8.6  --  8.1*   * 129* 102*   < > 114*   < > 139*    < > = values in this interval not displayed.     No results found for this or any previous visit (from the past 24 hour(s)).  Medications     - MEDICATION INSTRUCTIONS -         acetylcysteine  2 mL Nebulization TID     albuterol  2.5 mg Nebulization TID     bumetanide  1 mg Oral or Feeding Tube Daily     calcium carbonate  1,250 mg Oral or Feeding Tube BID w/meals     cholecalciferol  25 mcg Oral or Feeding Tube Daily     doxazosin  2 mg Oral or Feeding Tube Daily     enoxaparin ANTICOAGULANT  40 mg Subcutaneous Q24H      insulin aspart  1-6 Units Subcutaneous Q6H     levothyroxine  112 mcg Oral or Feeding Tube QAM AC     pantoprazole  40 mg Oral or Feeding Tube QAM AC     protein modular  1 packet Per Feeding Tube TID     sennosides  5 mL Oral or Feeding Tube BID     sodium chloride (PF)  10 mL Intracatheter Q8H

## 2022-10-06 NOTE — PROGRESS NOTES
[facility]  WOC Nurse Inpatient Assessment     Consulted for: Face, RLE, Trach, G-tube    Patient History (according to provider note(s):        Areas Assessed:      Trach - erythema kelle skin but no concerns  Continue routine cares      RLE open areas 90% healed (scar tissue) and 10% partial thickness, scattered  Improved      Treatment Plan:     RLE wound care - every 5 days  Cleanse wounds with wound cleanser, gently dry.  Cover open wounds with a strip of oil emulsion gauze and cover with Mepilex sacral dressing.     Orders: Reviewed    RECOMMEND PRIMARY TEAM ORDER: None, at this time  Education provided: plan of care  Discussed plan of care with: Patient  WOC nurse follow-up plan: weekly  Notify WOC if wound(s) deteriorate.  Nursing to notify the Provider(s) and re-consult the WOC Nurse if new skin concern.    DATA:     Current support surface: Standard  Foam mattress  Containment of urine/stool: Incontinence Protocol  BMI: Body mass index is 30.58 kg/m .   Active diet order: Orders Placed This Encounter      Pureed Diet (level 4) Liquidized/Moderately Thick (level 3)     Output: I/O last 3 completed shifts:  In: 1665 [NG/GT:1665]  Out: 1350 [Urine:1350]     Labs:   No lab results found in last 7 days.    Invalid input(s): GLUCOMBO  Pressure injury risk assessment:   Sensory Perception: 3-->slightly limited  Moisture: 3-->occasionally moist  Activity: 2-->chairfast  Mobility: 3-->slightly limited  Nutrition: 3-->adequate  Friction and Shear: 2-->potential problem  Bakari Score: 16    Hilary Smith, [place], RN, PHN, HNB-BC, CWOCN

## 2022-10-06 NOTE — PLAN OF CARE
Problem: Plan of Care - These are the overarching goals to be used throughout the patient stay.    Goal: Plan of Care Review/Shift Note  Description: The Plan of Care Review/Shift note should be completed every shift.  The Outcome Evaluation is a brief statement about your assessment that the patient is improving, declining, or no change.  This information will be displayed automatically on your shift note.  Outcome: Ongoing, Progressing   Goal Outcome Evaluation:           Patient was calm and cooperative, denied pain and slept most of the shift. Patient was repositioned every 2 hours, BS was within normal range,  no insulin given.

## 2022-10-07 ENCOUNTER — APPOINTMENT (OUTPATIENT)
Dept: PHYSICAL THERAPY | Facility: CLINIC | Age: 87
DRG: 208 | End: 2022-10-07
Attending: INTERNAL MEDICINE
Payer: MEDICARE

## 2022-10-07 ENCOUNTER — APPOINTMENT (OUTPATIENT)
Dept: SPEECH THERAPY | Facility: CLINIC | Age: 87
DRG: 208 | End: 2022-10-07
Attending: INTERNAL MEDICINE
Payer: MEDICARE

## 2022-10-07 ENCOUNTER — DOCUMENTATION ONLY (OUTPATIENT)
Dept: ORTHOPEDICS | Facility: CLINIC | Age: 87
End: 2022-10-07

## 2022-10-07 LAB
ANION GAP SERPL CALCULATED.3IONS-SCNC: 9 MMOL/L (ref 7–15)
BUN SERPL-MCNC: 25.1 MG/DL (ref 8–23)
CALCIUM SERPL-MCNC: 8.3 MG/DL (ref 8.2–9.6)
CHLORIDE SERPL-SCNC: 96 MMOL/L (ref 98–107)
CREAT SERPL-MCNC: 0.66 MG/DL (ref 0.67–1.17)
DEPRECATED HCO3 PLAS-SCNC: 28 MMOL/L (ref 22–29)
GFR SERPL CREATININE-BSD FRML MDRD: 88 ML/MIN/1.73M2
GLUCOSE BLDC GLUCOMTR-MCNC: 123 MG/DL (ref 70–99)
GLUCOSE BLDC GLUCOMTR-MCNC: 127 MG/DL (ref 70–99)
GLUCOSE BLDC GLUCOMTR-MCNC: 127 MG/DL (ref 70–99)
GLUCOSE BLDC GLUCOMTR-MCNC: 132 MG/DL (ref 70–99)
GLUCOSE SERPL-MCNC: 149 MG/DL (ref 70–99)
POTASSIUM SERPL-SCNC: 4.1 MMOL/L (ref 3.4–5.3)
SODIUM SERPL-SCNC: 133 MMOL/L (ref 136–145)

## 2022-10-07 PROCEDURE — 250N000013 HC RX MED GY IP 250 OP 250 PS 637: Performed by: HOSPITALIST

## 2022-10-07 PROCEDURE — 250N000011 HC RX IP 250 OP 636: Performed by: HOSPITALIST

## 2022-10-07 PROCEDURE — 94640 AIRWAY INHALATION TREATMENT: CPT

## 2022-10-07 PROCEDURE — 92526 ORAL FUNCTION THERAPY: CPT | Mod: GN | Performed by: SPEECH-LANGUAGE PATHOLOGIST

## 2022-10-07 PROCEDURE — 80051 ELECTROLYTE PANEL: CPT | Performed by: HOSPITALIST

## 2022-10-07 PROCEDURE — 999N000253 HC STATISTIC WEANING TRIALS

## 2022-10-07 PROCEDURE — 999N000157 HC STATISTIC RCP TIME EA 10 MIN

## 2022-10-07 PROCEDURE — 120N000017 HC R&B RESPIRATORY CARE

## 2022-10-07 PROCEDURE — 250N000013 HC RX MED GY IP 250 OP 250 PS 637: Performed by: NURSE PRACTITIONER

## 2022-10-07 PROCEDURE — 99233 SBSQ HOSP IP/OBS HIGH 50: CPT | Performed by: HOSPITALIST

## 2022-10-07 PROCEDURE — 999N000009 HC STATISTIC AIRWAY CARE

## 2022-10-07 PROCEDURE — 250N000009 HC RX 250: Performed by: NURSE PRACTITIONER

## 2022-10-07 PROCEDURE — 99232 SBSQ HOSP IP/OBS MODERATE 35: CPT | Performed by: NURSE PRACTITIONER

## 2022-10-07 PROCEDURE — 97530 THERAPEUTIC ACTIVITIES: CPT | Mod: GP

## 2022-10-07 PROCEDURE — 36415 COLL VENOUS BLD VENIPUNCTURE: CPT | Performed by: HOSPITALIST

## 2022-10-07 PROCEDURE — 94640 AIRWAY INHALATION TREATMENT: CPT | Mod: 76

## 2022-10-07 RX ORDER — ALBUTEROL SULFATE 0.83 MG/ML
2.5 SOLUTION RESPIRATORY (INHALATION)
Status: DISCONTINUED | OUTPATIENT
Start: 2022-10-07 | End: 2022-10-17

## 2022-10-07 RX ORDER — ACETYLCYSTEINE 200 MG/ML
2 SOLUTION ORAL; RESPIRATORY (INHALATION) 2 TIMES DAILY
Status: DISCONTINUED | OUTPATIENT
Start: 2022-10-07 | End: 2022-10-13

## 2022-10-07 RX ADMIN — ACETYLCYSTEINE 2 ML: 200 SOLUTION ORAL; RESPIRATORY (INHALATION) at 07:10

## 2022-10-07 RX ADMIN — Medication 2 MG: at 12:57

## 2022-10-07 RX ADMIN — ENOXAPARIN SODIUM 40 MG: 100 INJECTION SUBCUTANEOUS at 18:15

## 2022-10-07 RX ADMIN — Medication 40 MG: at 06:44

## 2022-10-07 RX ADMIN — ALBUTEROL SULFATE 2.5 MG: 2.5 SOLUTION RESPIRATORY (INHALATION) at 19:30

## 2022-10-07 RX ADMIN — ACETAMINOPHEN 1000 MG: 500 TABLET ORAL at 20:02

## 2022-10-07 RX ADMIN — SENNOSIDES 5 ML: 8.8 LIQUID ORAL at 20:02

## 2022-10-07 RX ADMIN — ALBUTEROL SULFATE 2.5 MG: 2.5 SOLUTION RESPIRATORY (INHALATION) at 07:10

## 2022-10-07 RX ADMIN — LEVOTHYROXINE SODIUM 112 MCG: 0.11 TABLET ORAL at 06:44

## 2022-10-07 RX ADMIN — BUMETANIDE 1 MG: 1 TABLET ORAL at 12:57

## 2022-10-07 RX ADMIN — Medication 25 MCG: at 12:58

## 2022-10-07 RX ADMIN — CALCIUM CARBONATE 1250 MG: 1250 SUSPENSION ORAL at 17:29

## 2022-10-07 RX ADMIN — CALCIUM CARBONATE 1250 MG: 1250 SUSPENSION ORAL at 12:57

## 2022-10-07 RX ADMIN — ACETYLCYSTEINE 2 ML: 200 SOLUTION ORAL; RESPIRATORY (INHALATION) at 19:29

## 2022-10-07 RX ADMIN — SENNOSIDES 5 ML: 8.8 LIQUID ORAL at 12:57

## 2022-10-07 ASSESSMENT — ACTIVITIES OF DAILY LIVING (ADL)
ADLS_ACUITY_SCORE: 71
ADLS_ACUITY_SCORE: 77
ADLS_ACUITY_SCORE: 77
ADLS_ACUITY_SCORE: 71
ADLS_ACUITY_SCORE: 77

## 2022-10-07 NOTE — PROGRESS NOTES
S: Alexandru Still was seen at [facility], Room 124-01, for a consult regarding Posterior Section of the Patient s [place] J Cervical Collar.  The patient is expected to be supine for an extended period and needs the pressure relief an [place] (pronounced  ocean ) Cervical Collar Back provides.    Dx: Anterior arch C1 and base of dens fractures.  Unchanged 7mm probable epidural hematoma.    O:  Patient is currently wearing a size a size MJ-300 [place] J cervical collar.  With the assistance of the Patient s Nurse, stabilizing the patient s cervical spine, the patient was refit with the patient s MJ-300 [place] J anterior section and a new ACB-100 [place] posterior section.  During the cervical collar refitting, the patient s nurse was able to inspect the patient s occipital area.  The Nurse noted area that felt like a scab and the patient indicated was tender to palpation.  I was not able to visualize the potential occipital breakdown.    A:  The MJ-300 [place] J anterior affords the tracheotomy access.  The posterior section was centered on the patient s head. With the side closure straps extending equally on both sides, the Velcro was secured. The patient s Nurse instructed on donning, doffing, use and care.  The manufacture s directions and information were provided and placed near the other cervical supplies in the patient s room.    P: The orthosis should be worn according to the physician s directions.  Please monitor for indications of excessive skin pressure.  Unless otherwise directed by a physician, care should be taken to divide any pillows/head supports between the shoulder blades and the patient s head with the goal of maintaining the patient s head neutral in terms of flexion/extension.  This will reduce potential chin pressures.      The Occian posterior section is a pressure relieving memory foam that is clinically proven to virtually eliminate occipital breakdown without sacrificing c-spine stability.   The [place]ian back is easy to clean, is X-ray/CT lucent, and MR safe.    G: The objective/goal of the cervical collar is to reduce cervical spine motion and provide cervical stability.    Please contact the [facility] at [phone] if you have any questions.  Thank you, Curt    Note electronically signed by Curt Romano CPO, KHURRAMO

## 2022-10-07 NOTE — PHARMACY-CONSULT NOTE
Pharmacy Tube Feeding Consult    Medication reviewed for administration by feeding tube and for potential food/drug interactions.    Recommendation: No changes are needed at this time.    Levothyroxine and calcium doses re timed to allow at least 4 hours between administrations     Pharmacy will continue to follow as new medications are ordered.

## 2022-10-07 NOTE — PLAN OF CARE
Problem: Device-Related Complication Risk (Mechanical Ventilation, Invasive)  Goal: Optimal Device Function  Outcome: Ongoing, Progressing  Intervention: Optimize Device Care and Function  Recent Flowsheet Documentation  Taken 9/23/2022 1127 by Radha Mcclelland RT  Airway Safety Measures: all equipment/monitors on and audible  Taken 9/23/2022 0805 by Radha Mcclelland RT  Airway Safety Measures: all equipment/monitors on and audible     Problem: Inability to Wean (Mechanical Ventilation, Invasive)  Goal: Mechanical Ventilation Liberation  Outcome: Ongoing, Progressing     Problem: Skin and Tissue Injury (Mechanical Ventilation, Invasive)  Goal: Absence of Device-Related Skin and Tissue Injury  Outcome: Ongoing, Progressing     Problem: Ventilator-Induced Lung Injury (Mechanical Ventilation, Invasive)  Goal: Absence of Ventilator-Induced Lung Injury  Outcome: Ongoing, Progressing   RT PROGRESS NOTE   [id number]  DATA:     CURRENT SETTINGS:             TRACH TYPE / SIZE:#7 Bivona, changed from #6 Shiley Taper guard on 10/03              MODE:TM 21% 25L               FIO2:        ACTION:             THERAPIES: Alb/ Mucomyst neb BID from TID              SUCTION: X1  for   small p-yellow thick to thin                         FREQUENCY:                           AMOUNT:                           CONSISTENCY:                           COLOR:                SPONTANEOUS COUGH EFFORT/STRENGTH OF EFFORT (not elicited by suctioning):                               WEANING PHASE:  2                         WEAN MODE: PMV started  at 0830, chichi well,   TM cont since  9/26                        WEAN TIME:                           END WEAN REASON:        RESPONSE:             BS:                VITAL SIGNS:                EMOTIONAL NEEDS / CONCERNS:                  RISK FOR SELF DECANNULATION:                          RISK DUE TO:                          INTERVENTION/S IN PLACE IS/ARE:         NOTE / PLAN:   Goal Outcome  Evaluation:    Cont with current POC

## 2022-10-07 NOTE — PLAN OF CARE
Problem: Malnutrition  Goal: Improved Nutritional Intake  Outcome: Ongoing, Progressing   Goal Outcome Evaluation:           Oral diet initiated 10/6/22 s/p VSS  Modified enteral schedule to back up bolus feedings IF intake < 50% of meal

## 2022-10-07 NOTE — PLAN OF CARE
Problem: Pain Acute  Goal: Acceptable Pain Control and Functional Ability  Outcome: Ongoing, Progressing  Intervention: Prevent or Manage Pain  Recent Flowsheet Documentation  Taken 10/7/2022 0112 by Sandra Chilel RN  Medication Review/Management: medications reviewed     Problem: Communication Impairment (Artificial Airway)  Goal: Effective Communication  Outcome: Ongoing, Progressing  Intervention: Ensure Effective Communication  Recent Flowsheet Documentation  Taken 10/7/2022 0112 by Sandra Chilel RN  Trust Relationship/Rapport:    care explained    choices provided   Goal Outcome Evaluation:    Patient appeared to rest well during noc shift, vss, denied pain or discomfort, no prn's ordered or requested, alert x 3, had 2 episodes of resistance with this Writer and aide by way of pushing staff away, behavior was self-corrected after redirection, after  no change in alertness or LOC, will continue to monitor.

## 2022-10-07 NOTE — PLAN OF CARE
7 PM to 7 AM  RT PROGRESS NOTE     DATA:     CURRENT SETTINGS:             TRACH TYPE / SIZE:  7 Bivona placed 10/3/22             MODE:   21% @ 25 LPM HFTM with cuff up.       ACTION:   THERAPIES: TID Albuterol and Mucomyst neb given              SUCTION:                           FREQUENCY:   X 1                        AMOUNT:   small x 1                        CONSISTENCY:  thin- thick                        COLOR:   white             SPONTANEOUS COUGH EFFORT/STRENGTH OF EFFORT (not elicited by suctioning): Good                              WEANING PHASE:   PMV days, cuff up at night, advance into night starting Thursday night.                        WEAN MODE:    PMV                        WEAN TIME:   0857-0037, for 15 hours and 50 minutes. Pt seemed to have tolerated                        END WEAN REASON:   HS     RESPONSE:             BS:   Dim and clear post sxn             VITAL SIGNS:   SATs 94-96%, HR 66-72, RR 20-22             RISK FOR SELF DECANNULATION:  No     NOTE / PLAN:   Advance PMV to 0200 Saturday. Continue with same.       Problem: Communication Impairment (Artificial Airway)  Goal: Effective Communication  Outcome: Ongoing, Progressing     Problem: Device-Related Complication Risk (Artificial Airway)  Goal: Optimal Device Function  Outcome: Ongoing, Progressing     Problem: Skin and Tissue Injury (Artificial Airway)  Goal: Absence of Device-Related Skin or Tissue Injury  Outcome: Ongoing, Progressing

## 2022-10-07 NOTE — PROGRESS NOTES
[facility]    Medicine Progress Note - Hospitalist Service    Date of Admission:  9/22/2022  Brief Hospital Course Summary:    Alexandru Still is a 89 year old man w/ PMH of Lambert-Eaton syndrome, hypothyroidism,  complete heart block s/p pacemaker, HTN, BPH,  PB, and DM type 2 who was admitted to the SICU on 9/15/2022 following an unwitnessed fall and striking his head. He was initially seen at an outside hospital and underwent a comprehensive trauma work up found to have a left frontal SDH (5mm) + C1 fx + Type II C2 odontoid fracture with a 1cm posterior displacement. He was  intubated for airway protection in the ED given high c-spine injury and difficulty managing his secretions. On admission he requested all cares without surgical cervical spine stabilization, agreeable to a tracheostomy and PEG. Multiple care conferences held with his family who confirmed his wishes.     LTACH course:    9/24-9/26:  Speech evaluated patient and recommends NPO, cont tube feeds via PEG due to severe oropharyngeal dysphasia.  PT/OT, dietitian, wo nurse saw pt on 9/23.  Pulled out G tube during night of 9/23. Pt now on soft wrist restraints.  IVF changed to D51/2 NS at low rate as pt has TAVR.  Morphine IV ordered prn pain.    Spoke w/ Dr. Brown- IR - recommends no hannah tube as this is a brand new PEG and placing it blind will likely lead it to not be in correct position.  He will not do a PEG placement on the weekend, scheduled for Monday.  Pt cannot be fed via NG tube as it is contraindicated with C1 and C2 fx.  For PEG tube replacement (herpain on hold, place ICD)on 9/26.  Will discontinue IVF once PEG tube is placed and restart po meds.  Given NaPhos 9/26 9/27-10/3:  9/27 patient had PEG tube replaced after patient himself removed it on 9/23.  Was a started on tube feeding.  P.O. medication were restarted.  IV fluids were discontinued.  He still needs soft restraint x2.  Is off ventilator and stable.  9/28 Failed blue  dye test, Continue NPO   9/30 patient had [place] J 300 collar pads delivered  10/1 SLP found patient well below baseline for swallowing, cognition and communication.  Patient stays n.p.o. except ice chips    10/4/22 -/10/7:   -No acute events, Vent weaning phase 2. Continue diuresis with bumex. Continue plan of care. Cognitive testing PENDING. May need neuropsych evaluation depending on cognitive evaluation.     Assessment & Plan        Traumatic 5mm left frontal lobe SDH  Posteriorly displaced (1 cm) Type II odontoid fx  C1 anterior arch fracture   Epidural hemorrhage of 7mm  Acute traumatic neck pain  Acute L 6th rib fracture  S/p fall, Facial abrasions with ecchymoses  RLE wound  He was seen and evaluated by Neurosurgery.  Pt refused surgical intervention.   A repeat head CT was obtained with a stable subdural hematoma. He was placed in a cervical collar for the cervical fracture. No surgical intervention per patient preference.  -Cont Kaw J -keep in place at all times  -Tylenol, oxy prn for pain, changed tylenol to extra strength  -Neurosurg ok lovenox for DVT ppx  -Wound care following   -f/u Neurosurgery in 6 weeks with an upright XR of the cervical spine (November PENDING appointment)  -CT cervical spine in 3 months (first week of December).   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 Failed bedside and radiology swallow. He completed 3 doses of  Decadron for laryngeal edema.   -S/P tracheostomy 09/21/2022, Trach suture removal on 09/26  -Pulm and RT following  -Wean vent as per pulm , on phase 2 currently  -Bronchial hygeine w/ albuterol/mucomyst. Avoiding metaneb for now given CSI with cervical collar in place   -Gentle diuresis with bumex      Hypertension   Complete heart block s/p PM placement  Nonrheumatic aortic valve stenosis s/p TAVR 2019  -Monitor  -Pt is currently normotensive , on bumex and cardura  -Echo on 9/16:  EF: 60-65%, no LV dysfunction     Severe orophayngeal dysphagia   S/P PEG   Severe protein calorie malnutrition  Hiatal hernia  -PEG tube placed 09/20. Pt pulled out PEG tube on night of 9/23, replaced on 9/27  -On TF : osmolite 1.5 and 60 ml/hr   -RD following  -S/p VFSS 10/6/22: abnormal but no aspiration, recommend pureed food textures and moderately thick liquids       Hypophosphatemia  Hypomagnesia  Hypokalemia  Hyponatremia  -Monitor and manage as needed     Hypothyroidism   -Continue Levothyroxine     Anemia of chronic disease  -Hg trending down past few days  -Cont to monitor intermittently.     BPH  -Cont doxazosin     DM type 2  -Sliding scale insulin  -Last HbA1:  6.2  -Hypoglycemic protocol     Deconditioning  -PT/OT following           Diet: Adult Formula Drip Feeding: Continuous Osmolite 1.5; Gastrostomy; Goal Rate: 55; mL/hr; Medication - Feeding Tube Flush Frequency: At least 15-30 mL water before and after medication administration and with tube clogging; Amount to Send (Nutrition...  Pureed Diet (level 4) Liquidized/Moderately Thick (level 3)    DVT Prophylaxis: Lovenox  [place]udero Catheter: Not present  Central Lines: PRESENT       Cardiac Monitoring: None  Code Status: Full Code      Disposition Plan     Discharge date: TBD, to TCU on discharge  Barriers: Placement     The patient's care was discussed with patient, RN, SW/CM    Kiko Brower MD  Hospitalist Service  LTACH  Securely message with the Vocera Web Console  (learn more here)  Text page via [facility] Paging/Directory           ______________________________________________________________________    Interval History    Chart reviewed and events noted.  Patient seen and examined.     Some resistance overnight, redirectable.   This AM, calm. Sitting up in bed, states feels ok. Pain slightly better today. No abd pain, N/V.     ROS:  A 10-system review was obtained and is negative with the exception of the symptoms noted above    Data reviewed today: I reviewed all medications, new labs and imaging results over the last 24 hours.     Physical Exam   Vital Signs: Temp: 99.3  F (37.4  C) Temp src: Oral BP: 117/57 Pulse: 68   Resp: 24 SpO2: 92 % O2 Device: Trach dome Oxygen Delivery: 25 LPM  Weight: 196 lbs 1.6 oz    General:  No acute distress,awake and alert  HEENT: [place] J in place, trach in place  Lungs:  Clear to auscultation bilaterally  CVS:  S1 and S2, RRR  Abdomen:  Soft, nontender, PEG in place and abdominal binder in place.  Musculoskeletal:  No edema  Neuro: Awake, alert, oriented, speech+, following commands.   Skin:  please refer to nursing documentation for full skin assessment    Data   Recent Labs   Lab 10/07/22  0548 10/07/22  0107 10/06/22  1737 10/06/22  1152 10/06/22  0636 10/03/22  0628 10/03/22  0614   NA  --   --   --   --  129*  --  134*   POTASSIUM  --   --   --   --  4.2  --  4.6   CHLORIDE  --   --   --   --  94*  --  99   CO2  --   --   --   --  28  --  28   BUN  --   --   --   --  26.7*  --  21.3   CR  --   --   --   --  0.62*  --  0.64*   ANIONGAP  --   --   --   --  7  --  7   TITA  --   --   --   --  8.4  --  8.6   * 123* 120*   < > 125*   < > 114*    < > = values in this interval not displayed.     Recent Results (from the past 24 hour(s))   XR Video Swallow with SLP or OT    Narrative    EXAM: XR VIDEO SWALLOW WITH SLP OR OT  LOCATION: [facility]  DATE/TIME: 10/6/2022 9:41 AM    INDICATION: Difficulty  swallowing.  COMPARISON: None.    TECHNIQUE: Routine swallow study with speech pathology using multiple barium thicknesses.    FINDINGS:   FLUOROSCOPIC TIME: 1.7 minutes  NUMBER OF IMAGES: 0    Swallow study with Speech Pathology using multiple barium thicknesses.     Adequate oral phase.    Delay in initiation of swallow reflex with pour over to the vallecular space during swallowing of moderately thick liquid, puree and crunchy solid consistencies, past the epiglottis during swallowing of mildly thick liquid and to the pyriform sinuses   during swallowing of thin liquid. Horizontal to complete epiglottic inversion was demonstrated.    Deep laryngeal penetration occurred during swallowing of thin and mildly thick liquid. No laryngeal penetration or aspiration of moderately thick liquid, puree or crunchy solid consistencies.    Mild stasis after swallowing of thin liquid, and mildly thick liquid, moderately thick liquid, puree and crunchy solid consistencies.      Impression    IMPRESSION:  1.  Adequate oral phase.  2.  Inconsistent delay in swallow reflex with horizontal to complete epiglottic inversion.  3.  Deep laryngeal penetration of thin and mildly thick liquid.  4.  Mild stasis.    Please refer to speech therapy dysphasia evaluation report for additional information.    Examination performed by Melany Hansen, LAURYN/RRA, RT (R) under the general supervision of .         Medications     - MEDICATION INSTRUCTIONS -         acetylcysteine  2 mL Nebulization TID     albuterol  2.5 mg Nebulization TID     bumetanide  1 mg Oral or Feeding Tube Daily     calcium carbonate  1,250 mg Oral or Feeding Tube BID w/meals     cholecalciferol  25 mcg Oral or Feeding Tube Daily     doxazosin  2 mg Oral or Feeding Tube Daily     enoxaparin ANTICOAGULANT  40 mg Subcutaneous Q24H     insulin aspart  1-6 Units Subcutaneous Q6H     levothyroxine  112 mcg Oral or Feeding Tube QAM AC     pantoprazole  40 mg Oral or Feeding Tube QAM AC      protein modular  1 packet Per Feeding Tube TID     sennosides  5 mL Oral or Feeding Tube BID     sodium chloride (PF)  10 mL Intracatheter Q8H

## 2022-10-07 NOTE — PROGRESS NOTES
CLINICAL NUTRITION SERVICES - REASSESSMENT NOTE     Nutrition Prescription    RECOMMENDATIONS FOR MDs/PROVIDERS TO ORDER:      Malnutrition Status:    Previously noted severe    Recommendations already ordered by Registered Dietitian (RD):      Discontinued prosource modulars    Modified enteral schedule to back up bolus feeding after each meal IF INTAKE < 50% of meal: Osmolite 1.5 250 ml (125 ml/h x 2 h), 120 ml water flush b/4 and after bolus administration. One bolus feeding will provide: 375 kcal. 16 g protein, 54 g carbohydrate, 0g fiber, 190 ml free fluid from formula + 240 ml from flush = 430 ml     Modified FWF to 60 ml q 8 h    Future/Additional Recommendations:       EVALUATION OF PROGRESS TOWARD GOALS/NEW FINDINGS   Progress towards goals will be monitored and evaluated per protocol and Practice Guidelines       DIET EFFECTIVE NOW, Starting on Thu 10/6/22 at 1130, Until Specified  Liquid Consistency? Liquidized/Moderately Thick (level 3)  CONTINUOUS, Starting on Fri 9/30/22 at 1330, Until Specified  Adult Formula: Osmolite 1.5  Route: Gastrostomy  Goal Rate: 55  Goal Units: mL/hr  Medication - Feeding Tube Flush Frequency: At least 15-30 mL water before and after medication administration and with tube clogging  provides the following w/ modulars to meet estimated needs: 1320 ml formula,2100 kcal, 116 g protein (1.7 g/kg dose wt), 269 g carbohydrate, 0 g fiber, 1106 ml free fluid +540 ml from flushes =1546 ml/d,132 % RDI    Last BM per nursing: 10/6/22. soft      Last  SLP: 10/6/22 s/p VSS- oral diet initiated    Skin: neck incision,face abrasion per nursing     Bakari nutrition score: 3; total score: 16    I/O last 3 completed shifts:    In: 1862 [P.O.:120; I.V.:60; NG/GT:1181]    Out: 2200 [Urine:2200]    Labs:    Electrolytes  Potassium (mmol/L)   Date Value   10/06/2022 4.2   10/03/2022 4.6   09/30/2022 4.2   06/23/2022 4.0   06/22/2022 3.9   06/21/2022 3.7     Potassium POCT (mmol/L)   Date Value    09/27/2022 3.8     Phosphorus (mg/dL)   Date Value   10/03/2022 3.3   09/27/2022 3.2   09/26/2022 2.4 (L)   09/25/2022 2.5   09/24/2022 2.2 (L)        Blood Glucose  Glucose (mg/dL)   Date Value   10/06/2022 125 (H)   06/22/2022 92   06/20/2022 87   06/19/2022 91   06/18/2022 91   06/17/2022 99     GLUCOSE BY METER POCT (mg/dL)   Date Value   10/07/2022 127 (H)   10/07/2022 123 (H)   10/06/2022 120 (H)   10/06/2022 124 (H)   10/06/2022 129 (H)     Hemoglobin A1C (%)   Date Value   09/16/2022 6.2 (H)        Inflammatory Markers  WBC Count (10e3/uL)   Date Value   09/27/2022 7.3   09/26/2022 10.9   09/25/2022 11.2 (H)     Albumin (g/dL)   Date Value   09/27/2022 2.7 (L)   09/26/2022 2.8 (L)   09/25/2022 2.6 (L)   06/17/2022 2.3 (L)   06/16/2022 2.4 (L)   06/15/2022 3.6        Magnesium (mg/dL)   Date Value   10/03/2022 2.1   09/27/2022 2.0   09/26/2022 2.0     Sodium (mmol/L)   Date Value   10/06/2022 129 (L)   10/03/2022 134 (L)   09/30/2022 139        Renal  Urea Nitrogen (mg/dL)   Date Value   10/06/2022 26.7 (H)   10/03/2022 21.3   09/30/2022 23.3 (H)   06/22/2022 24   06/20/2022 22   06/19/2022 23     Creatinine (mg/dL)   Date Value   10/06/2022 0.62 (L)   10/03/2022 0.64 (L)   09/30/2022 0.64 (L)         Additional  Ketones Urine (mg/dL)   Date Value   09/16/2022 Negative        Meds:    acetylcysteine  2 mL Nebulization TID     albuterol  2.5 mg Nebulization TID     bumetanide  1 mg Oral or Feeding Tube Daily     calcium carbonate  1,250 mg Oral or Feeding Tube BID w/meals     cholecalciferol  25 mcg Oral or Feeding Tube Daily     doxazosin  2 mg Oral or Feeding Tube Daily     enoxaparin ANTICOAGULANT  40 mg Subcutaneous Q24H     insulin aspart  1-6 Units Subcutaneous Q6H     levothyroxine  112 mcg Oral or Feeding Tube QAM AC     pantoprazole  40 mg Oral or Feeding Tube QAM AC     protein modular  1 packet Per Feeding Tube TID     sennosides  5 mL Oral or Feeding Tube BID     sodium chloride (PF)  10 mL  "Intracatheter Q8H        - MEDICATION INSTRUCTIONS -        acetaminophen, albuterol, bisacodyl, glucose **OR** dextrose **OR** glucagon, hydrALAZINE, lidocaine 4%, menthol-zinc oxide, miconazole, naloxone **OR** naloxone **OR** [DISCONTINUED] naloxone **OR** [DISCONTINUED] naloxone, oxyCODONE, - MEDICATION INSTRUCTIONS -, QUEtiapine, sodium chloride (PF)          ANTHROPOMETRICS  Height: 68\"  Weight: 88 kg   Body mass index is 29.82 kg/m .    10/07/22 0422 89 kg (196 lb 1.6 oz) --   10/06/22 0606 91.2 kg (201 lb 1.6 oz) Bed scale   10/05/22 0556 92 kg (202 lb 14.4 oz) Bed scale   10/04/22 0600 91.2 kg (201 lb) Bed scale   10/03/22 0639 88.8 kg (195 lb 11.2 oz) Bed scale   10/02/22 0419 87.4 kg (192 lb 9.6 oz) Bed scale   10/01/22 0513 89.5 kg (197 lb 6.4 oz) Bed scale   09/30/22 0527 92.6 kg (204 lb 3.2 oz) Bed scale   09/29/22 1200 90.4 kg (199 lb 6.4 oz) Bed scale       Weight Status:  Overweight BMI 25-29.9  Weight History: 2 % weight loss in ~ 1 week, as noted above, since last review  Net I/0 +211 ml    NEW FINDINGS     Previous Goals   total avg nutritional intake to meet a minimum of 25 kcal/kg and 1.5 g PRO/kg daily   Evaluation: progressing    New goals: tolerate oral intake w/o choking or aspiration and meet estimated needs  Achieve and maintain fluid and electrolyte balance    Previous Nutrition Diagnosis  Inadequate oral intake related to respiratory status, NPO as evidenced by need for enteral nutrition to meet 100% nutrition needs  ( + malnutrition )  Evaluation: Improving     "

## 2022-10-07 NOTE — PLAN OF CARE
Goal Outcome Evaluation:    Plan of Care Reviewed With: patient     Overall Patient Progress: improving    Outcome Evaluation: diet advanced today to be level 4 moderately thickened and he ate 75% of his dinner. Tube feeding continues.    C-collar cares completed, all pads exchanged and washed. Patient is able to move all extremities and sensation is intact. He denies numbness or tingling in his extremities. Blanchable red area noted on coccyx. Mepilex replaced. Calmoseptine to perianal area. Primofit is in place and draining frothy yogesh-colored urine. Output of 1100 ml. Bumex given as ordered.  Vitals are stable. Oxycodone 5 mg prn given at 1549 for neck and shoulder pain that patient did not find to be particularly effective. Heels are noted to be red yet blanchable. Prafos are in place for heel protection.    B/P: 138/64, T: 98.1, P: 72, R: 22      Yogesh Cruz RN

## 2022-10-07 NOTE — CARE PLAN
Care Management Progression of Care Update        DR GLOS 10/19-10/24  [facility] D/C Date 10/24/22        PLAN/GOALS  1.  Pulmonary following.  Liberated from vent . Phase 2 wean ~ trach mask 25%/30 LPM w/PMV.  Trach mask no PMV/cuff up at night.  Nebulizers three times a day for bronchial hygiene.     2.  Nutrition management.  Pureed diet level 4, moderately thick liquid level 3.  Tube feeding bolus 3 times a day with each meal if intake is < 50% of meal via PEG placed .  Registered Dietician to monitor PO intake, need of boluses, weight and labs.    3. PT/OT 5x/week.    4.  Speech therapy 5x/week.    5.   providing psycho-social support w/patient and family and coordinating discharge plan.    6.  WOC nurse following weekly.    7.  Continuous cervical collar at all time.          BARRIERS  1.  Acute hypoxic/hypercapnic respiratory failure. Trach wean.    2.  Severe oropharyngeal dysphagia.  Laryngeal edema secondary to traumatic cervical fracture/injury.    3.  Physically deconditioned.    4.  Cognitive assessment - ACE III score 48/100.        Disposition:  Acute rehab vs. TCU    Care Manager Name:  Sae Roa, RN,BSN, HNB-BC    Date:  2022

## 2022-10-07 NOTE — PROGRESS NOTES
Pulmonary Progress Note    Admit Date: 9/22/2022  CODE: Full Code    Assessment/Plan:   89 yoM admitted 9/15/2022 following an unwitnessed fall found to have a left frontal SDH and C1/C2 cervical spine fracture.  Intubated for airway protection in the ED given high c-spine injury and difficulty managing his secretions. On admission he requested all cares without surgical cervical spine stabilization, agreeable to a tracheostomy and PEG. Discharged to LTACH 9/22/22.     PMHx: Lambert-Eaton syndrome, TAVR 2019, complete heart block with pacemaker dependency, DM2, arthritis, BPH, HTN, osteoporosis     Pertinent problems:  Acute hypoxic/hypercapnic respiratory failure s/p trach: in setting of traumatic cervical injury after a fall.  History of PB(no home CPAP per pt).  Liberated from vent 9/26 with acceptable blood gas off vent.  Aspirating on BDTs but these are improving and now tolerating prolonged periods with speaking valve.  Diuresing with bumex.  Now on room air.   - Phase 2: TM/PMV as tolerated - if tolerates will consider capping trials early next week  - Albuterol/mucomsyt nebs BID for bronchial hygiene.  Avoiding metaneb for now given CSI with cervical collar in place   - continue gentle diuresis with Bumex. (received extra dose 10/6)    Tracheostomy: 6 [place] placed 9/21/2022.  Downsized to 7 [place] 10/3 without issue    Dysphagia s/p PEG: Repeat BDT 10/3 with no immediate and large delayed aspiration; this is improvement from previous tests. Tolerating PMV well during the day with a good spontaneous cough per RT.  Abnormal VFSS 10/6 but no aspiration   - pureed diet with liquidized/mod thick liquids started 10/6    Laryngeal edema 2/2 traumatic cervical fracture/injury s/p 3 doses of decadron in acute care    Other problems managed by primary team:  1. Acute neck and rib cage pain(known rib fractures), chronic hip/shoulder pain  2. Traumatic SDH, stable  3. C1/C2 CSI: [place] J collar at all times, repeat  imaging per NSG  4. DVT ppx: Lovenox  5. Hyponatremia: BMP pending     Sawyer Capone CNP  Pulmonary Medicine  [facility]  Pager [phone]    Subjective/Interim Events:   - FiO2 now down to 21%  - secretion burden improving   - Tolerating speaking valve well per RT  - denies dyspnea, positive for HA and chronic joint pains     Tracheal secretions:  Overnight - x1, mod/large, thick  Yesterday - x3, small/mod, thick    Cough strength: strong, productive     Current phase of ventilator weaning pathway:  Phase 2    Ventilator weaning results  - continuous TD since 9/26  - 10/2-4: PMV for ~12hr  - 10/5: PMV ~14hr  - 10/5: PMV ~16hr      Clinical status discussed today with respiratory therapist, hospitalist     Medications:       - MEDICATION INSTRUCTIONS -         acetylcysteine  2 mL Nebulization BID     albuterol  2.5 mg Nebulization 2 times daily     bumetanide  1 mg Oral or Feeding Tube Daily     calcium carbonate  1,250 mg Oral or Feeding Tube BID w/meals     cholecalciferol  25 mcg Oral or Feeding Tube Daily     doxazosin  2 mg Oral or Feeding Tube Daily     enoxaparin ANTICOAGULANT  40 mg Subcutaneous Q24H     insulin aspart  1-6 Units Subcutaneous Q6H     levothyroxine  112 mcg Oral or Feeding Tube QAM AC     pantoprazole  40 mg Oral or Feeding Tube QAM AC     protein modular  1 packet Per Feeding Tube TID     sennosides  5 mL Oral or Feeding Tube BID     sodium chloride (PF)  10 mL Intracatheter Q8H         Exam/Data:   Vitals  /57 (BP Location: Left arm, Patient Position: Supine, Cuff Size: Adult Regular)   Pulse 75   Temp 99.3  F (37.4  C) (Oral)   Resp 24   Wt 89 kg (196 lb 1.6 oz)   SpO2 93%   BMI 29.82 kg/m       I/O last 3 completed shifts:  In: 1862 [P.O.:120; I.V.:60; NG/GT:1181]  Out: 2200 [Urine:2200]  Weight change: -2.268 kg (-5 lb)    Vent Mode: Trach collar  FiO2 (%): 21 %  Resp: 24      EXAM:  Gen:  no distress on TM/PMV sitting in chair eating  HEENT: NT, trach midline/intact,  c-collar on, good voice, no stridor   CV: RRR, no m/g/r  Resp: CTAB; non-labored   Abd: soft, nontender, BS+  Skin: no rashes or lesions  Ext: mild b/l UE edema  Neuro: A&O x4, follows commands    ROS:  A 10-system review was obtained and is negative with the exception of the symptoms noted above.    Labs:  Basic Metabolic Panel  Recent Labs   Lab 10/07/22  0548 10/07/22  0107 10/06/22  1737 10/06/22  1152 10/06/22  0636 10/03/22  0628 10/03/22  0614   NA  --   --   --   --  129*  --  134*   POTASSIUM  --   --   --   --  4.2  --  4.6   CHLORIDE  --   --   --   --  94*  --  99   CO2  --   --   --   --  28  --  28   BUN  --   --   --   --  26.7*  --  21.3   CR  --   --   --   --  0.62*  --  0.64*   * 123* 120* 124* 125*   < > 114*    < > = values in this interval not displayed.     CBC RESULTS: Recent Labs   Lab Test 09/27/22  0648   WBC 7.3   RBC 3.16*   HGB 9.9*   HCT 29.1*   MCV 92   MCH 31.3   MCHC 34.0   RDW 14.4           RADIOLOGY:   XR CHEST PORT 1 VIEW  LOCATION: [facility]  DATE/TIME: 10/3/2022 8:40 AM     INDICATION: hypoxic respiratory failure  COMPARISON: Chest x-ray 09/26/2022                                                                      IMPRESSION: Stable satisfactory tracheostomy tube position. TAVR. Stable dual-lead left-sided cardiac conduction device. Right shoulder arthroplasty. Bibasilar pulmonary opacities which may reflect atelectasis and/or infiltrates, increased on the left   and stable on the right. No definite pleural effusion. Stable cardiomegaly and central vascular congestion.

## 2022-10-08 LAB
GLUCOSE BLDC GLUCOMTR-MCNC: 80 MG/DL (ref 70–99)
GLUCOSE BLDC GLUCOMTR-MCNC: 87 MG/DL (ref 70–99)
GLUCOSE BLDC GLUCOMTR-MCNC: 88 MG/DL (ref 70–99)
GLUCOSE BLDC GLUCOMTR-MCNC: 89 MG/DL (ref 70–99)
GLUCOSE BLDC GLUCOMTR-MCNC: 94 MG/DL (ref 70–99)

## 2022-10-08 PROCEDURE — 120N000017 HC R&B RESPIRATORY CARE

## 2022-10-08 PROCEDURE — 250N000013 HC RX MED GY IP 250 OP 250 PS 637: Performed by: HOSPITALIST

## 2022-10-08 PROCEDURE — 250N000009 HC RX 250: Performed by: NURSE PRACTITIONER

## 2022-10-08 PROCEDURE — 999N000123 HC STATISTIC OXYGEN O2DAILY TECH TIME

## 2022-10-08 PROCEDURE — 999N000009 HC STATISTIC AIRWAY CARE

## 2022-10-08 PROCEDURE — 99207 PR CDG-CUT & PASTE-POTENTIAL IMPACT ON LEVEL: CPT | Performed by: HOSPITALIST

## 2022-10-08 PROCEDURE — 94640 AIRWAY INHALATION TREATMENT: CPT

## 2022-10-08 PROCEDURE — 99233 SBSQ HOSP IP/OBS HIGH 50: CPT | Performed by: HOSPITALIST

## 2022-10-08 PROCEDURE — 250N000013 HC RX MED GY IP 250 OP 250 PS 637: Performed by: NURSE PRACTITIONER

## 2022-10-08 PROCEDURE — 272N000063 HC CIRCUIT HUMID FACE/TRACH MSK

## 2022-10-08 PROCEDURE — 250N000011 HC RX IP 250 OP 636: Performed by: HOSPITALIST

## 2022-10-08 PROCEDURE — 94640 AIRWAY INHALATION TREATMENT: CPT | Mod: 76

## 2022-10-08 PROCEDURE — 999N000157 HC STATISTIC RCP TIME EA 10 MIN

## 2022-10-08 RX ADMIN — Medication 40 MG: at 06:30

## 2022-10-08 RX ADMIN — ALBUTEROL SULFATE 2.5 MG: 2.5 SOLUTION RESPIRATORY (INHALATION) at 08:09

## 2022-10-08 RX ADMIN — ENOXAPARIN SODIUM 40 MG: 100 INJECTION SUBCUTANEOUS at 18:19

## 2022-10-08 RX ADMIN — ACETYLCYSTEINE 2 ML: 200 SOLUTION ORAL; RESPIRATORY (INHALATION) at 19:34

## 2022-10-08 RX ADMIN — Medication 25 MCG: at 09:30

## 2022-10-08 RX ADMIN — SENNOSIDES 5 ML: 8.8 LIQUID ORAL at 09:30

## 2022-10-08 RX ADMIN — Medication 2 MG: at 09:30

## 2022-10-08 RX ADMIN — CALCIUM CARBONATE 1250 MG: 1250 SUSPENSION ORAL at 12:56

## 2022-10-08 RX ADMIN — SENNOSIDES 5 ML: 8.8 LIQUID ORAL at 20:25

## 2022-10-08 RX ADMIN — LEVOTHYROXINE SODIUM 112 MCG: 0.11 TABLET ORAL at 06:30

## 2022-10-08 RX ADMIN — ACETYLCYSTEINE 2 ML: 200 SOLUTION ORAL; RESPIRATORY (INHALATION) at 08:09

## 2022-10-08 RX ADMIN — ALBUTEROL SULFATE 2.5 MG: 2.5 SOLUTION RESPIRATORY (INHALATION) at 19:34

## 2022-10-08 RX ADMIN — BUMETANIDE 1 MG: 1 TABLET ORAL at 09:31

## 2022-10-08 RX ADMIN — ANORECTAL OINTMENT: 15.7; .44; 24; 20.6 OINTMENT TOPICAL at 09:31

## 2022-10-08 RX ADMIN — CALCIUM CARBONATE 1250 MG: 1250 SUSPENSION ORAL at 18:19

## 2022-10-08 RX ADMIN — OXYCODONE HYDROCHLORIDE 5 MG: 5 SOLUTION ORAL at 09:36

## 2022-10-08 ASSESSMENT — ACTIVITIES OF DAILY LIVING (ADL)
ADLS_ACUITY_SCORE: 69
ADLS_ACUITY_SCORE: 71
ADLS_ACUITY_SCORE: 69
ADLS_ACUITY_SCORE: 69
ADLS_ACUITY_SCORE: 71
ADLS_ACUITY_SCORE: 69
ADLS_ACUITY_SCORE: 71
ADLS_ACUITY_SCORE: 69

## 2022-10-08 NOTE — PLAN OF CARE
Problem: Communication Impairment (Artificial Airway)  Goal: Effective Communication  Outcome: Ongoing, Progressing     Problem: Device-Related Complication Risk (Artificial Airway)  Goal: Optimal Device Function  Outcome: Ongoing, Progressing     Problem: Skin and Tissue Injury (Artificial Airway)  Goal: Absence of Device-Related Skin or Tissue Injury  Outcome: Ongoing, Progressing  RT PROGRESS NOTE     DATA:     CURRENT SETTINGS:             TRACH TYPE / SIZE: # 7 BIVONA TTS CHANGED ON 10/3/2022             MODE: TM/PMV             FIO2: 25%@ 25 LPM     ACTION:             THERAPIES:Albuterol/Mucomyst neb BID              SUCTION:                           FREQUENCY: X1                        AMOUNT: Small                        CONSISTENCY: thick                        COLOR:  white              SPONTANEOUS COUGH EFFORT/STRENGTH OF EFFORT (not elicited by suctioning):                               WEANING PHASE: 2                        WEAN MODE: TM/PMV                        WEAN TIME: CONT.                        END WEAN REASON:       RESPONSE:             BS: Clear Decreased              VITAL SIGNS: Sat 92-95%, HR 67-68, RR 20-22             EMOTIONAL NEEDS / CONCERNS: no               RISK FOR SELF DECANNULATION: no                        RISK DUE TO:                          INTERVENTION/S IN PLACE IS/ARE:         NOTE / PLAN: Cont. Current plan of care

## 2022-10-08 NOTE — PLAN OF CARE
"Problem: Plan of Care - These are the overarching goals to be used throughout the patient stay.    Goal: Optimal Comfort and Wellbeing  Outcome: Ongoing, Progressing  Intervention: Monitor Pain and Promote Comfort  Recent Flowsheet Documentation  Taken 10/7/2022 2002 by Reta Javier RN  Pain Management Interventions:    medication (see MAR)    distraction    emotional support    repositioned     Pt resting quietly in bed when writer arrived to shift. C/o head pain 6/10 and was given PRN apap at 2002. Pt resting quietly when writer went to reassess effectiveness of apap. Pt continues on trach dome. VSS. Has glasses which he wore until HS and uses pocket talker wit headphones as he is hard of hearing. Staff attempted to remove pocket talker at HS and pt stated \"no!,\" so his headphones were left on. Had PrimoFit on, but had fallen off. Left off as pt did use urinal with staff assistance, however later was incontinent again so PrimoFit placed back on. Pt voided 175 ml this shift thus far and had one unmeasured occurrence. GJ tube flushed per orders. Blood sugars 80 and 87.     Note left to provider asking if they would like to change blood sugars to ACHS as pt is now eating and receiving tube feeding boluses TID. Note left to dietary to discontinue old diet orders and requesting clarification regarding how many mls the tube feeding flushes are.    "

## 2022-10-08 NOTE — PROGRESS NOTES
[facility]    Medicine Progress Note - Hospitalist Service    Date of Admission:  9/22/2022  Brief Hospital Course Summary:    Alexandru Still is a 89 year old man w/ PMH of Lambert-Eaton syndrome, hypothyroidism,  complete heart block s/p pacemaker, HTN, BPH,  PB, and DM type 2 who was admitted to the SICU on 9/15/2022 following an unwitnessed fall and striking his head. He was initially seen at an outside hospital and underwent a comprehensive trauma work up found to have a left frontal SDH (5mm) + C1 fx + Type II C2 odontoid fracture with a 1cm posterior displacement. He was  intubated for airway protection in the ED given high c-spine injury and difficulty managing his secretions. On admission he requested all cares without surgical cervical spine stabilization, agreeable to a tracheostomy and PEG. Multiple care conferences held with his family who confirmed his wishes.     LTACH course:    9/24-9/26:  Speech evaluated patient and recommends NPO, cont tube feeds via PEG due to severe oropharyngeal dysphasia.  PT/OT, dietitian, wo nurse saw pt on 9/23.  Pulled out G tube during night of 9/23. Pt now on soft wrist restraints.  IVF changed to D51/2 NS at low rate as pt has TAVR.  Morphine IV ordered prn pain.    Spoke w/ Dr. Brown- IR - recommends no hannah tube as this is a brand new PEG and placing it blind will likely lead it to not be in correct position.  He will not do a PEG placement on the weekend, scheduled for Monday.  Pt cannot be fed via NG tube as it is contraindicated with C1 and C2 fx.  For PEG tube replacement (herpain on hold, place ICD)on 9/26.  Will discontinue IVF once PEG tube is placed and restart po meds.  Given NaPhos 9/26 9/27-10/3:  9/27 patient had PEG tube replaced after patient himself removed it on 9/23.  Was a started on tube feeding.  P.O. medication were restarted.  IV fluids were discontinued.  He still needs soft restraint x2.  Is off ventilator and stable.  9/28 Failed blue  dye test, Continue NPO   9/30 patient had [place] 300 collar pads delivered  10/1 SLP found patient well below baseline for swallowing, cognition and communication.  Patient stays n.p.o. except ice chips    10/4/22 -/10/8:   -No acute events, Vent weaning phase 2. Continue diuresis with bumex. Continue plan of care. Cognitive testing PENDING. May need neuropsych evaluation depending on cognitive evaluation.     Assessment & Plan        Traumatic 5mm left frontal lobe SDH  Posteriorly displaced (1 cm) Type II odontoid fx  C1 anterior arch fracture   Epidural hemorrhage of 7mm  Acute traumatic neck pain  Acute L 6th rib fracture  S/p fall, Facial abrasions with ecchymoses  RLE wound  He was seen and evaluated by Neurosurgery.  Pt refused surgical intervention.   A repeat head CT was obtained with a stable subdural hematoma. He was placed in a cervical collar for the cervical fracture. No surgical intervention per patient preference.  -Cont [place] -keep in place at all times  -Tylenol, oxy prn for pain, changed tylenol to extra strength  -Neurosurg ok lovenox for DVT ppx  -Wound care following   -f/u Neurosurgery in 6 weeks with an upright XR of the cervical spine (November PENDING appointment)  -CT cervical spine in 3 months (first week of December).   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 Failed bedside and radiology swallow. He completed 3 doses of  Decadron for laryngeal edema.   -S/P tracheostomy 09/21/2022, Trach suture removal on 09/26  -Pulm and RT following  -Wean vent as per pulm , on phase 2 currently  -Bronchial hygeine w/ albuterol/mucomyst. Avoiding metaneb for now given CSI with cervical collar in place   -Gentle diuresis with bumex      Hypertension   Complete heart block s/p PM placement  Nonrheumatic aortic valve stenosis s/p TAVR 2019  -Monitor  -Pt is currently normotensive , on bumex and cardura  -Echo on 9/16:  EF: 60-65%, no LV dysfunction     Severe orophayngeal dysphagia   S/P PEG   Severe protein calorie malnutrition  Hiatal hernia  -PEG tube placed 09/20. Pt pulled out PEG tube on night of 9/23, replaced on 9/27  -On TF : osmolite 1.5 and 60 ml/hr   -RD following  -S/p VFSS 10/6/22: abnormal but no aspiration, recommend pureed food textures and moderately thick liquids     Hypophosphatemia  Hypomagnesia  Hypokalemia  Hyponatremia  -Monitor and manage as needed     Hypothyroidism   -Continue Levothyroxine     Anemia of chronic disease  -Hg trending down past few days  -Cont to monitor intermittently.     BPH  -Cont doxazosin     DM type 2  -Sliding scale insulin  -Last HbA1:  6.2  -Hypoglycemic protocol     Deconditioning  -PT/OT following           Diet: Adult Formula Drip Feeding: Continuous Osmolite 1.5; Gastrostomy; Goal Rate: 55; mL/hr; Medication - Feeding Tube Flush Frequency: At least 15-30 mL water before and after medication administration and with tube clogging; Amount to Send (Nutrition...  Pureed Diet (level 4) Liquidized/Moderately Thick (level 3)  Adult Formula Bolus Feeding: TID Osmolite 1.5; Route: Gastrostomy; 250; mL(s); Additional free water (mL): 120 ml b/t and after bolus feeding; Medication - Feeding Tube Flush Frequency: At least 15-30 mL water before and after medication administr...    DVT Prophylaxis: Lovenox  [place] Catheter: Not present  Central Lines: PRESENT       Cardiac Monitoring: None  Code Status: Full  Code      Disposition Plan     Discharge date: TBD, to TCU on discharge  Barriers: Placement     The patient's care was discussed with patient, RN, SW/CM    Kiko Brower MD  Hospitalist Service  LTACH  Securely message with the Vocera Web Console (learn more here)  Text page via [employer] Paging/Directory           ______________________________________________________________________    Interval History    Chart reviewed and events noted.  Patient seen and examined.     No acute events overnight.    This AM, calm. Sitting up in bed, states feels ok. Pain slightly better today. No abd pain, N/V.     ROS:  A 10-system review was obtained and is negative with the exception of the symptoms noted above    Data reviewed today: I reviewed all medications, new labs and imaging results over the last 24 hours.     Physical Exam   Vital Signs: Temp: 98.2  F (36.8  C) Temp src: Axillary BP: (!) 146/67 Pulse: 67   Resp: 20 SpO2: 92 % O2 Device: Trach dome Oxygen Delivery: 25 LPM  Weight: 196 lbs 1.6 oz    General:  No acute distress,awake and alert  HEENT: [place] J in place, trach in place  Lungs:  Clear to auscultation bilaterally  CVS:  S1 and S2, RRR  Abdomen:  Soft, nontender, PEG in place and abdominal binder in place.  Musculoskeletal:  No edema  Neuro: Awake, alert, oriented, speech+, following commands.   Skin:  please refer to nursing documentation for full skin assessment    Data   Recent Labs   Lab 10/08/22  0506 10/08/22  0021 10/07/22  1727 10/07/22  1119 10/07/22  1026 10/06/22  1152 10/06/22  0636 10/03/22  0628 10/03/22  0614   NA  --   --   --   --  133*  --  129*  --  134*   POTASSIUM  --   --   --   --  4.1  --  4.2  --  4.6   CHLORIDE  --   --   --   --  96*  --  94*  --  99   CO2  --   --   --   --  28  --  28  --  28   BUN  --   --   --   --  25.1*  --  26.7*  --  21.3   CR  --   --   --   --  0.66*  --  0.62*  --  0.64*   ANIONGAP  --   --   --   --  9  --  7  --  7   TITA  --   --   --   --  8.3  --  8.4   --  8.6   GLC 87 80 127*   < > 149*   < > 125*   < > 114*    < > = values in this interval not displayed.     No results found for this or any previous visit (from the past 24 hour(s)).  Medications     - MEDICATION INSTRUCTIONS -         acetylcysteine  2 mL Nebulization BID     albuterol  2.5 mg Nebulization 2 times daily     bumetanide  1 mg Oral or Feeding Tube Daily     calcium carbonate  1,250 mg Oral or Feeding Tube BID w/meals     cholecalciferol  25 mcg Oral or Feeding Tube Daily     doxazosin  2 mg Oral or Feeding Tube Daily     enoxaparin ANTICOAGULANT  40 mg Subcutaneous Q24H     insulin aspart  1-6 Units Subcutaneous TID w/meals     levothyroxine  112 mcg Oral or Feeding Tube QAM AC     pantoprazole  40 mg Oral or Feeding Tube QAM AC     sennosides  5 mL Oral or Feeding Tube BID     sodium chloride (PF)  10 mL Intracatheter Q8H

## 2022-10-08 NOTE — PLAN OF CARE
Problem: Skin and Tissue Injury (Artificial Airway)  Goal: Absence of Device-Related Skin or Tissue Injury  Outcome: Ongoing, Progressing   Goal Outcome Evaluation:      Pt alert; oriented to person and situation. Vital signs stable.   Pt complained of head ache. PRN Oxycodone given at 0936, to good effect.  Pt ate at least 50% of breakfast and lunch, and therefore, did not require a bolus tube feeding.  Pt spent time up in chair.    All care needs met.      Cathy Mays RN

## 2022-10-08 NOTE — PLAN OF CARE
Problem: Device-Related Complication Risk (Mechanical Ventilation, Invasive)  Goal: Optimal Device Function  Outcome: Ongoing, Progressing     Problem: Ventilator-Induced Lung Injury (Mechanical Ventilation, Invasive)  Goal: Absence of Ventilator-Induced Lung Injury  Outcome: Ongoing, Progressing     RT PROGRESS NOTE     DATA:     CURRENT SETTINGS:             TRACH TYPE / SIZE:#7 Bivona, changed from #6 Shiley Taper guard on since 10/03              MODE:TM 25% 25L  (FiO2 increased from 21% to 25% because of low saturations)             FIO2:  25%%/25L     ACTION:             THERAPIES: Alb/ Mucomyst neb BID             SUCTION: X 1                        FREQUENCY:  x 1                       AMOUNT:   small                         CONSISTENCY:                           COLOR:                SPONTANEOUS COUGH EFFORT/STRENGTH OF EFFORT (not elicited by suctioning):                               WEANING PHASE:  2                         WEAN MODE: TM/PMV continued since 0830 am yesterday and pt. Is tolerating well.                         WEAN TIME:                           END WEAN REASON:        RESPONSE:             BS:                VITAL SIGNS:                EMOTIONAL NEEDS / CONCERNS:                  RISK FOR SELF DECANNULATION:                          RISK DUE TO:                          INTERVENTION/S IN PLACE IS/ARE:         NOTE / PLAN:   continue current orders and monitoring.

## 2022-10-09 ENCOUNTER — APPOINTMENT (OUTPATIENT)
Dept: SPEECH THERAPY | Facility: CLINIC | Age: 87
DRG: 208 | End: 2022-10-09
Attending: INTERNAL MEDICINE
Payer: MEDICARE

## 2022-10-09 LAB
GLUCOSE BLDC GLUCOMTR-MCNC: 108 MG/DL (ref 70–99)
GLUCOSE BLDC GLUCOMTR-MCNC: 138 MG/DL (ref 70–99)
GLUCOSE BLDC GLUCOMTR-MCNC: 86 MG/DL (ref 70–99)
GLUCOSE BLDC GLUCOMTR-MCNC: 91 MG/DL (ref 70–99)

## 2022-10-09 PROCEDURE — 250N000009 HC RX 250: Performed by: NURSE PRACTITIONER

## 2022-10-09 PROCEDURE — 250N000011 HC RX IP 250 OP 636: Performed by: HOSPITALIST

## 2022-10-09 PROCEDURE — 999N000123 HC STATISTIC OXYGEN O2DAILY TECH TIME

## 2022-10-09 PROCEDURE — 94640 AIRWAY INHALATION TREATMENT: CPT

## 2022-10-09 PROCEDURE — 250N000013 HC RX MED GY IP 250 OP 250 PS 637: Performed by: HOSPITALIST

## 2022-10-09 PROCEDURE — 92526 ORAL FUNCTION THERAPY: CPT | Mod: GN | Performed by: SPEECH-LANGUAGE PATHOLOGIST

## 2022-10-09 PROCEDURE — 250N000013 HC RX MED GY IP 250 OP 250 PS 637: Performed by: NURSE PRACTITIONER

## 2022-10-09 PROCEDURE — 99233 SBSQ HOSP IP/OBS HIGH 50: CPT | Performed by: HOSPITALIST

## 2022-10-09 PROCEDURE — 999N000157 HC STATISTIC RCP TIME EA 10 MIN

## 2022-10-09 PROCEDURE — 120N000017 HC R&B RESPIRATORY CARE

## 2022-10-09 PROCEDURE — 94640 AIRWAY INHALATION TREATMENT: CPT | Mod: 76

## 2022-10-09 PROCEDURE — 999N000009 HC STATISTIC AIRWAY CARE

## 2022-10-09 RX ADMIN — CALCIUM CARBONATE 1250 MG: 1250 SUSPENSION ORAL at 13:06

## 2022-10-09 RX ADMIN — ALBUTEROL SULFATE 2.5 MG: 2.5 SOLUTION RESPIRATORY (INHALATION) at 20:01

## 2022-10-09 RX ADMIN — CALCIUM CARBONATE 1250 MG: 1250 SUSPENSION ORAL at 17:06

## 2022-10-09 RX ADMIN — OXYCODONE HYDROCHLORIDE 5 MG: 5 SOLUTION ORAL at 13:14

## 2022-10-09 RX ADMIN — ENOXAPARIN SODIUM 40 MG: 100 INJECTION SUBCUTANEOUS at 18:22

## 2022-10-09 RX ADMIN — OXYCODONE HYDROCHLORIDE 5 MG: 5 SOLUTION ORAL at 17:07

## 2022-10-09 RX ADMIN — SENNOSIDES 5 ML: 8.8 LIQUID ORAL at 21:03

## 2022-10-09 RX ADMIN — Medication 2 MG: at 09:21

## 2022-10-09 RX ADMIN — LEVOTHYROXINE SODIUM 112 MCG: 0.11 TABLET ORAL at 06:37

## 2022-10-09 RX ADMIN — ACETYLCYSTEINE 2 ML: 200 SOLUTION ORAL; RESPIRATORY (INHALATION) at 08:13

## 2022-10-09 RX ADMIN — Medication 25 MCG: at 09:21

## 2022-10-09 RX ADMIN — BUMETANIDE 1 MG: 1 TABLET ORAL at 09:21

## 2022-10-09 RX ADMIN — ACETAMINOPHEN 1000 MG: 500 TABLET ORAL at 09:48

## 2022-10-09 RX ADMIN — SENNOSIDES 5 ML: 8.8 LIQUID ORAL at 09:21

## 2022-10-09 RX ADMIN — ALBUTEROL SULFATE 2.5 MG: 2.5 SOLUTION RESPIRATORY (INHALATION) at 08:13

## 2022-10-09 RX ADMIN — Medication 40 MG: at 06:37

## 2022-10-09 RX ADMIN — ACETYLCYSTEINE 2 ML: 200 SOLUTION ORAL; RESPIRATORY (INHALATION) at 20:01

## 2022-10-09 ASSESSMENT — ACTIVITIES OF DAILY LIVING (ADL)
ADLS_ACUITY_SCORE: 69
ADLS_ACUITY_SCORE: 69
ADLS_ACUITY_SCORE: 71
ADLS_ACUITY_SCORE: 69
ADLS_ACUITY_SCORE: 69
ADLS_ACUITY_SCORE: 67
ADLS_ACUITY_SCORE: 69
ADLS_ACUITY_SCORE: 67
ADLS_ACUITY_SCORE: 71

## 2022-10-09 NOTE — PLAN OF CARE
Problem: Device-Related Complication Risk (Mechanical Ventilation, Invasive)  Goal: Optimal Device Function  Outcome: Ongoing, Progressing  Intervention: Optimize Device Care and Function  Recent Flowsheet Documentation  Taken 9/23/2022 1127 by Radha Mcclelland RT  Airway Safety Measures: all equipment/monitors on and audible  Taken 9/23/2022 0805 by Radha Mcclelland RT  Airway Safety Measures: all equipment/monitors on and audible     Problem: Inability to Wean (Mechanical Ventilation, Invasive)  Goal: Mechanical Ventilation Liberation  Outcome: Ongoing, Progressing     Problem: Skin and Tissue Injury (Mechanical Ventilation, Invasive)  Goal: Absence of Device-Related Skin and Tissue Injury  Outcome: Ongoing, Progressing     Problem: Ventilator-Induced Lung Injury (Mechanical Ventilation, Invasive)  Goal: Absence of Ventilator-Induced Lung Injury  Outcome: Ongoing, Progressing   RT PROGRESS NOTE   [id number]  DATA:     CURRENT SETTINGS:             TRACH TYPE / SIZE:#7 Bivona, changed from #6 Shiley Taper guard on 10/03              MODE:TM 21% 25L (89%)  To 35% 30L             FIO2:        ACTION:             THERAPIES: Alb/ Mucomyst neb BID             SUCTION: X1  for   small p-yellow thick to thin                         FREQUENCY:                           AMOUNT:                           CONSISTENCY:                           COLOR:                SPONTANEOUS COUGH EFFORT/STRENGTH OF EFFORT (not elicited by suctioning):                               WEANING PHASE:  2                         WEAN MODE: PMV cont since 10/07   TM cont since  9/26                        WEAN TIME:                           END WEAN REASON:        RESPONSE:             BS:                VITAL SIGNS:                EMOTIONAL NEEDS / CONCERNS:                  RISK FOR SELF DECANNULATION:                          RISK DUE TO:                          INTERVENTION/S IN PLACE IS/ARE:         NOTE / PLAN:   Goal Outcome  Evaluation:    Cont with current POC

## 2022-10-09 NOTE — PROGRESS NOTES
[facility]    Medicine Progress Note - Hospitalist Service    Date of Admission:  9/22/2022  Brief Hospital Course Summary:    Alexandru Still is a 89 year old man w/ PMH of Lambert-Eaton syndrome, hypothyroidism,  complete heart block s/p pacemaker, HTN, BPH,  PB, and DM type 2 who was admitted to the SICU on 9/15/2022 following an unwitnessed fall and striking his head. He was initially seen at an outside hospital and underwent a comprehensive trauma work up found to have a left frontal SDH (5mm) + C1 fx + Type II C2 odontoid fracture with a 1cm posterior displacement. He was  intubated for airway protection in the ED given high c-spine injury and difficulty managing his secretions. On admission he requested all cares without surgical cervical spine stabilization, agreeable to a tracheostomy and PEG. Multiple care conferences held with his family who confirmed his wishes.     LTACH course:    9/24-9/26:  Speech evaluated patient and recommends NPO, cont tube feeds via PEG due to severe oropharyngeal dysphasia.  PT/OT, dietitian, wo nurse saw pt on 9/23.  Pulled out G tube during night of 9/23. Pt now on soft wrist restraints.  IVF changed to D51/2 NS at low rate as pt has TAVR.  Morphine IV ordered prn pain.    Spoke w/ Dr. Brown- IR - recommends no hannah tube as this is a brand new PEG and placing it blind will likely lead it to not be in correct position.  He will not do a PEG placement on the weekend, scheduled for Monday.  Pt cannot be fed via NG tube as it is contraindicated with C1 and C2 fx.  For PEG tube replacement (herpain on hold, place ICD)on 9/26.  Will discontinue IVF once PEG tube is placed and restart po meds.  Given NaPhos 9/26 9/27-10/3:  9/27 patient had PEG tube replaced after patient himself removed it on 9/23.  Was a started on tube feeding.  P.O. medication were restarted.  IV fluids were discontinued.  He still needs soft restraint x2.  Is off ventilator and stable.  9/28 Failed blue  dye test, Continue NPO   9/30 patient had [place] J 300 collar pads delivered  10/1 SLP found patient well below baseline for swallowing, cognition and communication.  Patient stays n.p.o. except ice chips    10/4/22 -/10/8:   -No acute events, Vent weaning phase 2. Continue diuresis with bumex. Continue plan of care. Cognitive testing PENDING. May need neuropsych evaluation depending on cognitive evaluation.     10/9: he follows commands, cont [place] J    Assessment & Plan        Traumatic 5mm left frontal lobe SDH  Posteriorly displaced (1 cm) Type II odontoid fx  C1 anterior arch fracture   Epidural hemorrhage of 7mm  Acute traumatic neck pain  Acute L 6th rib fracture  S/p fall, Facial abrasions with ecchymoses  RLE wound  He was seen and evaluated by Neurosurgery.  Pt refused surgical intervention.   A repeat head CT was obtained with a stable subdural hematoma. He was placed in a cervical collar for the cervical fracture. No surgical intervention per patient preference.  -Cont [place] J -keep in place at all times  -Tylenol, oxy prn for pain, changed tylenol to extra strength  -Neurosurg ok lovenox for DVT ppx  -Wound care following   -f/u Neurosurgery in 6 weeks with an upright XR of the cervical spine (November PENDING appointment)  -CT cervical spine in 3 months (first week of December).   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 Failed bedside and  radiology swallow. He completed 3 doses of Decadron for laryngeal edema.   -S/P tracheostomy 09/21/2022, Trach suture removal on 09/26  -Pulm and RT following  -Wean vent as per pulm , on phase 2 currently  -Bronchial hygeine w/ albuterol/mucomyst. Avoiding metaneb for now given CSI with cervical collar in place   -Gentle diuresis with bumex      Hypertension   Complete heart block s/p PM placement  Nonrheumatic aortic valve stenosis s/p TAVR 2019  -Monitor  -Pt is currently normotensive , on bumex and cardura  -Echo on 9/16:  EF: 60-65%, no LV dysfunction     Severe orophayngeal dysphagia   S/P PEG   Severe protein calorie malnutrition  Hiatal hernia  -PEG tube placed 09/20. Pt pulled out PEG tube on night of 9/23, replaced on 9/27  -On TF : osmolite 1.5 and 60 ml/hr   -RD following  -S/p VFSS 10/6/22: abnormal but no aspiration, recommend pureed food textures and moderately thick liquids     Hypophosphatemia  Hypomagnesia  Hypokalemia  Hyponatremia  -Monitor and manage as needed     Hypothyroidism   -Continue Levothyroxine     Anemia of chronic disease  -Hg trending down past few days  -Cont to monitor intermittently.     BPH  -Cont doxazosin     DM type 2  -Sliding scale insulin  -Last HbA1:  6.2  -Hypoglycemic protocol     Deconditioning  -PT/OT following           Diet: Adult Formula Drip Feeding: Continuous Osmolite 1.5; Gastrostomy; Goal Rate: 55; mL/hr; Medication - Feeding Tube Flush Frequency: At least 15-30 mL water before and after medication administration and with tube clogging; Amount to Send (Nutrition...  Pureed Diet (level 4) Liquidized/Moderately Thick (level 3)  Adult Formula Bolus Feeding: TID Osmolite 1.5; Route: Gastrostomy; 250; mL(s); Additional free water (mL): 120 ml b/t and after bolus feeding; Medication - Feeding Tube Flush Frequency: At least 15-30 mL water before and after medication administr...    DVT Prophylaxis: Lovenox  Escudero Catheter: Not present  Central Lines: PRESENT        Cardiac Monitoring: None  Code Status: Full Code      Disposition Plan     Discharge date: TBD, to TCU on discharge  Barriers: Placement     The patient's care was discussed with patient, RN, SW/AINSLEY Rubio MD  Hospitalist Service  LTACH  Securely message with the Vocera Web Console (learn more here)  Text page via [facility] Paging/Directory           ______________________________________________________________________    Interval History    Chart reviewed and events noted.  Patient seen and examined.     No acute events overnight.    He reports that he is sleepy, he has no dyspnea, no chest pain, no nausea or vomiting.     ROS:  A 10-system review was obtained and is negative with the exception of the symptoms noted above    Data reviewed today: I reviewed all medications, new labs and imaging results over the last 24 hours.     Physical Exam   Vital Signs: Temp: 98.3  F (36.8  C) Temp src: Axillary BP: 137/67 Pulse: 61   Resp: 21 SpO2: 90 % O2 Device: Trach dome Oxygen Delivery: 25 LPM  Weight: 194 lbs 3.2 oz    General:  No acute distress,awake and alert  HEENT: [place] J in place, trach in place  Lungs:  Clear to auscultation bilaterally  CVS:  S1 and S2, RRR  Abdomen:  Soft, nontender, PEG in place and abdominal binder in place.  Musculoskeletal:  No edema  Neuro: Awake, alert, oriented, speech+, following commands.   Skin:  please refer to nursing documentation for full skin assessment    Data   Recent Labs   Lab 10/09/22  0816 10/08/22  2355 10/08/22  1726 10/07/22  1119 10/07/22  1026 10/06/22  1152 10/06/22  0636 10/03/22  0628 10/03/22  0614   NA  --   --   --   --  133*  --  129*  --  134*   POTASSIUM  --   --   --   --  4.1  --  4.2  --  4.6   CHLORIDE  --   --   --   --  96*  --  94*  --  99   CO2  --   --   --   --  28  --  28  --  28   BUN  --   --   --   --  25.1*  --  26.7*  --  21.3   CR  --   --   --   --  0.66*  --  0.62*  --  0.64*   ANIONGAP  --   --   --   --  9  --  7  --  7    TITA  --   --   --   --  8.3  --  8.4  --  8.6   GLC 91 86 89   < > 149*   < > 125*   < > 114*    < > = values in this interval not displayed.     No results found for this or any previous visit (from the past 24 hour(s)).  Medications     - MEDICATION INSTRUCTIONS -         acetylcysteine  2 mL Nebulization BID     albuterol  2.5 mg Nebulization 2 times daily     bumetanide  1 mg Oral or Feeding Tube Daily     calcium carbonate  1,250 mg Oral or Feeding Tube BID w/meals     cholecalciferol  25 mcg Oral or Feeding Tube Daily     doxazosin  2 mg Oral or Feeding Tube Daily     enoxaparin ANTICOAGULANT  40 mg Subcutaneous Q24H     insulin aspart  1-6 Units Subcutaneous TID w/meals     levothyroxine  112 mcg Oral or Feeding Tube QAM AC     pantoprazole  40 mg Oral or Feeding Tube QAM AC     sennosides  5 mL Oral or Feeding Tube BID     sodium chloride (PF)  10 mL Intracatheter Q8H

## 2022-10-09 NOTE — PLAN OF CARE
Problem: Pain Acute  Goal: Acceptable Pain Control and Functional Ability  Outcome: Ongoing, Progressing  Intervention: Prevent or Manage Pain  Recent Flowsheet Documentation  Taken 10/8/2022 1644 by Bob Jeter RN  Medication Review/Management: medications reviewed     Problem: Malnutrition  Goal: Improved Nutritional Intake  Outcome: Ongoing, Progressing   Goal Outcome Evaluation:      Pt alert and able to answer simple yes or no questions. Denied pain and vital signs on this shift were fine. On trach done and oxygen saturation has been fine (93-96%). He ate about 50% for dinner. All his scheduled medications on this evening shift were administered and cares done. He looks comfortable and sleeping now; will continue to monitor. Bob Jeter RN.

## 2022-10-09 NOTE — PLAN OF CARE
Problem: Device-Related Complication Risk (Mechanical Ventilation, Invasive)  Goal: Optimal Device Function  Outcome: Ongoing, Progressing     Problem: Inability to Wean (Mechanical Ventilation, Invasive)  Goal: Mechanical Ventilation Liberation  Outcome: Ongoing, Progressing     Problem: Skin and Tissue Injury (Mechanical Ventilation, Invasive)  Goal: Absence of Device-Related Skin and Tissue Injury  Outcome: Ongoing, Progressing      RT PROGRESS NOTE     DATA:     CURRENT SETTINGS: AC 12/450/+5/30 (back up setting)             TRACH TYPE / SIZE:  #7 [place] 10/03/22             MODE: TM/PMV             FIO2:   25% and 25 lpm      ACTION:             THERAPIES:   ALB BID/ MUCOMYST BID             SUCTION:                           FREQUENCY:  X3                        AMOUNT:   Moderate to small                         CONSISTENCY:  Thick                        COLOR:   White/yellow             SPONTANEOUS COUGH EFFORT/STRENGTH OF EFFORT (not elicited by suctioning): Yes:Strong                              WEANING PHASE: #2                        WEAN MODE:   25% and 25 lpm TM/PMV as chichi                        WEAN TIME:  Since  9/26                        END WEAN REASON:   Cont     RESPONSE:             BS:   Coarse             VITAL SIGNS:   SAT 93-96%, HR 66-69, RR 20-22             EMOTIONAL NEEDS / CONCERNS: N/A              RISK FOR SELF DECANNULATION: N/A                        RISK DUE TO:                          INTERVENTION/S IN PLACE IS/ARE: N/A      NOTE / PLAN:   Pt is on 25% and 25 lpm tm/pmv , chichi well. Cont current therapy and keep sat >/=90%.     9/27 VBG done 7.47/36/41/27/79.2%

## 2022-10-09 NOTE — PLAN OF CARE
Problem: Pain Acute  Goal: Acceptable Pain Control and Functional Ability  Outcome: Not Progressing  Intervention: Develop Pain Management Plan  Recent Flowsheet Documentation  Taken 10/9/2022 1314 by Cathy Mays RN  Pain Management Interventions: medication (see MAR)  Taken 10/9/2022 0946 by Cathy Mays RN  Pain Management Interventions: medication (see MAR)  Intervention: Prevent or Manage Pain  Recent Flowsheet Documentation  Taken 10/9/2022 0946 by Cathy Mays RN  Medication Review/Management: medications reviewed   Goal Outcome Evaluation:       Pt alert and oriented x 4; vital signs stable.   Pt continues to complain of ongoing neck pain. PRN Tylenol given at 0948 for 6/10 pain, and Oxycodone at 1314 for 8/10 pain.  Pt is hard of hearing and uses a pocket talker. Pt wears a Primofit.  Pt spent time up in chair and ate 75% and 100% of breakfast and lunch respectively; no tube feeding bolus given.  Wound care was done on lower right leg.  All care needs met.    Cathy Mays RN

## 2022-10-10 ENCOUNTER — APPOINTMENT (OUTPATIENT)
Dept: SPEECH THERAPY | Facility: CLINIC | Age: 87
DRG: 208 | End: 2022-10-10
Attending: INTERNAL MEDICINE
Payer: MEDICARE

## 2022-10-10 ENCOUNTER — APPOINTMENT (OUTPATIENT)
Dept: OCCUPATIONAL THERAPY | Facility: CLINIC | Age: 87
DRG: 208 | End: 2022-10-10
Attending: INTERNAL MEDICINE
Payer: MEDICARE

## 2022-10-10 LAB
ANION GAP SERPL CALCULATED.3IONS-SCNC: 10 MMOL/L (ref 7–15)
BASOPHILS # BLD AUTO: 0 10E3/UL (ref 0–0.2)
BASOPHILS NFR BLD AUTO: 0 %
BUN SERPL-MCNC: 23.3 MG/DL (ref 8–23)
CALCIUM SERPL-MCNC: 8.7 MG/DL (ref 8.2–9.6)
CHLORIDE SERPL-SCNC: 96 MMOL/L (ref 98–107)
CREAT SERPL-MCNC: 0.75 MG/DL (ref 0.67–1.17)
DEPRECATED HCO3 PLAS-SCNC: 27 MMOL/L (ref 22–29)
EOSINOPHIL # BLD AUTO: 0.1 10E3/UL (ref 0–0.7)
EOSINOPHIL NFR BLD AUTO: 2 %
ERYTHROCYTE [DISTWIDTH] IN BLOOD BY AUTOMATED COUNT: 14.8 % (ref 10–15)
GFR SERPL CREATININE-BSD FRML MDRD: 85 ML/MIN/1.73M2
GLUCOSE BLDC GLUCOMTR-MCNC: 103 MG/DL (ref 70–99)
GLUCOSE BLDC GLUCOMTR-MCNC: 109 MG/DL (ref 70–99)
GLUCOSE BLDC GLUCOMTR-MCNC: 112 MG/DL (ref 70–99)
GLUCOSE BLDC GLUCOMTR-MCNC: 84 MG/DL (ref 70–99)
GLUCOSE SERPL-MCNC: 90 MG/DL (ref 70–99)
HCT VFR BLD AUTO: 29.5 % (ref 40–53)
HGB BLD-MCNC: 9.8 G/DL (ref 13.3–17.7)
IMM GRANULOCYTES # BLD: 0 10E3/UL
IMM GRANULOCYTES NFR BLD: 0 %
LYMPHOCYTES # BLD AUTO: 1 10E3/UL (ref 0.8–5.3)
LYMPHOCYTES NFR BLD AUTO: 14 %
MAGNESIUM SERPL-MCNC: 2 MG/DL (ref 1.7–2.3)
MCH RBC QN AUTO: 30.9 PG (ref 26.5–33)
MCHC RBC AUTO-ENTMCNC: 33.2 G/DL (ref 31.5–36.5)
MCV RBC AUTO: 93 FL (ref 78–100)
MONOCYTES # BLD AUTO: 1 10E3/UL (ref 0–1.3)
MONOCYTES NFR BLD AUTO: 14 %
NEUTROPHILS # BLD AUTO: 5 10E3/UL (ref 1.6–8.3)
NEUTROPHILS NFR BLD AUTO: 70 %
NRBC # BLD AUTO: 0 10E3/UL
NRBC BLD AUTO-RTO: 0 /100
PHOSPHATE SERPL-MCNC: 3 MG/DL (ref 2.5–4.5)
PLATELET # BLD AUTO: 335 10E3/UL (ref 150–450)
POTASSIUM SERPL-SCNC: 4.2 MMOL/L (ref 3.4–5.3)
RBC # BLD AUTO: 3.17 10E6/UL (ref 4.4–5.9)
SODIUM SERPL-SCNC: 133 MMOL/L (ref 136–145)
WBC # BLD AUTO: 7.1 10E3/UL (ref 4–11)

## 2022-10-10 PROCEDURE — 999N000123 HC STATISTIC OXYGEN O2DAILY TECH TIME

## 2022-10-10 PROCEDURE — 250N000013 HC RX MED GY IP 250 OP 250 PS 637: Performed by: HOSPITALIST

## 2022-10-10 PROCEDURE — 97535 SELF CARE MNGMENT TRAINING: CPT | Mod: GO

## 2022-10-10 PROCEDURE — 250N000011 HC RX IP 250 OP 636: Performed by: HOSPITALIST

## 2022-10-10 PROCEDURE — 250N000009 HC RX 250: Performed by: NURSE PRACTITIONER

## 2022-10-10 PROCEDURE — 82310 ASSAY OF CALCIUM: CPT | Performed by: HOSPITALIST

## 2022-10-10 PROCEDURE — 999N000009 HC STATISTIC AIRWAY CARE

## 2022-10-10 PROCEDURE — 84100 ASSAY OF PHOSPHORUS: CPT | Performed by: HOSPITALIST

## 2022-10-10 PROCEDURE — 83735 ASSAY OF MAGNESIUM: CPT | Performed by: HOSPITALIST

## 2022-10-10 PROCEDURE — 999N000253 HC STATISTIC WEANING TRIALS

## 2022-10-10 PROCEDURE — 99232 SBSQ HOSP IP/OBS MODERATE 35: CPT | Performed by: HOSPITALIST

## 2022-10-10 PROCEDURE — 99232 SBSQ HOSP IP/OBS MODERATE 35: CPT | Performed by: NURSE PRACTITIONER

## 2022-10-10 PROCEDURE — 92526 ORAL FUNCTION THERAPY: CPT | Mod: GN

## 2022-10-10 PROCEDURE — 36415 COLL VENOUS BLD VENIPUNCTURE: CPT | Performed by: HOSPITALIST

## 2022-10-10 PROCEDURE — 999N000157 HC STATISTIC RCP TIME EA 10 MIN

## 2022-10-10 PROCEDURE — 94640 AIRWAY INHALATION TREATMENT: CPT

## 2022-10-10 PROCEDURE — 85025 COMPLETE CBC W/AUTO DIFF WBC: CPT | Performed by: HOSPITALIST

## 2022-10-10 PROCEDURE — 94640 AIRWAY INHALATION TREATMENT: CPT | Mod: 76

## 2022-10-10 PROCEDURE — 250N000013 HC RX MED GY IP 250 OP 250 PS 637: Performed by: NURSE PRACTITIONER

## 2022-10-10 PROCEDURE — 120N000017 HC R&B RESPIRATORY CARE

## 2022-10-10 RX ORDER — CALCIUM CARBONATE 500(1250)
500 TABLET ORAL 2 TIMES DAILY WITH MEALS
Status: DISCONTINUED | OUTPATIENT
Start: 2022-10-10 | End: 2022-10-28 | Stop reason: HOSPADM

## 2022-10-10 RX ORDER — BUMETANIDE 1 MG/1
1 TABLET ORAL DAILY
Status: DISCONTINUED | OUTPATIENT
Start: 2022-10-11 | End: 2022-10-13

## 2022-10-10 RX ORDER — SENNOSIDES 8.6 MG
1 TABLET ORAL 2 TIMES DAILY
Status: DISCONTINUED | OUTPATIENT
Start: 2022-10-10 | End: 2022-10-28 | Stop reason: HOSPADM

## 2022-10-10 RX ORDER — DOXAZOSIN 1 MG/1
2 TABLET ORAL DAILY
Status: DISCONTINUED | OUTPATIENT
Start: 2022-10-20 | End: 2022-10-25

## 2022-10-10 RX ORDER — LEVOTHYROXINE SODIUM 112 UG/1
112 TABLET ORAL
Status: DISCONTINUED | OUTPATIENT
Start: 2022-10-11 | End: 2022-10-28 | Stop reason: HOSPADM

## 2022-10-10 RX ORDER — VITAMIN B COMPLEX
25 TABLET ORAL DAILY
Status: DISCONTINUED | OUTPATIENT
Start: 2022-10-11 | End: 2022-10-28 | Stop reason: HOSPADM

## 2022-10-10 RX ADMIN — Medication 25 MCG: at 08:52

## 2022-10-10 RX ADMIN — CALCIUM CARBONATE 1250 MG: 1250 SUSPENSION ORAL at 17:22

## 2022-10-10 RX ADMIN — CALCIUM CARBONATE 1250 MG: 1250 SUSPENSION ORAL at 11:28

## 2022-10-10 RX ADMIN — ACETYLCYSTEINE 2 ML: 200 SOLUTION ORAL; RESPIRATORY (INHALATION) at 07:46

## 2022-10-10 RX ADMIN — SENNOSIDES 1 TABLET: 8.6 TABLET, FILM COATED ORAL at 20:06

## 2022-10-10 RX ADMIN — ACETYLCYSTEINE 2 ML: 200 SOLUTION ORAL; RESPIRATORY (INHALATION) at 19:56

## 2022-10-10 RX ADMIN — ALBUTEROL SULFATE 2.5 MG: 2.5 SOLUTION RESPIRATORY (INHALATION) at 07:46

## 2022-10-10 RX ADMIN — OXYCODONE HYDROCHLORIDE 5 MG: 5 SOLUTION ORAL at 00:42

## 2022-10-10 RX ADMIN — Medication 40 MG: at 05:53

## 2022-10-10 RX ADMIN — BUMETANIDE 1 MG: 1 TABLET ORAL at 08:52

## 2022-10-10 RX ADMIN — QUETIAPINE 25 MG: 25 TABLET, FILM COATED ORAL at 15:43

## 2022-10-10 RX ADMIN — ALBUTEROL SULFATE 2.5 MG: 2.5 SOLUTION RESPIRATORY (INHALATION) at 19:55

## 2022-10-10 RX ADMIN — SENNOSIDES 5 ML: 8.8 LIQUID ORAL at 08:52

## 2022-10-10 RX ADMIN — Medication 2 MG: at 08:52

## 2022-10-10 RX ADMIN — ENOXAPARIN SODIUM 40 MG: 100 INJECTION SUBCUTANEOUS at 20:06

## 2022-10-10 RX ADMIN — OXYCODONE HYDROCHLORIDE 5 MG: 5 SOLUTION ORAL at 11:28

## 2022-10-10 RX ADMIN — LEVOTHYROXINE SODIUM 112 MCG: 0.11 TABLET ORAL at 05:59

## 2022-10-10 RX ADMIN — ACETAMINOPHEN 1000 MG: 500 TABLET ORAL at 15:43

## 2022-10-10 ASSESSMENT — ACTIVITIES OF DAILY LIVING (ADL)
ADLS_ACUITY_SCORE: 71

## 2022-10-10 NOTE — PROGRESS NOTES
[facility]    Medicine Progress Note - Hospitalist Service    Date of Admission:  9/22/2022  Brief Hospital Course Summary:    Alexandru Still is a 89 year old man w/ PMH of Lambert-Eaton syndrome, hypothyroidism,  complete heart block s/p pacemaker, HTN, BPH,  PB, and DM type 2 who was admitted to the SICU on 9/15/2022 following an unwitnessed fall and striking his head. He was initially seen at an outside hospital and underwent a comprehensive trauma work up found to have a left frontal SDH (5mm) + C1 fx + Type II C2 odontoid fracture with a 1cm posterior displacement. He was  intubated for airway protection in the ED given high c-spine injury and difficulty managing his secretions. On admission he requested all cares without surgical cervical spine stabilization, agreeable to a tracheostomy and PEG. Multiple care conferences held with his family who confirmed his wishes.     LTACH course:    9/24-9/26:  Speech evaluated patient and recommends NPO, cont tube feeds via PEG due to severe oropharyngeal dysphasia.  PT/OT, dietitian, wo nurse saw pt on 9/23.  Pulled out G tube during night of 9/23. Pt now on soft wrist restraints.  IVF changed to D51/2 NS at low rate as pt has TAVR.  Morphine IV ordered prn pain.    Spoke w/ Dr. Brown- IR - recommends no hannah tube as this is a brand new PEG and placing it blind will likely lead it to not be in correct position.  He will not do a PEG placement on the weekend, scheduled for Monday.  Pt cannot be fed via NG tube as it is contraindicated with C1 and C2 fx.  For PEG tube replacement (herpain on hold, place ICD)on 9/26.  Will discontinue IVF once PEG tube is placed and restart po meds.  Given NaPhos 9/26 9/27-10/3:  9/27 patient had PEG tube replaced after patient himself removed it on 9/23.  Was a started on tube feeding.  P.O. medication were restarted.  IV fluids were discontinued.  He still needs soft restraint x2.  Is off ventilator and stable.  9/28 Failed blue  dye test, Continue NPO   9/30 patient had [place] J 300 collar pads delivered  10/1 SLP found patient well below baseline for swallowing, cognition and communication.  Patient stays n.p.o. except ice chips    10/4/22 -/10/8:   -No acute events, Vent weaning phase 2. Continue diuresis with bumex. Continue plan of care. Cognitive testing PENDING. May need neuropsych evaluation depending on cognitive evaluation.     10/9-10/10: he follows commands, cont [place] J.  He does better with glasses and hearing device on.  He has neck pain and headache.      Assessment & Plan        Traumatic 5mm left frontal lobe SDH  Posteriorly displaced (1 cm) Type II odontoid fx  C1 anterior arch fracture   Epidural hemorrhage of 7mm  Acute traumatic neck pain  Acute L 6th rib fracture  S/p fall, Facial abrasions with ecchymoses  RLE wound  He was seen and evaluated by Neurosurgery.  Pt refused surgical intervention.   A repeat head CT was obtained with a stable subdural hematoma. He was placed in a cervical collar for the cervical fracture. No surgical intervention per patient preference.  -Cont [place] J -keep in place at all times  -Tylenol, oxy prn for pain, changed tylenol to extra strength  -Neurosurg ok lovenox for DVT ppx  -Wound care following   -f/u Neurosurgery in 6 weeks with an upright XR of the cervical spine (November PENDING appointment)  -CT cervical spine in 3 months (first week of December).   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 Failed bedside and radiology swallow. He completed 3 doses of Decadron for laryngeal edema.   -S/P tracheostomy 09/21/2022, Trach suture removal on 09/26  -Pulm and RT following  -Wean vent as per pulm , on phase 2 currently  -Bronchial hygeine w/ albuterol/mucomyst. Avoiding metaneb for now given CSI with cervical collar in place   -Gentle diuresis with bumex      Hypertension   Complete heart block s/p PM placement  Nonrheumatic aortic valve stenosis s/p TAVR 2019  -Monitor  -Pt is currently normotensive , on bumex and cardura  -Echo on 9/16:  EF: 60-65%, no LV dysfunction     Severe orophayngeal dysphagia   S/P PEG   Severe protein calorie malnutrition  Hiatal hernia  -PEG tube placed 09/20. Pt pulled out PEG tube on night of 9/23, replaced on 9/27  -On TF : osmolite 1.5 and 60 ml/hr   -RD following  -S/p VFSS 10/6/22: abnormal but no aspiration, recommend pureed food textures and moderately thick liquids     Hypophosphatemia  Hypomagnesia  Hypokalemia  Hyponatremia  -Monitor and manage as needed     Hypothyroidism   -Continue Levothyroxine     Anemia of chronic disease  -Hg trending down past few days  -Cont to monitor intermittently.     BPH  -Cont doxazosin     DM type 2  -Sliding scale insulin  -Last HbA1:  6.2  -Hypoglycemic protocol     Deconditioning  -PT/OT following           Diet: Adult Formula Drip Feeding: Continuous Osmolite 1.5; Gastrostomy; Goal Rate: 55; mL/hr; Medication - Feeding Tube Flush Frequency: At least 15-30 mL water before and after medication administration and with tube clogging; Amount to Send (Nutrition...  Pureed Diet (level 4) Liquidized/Moderately Thick (level 3)  Adult Formula Bolus Feeding: TID Osmolite 1.5; Route: Gastrostomy; 250; mL(s); Additional free water (mL): 120 ml b/t and after bolus feeding; Medication - Feeding Tube Flush Frequency: At least 15-30 mL water before and after medication administr...     DVT Prophylaxis: Lovenox  Escudero Catheter: Not present  Central Lines: PRESENT       Cardiac Monitoring: None  Code Status: Full Code      Disposition Plan     Discharge date: TBD, to TCU on discharge  Barriers: Placement     The patient's care was discussed with patient, RN, TAMRA/AINSLEY Rubio MD  Hospitalist Service  LTACH  Securely message with the Vocera Web Console (learn more here)  Text page via [employer] Paging/Directory           ______________________________________________________________________    Interval History    Chart reviewed and events noted.  Patient seen and examined.     No acute events overnight.    He reports that he is having neck pain and headache 5/10 in severity.  he has no dyspnea, no chest pain, no nausea or vomiting.     ROS:  A 10-system review was obtained and is negative with the exception of the symptoms noted above    Data reviewed today: I reviewed all medications, new labs and imaging results over the last 24 hours.     Physical Exam   Vital Signs: Temp: 99.3  F (37.4  C) Temp src: Axillary BP: (!) 151/79 Pulse: 65   Resp: 20 SpO2: 95 % O2 Device: Trach dome Oxygen Delivery: 30 LPM  Weight: 187 lbs 3.2 oz    General:  No acute distress,awake and alert  HEENT: [place] J in place, trach in place  Lungs:  Clear to auscultation bilaterally  CVS:  S1 and S2, RRR  Abdomen:  Soft, nontender, PEG in place and abdominal binder in place.  Musculoskeletal:  No edema  Neuro: Awake, alert, oriented, speech+, following commands.   Skin:  please refer to nursing documentation for full skin assessment    Data   Recent Labs   Lab 10/10/22  0804 10/10/22  0632 10/09/22  1712 10/07/22  1119 10/07/22  1026 10/06/22  1152 10/06/22  0636   WBC  --  7.1  --   --   --   --   --    HGB  --  9.8*  --   --   --   --   --    MCV  --  93  --   --   --   --   --    PLT  --  335  --   --   --   --   --    NA  --  133*  --   --  133*  --  129*   POTASSIUM  --  4.2  --   --  4.1  --  4.2   CHLORIDE  --   96*  --   --  96*  --  94*   CO2  --  27  --   --  28  --  28   BUN  --  23.3*  --   --  25.1*  --  26.7*   CR  --  0.75  --   --  0.66*  --  0.62*   ANIONGAP  --  10  --   --  9  --  7   TITA  --  8.7  --   --  8.3  --  8.4   GLC 84 90 108*   < > 149*   < > 125*    < > = values in this interval not displayed.     No results found for this or any previous visit (from the past 24 hour(s)).  Medications     - MEDICATION INSTRUCTIONS -         acetylcysteine  2 mL Nebulization BID     albuterol  2.5 mg Nebulization 2 times daily     bumetanide  1 mg Oral or Feeding Tube Daily     calcium carbonate  1,250 mg Oral or Feeding Tube BID w/meals     cholecalciferol  25 mcg Oral or Feeding Tube Daily     doxazosin  2 mg Oral or Feeding Tube Daily     enoxaparin ANTICOAGULANT  40 mg Subcutaneous Q24H     insulin aspart  1-6 Units Subcutaneous TID w/meals     levothyroxine  112 mcg Oral or Feeding Tube QAM AC     pantoprazole  40 mg Oral or Feeding Tube QAM AC     sennosides  5 mL Oral or Feeding Tube BID     sodium chloride (PF)  10 mL Intracatheter Q8H

## 2022-10-10 NOTE — PROGRESS NOTES
Pulmonary Progress Note    Admit Date: 9/22/2022  CODE: Full Code    Assessment/Plan:   89 yoM admitted 9/15/2022 following an unwitnessed fall found to have a left frontal SDH and C1/C2 cervical spine fracture.  Intubated for airway protection in the ED given high c-spine injury and difficulty managing his secretions. On admission he requested all cares without surgical cervical spine stabilization, agreeable to a tracheostomy and PEG. Discharged to LTACH 9/22/22.     PMHx: Lambert-Eaton syndrome, TAVR 2019, complete heart block with pacemaker dependency, DM2, arthritis, BPH, HTN, osteoporosis     Pertinent problems:  Acute hypoxic/hypercapnic respiratory failure s/p trach: in setting of traumatic cervical injury after a fall.  History of PB(no home CPAP per pt).  Liberated from vent 9/26 with acceptable blood gas off vent.  Aspirating on BDTs but these are improving and now tolerating prolonged periods with speaking valve.  Diuresing with bumex.    - Start Phase 3: Cap as tolerated  - Albuterol/mucomsyt nebs BID for bronchial hygiene.  Avoiding metaneb for now given CSI with cervical collar in place   - continue gentle diuresis with Bumex    Tracheostomy: 6 Shiley placed 9/21/2022.  Downsized to 7 Bivona 10/3 without issue    Dysphagia s/p PEG: Repeat BDT 10/3 with no immediate and large delayed aspiration; this is improvement from previous tests. Tolerating PMV well during the day with a good spontaneous cough per RT.  Abnormal VFSS 10/6 but no aspiration   - pureed diet with liquidized/mod thick liquids started 10/6    Laryngeal edema 2/2 traumatic cervical fracture/injury s/p 3 doses of decadron in acute care    Other problems managed by primary team:  1. Acute neck and rib cage pain(known rib fractures), chronic hip/shoulder pain  2. Traumatic SDH, stable  3. C1/C2 CSI: [place] J collar at all times, repeat imaging per NSG  4. DVT ppx: Lovenox  5. Hyponatremia    Sawyer Capone, STEPHANIE  Pulmonary Medicine  [facility]  [place]  Pager [phone]    Subjective/Interim Events:   - tolerating capping on 2L via NC with SpO2 97%  - denies dyspnea, positive for HA and chronic joint pains   - secretion burden improving     Tracheal secretions:  Overnight - x2, small/mod, thick  Yesterday - x1, small    Cough strength: strong, productive     Current phase of ventilator weaning pathway:  Phase 2    Ventilator weaning results  10/7-present: TM/PMV continuously     Clinical status discussed today with respiratory therapist    Medications:       - MEDICATION INSTRUCTIONS -         acetylcysteine  2 mL Nebulization BID     albuterol  2.5 mg Nebulization 2 times daily     [START ON 10/11/2022] bumetanide  1 mg Oral Daily     calcium carbonate  500 mg Oral BID w/meals     calcium carbonate  1,250 mg Oral or Feeding Tube BID w/meals     doxazosin  2 mg Oral or Feeding Tube Daily     [START ON 10/20/2022] doxazosin  2 mg Oral Daily     enoxaparin ANTICOAGULANT  40 mg Subcutaneous Q24H     insulin aspart  1-6 Units Subcutaneous TID w/meals     [START ON 10/11/2022] levothyroxine  112 mcg Oral QAM AC     pantoprazole  40 mg Oral or Feeding Tube QAM AC     sennosides  1 tablet Oral BID     sodium chloride (PF)  10 mL Intracatheter Q8H     [START ON 10/11/2022] cholecalciferol  25 mcg Oral Daily         Exam/Data:   Vitals  BP (!) 151/79 (BP Location: Left arm)   Pulse 71   Temp 99.3  F (37.4  C) (Axillary)   Resp 20   Wt 84.9 kg (187 lb 3.2 oz)   SpO2 95%   BMI 28.46 kg/m       I/O last 3 completed shifts:  In: 660 [P.O.:480; NG/GT:180]  Out: 1175 [Urine:1175]  Weight change: -3.175 kg (-7 lb)    Vent Mode: Trach collar  FiO2 (%): 35 %  Resp: 20      EXAM:  Gen:  no distress with trach capped   HEENT: NT, trach midline/intact, c-collar on, good voice, no stridor   CV: RRR, no m/g/r  Resp: CTAB; non-labored   Abd: soft, nontender, BS+  Skin: no rashes or lesions  Ext: mild b/l UE edema  Neuro: Alert, follows commands, [place] uses pocket talker      ROS:  A 10-system review was obtained and is negative with the exception of the symptoms noted above.    Labs:  Basic Metabolic Panel  Recent Labs   Lab 10/10/22  1206 10/10/22  0804 10/10/22  0632 10/09/22  1712 10/07/22  1119 10/07/22  1026 10/06/22  1152 10/06/22  0636   NA  --   --  133*  --   --  133*  --  129*   POTASSIUM  --   --  4.2  --   --  4.1  --  4.2   CHLORIDE  --   --  96*  --   --  96*  --  94*   CO2  --   --  27  --   --  28  --  28   BUN  --   --  23.3*  --   --  25.1*  --  26.7*   CR  --   --  0.75  --   --  0.66*  --  0.62*   * 84 90 108*   < > 149*   < > 125*    < > = values in this interval not displayed.     CBC RESULTS: Recent Labs   Lab Test 10/10/22  0632   WBC 7.1   RBC 3.17*   HGB 9.8*   HCT 29.5*   MCV 93   MCH 30.9   MCHC 33.2   RDW 14.8          RADIOLOGY:   XR CHEST PORT 1 VIEW  LOCATION: [facility]  DATE/TIME: 10/3/2022 8:40 AM     INDICATION: hypoxic respiratory failure  COMPARISON: Chest x-ray 09/26/2022                                                                      IMPRESSION: Stable satisfactory tracheostomy tube position. TAVR. Stable dual-lead left-sided cardiac conduction device. Right shoulder arthroplasty. Bibasilar pulmonary opacities which may reflect atelectasis and/or infiltrates, increased on the left   and stable on the right. No definite pleural effusion. Stable cardiomegaly and central vascular congestion.

## 2022-10-10 NOTE — PLAN OF CARE
Problem: Device-Related Complication Risk (Mechanical Ventilation, Invasive)  Goal: Optimal Device Function  Outcome: Ongoing, Progressing    Problem: Inability to Wean (Mechanical Ventilation, Invasive)  Goal: Mechanical Ventilation Liberation  Outcome: Ongoing, Progressing     Problem: Skin and Tissue Injury (Mechanical Ventilation, Invasive)  Goal: Absence of Device-Related Skin and Tissue Injury  Outcome: Ongoing, Progressing     Problem: Ventilator-Induced Lung Injury (Mechanical Ventilation, Invasive)  Goal: Absence of Ventilator-Induced Lung Injury  Outcome: Ongoing, Progressing   RT PROGRESS NOTE     DATA:     CURRENT SETTINGS:             TRACH TYPE / SIZE: #7 Bivona, placed on 10/3/22              MODE:capped              FIO2:  3L NC     ACTION:             THERAPIES: Alb/ Mucomyst neb x 1 BID given on scheduled time              SUCTION:                          FREQUENCY: 2                        AMOUNT:  small                         CONSISTENCY:   thick                        COLOR:   white              SPONTANEOUS COUGH EFFORT/STRENGTH OF EFFORT (not elicited by suctioning): mod/ little bit congested, non-productive cough                               WEANING PHASE:  3                        WEAN MODE:   Capped/ 3L                         WEAN TIME:  cont since 0950                        END WEAN REASON:        RESPONSE:             BS: slightly coarse to clear decreased - clear decreased after Sx               VITAL SIGNS: sat 93-97%, HR 62-71, RR 20               EMOTIONAL NEEDS / CONCERNS:                  RISK FOR SELF DECANNULATION:                          RISK DUE TO:                          INTERVENTION/S IN PLACE IS/ARE:         NOTE / PLAN:   cont current plan care - cap trach cont as tolerated. If not tolerated capping - 30% TM 30L/ PMV.

## 2022-10-10 NOTE — PLAN OF CARE
Problem: Device-Related Complication Risk (Mechanical Ventilation, Invasive)  Goal: Optimal Device Function  Outcome: Ongoing, Progressing     Problem: Inability to Wean (Mechanical Ventilation, Invasive)  Goal: Mechanical Ventilation Liberation  Outcome: Ongoing, Progressing     Problem: Skin and Tissue Injury (Mechanical Ventilation, Invasive)  Goal: Absence of Device-Related Skin and Tissue Injury  Outcome: Ongoing, Progressing      RT PROGRESS NOTE     DATA:     CURRENT SETTINGS: AC 12/450/+5/30 (back up setting)             TRACH TYPE / SIZE:  #7 Bivona 10/03/22             MODE: TM/PMV             FIO2:   35% and 30 lpm      ACTION:             THERAPIES:   ALB BID/ MUCOMYST BID             SUCTION:                           FREQUENCY:  X2                        AMOUNT:   Moderate to small                         CONSISTENCY:  Thick                        COLOR:   White/yellow             SPONTANEOUS COUGH EFFORT/STRENGTH OF EFFORT (not elicited by suctioning): Yes:Strong                              WEANING PHASE: #2                        WEAN MODE:   35% and 30 lpm TM/PMV as chichi                        WEAN TIME:  Since  9/26                        END WEAN REASON:   Cont     RESPONSE:             BS:   Coarse             VITAL SIGNS:   SAT 96-98%, HR 64-67, RR 20-24             EMOTIONAL NEEDS / CONCERNS: N/A              RISK FOR SELF DECANNULATION: N/A                        RISK DUE TO:                          INTERVENTION/S IN PLACE IS/ARE: N/A      NOTE / PLAN:   Pt is on 35% and 30 lpm tm/pmv , chichi well. Cont current therapy and keep sat >/=90%. Since the pt is tolerating well on tm/pmv, consider phase#3 trail    9/27 VBG done 7.47/36/41/27/79.2%

## 2022-10-10 NOTE — PROGRESS NOTES
Pt resting quietly in bed this shift thus far. Denies pain. C-collar on. On trach dome.  Midline dressing changed. Unsure why patient needs midline as he has no IV medications or fluids so a note was left to the provider asking if pt needs the midline still. Pt has two conflicting tube feeding orders, note left to dietary to discontinue old order. Note also left to dietary requesting clarification of tube feeding flush orders as two different flush amounts are listed. A note was left by previous nurse requesting to change accuchecks from q 6 hours to TID to reflect pt's scheduled insulin.

## 2022-10-11 ENCOUNTER — APPOINTMENT (OUTPATIENT)
Dept: OCCUPATIONAL THERAPY | Facility: CLINIC | Age: 87
DRG: 208 | End: 2022-10-11
Attending: INTERNAL MEDICINE
Payer: MEDICARE

## 2022-10-11 ENCOUNTER — APPOINTMENT (OUTPATIENT)
Dept: SPEECH THERAPY | Facility: CLINIC | Age: 87
DRG: 208 | End: 2022-10-11
Attending: INTERNAL MEDICINE
Payer: MEDICARE

## 2022-10-11 ENCOUNTER — APPOINTMENT (OUTPATIENT)
Dept: PHYSICAL THERAPY | Facility: CLINIC | Age: 87
DRG: 208 | End: 2022-10-11
Attending: INTERNAL MEDICINE
Payer: MEDICARE

## 2022-10-11 LAB
GLUCOSE BLDC GLUCOMTR-MCNC: 115 MG/DL (ref 70–99)
GLUCOSE BLDC GLUCOMTR-MCNC: 90 MG/DL (ref 70–99)

## 2022-10-11 PROCEDURE — 250N000013 HC RX MED GY IP 250 OP 250 PS 637: Performed by: HOSPITALIST

## 2022-10-11 PROCEDURE — 250N000011 HC RX IP 250 OP 636: Performed by: HOSPITALIST

## 2022-10-11 PROCEDURE — 120N000017 HC R&B RESPIRATORY CARE

## 2022-10-11 PROCEDURE — 97535 SELF CARE MNGMENT TRAINING: CPT | Mod: GO | Performed by: OCCUPATIONAL THERAPIST

## 2022-10-11 PROCEDURE — 250N000013 HC RX MED GY IP 250 OP 250 PS 637: Performed by: INTERNAL MEDICINE

## 2022-10-11 PROCEDURE — 250N000009 HC RX 250: Performed by: NURSE PRACTITIONER

## 2022-10-11 PROCEDURE — 999N000157 HC STATISTIC RCP TIME EA 10 MIN

## 2022-10-11 PROCEDURE — 94640 AIRWAY INHALATION TREATMENT: CPT

## 2022-10-11 PROCEDURE — 94640 AIRWAY INHALATION TREATMENT: CPT | Mod: 76

## 2022-10-11 PROCEDURE — 97110 THERAPEUTIC EXERCISES: CPT | Mod: GP

## 2022-10-11 PROCEDURE — 97530 THERAPEUTIC ACTIVITIES: CPT | Mod: GP

## 2022-10-11 PROCEDURE — 999N000123 HC STATISTIC OXYGEN O2DAILY TECH TIME

## 2022-10-11 PROCEDURE — 999N000009 HC STATISTIC AIRWAY CARE

## 2022-10-11 PROCEDURE — 99232 SBSQ HOSP IP/OBS MODERATE 35: CPT | Performed by: INTERNAL MEDICINE

## 2022-10-11 PROCEDURE — 92526 ORAL FUNCTION THERAPY: CPT | Mod: GN | Performed by: SPEECH-LANGUAGE PATHOLOGIST

## 2022-10-11 RX ORDER — MULTIPLE VITAMINS W/ MINERALS TAB 9MG-400MCG
1 TAB ORAL DAILY
Status: DISCONTINUED | OUTPATIENT
Start: 2022-10-12 | End: 2022-10-28 | Stop reason: HOSPADM

## 2022-10-11 RX ORDER — LACTULOSE 10 G/15ML
20 SOLUTION ORAL 2 TIMES DAILY
Status: DISCONTINUED | OUTPATIENT
Start: 2022-10-11 | End: 2022-10-18

## 2022-10-11 RX ADMIN — CALCIUM 500 MG: 500 TABLET ORAL at 22:24

## 2022-10-11 RX ADMIN — ACETYLCYSTEINE 2 ML: 200 SOLUTION ORAL; RESPIRATORY (INHALATION) at 09:31

## 2022-10-11 RX ADMIN — SENNOSIDES 1 TABLET: 8.6 TABLET, FILM COATED ORAL at 21:14

## 2022-10-11 RX ADMIN — LACTULOSE 20 G: 20 SOLUTION ORAL at 21:13

## 2022-10-11 RX ADMIN — OXYCODONE HYDROCHLORIDE 5 MG: 5 SOLUTION ORAL at 09:13

## 2022-10-11 RX ADMIN — ACETAMINOPHEN 1000 MG: 500 TABLET ORAL at 09:13

## 2022-10-11 RX ADMIN — Medication 25 MCG: at 09:15

## 2022-10-11 RX ADMIN — LEVOTHYROXINE SODIUM 112 MCG: 0.11 TABLET ORAL at 05:32

## 2022-10-11 RX ADMIN — Medication 2 MG: at 09:13

## 2022-10-11 RX ADMIN — ENOXAPARIN SODIUM 40 MG: 100 INJECTION SUBCUTANEOUS at 21:13

## 2022-10-11 RX ADMIN — CALCIUM 500 MG: 500 TABLET ORAL at 15:09

## 2022-10-11 RX ADMIN — ACETYLCYSTEINE 2 ML: 200 SOLUTION ORAL; RESPIRATORY (INHALATION) at 20:34

## 2022-10-11 RX ADMIN — BUMETANIDE 1 MG: 1 TABLET ORAL at 09:13

## 2022-10-11 RX ADMIN — Medication 40 MG: at 05:33

## 2022-10-11 RX ADMIN — ALBUTEROL SULFATE 2.5 MG: 2.5 SOLUTION RESPIRATORY (INHALATION) at 09:31

## 2022-10-11 RX ADMIN — SENNOSIDES 1 TABLET: 8.6 TABLET, FILM COATED ORAL at 09:13

## 2022-10-11 RX ADMIN — ALBUTEROL SULFATE 2.5 MG: 2.5 SOLUTION RESPIRATORY (INHALATION) at 20:34

## 2022-10-11 ASSESSMENT — ACTIVITIES OF DAILY LIVING (ADL)
ADLS_ACUITY_SCORE: 71
ADLS_ACUITY_SCORE: 73
ADLS_ACUITY_SCORE: 71
ADLS_ACUITY_SCORE: 71
ADLS_ACUITY_SCORE: 73
ADLS_ACUITY_SCORE: 71
ADLS_ACUITY_SCORE: 67
ADLS_ACUITY_SCORE: 73
ADLS_ACUITY_SCORE: 71
ADLS_ACUITY_SCORE: 67

## 2022-10-11 NOTE — PROGRESS NOTES
CLINICAL NUTRITION SERVICES - REASSESSMENT NOTE      RECOMMENDATIONS FOR MD/PROVIDER TO ORDER:   Consider increasing bowel regimen if needed, LBM 10/7   Recommendations Ordered by Registered Dietitian (RD):   Discontinued Enteral nutrition  Continue FWF 60 mL Q8 hours via G-tube for tube patency  Berry Magic Cup BID between meals  MVI/M   Future/Additional Recommendations:   Continue to monitor meal intakes and weight.   Malnutrition:   Previously noted severe     EVALUATION OF PROGRESS TOWARD GOALS   Diet:  Orders Placed This Encounter      Pureed Diet (level 4) Liquidized/Moderately Thick (level 3)    Nutrition Support:    Adult Formula Osmolite 1.5   Route Gastrostomy   Total Daily Volume 250   Volume units mL(s)   Additional free water (mL) 120 ml b/t and after bolus feeding   Additional comments 125 ml/h x 2 h after each meal ONLY IF INTAKE < 50% of meal     Free Water Route Gastric   Free Water - Feeding Tube Flush Frequency Other   Specify Q8 hours   Free Water Volume 60 mL     Intake/Tolerance:  Patient is feeder, usually has dentures but currently they are being looked for. Patient is weaning off of enteral nutrition no TF boluses needed since 10/6  Intakes improved from 50-75% to 100% past couple days with weight maintained.    ASSESSED NUTRITION NEEDS:  Dosing Weight  70 kg  IBW  Estimated Energy Needs: 8818-3915 kcals/day (25 - 30 kcals/kg)  Justification: Obese vs age, s/p Fx  Estimated Protein Needs: 105-140 grams protein/day (1.5 - 2 grams of pro/kg)  Justification: Obesity guidlelines   Estimated Fluid Needs: 1 mL/kcal   Justification: Maintenance or Per provider pending fluid status    NEW FINDINGS:   I/O: +4L in 2 weeks per chart  BM: last BM 10/7  Electrolytes: hyponatremia, improved 133 mmol/L  BG: WNL  Meds: bumex, oscal, novolog insulin, levothyroxine, protonix, senokot BID, vitamin D3  Weight: relatively stable 195-200#  Labs:  Electrolytes  Potassium (mmol/L)   Date Value   10/10/2022 4.2    10/07/2022 4.1   10/06/2022 4.2   06/23/2022 4.0   06/22/2022 3.9   06/21/2022 3.7     Potassium POCT (mmol/L)   Date Value   09/27/2022 3.8     Phosphorus (mg/dL)   Date Value   10/10/2022 3.0   10/03/2022 3.3   09/27/2022 3.2   09/26/2022 2.4 (L)   09/25/2022 2.5    Blood Glucose  Glucose (mg/dL)   Date Value   10/10/2022 90   06/22/2022 92   06/20/2022 87   06/19/2022 91   06/18/2022 91   06/17/2022 99     GLUCOSE BY METER POCT (mg/dL)   Date Value   10/10/2022 103 (H)   10/10/2022 112 (H)   10/10/2022 109 (H)   10/10/2022 84   10/09/2022 108 (H)     Hemoglobin A1C (%)   Date Value   09/16/2022 6.2 (H)    Inflammatory Markers  WBC Count (10e3/uL)   Date Value   10/10/2022 7.1   09/27/2022 7.3   09/26/2022 10.9     Albumin (g/dL)   Date Value   09/27/2022 2.7 (L)   09/26/2022 2.8 (L)   09/25/2022 2.6 (L)   06/17/2022 2.3 (L)   06/16/2022 2.4 (L)   06/15/2022 3.6      Magnesium (mg/dL)   Date Value   10/10/2022 2.0   10/03/2022 2.1   09/27/2022 2.0     Sodium (mmol/L)   Date Value   10/10/2022 133 (L)   10/07/2022 133 (L)   10/06/2022 129 (L)    Renal  Urea Nitrogen (mg/dL)   Date Value   10/10/2022 23.3 (H)   10/07/2022 25.1 (H)   10/06/2022 26.7 (H)   06/22/2022 24   06/20/2022 22   06/19/2022 23     Creatinine (mg/dL)   Date Value   10/10/2022 0.75   10/07/2022 0.66 (L)   10/06/2022 0.62 (L)     Additional  Ketones Urine (mg/dL)   Date Value   09/16/2022 Negative        Previous Goals:   Total avg nutritional intake to meet a minimum of 25 kcal/kg and 1.5 g PRO/kg daily  Evaluation: progressing    Tolerate oral intake w/o choking or aspiration and meet estimated need  Evaluation: met    Achieve and maintain fluid and electrolyte balance  Evaluation: progressing - ongoing hyponatremia    Previous Nutrition Diagnosis:   Inadequate oral intake related to respiratory status, NPO as evidenced by need for enteral nutrition to meet 100% nutrition needs  ( + malnutrition )  Evaluation: Improving    CURRENT NUTRITION  DIAGNOSIS  Malnutrition related to critical illness as evidenced by severe weight loss 13% in 3 months, trophic feeds at [facility], and mild-moderate muscle wasting.    INTERVENTIONS  Recommendations / Nutrition Prescription  See top of note.    Implementation  EN Schedule    Goals  Total avg nutritional intake to meet a minimum of 25 kcal/kg and 1.5 g PRO/kg daily  Achieve and maintain fluid and electrolyte balance    MONITORING AND EVALUATION:  Progress towards goals will be monitored and evaluated per protocol and Practice Guidelines    Serene Rosenbaum RDN, LD  Clinical Dietitian

## 2022-10-11 NOTE — PROGRESS NOTES
[facility]    Medicine Progress Note - Hospitalist Service    Date of Admission:  9/22/2022  Brief Hospital Course Summary:    Alexandru Still is a 89 year old man w/ PMH of Lambert-Eaton syndrome, hypothyroidism,  complete heart block s/p pacemaker, HTN, BPH,  PB, and DM type 2 who was admitted to the SICU on 9/15/2022 following an unwitnessed fall and striking his head. He was initially seen at an outside hospital and underwent a comprehensive trauma work up found to have a left frontal SDH (5mm) + C1 fx + Type II C2 odontoid fracture with a 1cm posterior displacement. He was  intubated for airway protection in the ED given high c-spine injury and difficulty managing his secretions. On admission he requested all cares without surgical cervical spine stabilization, agreeable to a tracheostomy and PEG. Multiple care conferences held with his family who confirmed his wishes.     LTACH course:    9/24-9/26:  Speech evaluated patient and recommends NPO, cont tube feeds via PEG due to severe oropharyngeal dysphasia.  PT/OT, dietitian, wo nurse saw pt on 9/23.  Pulled out G tube during night of 9/23. Pt now on soft wrist restraints.  IVF changed to D51/2 NS at low rate as pt has TAVR.  Morphine IV ordered prn pain.    Spoke w/ Dr. Brown- IR - recommends no hannah tube as this is a brand new PEG and placing it blind will likely lead it to not be in correct position.  He will not do a PEG placement on the weekend, scheduled for Monday.  Pt cannot be fed via NG tube as it is contraindicated with C1 and C2 fx.  For PEG tube replacement (herpain on hold, place ICD)on 9/26.  Will discontinue IVF once PEG tube is placed and restart po meds.  Given NaPhos 9/26 9/27-10/3:  9/27 patient had PEG tube replaced after patient himself removed it on 9/23.  Was a started on tube feeding.  P.O. medication were restarted.  IV fluids were discontinued.  He still needs soft restraint x2.  Is off ventilator and stable.  9/28 Failed blue  dye test, Continue NPO   9/30 patient had [place] J 300 collar pads delivered  10/1 SLP found patient well below baseline for swallowing, cognition and communication.  Patient stays n.p.o. except ice chips    10/4/22 -/10/8:   -No acute events, Vent weaning phase 2. Continue diuresis with bumex. Continue plan of care. Cognitive testing PENDING. May need neuropsych evaluation depending on cognitive evaluation.     10/9-10/10: he follows commands, cont [place] J.  He does better with glasses and hearing device on.  He has neck pain and headache.    10/11 no bm since 10/7 despite med's ,will add lactulose   Assessment & Plan        Traumatic 5mm left frontal lobe SDH  Posteriorly displaced (1 cm) Type II odontoid fx  C1 anterior arch fracture   Epidural hemorrhage of 7mm  Acute traumatic neck pain  Acute L 6th rib fracture  S/p fall, Facial abrasions with ecchymoses  RLE wound  He was seen and evaluated by Neurosurgery.  Pt refused surgical intervention.   A repeat head CT was obtained with a stable subdural hematoma. He was placed in a cervical collar for the cervical fracture. No surgical intervention per patient preference.  -Cont [place] J -keep in place at all times  -Tylenol, oxy prn for pain, changed tylenol to extra strength  -Neurosurg ok lovenox for DVT ppx  -Wound care following   -f/u Neurosurgery in 6 weeks with an upright XR of the cervical spine (November PENDING appointment)  -CT cervical spine in 3 months (first week of December).   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 Failed bedside and radiology swallow. He completed 3 doses of Decadron for laryngeal edema.   -S/P tracheostomy 09/21/2022, Trach suture removal on 09/26  -Pulm and RT following  -Wean vent as per pulm , on phase 2 currently  -Bronchial hygeine w/ albuterol/mucomyst. Avoiding metaneb for now given CSI with cervical collar in place   -Gentle diuresis with bumex      Hypertension   Complete heart block s/p PM placement  Nonrheumatic aortic valve stenosis s/p TAVR 2019  -Monitor  -Pt is currently normotensive , on bumex and cardura  -Echo on 9/16:  EF: 60-65%, no LV dysfunction     Severe orophayngeal dysphagia   S/P PEG   Severe protein calorie malnutrition  Hiatal hernia  -PEG tube placed 09/20. Pt pulled out PEG tube on night of 9/23, replaced on 9/27  -On TF : osmolite 1.5 and 60 ml/hr   -RD following  -S/p VFSS 10/6/22: abnormal but no aspiration, recommend pureed food textures and moderately thick liquids     Hypophosphatemia  Hypomagnesia  Hypokalemia  Hyponatremia  -Monitor and manage as needed     Hypothyroidism   -Continue Levothyroxine     Anemia of chronic disease  -Hg trending down past few days  -Cont to monitor intermittently.     BPH  -Cont doxazosin     DM type 2  -Sliding scale insulin  -Last HbA1:  6.2  -Hypoglycemic protocol     Deconditioning  -PT/OT following           Diet: Pureed Diet (level 4) Liquidized/Moderately Thick (level 3)  Snacks/Supplements Adult: Magic Cup; Between Meals    DVT Prophylaxis: Lovenox  [place] Catheter: Not present  Central Lines: PRESENT       Cardiac Monitoring: None  Code Status: Full Code      Disposition Plan     Discharge date: TBD, to TCU on discharge  Barriers: Placement     The patient's care was discussed with patient, RN, SW/CM    Berry Porter MD  Hospitalist Service  LTACH  Securely message with the Vocera Web Console (learn  more here)  Text page via [facility] Paging/Directory           ______________________________________________________________________    Interval History    Chart reviewed and events noted.  Patient seen and examined.     No acute events overnight.    He reports that he is having neck pain and headache 5/10 in severity.  he has no dyspnea, no chest pain, no nausea or vomiting.     ROS:  A 10-system review was obtained and is negative with the exception of the symptoms noted above    Data reviewed today: I reviewed all medications, new labs and imaging results over the last 24 hours.     Physical Exam   Vital Signs: Temp: 98  F (36.7  C) Temp src: Oral BP: 130/60 Pulse: 59   Resp: 18 SpO2: 94 % O2 Device: Nasal cannula Oxygen Delivery: 1 LPM  Weight: 201 lbs 1.6 oz    General:  No acute distress,awake and alert  HEENT: [place] J in place, trach in place  Lungs:  Clear to auscultation bilaterally  CVS:  S1 and S2, RRR  Abdomen:  Soft, nontender, PEG in place and abdominal binder in place.  Musculoskeletal:  No edema  Neuro: Awake, alert, oriented, speech+, following commands.   Skin:  please refer to nursing documentation for full skin assessment    Data   Recent Labs   Lab 10/11/22  1459 10/10/22  1800 10/10/22  1718 10/10/22  0804 10/10/22  0632 10/07/22  1119 10/07/22  1026 10/06/22  1152 10/06/22  0636   WBC  --   --   --   --  7.1  --   --   --   --    HGB  --   --   --   --  9.8*  --   --   --   --    MCV  --   --   --   --  93  --   --   --   --    PLT  --   --   --   --  335  --   --   --   --    NA  --   --   --   --  133*  --  133*  --  129*   POTASSIUM  --   --   --   --  4.2  --  4.1  --  4.2   CHLORIDE  --   --   --   --  96*  --  96*  --  94*   CO2  --   --   --   --  27  --  28  --  28   BUN  --   --   --   --  23.3*  --  25.1*  --  26.7*   CR  --   --   --   --  0.75  --  0.66*  --  0.62*   ANIONGAP  --   --   --   --  10  --  9  --  7   TITA  --   --   --   --  8.7  --  8.3  --  8.4   * 103* 112*   <  > 90   < > 149*   < > 125*    < > = values in this interval not displayed.     No results found for this or any previous visit (from the past 24 hour(s)).  Medications     - MEDICATION INSTRUCTIONS -         acetylcysteine  2 mL Nebulization BID     albuterol  2.5 mg Nebulization 2 times daily     bumetanide  1 mg Oral Daily     calcium carbonate  500 mg Oral BID w/meals     doxazosin  2 mg Oral or Feeding Tube Daily     [START ON 10/20/2022] doxazosin  2 mg Oral Daily     enoxaparin ANTICOAGULANT  40 mg Subcutaneous Q24H     insulin aspart  1-6 Units Subcutaneous TID w/meals     levothyroxine  112 mcg Oral QAM AC     [START ON 10/12/2022] multivitamin w/minerals  1 tablet Oral Daily     pantoprazole  40 mg Oral or Feeding Tube QAM AC     sennosides  1 tablet Oral BID     sodium chloride (PF)  10 mL Intracatheter Q8H     cholecalciferol  25 mcg Oral Daily

## 2022-10-11 NOTE — PLAN OF CARE
Pt slept most the night, tolerated repositioning every two hours. No complaints of pain. Cervical collar in place all night.        Problem: Malnutrition  Goal: Improved Nutritional Intake  Outcome: Adequate for Care Transition     Problem: Pain Acute  Goal: Acceptable Pain Control and Functional Ability  Outcome: Adequate for Care Transition  Intervention: Prevent or Manage Pain  Recent Flowsheet Documentation  Taken 10/11/2022 0159 by Lianna Gomez RN  Medication Review/Management: medications reviewed     Problem: Skin and Tissue Injury (Artificial Airway)  Goal: Absence of Device-Related Skin or Tissue Injury  Outcome: Adequate for Care Transition   Goal Outcome Evaluation:

## 2022-10-11 NOTE — PLAN OF CARE
Problem: Device-Related Complication Risk (Artificial Airway)  Goal: Optimal Device Function  Outcome: Not Progressing     Problem: Communication Impairment (Artificial Airway)  Goal: Effective Communication  Outcome: Progressing   Goal Outcome Evaluation:         RT PROGRESS NOTE     DATA:     CURRENT SETTINGS:             TRACH TYPE / SIZE:  #7 bivona changed 10/3             MODE:   capped with 1-3L nc              ACTION:             THERAPIES:   albuterol and mucomyst bid             SUCTION:                           FREQUENCY:   none required                                                                     SPONTANEOUS COUGH EFFORT/STRENGTH OF EFFORT (not elicited by suctioning): moderate to strong                               WEANING PHASE:   3                        WEAN MODE:    capped continuous                                               RESPONSE:             BS:   clear, clear and diminished in bilateral lower lobes             VITAL SIGNS:   Blood pressure 130/60, pulse 59, temperature 98  F (36.7  C), temperature source Oral, resp. rate 18, weight 91.2 kg (201 lb 1.6 oz), SpO2 94 %.                 EMOTIONAL NEEDS / CONCERNS:  no                RISK FOR SELF DECANNULATION:  no                                              NOTE / PLAN:   continue current plan.

## 2022-10-11 NOTE — PLAN OF CARE
"  Problem: Pain Acute  Goal: Acceptable Pain Control and Functional Ability  Outcome: Progressing  Intervention: Prevent or Manage Pain  Recent Flowsheet Documentation  Taken 10/10/2022 1630 by Nicole Delgado RN  Bowel Elimination Promotion: adequate fluid intake promoted  Medication Review/Management: medications reviewed  Taken 10/10/2022 1000 by Nicole Delgado RN  Bowel Elimination Promotion: adequate fluid intake promoted  Medication Review/Management: medications reviewed     Problem: Communication Impairment (Artificial Airway)  Goal: Effective Communication  Outcome: Progressing  Intervention: Ensure Effective Communication  Recent Flowsheet Documentation  Taken 10/10/2022 1630 by Nicole Delgado RN  Trust Relationship/Rapport:   care explained   choices provided   emotional support provided   empathic listening provided   questions answered   questions encouraged   reassurance provided   thoughts/feelings acknowledged  Taken 10/10/2022 1000 by Nicole Delgado RN  Trust Relationship/Rapport:   care explained   choices provided   emotional support provided   empathic listening provided   questions answered   questions encouraged   reassurance provided   thoughts/feelings acknowledged   Goal Outcome Evaluation:       Patient alert,calm and cooperative with medication and cares. Patient fed 100% of meals, no back up bolus tube feeding given. Patient complained of a headache in am and received Oxycodone 5mg PRN with effect. At 1530, patient was heard yelling,\"help\" from his room, stated he was having a headache, irritable at that time. Patient received Tylenol PRN and Seroquel PRN with effect. Will continue to monitor.    Nicole Delgado RN                   "

## 2022-10-11 NOTE — PLAN OF CARE
Problem: Device-Related Complication Risk (Mechanical Ventilation, Invasive)  Goal: Optimal Device Function  Outcome: Ongoing, Progressing     Problem: Inability to Wean (Mechanical Ventilation, Invasive)  Goal: Mechanical Ventilation Liberation  Outcome: Ongoing, Progressing     Problem: Skin and Tissue Injury (Mechanical Ventilation, Invasive)  Goal: Absence of Device-Related Skin and Tissue Injury  Outcome: Ongoing, Progressing      RT PROGRESS NOTE     DATA:     CURRENT SETTINGS: AC 12/450/+5/30 (back up setting)             TRACH TYPE / SIZE:  #7 Bivona 10/03/22             MODE: Trach capped             FIO2:   3 lpm     ACTION:             THERAPIES:   ALB BID/ MUCOMYST BID             SUCTION:                           FREQUENCY:  X2                        AMOUNT:   Moderate to small                         CONSISTENCY:  Thick                        COLOR:   White/yellow             SPONTANEOUS COUGH EFFORT/STRENGTH OF EFFORT (not elicited by suctioning): Yes:Strong                              WEANING PHASE: #3                        WEAN MODE:   Trach capped as chichi                        WEAN TIME:  Since  9:30 am                        END WEAN REASON:   Cont     RESPONSE:             BS:   Coarse             VITAL SIGNS:   SAT 91-95%, HR 72-84, RR 18-22             EMOTIONAL NEEDS / CONCERNS: N/A              RISK FOR SELF DECANNULATION: N/A                        RISK DUE TO:                          INTERVENTION/S IN PLACE IS/ARE: N/A      NOTE / PLAN:   Pt is on 1st night on trach capped with 3 lpm nc, chichi well. Cont capping as chichi and keep sat >/=90%      9/27 VBG done 7.47/36/41/27/79.2%

## 2022-10-12 ENCOUNTER — APPOINTMENT (OUTPATIENT)
Dept: OCCUPATIONAL THERAPY | Facility: CLINIC | Age: 87
DRG: 208 | End: 2022-10-12
Attending: INTERNAL MEDICINE
Payer: MEDICARE

## 2022-10-12 ENCOUNTER — APPOINTMENT (OUTPATIENT)
Dept: SPEECH THERAPY | Facility: CLINIC | Age: 87
DRG: 208 | End: 2022-10-12
Attending: INTERNAL MEDICINE
Payer: MEDICARE

## 2022-10-12 LAB
GLUCOSE BLDC GLUCOMTR-MCNC: 110 MG/DL (ref 70–99)
GLUCOSE BLDC GLUCOMTR-MCNC: 133 MG/DL (ref 70–99)

## 2022-10-12 PROCEDURE — 94640 AIRWAY INHALATION TREATMENT: CPT | Mod: 76

## 2022-10-12 PROCEDURE — 250N000009 HC RX 250: Performed by: NURSE PRACTITIONER

## 2022-10-12 PROCEDURE — 250N000011 HC RX IP 250 OP 636: Performed by: HOSPITALIST

## 2022-10-12 PROCEDURE — 250N000013 HC RX MED GY IP 250 OP 250 PS 637: Performed by: HOSPITALIST

## 2022-10-12 PROCEDURE — 120N000017 HC R&B RESPIRATORY CARE

## 2022-10-12 PROCEDURE — 999N000123 HC STATISTIC OXYGEN O2DAILY TECH TIME

## 2022-10-12 PROCEDURE — 99232 SBSQ HOSP IP/OBS MODERATE 35: CPT | Performed by: INTERNAL MEDICINE

## 2022-10-12 PROCEDURE — 250N000013 HC RX MED GY IP 250 OP 250 PS 637: Performed by: INTERNAL MEDICINE

## 2022-10-12 PROCEDURE — 999N000009 HC STATISTIC AIRWAY CARE

## 2022-10-12 PROCEDURE — 94640 AIRWAY INHALATION TREATMENT: CPT

## 2022-10-12 PROCEDURE — 92526 ORAL FUNCTION THERAPY: CPT | Mod: GN

## 2022-10-12 PROCEDURE — 999N000157 HC STATISTIC RCP TIME EA 10 MIN

## 2022-10-12 PROCEDURE — 97535 SELF CARE MNGMENT TRAINING: CPT | Mod: GO

## 2022-10-12 PROCEDURE — 97129 THER IVNTJ 1ST 15 MIN: CPT | Mod: GN

## 2022-10-12 RX ADMIN — ACETYLCYSTEINE 2 ML: 200 SOLUTION ORAL; RESPIRATORY (INHALATION) at 07:52

## 2022-10-12 RX ADMIN — ENOXAPARIN SODIUM 40 MG: 100 INJECTION SUBCUTANEOUS at 19:19

## 2022-10-12 RX ADMIN — MULTIPLE VITAMINS W/ MINERALS TAB 1 TABLET: TAB at 10:52

## 2022-10-12 RX ADMIN — Medication 40 MG: at 06:30

## 2022-10-12 RX ADMIN — QUETIAPINE 25 MG: 25 TABLET, FILM COATED ORAL at 16:40

## 2022-10-12 RX ADMIN — SENNOSIDES 1 TABLET: 8.6 TABLET, FILM COATED ORAL at 20:33

## 2022-10-12 RX ADMIN — ALBUTEROL SULFATE 2.5 MG: 2.5 SOLUTION RESPIRATORY (INHALATION) at 07:52

## 2022-10-12 RX ADMIN — Medication 2 MG: at 10:52

## 2022-10-12 RX ADMIN — SENNOSIDES 1 TABLET: 8.6 TABLET, FILM COATED ORAL at 08:39

## 2022-10-12 RX ADMIN — LACTULOSE 20 G: 20 SOLUTION ORAL at 08:39

## 2022-10-12 RX ADMIN — CALCIUM 500 MG: 500 TABLET ORAL at 10:56

## 2022-10-12 RX ADMIN — CALCIUM 500 MG: 500 TABLET ORAL at 16:42

## 2022-10-12 RX ADMIN — HYDRALAZINE HYDROCHLORIDE 5 MG: 20 INJECTION INTRAMUSCULAR; INTRAVENOUS at 16:41

## 2022-10-12 RX ADMIN — ACETYLCYSTEINE 2 ML: 200 SOLUTION ORAL; RESPIRATORY (INHALATION) at 20:18

## 2022-10-12 RX ADMIN — LACTULOSE 20 G: 20 SOLUTION ORAL at 20:33

## 2022-10-12 RX ADMIN — LEVOTHYROXINE SODIUM 112 MCG: 0.11 TABLET ORAL at 06:30

## 2022-10-12 RX ADMIN — BUMETANIDE 1 MG: 1 TABLET ORAL at 08:40

## 2022-10-12 RX ADMIN — ALBUTEROL SULFATE 2.5 MG: 2.5 SOLUTION RESPIRATORY (INHALATION) at 20:17

## 2022-10-12 RX ADMIN — ACETAMINOPHEN 1000 MG: 500 TABLET ORAL at 16:40

## 2022-10-12 RX ADMIN — Medication 25 MCG: at 08:39

## 2022-10-12 ASSESSMENT — ACTIVITIES OF DAILY LIVING (ADL)
ADLS_ACUITY_SCORE: 74
ADLS_ACUITY_SCORE: 77
ADLS_ACUITY_SCORE: 77
ADLS_ACUITY_SCORE: 73
ADLS_ACUITY_SCORE: 74
ADLS_ACUITY_SCORE: 73
ADLS_ACUITY_SCORE: 73
ADLS_ACUITY_SCORE: 74
ADLS_ACUITY_SCORE: 73
ADLS_ACUITY_SCORE: 74

## 2022-10-12 NOTE — PLAN OF CARE
Problem: Device-Related Complication Risk (Mechanical Ventilation, Invasive)  Goal: Optimal Device Function  Outcome: Ongoing, Progressing     Problem: Inability to Wean (Mechanical Ventilation, Invasive)  Goal: Mechanical Ventilation Liberation  Outcome: Ongoing, Progressing     Problem: Skin and Tissue Injury (Mechanical Ventilation, Invasive)  Goal: Absence of Device-Related Skin and Tissue Injury  Outcome: Ongoing, Progressing      RT PROGRESS NOTE     DATA:     CURRENT SETTINGS: AC 12/450/+5/30 (back up setting)             TRACH TYPE / SIZE:  #7 Bivona 10/03/22             MODE: Trach capped             FIO2:   1 lpm     ACTION:             THERAPIES:   ALB BID/ MUCOMYST BID             SUCTION:                           FREQUENCY:  X2                        AMOUNT:    Small                         CONSISTENCY:  Thick                        COLOR:   White/yellow             SPONTANEOUS COUGH EFFORT/STRENGTH OF EFFORT (not elicited by suctioning): Yes:Strong                              WEANING PHASE: #3                        WEAN MODE:   Trach capped as chichi                        WEAN TIME:  Since  9:30 am@ 10/10                        END WEAN REASON:   Cont     RESPONSE:             BS:   Coarse             VITAL SIGNS:   SAT 94-96%, HR 56-61, RR 18-22             EMOTIONAL NEEDS / CONCERNS: N/A              RISK FOR SELF DECANNULATION: N/A                        RISK DUE TO:                          INTERVENTION/S IN PLACE IS/ARE: N/A      NOTE / PLAN:   Pt is on 2nd night on trach capped with 1 lpm nc, chichi well. Cont capping as chichi and keep sat >/=90%      9/27 VBG done 7.47/36/41/27/79.2%

## 2022-10-12 NOTE — PLAN OF CARE
Problem: Plan of Care - These are the overarching goals to be used throughout the patient stay.    Goal: Optimal Comfort and Wellbeing  Outcome: Progressing  Intervention: Provide Person-Centered Care  Recent Flowsheet Documentation  Taken 10/12/2022 0000 by Danny Valdivia RN  Trust Relationship/Rapport: care explained  Taken 10/11/2022 2300 by Danny Valdivia RN  Trust Relationship/Rapport: care explained     Problem: Device-Related Complication Risk (Artificial Airway)  Goal: Optimal Device Function  Outcome: Progressing  Intervention: Optimize Device Care and Function  Recent Flowsheet Documentation  Taken 10/12/2022 0000 by Danny Valdivia RN  Airway Safety Measures: all equipment/monitors on and audible  Taken 10/11/2022 2300 by Danny Valdivia RN  Airway Safety Measures: all equipment/monitors on and audible   Goal Outcome Evaluation:         Pt is alert and oriented, able to made needs known. Calm and cooperative with cares, repositioned q2 hrs, cervical collar in place all night. No complaints of pain. Slept most of the shift. Continue to monitor.

## 2022-10-12 NOTE — PLAN OF CARE
Problem: Restraint, Nonbehavioral (Nonviolent)  Goal: Absence of Harm or Injury  Outcome: Progressing     Problem: Communication Impairment (Artificial Airway)  Goal: Effective Communication  Outcome: Progressing     Problem: Fall Injury Risk  Goal: Absence of Fall and Fall-Related Injury  Outcome: Progressing  Intervention: Promote Injury-Free Environment  Recent Flowsheet Documentation  Taken 10/12/2022 1129 by Jeanna Auguste RN  Safety Promotion/Fall Prevention:   activity supervised   fall prevention program maintained   nonskid shoes/slippers when out of bed     Problem: Plan of Care - These are the overarching goals to be used throughout the patient stay.    Goal: Absence of Hospital-Acquired Illness or Injury  Intervention: Identify and Manage Fall Risk  Recent Flowsheet Documentation  Taken 10/12/2022 1129 by Jeanna Auguste RN  Safety Promotion/Fall Prevention:   activity supervised   fall prevention program maintained   nonskid shoes/slippers when out of bed  Intervention: Prevent Infection  Recent Flowsheet Documentation  Taken 10/12/2022 1129 by Jeanna Auguste RN  Infection Prevention: hand hygiene promoted     Problem: Device-Related Complication Risk (Mechanical Ventilation, Invasive)  Goal: Optimal Device Function  Intervention: Optimize Device Care and Function  Recent Flowsheet Documentation  Taken 10/12/2022 1129 by Jeanna Auguste RN  Airway Safety Measures: all equipment/monitors on and audible     Problem: Pain Acute  Goal: Acceptable Pain Control and Functional Ability  Intervention: Prevent or Manage Pain  Recent Flowsheet Documentation  Taken 10/12/2022 1005 by Jeanna Auguste RN  Bowel Elimination Promotion: adequate fluid intake promoted     Problem: Device-Related Complication Risk (Artificial Airway)  Goal: Optimal Device Function  Intervention: Optimize Device Care and Function  Recent Flowsheet Documentation  Taken 10/12/2022 1129 by Jeanna Auguste RN  Airway Safety Measures: all  equipment/monitors on and audible   Goal Outcome Evaluation:    Patient denied pain and discomfort so far this shift, was irritable and frustrated with staff and cares at the start of shift. BG was 133 at 1154 (BG monitoring was discontinued, insulin order still active as of now)-will send a message to provider to discontinue this

## 2022-10-12 NOTE — PLAN OF CARE
Problem: Device-Related Complication Risk (Mechanical Ventilation, Invasive)  Goal: Optimal Device Function  Outcome: Ongoing, Progressing    Problem: Inability to Wean (Mechanical Ventilation, Invasive)  Goal: Mechanical Ventilation Liberation  Outcome: Ongoing, Progressing     Problem: Skin and Tissue Injury (Mechanical Ventilation, Invasive)  Goal: Absence of Device-Related Skin and Tissue Injury  Outcome: Ongoing, Progressing     Problem: Ventilator-Induced Lung Injury (Mechanical Ventilation, Invasive)  Goal: Absence of Ventilator-Induced Lung Injury  Outcome: Ongoing, Progressing   RT PROGRESS NOTE     DATA:     CURRENT SETTINGS:             TRACH TYPE / SIZE: #7 Bivona, placed on 10/3/22              MODE: capped              FIO2: 1L NC     ACTION:             THERAPIES: Alb/ Mucomyst neb x 1 BID given on scheduled time              SUCTION:                          FREQUENCY: 1                        AMOUNT:  small                         CONSISTENCY:   thick                        COLOR:   white              SPONTANEOUS COUGH EFFORT/STRENGTH OF EFFORT (not elicited by suctioning): strong non-congested, non-productive cough                               WEANING PHASE:  3                        WEAN MODE:   capped/ 1L                         WEAN TIME:  cont as tolerated                        END WEAN REASON:        RESPONSE:             BS: insp + exp rhonchi - clear to clear decreased after cough and Sx               VITAL SIGNS: sat 96%, HR 59-72, RR 20               EMOTIONAL NEEDS / CONCERNS:                  RISK FOR SELF DECANNULATION:                          RISK DUE TO:                          INTERVENTION/S IN PLACE IS/ARE:         NOTE / PLAN: at 1335 try to titrate O2 to RA. On RA pt's sat was 91%, down to 90%, sometimes to 89%. Pt put back on O2 1L NC at 1614. Cont current plan care - cap trach cont as tolerated. If not tolerated capping - 30% TM 30L/ PMV.

## 2022-10-12 NOTE — PROGRESS NOTES
[facility]    Medicine Progress Note - Hospitalist Service    Date of Admission:  9/22/2022  Brief Hospital Course Summary:    Alexandru Still is a 89 year old man w/ PMH of Lambert-Eaton syndrome, hypothyroidism,  complete heart block s/p pacemaker, HTN, BPH,  PB, and DM type 2 who was admitted to the SICU on 9/15/2022 following an unwitnessed fall and striking his head. He was initially seen at an outside hospital and underwent a comprehensive trauma work up found to have a left frontal SDH (5mm) + C1 fx + Type II C2 odontoid fracture with a 1cm posterior displacement. He was  intubated for airway protection in the ED given high c-spine injury and difficulty managing his secretions. On admission he requested all cares without surgical cervical spine stabilization, agreeable to a tracheostomy and PEG. Multiple care conferences held with his family who confirmed his wishes.     LTACH course:    9/24-9/26:  Speech evaluated patient and recommends NPO, cont tube feeds via PEG due to severe oropharyngeal dysphasia.  PT/OT, dietitian, wo nurse saw pt on 9/23.  Pulled out G tube during night of 9/23. Pt now on soft wrist restraints.  IVF changed to D51/2 NS at low rate as pt has TAVR.  Morphine IV ordered prn pain.    Spoke w/ Dr. Brown- IR - recommends no hannah tube as this is a brand new PEG and placing it blind will likely lead it to not be in correct position.  He will not do a PEG placement on the weekend, scheduled for Monday.  Pt cannot be fed via NG tube as it is contraindicated with C1 and C2 fx.  For PEG tube replacement (herpain on hold, place ICD)on 9/26.  Will discontinue IVF once PEG tube is placed and restart po meds.  Given NaPhos 9/26 9/27-10/3:  9/27 patient had PEG tube replaced after patient himself removed it on 9/23.  Was a started on tube feeding.  P.O. medication were restarted.  IV fluids were discontinued.  He still needs soft restraint x2.  Is off ventilator and stable.  9/28 Failed blue  dye test, Continue NPO   9/30 patient had [place] J 300 collar pads delivered  10/1 SLP found patient well below baseline for swallowing, cognition and communication.  Patient stays n.p.o. except ice chips    10/4/22 -/10/8:   -No acute events, Vent weaning phase 2. Continue diuresis with bumex. Continue plan of care. Cognitive testing PENDING. May need neuropsych evaluation depending on cognitive evaluation.     10/9-10/10: he follows commands, cont [place] J.  He does better with glasses and hearing device on.  He has neck pain and headache.    10/11 no bm since 10/7 despite med's ,will add lactulose   9/12: no new issues reported today ,mental status unchanged   Assessment & Plan        Traumatic 5mm left frontal lobe SDH  Posteriorly displaced (1 cm) Type II odontoid fx  C1 anterior arch fracture   Epidural hemorrhage of 7mm  Acute traumatic neck pain  Acute L 6th rib fracture  S/p fall, Facial abrasions with ecchymoses  RLE wound  He was seen and evaluated by Neurosurgery.  Pt refused surgical intervention.   A repeat head CT was obtained with a stable subdural hematoma. He was placed in a cervical collar for the cervical fracture. No surgical intervention per patient preference.  -Cont [place] J -keep in place at all times  -Tylenol, oxy prn for pain, changed tylenol to extra strength  -Neurosurg ok lovenox for DVT ppx  -Wound care following   -f/u Neurosurgery in 6 weeks with an upright XR of the cervical spine (November PENDING appointment)  -CT cervical spine in 3 months (first week of December).   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 Failed bedside and radiology swallow. He completed 3 doses of Decadron for laryngeal edema.   -S/P tracheostomy 09/21/2022, Trach suture removal on 09/26  -Pulm and RT following  -Wean vent as per pulm , on phase 2 currently  -Bronchial hygeine w/ albuterol/mucomyst. Avoiding metaneb for now given CSI with cervical collar in place   -Gentle diuresis with bumex      Hypertension   Complete heart block s/p PM placement  Nonrheumatic aortic valve stenosis s/p TAVR 2019  -Monitor  -Pt is currently normotensive , on bumex and cardura  -Echo on 9/16:  EF: 60-65%, no LV dysfunction     Severe orophayngeal dysphagia   S/P PEG   Severe protein calorie malnutrition  Hiatal hernia  -PEG tube placed 09/20. Pt pulled out PEG tube on night of 9/23, replaced on 9/27  -On TF : osmolite 1.5 and 60 ml/hr   -RD following  -S/p VFSS 10/6/22: abnormal but no aspiration, recommend pureed food textures and moderately thick liquids     Hypophosphatemia  Hypomagnesia  Hypokalemia  Hyponatremia  -Monitor and manage as needed     Hypothyroidism   -Continue Levothyroxine     Anemia of chronic disease  -Hg trending down past few days  -Cont to monitor intermittently.     BPH  -Cont doxazosin     DM type 2  -Sliding scale insulin  -Last HbA1:  6.2  -Hypoglycemic protocol     Deconditioning  -PT/OT following           Diet: Pureed Diet (level 4) Liquidized/Moderately Thick (level 3)  Snacks/Supplements Adult: Magic Cup; Between Meals    DVT Prophylaxis: Lovenox  [place] Catheter: Not present  Central Lines: PRESENT       Cardiac Monitoring: None  Code Status: Full Code      Disposition Plan     Discharge date: TBD, to TCU on discharge  Barriers: Placement     The patient's care was discussed with patient, RN, SW/CM    Berry Porter MD  Hospitalist  Service  LTACH  Securely message with the Vocera Web Console (learn more here)  Text page via [facility] Paging/Directory           ______________________________________________________________________    Interval History    Chart reviewed and events noted.  Patient seen and examined.     No acute events overnight.    He reports that he is having neck pain and headache 5/10 in severity.  he has no dyspnea, no chest pain, no nausea or vomiting.     ROS:  A 10-system review was obtained and is negative with the exception of the symptoms noted above    Data reviewed today: I reviewed all medications, new labs and imaging results over the last 24 hours.     Physical Exam   Vital Signs: Temp: 98.1  F (36.7  C) Temp src: Oral BP: 137/80 Pulse: 76   Resp: 21 SpO2: 96 % O2 Device: Nasal cannula with humidification Oxygen Delivery: 1 LPM  Weight: 184 lbs 12.8 oz    General:  No acute distress,awake and alert  HEENT: [place] J in place, trach in place  Lungs:  Clear to auscultation bilaterally  CVS:  S1 and S2, RRR  Abdomen:  Soft, nontender, PEG in place and abdominal binder in place.  Musculoskeletal:  No edema  Neuro: Awake, alert, oriented, speech+, following commands.   Skin:  please refer to nursing documentation for full skin assessment    Data   Recent Labs   Lab 10/12/22  1154 10/11/22  1728 10/11/22  1459 10/10/22  0804 10/10/22  0632 10/07/22  1119 10/07/22  1026 10/06/22  1152 10/06/22  0636   WBC  --   --   --   --  7.1  --   --   --   --    HGB  --   --   --   --  9.8*  --   --   --   --    MCV  --   --   --   --  93  --   --   --   --    PLT  --   --   --   --  335  --   --   --   --    NA  --   --   --   --  133*  --  133*  --  129*   POTASSIUM  --   --   --   --  4.2  --  4.1  --  4.2   CHLORIDE  --   --   --   --  96*  --  96*  --  94*   CO2  --   --   --   --  27  --  28  --  28   BUN  --   --   --   --  23.3*  --  25.1*  --  26.7*   CR  --   --   --   --  0.75  --  0.66*  --  0.62*   ANIONGAP  --   --   --   --   10  --  9  --  7   TITA  --   --   --   --  8.7  --  8.3  --  8.4   * 90 115*   < > 90   < > 149*   < > 125*    < > = values in this interval not displayed.     No results found for this or any previous visit (from the past 24 hour(s)).  Medications     - MEDICATION INSTRUCTIONS -         acetylcysteine  2 mL Nebulization BID     albuterol  2.5 mg Nebulization 2 times daily     bumetanide  1 mg Oral Daily     calcium carbonate  500 mg Oral BID w/meals     doxazosin  2 mg Oral or Feeding Tube Daily     [START ON 10/20/2022] doxazosin  2 mg Oral Daily     enoxaparin ANTICOAGULANT  40 mg Subcutaneous Q24H     insulin aspart  1-6 Units Subcutaneous TID w/meals     lactulose  20 g Oral BID     levothyroxine  112 mcg Oral QAM AC     multivitamin w/minerals  1 tablet Oral Daily     pantoprazole  40 mg Oral or Feeding Tube QAM AC     sennosides  1 tablet Oral BID     sodium chloride (PF)  10 mL Intracatheter Q8H     cholecalciferol  25 mcg Oral Daily

## 2022-10-12 NOTE — PROGRESS NOTES
Social Work Note:  Patient chart reviewed.  Patient discussed in morning rounds.  Barriers to discharge:   * Trach capped  * Will need placement.    Patient with Trach, cervical collar, tube feed flushes (FWF 60 mL Q8 hours via G-tube for tube patency). Diet: Pureed Diet (level 4) Liquidized/Moderately Thick (level 3).    Patient will likely need TCU at discharge.  Patient has previously declined recommendation for TCU placement,  Will speak to patient again today.      Bob Reynolds, [facility]/[place]  [phone]

## 2022-10-13 ENCOUNTER — APPOINTMENT (OUTPATIENT)
Dept: SPEECH THERAPY | Facility: CLINIC | Age: 87
DRG: 208 | End: 2022-10-13
Attending: INTERNAL MEDICINE
Payer: MEDICARE

## 2022-10-13 ENCOUNTER — APPOINTMENT (OUTPATIENT)
Dept: OCCUPATIONAL THERAPY | Facility: CLINIC | Age: 87
DRG: 208 | End: 2022-10-13
Attending: INTERNAL MEDICINE
Payer: MEDICARE

## 2022-10-13 LAB
GLUCOSE BLDC GLUCOMTR-MCNC: 110 MG/DL (ref 70–99)
GLUCOSE BLDC GLUCOMTR-MCNC: 80 MG/DL (ref 70–99)
GLUCOSE BLDC GLUCOMTR-MCNC: 99 MG/DL (ref 70–99)

## 2022-10-13 PROCEDURE — 120N000017 HC R&B RESPIRATORY CARE

## 2022-10-13 PROCEDURE — 94640 AIRWAY INHALATION TREATMENT: CPT

## 2022-10-13 PROCEDURE — 97535 SELF CARE MNGMENT TRAINING: CPT | Mod: GO | Performed by: OCCUPATIONAL THERAPIST

## 2022-10-13 PROCEDURE — 94640 AIRWAY INHALATION TREATMENT: CPT | Mod: 76

## 2022-10-13 PROCEDURE — 97129 THER IVNTJ 1ST 15 MIN: CPT | Mod: GN | Performed by: SPEECH-LANGUAGE PATHOLOGIST

## 2022-10-13 PROCEDURE — 999N000157 HC STATISTIC RCP TIME EA 10 MIN

## 2022-10-13 PROCEDURE — G0463 HOSPITAL OUTPT CLINIC VISIT: HCPCS

## 2022-10-13 PROCEDURE — 97130 THER IVNTJ EA ADDL 15 MIN: CPT | Mod: GN | Performed by: SPEECH-LANGUAGE PATHOLOGIST

## 2022-10-13 PROCEDURE — 250N000013 HC RX MED GY IP 250 OP 250 PS 637: Performed by: HOSPITALIST

## 2022-10-13 PROCEDURE — 999N000123 HC STATISTIC OXYGEN O2DAILY TECH TIME

## 2022-10-13 PROCEDURE — 99232 SBSQ HOSP IP/OBS MODERATE 35: CPT | Performed by: INTERNAL MEDICINE

## 2022-10-13 PROCEDURE — 250N000011 HC RX IP 250 OP 636: Performed by: HOSPITALIST

## 2022-10-13 PROCEDURE — 999N000009 HC STATISTIC AIRWAY CARE

## 2022-10-13 PROCEDURE — 97110 THERAPEUTIC EXERCISES: CPT | Mod: GO | Performed by: OCCUPATIONAL THERAPIST

## 2022-10-13 PROCEDURE — 250N000013 HC RX MED GY IP 250 OP 250 PS 637: Performed by: INTERNAL MEDICINE

## 2022-10-13 PROCEDURE — 99232 SBSQ HOSP IP/OBS MODERATE 35: CPT | Performed by: NURSE PRACTITIONER

## 2022-10-13 PROCEDURE — 250N000009 HC RX 250: Performed by: NURSE PRACTITIONER

## 2022-10-13 RX ORDER — BUMETANIDE 0.5 MG/1
0.5 TABLET ORAL DAILY
Status: DISCONTINUED | OUTPATIENT
Start: 2022-10-14 | End: 2022-10-28 | Stop reason: HOSPADM

## 2022-10-13 RX ORDER — ACETYLCYSTEINE 200 MG/ML
2 SOLUTION ORAL; RESPIRATORY (INHALATION) EVERY 6 HOURS PRN
Status: DISCONTINUED | OUTPATIENT
Start: 2022-10-13 | End: 2022-10-28 | Stop reason: HOSPADM

## 2022-10-13 RX ADMIN — LACTULOSE 20 G: 20 SOLUTION ORAL at 08:30

## 2022-10-13 RX ADMIN — SENNOSIDES 1 TABLET: 8.6 TABLET, FILM COATED ORAL at 20:05

## 2022-10-13 RX ADMIN — CALCIUM 500 MG: 500 TABLET ORAL at 12:32

## 2022-10-13 RX ADMIN — ACETYLCYSTEINE 2 ML: 200 SOLUTION ORAL; RESPIRATORY (INHALATION) at 08:39

## 2022-10-13 RX ADMIN — Medication 25 MCG: at 08:30

## 2022-10-13 RX ADMIN — Medication 40 MG: at 06:30

## 2022-10-13 RX ADMIN — SENNOSIDES 1 TABLET: 8.6 TABLET, FILM COATED ORAL at 08:30

## 2022-10-13 RX ADMIN — ENOXAPARIN SODIUM 40 MG: 100 INJECTION SUBCUTANEOUS at 18:33

## 2022-10-13 RX ADMIN — QUETIAPINE 25 MG: 25 TABLET, FILM COATED ORAL at 02:48

## 2022-10-13 RX ADMIN — MULTIPLE VITAMINS W/ MINERALS TAB 1 TABLET: TAB at 12:32

## 2022-10-13 RX ADMIN — LACTULOSE 20 G: 20 SOLUTION ORAL at 20:05

## 2022-10-13 RX ADMIN — CALCIUM 500 MG: 500 TABLET ORAL at 17:42

## 2022-10-13 RX ADMIN — Medication 2 MG: at 09:12

## 2022-10-13 RX ADMIN — LEVOTHYROXINE SODIUM 112 MCG: 0.11 TABLET ORAL at 06:30

## 2022-10-13 RX ADMIN — ACETAMINOPHEN 1000 MG: 500 TABLET ORAL at 18:33

## 2022-10-13 RX ADMIN — BUMETANIDE 1 MG: 1 TABLET ORAL at 08:30

## 2022-10-13 RX ADMIN — ALBUTEROL SULFATE 2.5 MG: 2.5 SOLUTION RESPIRATORY (INHALATION) at 18:34

## 2022-10-13 RX ADMIN — ALBUTEROL SULFATE 2.5 MG: 2.5 SOLUTION RESPIRATORY (INHALATION) at 08:39

## 2022-10-13 ASSESSMENT — ACTIVITIES OF DAILY LIVING (ADL)
ADLS_ACUITY_SCORE: 75
ADLS_ACUITY_SCORE: 77
ADLS_ACUITY_SCORE: 75
ADLS_ACUITY_SCORE: 77

## 2022-10-13 NOTE — PLAN OF CARE
Problem: Confusion Chronic  Goal: Optimal Cognitive Function  10/13/2022 1824 by Mitzy Green RN  Outcome: Progressing  10/13/2022 1103 by Mitzy Green RN  Outcome: Progressing  Intervention: Minimize Injury Risk and Provide Safety  Recent Flowsheet Documentation  Taken 10/13/2022 1819 by Mitzy Green RN  Enhanced Safety Measures: bed alarm set  Intervention: Minimize and Manage Confusion  Recent Flowsheet Documentation  Taken 10/13/2022 1819 by Mitzy Green RN  Sensory Stimulation Regulation:    care clustered    quiet environment promoted  Reorientation Measures:    calendar in view    clock in view    reorientation provided    glasses use encouraged    hearing device use encouraged  Environmental Support: calm environment promoted  Environment Familiarity/Consistency: daily routine followed   Goal Outcome Evaluation:       Patient alert, oriented x1. No signs of pain noted. Confused and gets agitated at times. Hard of hearing, uses pocket talker. Got up in the chair x 1-2 hrs. Needed assist for feeding. Ate 100% of breakfast & 75% of lunch, with good appetite. Tried to use bedpan x1, no BMs yet. With scheduled BM meds. Resting in bed at this time.

## 2022-10-13 NOTE — PLAN OF CARE
Problem: Plan of Care - These are the overarching goals to be used throughout the patient stay.    Goal: Optimal Comfort and Wellbeing  Outcome: Progressing     Pt resting quietly in bed when writer arrived to shift, pt denies pain. VSS, on o2 nasal cannula 1.5 LPM. Pt is alert to self only, had gown pulled off his chest during first rounds. When writer putting gown back on, pt stating he needs to get up and go home so he can get ready for work in the morning. Pt oriented to hospital setting. No attempts to get OOB. Bed alarm on. During second rounds, pt again pulled gown off and calling out for someone named Mariano. Pt pulled o2 tubing off and had it resting on his forehead and resistive to staff putting it back in nose. O2 sats good on room air, RT updated. PRN Seroquel given at 0248 for agitation. Tube feeding flushed per orders. RLE wound ASMITA. Incontinent of urine, new Primo Fit applied. Noted that pt has not gotten any sliding scale insulin since 10/8, left note to provider requesting consideration of discontinuing insulin orders.

## 2022-10-13 NOTE — PROGRESS NOTES
[facility]    Medicine Progress Note - Hospitalist Service    Date of Admission:  9/22/2022  Brief Hospital Course Summary:    Alexandru Still is a 89 year old man w/ PMH of Lambert-Eaton syndrome, hypothyroidism,  complete heart block s/p pacemaker, HTN, BPH,  PB, and DM type 2 who was admitted to the SICU on 9/15/2022 following an unwitnessed fall and striking his head. He was initially seen at an outside hospital and underwent a comprehensive trauma work up found to have a left frontal SDH (5mm) + C1 fx + Type II C2 odontoid fracture with a 1cm posterior displacement. He was  intubated for airway protection in the ED given high c-spine injury and difficulty managing his secretions. On admission he requested all cares without surgical cervical spine stabilization, agreeable to a tracheostomy and PEG. Multiple care conferences held with his family who confirmed his wishes.     LTACH course:    9/24-9/26:  Speech evaluated patient and recommends NPO, cont tube feeds via PEG due to severe oropharyngeal dysphasia.  PT/OT, dietitian, wo nurse saw pt on 9/23.  Pulled out G tube during night of 9/23. Pt now on soft wrist restraints.  IVF changed to D51/2 NS at low rate as pt has TAVR.  Morphine IV ordered prn pain.    Spoke w/ Dr. Brown- IR - recommends no hannah tube as this is a brand new PEG and placing it blind will likely lead it to not be in correct position.  He will not do a PEG placement on the weekend, scheduled for Monday.  Pt cannot be fed via NG tube as it is contraindicated with C1 and C2 fx.  For PEG tube replacement (herpain on hold, place ICD)on 9/26.  Will discontinue IVF once PEG tube is placed and restart po meds.  Given NaPhos 9/26 9/27-10/3:  9/27 patient had PEG tube replaced after patient himself removed it on 9/23.  Was a started on tube feeding.  P.O. medication were restarted.  IV fluids were discontinued.  He still needs soft restraint x2.  Is off ventilator and stable.  9/28 Failed blue  dye test, Continue NPO   9/30 patient had [place] J 300 collar pads delivered  10/1 SLP found patient well below baseline for swallowing, cognition and communication.  Patient stays n.p.o. except ice chips    10/4/22 -/10/8:   -No acute events, Vent weaning phase 2. Continue diuresis with bumex. Continue plan of care. Cognitive testing PENDING. May need neuropsych evaluation depending on cognitive evaluation.     10/9-10/10: he follows commands, cont [place].  He does better with glasses and hearing device on.  He has neck pain and headache.    10/11 no bm since 10/7 despite med's ,will add lactulose   9/12: no new issues reported today ,mental status unchanged  9/13 : no c/o except he is not happy   Assessment & Plan        Traumatic 5mm left frontal lobe SDH  Posteriorly displaced (1 cm) Type II odontoid fx  C1 anterior arch fracture   Epidural hemorrhage of 7mm  Acute traumatic neck pain  Acute L 6th rib fracture  S/p fall, Facial abrasions with ecchymoses  RLE wound  He was seen and evaluated by Neurosurgery.  Pt refused surgical intervention.   A repeat head CT was obtained with a stable subdural hematoma. He was placed in a cervical collar for the cervical fracture. No surgical intervention per patient preference.  -Cont [place] J -keep in place at all times  -Tylenol, oxy prn for pain, changed tylenol to extra strength  -Neurosurg ok lovenox for DVT ppx  -Wound care following   -f/u Neurosurgery in 6 weeks with an upright XR of the cervical spine (November PENDING appointment)  -CT cervical spine in 3 months (first week of December).   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 Failed bedside and radiology swallow. He completed 3 doses of Decadron for laryngeal edema.   -S/P tracheostomy 09/21/2022, Trach suture removal on 09/26  -Pulm and RT following  -Wean vent as per pulm , on phase 2 currently  -Bronchial hygeine w/ albuterol/mucomyst. Avoiding metaneb for now given CSI with cervical collar in place   -Gentle diuresis with bumex      Hypertension   Complete heart block s/p PM placement  Nonrheumatic aortic valve stenosis s/p TAVR 2019  -Monitor  -Pt is currently normotensive , on bumex and cardura  -Echo on 9/16:  EF: 60-65%, no LV dysfunction     Severe orophayngeal dysphagia   S/P PEG   Severe protein calorie malnutrition  Hiatal hernia  -PEG tube placed 09/20. Pt pulled out PEG tube on night of 9/23, replaced on 9/27  -On TF : osmolite 1.5 and 60 ml/hr   -RD following  -S/p VFSS 10/6/22: abnormal but no aspiration, recommend pureed food textures and moderately thick liquids     Hypophosphatemia  Hypomagnesia  Hypokalemia  Hyponatremia  -Monitor and manage as needed     Hypothyroidism   -Continue Levothyroxine     Anemia of chronic disease  -Hg trending down past few days  -Cont to monitor intermittently.     BPH  -Cont doxazosin     DM type 2  -Sliding scale insulin  -Last HbA1:  6.2  -Hypoglycemic protocol     Deconditioning  -PT/OT following           Diet: Pureed Diet (level 4) Liquidized/Moderately Thick (level 3)  Snacks/Supplements Adult: Magic Cup; Between Meals    DVT Prophylaxis: Lovenox  [place] Catheter: Not present  Central Lines: PRESENT       Cardiac Monitoring: None  Code Status: Full Code      Disposition Plan     Discharge date: TBD, to TCU on discharge  Barriers: Placement     The patient's care was discussed with patient, RN, SW/AINSLEY NAIR  MD German  Hospitalist Service  LTACH  Securely message with the Vocera Web Console (learn more here)  Text page via [facility] Paging/Directory           ______________________________________________________________________    Interval History    Chart reviewed and events noted.  Patient seen and examined.     No acute events overnight.    He reports that he is having neck pain and headache 5/10 in severity.  he has no dyspnea, no chest pain, no nausea or vomiting.     ROS:  A 10-system review was obtained and is negative with the exception of the symptoms noted above    Data reviewed today: I reviewed all medications, new labs and imaging results over the last 24 hours.     Physical Exam   Vital Signs: Temp: 98.4  F (36.9  C) Temp src: Axillary BP: (!) 140/68 Pulse: 69   Resp: 20 SpO2: 95 % O2 Device: Nasal cannula with humidification Oxygen Delivery: 1 LPM  Weight: 183 lbs 12.8 oz    General:  No acute distress,awake and alert  HEENT: [place] J in place, trach in place  Lungs:  Clear to auscultation bilaterally  CVS:  S1 and S2, RRR  Abdomen:  Soft, nontender, PEG in place and abdominal binder in place.  Musculoskeletal:  No edema  Neuro: Awake, alert, oriented, speech+, following commands.   Skin:  please refer to nursing documentation for full skin assessment    Data   Recent Labs   Lab 10/13/22  1230 10/13/22  0804 10/12/22  1721 10/10/22  0804 10/10/22  0632 10/07/22  1119 10/07/22  1026   WBC  --   --   --   --  7.1  --   --    HGB  --   --   --   --  9.8*  --   --    MCV  --   --   --   --  93  --   --    PLT  --   --   --   --  335  --   --    NA  --   --   --   --  133*  --  133*   POTASSIUM  --   --   --   --  4.2  --  4.1   CHLORIDE  --   --   --   --  96*  --  96*   CO2  --   --   --   --  27  --  28   BUN  --   --   --   --  23.3*  --  25.1*   CR  --   --   --   --  0.75  --  0.66*   ANIONGAP  --   --   --   --  10  --  9   TITA  --   --   --   --  8.7  --  8.3   * 80 110*   < > 90   < > 149*    < > =  values in this interval not displayed.     No results found for this or any previous visit (from the past 24 hour(s)).  Medications     - MEDICATION INSTRUCTIONS -         albuterol  2.5 mg Nebulization 2 times daily     [START ON 10/14/2022] bumetanide  0.5 mg Oral Daily     calcium carbonate  500 mg Oral BID w/meals     doxazosin  2 mg Oral or Feeding Tube Daily     [START ON 10/20/2022] doxazosin  2 mg Oral Daily     enoxaparin ANTICOAGULANT  40 mg Subcutaneous Q24H     insulin aspart  1-6 Units Subcutaneous TID w/meals     lactulose  20 g Oral BID     levothyroxine  112 mcg Oral QAM AC     multivitamin w/minerals  1 tablet Oral Daily     pantoprazole  40 mg Oral or Feeding Tube QAM AC     sennosides  1 tablet Oral BID     sodium chloride (PF)  10 mL Intracatheter Q8H     cholecalciferol  25 mcg Oral Daily

## 2022-10-13 NOTE — PROGRESS NOTES
Pulmonary Progress Note    Admit Date: 9/22/2022  CODE: Full Code    Assessment/Plan:   89 yoM admitted 9/15/2022 following an unwitnessed fall found to have a left frontal SDH and C1/C2 cervical spine fracture.  Intubated for airway protection in the ED given high c-spine injury and difficulty managing his secretions. On admission he requested all cares without surgical cervical spine stabilization, agreeable to a tracheostomy and PEG. Discharged to LTACH 9/22/22.     PMHx: Lambert-Eaton syndrome, TAVR 2019, complete heart block with pacemaker dependency, DM2, arthritis, BPH, HTN, osteoporosis     Pertinent problems:  Acute hypoxic/hypercapnic respiratory failure s/p trach: in setting of traumatic cervical injury after a fall.  History of PB(no home CPAP per pt).  Liberated from vent 9/26 with acceptable blood gas off vent.  Initially aspirating on BDTs but these improved and now capping continuously since 10/10.  O2 needs decreasing now on 1L via NC.  Secretions slowly improving.    - Phase 3: Cap as tolerated  - Repeat VBG on Monday  - Will check overnight oximetry on Sunday night    - Albuterol nebs BID for bronchial hygiene - change mucomyst to PRN   - continue gentle diuresis with Bumex(decreased to 0.5mg daily)  - Monitor over the weekend and if minimal suctioning needs with no other issues, consider decannulation early next week.  I spoke with NSG(Dr. Still) 10/12 and they are ok with decannulation if meeting our criteria.     Tracheostomy: 6 Shiley placed 9/21/2022.  Downsized to 7 Bivona 10/3 without issue  - routine trach cares/monthly changes     Dysphagia s/p PEG: Repeat BDT 10/3 with no immediate and large delayed aspiration; this is improvement from previous tests. Tolerating PMV well during the day with a good spontaneous cough per RT.  Abnormal VFSS 10/6 but no aspiration   - pureed diet with liquidized/mod thick liquids started 10/6    H/o Laryngeal edema 2/2 traumatic cervical fracture/injury  s/p 3 doses of decadron in acute care.  Not inhibiting trach weans currently    Other problems managed by primary team:  1. Acute neck and rib cage pain(known rib fractures), chronic hip/shoulder pain  2. Traumatic SDH, stable  3. C1/C2 CSI: [place] J collar at all times, repeat imaging per NSG  4. DVT ppx: Lovenox  5. Hyponatremia    Sawyer Capone, STEPHANIE  Pulmonary Medicine  [facility]  Pager [phone]    Subjective/Interim Events:   - tolerating capping now on 1L  - denies dyspnea  - secretion burden improving     Tracheal secretions:  Overnight - none  Yesterday - x1, small    Cough strength: strong, productive     Current phase of ventilator weaning pathway:  Phase 3    Ventilator weaning results  10/10-present: capped continuously     Clinical status discussed today with respiratory therapist    Medications:       - MEDICATION INSTRUCTIONS -         acetylcysteine  2 mL Nebulization BID     albuterol  2.5 mg Nebulization 2 times daily     bumetanide  1 mg Oral Daily     calcium carbonate  500 mg Oral BID w/meals     doxazosin  2 mg Oral or Feeding Tube Daily     [START ON 10/20/2022] doxazosin  2 mg Oral Daily     enoxaparin ANTICOAGULANT  40 mg Subcutaneous Q24H     insulin aspart  1-6 Units Subcutaneous TID w/meals     lactulose  20 g Oral BID     levothyroxine  112 mcg Oral QAM AC     multivitamin w/minerals  1 tablet Oral Daily     pantoprazole  40 mg Oral or Feeding Tube QAM AC     sennosides  1 tablet Oral BID     sodium chloride (PF)  10 mL Intracatheter Q8H     cholecalciferol  25 mcg Oral Daily         Exam/Data:   Vitals  BP (!) 140/68 (BP Location: Left arm)   Pulse 68   Temp 98.4  F (36.9  C) (Axillary)   Resp 20   Wt 83.4 kg (183 lb 12.8 oz)   SpO2 96%   BMI 27.95 kg/m       I/O last 3 completed shifts:  In: 1000 [P.O.:600; NG/GT:400]  Out: 1000 [Urine:1000]  Weight change: -0.454 kg (-1 lb)    Vent Mode: Other (see comments) (cap/nv)  Resp: 20      EXAM:  Gen:  no distress with trach  capped   HEENT: NT, trach midline/intact, c-collar on, good voice, no stridor   CV: RRR, no m/g/r  Resp: CTAB; non-labored   Abd: soft, nontender, BS+  Skin: no rashes or lesions  Ext: mild b/l UE edema  Neuro: Alert, follows commands, [place] uses pocket talker     ROS:  A 10-system review was obtained and is negative with the exception of the symptoms noted above.    Labs:  Basic Metabolic Panel  Recent Labs   Lab 10/13/22  0804 10/12/22  1721 10/12/22  1154 10/11/22  1728 10/10/22  0804 10/10/22  0632 10/07/22  1119 10/07/22  1026   NA  --   --   --   --   --  133*  --  133*   POTASSIUM  --   --   --   --   --  4.2  --  4.1   CHLORIDE  --   --   --   --   --  96*  --  96*   CO2  --   --   --   --   --  27  --  28   BUN  --   --   --   --   --  23.3*  --  25.1*   CR  --   --   --   --   --  0.75  --  0.66*   GLC 80 110* 133* 90   < > 90   < > 149*    < > = values in this interval not displayed.     CBC RESULTS: Recent Labs   Lab Test 10/10/22  0632   WBC 7.1   RBC 3.17*   HGB 9.8*   HCT 29.5*   MCV 93   MCH 30.9   MCHC 33.2   RDW 14.8          RADIOLOGY:   XR CHEST PORT 1 VIEW  LOCATION: [facility]  DATE/TIME: 10/3/2022 8:40 AM     INDICATION: hypoxic respiratory failure  COMPARISON: Chest x-ray 09/26/2022                                                                      IMPRESSION: Stable satisfactory tracheostomy tube position. TAVR. Stable dual-lead left-sided cardiac conduction device. Right shoulder arthroplasty. Bibasilar pulmonary opacities which may reflect atelectasis and/or infiltrates, increased on the left   and stable on the right. No definite pleural effusion. Stable cardiomegaly and central vascular congestion.

## 2022-10-13 NOTE — PLAN OF CARE
Problem: Restraint, Nonbehavioral (Nonviolent)  Goal: Absence of Harm or Injury  Intervention: Protect Dignity, Rights, and Personal Wellbeing  Recent Flowsheet Documentation  Taken 10/12/2022 1616 by Mady Damian RN  Trust Relationship/Rapport:    care explained    emotional support provided    empathic listening provided    questions answered    questions encouraged    reassurance provided     Problem: Skin and Tissue Injury (Mechanical Ventilation, Invasive)  Goal: Absence of Device-Related Skin and Tissue Injury  Intervention: Maintain Skin and Tissue Health  Recent Flowsheet Documentation  Taken 10/12/2022 1616 by Mady Damian RN  Device Skin Pressure Protection: positioning supports utilized     Problem: Plan of Care - These are the overarching goals to be used throughout the patient stay.    Goal: Absence of Hospital-Acquired Illness or Injury  Intervention: Prevent and Manage VTE (Venous Thromboembolism) Risk  Recent Flowsheet Documentation  Taken 10/12/2022 1616 by Mady Damian RN  VTE Prevention/Management: SCDs (sequential compression devices) on   Goal Outcome Evaluation:  Pt co-operative this shift. bp 176/86 at 1536, prn hydralazine given with tylenol at 1640. Recheck 139/68. No behavior was noted this shift. Cares provided.  FYI-Grand daughter wanting to talk to  about some home issues concerning the pt. Redirected her to come in am when the SW will be here.

## 2022-10-13 NOTE — PLAN OF CARE
7 PM to 7 AM  RT PROGRESS NOTE     DATA:     CURRENT SETTINGS:             TRACH TYPE / SIZE:  7 Bivona placed 10/3/22             MODE:   Capped/ 1.5 LPM NC     ACTION:             THERAPIES:   BID Albuterol and Mucomyst neb given             SUCTION:                           FREQUENCY:   None this shift                        AMOUNT:                           CONSISTENCY:                           COLOR:                SPONTANEOUS COUGH EFFORT/STRENGTH OF EFFORT (not elicited by suctioning): Good                              WEANING PHASE:   3, since 10/10/22 @ 0950                        WEAN MODE:                            WEAN TIME:                           END WEAN REASON:        RESPONSE:             BS:   Dim             VITAL SIGNS:   SATs 95-99%, HR 64-79, RR 18-22             RISK FOR SELF DECANNULATION:  No     NOTE / PLAN:   Continue with same       Problem: Device-Related Complication Risk (Mechanical Ventilation, Invasive)  Goal: Optimal Device Function  Intervention: Optimize Device Care and Function  Recent Flowsheet Documentation  Taken 10/13/2022 0115 by Erna Batista, RT  Airway Safety Measures:    all equipment/monitors on and audible    manual resuscitator/mask/valve in room     Problem: Communication Impairment (Artificial Airway)  Goal: Effective Communication  Outcome: Progressing     Problem: Device-Related Complication Risk (Artificial Airway)  Goal: Optimal Device Function  Outcome: Progressing  Intervention: Optimize Device Care and Function  Recent Flowsheet Documentation  Taken 10/13/2022 0115 by Erna Batista, RT  Airway Safety Measures:    all equipment/monitors on and audible    manual resuscitator/mask/valve in room

## 2022-10-13 NOTE — PROGRESS NOTES
[facility]  WOC Nurse Inpatient Assessment     Consulted for: Face, RLE, Trach, G-tube    Patient History (according to provider note(s):        Areas Assessed:      Trach - erythema kelle skin but no concerns  Continue routine cares      RLE healed      Treatment Plan:       RECOMMEND PRIMARY TEAM ORDER: None, at this time  Education provided: plan of care  Discussed plan of care with: Patient  WOC nurse follow-up plan: signing off  Notify WOC if wound(s) deteriorate.  Nursing to notify the Provider(s) and re-consult the WOC Nurse if new skin concern.    DATA:     Current support surface: Standard  Foam mattress  Containment of urine/stool: Incontinence Protocol  BMI: Body mass index is 27.95 kg/m .   Active diet order: Orders Placed This Encounter      Pureed Diet (level 4) Liquidized/Moderately Thick (level 3)     Output: I/O last 3 completed shifts:  In: 1000 [P.O.:600; NG/GT:400]  Out: 1000 [Urine:1000]     Labs:   Recent Labs   Lab 10/10/22  0632   HGB 9.8*   WBC 7.1     Pressure injury risk assessment:   Sensory Perception: 3-->slightly limited  Moisture: 3-->occasionally moist  Activity: 2-->chairfast  Mobility: 2-->very limited  Nutrition: 3-->adequate  Friction and Shear: 2-->potential problem  Bakari Score: 15    Hilary Smith, [place], RN, PHN, HNB-BC, CWOCN

## 2022-10-13 NOTE — PLAN OF CARE
"  Problem: Communication Impairment (Artificial Airway)  Goal: Effective Communication  Outcome: Progressing     Problem: Device-Related Complication Risk (Artificial Airway)  Goal: Optimal Device Function  Outcome: Progressing  Intervention: Optimize Device Care and Function  Recent Flowsheet Documentation  Taken 10/13/2022 0851 by Mariza Solis, RT  Airway Safety Measures: all equipment/monitors on and audible   Goal Outcome Evaluation:      Problem: Device-Related Complication Risk (Mechanical Ventilation, Invasive)  Goal: Optimal Device Function  Intervention: Optimize Device Care and Function  Recent Flowsheet Documentation  Taken 10/13/2022 0851 by Mariza Solis, RT  Airway Safety Measures: all equipment/monitors on and audible     RT PROGRESS NOTE     DATA:     CURRENT SETTINGS:             TRACH TYPE / SIZE:  #7 Bivona changed 10/3/2022             MODE:   Trach capped/nc             FIO2:   1 lpm     ACTION:             THERAPIES:   Alb x2, mucomyst x1 then changed to prn             SUCTION:                           FREQUENCY:   x2                        AMOUNT:   sm                        CONSISTENCY:   thin                        COLOR:   white             SPONTANEOUS COUGH EFFORT/STRENGTH OF EFFORT (not elicited by suctioning): fair loose productive                              WEANING PHASE:   3                        WEAN MODE:    Trach capped/nc                        WEAN TIME:  since 10/10                        END WEAN REASON:        RESPONSE:             BS:   clear and diminished             VITAL SIGNS:   SPO2 93-95%, HR 71, RR 20             EMOTIONAL NEEDS / CONCERNS:  no                RISK FOR SELF DECANNULATION:  no                             NOTE / PLAN:     New order for overnight oximetry study\"  On trach capping.  Start on room air.  Let O2 saturation be less than or equal to 88% for greater than or equal to 5min before placing on supplemental oxygen.  Increase in " 1L increments. Document liter flow in comment section.    Pt to cont with current poc.

## 2022-10-14 ENCOUNTER — APPOINTMENT (OUTPATIENT)
Dept: OCCUPATIONAL THERAPY | Facility: CLINIC | Age: 87
DRG: 208 | End: 2022-10-14
Attending: INTERNAL MEDICINE
Payer: MEDICARE

## 2022-10-14 ENCOUNTER — APPOINTMENT (OUTPATIENT)
Dept: SPEECH THERAPY | Facility: CLINIC | Age: 87
DRG: 208 | End: 2022-10-14
Attending: INTERNAL MEDICINE
Payer: MEDICARE

## 2022-10-14 ENCOUNTER — APPOINTMENT (OUTPATIENT)
Dept: PHYSICAL THERAPY | Facility: CLINIC | Age: 87
DRG: 208 | End: 2022-10-14
Attending: INTERNAL MEDICINE
Payer: MEDICARE

## 2022-10-14 LAB
GLUCOSE BLDC GLUCOMTR-MCNC: 134 MG/DL (ref 70–99)
GLUCOSE BLDC GLUCOMTR-MCNC: 87 MG/DL (ref 70–99)
GLUCOSE BLDC GLUCOMTR-MCNC: 95 MG/DL (ref 70–99)
GLUCOSE BLDC GLUCOMTR-MCNC: 97 MG/DL (ref 70–99)

## 2022-10-14 PROCEDURE — 250N000011 HC RX IP 250 OP 636: Performed by: HOSPITALIST

## 2022-10-14 PROCEDURE — 999N000157 HC STATISTIC RCP TIME EA 10 MIN

## 2022-10-14 PROCEDURE — 250N000013 HC RX MED GY IP 250 OP 250 PS 637: Performed by: HOSPITALIST

## 2022-10-14 PROCEDURE — 250N000013 HC RX MED GY IP 250 OP 250 PS 637: Performed by: INTERNAL MEDICINE

## 2022-10-14 PROCEDURE — 999N000123 HC STATISTIC OXYGEN O2DAILY TECH TIME

## 2022-10-14 PROCEDURE — 94640 AIRWAY INHALATION TREATMENT: CPT | Mod: 76

## 2022-10-14 PROCEDURE — 120N000017 HC R&B RESPIRATORY CARE

## 2022-10-14 PROCEDURE — 97110 THERAPEUTIC EXERCISES: CPT | Mod: GO | Performed by: OCCUPATIONAL THERAPIST

## 2022-10-14 PROCEDURE — 97129 THER IVNTJ 1ST 15 MIN: CPT | Mod: GN | Performed by: SPEECH-LANGUAGE PATHOLOGIST

## 2022-10-14 PROCEDURE — 999N000009 HC STATISTIC AIRWAY CARE

## 2022-10-14 PROCEDURE — 97116 GAIT TRAINING THERAPY: CPT | Mod: GP | Performed by: PHYSICAL THERAPIST

## 2022-10-14 PROCEDURE — 250N000013 HC RX MED GY IP 250 OP 250 PS 637: Performed by: NURSE PRACTITIONER

## 2022-10-14 PROCEDURE — 250N000009 HC RX 250: Performed by: NURSE PRACTITIONER

## 2022-10-14 PROCEDURE — 94640 AIRWAY INHALATION TREATMENT: CPT

## 2022-10-14 PROCEDURE — 97530 THERAPEUTIC ACTIVITIES: CPT | Mod: GP | Performed by: PHYSICAL THERAPIST

## 2022-10-14 PROCEDURE — 99232 SBSQ HOSP IP/OBS MODERATE 35: CPT | Performed by: INTERNAL MEDICINE

## 2022-10-14 PROCEDURE — 92526 ORAL FUNCTION THERAPY: CPT | Mod: GN | Performed by: SPEECH-LANGUAGE PATHOLOGIST

## 2022-10-14 RX ADMIN — LACTULOSE 20 G: 20 SOLUTION ORAL at 08:55

## 2022-10-14 RX ADMIN — Medication 2 MG: at 08:55

## 2022-10-14 RX ADMIN — MULTIPLE VITAMINS W/ MINERALS TAB 1 TABLET: TAB at 13:45

## 2022-10-14 RX ADMIN — BUMETANIDE 0.5 MG: 0.5 TABLET ORAL at 08:55

## 2022-10-14 RX ADMIN — CALCIUM 500 MG: 500 TABLET ORAL at 18:11

## 2022-10-14 RX ADMIN — LACTULOSE 20 G: 20 SOLUTION ORAL at 22:09

## 2022-10-14 RX ADMIN — ACETAMINOPHEN 1000 MG: 500 TABLET ORAL at 08:56

## 2022-10-14 RX ADMIN — LEVOTHYROXINE SODIUM 112 MCG: 0.11 TABLET ORAL at 06:36

## 2022-10-14 RX ADMIN — Medication 40 MG: at 06:36

## 2022-10-14 RX ADMIN — ALBUTEROL SULFATE 2.5 MG: 2.5 SOLUTION RESPIRATORY (INHALATION) at 06:18

## 2022-10-14 RX ADMIN — SENNOSIDES 1 TABLET: 8.6 TABLET, FILM COATED ORAL at 08:56

## 2022-10-14 RX ADMIN — SENNOSIDES 1 TABLET: 8.6 TABLET, FILM COATED ORAL at 22:09

## 2022-10-14 RX ADMIN — CALCIUM 500 MG: 500 TABLET ORAL at 13:45

## 2022-10-14 RX ADMIN — ALBUTEROL SULFATE 2.5 MG: 2.5 SOLUTION RESPIRATORY (INHALATION) at 18:38

## 2022-10-14 RX ADMIN — Medication 25 MCG: at 08:55

## 2022-10-14 RX ADMIN — ENOXAPARIN SODIUM 40 MG: 100 INJECTION SUBCUTANEOUS at 18:11

## 2022-10-14 ASSESSMENT — ACTIVITIES OF DAILY LIVING (ADL)
ADLS_ACUITY_SCORE: 75
ADLS_ACUITY_SCORE: 77
ADLS_ACUITY_SCORE: 75
ADLS_ACUITY_SCORE: 77
ADLS_ACUITY_SCORE: 75
ADLS_ACUITY_SCORE: 77
ADLS_ACUITY_SCORE: 75

## 2022-10-14 NOTE — PROGRESS NOTES
[facility]    Medicine Progress Note - Hospitalist Service    Date of Admission:  9/22/2022  Brief Hospital Course Summary:    Alexandru Still is a 89 year old man w/ PMH of Lambert-Eaton syndrome, hypothyroidism,  complete heart block s/p pacemaker, HTN, BPH,  PB, and DM type 2 who was admitted to the SICU on 9/15/2022 following an unwitnessed fall and striking his head. He was initially seen at an outside hospital and underwent a comprehensive trauma work up found to have a left frontal SDH (5mm) + C1 fx + Type II C2 odontoid fracture with a 1cm posterior displacement. He was  intubated for airway protection in the ED given high c-spine injury and difficulty managing his secretions. On admission he requested all cares without surgical cervical spine stabilization, agreeable to a tracheostomy and PEG. Multiple care conferences held with his family who confirmed his wishes.     LTACH course:    9/24-9/26:  Speech evaluated patient and recommends NPO, cont tube feeds via PEG due to severe oropharyngeal dysphasia.  PT/OT, dietitian, wo nurse saw pt on 9/23.  Pulled out G tube during night of 9/23. Pt now on soft wrist restraints.  IVF changed to D51/2 NS at low rate as pt has TAVR.  Morphine IV ordered prn pain.    Spoke w/ Dr. Brown- IR - recommends no hannah tube as this is a brand new PEG and placing it blind will likely lead it to not be in correct position.  He will not do a PEG placement on the weekend, scheduled for Monday.  Pt cannot be fed via NG tube as it is contraindicated with C1 and C2 fx.  For PEG tube replacement (herpain on hold, place ICD)on 9/26.  Will discontinue IVF once PEG tube is placed and restart po meds.  Given NaPhos 9/26 9/27-10/3:  9/27 patient had PEG tube replaced after patient himself removed it on 9/23.  Was a started on tube feeding.  P.O. medication were restarted.  IV fluids were discontinued.  He still needs soft restraint x2.  Is off ventilator and stable.  9/28 Failed blue  dye test, Continue NPO   9/30 patient had [place] 300 collar pads delivered  10/1 SLP found patient well below baseline for swallowing, cognition and communication.  Patient stays n.p.o. except ice chips    10/4/22 -/10/8:   -No acute events, Vent weaning phase 2. Continue diuresis with bumex. Continue plan of care. Cognitive testing PENDING. May need neuropsych evaluation depending on cognitive evaluation.     10/9-10/10: he follows commands, cont [place].  He does better with glasses and hearing device on.  He has neck pain and headache.    10/11 no bm since 10/7 despite med's ,will add lactulose   10/12: no new issues reported today ,mental status unchanged  10/13 : no c/o except he is not happy   10/14: speech may do viseoscopic swallw study this am  And oximetry on Sunday   Assessment & Plan        Traumatic 5mm left frontal lobe SDH  Posteriorly displaced (1 cm) Type II odontoid fx  C1 anterior arch fracture   Epidural hemorrhage of 7mm  Acute traumatic neck pain  Acute L 6th rib fracture  S/p fall, Facial abrasions with ecchymoses  RLE wound  He was seen and evaluated by Neurosurgery.  Pt refused surgical intervention.   A repeat head CT was obtained with a stable subdural hematoma. He was placed in a cervical collar for the cervical fracture. No surgical intervention per patient preference.  -Cont [place] J -keep in place at all times  -Tylenol, oxy prn for pain, changed tylenol to extra strength  -Neurosurg ok lovenox for DVT ppx  -Wound care following   -f/u Neurosurgery in 6 weeks with an upright XR of the cervical spine (November PENDING appointment)  -CT cervical spine in 3 months (first week of December).   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 Failed bedside and radiology swallow. He completed 3 doses of Decadron for laryngeal edema.   -S/P tracheostomy 09/21/2022, Trach suture removal on 09/26  -Pulm and RT following  -Wean vent as per pulm , on phase 2 currently  -Bronchial hygeine w/ albuterol/mucomyst. Avoiding metaneb for now given CSI with cervical collar in place   -Gentle diuresis with bumex      Hypertension   Complete heart block s/p PM placement  Nonrheumatic aortic valve stenosis s/p TAVR 2019  -Monitor  -Pt is currently normotensive , on bumex and cardura  -Echo on 9/16:  EF: 60-65%, no LV dysfunction     Severe orophayngeal dysphagia   S/P PEG   Severe protein calorie malnutrition  Hiatal hernia  -PEG tube placed 09/20. Pt pulled out PEG tube on night of 9/23, replaced on 9/27  -On TF : osmolite 1.5 and 60 ml/hr   -RD following  -S/p VFSS 10/6/22: abnormal but no aspiration, recommend pureed food textures and moderately thick liquids     Hypophosphatemia  Hypomagnesia  Hypokalemia  Hyponatremia  -Monitor and manage as needed     Hypothyroidism   -Continue Levothyroxine     Anemia of chronic disease  -Hg trending down past few days  -Cont to monitor intermittently.     BPH  -Cont doxazosin     DM type 2  -Sliding scale insulin  -Last HbA1:  6.2  -Hypoglycemic protocol     Deconditioning  -PT/OT following           Diet: Pureed Diet (level 4) Liquidized/Moderately Thick (level 3)  Snacks/Supplements Adult: Magic Cup; Between Meals    DVT Prophylaxis: Lovenox  [place] Catheter: Not present  Central Lines: PRESENT       Cardiac Monitoring: None  Code Status: Full Code      Disposition Plan     Discharge date: TBD, to TCU on discharge  Barriers:  Placement     The patient's care was discussed with patient, RN, SW/CM    Berry Porter MD  Hospitalist Service  LTACH  Securely message with the [employer] Web Console (learn more here)  Text page via [employer] Paging/Directory           ______________________________________________________________________    Interval History    Chart reviewed and events noted.  Patient seen and examined.     No acute events overnight.    He reports that he is having neck pain and headache 5/10 in severity.  he has no dyspnea, no chest pain, no nausea or vomiting.     ROS:  A 10-system review was obtained and is negative with the exception of the symptoms noted above    Data reviewed today: I reviewed all medications, new labs and imaging results over the last 24 hours.     Physical Exam   Vital Signs: Temp: 97.8  F (36.6  C) Temp src: Axillary BP: (!) 150/70 Pulse: 64   Resp: 20 SpO2: 94 % O2 Device: Nasal cannula with humidification Oxygen Delivery: 1 LPM  Weight: 180 lbs 12.8 oz    General:  No acute distress,awake and alert  HEENT: [place] J in place, trach in place  Lungs:  Clear to auscultation bilaterally  CVS:  S1 and S2, RRR  Abdomen:  Soft, nontender, PEG in place and abdominal binder in place.  Musculoskeletal:  No edema  Neuro: Awake, alert, oriented, speech+, following commands.   Skin:  please refer to nursing documentation for full skin assessment    Data   Recent Labs   Lab 10/14/22  0802 10/13/22  1747 10/13/22  1230 10/10/22  0804 10/10/22  0632   WBC  --   --   --   --  7.1   HGB  --   --   --   --  9.8*   MCV  --   --   --   --  93   PLT  --   --   --   --  335   NA  --   --   --   --  133*   POTASSIUM  --   --   --   --  4.2   CHLORIDE  --   --   --   --  96*   CO2  --   --   --   --  27   BUN  --   --   --   --  23.3*   CR  --   --   --   --  0.75   ANIONGAP  --   --   --   --  10   TITA  --   --   --   --  8.7   GLC 95 99 110*   < > 90    < > = values in this interval not displayed.     No results found for this or  any previous visit (from the past 24 hour(s)).  Medications     - MEDICATION INSTRUCTIONS -         albuterol  2.5 mg Nebulization 2 times daily     bumetanide  0.5 mg Oral Daily     calcium carbonate  500 mg Oral BID w/meals     doxazosin  2 mg Oral or Feeding Tube Daily     [START ON 10/20/2022] doxazosin  2 mg Oral Daily     enoxaparin ANTICOAGULANT  40 mg Subcutaneous Q24H     insulin aspart  1-6 Units Subcutaneous TID w/meals     lactulose  20 g Oral BID     levothyroxine  112 mcg Oral QAM AC     multivitamin w/minerals  1 tablet Oral Daily     pantoprazole  40 mg Oral or Feeding Tube QAM AC     sennosides  1 tablet Oral BID     sodium chloride (PF)  10 mL Intracatheter Q8H     cholecalciferol  25 mcg Oral Daily

## 2022-10-14 NOTE — PROGRESS NOTES
Social Work Note:  Patient chart reviewed.  Patient discussed in morning rounds.  Barriers to discharge:   * Trach capped.  02 1 lpm  * Being fed meals.  Must be able to feed self to go to a TCU.    * Will need placement.     Patient with Trach, cervical collar, tube feed flushes (FWF 60 mL Q8 hours via G-tube for tube patency). Diet: Pureed Diet (level 4) Liquidized/Moderately Thick (level 3).    Patient will likely need TCU at discharge.  Patient has previously declined recommendation for TCU placement.    Writer spoke to patient and his son in law Erv about TCU placement.  Both in agreement.  Erv has no specific choice or option.  Will discuss with patient and given a list of SNF/TCU in his home area.       Bob Reynolds, [facility]  [facility]  [phone]

## 2022-10-14 NOTE — PLAN OF CARE
Problem: Plan of Care - These are the overarching goals to be used throughout the patient stay.    Goal: Optimal Comfort and Wellbeing  Outcome: Progressing     When writer arrived to shift, pt resting quietly in his bed. Had been given prn apap by previous shift, pt states pain medication was helpful for him. Slept  between rounds tonight. No further c/o pain. Pt [place] and uses pocket talker to aid with communication. Pt AOX4 but does get confused at times and needs to be reoriented. Trach capped and on o2 1 LPM via nasal cannula. Tube feeding flushed per orders. PrimoFit changed and had good output, see flowsheets. [place]-J collar on. Note left to provider advising that pt has not received any sliding scale insulin in days and maybe the order could be discontinued.

## 2022-10-14 NOTE — PLAN OF CARE
"  Problem: Communication Impairment (Artificial Airway)  Goal: Effective Communication  Outcome: Progressing     Problem: Device-Related Complication Risk (Artificial Airway)  Goal: Optimal Device Function  Outcome: Progressing  Intervention: Optimize Device Care and Function  Recent Flowsheet Documentation  Taken 10/14/2022 0805 by Mariza Solis, RT  Airway Safety Measures: all equipment/monitors on and audible   Goal Outcome Evaluation:         RT PROGRESS NOTE     DATA:     CURRENT SETTINGS:             TRACH TYPE / SIZE:  #7 Bivona changed 10/3/2022             MODE:   Trach capped/nc             FIO2:   1 lpm     ACTION:             THERAPIES:   Alb x1             SUCTION:                           FREQUENCY:   x1                        AMOUNT:  scant                        CONSISTENCY:  thin                        COLOR: white              SPONTANEOUS COUGH EFFORT/STRENGTH OF EFFORT (not elicited by suctioning): fair loose productive                              WEANING PHASE:   3                        WEAN MODE:    Trach capped/nc                        WEAN TIME:  since 10/10                        END WEAN REASON:        RESPONSE:             BS:   clear and diminished             VITAL SIGNS:   SPO2 93-95%, HR 71, RR 20             EMOTIONAL NEEDS / CONCERNS:  no                RISK FOR SELF DECANNULATION:  no                             NOTE / PLAN:        10/16 New order for overnight oximetry study\"  On trach capping.  Start on room air.  Let O2 saturation be less than or equal to 88% for greater than or equal to 5min before placing on supplemental oxygen.  Increase in 1L increments. Document liter flow in comment section.     Pt to cont with current poc.                   "

## 2022-10-14 NOTE — PLAN OF CARE
Problem: Plan of Care - These are the overarching goals to be used throughout the patient stay.    Goal: Optimal Comfort and Wellbeing  Outcome: Progressing  Intervention: Monitor Pain and Promote Comfort  Recent Flowsheet Documentation  Taken 10/14/2022 0856 by Maile Montesinos RN  Pain Management Interventions: medication (see MAR)  Intervention: Provide Person-Centered Care  Recent Flowsheet Documentation  Taken 10/14/2022 1400 by Maile Montesinos RN  Trust Relationship/Rapport:   care explained   choices provided   emotional support provided   empathic listening provided   questions encouraged   reassurance provided   thoughts/feelings acknowledged   At the beginning of the shift pt c/o bilateral shoulder pain, tylenol 1000 mg given @ 0900 with effect.  VSS, attended therapy as scheduled. Pt ate 100% for break fast and 25% for lunch. Up in chair for 2 hrs. Blood sugar 95 & 134 before meals.no respiratory distress noted, will continue monitoring.

## 2022-10-14 NOTE — PLAN OF CARE
7 PM to 7 AM  RT PROGRESS NOTE     DATA:     CURRENT SETTINGS:             TRACH TYPE / SIZE:  7 Bivona placed 10/3/22             MODE:   Capped/ 1 LPM NC     ACTION:             THERAPIES:   BID Albuterol neb given             SUCTION:                           FREQUENCY:   None this shift                        AMOUNT:                           CONSISTENCY:                           COLOR:                SPONTANEOUS COUGH EFFORT/STRENGTH OF EFFORT (not elicited by suctioning): Good                              WEANING PHASE:   3, since 10/10/22 @ 0950                        WEAN MODE:                            WEAN TIME:                           END WEAN REASON:        RESPONSE:             BS:   Dim             VITAL SIGNS:   SATs %, HR 60-77, RR 22             RISK FOR SELF DECANNULATION:  No     NOTE / PLAN:   Continue with same       Problem: Device-Related Complication Risk (Mechanical Ventilation, Invasive)  Goal: Optimal Device Function  Intervention: Optimize Device Care and Function  Recent Flowsheet Documentation  Taken 10/13/2022 0115 by Erna Batista, RT  Airway Safety Measures:    all equipment/monitors on and audible    manual resuscitator/mask/valve in room     Problem: Communication Impairment (Artificial Airway)  Goal: Effective Communication  Outcome: Progressing     Problem: Device-Related Complication Risk (Artificial Airway)  Goal: Optimal Device Function  Outcome: Progressing  Intervention: Optimize Device Care and Function  Recent Flowsheet Documentation  Taken 10/13/2022 0115 by Erna Batista, RT  Airway Safety Measures:    all equipment/monitors on and audible    manual resuscitator/mask/valve in room

## 2022-10-15 LAB
GLUCOSE BLDC GLUCOMTR-MCNC: 102 MG/DL (ref 70–99)
GLUCOSE BLDC GLUCOMTR-MCNC: 106 MG/DL (ref 70–99)
GLUCOSE BLDC GLUCOMTR-MCNC: 85 MG/DL (ref 70–99)

## 2022-10-15 PROCEDURE — 250N000013 HC RX MED GY IP 250 OP 250 PS 637: Performed by: HOSPITALIST

## 2022-10-15 PROCEDURE — 999N000009 HC STATISTIC AIRWAY CARE

## 2022-10-15 PROCEDURE — 250N000013 HC RX MED GY IP 250 OP 250 PS 637: Performed by: NURSE PRACTITIONER

## 2022-10-15 PROCEDURE — 94640 AIRWAY INHALATION TREATMENT: CPT | Mod: 76

## 2022-10-15 PROCEDURE — 94640 AIRWAY INHALATION TREATMENT: CPT

## 2022-10-15 PROCEDURE — 120N000017 HC R&B RESPIRATORY CARE

## 2022-10-15 PROCEDURE — 999N000123 HC STATISTIC OXYGEN O2DAILY TECH TIME

## 2022-10-15 PROCEDURE — 99232 SBSQ HOSP IP/OBS MODERATE 35: CPT | Performed by: INTERNAL MEDICINE

## 2022-10-15 PROCEDURE — 250N000013 HC RX MED GY IP 250 OP 250 PS 637: Performed by: INTERNAL MEDICINE

## 2022-10-15 PROCEDURE — 250N000009 HC RX 250: Performed by: NURSE PRACTITIONER

## 2022-10-15 PROCEDURE — 250N000011 HC RX IP 250 OP 636: Performed by: HOSPITALIST

## 2022-10-15 PROCEDURE — 999N000157 HC STATISTIC RCP TIME EA 10 MIN

## 2022-10-15 RX ORDER — OXYCODONE HYDROCHLORIDE 5 MG/1
5 TABLET ORAL EVERY 4 HOURS PRN
Status: DISCONTINUED | OUTPATIENT
Start: 2022-10-15 | End: 2022-10-28 | Stop reason: HOSPADM

## 2022-10-15 RX ADMIN — LACTULOSE 20 G: 20 SOLUTION ORAL at 08:40

## 2022-10-15 RX ADMIN — CALCIUM 500 MG: 500 TABLET ORAL at 12:59

## 2022-10-15 RX ADMIN — ENOXAPARIN SODIUM 40 MG: 100 INJECTION SUBCUTANEOUS at 18:19

## 2022-10-15 RX ADMIN — ALBUTEROL SULFATE 2.5 MG: 2.5 SOLUTION RESPIRATORY (INHALATION) at 07:15

## 2022-10-15 RX ADMIN — Medication 25 MCG: at 08:40

## 2022-10-15 RX ADMIN — OXYCODONE HYDROCHLORIDE 5 MG: 5 TABLET ORAL at 23:58

## 2022-10-15 RX ADMIN — MULTIPLE VITAMINS W/ MINERALS TAB 1 TABLET: TAB at 13:00

## 2022-10-15 RX ADMIN — LACTULOSE 20 G: 20 SOLUTION ORAL at 20:50

## 2022-10-15 RX ADMIN — CALCIUM 500 MG: 500 TABLET ORAL at 18:19

## 2022-10-15 RX ADMIN — SENNOSIDES 1 TABLET: 8.6 TABLET, FILM COATED ORAL at 20:50

## 2022-10-15 RX ADMIN — SENNOSIDES 1 TABLET: 8.6 TABLET, FILM COATED ORAL at 08:40

## 2022-10-15 RX ADMIN — BUMETANIDE 0.5 MG: 0.5 TABLET ORAL at 08:40

## 2022-10-15 RX ADMIN — Medication 2 MG: at 08:41

## 2022-10-15 RX ADMIN — Medication 40 MG: at 06:30

## 2022-10-15 RX ADMIN — LEVOTHYROXINE SODIUM 112 MCG: 0.11 TABLET ORAL at 06:30

## 2022-10-15 RX ADMIN — OXYCODONE HYDROCHLORIDE 5 MG: 5 SOLUTION ORAL at 00:46

## 2022-10-15 RX ADMIN — ALBUTEROL SULFATE 2.5 MG: 2.5 SOLUTION RESPIRATORY (INHALATION) at 19:33

## 2022-10-15 RX ADMIN — ACETAMINOPHEN 1000 MG: 500 TABLET ORAL at 23:57

## 2022-10-15 ASSESSMENT — ACTIVITIES OF DAILY LIVING (ADL)
ADLS_ACUITY_SCORE: 70
ADLS_ACUITY_SCORE: 70
ADLS_ACUITY_SCORE: 74
ADLS_ACUITY_SCORE: 68
ADLS_ACUITY_SCORE: 68
ADLS_ACUITY_SCORE: 74
ADLS_ACUITY_SCORE: 74
ADLS_ACUITY_SCORE: 68
ADLS_ACUITY_SCORE: 74
ADLS_ACUITY_SCORE: 68
ADLS_ACUITY_SCORE: 68
ADLS_ACUITY_SCORE: 74

## 2022-10-15 NOTE — PLAN OF CARE
Problem: Communication Impairment (Artificial Airway)  Goal: Effective Communication  Outcome: Progressing     Problem: Device-Related Complication Risk (Artificial Airway)  Goal: Optimal Device Function  Outcome: Progressing  Intervention: Optimize Device Care and Function  Recent Flowsheet Documentation  Taken 10/15/2022 0715 by Barak Alvarado, RT  Airway Safety Measures:    all equipment/monitors on and audible    manual resuscitator/mask/valve in room    RT PROGRESS NOTE     DATA:     CURRENT SETTINGS:             TRACH TYPE / SIZE:  #7 Bivona changed 10/3/2022             MODE:   Trach capped.              FIO2:  1L-NC     ACTION:             THERAPIES:   Alb x1             SUCTION:                           FREQUENCY:   x2                        AMOUNT:  small                        CONSISTENCY:  thin                        COLOR: white              SPONTANEOUS COUGH EFFORT/STRENGTH OF EFFORT (not elicited by suctioning): fair loose productive                              WEANING PHASE:   3                        WEAN MODE:    Trach capped/nc                        WEAN TIME:  since 10/10                        END WEAN REASON:        RESPONSE:             BS:   clear and diminished             VITAL SIGNS:   Blood pressure (!) 179/79, pulse 67, temperature 98.3  F (36.8  C), temperature source Oral, resp. rate 18, weight 83.1 kg (183 lb 1.6 oz), SpO2 94 %.               EMOTIONAL NEEDS / CONCERNS:  no                RISK FOR SELF DECANNULATION:  no                             NOTE / PLAN: Patient remains on Trach capped with 1L-NC and is tolerating well. Plan: cont- current POC.

## 2022-10-15 NOTE — PROGRESS NOTES
[facility]    Medicine Progress Note - Hospitalist Service    Date of Admission:  9/22/2022  Brief Hospital Course Summary:    Alexadnru Still is a 89 year old man w/ PMH of Lambert-Eaton syndrome, hypothyroidism,  complete heart block s/p pacemaker, HTN, BPH,  PB, and DM type 2 who was admitted to the SICU on 9/15/2022 following an unwitnessed fall and striking his head. He was initially seen at an outside hospital and underwent a comprehensive trauma work up found to have a left frontal SDH (5mm) + C1 fx + Type II C2 odontoid fracture with a 1cm posterior displacement. He was  intubated for airway protection in the ED given high c-spine injury and difficulty managing his secretions. On admission he requested all cares without surgical cervical spine stabilization, agreeable to a tracheostomy and PEG. Multiple care conferences held with his family who confirmed his wishes.     LTACH course:    9/24-9/26:  Speech evaluated patient and recommends NPO, cont tube feeds via PEG due to severe oropharyngeal dysphasia.  PT/OT, dietitian, wo nurse saw pt on 9/23.  Pulled out G tube during night of 9/23. Pt now on soft wrist restraints.  IVF changed to D51/2 NS at low rate as pt has TAVR.  Morphine IV ordered prn pain.    Spoke w/ Dr. Brown- IR - recommends no hannah tube as this is a brand new PEG and placing it blind will likely lead it to not be in correct position.  He will not do a PEG placement on the weekend, scheduled for Monday.  Pt cannot be fed via NG tube as it is contraindicated with C1 and C2 fx.  For PEG tube replacement (herpain on hold, place ICD)on 9/26.  Will discontinue IVF once PEG tube is placed and restart po meds.  Given NaPhos 9/26 9/27-10/3:  9/27 patient had PEG tube replaced after patient himself removed it on 9/23.  Was a started on tube feeding.  P.O. medication were restarted.  IV fluids were discontinued.  He still needs soft restraint x2.  Is off ventilator and stable.  9/28 Failed blue  dye test, Continue NPO   9/30 patient had [place] J 300 collar pads delivered  10/1 SLP found patient well below baseline for swallowing, cognition and communication.  Patient stays n.p.o. except ice chips    10/4/22 -/10/8:   -No acute events, Vent weaning phase 2. Continue diuresis with bumex. Continue plan of care. Cognitive testing PENDING. May need neuropsych evaluation depending on cognitive evaluation.     10/9-10/10: he follows commands, cont Ram[place] J.  He does better with glasses and hearing device on.  He has neck pain and headache.    10/11 no bm since 10/7 despite med's ,will add lactulose   10/12: no new issues reported today ,mental status unchanged  10/13 : no c/o except he is not happy   10/14: speech may do viseoscopic swallw study this am  And oximetry on Myron   10/15 Apparently failed swallow study yesterday, oximetry study in am   Assessment & Plan        Traumatic 5mm left frontal lobe SDH  Posteriorly displaced (1 cm) Type II odontoid fx  C1 anterior arch fracture   Epidural hemorrhage of 7mm  Acute traumatic neck pain  Acute L 6th rib fracture  S/p fall, Facial abrasions with ecchymoses  RLE wound  He was seen and evaluated by Neurosurgery.  Pt refused surgical intervention.   A repeat head CT was obtained with a stable subdural hematoma. He was placed in a cervical collar for the cervical fracture. No surgical intervention per patient preference.  -Cont [place] J -keep in place at all times  -Tylenol, oxy prn for pain, changed tylenol to extra strength  -Neurosurg ok lovenox for DVT ppx  -Wound care following   -f/u Neurosurgery in 6 weeks with an upright XR of the cervical spine (November PENDING appointment)  -CT cervical spine in 3 months (first week of December).   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 Failed bedside and radiology swallow. He completed 3 doses of Decadron for laryngeal edema.   -S/P tracheostomy 09/21/2022, Trach suture removal on 09/26  -Pulm and RT following  -Wean vent as per pulm , on phase 2 currently  -Bronchial hygeine w/ albuterol/mucomyst. Avoiding metaneb for now given CSI with cervical collar in place   -Gentle diuresis with bumex      Hypertension   Complete heart block s/p PM placement  Nonrheumatic aortic valve stenosis s/p TAVR 2019  -Monitor  -Pt is currently normotensive , on bumex and cardura  -Echo on 9/16:  EF: 60-65%, no LV dysfunction     Severe orophayngeal dysphagia   S/P PEG   Severe protein calorie malnutrition  Hiatal hernia  -PEG tube placed 09/20. Pt pulled out PEG tube on night of 9/23, replaced on 9/27  -On TF : osmolite 1.5 and 60 ml/hr   -RD following  -S/p VFSS 10/6/22: abnormal but no aspiration, recommend pureed food textures and moderately thick liquids     Hypophosphatemia  Hypomagnesia  Hypokalemia  Hyponatremia  -Monitor and manage as needed     Hypothyroidism   -Continue Levothyroxine     Anemia of chronic disease  -Hg trending down past few days  -Cont to monitor intermittently.     BPH  -Cont doxazosin     DM type 2  -Sliding scale insulin  -Last HbA1:  6.2  -Hypoglycemic protocol     Deconditioning  -PT/OT following           Diet: Pureed Diet (level 4) Liquidized/Moderately Thick (level 3)  Snacks/Supplements Adult: Magic Cup; Between Meals    DVT Prophylaxis: Lovenox  Escudero Catheter: Not present  Central Lines: PRESENT       Cardiac Monitoring: None  Code Status: Full Code       Disposition Plan     Discharge date: TBD, to TCU on discharge  Barriers: Placement     The patient's care was discussed with patient, RN, SW/CM    Berry Porter MD  Hospitalist Service  LTACH  Securely message with the Vocera Web Console (learn more here)  Text page via [facility] Paging/Directory           ______________________________________________________________________    Interval History    Chart reviewed and events noted.  Patient seen and examined.     No acute events overnight.    He reports that he is having neck pain and headache 5/10 in severity.  he has no dyspnea, no chest pain, no nausea or vomiting.     ROS:  A 10-system review was obtained and is negative with the exception of the symptoms noted above    Data reviewed today: I reviewed all medications, new labs and imaging results over the last 24 hours.     Physical Exam   Vital Signs: Temp: 97.5  F (36.4  C) Temp src: Oral BP: (!) 152/70 Pulse: 67   Resp: 20 SpO2: 95 % O2 Device: Nasal cannula Oxygen Delivery: 1 LPM  Weight: 183 lbs 1.6 oz    General:  No acute distress,awake and alert  HEENT: [place] J in place, trach in place  Lungs:  Clear to auscultation bilaterally  CVS:  S1 and S2, RRR  Abdomen:  Soft, nontender, PEG in place and abdominal binder in place.  Musculoskeletal:  No edema  Neuro: Awake, alert, oriented, speech+, following commands.   Skin:  please refer to nursing documentation for full skin assessment    Data   Recent Labs   Lab 10/15/22  0828 10/14/22  2056 10/14/22  1741 10/10/22  0804 10/10/22  0632   WBC  --   --   --   --  7.1   HGB  --   --   --   --  9.8*   MCV  --   --   --   --  93   PLT  --   --   --   --  335   NA  --   --   --   --  133*   POTASSIUM  --   --   --   --  4.2   CHLORIDE  --   --   --   --  96*   CO2  --   --   --   --  27   BUN  --   --   --   --  23.3*   CR  --   --   --   --  0.75   ANIONGAP  --   --   --   --  10   TITA  --   --   --   --  8.7   GLC 85 97 87   < > 90    < > = values in this  interval not displayed.     No results found for this or any previous visit (from the past 24 hour(s)).  Medications     - MEDICATION INSTRUCTIONS -         albuterol  2.5 mg Nebulization 2 times daily     bumetanide  0.5 mg Oral Daily     calcium carbonate  500 mg Oral BID w/meals     doxazosin  2 mg Oral or Feeding Tube Daily     [START ON 10/20/2022] doxazosin  2 mg Oral Daily     enoxaparin ANTICOAGULANT  40 mg Subcutaneous Q24H     insulin aspart  1-6 Units Subcutaneous TID w/meals     lactulose  20 g Oral BID     levothyroxine  112 mcg Oral QAM AC     multivitamin w/minerals  1 tablet Oral Daily     pantoprazole  40 mg Oral or Feeding Tube QAM AC     sennosides  1 tablet Oral BID     sodium chloride (PF)  10 mL Intracatheter Q8H     cholecalciferol  25 mcg Oral Daily

## 2022-10-15 NOTE — PLAN OF CARE
Problem: Pain Acute  Goal: Acceptable Pain Control and Functional Ability  Intervention: Prevent or Manage Pain  Recent Flowsheet Documentation  Taken 10/15/2022 1300 by Maile Montesinos RN  Sensory Stimulation Regulation: television on  Bowel Elimination Promotion: adequate fluid intake promoted  Medication Review/Management: medications reviewed  Intervention: Optimize Psychosocial Wellbeing  Recent Flowsheet Documentation  Taken 10/15/2022 1300 by Maile Montesinos RN  Supportive Measures:   active listening utilized   positive reinforcement provided     Problem: Fall Injury Risk  Goal: Absence of Fall and Fall-Related Injury  Outcome: Progressing  Intervention: Identify and Manage Contributors  Recent Flowsheet Documentation  Taken 10/15/2022 1300 by Maile Montesinos RN  Medication Review/Management: medications reviewed  Intervention: Promote Injury-Free Environment  Recent Flowsheet Documentation  Taken 10/15/2022 1300 by Maile Montesinos RN  Safety Promotion/Fall Prevention: activity supervised    VSS, denies pain , good food and fluid intake. Ate 75% for break fast and lunch. Up in chair most of the shift, tolerated well. Up on bed side commode X 1. Will continue monitoring.

## 2022-10-15 NOTE — PLAN OF CARE
7 PM to 7 AM  RT PROGRESS NOTE     DATA:     CURRENT SETTINGS:             TRACH TYPE / SIZE:  7 Bivona placed 10/3/22             MODE:   Capped/ 1 LPM NC     ACTION:             THERAPIES:   BID Albuterol neb given             SUCTION:                           FREQUENCY:   None this shift                        AMOUNT:                           CONSISTENCY:                           COLOR:                SPONTANEOUS COUGH EFFORT/STRENGTH OF EFFORT (not elicited by suctioning): Good                              WEANING PHASE:   3, since 10/10/22 @ 0950                        WEAN MODE:                            WEAN TIME:                           END WEAN REASON:        RESPONSE:             BS:   Dim             VITAL SIGNS:   SATs 94%, HR 67, RR 20             RISK FOR SELF DECANNULATION:  No     NOTE / PLAN:   Continue with same       Problem: Device-Related Complication Risk (Mechanical Ventilation, Invasive)  Goal: Optimal Device Function  Intervention: Optimize Device Care and Function  Recent Flowsheet Documentation  Taken 10/13/2022 0115 by Erna Batista, RT  Airway Safety Measures:    all equipment/monitors on and audible    manual resuscitator/mask/valve in room     Problem: Communication Impairment (Artificial Airway)  Goal: Effective Communication  Outcome: Progressing     Problem: Device-Related Complication Risk (Artificial Airway)  Goal: Optimal Device Function  Outcome: Progressing  Intervention: Optimize Device Care and Function  Recent Flowsheet Documentation  Taken 10/13/2022 0115 by Erna Batista, RT  Airway Safety Measures:    all equipment/monitors on and audible    manual resuscitator/mask/valve in room

## 2022-10-15 NOTE — PLAN OF CARE
"Goal Outcome Evaluation: A&Ox4, forgetful and [place].Pleasant, cooperative. C/o trach site pain rated 'about 5 or 6\" verbalized good relief with 5mg Oxycodone per Feeding tube. Able to use call light and verbalize needs. Continue to monitor.                        "

## 2022-10-15 NOTE — PLAN OF CARE
"  Problem: Plan of Care - These are the overarching goals to be used throughout the patient stay.    Goal: Patient-Specific Goal (Individualized)  Description: You can add care plan individualizations to a care plan. Examples of Individualization might be:  \"Parent requests to be called daily at 9am for status\", \"I have a hard time hearing out of my right ear\", or \"Do not touch me to wake me up as it startles me\".  Outcome: Progressing  Goal: Optimal Comfort and Wellbeing  Outcome: Progressing  Intervention: Provide Person-Centered Care  Recent Flowsheet Documentation  Taken 10/14/2022 1800 by Pam Lin RN  Trust Relationship/Rapport:   care explained   emotional support provided   thoughts/feelings acknowledged   reassurance provided     Problem: Communication Impairment (Artificial Airway)  Goal: Effective Communication  Outcome: Progressing  Intervention: Ensure Effective Communication  Recent Flowsheet Documentation  Taken 10/14/2022 1800 by Pam Lin RN  Communication Enhancement Strategies: (Hearing device with amplifier)   other (see comments)   communication device used  Family/Support System Care: involvement promoted  Trust Relationship/Rapport:   care explained   emotional support provided   thoughts/feelings acknowledged   reassurance provided     Problem: Device-Related Complication Risk (Artificial Airway)  Goal: Optimal Device Function  Outcome: Progressing  Intervention: Optimize Device Care and Function  Recent Flowsheet Documentation  Taken 10/14/2022 1800 by Pam Lin RN  Airway Safety Measures: all equipment/monitors on and audible  Aspiration Precautions: awake/alert before oral intake     Problem: Fall Injury Risk  Goal: Absence of Fall and Fall-Related Injury  Outcome: Progressing  Intervention: Identify and Manage Contributors  Recent Flowsheet Documentation  Taken 10/14/2022 1800 by Pam Lin RN  Medication Review/Management: medications " reviewed  Intervention: Promote Injury-Free Environment  Recent Flowsheet Documentation  Taken 10/14/2022 1800 by Pam Lin RN  Safety Promotion/Fall Prevention:   activity supervised   bed alarm on   clutter free environment maintained   fall prevention program maintained   lighting adjusted   room near nurse's station   room door open     Goal Outcome Evaluation:       Patient's vital signs were stable this evening. His trache was capped and tolerating that well with humidified O2 support per nasal cannula at 1 LPM. He denied pains. His Blood glucose readings  this shift were  87 before supper and 97 mm Hg at bedtime.  He ate 50% of supper and drank moderate amounts of fluids this shift. No BM was noted. Primofit device was intact and draining clear yogesh urine output. Will keep monitoring patient's progress and safety.

## 2022-10-16 LAB
GLUCOSE BLDC GLUCOMTR-MCNC: 112 MG/DL (ref 70–99)
GLUCOSE BLDC GLUCOMTR-MCNC: 86 MG/DL (ref 70–99)

## 2022-10-16 PROCEDURE — 999N000009 HC STATISTIC AIRWAY CARE

## 2022-10-16 PROCEDURE — 250N000009 HC RX 250: Performed by: NURSE PRACTITIONER

## 2022-10-16 PROCEDURE — 999N000123 HC STATISTIC OXYGEN O2DAILY TECH TIME

## 2022-10-16 PROCEDURE — 250N000011 HC RX IP 250 OP 636: Performed by: HOSPITALIST

## 2022-10-16 PROCEDURE — 250N000013 HC RX MED GY IP 250 OP 250 PS 637: Performed by: INTERNAL MEDICINE

## 2022-10-16 PROCEDURE — 94640 AIRWAY INHALATION TREATMENT: CPT

## 2022-10-16 PROCEDURE — 94640 AIRWAY INHALATION TREATMENT: CPT | Mod: 76

## 2022-10-16 PROCEDURE — 250N000013 HC RX MED GY IP 250 OP 250 PS 637: Performed by: HOSPITALIST

## 2022-10-16 PROCEDURE — 120N000017 HC R&B RESPIRATORY CARE

## 2022-10-16 PROCEDURE — 99232 SBSQ HOSP IP/OBS MODERATE 35: CPT | Performed by: INTERNAL MEDICINE

## 2022-10-16 PROCEDURE — 250N000013 HC RX MED GY IP 250 OP 250 PS 637: Performed by: NURSE PRACTITIONER

## 2022-10-16 PROCEDURE — 999N000157 HC STATISTIC RCP TIME EA 10 MIN

## 2022-10-16 RX ADMIN — SENNOSIDES 1 TABLET: 8.6 TABLET, FILM COATED ORAL at 20:41

## 2022-10-16 RX ADMIN — ALBUTEROL SULFATE 2.5 MG: 2.5 SOLUTION RESPIRATORY (INHALATION) at 07:13

## 2022-10-16 RX ADMIN — Medication 40 MG: at 06:44

## 2022-10-16 RX ADMIN — LACTULOSE 20 G: 20 SOLUTION ORAL at 09:00

## 2022-10-16 RX ADMIN — SENNOSIDES 1 TABLET: 8.6 TABLET, FILM COATED ORAL at 09:00

## 2022-10-16 RX ADMIN — LEVOTHYROXINE SODIUM 112 MCG: 0.11 TABLET ORAL at 06:44

## 2022-10-16 RX ADMIN — ALBUTEROL SULFATE 2.5 MG: 2.5 SOLUTION RESPIRATORY (INHALATION) at 19:39

## 2022-10-16 RX ADMIN — Medication 2 MG: at 09:00

## 2022-10-16 RX ADMIN — ACETAMINOPHEN 1000 MG: 500 TABLET ORAL at 20:45

## 2022-10-16 RX ADMIN — BUMETANIDE 0.5 MG: 0.5 TABLET ORAL at 09:00

## 2022-10-16 RX ADMIN — Medication 25 MCG: at 09:00

## 2022-10-16 RX ADMIN — ENOXAPARIN SODIUM 40 MG: 100 INJECTION SUBCUTANEOUS at 18:09

## 2022-10-16 RX ADMIN — QUETIAPINE 25 MG: 25 TABLET, FILM COATED ORAL at 20:42

## 2022-10-16 RX ADMIN — CALCIUM 500 MG: 500 TABLET ORAL at 16:41

## 2022-10-16 RX ADMIN — CALCIUM 500 MG: 500 TABLET ORAL at 11:51

## 2022-10-16 RX ADMIN — LACTULOSE 20 G: 20 SOLUTION ORAL at 20:40

## 2022-10-16 RX ADMIN — HYDRALAZINE HYDROCHLORIDE 5 MG: 20 INJECTION INTRAMUSCULAR; INTRAVENOUS at 17:08

## 2022-10-16 RX ADMIN — MULTIPLE VITAMINS W/ MINERALS TAB 1 TABLET: TAB at 11:51

## 2022-10-16 ASSESSMENT — ACTIVITIES OF DAILY LIVING (ADL)
ADLS_ACUITY_SCORE: 72
ADLS_ACUITY_SCORE: 70
ADLS_ACUITY_SCORE: 72
ADLS_ACUITY_SCORE: 72
ADLS_ACUITY_SCORE: 70
ADLS_ACUITY_SCORE: 72
ADLS_ACUITY_SCORE: 72
ADLS_ACUITY_SCORE: 70

## 2022-10-16 NOTE — PROGRESS NOTES
[facility]    Medicine Progress Note - Hospitalist Service    Date of Admission:  9/22/2022  Brief Hospital Course Summary:    Alexandru Still is a 89 year old man w/ PMH of Lambert-Eaton syndrome, hypothyroidism,  complete heart block s/p pacemaker, HTN, BPH,  PB, and DM type 2 who was admitted to the SICU on 9/15/2022 following an unwitnessed fall and striking his head. He was initially seen at an outside hospital and underwent a comprehensive trauma work up found to have a left frontal SDH (5mm) + C1 fx + Type II C2 odontoid fracture with a 1cm posterior displacement. He was  intubated for airway protection in the ED given high c-spine injury and difficulty managing his secretions. On admission he requested all cares without surgical cervical spine stabilization, agreeable to a tracheostomy and PEG. Multiple care conferences held with his family who confirmed his wishes.     LTACH course:    9/24-9/26:  Speech evaluated patient and recommends NPO, cont tube feeds via PEG due to severe oropharyngeal dysphasia.  PT/OT, dietitian, wo nurse saw pt on 9/23.  Pulled out G tube during night of 9/23. Pt now on soft wrist restraints.  IVF changed to D51/2 NS at low rate as pt has TAVR.  Morphine IV ordered prn pain.    Spoke w/ Dr. Brown- IR - recommends no hannah tube as this is a brand new PEG and placing it blind will likely lead it to not be in correct position.  He will not do a PEG placement on the weekend, scheduled for Monday.  Pt cannot be fed via NG tube as it is contraindicated with C1 and C2 fx.  For PEG tube replacement (herpain on hold, place ICD)on 9/26.  Will discontinue IVF once PEG tube is placed and restart po meds.  Given NaPhos 9/26 9/27-10/3:  9/27 patient had PEG tube replaced after patient himself removed it on 9/23.  Was a started on tube feeding.  P.O. medication were restarted.  IV fluids were discontinued.  He still needs soft restraint x2.  Is off ventilator and stable.  9/28 Failed blue  dye test, Continue NPO   9/30 patient had [place] J 300 collar pads delivered  10/1 SLP found patient well below baseline for swallowing, cognition and communication.  Patient stays n.p.o. except ice chips    10/4/22 -/10/8:   -No acute events, Vent weaning phase 2. Continue diuresis with bumex. Continue plan of care. Cognitive testing PENDING. May need neuropsych evaluation depending on cognitive evaluation.     10/9-10/10: he follows commands, cont [place] J.  He does better with glasses and hearing device on.  He has neck pain and headache.    10/11 no bm since 10/7 despite med's ,will add lactulose   10/12: no new issues reported today ,mental status unchanged  10/13 : no c/o except he is not happy   10/14: speech may do viseoscopic swallw study this am  And oximetry on Myron   10/15 Apparently failed swallow study yesterday, oximetry study in am  10/16; GLUCOSE CONSISTENTLY <110 ,WILL DISCONTINUE SLIDING SCALE INSULIN   Assessment & Plan        Traumatic 5mm left frontal lobe SDH  Posteriorly displaced (1 cm) Type II odontoid fx  C1 anterior arch fracture   Epidural hemorrhage of 7mm  Acute traumatic neck pain  Acute L 6th rib fracture  S/p fall, Facial abrasions with ecchymoses  RLE wound  He was seen and evaluated by Neurosurgery.  Pt refused surgical intervention.   A repeat head CT was obtained with a stable subdural hematoma. He was placed in a cervical collar for the cervical fracture. No surgical intervention per patient preference.  -Cont [place] J -keep in place at all times  -Tylenol, oxy prn for pain, changed tylenol to extra strength  -Neurosurg ok lovenox for DVT ppx  -Wound care following   -f/u Neurosurgery in 6 weeks with an upright XR of the cervical spine (November PENDING appointment)  -CT cervical spine in 3 months (first week of December).   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 Failed bedside and radiology swallow. He completed 3 doses of Decadron for laryngeal edema.   -S/P tracheostomy 09/21/2022, Trach suture removal on 09/26  -Pulm and RT following  -Wean vent as per pulm , on phase 2 currently  -Bronchial hygeine w/ albuterol/mucomyst. Avoiding metaneb for now given CSI with cervical collar in place   -Gentle diuresis with bumex      Hypertension   Complete heart block s/p PM placement  Nonrheumatic aortic valve stenosis s/p TAVR 2019  -Monitor  -Pt is currently normotensive , on bumex and cardura  -Echo on 9/16:  EF: 60-65%, no LV dysfunction     Severe orophayngeal dysphagia   S/P PEG   Severe protein calorie malnutrition  Hiatal hernia  -PEG tube placed 09/20. Pt pulled out PEG tube on night of 9/23, replaced on 9/27  -On TF : osmolite 1.5 and 60 ml/hr   -RD following  -S/p VFSS 10/6/22: abnormal but no aspiration, recommend pureed food textures and moderately thick liquids     Hypophosphatemia  Hypomagnesia  Hypokalemia  Hyponatremia  -Monitor and manage as needed     Hypothyroidism   -Continue Levothyroxine     Anemia of chronic disease  -Hg trending down past few days  -Cont to monitor intermittently.     BPH  -Cont doxazosin     DM type 2  -Sliding scale insulin  -Last HbA1:  6.2  -Hypoglycemic protocol     Deconditioning  -PT/OT following           Diet: Pureed Diet (level 4) Liquidized/Moderately Thick (level 3)  Snacks/Supplements Adult: Magic Cup; Between Meals    DVT Prophylaxis: Lovenox  Escudero Catheter: Not present  Central  Lines: PRESENT       Cardiac Monitoring: None  Code Status: Full Code      Disposition Plan     Discharge date: TBD, to TCU on discharge  Barriers: Placement     The patient's care was discussed with patient, RN, SW/CM    Berry Porter MD  Hospitalist Service  LTACH  Securely message with the Vocera Web Console (learn more here)  Text page via [facility] Paging/Directory           ______________________________________________________________________    Interval History    Chart reviewed and events noted.  Patient seen and examined.     No acute events overnight.    He reports that he is having neck pain and headache 5/10 in severity.  he has no dyspnea, no chest pain, no nausea or vomiting.     ROS:  A 10-system review was obtained and is negative with the exception of the symptoms noted above    Data reviewed today: I reviewed all medications, new labs and imaging results over the last 24 hours.     Physical Exam   Vital Signs: Temp: 98.7  F (37.1  C) Temp src: Oral BP: (!) 142/63 Pulse: 64   Resp: 18 SpO2: 94 % O2 Device: None (Room air) Oxygen Delivery: 1 LPM  Weight: 183 lbs 6.4 oz    General:  No acute distress,awake and alert  HEENT: [place] J in place, trach in place  Lungs:  Clear to auscultation bilaterally  CVS:  S1 and S2, RRR  Abdomen:  Soft, nontender, PEG in place and abdominal binder in place.  Musculoskeletal:  No edema  Neuro: Awake, alert, oriented, speech+, following commands.   Skin:  please refer to nursing documentation for full skin assessment    Data   Recent Labs   Lab 10/16/22  1155 10/16/22  0837 10/15/22  1741 10/10/22  0804 10/10/22  0632   WBC  --   --   --   --  7.1   HGB  --   --   --   --  9.8*   MCV  --   --   --   --  93   PLT  --   --   --   --  335   NA  --   --   --   --  133*   POTASSIUM  --   --   --   --  4.2   CHLORIDE  --   --   --   --  96*   CO2  --   --   --   --  27   BUN  --   --   --   --  23.3*   CR  --   --   --   --  0.75   ANIONGAP  --   --   --   --  10   TITA  --    --   --   --  8.7   * 86 102*   < > 90    < > = values in this interval not displayed.     No results found for this or any previous visit (from the past 24 hour(s)).  Medications     - MEDICATION INSTRUCTIONS -         albuterol  2.5 mg Nebulization 2 times daily     bumetanide  0.5 mg Oral Daily     calcium carbonate  500 mg Oral BID w/meals     doxazosin  2 mg Oral or Feeding Tube Daily     [START ON 10/20/2022] doxazosin  2 mg Oral Daily     enoxaparin ANTICOAGULANT  40 mg Subcutaneous Q24H     lactulose  20 g Oral BID     levothyroxine  112 mcg Oral QAM AC     multivitamin w/minerals  1 tablet Oral Daily     pantoprazole  40 mg Oral or Feeding Tube QAM AC     sennosides  1 tablet Oral BID     sodium chloride (PF)  10 mL Intracatheter Q8H     cholecalciferol  25 mcg Oral Daily

## 2022-10-16 NOTE — PLAN OF CARE
Problem: Communication Impairment (Artificial Airway)  Goal: Effective Communication  Outcome: Progressing     Problem: Device-Related Complication Risk (Artificial Airway)  Goal: Optimal Device Function  Outcome: Progressing  Intervention: Optimize Device Care and Function    all equipment/monitors on and audible    manual resuscitator/mask/valve in room     RT PROGRESS NOTE     DATA:     CURRENT SETTINGS:             TRACH TYPE / SIZE:  #7 Bivona changed 10/3/2022             MODE:   Trach capped.              FIO2:  1L-NC     ACTION:             THERAPIES:   Albuterol             SUCTION:                           FREQUENCY:   none                        AMOUNT:                          CONSISTENCY:                          COLOR:              SPONTANEOUS COUGH EFFORT/STRENGTH OF EFFORT (not elicited by suctioning): fair                              WEANING PHASE:   3                        WEAN MODE:    Trach capped/nc                        WEAN TIME:  since 10/10                        END WEAN REASON:  ongoing      RESPONSE:             BS:   clear and diminished             VITAL SIGNS: BP (!) 140/92 (BP Location: Right arm)   Pulse 68   Temp 97.8  F (36.6  C) (Oral)   Resp 20   Wt 83.2 kg (183 lb 6.4 oz)   SpO2 94%   BMI 27.89 kg/m                     EMOTIONAL NEEDS / CONCERNS:  no                RISK FOR SELF DECANNULATION:  no                             NOTE / PLAN: Continue current POC

## 2022-10-16 NOTE — PLAN OF CARE
Problem: Communication Impairment (Artificial Airway)  Goal: Effective Communication  Outcome: Progressing  Intervention: Ensure Effective Communication  Recent Flowsheet Documentation  Taken 10/15/2022 1810 by Pam Lin RN  Communication Enhancement Strategies: (Pocket talker) --  Family/Support System Care: involvement promoted  Trust Relationship/Rapport:   care explained   reassurance provided     Problem: Device-Related Complication Risk (Artificial Airway)  Goal: Optimal Device Function  Outcome: Progressing  Intervention: Optimize Device Care and Function  Recent Flowsheet Documentation  Taken 10/15/2022 1810 by Pam Lin RN  Airway Safety Measures: all equipment/monitors on and audible  Aspiration Precautions: awake/alert before oral intake  Airway/Ventilation Management: airway patency maintained     Problem: Plan of Care - These are the overarching goals to be used throughout the patient stay.    Goal: Optimal Comfort and Wellbeing  Intervention: Provide Person-Centered Care  Recent Flowsheet Documentation  Taken 10/15/2022 1810 by Pam Lin RN  Trust Relationship/Rapport:   care explained   reassurance provided    Goal Outcome Evaluation:         Patient's trache is capped continuously  with NC at 1 LPM.  He is alert and oriented but can be forgetful.  His  blood pressure was  179/79  at the beginning of the shift. It was not covered by PRN, but recheck at bedtime was  133/92 HR = 87/minute. He is on scheduled Doxazocin and Bumex during the day. He ate 25% of supper. Blood sugar before dinner was  102 mg/dL and drank moderate amounts of fluids. He is also on scheduled water flushes/ GT. Will keep monitoring patient's progress and safety.

## 2022-10-16 NOTE — PLAN OF CARE
Problem: Confusion Chronic  Goal: Optimal Cognitive Function  Outcome: Progressing  Intervention: Minimize Injury Risk and Provide Safety  Recent Flowsheet Documentation  Taken 10/16/2022 1316 by Maile Montesinos RN  Enhanced Safety Measures: bed alarm set  Intervention: Minimize and Manage Confusion  Recent Flowsheet Documentation  Taken 10/16/2022 1316 by Maile Montesinos RN  Sensory Stimulation Regulation: television on  Reorientation Measures: reorientation provided  Family/Support System Care: presence promoted  Environmental Support: calm environment promoted   Pt VSS, denies pain , up in chair most of the day. Good food and fluid intake. Blood sugar 86 &112 before break fast and lunch. Pt appeared comfortable, will continue monitoring.

## 2022-10-16 NOTE — PLAN OF CARE
Problem: Device-Related Complication Risk (Artificial Airway)  Goal: Optimal Device Function  Outcome: Not Progressing     Problem: Communication Impairment (Artificial Airway)  Goal: Effective Communication  Outcome: Progressing   Goal Outcome Evaluation:             RT PROGRESS NOTE     DATA:     CURRENT SETTINGS:             TRACH TYPE / SIZE:  #7 bivona             MODE:   capped, RA while awake, 1lnc while asleep             ACTION:             THERAPIES:   albuterol bid             SUCTION:                           FREQUENCY:   none required                                                                             SPONTANEOUS COUGH EFFORT/STRENGTH OF EFFORT (not elicited by suctioning): moderate to strong                              WEANING PHASE:   3                        WEAN MODE:    capped continuous                                           RESPONSE:             BS:   clear and diminished             VITAL SIGNS:   Blood pressure (!) 142/63, pulse 61, temperature 98.7  F (37.1  C), temperature source Oral, resp. rate 22, weight 83.2 kg (183 lb 6.4 oz), SpO2 95 %.                 EMOTIONAL NEEDS / CONCERNS:  no                RISK FOR SELF DECANNULATION:  no                                            NOTE / PLAN:   overnight oximeter tonight, VBG in the morning.

## 2022-10-17 ENCOUNTER — APPOINTMENT (OUTPATIENT)
Dept: PHYSICAL THERAPY | Facility: CLINIC | Age: 87
DRG: 208 | End: 2022-10-17
Attending: INTERNAL MEDICINE
Payer: MEDICARE

## 2022-10-17 ENCOUNTER — APPOINTMENT (OUTPATIENT)
Dept: SPEECH THERAPY | Facility: CLINIC | Age: 87
DRG: 208 | End: 2022-10-17
Attending: INTERNAL MEDICINE
Payer: MEDICARE

## 2022-10-17 ENCOUNTER — APPOINTMENT (OUTPATIENT)
Dept: OCCUPATIONAL THERAPY | Facility: CLINIC | Age: 87
DRG: 208 | End: 2022-10-17
Attending: INTERNAL MEDICINE
Payer: MEDICARE

## 2022-10-17 LAB
ANION GAP SERPL CALCULATED.3IONS-SCNC: 6 MMOL/L (ref 7–15)
BASE EXCESS BLDV CALC-SCNC: 9.3 MMOL/L (ref -8.1–1.9)
BASOPHILS # BLD AUTO: 0 10E3/UL (ref 0–0.2)
BASOPHILS NFR BLD AUTO: 1 %
BUN SERPL-MCNC: 12 MG/DL (ref 8–23)
CA-I BLD-MCNC: 1.18 MMOL/L (ref 1.11–1.3)
CALCIUM SERPL-MCNC: 8.7 MG/DL (ref 8.2–9.6)
CHLORIDE SERPL-SCNC: 97 MMOL/L (ref 98–107)
CREAT SERPL-MCNC: 0.74 MG/DL (ref 0.67–1.17)
DEPRECATED HCO3 PLAS-SCNC: 30 MMOL/L (ref 22–29)
EOSINOPHIL # BLD AUTO: 0.2 10E3/UL (ref 0–0.7)
EOSINOPHIL NFR BLD AUTO: 3 %
ERYTHROCYTE [DISTWIDTH] IN BLOOD BY AUTOMATED COUNT: 14.7 % (ref 10–15)
GFR SERPL CREATININE-BSD FRML MDRD: 85 ML/MIN/1.73M2
GLUCOSE BLD-MCNC: 84 MG/DL (ref 70–125)
GLUCOSE BLDC GLUCOMTR-MCNC: 112 MG/DL (ref 70–99)
GLUCOSE SERPL-MCNC: 87 MG/DL (ref 70–99)
HCO3 BLDV-SCNC: 32 MMOL/L (ref 24–30)
HCT VFR BLD AUTO: 30.4 % (ref 40–53)
HGB BLD-MCNC: 10 G/DL (ref 13.3–17.7)
HGB BLD-MCNC: 10.5 G/DL (ref 13.3–17.7)
IMM GRANULOCYTES # BLD: 0 10E3/UL
IMM GRANULOCYTES NFR BLD: 1 %
LACTATE BLD-SCNC: 0.9 MMOL/L (ref 0.7–2)
LYMPHOCYTES # BLD AUTO: 1 10E3/UL (ref 0.8–5.3)
LYMPHOCYTES NFR BLD AUTO: 12 %
MAGNESIUM SERPL-MCNC: 2.1 MG/DL (ref 1.7–2.3)
MCH RBC QN AUTO: 30.1 PG (ref 26.5–33)
MCHC RBC AUTO-ENTMCNC: 32.9 G/DL (ref 31.5–36.5)
MCV RBC AUTO: 92 FL (ref 78–100)
MONOCYTES # BLD AUTO: 1.2 10E3/UL (ref 0–1.3)
MONOCYTES NFR BLD AUTO: 13 %
NEUTROPHILS # BLD AUTO: 6.2 10E3/UL (ref 1.6–8.3)
NEUTROPHILS NFR BLD AUTO: 70 %
NRBC # BLD AUTO: 0 10E3/UL
NRBC BLD AUTO-RTO: 0 /100
PCO2 BLDV: 44 MM HG (ref 35–50)
PH BLDV: 7.48 [PH] (ref 7.35–7.45)
PHOSPHATE SERPL-MCNC: 3.1 MG/DL (ref 2.5–4.5)
PLATELET # BLD AUTO: 262 10E3/UL (ref 150–450)
PO2 BLDV: 40 MM HG (ref 25–47)
POTASSIUM BLD-SCNC: 3.7 MMOL/L (ref 3.5–5)
POTASSIUM SERPL-SCNC: 3.9 MMOL/L (ref 3.4–5.3)
RBC # BLD AUTO: 3.32 10E6/UL (ref 4.4–5.9)
SATV LHE POCT: 76.5 % (ref 70–75)
SODIUM BLD-SCNC: 137 MMOL/L (ref 136–145)
SODIUM SERPL-SCNC: 133 MMOL/L (ref 136–145)
WBC # BLD AUTO: 8.7 10E3/UL (ref 4–11)

## 2022-10-17 PROCEDURE — 250N000011 HC RX IP 250 OP 636: Performed by: HOSPITALIST

## 2022-10-17 PROCEDURE — 99232 SBSQ HOSP IP/OBS MODERATE 35: CPT | Performed by: INTERNAL MEDICINE

## 2022-10-17 PROCEDURE — 999N000259 HC STATISTIC EXTUBATION

## 2022-10-17 PROCEDURE — 97110 THERAPEUTIC EXERCISES: CPT | Mod: GO

## 2022-10-17 PROCEDURE — 250N000013 HC RX MED GY IP 250 OP 250 PS 637: Performed by: HOSPITALIST

## 2022-10-17 PROCEDURE — 83735 ASSAY OF MAGNESIUM: CPT | Performed by: HOSPITALIST

## 2022-10-17 PROCEDURE — 36415 COLL VENOUS BLD VENIPUNCTURE: CPT | Performed by: HOSPITALIST

## 2022-10-17 PROCEDURE — 82803 BLOOD GASES ANY COMBINATION: CPT

## 2022-10-17 PROCEDURE — 999N000157 HC STATISTIC RCP TIME EA 10 MIN

## 2022-10-17 PROCEDURE — 999N000009 HC STATISTIC AIRWAY CARE

## 2022-10-17 PROCEDURE — 82330 ASSAY OF CALCIUM: CPT

## 2022-10-17 PROCEDURE — 85025 COMPLETE CBC W/AUTO DIFF WBC: CPT | Performed by: HOSPITALIST

## 2022-10-17 PROCEDURE — 97535 SELF CARE MNGMENT TRAINING: CPT | Mod: GO

## 2022-10-17 PROCEDURE — 84100 ASSAY OF PHOSPHORUS: CPT | Performed by: HOSPITALIST

## 2022-10-17 PROCEDURE — 250N000013 HC RX MED GY IP 250 OP 250 PS 637: Performed by: INTERNAL MEDICINE

## 2022-10-17 PROCEDURE — 97116 GAIT TRAINING THERAPY: CPT | Mod: GP | Performed by: PHYSICAL THERAPIST

## 2022-10-17 PROCEDURE — 84132 ASSAY OF SERUM POTASSIUM: CPT | Performed by: HOSPITALIST

## 2022-10-17 PROCEDURE — 92507 TX SP LANG VOICE COMM INDIV: CPT | Mod: GN

## 2022-10-17 PROCEDURE — 120N000017 HC R&B RESPIRATORY CARE

## 2022-10-17 PROCEDURE — 92526 ORAL FUNCTION THERAPY: CPT | Mod: GN

## 2022-10-17 PROCEDURE — 97530 THERAPEUTIC ACTIVITIES: CPT | Mod: GP | Performed by: PHYSICAL THERAPIST

## 2022-10-17 PROCEDURE — 999N000123 HC STATISTIC OXYGEN O2DAILY TECH TIME

## 2022-10-17 PROCEDURE — 99207 PR APP CREDIT; MD BILLING SHARED VISIT: CPT | Mod: FS | Performed by: NURSE PRACTITIONER

## 2022-10-17 PROCEDURE — 250N000013 HC RX MED GY IP 250 OP 250 PS 637: Performed by: NURSE PRACTITIONER

## 2022-10-17 PROCEDURE — 0BP1XFZ REMOVAL OF TRACHEOSTOMY DEVICE FROM TRACHEA, EXTERNAL APPROACH: ICD-10-PCS | Performed by: NURSE PRACTITIONER

## 2022-10-17 RX ORDER — ALBUTEROL SULFATE 0.83 MG/ML
2.5 SOLUTION RESPIRATORY (INHALATION) EVERY 4 HOURS PRN
Status: DISCONTINUED | OUTPATIENT
Start: 2022-10-17 | End: 2022-10-28 | Stop reason: HOSPADM

## 2022-10-17 RX ADMIN — LACTULOSE 20 G: 20 SOLUTION ORAL at 20:55

## 2022-10-17 RX ADMIN — BUMETANIDE 0.5 MG: 0.5 TABLET ORAL at 08:55

## 2022-10-17 RX ADMIN — SENNOSIDES 1 TABLET: 8.6 TABLET, FILM COATED ORAL at 20:55

## 2022-10-17 RX ADMIN — SENNOSIDES 1 TABLET: 8.6 TABLET, FILM COATED ORAL at 08:55

## 2022-10-17 RX ADMIN — OXYCODONE HYDROCHLORIDE 5 MG: 5 TABLET ORAL at 01:47

## 2022-10-17 RX ADMIN — ENOXAPARIN SODIUM 40 MG: 100 INJECTION SUBCUTANEOUS at 18:04

## 2022-10-17 RX ADMIN — OXYCODONE HYDROCHLORIDE 5 MG: 5 TABLET ORAL at 09:01

## 2022-10-17 RX ADMIN — CALCIUM 500 MG: 500 TABLET ORAL at 16:25

## 2022-10-17 RX ADMIN — OXYCODONE HYDROCHLORIDE 5 MG: 5 TABLET ORAL at 20:55

## 2022-10-17 RX ADMIN — LACTULOSE 20 G: 20 SOLUTION ORAL at 08:55

## 2022-10-17 RX ADMIN — Medication 2 MG: at 08:56

## 2022-10-17 RX ADMIN — Medication 25 MCG: at 08:55

## 2022-10-17 RX ADMIN — LEVOTHYROXINE SODIUM 112 MCG: 0.11 TABLET ORAL at 06:44

## 2022-10-17 RX ADMIN — MULTIPLE VITAMINS W/ MINERALS TAB 1 TABLET: TAB at 11:44

## 2022-10-17 RX ADMIN — CALCIUM 500 MG: 500 TABLET ORAL at 11:44

## 2022-10-17 RX ADMIN — Medication 40 MG: at 06:44

## 2022-10-17 ASSESSMENT — ACTIVITIES OF DAILY LIVING (ADL)
ADLS_ACUITY_SCORE: 76

## 2022-10-17 NOTE — PLAN OF CARE
Pt was decannulated after long successful Capping. Sats 90-94% on RA, BS clr to decr. Nebs were changed to PRN      Cont Ox for 48hr, then re-eval     Cont to monitor

## 2022-10-17 NOTE — PLAN OF CARE
Problem: Plan of Care - These are the overarching goals to be used throughout the patient stay.    Goal: Absence of Hospital-Acquired Illness or Injury  Outcome: Progressing  Intervention: Identify and Manage Fall Risk  Recent Flowsheet Documentation  Taken 10/17/2022 1100 by Silke Andres RN  Safety Promotion/Fall Prevention: bed alarm on  Intervention: Prevent Infection  Recent Flowsheet Documentation  Taken 10/17/2022 1100 by Silke Andres RN  Infection Prevention: rest/sleep promoted     Problem: Plan of Care - These are the overarching goals to be used throughout the patient stay.    Goal: Optimal Comfort and Wellbeing  Outcome: Progressing  Intervention: Monitor Pain and Promote Comfort  Recent Flowsheet Documentation  Taken 10/17/2022 0911 by Silke Andres RN  Pain Management Interventions: medication (see MAR)  Intervention: Provide Person-Centered Care  Recent Flowsheet Documentation  Taken 10/17/2022 1100 by Silke Andres RN  Trust Relationship/Rapport: care explained     Problem: Pain Acute  Goal: Acceptable Pain Control and Functional Ability  Intervention: Develop Pain Management Plan  Recent Flowsheet Documentation  Taken 10/17/2022 0911 by Silke Andres RN  Pain Management Interventions: medication (see MAR)  Intervention: Prevent or Manage Pain  Recent Flowsheet Documentation  Taken 10/17/2022 1100 by Silke Andres RN  Sensory Stimulation Regulation: television on  Bowel Elimination Promotion: adequate fluid intake promoted  Medication Review/Management: medications reviewed  Intervention: Optimize Psychosocial Wellbeing  Recent Flowsheet Documentation  Taken 10/17/2022 1100 by Silke Andres RN  Supportive Measures: active listening utilized   Goal Outcome Evaluation:       Pt complain of left sided upper back pain.  Pt repositioned and PRN given with effect.  Pt able to rest while up in chair late morning.

## 2022-10-17 NOTE — PLAN OF CARE
Problem: Hypertension Acute  Goal: Blood Pressure Within Desired Range  Outcome: Progressing  Intervention: Normalize Blood Pressure  Recent Flowsheet Documentation  Taken 10/17/2022 0212 by Sandra Chilel RN  Medication Review/Management: medications reviewed     Problem: Anxiety  Goal: Anxiety Reduction or Resolution  Outcome: Progressing   Goal Outcome Evaluation:    Patient appeared to rest well most of shift, prn adm for pain to left arm, med effective aeb no further complaints, b/p noted as elevated after aide obtained, this Writer's reassessment yielded a lower b/p, see flowsheet, j-collar to neck in place, will continue to monitor.

## 2022-10-17 NOTE — PROGRESS NOTES
[facility]    Medicine Progress Note - Hospitalist Service    Date of Admission:  9/22/2022  Brief Hospital Course Summary:    Alexandru Still is a 89 year old man w/ PMH of Lambert-Eaton syndrome, hypothyroidism,  complete heart block s/p pacemaker, HTN, BPH,  PB, and DM type 2 who was admitted to the SICU on 9/15/2022 following an unwitnessed fall and striking his head. He was initially seen at an outside hospital and underwent a comprehensive trauma work up found to have a left frontal SDH (5mm) + C1 fx + Type II C2 odontoid fracture with a 1cm posterior displacement. He was  intubated for airway protection in the ED given high c-spine injury and difficulty managing his secretions. On admission he requested all cares without surgical cervical spine stabilization, agreeable to a tracheostomy and PEG. Multiple care conferences held with his family who confirmed his wishes.     LTACH course:    9/24-9/26:  Speech evaluated patient and recommends NPO, cont tube feeds via PEG due to severe oropharyngeal dysphasia.  PT/OT, dietitian, wo nurse saw pt on 9/23.  Pulled out G tube during night of 9/23. Pt now on soft wrist restraints.  IVF changed to D51/2 NS at low rate as pt has TAVR.  Morphine IV ordered prn pain.    Spoke w/ Dr. Brown- IR - recommends no hannah tube as this is a brand new PEG and placing it blind will likely lead it to not be in correct position.  He will not do a PEG placement on the weekend, scheduled for Monday.  Pt cannot be fed via NG tube as it is contraindicated with C1 and C2 fx.  For PEG tube replacement (herpain on hold, place ICD)on 9/26.  Will discontinue IVF once PEG tube is placed and restart po meds.  Given NaPhos 9/26 9/27-10/3:  9/27 patient had PEG tube replaced after patient himself removed it on 9/23.  Was a started on tube feeding.  P.O. medication were restarted.  IV fluids were discontinued.  He still needs soft restraint x2.  Is off ventilator and stable.  9/28 Failed blue  dye test, Continue NPO   9/30 patient had [place] J 300 collar pads delivered  10/1 SLP found patient well below baseline for swallowing, cognition and communication.  Patient stays n.p.o. except ice chips    10/4/22 -/10/8:   -No acute events, Vent weaning phase 2. Continue diuresis with bumex. Continue plan of care. Cognitive testing PENDING. May need neuropsych evaluation depending on cognitive evaluation.     10/9-10/10: he follows commands, cont [place] J.  He does better with glasses and hearing device on.  He has neck pain and headache.    10/11 no bm since 10/7 despite med's ,will add lactulose   10/12: no new issues reported today ,mental status unchanged  10/13 : no c/o except he is not happy   10/14: speech may do viseoscopic swallw study this am  And oximetry on Myron   10/15 Apparently failed swallow study yesterday, oximetry study in am  10/16; GLUCOSE CONSISTENTLY <110 ,WILL DISCONTINUE SLIDING SCALE INSULIN   10/17; pending oximetry study ,getting vbg today per pulmonary    Assessment & Plan        Traumatic 5mm left frontal lobe SDH  Posteriorly displaced (1 cm) Type II odontoid fx  C1 anterior arch fracture   Epidural hemorrhage of 7mm  Acute traumatic neck pain  Acute L 6th rib fracture  S/p fall, Facial abrasions with ecchymoses  RLE wound  He was seen and evaluated by Neurosurgery.  Pt refused surgical intervention.   A repeat head CT was obtained with a stable subdural hematoma. He was placed in a cervical collar for the cervical fracture. No surgical intervention per patient preference.  -Cont [place] J -keep in place at all times  -Tylenol, oxy prn for pain, changed tylenol to extra strength  -Neurosurg ok lovenox for DVT ppx  -Wound care following   -f/u Neurosurgery in 6 weeks with an upright XR of the cervical spine (November PENDING appointment)  -CT cervical spine in 3 months (first week of December).   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 Failed bedside and radiology swallow. He completed 3 doses of Decadron for laryngeal edema.   -S/P tracheostomy 09/21/2022, Trach suture removal on 09/26  -Pulm and RT following  -Wean vent as per pulm , on phase 2 currently  -Bronchial hygeine w/ albuterol/mucomyst. Avoiding metaneb for now given CSI with cervical collar in place   -Gentle diuresis with bumex      Hypertension   Complete heart block s/p PM placement  Nonrheumatic aortic valve stenosis s/p TAVR 2019  -Monitor  -Pt is currently normotensive , on bumex and cardura  -Echo on 9/16:  EF: 60-65%, no LV dysfunction     Severe orophayngeal dysphagia   S/P PEG   Severe protein calorie malnutrition  Hiatal hernia  -PEG tube placed 09/20. Pt pulled out PEG tube on night of 9/23, replaced on 9/27  -On TF : osmolite 1.5 and 60 ml/hr   -RD following  -S/p VFSS 10/6/22: abnormal but no aspiration, recommend pureed food textures and moderately thick liquids     Hypophosphatemia  Hypomagnesia  Hypokalemia  Hyponatremia  -Monitor and manage as needed     Hypothyroidism   -Continue Levothyroxine     Anemia of chronic disease  -Hg trending down past few days  -Cont to monitor intermittently.     BPH  -Cont doxazosin     DM type 2  -Sliding scale insulin  -Last HbA1:  6.2  -Hypoglycemic protocol     Deconditioning  -PT/OT following           Diet: Pureed Diet (level 4) Liquidized/Moderately Thick (level 3)  Snacks/Supplements Adult: Magic Cup;  Between Meals    DVT Prophylaxis: Lovenox  [place] Catheter: Not present  Central Lines: PRESENT       Cardiac Monitoring: None  Code Status: Full Code      Disposition Plan     Discharge date: TBD, to TCU on discharge  Barriers: Placement     The patient's care was discussed with patient, RN, SW/CM    Berry Porter MD  Hospitalist Service  LTACH  Securely message with the Vocera Web Console (learn more here)  Text page via [employer] Paging/Directory           ______________________________________________________________________    Interval History    Chart reviewed and events noted.  Patient seen and examined.     No acute events overnight.    He reports that he is having neck pain and headache 5/10 in severity.  he has no dyspnea, no chest pain, no nausea or vomiting.     ROS:  A 10-system review was obtained and is negative with the exception of the symptoms noted above    Data reviewed today: I reviewed all medications, new labs and imaging results over the last 24 hours.     Physical Exam   Vital Signs: Temp: 98  F (36.7  C) Temp src: Oral BP: (!) 145/70 Pulse: 80   Resp: 22 SpO2: 92 % O2 Device: None (Room air) Oxygen Delivery: 1 LPM  Weight: 184 lbs 6.4 oz    General:  No acute distress,awake and alert  HEENT: [place] J in place, trach in place  Lungs:  Clear to auscultation bilaterally  CVS:  S1 and S2, RRR  Abdomen:  Soft, nontender, PEG in place and abdominal binder in place.  Musculoskeletal:  No edema  Neuro: Awake, alert, oriented, speech+, following commands.   Skin:  please refer to nursing documentation for full skin assessment    Data   Recent Labs   Lab 10/17/22  0649 10/17/22  0646 10/16/22  1155   WBC  --  8.7  --    HGB 10.5* 10.0*  --    MCV  --  92  --    PLT  --  262  --     133*  --    POTASSIUM 3.7 3.9  --    CHLORIDE  --  97*  --    CO2  --  30*  --    BUN  --  12.0  --    CR  --  0.74  --    ANIONGAP  --  6*  --    TITA  --  8.7  --    GLC 84 87 112*     No results found for this or any  previous visit (from the past 24 hour(s)).  Medications     - MEDICATION INSTRUCTIONS -         bumetanide  0.5 mg Oral Daily     calcium carbonate  500 mg Oral BID w/meals     doxazosin  2 mg Oral or Feeding Tube Daily     [START ON 10/20/2022] doxazosin  2 mg Oral Daily     enoxaparin ANTICOAGULANT  40 mg Subcutaneous Q24H     lactulose  20 g Oral BID     levothyroxine  112 mcg Oral QAM AC     multivitamin w/minerals  1 tablet Oral Daily     pantoprazole  40 mg Oral or Feeding Tube QAM AC     sennosides  1 tablet Oral BID     sodium chloride (PF)  10 mL Intracatheter Q8H     cholecalciferol  25 mcg Oral Daily

## 2022-10-17 NOTE — PROGRESS NOTES
Pulmonary Progress Note    Admit Date: 9/22/2022  CODE: Full Code    Assessment/Plan:   89 yoM admitted 9/15/2022 following an unwitnessed fall found to have a left frontal SDH and C1/C2 cervical spine fracture.  Intubated for airway protection in the ED given high c-spine injury and difficulty managing his secretions. On admission he requested all cares without surgical cervical spine stabilization, agreeable to a tracheostomy and PEG. Discharged to LTACH 9/22/22.     PMHx: Lambert-Eaton syndrome, TAVR 2019, complete heart block with pacemaker dependency, DM2, arthritis, BPH, HTN, osteoporosis     Pertinent problems:  Acute hypoxic/hypercapnic respiratory failure s/p trach: in setting of traumatic cervical injury after a fall.  History of PB(no home CPAP per pt).  Liberated from vent 9/26 with acceptable blood gas off vent.  Initially aspirating on BDTs but these improved and now capping continuously since 10/10.  O2 needs decreasing now on 1L via NC.  Secretions slowly improving.    - overnight oxymetry personally faraz HANNAH ok   Will have Rt de cannulate pt today  I d/w Sawyer BROWN who talked with neurosurgery-since pt has refused surgery, it was ok by them to take trach out(when we felt pt was ready for this)  He will continue to need neck percautions(no hyperextending)  - make Albuterol nebs PRN(per pt and RT request)for bronchial hygiene - change mucomyst to PRN     Tracheostomy: 6 Shiley placed 9/21/2022.  Downsized to 7 Bivona 10/3 without issue  - will have RT take out today    Dysphagia s/p PEG: Repeat BDT 10/3 with no immediate and large delayed aspiration; this is improvement from previous tests. Tolerating PMV well during the day with a good spontaneous cough per RT.  Abnormal VFSS 10/6 but no aspiration   - pureed diet with liquidized/mod thick liquids started 10/6    H/o Laryngeal edema 2/2 traumatic cervical fracture/injury s/p 3 doses of decadron in acute care.  Not inhibiting trach weans  currently    Other problems managed by primary team:  1. Acute neck and rib cage pain(known rib fractures), chronic hip/shoulder pain  2. Traumatic SDH, stable  3. C1/C2 CSI: [place] J collar at all times, repeat imaging per NSG  4. DVT ppx: Lovenox  5. Hyponatremia    NATASHA Zhong CNP   Pulmonary Medicine  [facility]      Subjective/Interim Events:   - tolerating capping now on 1L  - denies dyspnea  - secretion burden improving     Tracheal secretions:  Overnight - none  Yesterday - none    Cough strength: strong, productive     Current phase of ventilator weaning pathway:  Phase 3    Ventilator weaning results  10/10-present: capped continuously     Clinical status discussed today with respiratory therapist    Medications:       - MEDICATION INSTRUCTIONS -         albuterol  2.5 mg Nebulization 2 times daily     bumetanide  0.5 mg Oral Daily     calcium carbonate  500 mg Oral BID w/meals     doxazosin  2 mg Oral or Feeding Tube Daily     [START ON 10/20/2022] doxazosin  2 mg Oral Daily     enoxaparin ANTICOAGULANT  40 mg Subcutaneous Q24H     lactulose  20 g Oral BID     levothyroxine  112 mcg Oral QAM AC     multivitamin w/minerals  1 tablet Oral Daily     pantoprazole  40 mg Oral or Feeding Tube QAM AC     sennosides  1 tablet Oral BID     sodium chloride (PF)  10 mL Intracatheter Q8H     cholecalciferol  25 mcg Oral Daily         Exam/Data:   Vitals  BP (!) 145/70 (BP Location: Left arm, Patient Position: Left side, Cuff Size: Adult Regular)   Pulse 70   Temp 98  F (36.7  C) (Oral)   Resp 18   Wt 83.6 kg (184 lb 6.4 oz)   SpO2 95%   BMI 28.04 kg/m       I/O last 3 completed shifts:  In: 810 [P.O.:550; I.V.:20; NG/GT:240]  Out: 800 [Urine:800]  Weight change: 0.454 kg (1 lb)    Vent Mode: Other (see comments) (Cap RA)  Resp: 18      EXAM:  Gen:  no distress with trach capped   HEENT: NT, trach midline/intact, c-collar on, good voice, no stridor   CV: RRR, no m/g/r  Resp: CTAB; non-labored    Abd: soft, nontender, BS+  Skin: no rashes or lesions  Ext: mild b/l UE edema  Neuro: Alert, follows commands, [place] uses pocket talker     ROS:  A 10-system review was obtained and is negative with the exception of the symptoms noted above.    Labs:  Basic Metabolic Panel  Recent Labs   Lab 10/17/22  0649 10/17/22  0646 10/16/22  1155 10/16/22  0837    133*  --   --    POTASSIUM 3.7 3.9  --   --    CHLORIDE  --  97*  --   --    CO2  --  30*  --   --    BUN  --  12.0  --   --    CR  --  0.74  --   --    GLC 84 87 112* 86     CBC RESULTS: Recent Labs   Lab Test 10/10/22  0632   WBC 7.1   RBC 3.17*   HGB 9.8*   HCT 29.5*   MCV 93   MCH 30.9   MCHC 33.2   RDW 14.8          RADIOLOGY:   XR CHEST PORT 1 VIEW  LOCATION: [facility]  DATE/TIME: 10/3/2022 8:40 AM     INDICATION: hypoxic respiratory failure  COMPARISON: Chest x-ray 09/26/2022                                                                      IMPRESSION: Stable satisfactory tracheostomy tube position. TAVR. Stable dual-lead left-sided cardiac conduction device. Right shoulder arthroplasty. Bibasilar pulmonary opacities which may reflect atelectasis and/or infiltrates, increased on the left   and stable on the right. No definite pleural effusion. Stable cardiomegaly and central vascular congestion.

## 2022-10-17 NOTE — PLAN OF CARE
Problem: Plan of Care - These are the overarching goals to be used throughout the patient stay.    Goal: Optimal Comfort and Wellbeing  Intervention: Provide Person-Centered Care  Recent Flowsheet Documentation  Taken 10/16/2022 1630 by Pam Lin RN  Trust Relationship/Rapport:   care explained   choices provided   reassurance provided   thoughts/feelings acknowledged     Problem: Malnutrition  Goal: Improved Nutritional Intake  Intervention: Promote and Optimize Nutrition Support  Recent Flowsheet Documentation  Taken 10/16/2022 1630 by Pam Lin RN  Nutrition Support Management: weight trending reviewed     Problem: Pain Acute  Goal: Acceptable Pain Control and Functional Ability  Intervention: Prevent or Manage Pain  Recent Flowsheet Documentation  Taken 10/16/2022 1630 by Pam Lin RN  Sensory Stimulation Regulation: television on  Sleep/Rest Enhancement: comfort measures  Bowel Elimination Promotion: adequate fluid intake promoted  Medication Review/Management: medications reviewed    Goal Outcome Evaluation:       Patient's  blood pressures were elevated this evening, ranging from 160 - 177  mm Hg; HR's  between 61 - 82/ minute at the early part pf this shift. PRN  Hydralazine 5 mg given IVP at 1708. Rechecked BP at 2037 = 130/64; HR = 81/min. He barely ate 25 % of his supper. He allowed himself  to be fed with apple sauce and thickened apple juice in between meals. He was anxious at bedtime hitting his soft call light incessantly and complained of back pain rated 7/10. He was medicated with PRN Tylenol and Seroquel with good effect. He was noted sound asleep on reassessment. Will keep monitoring patient's progress and safety.

## 2022-10-17 NOTE — PLAN OF CARE
Problem: Communication Impairment (Artificial Airway)  Goal: Effective Communication  Outcome: Adequate for Care Transition     RT PROGRESS NOTE     DATA:     CURRENT SETTINGS:             TRACH TYPE / SIZE: #7 [place], placed on 10/3             MODE: cap             FIO2: 21%     ACTION:             THERAPIES: Albuterol BID             SUCTION:                           FREQUENCY: X1                        AMOUNT: scant                         CONSISTENCY:                         COLOR:                SPONTANEOUS COUGH EFFORT/STRENGTH OF EFFORT (not elicited by suctioning): good                            WEANING PHASE: 3                        WEAN MODE: capped                          WEAN TIME: continuous                         END WEAN REASON:       RESPONSE:             BS: clear             VITAL SIGNS: BP (!) 161/73 (BP Location: Left arm)   Pulse 72   Temp 98.7  F (37.1  C) (Oral)   Resp 22   Wt 83.2 kg (183 lb 6.4 oz)   SpO2 94%   BMI 27.89 kg/m                   EMOTIONAL NEEDS / CONCERNS:                  RISK FOR SELF DECANNULATION:                          RISK DUE TO:                          INTERVENTION/S IN PLACE IS/ARE:         NOTE / PLAN:  Pt remains capped, tolerating it well.  Pt started on overnight oximeter study on RA @22:00. To check VBG in A.M. Will cont to monitor.

## 2022-10-17 NOTE — PROGRESS NOTES
Social Work Note:  Patient chart reviewed.  Patient discussed in morning rounds.  Barriers to discharge:   * Will need TCU placement.    Writer made referrals today for TCU placement:    * [facility]  * [place]: Declined  * [place]: Declined  * [facility]  * [facility]  * [facility]  * [facility]  * [facility]: Declined  * [facility]): referral sent/call placed.  * [facility]  * [facility]  * [facility]  * [facility]: Declined.  Do not accept anyone who has/had a Trach.  * [facility]: referral sent/call placed.   * [facility]-declined      Bob Reynolds, [facility]  [phone]

## 2022-10-18 ENCOUNTER — APPOINTMENT (OUTPATIENT)
Dept: OCCUPATIONAL THERAPY | Facility: CLINIC | Age: 87
DRG: 208 | End: 2022-10-18
Attending: INTERNAL MEDICINE
Payer: MEDICARE

## 2022-10-18 ENCOUNTER — APPOINTMENT (OUTPATIENT)
Dept: PHYSICAL THERAPY | Facility: CLINIC | Age: 87
DRG: 208 | End: 2022-10-18
Attending: INTERNAL MEDICINE
Payer: MEDICARE

## 2022-10-18 ENCOUNTER — APPOINTMENT (OUTPATIENT)
Dept: SPEECH THERAPY | Facility: CLINIC | Age: 87
DRG: 208 | End: 2022-10-18
Attending: INTERNAL MEDICINE
Payer: MEDICARE

## 2022-10-18 PROCEDURE — 99222 1ST HOSP IP/OBS MODERATE 55: CPT | Performed by: FAMILY MEDICINE

## 2022-10-18 PROCEDURE — 120N000017 HC R&B RESPIRATORY CARE

## 2022-10-18 PROCEDURE — 97530 THERAPEUTIC ACTIVITIES: CPT | Mod: GP | Performed by: PHYSICAL THERAPIST

## 2022-10-18 PROCEDURE — 97535 SELF CARE MNGMENT TRAINING: CPT | Mod: GO | Performed by: OCCUPATIONAL THERAPIST

## 2022-10-18 PROCEDURE — 250N000013 HC RX MED GY IP 250 OP 250 PS 637: Performed by: INTERNAL MEDICINE

## 2022-10-18 PROCEDURE — 250N000013 HC RX MED GY IP 250 OP 250 PS 637: Performed by: HOSPITALIST

## 2022-10-18 PROCEDURE — 99233 SBSQ HOSP IP/OBS HIGH 50: CPT | Performed by: HOSPITALIST

## 2022-10-18 PROCEDURE — 250N000011 HC RX IP 250 OP 636: Performed by: HOSPITALIST

## 2022-10-18 PROCEDURE — 92526 ORAL FUNCTION THERAPY: CPT | Mod: GN | Performed by: SPEECH-LANGUAGE PATHOLOGIST

## 2022-10-18 PROCEDURE — 999N000157 HC STATISTIC RCP TIME EA 10 MIN

## 2022-10-18 PROCEDURE — 250N000013 HC RX MED GY IP 250 OP 250 PS 637: Performed by: NURSE PRACTITIONER

## 2022-10-18 RX ORDER — LIDOCAINE 50 MG/G
OINTMENT TOPICAL EVERY 4 HOURS PRN
Status: DISCONTINUED | OUTPATIENT
Start: 2022-10-18 | End: 2022-10-28 | Stop reason: HOSPADM

## 2022-10-18 RX ORDER — LACTULOSE 10 G/15ML
10 SOLUTION ORAL 2 TIMES DAILY
Status: DISCONTINUED | OUTPATIENT
Start: 2022-10-18 | End: 2022-10-21

## 2022-10-18 RX ADMIN — SENNOSIDES 1 TABLET: 8.6 TABLET, FILM COATED ORAL at 08:22

## 2022-10-18 RX ADMIN — MULTIPLE VITAMINS W/ MINERALS TAB 1 TABLET: TAB at 11:54

## 2022-10-18 RX ADMIN — Medication 25 MCG: at 08:22

## 2022-10-18 RX ADMIN — LEVOTHYROXINE SODIUM 112 MCG: 0.11 TABLET ORAL at 06:31

## 2022-10-18 RX ADMIN — LACTULOSE 10 G: 10 SOLUTION ORAL at 20:18

## 2022-10-18 RX ADMIN — CALCIUM 500 MG: 500 TABLET ORAL at 11:54

## 2022-10-18 RX ADMIN — BUMETANIDE 0.5 MG: 0.5 TABLET ORAL at 08:22

## 2022-10-18 RX ADMIN — ENOXAPARIN SODIUM 40 MG: 100 INJECTION SUBCUTANEOUS at 18:03

## 2022-10-18 RX ADMIN — SENNOSIDES 1 TABLET: 8.6 TABLET, FILM COATED ORAL at 20:18

## 2022-10-18 RX ADMIN — Medication 40 MG: at 06:31

## 2022-10-18 RX ADMIN — Medication 2 MG: at 08:22

## 2022-10-18 RX ADMIN — OXYCODONE HYDROCHLORIDE 5 MG: 5 TABLET ORAL at 20:19

## 2022-10-18 RX ADMIN — OXYCODONE HYDROCHLORIDE 5 MG: 5 TABLET ORAL at 08:22

## 2022-10-18 RX ADMIN — LACTULOSE 20 G: 20 SOLUTION ORAL at 08:22

## 2022-10-18 RX ADMIN — CALCIUM 500 MG: 500 TABLET ORAL at 17:55

## 2022-10-18 RX ADMIN — OXYCODONE HYDROCHLORIDE 5 MG: 5 TABLET ORAL at 02:25

## 2022-10-18 ASSESSMENT — ACTIVITIES OF DAILY LIVING (ADL)
ADLS_ACUITY_SCORE: 72
ADLS_ACUITY_SCORE: 72
ADLS_ACUITY_SCORE: 76
ADLS_ACUITY_SCORE: 72
ADLS_ACUITY_SCORE: 76
ADLS_ACUITY_SCORE: 76
ADLS_ACUITY_SCORE: 72
ADLS_ACUITY_SCORE: 76
ADLS_ACUITY_SCORE: 76
ADLS_ACUITY_SCORE: 72

## 2022-10-18 NOTE — PROGRESS NOTES
[facility]    Medicine Progress Note - Hospitalist Service    Date of Admission:  9/22/2022  Brief Hospital Course Summary:    Alexandru Still is a 89 year old man w/ PMH of Lambert-Eaton syndrome, hypothyroidism,  complete heart block s/p pacemaker, HTN, BPH,  PB, and DM type 2 who was admitted to the SICU on 9/15/2022 following an unwitnessed fall and striking his head. He was initially seen at an outside hospital and underwent a comprehensive trauma work up found to have a left frontal SDH (5mm) + C1 fx + Type II C2 odontoid fracture with a 1cm posterior displacement. He was  intubated for airway protection in the ED given high c-spine injury and difficulty managing his secretions. On admission he requested all cares without surgical cervical spine stabilization, agreeable to a tracheostomy and PEG. Multiple care conferences held with his family who confirmed his wishes.     LTACH course:    9/24-9/26:  Speech evaluated patient and recommends NPO, cont tube feeds via PEG due to severe oropharyngeal dysphasia.  PT/OT, dietitian, wo nurse saw pt on 9/23.  Pulled out G tube during night of 9/23. Pt now on soft wrist restraints.  IVF changed to D51/2 NS at low rate as pt has TAVR.  Morphine IV ordered prn pain.    Spoke w/ Dr. Brown- IR - recommends no hannah tube as this is a brand new PEG and placing it blind will likely lead it to not be in correct position.  He will not do a PEG placement on the weekend, scheduled for Monday.  Pt cannot be fed via NG tube as it is contraindicated with C1 and C2 fx.  For PEG tube replacement (herpain on hold, place ICD)on 9/26.  Will discontinue IVF once PEG tube is placed and restart po meds.  Given NaPhos 9/26 9/27-10/3:  9/27 patient had PEG tube replaced after patient himself removed it on 9/23.  Was a started on tube feeding.  P.O. medication were restarted.  IV fluids were discontinued.  He still needs soft restraint x2.  Is off ventilator and stable.  9/28 Failed blue  dye test, Continue NPO   9/30 patient had [place] 300 collar pads delivered  10/1 SLP found patient well below baseline for swallowing, cognition and communication.  Patient stays n.p.o. except ice chips    10/4/22 -/10/8:   -No acute events, Vent weaning phase 2. Continue diuresis with bumex. Continue plan of care. Cognitive testing PENDING. May need neuropsych evaluation depending on cognitive evaluation.     10/9-10/17: he follows commands, cont [place] J.  He does better with glasses and hearing device on.  He has neck pain and headache.  Constipation issues.  Speech eval with swallow study and placed on puree with moderately, thick fluids.  Stopped insulin sliding scale as pt has normal glucose.        10/18:   decannulated yesterday, confused overnight,  +back pain.  High risk of aspiration.  Consult palliative care as he has intermittent confusion.  Need goals of care       Assessment & Plan        Traumatic 5mm left frontal lobe SDH  Posteriorly displaced (1 cm) Type II odontoid fx  C1 anterior arch fracture   Epidural hemorrhage of 7mm  Acute traumatic neck pain  Acute L 6th rib fracture  S/p fall, Facial abrasions with ecchymoses  RLE wound  He was seen and evaluated by Neurosurgery.  Pt refused surgical intervention.   A repeat head CT was obtained with a stable subdural hematoma. He was placed in a cervical collar for the cervical fracture. No surgical intervention per patient preference.  -Cont [place] J -keep in place at all times  -Tylenol, oxy prn for pain, changed tylenol to extra strength  -Neurosurg ok lovenox for DVT ppx  -Wound care following   -f/u Neurosurgery in 6 weeks with an upright XR of the cervical spine (November PENDING appointment)  -CT cervical spine in 3 months (first week of December).   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 Failed bedside and radiology swallow. He completed 3 doses of Decadron for laryngeal edema.   -S/P tracheostomy 09/21/2022, Trach suture removal on 09/26  -Pulm and RT following  -decannulated on 10/17        Hypertension   Complete heart block s/p PM placement  Nonrheumatic aortic valve stenosis s/p TAVR 2019  -Monitor  -Pt is currently normotensive , on bumex and cardura  -Echo on 9/16:  EF: 60-65%, no LV dysfunction     Severe orophayngeal dysphagia   S/P PEG   Severe protein calorie malnutrition  Hiatal hernia  -PEG tube placed 09/20. Pt pulled out PEG tube on night of 9/23, replaced on 9/27  -off TF   -RD following       Hypophosphatemia  Hypomagnesia   Hypokalemia  -Monitor and replete as needed    Hyponatremia  -IVF recheck Na- if ok will discontinue fluids     Hypothyroidism   -Continue Levothyroxine     Anemia of chronic disease  -Hg stable  -Cont to monitor intermittently.     BPH  -Cont doxazosin     DM type 2  -discontinue Sliding scale insulin  -Last HbA1:  6.2  -Hypoglycemic protocol     Deconditioning  -PT/OT following           Diet: Pureed Diet (level 4) Liquidized/Moderately Thick (level 3)  Snacks/Supplements Adult: Magic Cup; Between Meals    DVT Prophylaxis: Lovenox  [place] Catheter: Not present  Central Lines: PRESENT       Cardiac Monitoring: None  Code Status: Full Code      Disposition Plan     Discharge date: TBD, to TCU on discharge  Barriers: Placement     The patient's care was discussed with patient, RN, SW/AINSLEY Rubio MD  Hospitalist Service  LTACH  Securely message with  the [facility] Web Console (learn more here)  Text page via [facility] Paging/Directory           ______________________________________________________________________    Interval History    Chart reviewed and events noted.  Patient seen and examined.     He is having neck pain, intermittent, improves w/ pain meds, he is having some aspiration but patient prefers to eat.  He has no chest pain, dyspnea, or painful urination    ROS:  A 10-system review was obtained and is negative with the exception of the symptoms noted above    Data reviewed today: I reviewed all medications, new labs and imaging results over the last 24 hours.     Physical Exam   Vital Signs: Temp: 98.5  F (36.9  C) Temp src: Oral BP: (!) 159/70 Pulse: 70   Resp: 20 SpO2: 95 % O2 Device: None (Room air) Oxygen Delivery: 1 LPM  Weight: 188 lbs 1.6 oz    General:  No acute distress,awake and alert  HEENT: [place] J in place, trach in place  Lungs:  Clear to auscultation bilaterally  CVS:  S1 and S2, RRR  Abdomen:  Soft, nontender, PEG in place  Musculoskeletal:  No edema  Neuro: Awake, alert, oriented, speech+, following commands.   Skin:  please refer to nursing documentation for full skin assessment    Data   Recent Labs   Lab 10/17/22  1835 10/17/22  0649 10/17/22  0646   WBC  --   --  8.7   HGB  --  10.5* 10.0*   MCV  --   --  92   PLT  --   --  262   NA  --  137 133*   POTASSIUM  --  3.7 3.9   CHLORIDE  --   --  97*   CO2  --   --  30*   BUN  --   --  12.0   CR  --   --  0.74   ANIONGAP  --   --  6*   TITA  --   --  8.7   * 84 87     No results found for this or any previous visit (from the past 24 hour(s)).  Medications     - MEDICATION INSTRUCTIONS -         bumetanide  0.5 mg Oral Daily     calcium carbonate  500 mg Oral BID w/meals     doxazosin  2 mg Oral or Feeding Tube Daily     [START ON 10/20/2022] doxazosin  2 mg Oral Daily     enoxaparin ANTICOAGULANT  40 mg Subcutaneous Q24H     lactulose  20 g Oral BID     levothyroxine  112 mcg Oral  QAM AC     multivitamin w/minerals  1 tablet Oral Daily     pantoprazole  40 mg Oral or Feeding Tube QAM AC     sennosides  1 tablet Oral BID     sodium chloride (PF)  10 mL Intracatheter Q8H     cholecalciferol  25 mcg Oral Daily

## 2022-10-18 NOTE — PLAN OF CARE
Problem: Confusion Chronic  Goal: Optimal Cognitive Function  Intervention: Minimize Injury Risk and Provide Safety    Intervention: Minimize and Manage Confusion      Problem: Fall Injury Risk  Goal: Absence of Fall and Fall-Related Injury  Intervention: Promote Injury-Free Environment    Problem: Pain Acute  Goal: Acceptable Pain Control and Functional Ability  Intervention: Optimize Psychosocial Wellbeing     Pt. Alert and oriented with confusion with time. Pt. Able to communicate needs effectively. Pt. Has [place] J collar that is on at all times. Previous trach site covered with dressing. Pt. Remains on room air. Pt. C/o pain to middle back 6/10 and 7/10. Pt. Received PRN oxycodone 5mg with effectiveness. B/P elevated at start of shift. B/P 161/73. Recheck B/P 158/71.

## 2022-10-18 NOTE — PLAN OF CARE
"  Problem: Fall Injury Risk  Goal: Absence of Fall and Fall-Related Injury  Outcome: Progressing  Intervention: Identify and Manage Contributors  Recent Flowsheet Documentation  Taken 10/18/2022 0927 by Nicole Delgado RN  Medication Review/Management: medications reviewed  Intervention: Promote Injury-Free Environment  Recent Flowsheet Documentation  Taken 10/18/2022 0927 by Nicole Delgado RN  Safety Promotion/Fall Prevention:    bed alarm on    lighting adjusted     Problem: Confusion Chronic  Goal: Optimal Cognitive Function  Outcome: Progressing  Intervention: Minimize Injury Risk and Provide Safety  Recent Flowsheet Documentation  Taken 10/18/2022 0927 by Nicole Delgado RN  Enhanced Safety Measures: bed alarm set  Intervention: Minimize and Manage Confusion  Recent Flowsheet Documentation  Taken 10/18/2022 0927 by Nicole Delgado RN  Sensory Stimulation Regulation: quiet environment promoted  Reorientation Measures:    hearing device use encouraged    reorientation provided  Environmental Support:    calm environment promoted    distractions minimized  Environment Familiarity/Consistency: familiar objects from home provided     Problem: Hypertension Acute  Goal: Blood Pressure Within Desired Range  Outcome: Progressing  Intervention: Normalize Blood Pressure  Recent Flowsheet Documentation  Taken 10/18/2022 0927 by Nicole Delgado RN  Sensory Stimulation Regulation: quiet environment promoted  Medication Review/Management: medications reviewed     Problem: Anxiety  Goal: Anxiety Reduction or Resolution  Outcome: Progressing  Intervention: Promote Anxiety Reduction  Recent Flowsheet Documentation  Taken 10/18/2022 0927 by Nicole Delgado RN  Supportive Measures: active listening utilized   Goal Outcome Evaluation:         Patient presented as calm, pleasant and cooperative, yelling \"ouch\" with any movement. Patient complained of back pain 6/10, received Oxycodone PRN with effect. " Patient up in chair for therapy and tolerated it well.Patient incontinent of urine, last BM 10/16/22,received am bowel medications,medium BM this evening. Patient ate 100% of meals.    Nicole Delgado RN

## 2022-10-18 NOTE — CONSULTS
"[facility]  Palliative Care Consultation Note    Patient: Alexandru Still  Date of Admission:  9/22/2022    Requesting Clinician / Team: Neli Rubio MD/VIRI MANRIQUEZ  Reason for consult: \"Pt is full code, unlikely to get better - Life exp 3-6 months\"  Goals of care  Patient and family support    Impression & Recommendations:  Alexandru appeared to have at least partial capacity today and clearly stated his wish to have code status changed to DNR/DNI.  I updated his son in law Erv regarding this change.  He noted \"that's a big change from where he was before.\"  I discussed with Erv that Alexandru was lucid, able to express understanding of what he was saying no to, why he didn't want CPR/reintubation, and that should Alexandru have an arrest, trauma of CPR, issues with reintubation w risk of hyperextending his neck with the C1-C2 fracture could be very difficult, and that likelihood of surviving CPR/reintubation to recover to his prior level of functioning would be very small.  Reviewed that long term care is anticipated necessary by medical team for Alexandru.      Recommend follow up C conversation, this has been scheduled with Erv for 10/24/22 (Monday) at 9:45 am to occur at bedside to have palliative facilitate the conversation with Alexandru and Erv (this has never been a topic they had discussed previously); at that time will discuss further about their understanding of prognosis, goals of care, POLST (DNR/selective vs DNR/comfort) etc.    Goals of care: restorative with limits/life prolonging currently.    ACP: HCD on file, names Erv, Zhou and Lolis as health care agents/first/second alternate accordingly.  Code status: initially full code, now DNR/DNI.  POLST recommended to be re-done (2020 POLST was invalidated earlier) prior to discharge.    Support: [employer] reema, is open to meeting with spiritual care (spirttual care consult placed)  Highly service connected to [facility] system--has primary " provider w [facility] and may have support for LTC through [facility] if that is needed--recommend checking w vA system.   and 3 of 4 children have .  OSVALDO Erv is primary family contact currently.    Symptom management:  1. Pain: bilateral shoulder (stiffness/soreness) and hip pain.  - declines use of heat but would offer in future if pt has discomfort (warm pack PRN)  - ordered topical lidocaine, sarna for pain.  - agree w prn oxycodone at current dosing.      2. Encephalopathy / delirium earlier in hospital stay.  - appears to be clearing.  - avoid deliriogenic medications, attention to sleep hygeine.  - monitor.    3. Dysphagia, high risk for aspiration.  - if worsening respiratory status, would not want reintubation but rather transition to comfort at that time.  - continue modified diet.  If pt finds it unpalatable, will need to have further conversation about risks/benefits with unmodified diet.  - Pt wishes to have diet for QoL which is understandable.  - discussed with [facility] Er that there may be a time (if Alexandru has recurrent episodes of aspiration) that goals of care may change to focus on comfort more than life prolongation but affirmed that currently we aren't currently at that time.  Will plan to complete anticipatory guidance on the dysphagia at meeting next week.    These recommendations have been discussed with Dr Rubio      Thank you for the opportunity to participate in the care of this patient and family. Our team: will continue to follow.   During regular M-F work hours -- if you are not sure who specifically to contact -- please contact us by paging the [facility] Palliative Care pager: [facility] Palliative Medicine pager: [phone]    (Please use [employer] for text paging if possible, use link under Palliative service)        Assessments:  Alexandru Still is a 89 year old male with prior history of complete heart block (placement of dual chamber pacemaker, TAVR, HTN, PB (no home CPAP use  per pt), hypothyroidism, Lambert Eaton MG syndrome, BPH, frequent falls, chronic bilateral hip pain (chronic opioid use), osteoporosis, frequent falls, recent dysphagia, encephalopathy who had a mechanical fall 9/15/22 and was found to have C1-C2 type II odontoid fracture with 1cm posterior dislocation, epidural hemorrhage, traumatic subdural hemorrhage, and who opted for intubation for airway protection in ED, had Trach 9/20, PEG 9/21 and transferred to [facility] 9/22.  Had multiple care conferences during that hospitalization; pt had declined surgical cervical spine stabilization.     Since admission to [facility] he was able to wean from the ventilator and was decannulated 10/17/22.  Alexandru had abnormal swallow study (and chronic dysphagia issues with moderate protein calorie malnutrition PTA), and transitioned to pureed diet.  He will require cervical collar at all times and outpatient follow up with Neurosurgery in [facility], CT c spine in early December.       Today, the patient was seen for:  Introduction (reintroduction) to palliative care, brief GOC discussion and support to Alexandru and his son in law/[facility] Erv.    Prognosis, Goals, & Planning:    Functional Status just prior to hospitalization: Prior to this hospital stay: Palliative Performance Score:       30%- 1. Totally bed bound; 2. Unable to do any activity, extensive disease; 3. Total care; 4. Normal or reduced; 5. Full or drowsy +/- confusion      --Reportedly PPS 80-90% HOWEVER pt had a PCA which would imply less than independent functional status.  Used a walker at baseline w occas wheelchair.      Prognosis, Goals, and/or Advance Care Planning were addressed today: Yes        Summary/Comments: Discussed with Alexandru that medical team do not anticipate he will be able to return to his condo/prior living situation due to his neck fracture and need for 24-7 assistance.      Patient's decision making preferences: independently          Patient has  decision-making capacity today for complex decisions: Partial (needs assistance with complex decisions).            I have concerns about the patient/family's health literacy today: No           Patient has a completed Health Care Directive: Yes, and on file.  22: DNR/selective treatment POLST from 12/3/2020.  ALSO has HCD from 2022 naming Erv (OSVALDO) as HCA, and grandchildren as first and second alternate HCA>      Code status: No CPR / No Intubation  - this is affirmed by Alexandru today in conversation    Coping, Meaning, & Spirituality:   Mood, coping, and/or meaning in the context of serious illness were addressed today: Yes  Summary/Comments: Prayer has helped him go through difficult things in the past.  Welcomes spiritual care.    Social:     Key family / caregivers: OSVALDO, Erv; Son Marc LTC unable to be caregiver or decision maker.  had paid PCA for 2-3 hrs/day prior to hospitalization at [facility].    Financial concerns: not discussed.    Current in-home services: see above     , one of four children is living (Marc) and resides in SNF, lacks capacity to be surrogate decision maker.  Daughter Frida was primary contact and is .    Lived in a condo with PCA assistance prior to admission.      Served in [place] conflict.  Former smoker.    History of Present Illness:  History gathered today from: patient, family/loved ones, medical chart, medical team members, unit team members, outside records including Care Everywhere, health care directive/s    Alexandru BLAIR Cheko is a 89 year old male with prior history of complete heart block (placement of dual chamber pacemaker, TAVR, HTN, hypothyroidism, Lambert Eaton MG syndrome, BPH, frequent falls, chronic bilateral hip pain (chronic opioid use), osteoporosis, frequent falls, recent dysphagia, encephalopathy who had a mechanical fall 9/15/22 and was found to have C1-C2 type II odontoid fracture with 1cm posterior dislocation, epidural hemorrhage,  "traumatic subdural hemorrhage, and who opted for intubation for airway protection in ED, had Trach 9/20, PEG 9/21 and transferred to [facility] 9/22.  Had multiple care conferences during that hospitalization; pt had declined surgical cervical spine stabilization.     [facility] was able to wean from the ventilator and was decannulated 10/17/22.  Alexandru had abnormal swallow study (and chronic dysphagia issues with moderate protein calorie malnutrition PTA), and transitioned to pureed diet.  He will require cervical collar at all times and [facility] early December.     I reintroduced Palliative Care as a specialty that works to help patients with serious illness live as well as possible, and manage symptoms/side effects of the illness or its treatment.  Palliative medicine also works to provide an extra layer of support to patients experiencing serious illness and their families.  Our specialty also helps with advance care planning (making health care directives and helping a person receive care that is in keeping with their individual hopes and values).     I asked Alexandru if he remembered being on the ventilator and he affirmed he did, and told me \"It was terrible.\"  I asked if his heart were to stop, would he want to go through CPR and he said \"I think if that happened it would be better to let me go, to let me die.\"  I asked if he weren't breathing well, would he want to be reintubated and he affirmed he would not want to be reintubated.  I affirmed to Alexandru these were his wishes and he said so. I also reassured Alexandru his life-prolonging treatments would continue as long as he is wanting to receive them.  Also provided medical recommendation that DNR/DNI would be advised as unlikely to provide meaningful benefit.     Alexandru wishes to receive compassionately honest information, in a direct manner, and wishes to have his son in law Erv " informed as well of medical recommendations and prognosis.  I affirmed to Alexandru that returning home to his condo appears to be highly unlikely, and that he may require SNF going forward for support as long as he needs the c collar.    I spoke with OSVALDO Lacey (who is  after Alexandru's daughter Frida ) who notes he has never had conversations about advanced care planning with Alexandru.  He would like to have supported conversation with Alexandru to work through other questions ahead.    Key Palliative Symptom Data:  # Pain severity the last 12 hours: low  # Dyspnea severity the last 12 hours: low  # Nausea severity the last 12 hours: none  # Anxiety severity the last 12 hours: low    Patient is on opioids: assessed and bowels ok/no needed changes to plan of care today.    ROS:  Comprehensive ROS is reviewed and is negative except as here & per HPI: notes anxiety in evenings; denies panic attacks or flashbacks.  Notes difficulty with pain in hips but it's not bothering him at time of my visit.  Notes difficulty with dyspnea when he feels anxious.  Denies cough.  Endorses regular BMs.     Past Medical History:  Past Medical History:   Diagnosis Date     Anemia      Aortic stenosis      BPH (benign prostatic hyperplasia)      Closed T12 fracture      Complete heart block     s/p dual chamber pacemaker     Hypertension      Hypothyroidism      Kidney stone      Lambert-Eaton myasthenic syndrome      Lower extremity edema         Past Surgical History:  Past Surgical History:   Procedure Laterality Date     CV TRANSCATHETER AORTIC VALVE REPLACEMENT TRANSAORTIC APPROACH       EP PACEMAKER       ESOPHAGOGASTRODUODENOSCOPY, WITH NASOGASTRIC TUBE INSERTION N/A 2022    Procedure: ESOPHAGOGASTRODUODENOSCOPY, WITH NASOJEJUNAL TUBE INSERTION ;  Surgeon: Nils Drew MD;  Location: [facility] GI     HERNIA REPAIR       IR GASTROSTOMY TUBE PERCUTANEOUS PLCMNT  2022     IR PICC PLACEMENT > 5 YRS OF AGE   6/15/2022     LASER HOLMIUM LITHOTRIPSY URETER(S), INSERT STENT, COMBINED N/A 6/15/2022    Procedure: 1. Cystourethroscopy 2. Laser lithotripsy of a urethral stone 3. Basketing of a urethral stone 4. Complex hannah catheter placement over a wire;  Surgeon: Beny Ha MD;  Location: [facility] OR     TRACHEOSTOMY N/A 9/21/2022    Procedure: CREATION, TRACHEOSTOMY;  Surgeon: Bob Dill MD;  Location: [facility] OR         Family History:  Family History   Problem Relation Age of Onset     Cerebrovascular Disease Mother      Chronic Obstructive Pulmonary Disease Father           Allergies:  Allergies   Allergen Reactions     Cefuroxime Hives     Contrast Dye      Gadodiamide Hives        Medications:  I have reviewed this patient's medication profile and medications from this hospitalization.   Noted:  Current Facility-Administered Medications   Medication     acetaminophen (TYLENOL) tablet 1,000 mg     acetylcysteine (MUCOMYST) 20 % nebulizer solution 2 mL     albuterol (PROVENTIL) neb solution 2.5 mg     albuterol (PROVENTIL) neb solution 2.5 mg     bisacodyl (DULCOLAX) suppository 10 mg     bumetanide (BUMEX) tablet 0.5 mg     calcium carbonate 500 mg (elemental) (OSCAL) tablet 500 mg     glucose gel 15-30 g    Or     dextrose 50 % injection 25-50 mL    Or     glucagon injection 1 mg     doxazosin (CARDURA) suspension 2 mg     [START ON 10/20/2022] doxazosin (CARDURA) tablet 2 mg     enoxaparin ANTICOAGULANT (LOVENOX) injection 40 mg     hydrALAZINE (APRESOLINE) injection 5 mg     lactulose (CHRONULAC) solution 10 g     levothyroxine (SYNTHROID/LEVOTHROID) tablet 112 mcg     lidocaine (LMX4) kit     menthol-zinc oxide (CALMOSEPTINE) 0.44-20.6 % ointment OINT     miconazole (MICATIN) 2 % powder     multivitamin w/minerals (THERA-VIT-M) tablet 1 tablet     naloxone (NARCAN) injection 0.2 mg    Or     naloxone (NARCAN) injection 0.4 mg     oxyCODONE (ROXICODONE) tablet 5 mg     pantoprazole (PROTONIX) 2 mg/mL suspension  40 mg     Patient is already receiving anticoagulation with heparin, enoxaparin (LOVENOX), warfarin (COUMADIN)  or other anticoagulant medication     QUEtiapine (SEROquel) tablet 25 mg     sennosides (SENOKOT) tablet 1 tablet     sodium chloride (PF) 0.9% PF flush 10 mL     sodium chloride (PF) 0.9% PF flush 10-20 mL     Vitamin D3 (CHOLECALCIFEROL) tablet 25 mcg     24-hr MME: 15mg oxycodone = 18.75 OME      PERTINENT PHYSICAL EXAMINATION:  Vital Signs: Blood pressure 131/66, pulse 67, temperature 98.5  F (36.9  C), temperature source Oral, resp. rate 20, weight 85.3 kg (188 lb 1.6 oz), SpO2 92 %.   Body mass index is 28.6 kg/m .    Intake/Output Summary (Last 24 hours) at 10/18/2022 1414  Last data filed at 10/18/2022 1300  Gross per 24 hour   Intake 780 ml   Output --   Net 780 ml     Last documented BM 10/16 with daily BMs prior to that x3  GENERAL: Alert male, appears stated age.  Mildly [place].  Supine in bed w HOB elevated.   SKIN: Warm and dry   HEENT: Normocephalic, anicteric sclera, moist mucous membranes.  Anterior neck trach site dressing clean and dry; decannulated.    LUNGS: Clear to auscultation anterolaterally; non-labored, diminished bases.  No crackles.   CARDIAC: RRR, normal s1/s2, w/o m/r/g   ABDOMINAL: BS (+), soft, non distended, non tender  MUSKL: no gross joint deformities   EXTREMITIES: No edema or cyanosis, pulses 2+ and symmetrical  NEUROLOGIC: Oriented to self, place, month, year and situation.     PSYCH: affect full, fluent speech.    Data reviewed:  Recent imaging reviewed, my comments on pertinents:   VFSS 10/6/22:  IMPRESSION:  1.  Adequate oral phase.  2.  Inconsistent delay in swallow reflex with horizontal to complete epiglottic inversion.  3.  Deep laryngeal penetration of thin and mildly thick liquid.  4.  Mild stasis.    CXR 10/3/22:  IMPRESSION: Stable satisfactory tracheostomy tube position. TAVR. Stable dual-lead left-sided cardiac conduction device. Right shoulder arthroplasty.  Bibasilar pulmonary opacities which may reflect atelectasis and/or infiltrates, increased on the left   and stable on the right. No definite pleural effusion. Stable cardiomegaly and central vascular congestion.    Gastrostomy tube reinsertion performed 9/27/22    Recent lab data reviewed, my comments on pertinents:   Last Comprehensive Metabolic Panel:  Sodium   Date Value Ref Range Status   10/17/2022 133 (L) 136 - 145 mmol/L Final     Sodium POCT   Date Value Ref Range Status   10/17/2022 137 136 - 145 mmol/L Final     Potassium   Date Value Ref Range Status   10/17/2022 3.9 3.4 - 5.3 mmol/L Final   06/23/2022 4.0 3.4 - 5.3 mmol/L Final     Potassium POCT   Date Value Ref Range Status   10/17/2022 3.7 3.5 - 5.0 mmol/L Final     Chloride   Date Value Ref Range Status   10/17/2022 97 (L) 98 - 107 mmol/L Final   06/22/2022 100 94 - 109 mmol/L Final     Carbon Dioxide (CO2)   Date Value Ref Range Status   10/17/2022 30 (H) 22 - 29 mmol/L Final   06/22/2022 29 20 - 32 mmol/L Final     Anion Gap   Date Value Ref Range Status   10/17/2022 6 (L) 7 - 15 mmol/L Final   06/22/2022 6 3 - 14 mmol/L Final     Glucose   Date Value Ref Range Status   06/22/2022 92 70 - 99 mg/dL Final     GLUCOSE BY METER POCT   Date Value Ref Range Status   10/17/2022 112 (H) 70 - 99 mg/dL Final     Urea Nitrogen   Date Value Ref Range Status   10/17/2022 12.0 8.0 - 23.0 mg/dL Final   06/22/2022 24 7 - 30 mg/dL Final     Creatinine   Date Value Ref Range Status   10/17/2022 0.74 0.67 - 1.17 mg/dL Final     GFR Estimate   Date Value Ref Range Status   10/17/2022 85 >60 mL/min/1.73m2 Final     Comment:     Effective December 21, 2021 eGFRcr in adults is calculated using the 2021 CKD-EPI creatinine equation which includes age and gender (Michelle mccrary al., NEJM, DOI: 10.1056/ECRKwr6547499)   12/18/2020 >60 >60 mL/min/1.73m2 Final     Calcium   Date Value Ref Range Status   10/17/2022 8.7 8.2 - 9.6 mg/dL Final     CBC RESULTS: Recent Labs   Lab Test  10/17/22  0649 10/17/22  0646   WBC  --  8.7   RBC  --  3.32*   HGB 10.5* 10.0*   HCT  --  30.4*   MCV  --  92   MCH  --  30.1   MCHC  --  32.9   RDW  --  14.7   PLT  --  262     Lab Results   Component Value Date    AST 18 09/27/2022     Lab Results   Component Value Date    ALT 25 09/27/2022     No results found for: BILICONJ   Lab Results   Component Value Date    BILITOTAL 0.6 09/27/2022     Lab Results   Component Value Date    ALBUMIN 2.7 09/27/2022    ALBUMIN 2.3 06/17/2022     Lab Results   Component Value Date    PROTTOTAL 5.8 09/27/2022      Lab Results   Component Value Date    ALKPHOS 103 09/27/2022       Eileen Jeffries MD  [facility] Palliative Medicine Service: [facility]  Department voice mail (checked M-F 8a-4pm approx): [phone]  [facility] Palliative Medicine pager: [phone]  (Please use [employer] for text paging if possible, use link under Palliative service)  May page me directly via [employer]

## 2022-10-19 ENCOUNTER — APPOINTMENT (OUTPATIENT)
Dept: PHYSICAL THERAPY | Facility: CLINIC | Age: 87
DRG: 208 | End: 2022-10-19
Attending: INTERNAL MEDICINE
Payer: MEDICARE

## 2022-10-19 ENCOUNTER — APPOINTMENT (OUTPATIENT)
Dept: SPEECH THERAPY | Facility: CLINIC | Age: 87
DRG: 208 | End: 2022-10-19
Attending: INTERNAL MEDICINE
Payer: MEDICARE

## 2022-10-19 ENCOUNTER — APPOINTMENT (OUTPATIENT)
Dept: OCCUPATIONAL THERAPY | Facility: CLINIC | Age: 87
DRG: 208 | End: 2022-10-19
Attending: INTERNAL MEDICINE
Payer: MEDICARE

## 2022-10-19 PROCEDURE — 250N000013 HC RX MED GY IP 250 OP 250 PS 637: Performed by: NURSE PRACTITIONER

## 2022-10-19 PROCEDURE — 999N000157 HC STATISTIC RCP TIME EA 10 MIN

## 2022-10-19 PROCEDURE — 250N000013 HC RX MED GY IP 250 OP 250 PS 637: Performed by: FAMILY MEDICINE

## 2022-10-19 PROCEDURE — 97116 GAIT TRAINING THERAPY: CPT | Mod: GP | Performed by: PHYSICAL THERAPIST

## 2022-10-19 PROCEDURE — 250N000013 HC RX MED GY IP 250 OP 250 PS 637: Performed by: HOSPITALIST

## 2022-10-19 PROCEDURE — 92526 ORAL FUNCTION THERAPY: CPT | Mod: GN | Performed by: SPEECH-LANGUAGE PATHOLOGIST

## 2022-10-19 PROCEDURE — 120N000017 HC R&B RESPIRATORY CARE

## 2022-10-19 PROCEDURE — 99233 SBSQ HOSP IP/OBS HIGH 50: CPT | Performed by: HOSPITALIST

## 2022-10-19 PROCEDURE — 250N000013 HC RX MED GY IP 250 OP 250 PS 637: Performed by: INTERNAL MEDICINE

## 2022-10-19 PROCEDURE — 97530 THERAPEUTIC ACTIVITIES: CPT | Mod: GP | Performed by: PHYSICAL THERAPIST

## 2022-10-19 PROCEDURE — 97110 THERAPEUTIC EXERCISES: CPT | Mod: GO | Performed by: OCCUPATIONAL THERAPIST

## 2022-10-19 PROCEDURE — 250N000009 HC RX 250: Performed by: FAMILY MEDICINE

## 2022-10-19 PROCEDURE — 250N000011 HC RX IP 250 OP 636: Performed by: HOSPITALIST

## 2022-10-19 RX ADMIN — Medication 2 MG: at 09:15

## 2022-10-19 RX ADMIN — MULTIPLE VITAMINS W/ MINERALS TAB 1 TABLET: TAB at 12:09

## 2022-10-19 RX ADMIN — LIDOCAINE: 50 OINTMENT TOPICAL at 09:17

## 2022-10-19 RX ADMIN — BISACODYL 10 MG: 10 SUPPOSITORY RECTAL at 09:11

## 2022-10-19 RX ADMIN — ENOXAPARIN SODIUM 40 MG: 100 INJECTION SUBCUTANEOUS at 20:47

## 2022-10-19 RX ADMIN — DICLOFENAC 2 G: 10 GEL TOPICAL at 09:17

## 2022-10-19 RX ADMIN — CALCIUM 500 MG: 500 TABLET ORAL at 12:09

## 2022-10-19 RX ADMIN — SENNOSIDES 1 TABLET: 8.6 TABLET, FILM COATED ORAL at 20:48

## 2022-10-19 RX ADMIN — Medication 25 MCG: at 09:12

## 2022-10-19 RX ADMIN — LACTULOSE 10 G: 10 SOLUTION ORAL at 09:12

## 2022-10-19 RX ADMIN — Medication 40 MG: at 06:26

## 2022-10-19 RX ADMIN — ACETAMINOPHEN 1000 MG: 500 TABLET ORAL at 17:18

## 2022-10-19 RX ADMIN — CALCIUM 500 MG: 500 TABLET ORAL at 17:18

## 2022-10-19 RX ADMIN — BUMETANIDE 0.5 MG: 0.5 TABLET ORAL at 09:12

## 2022-10-19 RX ADMIN — OXYCODONE HYDROCHLORIDE 5 MG: 5 TABLET ORAL at 06:25

## 2022-10-19 RX ADMIN — OXYCODONE HYDROCHLORIDE 5 MG: 5 TABLET ORAL at 20:47

## 2022-10-19 RX ADMIN — SENNOSIDES 1 TABLET: 8.6 TABLET, FILM COATED ORAL at 09:12

## 2022-10-19 RX ADMIN — LEVOTHYROXINE SODIUM 112 MCG: 0.11 TABLET ORAL at 06:26

## 2022-10-19 ASSESSMENT — ACTIVITIES OF DAILY LIVING (ADL)
ADLS_ACUITY_SCORE: 75
ADLS_ACUITY_SCORE: 76
ADLS_ACUITY_SCORE: 72
ADLS_ACUITY_SCORE: 79
ADLS_ACUITY_SCORE: 76
ADLS_ACUITY_SCORE: 76
ADLS_ACUITY_SCORE: 79
ADLS_ACUITY_SCORE: 76
ADLS_ACUITY_SCORE: 79
ADLS_ACUITY_SCORE: 79

## 2022-10-19 NOTE — PROGRESS NOTES
[facility]    Medicine Progress Note - Hospitalist Service    Date of Admission:  9/22/2022  Brief Hospital Course Summary:    Alexandru Still is a 89 year old man w/ PMH of Lambert-Eaton syndrome, hypothyroidism,  complete heart block s/p pacemaker, HTN, BPH,  PB, and DM type 2 who was admitted to the SICU on 9/15/2022 following an unwitnessed fall and striking his head. He was initially seen at an outside hospital and underwent a comprehensive trauma work up found to have a left frontal SDH (5mm) + C1 fx + Type II C2 odontoid fracture with a 1cm posterior displacement. He was  intubated for airway protection in the ED given high c-spine injury and difficulty managing his secretions. On admission he requested all cares without surgical cervical spine stabilization, agreeable to a tracheostomy and PEG. Multiple care conferences held with his family who confirmed his wishes.     LTACH course:    9/24-9/26:  Speech evaluated patient and recommends NPO, cont tube feeds via PEG due to severe oropharyngeal dysphasia.  PT/OT, dietitian, wo nurse saw pt on 9/23.  Pulled out G tube during night of 9/23. Pt now on soft wrist restraints.  IVF changed to D51/2 NS at low rate as pt has TAVR.  Morphine IV ordered prn pain.    Spoke w/ Dr. Brown- IR - recommends no hannah tube as this is a brand new PEG and placing it blind will likely lead it to not be in correct position.  He will not do a PEG placement on the weekend, scheduled for Monday.  Pt cannot be fed via NG tube as it is contraindicated with C1 and C2 fx.  For PEG tube replacement (herpain on hold, place ICD)on 9/26.  Will discontinue IVF once PEG tube is placed and restart po meds.  Given NaPhos 9/26 9/27-10/3:  9/27 patient had PEG tube replaced after patient himself removed it on 9/23.  Was a started on tube feeding.  P.O. medication were restarted.  IV fluids were discontinued.  He still needs soft restraint x2.  Is off ventilator and stable.  9/28 Failed blue  dye test, Continue NPO   9/30 patient had [place] 300 collar pads delivered  10/1 SLP found patient well below baseline for swallowing, cognition and communication.  Patient stays n.p.o. except ice chips    10/4/22 -/10/8:   -No acute events, Vent weaning phase 2. Continue diuresis with bumex. Continue plan of care. Cognitive testing PENDING. May need neuropsych evaluation depending on cognitive evaluation.     10/9-10/17: he follows commands, cont [place].  He does better with glasses and hearing device on.  He has neck pain and headache.  Constipation issues.  Speech eval with swallow study and placed on puree with moderately, thick fluids.  Stopped insulin sliding scale as pt has normal glucose.        10/18:   decannulated yesterday, confused overnight,  +back pain.  High risk of aspiration.  Consult palliative care as he has intermittent confusion.  Need goals of care     10/19:  Palliative set up GOC conference with Erv his POA 10/24 at 9:45.  Pain overnight and this morning- given oxy.  More awake, alert, able to have conversation, cognition has improved.      Assessment & Plan        Traumatic 5mm left frontal lobe SDH  Posteriorly displaced (1 cm) Type II odontoid fx  C1 anterior arch fracture   Epidural hemorrhage of 7mm  Acute traumatic neck pain  Acute L 6th rib fracture  S/p fall, Facial abrasions with ecchymoses  RLE wound  He was seen and evaluated by Neurosurgery.  Pt refused surgical intervention.   A repeat head CT was obtained with a stable subdural hematoma. He was placed in a cervical collar for the cervical fracture. No surgical intervention per patient preference.  -Cont [place] -keep in place at all times  -Tylenol, oxy prn for pain, changed tylenol to extra strength  -Neurosurg ok lovenox for DVT ppx  -Wound care following   -f/u Neurosurgery in 6 weeks with an upright XR of the cervical spine (November PENDING appointment)  -CT cervical spine in 3 months (first week of December).   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 Failed bedside and radiology swallow. He completed 3 doses of Decadron for laryngeal edema.   -S/P tracheostomy 09/21/2022, Trach suture removal on 09/26  -Pulm and RT following  -decannulated on 10/17        Hypertension   Complete heart block s/p PM placement  Nonrheumatic aortic valve stenosis s/p TAVR 2019  -Monitor  -Pt is currently normotensive , on bumex and cardura  -Echo on 9/16:  EF: 60-65%, no LV dysfunction     Severe orophayngeal dysphagia   S/P PEG   Severe protein calorie malnutrition  Hiatal hernia  -PEG tube placed 09/20. Pt pulled out PEG tube on night of 9/23, replaced on 9/27  -off TF   -RD following       Hypophosphatemia  Hypomagnesia   Hypokalemia  -Monitor and replete as needed    Hyponatremia  Resolved w IVF     Hypothyroidism   -Continue Levothyroxine     Anemia of chronic disease  -Hg stable  -Cont to monitor intermittently.     BPH  -Cont doxazosin     DM type 2  -discontinue Sliding scale insulin  -Last HbA1:  6.2  -Hypoglycemic protocol     Deconditioning  -PT/OT following           Diet: Pureed Diet (level 4) Liquidized/Moderately Thick (level 3)  Snacks/Supplements Adult: Magic Cup; Between Meals    DVT Prophylaxis: Lovenox  [place] Catheter: Not present  Central Lines: PRESENT       Cardiac Monitoring: None  Code Status: No CPR- Do NOT Intubate      Disposition Plan     Discharge date: TBD, to TCU  on discharge  Barriers: Placement     The patient's care was discussed with patient, RN,     Neil Rubio MD  Hospitalist Service  LTACH  Securely message with the [employer] Web Console (learn more here)  Text page via [facility] Paging/Directory           ______________________________________________________________________    Interval History    Chart reviewed and events noted.  Patient seen and examined.     His cognition has improved.      He continues to have  neck pain, intermittent, improves w/ pain meds, he is having some aspiration but patient prefers to eat.  He has no chest pain, dyspnea, or painful urination    ROS:  A 10-system review was obtained and is negative with the exception of the symptoms noted above    Data reviewed today: I reviewed all medications, new labs and imaging results over the last 24 hours.     Physical Exam   Vital Signs: Temp: 98.8  F (37.1  C) Temp src: Oral BP: 120/62 Pulse: 65   Resp: 20 SpO2: 95 % O2 Device: None (Room air)    Weight: 187 lbs 9.6 oz    General:  No acute distress,awake and alert  HEENT: [place] J in place, bandage at trach site  Lungs:  coarse bilaterally, no wheezing, not labored   CVS:  S1 and S2, RRR  Abdomen:  Soft, nontender, PEG in place  Musculoskeletal:  No edema  Neuro: Awake, alert, oriented, speech+, following commands.   Skin:  please refer to nursing documentation for full skin assessment    Data   Recent Labs   Lab 10/17/22  1835 10/17/22  0649 10/17/22  0646   WBC  --   --  8.7   HGB  --  10.5* 10.0*   MCV  --   --  92   PLT  --   --  262   NA  --  137 133*   POTASSIUM  --  3.7 3.9   CHLORIDE  --   --  97*   CO2  --   --  30*   BUN  --   --  12.0   CR  --   --  0.74   ANIONGAP  --   --  6*   TITA  --   --  8.7   * 84 87     No results found for this or any previous visit (from the past 24 hour(s)).  Medications     - MEDICATION INSTRUCTIONS -         bumetanide  0.5 mg Oral Daily     calcium carbonate  500 mg Oral BID w/meals     doxazosin   2 mg Oral or Feeding Tube Daily     [START ON 10/20/2022] doxazosin  2 mg Oral Daily     enoxaparin ANTICOAGULANT  40 mg Subcutaneous Q24H     lactulose  10 g Oral BID     levothyroxine  112 mcg Oral QAM AC     multivitamin w/minerals  1 tablet Oral Daily     pantoprazole  40 mg Oral or Feeding Tube QAM AC     sennosides  1 tablet Oral BID     sodium chloride (PF)  10 mL Intracatheter Q8H     cholecalciferol  25 mcg Oral Daily

## 2022-10-19 NOTE — PROGRESS NOTES
RESPIRATORY CARE NOTE    Pt cont on RA bs clear and diminished, strong cough. Oximetry removed per protocol.   Cont monitoring as needed.    Mariza Mcmahon RT

## 2022-10-19 NOTE — PROGRESS NOTES
7 PM to 7 AM RT NOTE:    Pt resting on RA. BS: Clear and dim, SATs 92-93%, HR 66, RR 20-22. Pt was de-cannulated 10/17/22. Oximeter can be pulled 10/19/22.

## 2022-10-19 NOTE — PLAN OF CARE
"  Problem: Pain Acute  Goal: Acceptable Pain Control and Functional Ability  Intervention: Optimize Psychosocial Wellbeing           Problem: Fall Injury Risk  Goal: Absence of Fall and Fall-Related Injury  Intervention: Promote Injury-Free Environment    Problem: Hypertension Acute  Goal: Blood Pressure Within Desired Range  Outcome: Progressing     Pt. Alert and able to communicate needs effectively. Pt. Disoriented to time and place. Pt. Irritable this shift when staff wake him to turn him. Pt. Asking \"Why do I need to be turned?\" Writer explained and educated patient on importance of turning and repositioning and pressure ulcer prevention. Pt. Then agreed to be turned after explanation. Pt. C/o 6/10 middle back pain and received PRN oxycodone 5mg at 2019 with effectiveness. Pt. Later c/o 4/10 but declined pain medication. Pt. Was repositioned.          "

## 2022-10-19 NOTE — PLAN OF CARE
Problem: Pain Acute  Goal: Acceptable Pain Control and Functional Ability  10/19/2022 1002 by Nicole Delgado RN  Outcome: Progressing  10/19/2022 0959 by Nicole Delgado RN  Reactivated  Intervention: Prevent or Manage Pain  Recent Flowsheet Documentation  Taken 10/19/2022 0900 by Nicole Delgado RN  Bowel Elimination Promotion:    commode/bedpan at bedside    adequate fluid intake promoted  Medication Review/Management: medications reviewed  Intervention: Optimize Psychosocial Wellbeing  Recent Flowsheet Documentation  Taken 10/19/2022 0900 by Nicole Delgado RN  Supportive Measures:    active listening utilized    verbalization of feelings encouraged     Problem: Fall Injury Risk  Goal: Absence of Fall and Fall-Related Injury  Outcome: Progressing  Intervention: Identify and Manage Contributors  Recent Flowsheet Documentation  Taken 10/19/2022 0900 by Nicole Delgado RN  Medication Review/Management: medications reviewed  Intervention: Promote Injury-Free Environment  Recent Flowsheet Documentation  Taken 10/19/2022 0900 by Nicole Delgado RN  Safety Promotion/Fall Prevention:    bed alarm on    room door open     Problem: Confusion Chronic  Goal: Optimal Cognitive Function  Outcome: Progressing  Intervention: Minimize Injury Risk and Provide Safety  Recent Flowsheet Documentation  Taken 10/19/2022 0900 by Nicole Delgado RN  Enhanced Safety Measures: bed alarm set  Intervention: Minimize and Manage Confusion  Recent Flowsheet Documentation  Taken 10/19/2022 0900 by Nicole Delgado RN  Environmental Support:    calm environment promoted    caregiver consistency promoted    environmental consistency promoted    rest periods encouraged     Problem: Hypertension Acute  Goal: Blood Pressure Within Desired Range  Outcome: Progressing  Intervention: Normalize Blood Pressure  Recent Flowsheet Documentation  Taken 10/19/2022 0900 by Nicole Delgado RN  Medication Review/Management:  medications reviewed     Problem: Anxiety  Goal: Anxiety Reduction or Resolution  Outcome: Progressing  Intervention: Promote Anxiety Reduction  Recent Flowsheet Documentation  Taken 10/19/2022 0900 by Nicole Delgado RN  Supportive Measures:    active listening utilized    verbalization of feelings encouraged   Goal Outcome Evaluation:       Patient awake this morning, alert and oriented x3, disoriented to time. Patient complained of back and shoulder pain 4/10, Voltaren gel and Lidocaine gel applied. Patient complained of feeling constipated,received Bisacodyl suppository with large results.Good food and fluid intake. Patient up in the chair watching TV, will continue to monitor.    Nicole Delgado, RN

## 2022-10-19 NOTE — PROGRESS NOTES
SPIRITUAL HEALTH SERVICES Progress Note       visited Alexandru due to staff referral. He was lying in bed and was tired, so the visit was brief. He shared briefly about his medical condition, stating that he fell and broke his neck. He stated that he is feeling sore today. He comes from [employer] reema background. Alexandru welcomed prayer and expressed appreciation for the visit.      Yariel Dudley MDiv, [facility]    [phone]

## 2022-10-20 ENCOUNTER — APPOINTMENT (OUTPATIENT)
Dept: PHYSICAL THERAPY | Facility: CLINIC | Age: 87
DRG: 208 | End: 2022-10-20
Attending: INTERNAL MEDICINE
Payer: MEDICARE

## 2022-10-20 ENCOUNTER — APPOINTMENT (OUTPATIENT)
Dept: OCCUPATIONAL THERAPY | Facility: CLINIC | Age: 87
DRG: 208 | End: 2022-10-20
Attending: INTERNAL MEDICINE
Payer: MEDICARE

## 2022-10-20 ENCOUNTER — APPOINTMENT (OUTPATIENT)
Dept: SPEECH THERAPY | Facility: CLINIC | Age: 87
DRG: 208 | End: 2022-10-20
Attending: INTERNAL MEDICINE
Payer: MEDICARE

## 2022-10-20 LAB
ALBUMIN SERPL BCG-MCNC: 2.9 G/DL (ref 3.5–5.2)
ALP SERPL-CCNC: 97 U/L (ref 40–129)
ALT SERPL W P-5'-P-CCNC: 16 U/L (ref 10–50)
ANION GAP SERPL CALCULATED.3IONS-SCNC: 10 MMOL/L (ref 7–15)
AST SERPL W P-5'-P-CCNC: 21 U/L (ref 10–50)
BILIRUB SERPL-MCNC: 0.4 MG/DL
BUN SERPL-MCNC: 14.2 MG/DL (ref 8–23)
CALCIUM SERPL-MCNC: 8.6 MG/DL (ref 8.2–9.6)
CHLORIDE SERPL-SCNC: 98 MMOL/L (ref 98–107)
CREAT SERPL-MCNC: 0.78 MG/DL (ref 0.67–1.17)
DEPRECATED HCO3 PLAS-SCNC: 27 MMOL/L (ref 22–29)
ERYTHROCYTE [DISTWIDTH] IN BLOOD BY AUTOMATED COUNT: 14.8 % (ref 10–15)
GFR SERPL CREATININE-BSD FRML MDRD: 84 ML/MIN/1.73M2
GLUCOSE SERPL-MCNC: 88 MG/DL (ref 70–99)
HCT VFR BLD AUTO: 29.7 % (ref 40–53)
HGB BLD-MCNC: 9.8 G/DL (ref 13.3–17.7)
MCH RBC QN AUTO: 30.7 PG (ref 26.5–33)
MCHC RBC AUTO-ENTMCNC: 33 G/DL (ref 31.5–36.5)
MCV RBC AUTO: 93 FL (ref 78–100)
PLATELET # BLD AUTO: 251 10E3/UL (ref 150–450)
POTASSIUM SERPL-SCNC: 4.1 MMOL/L (ref 3.4–5.3)
PROT SERPL-MCNC: 6.1 G/DL (ref 6.4–8.3)
RBC # BLD AUTO: 3.19 10E6/UL (ref 4.4–5.9)
SODIUM SERPL-SCNC: 135 MMOL/L (ref 136–145)
WBC # BLD AUTO: 6 10E3/UL (ref 4–11)

## 2022-10-20 PROCEDURE — 97129 THER IVNTJ 1ST 15 MIN: CPT | Mod: GN | Performed by: SPEECH-LANGUAGE PATHOLOGIST

## 2022-10-20 PROCEDURE — 97535 SELF CARE MNGMENT TRAINING: CPT | Mod: GO | Performed by: OCCUPATIONAL THERAPIST

## 2022-10-20 PROCEDURE — 85027 COMPLETE CBC AUTOMATED: CPT | Performed by: HOSPITALIST

## 2022-10-20 PROCEDURE — 250N000013 HC RX MED GY IP 250 OP 250 PS 637: Performed by: INTERNAL MEDICINE

## 2022-10-20 PROCEDURE — 92526 ORAL FUNCTION THERAPY: CPT | Mod: GN | Performed by: SPEECH-LANGUAGE PATHOLOGIST

## 2022-10-20 PROCEDURE — 80053 COMPREHEN METABOLIC PANEL: CPT | Performed by: HOSPITALIST

## 2022-10-20 PROCEDURE — 250N000011 HC RX IP 250 OP 636: Performed by: HOSPITALIST

## 2022-10-20 PROCEDURE — 97116 GAIT TRAINING THERAPY: CPT | Mod: GP | Performed by: PHYSICAL THERAPIST

## 2022-10-20 PROCEDURE — 250N000013 HC RX MED GY IP 250 OP 250 PS 637: Performed by: HOSPITALIST

## 2022-10-20 PROCEDURE — 120N000017 HC R&B RESPIRATORY CARE

## 2022-10-20 PROCEDURE — 97530 THERAPEUTIC ACTIVITIES: CPT | Mod: GP | Performed by: PHYSICAL THERAPIST

## 2022-10-20 PROCEDURE — 250N000013 HC RX MED GY IP 250 OP 250 PS 637: Performed by: NURSE PRACTITIONER

## 2022-10-20 PROCEDURE — 99233 SBSQ HOSP IP/OBS HIGH 50: CPT | Performed by: HOSPITALIST

## 2022-10-20 PROCEDURE — 36415 COLL VENOUS BLD VENIPUNCTURE: CPT | Performed by: HOSPITALIST

## 2022-10-20 RX ORDER — LISINOPRIL 2.5 MG/1
5 TABLET ORAL DAILY
Status: DISCONTINUED | OUTPATIENT
Start: 2022-10-20 | End: 2022-10-28 | Stop reason: HOSPADM

## 2022-10-20 RX ADMIN — SENNOSIDES 1 TABLET: 8.6 TABLET, FILM COATED ORAL at 08:43

## 2022-10-20 RX ADMIN — CALCIUM 500 MG: 500 TABLET ORAL at 11:43

## 2022-10-20 RX ADMIN — Medication 40 MG: at 06:46

## 2022-10-20 RX ADMIN — CALCIUM 500 MG: 500 TABLET ORAL at 19:33

## 2022-10-20 RX ADMIN — DOXAZOSIN 2 MG: 1 TABLET ORAL at 09:34

## 2022-10-20 RX ADMIN — BUMETANIDE 0.5 MG: 0.5 TABLET ORAL at 08:43

## 2022-10-20 RX ADMIN — LACTULOSE 10 G: 10 SOLUTION ORAL at 08:43

## 2022-10-20 RX ADMIN — LACTULOSE 10 G: 10 SOLUTION ORAL at 21:13

## 2022-10-20 RX ADMIN — SENNOSIDES 1 TABLET: 8.6 TABLET, FILM COATED ORAL at 21:13

## 2022-10-20 RX ADMIN — LISINOPRIL 5 MG: 2.5 TABLET ORAL at 14:45

## 2022-10-20 RX ADMIN — LEVOTHYROXINE SODIUM 112 MCG: 0.11 TABLET ORAL at 06:46

## 2022-10-20 RX ADMIN — Medication 25 MCG: at 08:43

## 2022-10-20 RX ADMIN — MULTIPLE VITAMINS W/ MINERALS TAB 1 TABLET: TAB at 11:44

## 2022-10-20 RX ADMIN — ENOXAPARIN SODIUM 40 MG: 100 INJECTION SUBCUTANEOUS at 19:33

## 2022-10-20 ASSESSMENT — ACTIVITIES OF DAILY LIVING (ADL)
ADLS_ACUITY_SCORE: 80
ADLS_ACUITY_SCORE: 80
ADLS_ACUITY_SCORE: 74
ADLS_ACUITY_SCORE: 79
ADLS_ACUITY_SCORE: 75
ADLS_ACUITY_SCORE: 80
ADLS_ACUITY_SCORE: 75
ADLS_ACUITY_SCORE: 75
ADLS_ACUITY_SCORE: 74
ADLS_ACUITY_SCORE: 80
ADLS_ACUITY_SCORE: 76
ADLS_ACUITY_SCORE: 76

## 2022-10-20 NOTE — PROGRESS NOTES
[facility]    Medicine Progress Note - Hospitalist Service    Date of Admission:  9/22/2022  Brief Hospital Course Summary:    Alexandru Still is a 89 year old man w/ PMH of Lambert-Eaton syndrome, hypothyroidism,  complete heart block s/p pacemaker, HTN, BPH,  PB, and DM type 2 who was admitted to the SICU on 9/15/2022 following an unwitnessed fall and striking his head. He was initially seen at an outside hospital and underwent a comprehensive trauma work up found to have a left frontal SDH (5mm) + C1 fx + Type II C2 odontoid fracture with a 1cm posterior displacement. He was  intubated for airway protection in the ED given high c-spine injury and difficulty managing his secretions. On admission he requested all cares without surgical cervical spine stabilization, agreeable to a tracheostomy and PEG. Multiple care conferences held with his family who confirmed his wishes.     LTACH course:    9/24-9/26:  Speech evaluated patient and recommends NPO, cont tube feeds via PEG due to severe oropharyngeal dysphasia.  PT/OT, dietitian, wo nurse saw pt on 9/23.  Pulled out G tube during night of 9/23. Pt now on soft wrist restraints.  IVF changed to D51/2 NS at low rate as pt has TAVR.  Morphine IV ordered prn pain.    Spoke w/ Dr. Brown- IR - recommends no hannah tube as this is a brand new PEG and placing it blind will likely lead it to not be in correct position.  He will not do a PEG placement on the weekend, scheduled for Monday.  Pt cannot be fed via NG tube as it is contraindicated with C1 and C2 fx.  For PEG tube replacement (herpain on hold, place ICD)on 9/26.  Will discontinue IVF once PEG tube is placed and restart po meds.  Given NaPhos 9/26 9/27-10/3:  9/27 patient had PEG tube replaced after patient himself removed it on 9/23.  Was a started on tube feeding.  P.O. medication were restarted.  IV fluids were discontinued.  He still needs soft restraint x2.  Is off ventilator and stable.  9/28 Failed blue  dye test, Continue NPO   9/30 patient had [place] J 300 collar pads delivered  10/1 SLP found patient well below baseline for swallowing, cognition and communication.  Patient stays n.p.o. except ice chips    10/4/22 -/10/8:   -No acute events, Vent weaning phase 2. Continue diuresis with bumex. Continue plan of care. Cognitive testing PENDING. May need neuropsych evaluation depending on cognitive evaluation.     10/9-10/17: he follows commands, cont [place] J.  He does better with glasses and hearing device on.  He has neck pain and headache.  Constipation issues.  Speech eval with swallow study and placed on puree with moderately, thick fluids.  Stopped insulin sliding scale as pt has normal glucose.        10/18-10/19:   decannulated yesterday, confused overnight,  +back pain.  High risk of aspiration.  Consult palliative care as he has intermittent confusion.  Need goals of care   10/20:  Seen by gabrielle yesterday, Doing well on room air overnight, understands he has risk of aspiration and wants eat and willing to take risk.  BP elevated -add lisinopril      Assessment & Plan        Traumatic 5mm left frontal lobe SDH  Posteriorly displaced (1 cm) Type II odontoid fx  C1 anterior arch fracture   Epidural hemorrhage of 7mm  Acute traumatic neck pain  Acute L 6th rib fracture  S/p fall, Facial abrasions with ecchymoses  RLE wound  He was seen and evaluated by Neurosurgery.  Pt refused surgical intervention.   A repeat head CT was obtained with a stable subdural hematoma. He was placed in a cervical collar for the cervical fracture. No surgical intervention per patient preference.  -Cont [place] J -keep in place at all times  -Tylenol, oxy prn for pain, changed tylenol to extra strength  -Neurosurg ok lovenox for DVT ppx  -Wound care following   -f/u Neurosurgery in 6 weeks with an upright XR of the cervical spine (November PENDING appointment)  -CT cervical spine in 3 months (first week of December).   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 Failed bedside and radiology swallow. He completed 3 doses of Decadron for laryngeal edema.   -S/P tracheostomy 09/21/2022, Trach suture removal on 09/26  -Pulm and RT following  -decannulated on 10/17        Hypertension   Complete heart block s/p PM placement  Nonrheumatic aortic valve stenosis s/p TAVR 2019  -Monitor  -bumex, cardura  -add lisinopril   -Echo on 9/16:  EF: 60-65%, no LV dysfunction     Severe orophayngeal dysphagia   S/P PEG   Severe protein calorie malnutrition  Hiatal hernia  -PEG tube placed 09/20. Pt pulled out PEG tube on night of 9/23, replaced on 9/27  -off TF   -RD following       Hypophosphatemia  Hypomagnesia   Hypokalemia  -Monitor and replete as needed    Hyponatremia  -IVF recheck Na- if ok will discontinue fluids     Hypothyroidism   -Continue Levothyroxine     Anemia of chronic disease  -Hg stable  -Cont to monitor intermittently.     BPH  -Cont doxazosin     DM type 2  -discontinue Sliding scale insulin  -Last HbA1:  6.2  -Hypoglycemic protocol     Deconditioning  -PT/OT following           Diet: Pureed Diet (level 4) Liquidized/Moderately Thick (level 3)  Snacks/Supplements Adult: Magic Cup; Between Meals    DVT Prophylaxis: Lovenox  [place] Catheter: Not present  Central Lines: PRESENT       Cardiac Monitoring: None  Code Status: No CPR- Do NOT Intubate      Disposition Plan     Discharge date: TBD, to  TCU on discharge  Barriers: Placement     The patient's care was discussed with patient, RN, SW/CM    Neli Rubio MD  Hospitalist Service  LTACH  Securely message with the [employer] Web Console (learn more here)  Text page via [employer] Paging/Directory           ______________________________________________________________________    Interval History    Chart reviewed and events noted.  Patient seen and examined.     He reports that his neck pain has improved from yesterday.  He says that he is doing his swallowing exercises and is careful when eating to decrease aspiration risk.   He has no chest pain, dyspnea, or painful urination    ROS:  A 10-system review was obtained and is negative with the exception of the symptoms noted above    Data reviewed today: I reviewed all medications, new labs and imaging results over the last 24 hours.     Physical Exam   Vital Signs: Temp: 97.4  F (36.3  C) Temp src: Oral BP: (!) 142/77 Pulse: 68   Resp: 20 SpO2: 94 %      Weight: 185 lbs 8 oz    General:  No acute distress,awake and alert  HEENT: [place] J in place, trach in place  Lungs:  Clear to auscultation bilaterally  CVS:  S1 and S2, RRR  Abdomen:  Soft, nontender, PEG in place  Musculoskeletal:  No edema  Neuro: Awake, alert, oriented, speech+, following commands.   Skin:  please refer to nursing documentation for full skin assessment    Data   Recent Labs   Lab 10/20/22  0714 10/17/22  1835 10/17/22  0649 10/17/22  0646   WBC 6.0  --   --  8.7   HGB 9.8*  --  10.5* 10.0*   MCV 93  --   --  92     --   --  262   NA  --   --  137 133*   POTASSIUM  --   --  3.7 3.9   CHLORIDE  --   --   --  97*   CO2  --   --   --  30*   BUN  --   --   --  12.0   CR  --   --   --  0.74   ANIONGAP  --   --   --  6*   TITA  --   --   --  8.7   GLC  --  112* 84 87     No results found for this or any previous visit (from the past 24 hour(s)).  Medications     - MEDICATION INSTRUCTIONS -         bumetanide  0.5 mg Oral Daily     calcium  carbonate  500 mg Oral BID w/meals     doxazosin  2 mg Oral or Feeding Tube Daily     doxazosin  2 mg Oral Daily     enoxaparin ANTICOAGULANT  40 mg Subcutaneous Q24H     lactulose  10 g Oral BID     levothyroxine  112 mcg Oral QAM AC     multivitamin w/minerals  1 tablet Oral Daily     pantoprazole  40 mg Oral or Feeding Tube QAM AC     sennosides  1 tablet Oral BID     sodium chloride (PF)  10 mL Intracatheter Q8H     cholecalciferol  25 mcg Oral Daily

## 2022-10-20 NOTE — PLAN OF CARE
Problem: Pain Acute  Goal: Acceptable Pain Control and Functional Ability  Outcome: Progressing  Intervention: Prevent or Manage Pain  Recent Flowsheet Documentation  Taken 10/20/2022 0000 by Shantel Machuca RN  Sensory Stimulation Regulation: quiet environment promoted  Medication Review/Management: medications reviewed  Intervention: Optimize Psychosocial Wellbeing  Recent Flowsheet Documentation  Taken 10/20/2022 0000 by Shantel Machuca, RN  Supportive Measures: active listening utilized   Goal Outcome Evaluation:       Pt slept > 6 hours, denied pain / discomforts, repositioning done every 2 hours, will continue to monitor.

## 2022-10-20 NOTE — PLAN OF CARE
Problem: Plan of Care - These are the overarching goals to be used throughout the patient stay.    Goal: Optimal Comfort and Wellbeing  Intervention: Monitor Pain and Promote Comfort  Recent Flowsheet Documentation  Taken 10/19/2022 2050 by Мария Jarvis RN  Pain Management Interventions:   medication (see MAR)   repositioned   heat applied  Taken 10/19/2022 1734 by Мария Jarvis RN  Pain Management Interventions:   medication (see MAR)   repositioned     Problem: Pain Acute  Goal: Acceptable Pain Control and Functional Ability  Outcome: Progressing  Intervention: Develop Pain Management Plan  Recent Flowsheet Documentation  Taken 10/19/2022 2050 by Мария Jarvis RN  Pain Management Interventions:   medication (see MAR)   repositioned   heat applied  Taken 10/19/2022 1734 by Мария Jarvis RN  Pain Management Interventions:   medication (see MAR)   repositioned  Intervention: Optimize Psychosocial Wellbeing  Recent Flowsheet Documentation  Taken 10/19/2022 1839 by Мария Jarvis RN  Supportive Measures: active listening utilized     Problem: Anxiety  Goal: Anxiety Reduction or Resolution  Outcome: Progressing  Intervention: Promote Anxiety Reduction  Recent Flowsheet Documentation  Taken 10/19/2022 1839 by Мария Jarvis RN  Supportive Measures: active listening utilized   Goal Outcome Evaluation:       Pt alert forgetful at times, fed 75% of his meal, assisted filling out menus for next day, ambulated to bathroom x 2, c/o tomas shoulder and back pain, PRN x 2 warm blankets applied to shoulders and back were effective, bed alarm on

## 2022-10-20 NOTE — PROGRESS NOTES
Social Work Note:    Patient being followed by Palliative There will be a Palliative Care meeting on  Monday 10/24/22 at 9:45 am to  prognosis, goals of care, POLST (DNR/selective vs DNR/comfort).  Writer has made multiple referrals for TCU stay for patient; however, most are declining and recommending patient be a LTC placement.      Bob Reynolds, [facility]  [phone]

## 2022-10-20 NOTE — PLAN OF CARE
Problem: Confusion Chronic  Goal: Optimal Cognitive Function  Outcome: Progressing  Intervention: Minimize Injury Risk and Provide Safety  Recent Flowsheet Documentation  Taken 10/20/2022 0824 by Vinita Kaiser RN  Enhanced Safety Measures:    bed alarm set    room near unit station  Intervention: Minimize and Manage Confusion  Recent Flowsheet Documentation  Taken 10/20/2022 0820 by Vinita Kaiser RN  Sensory Stimulation Regulation: quiet environment promoted  Reorientation Measures:    hearing device use encouraged    reorientation provided  Environmental Support: calm environment promoted   Goal Outcome Evaluation:       Awake, alert and oriented x3, denies pain, incontinent. Eating with assists, almost a feeder because of the C collar in place that patient cannot see his food well .Needs to sit in an  upright position and bedside table in an angle where he can see his food. He was found chewing a nasal cannula ear protector. The foreign object is intact. [place] , constant reminder. C collar in place, RA.   Vinita Kaiser RN

## 2022-10-20 NOTE — PLAN OF CARE
Goal Outcome Evaluation:    Progressing  Individualized Care Needs: pocket talker used. Glasses removed because they are causing pressure injuries. His glasses are missing a foot also.  Anxieties, Fears or Concerns: anxious about changing position  Patient-Specific Goals (Include Timeframe): begin to feed himself.     Alexandru ate 100 % of his dinner. He was fed his dinner but encouraged to try to feed himself. He was able to drink his thickened milk himself after being handed the glass.  He is incontinent of urine. Penis is noted to be tender when patient is assisted with use of the urinal. He has a history of a penile implant. No apparent abnormalities other than the penis is rigid. When c-collar cares were completed the patient was noted to have redness and skin breakdown on the left side of his jaw. His buttocks and perineum are intact but reddened. Calmoseptine applied. Vitals are stable, though patient is mildly hypertensive. His prn hydralazine is for SBP >160 mmHg. Lisinopril was added today.     10/20/22 1800   Genitourinary   Signs/Symptoms (Male) other (see comments)  (patient has a history of penile implant)      10/20/22 2004   Brace/Orthotic/Orthosis 09/19/22 1146   Placement Date/Time: 09/19/22 1146   Location: neck  Device Type: cervical collar   Skin Condition redness;breakdown;irritation  (on left jawbone)     Vitals:    10/20/22 0840 10/20/22 1539   BP: (!) 153/71 (!) 146/70   BP Location: Left arm Left arm       Neli Cruz RN

## 2022-10-21 ENCOUNTER — APPOINTMENT (OUTPATIENT)
Dept: PHYSICAL THERAPY | Facility: CLINIC | Age: 87
DRG: 208 | End: 2022-10-21
Attending: INTERNAL MEDICINE
Payer: MEDICARE

## 2022-10-21 ENCOUNTER — APPOINTMENT (OUTPATIENT)
Dept: OCCUPATIONAL THERAPY | Facility: CLINIC | Age: 87
DRG: 208 | End: 2022-10-21
Attending: INTERNAL MEDICINE
Payer: MEDICARE

## 2022-10-21 ENCOUNTER — APPOINTMENT (OUTPATIENT)
Dept: SPEECH THERAPY | Facility: CLINIC | Age: 87
DRG: 208 | End: 2022-10-21
Attending: INTERNAL MEDICINE
Payer: MEDICARE

## 2022-10-21 ENCOUNTER — ANCILLARY PROCEDURE (OUTPATIENT)
Dept: GENERAL RADIOLOGY | Facility: CLINIC | Age: 87
DRG: 208 | End: 2022-10-21
Attending: NURSE PRACTITIONER
Payer: MEDICARE

## 2022-10-21 LAB
GLUCOSE BLDC GLUCOMTR-MCNC: 108 MG/DL (ref 70–99)
GLUCOSE BLDC GLUCOMTR-MCNC: 124 MG/DL (ref 70–99)

## 2022-10-21 PROCEDURE — 92526 ORAL FUNCTION THERAPY: CPT | Mod: GN

## 2022-10-21 PROCEDURE — 74230 X-RAY XM SWLNG FUNCJ C+: CPT

## 2022-10-21 PROCEDURE — 92611 MOTION FLUOROSCOPY/SWALLOW: CPT | Mod: GN

## 2022-10-21 PROCEDURE — 97535 SELF CARE MNGMENT TRAINING: CPT | Mod: GO | Performed by: OCCUPATIONAL THERAPIST

## 2022-10-21 PROCEDURE — 250N000011 HC RX IP 250 OP 636: Performed by: HOSPITALIST

## 2022-10-21 PROCEDURE — 250N000013 HC RX MED GY IP 250 OP 250 PS 637: Performed by: NURSE PRACTITIONER

## 2022-10-21 PROCEDURE — 97530 THERAPEUTIC ACTIVITIES: CPT | Mod: GP

## 2022-10-21 PROCEDURE — 250N000013 HC RX MED GY IP 250 OP 250 PS 637: Performed by: HOSPITALIST

## 2022-10-21 PROCEDURE — 97116 GAIT TRAINING THERAPY: CPT | Mod: GP

## 2022-10-21 PROCEDURE — 97129 THER IVNTJ 1ST 15 MIN: CPT | Mod: GO | Performed by: OCCUPATIONAL THERAPIST

## 2022-10-21 PROCEDURE — 250N000013 HC RX MED GY IP 250 OP 250 PS 637: Performed by: INTERNAL MEDICINE

## 2022-10-21 PROCEDURE — 120N000017 HC R&B RESPIRATORY CARE

## 2022-10-21 PROCEDURE — 99233 SBSQ HOSP IP/OBS HIGH 50: CPT | Performed by: HOSPITALIST

## 2022-10-21 PROCEDURE — 999N000157 HC STATISTIC RCP TIME EA 10 MIN

## 2022-10-21 RX ORDER — BARIUM SULFATE 400 MG/ML
SUSPENSION ORAL ONCE
Status: COMPLETED | OUTPATIENT
Start: 2022-10-21 | End: 2022-10-21

## 2022-10-21 RX ORDER — LIDOCAINE 4 G/G
1 PATCH TOPICAL
Status: DISCONTINUED | OUTPATIENT
Start: 2022-10-21 | End: 2022-10-28 | Stop reason: HOSPADM

## 2022-10-21 RX ADMIN — BARIUM SULFATE: 400 SUSPENSION ORAL at 10:10

## 2022-10-21 RX ADMIN — ENOXAPARIN SODIUM 40 MG: 100 INJECTION SUBCUTANEOUS at 18:26

## 2022-10-21 RX ADMIN — LEVOTHYROXINE SODIUM 112 MCG: 0.11 TABLET ORAL at 06:40

## 2022-10-21 RX ADMIN — Medication 25 MCG: at 08:53

## 2022-10-21 RX ADMIN — LACTULOSE 10 G: 10 SOLUTION ORAL at 08:53

## 2022-10-21 RX ADMIN — CALCIUM 500 MG: 500 TABLET ORAL at 17:05

## 2022-10-21 RX ADMIN — BUMETANIDE 0.5 MG: 0.5 TABLET ORAL at 08:53

## 2022-10-21 RX ADMIN — SENNOSIDES 1 TABLET: 8.6 TABLET, FILM COATED ORAL at 08:53

## 2022-10-21 RX ADMIN — Medication 40 MG: at 06:40

## 2022-10-21 RX ADMIN — LISINOPRIL 5 MG: 2.5 TABLET ORAL at 08:53

## 2022-10-21 RX ADMIN — BARIUM SULFATE: 400 SUSPENSION ORAL at 10:09

## 2022-10-21 RX ADMIN — LIDOCAINE 1 PATCH: 246 PATCH TOPICAL at 13:29

## 2022-10-21 RX ADMIN — DOXAZOSIN 2 MG: 1 TABLET ORAL at 08:53

## 2022-10-21 RX ADMIN — MULTIPLE VITAMINS W/ MINERALS TAB 1 TABLET: TAB at 13:29

## 2022-10-21 RX ADMIN — CALCIUM 500 MG: 500 TABLET ORAL at 13:29

## 2022-10-21 ASSESSMENT — ACTIVITIES OF DAILY LIVING (ADL)
ADLS_ACUITY_SCORE: 76
ADLS_ACUITY_SCORE: 76
ADLS_ACUITY_SCORE: 80
ADLS_ACUITY_SCORE: 77
ADLS_ACUITY_SCORE: 76
ADLS_ACUITY_SCORE: 76
ADLS_ACUITY_SCORE: 77
ADLS_ACUITY_SCORE: 77
ADLS_ACUITY_SCORE: 76

## 2022-10-21 NOTE — PLAN OF CARE
Problem: Pain Acute  Goal: Acceptable Pain Control and Functional Ability  Outcome: Progressing  Intervention: Prevent or Manage Pain  Recent Flowsheet Documentation  Taken 10/21/2022 0100 by Shantel Machuca RN  Sensory Stimulation Regulation: quiet environment promoted  Medication Review/Management: medications reviewed  Intervention: Optimize Psychosocial Wellbeing  Recent Flowsheet Documentation  Taken 10/21/2022 0100 by Shantel Machuca RN  Supportive Measures: active listening utilized     Problem: Fall Injury Risk  Goal: Absence of Fall and Fall-Related Injury  Outcome: Progressing  Intervention: Identify and Manage Contributors  Recent Flowsheet Documentation  Taken 10/21/2022 0100 by Shantel Machuca RN  Medication Review/Management: medications reviewed  Intervention: Promote Injury-Free Environment  Recent Flowsheet Documentation  Taken 10/21/2022 0100 by Shantel Machuca RN  Safety Promotion/Fall Prevention:    bed alarm on    clutter free environment maintained    fall prevention program maintained    increased rounding and observation    increase visualization of patient    lighting adjusted    room near nurse's station   Goal Outcome Evaluation:       Pt slept > 6 hours of the night, denied pain / discomforts, safety measures in progress, will continue to monitor.

## 2022-10-21 NOTE — PLAN OF CARE
Problem: Pain Acute  Goal: Acceptable Pain Control and Functional Ability  Intervention: Prevent or Manage Pain  Recent Flowsheet Documentation  Taken 10/21/2022 1234 by Maile Montesinos RN  Sensory Stimulation Regulation: quiet environment promoted  Sleep/Rest Enhancement: comfort measures  Bowel Elimination Promotion:   commode/bedpan at bedside   adequate fluid intake promoted  Medication Review/Management: medications reviewed     Problem: Hypertension Acute  Goal: Blood Pressure Within Desired Range  Outcome: Progressing  Intervention: Normalize Blood Pressure  Recent Flowsheet Documentation  Taken 10/21/2022 1234 by Maile Montesinos RN  Sensory Stimulation Regulation: quiet environment promoted  Medication Review/Management: medications reviewed   VSS, appeared comfortable. At times c/o discomfort of left shoulder, lidocaine patch applied. Pt had video swallow done today. Up in chair for 2 hrs, tolerated well. Pt resting in bed at this time, will continue monitoring.

## 2022-10-21 NOTE — PROGRESS NOTES
Speech-Language Pathology: Video Swallow Study     10/21/22 0900   Appointment Info   Signing Clinician's Name / Credentials (SLP) Sailaja Marte MA, CCC-SLP   General Information   Onset of Illness/Injury or Date of Surgery 09/15/22   Referring Physician Clarisse Barbosa APRN CNP   Pertinent History of Current Problem Per MD note, dated yesterday:Alexandru Still is a 89 year old man w/ PMH of Lambert-Eaton syndrome, hypothyroidism,  complete heart block s/p pacemaker, HTN, BPH,  PB, and DM type 2 who was admitted to the SICU on 9/15/2022 following an unwitnessed fall and striking his head. He was initially seen at an outside hospital and underwent a comprehensive trauma work up found to have a left frontal SDH (5mm) + C1 fx + Type II C2 odontoid fracture with a 1cm posterior displacement. He was  intubated for airway protection in the ED given high c-spine injury and difficulty managing his secretions. On admission he requested all cares without surgical cervical spine stabilization, agreeable to a tracheostomy and PEG.   General Observations Decannulated 10/17, on RA. Wearing C-collar.   Type of Evaluation   Type of Evaluation Swallow Evaluation   General Swallowing Observations   Past History of Dysphagia Yes. Significant hx of dysphagia. Last VFSS was on 10/6/22. Patient has been tolerating a puree and moderately thick diet. He was decannulated on 10/17.   Current Diet/Method of Nutritional Intake (General Swallowing Observations, NIS) moderately thick liquids/liquidized (level 3);pureed (level 4)   Swallowing Evaluation Videofluoroscopic swallow study (VFSS)   VFSS Evaluation   Radiologist Melany Ramirez   Views Taken left lateral   VFSS Textures Trialed thin liquids;mildly thick liquids;moderately thick liquids/liquidized;pureed;soft & bite-sized   VFSS Eval: Thin Liquid Texture Trial   Mode of Presentation, Thin Liquid cup;spoon;straw;self-fed   Order of Presentation 7,8,9,10   Preparatory Phase WFL    Bolus Location When Swallow Triggered pyriforms;valleculae   Pharyngeal Phase, Thin Liquid impaired epiglottic movement  (posterior inferior)   Rosenbek's Penetration Aspiration Scale: Thin Liquid Trial Results 3 - contrast remains above the vocal cords, visible residue remains (penetration)  (Via large consecutive straw sips only.)   Response to Aspiration productive cued cough   Strategies and Compensations reduce bolus size  (No straw)   Diagnostic Statement Patient had no penetration and improved timing of swallow with use of small single cup sips.   VFSS Eval: Mildly Thick Liquids   Mode of Presentation cup;spoon;straw;self-fed   Order of Presentation 4,5,6,13   Preparatory Phase WFL   Bolus Location When Swallow Triggered valleculae;pyriforms   Pharyngeal Phase impaired epiglottic movement  (posterior inferior)   Rosenbek's Penetration Aspiration Scale 1 - no aspiration, contrast does not enter airway   Diagnostic Statement No penetration or aspiration despite large consecutive bolus. Increased pourover noted with larger bolus size.   VFSS Eval: Moderately Thick Liquids   Mode of Presentation cup;spoon   Order of Presentation 1,2,3   Preparatory Phase WFL   Bolus Location When Swallow Triggered valleculae   Pharyngeal Phase impaired epiglottic movement  (posterior inferior)   Rosenbek's Penetration Aspiration Scale 1 - no aspiration, contrast does not enter airway   VFSS Evaluation: Puree Solid Texture Trial   Mode of Presentation, Puree spoon   Order of Presentation 11  (additional trials not administered d/t focus on liquids and currently tolerating puree diet.)   Preparatory Phase WFL   Bolus Location When Swallow Triggered valleculae   Pharyngeal Phase, Puree impaired epiglottic movement;residue in vallecula  (posterior inferior, minimal debris clears with spontaneous swallow.)   Rosenbek's Penetration Aspiration Scale: Puree Food Trial Results 1 - no aspiration, contrast does not enter airway   VFSS  Eval: Soft & Bite Sized   Mode of Presentation spoon   Order of presentation 12  (Additional trials not administered d/t severely prolonged oral phase and poor mastication.)   Preparatory Phase prolonged bolus preparation;insufficient mastication   Oral Phase WFL   Bolus Location When Swallow Triggered valleculae   Pharyngeal Phase residue in vallecula;impaired epiglottic movement   Rosenbek's Penetration Aspiration Scale 1 - no aspiration, contrast does not enter airway   Strategies and Compensations liquid wash   Diagnostic Statement Significantly prolonged preparatory phase and poor mastication. Prior to study the patient reported he would be able to masticate this texture despited being edentulous. Stasis cleared with liquid wash.   Esophageal Phase of Swallow   Patient reports or presents with symptoms of esophageal dysphagia No   Swallowing Recommendations   Diet Consistency Recommendations mildly thick liquids (level 2);pureed (level 4)   Supervision Level for Intake 1:1 supervision needed   Mode of Delivery Recommendations bolus size, small;no straws;slow rate of intake   Monitoring/Assistance Required (Eating/Swallowing) cue for finger/lingual sweep if oral pocketing present   Recommended Feeding/Eating Techniques (Swallow Eval) maintain upright sitting position for eating;maintain upright posture during/after eating for 30 minutes;provide assist with feeding   Medication Administration Recommendations, Swallowing (SLP) One at a time in puree   Clinical Impression   Criteria for Skilled Therapeutic Interventions Met (SLP Eval) Yes, treatment indicated   Risks & Benefits of therapy have been explained evaluation/treatment results reviewed;care plan/treatment goals reviewed;participants voiced agreement with care plan;participants included;patient   Clinical Impression Comments Repeat videofluoroscopic swallow study completed. No aspiration was observed during this evaluation. Patient presented with moderate  laryngeal penetration via large consecutive straw sips only. No penetration via small single cup or straw sip. Oral phase of swallow was WFL for puree, moderately thick, mildly thick, and thin. Bolus prep and posterior transit were significantly prolonged and inadequate with soft and bite sized texture. Pt had poor awareness of current state of dentition as well as capability. Tongue base retraction was WFL. Swallow was initiated at the valleculae with puree, moderately thick, and small single sips of thin or mildly thick. Large and consecutive bolus of thin and mildly thick resulted in swallow initiation past epiglottis or at the pyriform sinuses. This resulted in laryngeal penetration with large consecutive sips of thin only. Epiglottic inversion was posterior-inferior. Hyolaryngeal elevation was WNL. Hyolaryngeal excursion was WNL. Pharyngeal constriction was WFL. Patient is okay for liquid upgrade to mildly thick. Ongoing training of safe swallow strategies is warranted prior to further advancement.   SLP Total Evaluation Time   Eval: oral/pharyngeal swallow function, clinical swallow Minutes ([id number]) 15              SLP Discharge Planning   SLP Discharge Recommendation Transitional Care Facility;Acute Rehab Center-Motivated patient will benefit from intensive, interdisciplinary therapy.  Anticipate will be able to tolerate 3 hours of therapy per day;home with home care speech therapy   SLP Rationale for DC Rec Below baseline for swallowing, cognition   SLP Brief overview of current status  Recommend upgrade to mildly thick liquids, continue puree solids. He will require set up, assist, and close supervision to enforce swallow strategies (up in chair, small single sips/bites, slow pace).

## 2022-10-21 NOTE — PROGRESS NOTES
[facility]    Medicine Progress Note - Hospitalist Service    Date of Admission:  9/22/2022  Brief Hospital Course Summary:    Alexandru Still is a 89 year old man w/ PMH of Lambert-Eaton syndrome, hypothyroidism,  complete heart block s/p pacemaker, HTN, BPH,  PB, and DM type 2 who was admitted to the SICU on 9/15/2022 following an unwitnessed fall and striking his head. He was initially seen at an outside hospital and underwent a comprehensive trauma work up found to have a left frontal SDH (5mm) + C1 fx + Type II C2 odontoid fracture with a 1cm posterior displacement. He was  intubated for airway protection in the ED given high c-spine injury and difficulty managing his secretions. On admission he requested all cares without surgical cervical spine stabilization, agreeable to a tracheostomy and PEG. Multiple care conferences held with his family who confirmed his wishes.     LTACH course:    9/24-9/26:  Speech evaluated patient and recommends NPO, cont tube feeds via PEG due to severe oropharyngeal dysphasia.  PT/OT, dietitian, wo nurse saw pt on 9/23.  Pulled out G tube during night of 9/23. Pt now on soft wrist restraints.  IVF changed to D51/2 NS at low rate as pt has TAVR.  Morphine IV ordered prn pain.    Spoke w/ Dr. Brown- IR - recommends no hannah tube as this is a brand new PEG and placing it blind will likely lead it to not be in correct position.  He will not do a PEG placement on the weekend, scheduled for Monday.  Pt cannot be fed via NG tube as it is contraindicated with C1 and C2 fx.  For PEG tube replacement (herpain on hold, place ICD)on 9/26.  Will discontinue IVF once PEG tube is placed and restart po meds.  Given NaPhos 9/26 9/27-10/3:  9/27 patient had PEG tube replaced after patient himself removed it on 9/23.  Was a started on tube feeding.  P.O. medication were restarted.  IV fluids were discontinued.  He still needs soft restraint x2.  Is off ventilator and stable.  9/28 Failed blue  dye test, Continue NPO   9/30 patient had [place] 300 collar pads delivered  10/1 SLP found patient well below baseline for swallowing, cognition and communication.  Patient stays n.p.o. except ice chips    10/4/22 -/10/8:   -No acute events, Vent weaning phase 2. Continue diuresis with bumex. Continue plan of care. Cognitive testing PENDING. May need neuropsych evaluation depending on cognitive evaluation.     10/9-10/17: he follows commands, cont [place].  He does better with glasses and hearing device on.  He has neck pain and headache.  Constipation issues.  Speech eval with swallow study and placed on puree with moderately, thick fluids.  Stopped insulin sliding scale as pt has normal glucose.        10/18-10/19:   decannulated yesterday, confused overnight,  +back pain.  High risk of aspiration.  Consult palliative care as he has intermittent confusion.  Need goals of care   10/20:  Seen by gabrielle yesterday, Doing well on room air overnight, understands he has risk of aspiration and wants eat and willing to take risk.  BP elevated -add lisinopril  10/21:  Needs assistance for feeding, redness of L side of jaw, buttock, and perineum - calmoseptine applied      Assessment & Plan        Traumatic 5mm left frontal lobe SDH  Posteriorly displaced (1 cm) Type II odontoid fx  C1 anterior arch fracture   Epidural hemorrhage of 7mm  Acute traumatic neck pain  Acute L 6th rib fracture  S/p fall, Facial abrasions with ecchymoses  RLE wound  He was seen and evaluated by Neurosurgery.  Pt refused surgical intervention.   A repeat head CT was obtained with a stable subdural hematoma. He was placed in a cervical collar for the cervical fracture. No surgical intervention per patient preference.  -Cont [place] J -keep in place at all times  -Tylenol, oxy prn for pain, changed tylenol to extra strength  -Neurosurg ok lovenox for DVT ppx  -Wound care following   -f/u Neurosurgery in 6 weeks with an upright XR of the cervical  spine (November PENDING appointment)  -CT cervical spine in 3 months (first week of December).   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 Failed bedside and radiology swallow. He completed 3 doses of Decadron for laryngeal edema.   -S/P tracheostomy 09/21/2022, Trach suture removal on 09/26  -Pulm and RT following  -decannulated on 10/17        Hypertension   Complete heart block s/p PM placement  Nonrheumatic aortic valve stenosis s/p TAVR 2019  -Monitor  -bumex, cardura  -added lisinopril   -Echo on 9/16:  EF: 60-65%, no LV dysfunction     Severe orophayngeal dysphagia   S/P PEG   Severe protein calorie malnutrition  Hiatal hernia  -PEG tube placed 09/20. Pt pulled out PEG tube on night of 9/23, replaced on 9/27  -off TF   -RD following  -needs feeder for each meal due to [place] J- order placed      Hypophosphatemia  Hypomagnesia   Hypokalemia  -Monitor and replete as needed    Hyponatremia  -IVF recheck Na- if ok will discontinue fluids     Hypothyroidism   -Continue Levothyroxine     Anemia of chronic disease  -Hg stable  -Cont to monitor intermittently.     BPH  -Cont doxazosin     DM type 2  -discontinue Sliding scale insulin  -Last HbA1:  6.2  -Hypoglycemic protocol    L shoulder pain   -lidocaine patch ordered     Deconditioning  -PT/OT following           Diet: Pureed Diet (level 4) Liquidized/Moderately Thick (level 3)  Snacks/Supplements Adult: Magic  Cup; Between Meals    DVT Prophylaxis: Lovenox  [place] Catheter: Not present  Central Lines: None    Cardiac Monitoring: None  Code Status: No CPR- Do NOT Intubate      Disposition Plan     Discharge date: TBD, to TCU on discharge  Barriers: Placement     The patient's care was discussed with patient, RN, SW/CM    Neli Rubio MD  Hospitalist Service  LTACH  Securely message with the Vocera Web Console (learn more here)  Text page via [employer] Paging/Directory           ______________________________________________________________________    Interval History    Chart reviewed and events noted.  Patient seen and examined.     Reports L shoulder pain, constant, agrees to trying lidocaine patch.  He reports that his neck pain has improved from yesterday.   He has no chest pain, dyspnea, or painful urination    ROS:  A 10-system review was obtained and is negative with the exception of the symptoms noted above    Data reviewed today: I reviewed all medications, new labs and imaging results over the last 24 hours.     Physical Exam   Vital Signs: Temp: 98.3  F (36.8  C) Temp src: Axillary BP: (!) 156/70 Pulse: 57   Resp: 20 SpO2: 94 % O2 Device: None (Room air)    Weight: 183 lbs 0 oz    General:  No acute distress,awake and alert  HEENT: [place] J in place, trach in place  Lungs:  Clear to auscultation bilaterally  CVS:  S1 and S2, RRR  Abdomen:  Soft, nontender, PEG in place  Musculoskeletal:  No edema  Neuro: Awake, alert, oriented, speech+, following commands.   Skin:  erythematous area of R jaw, buttocks and perineum    Data   Recent Labs   Lab 10/21/22  0805 10/20/22  0714 10/17/22  1835 10/17/22  0649 10/17/22  0646   WBC  --  6.0  --   --  8.7   HGB  --  9.8*  --  10.5* 10.0*   MCV  --  93  --   --  92   PLT  --  251  --   --  262   NA  --  135*  --  137 133*   POTASSIUM  --  4.1  --  3.7 3.9   CHLORIDE  --  98  --   --  97*   CO2  --  27  --   --  30*   BUN  --  14.2  --   --  12.0   CR  --  0.78  --   --   0.74   ANIONGAP  --  10  --   --  6*   TITA  --  8.6  --   --  8.7   * 88 112* 84 87   ALBUMIN  --  2.9*  --   --   --    PROTTOTAL  --  6.1*  --   --   --    BILITOTAL  --  0.4  --   --   --    ALKPHOS  --  97  --   --   --    ALT  --  16  --   --   --    AST  --  21  --   --   --      No results found for this or any previous visit (from the past 24 hour(s)).  Medications     - MEDICATION INSTRUCTIONS -         bumetanide  0.5 mg Oral Daily     calcium carbonate  500 mg Oral BID w/meals     doxazosin  2 mg Oral Daily     enoxaparin ANTICOAGULANT  40 mg Subcutaneous Q24H     lactulose  10 g Oral BID     levothyroxine  112 mcg Oral QAM AC     lisinopril  5 mg Oral Daily     multivitamin w/minerals  1 tablet Oral Daily     pantoprazole  40 mg Oral or Feeding Tube QAM AC     sennosides  1 tablet Oral BID     cholecalciferol  25 mcg Oral Daily

## 2022-10-22 ENCOUNTER — APPOINTMENT (OUTPATIENT)
Dept: PHYSICAL THERAPY | Facility: CLINIC | Age: 87
DRG: 208 | End: 2022-10-22
Attending: INTERNAL MEDICINE
Payer: MEDICARE

## 2022-10-22 LAB
ANION GAP SERPL CALCULATED.3IONS-SCNC: 7 MMOL/L (ref 7–15)
BUN SERPL-MCNC: 14.5 MG/DL (ref 8–23)
CALCIUM SERPL-MCNC: 8.6 MG/DL (ref 8.2–9.6)
CHLORIDE SERPL-SCNC: 99 MMOL/L (ref 98–107)
CREAT SERPL-MCNC: 0.76 MG/DL (ref 0.67–1.17)
DEPRECATED HCO3 PLAS-SCNC: 29 MMOL/L (ref 22–29)
GFR SERPL CREATININE-BSD FRML MDRD: 84 ML/MIN/1.73M2
GLUCOSE BLDC GLUCOMTR-MCNC: 150 MG/DL (ref 70–99)
GLUCOSE BLDC GLUCOMTR-MCNC: 91 MG/DL (ref 70–99)
GLUCOSE SERPL-MCNC: 93 MG/DL (ref 70–99)
POTASSIUM SERPL-SCNC: 3.9 MMOL/L (ref 3.4–5.3)
SODIUM SERPL-SCNC: 135 MMOL/L (ref 136–145)

## 2022-10-22 PROCEDURE — 250N000011 HC RX IP 250 OP 636: Performed by: HOSPITALIST

## 2022-10-22 PROCEDURE — 250N000013 HC RX MED GY IP 250 OP 250 PS 637: Performed by: HOSPITALIST

## 2022-10-22 PROCEDURE — 250N000013 HC RX MED GY IP 250 OP 250 PS 637: Performed by: NURSE PRACTITIONER

## 2022-10-22 PROCEDURE — 250N000013 HC RX MED GY IP 250 OP 250 PS 637: Performed by: INTERNAL MEDICINE

## 2022-10-22 PROCEDURE — 97116 GAIT TRAINING THERAPY: CPT | Mod: GP

## 2022-10-22 PROCEDURE — 80048 BASIC METABOLIC PNL TOTAL CA: CPT | Performed by: HOSPITALIST

## 2022-10-22 PROCEDURE — 99232 SBSQ HOSP IP/OBS MODERATE 35: CPT | Performed by: HOSPITALIST

## 2022-10-22 PROCEDURE — 36415 COLL VENOUS BLD VENIPUNCTURE: CPT | Performed by: HOSPITALIST

## 2022-10-22 PROCEDURE — 97530 THERAPEUTIC ACTIVITIES: CPT | Mod: GP

## 2022-10-22 PROCEDURE — 120N000017 HC R&B RESPIRATORY CARE

## 2022-10-22 RX ADMIN — Medication 40 MG: at 06:01

## 2022-10-22 RX ADMIN — CALCIUM 500 MG: 500 TABLET ORAL at 12:39

## 2022-10-22 RX ADMIN — CALCIUM 500 MG: 500 TABLET ORAL at 16:07

## 2022-10-22 RX ADMIN — LISINOPRIL 5 MG: 2.5 TABLET ORAL at 08:52

## 2022-10-22 RX ADMIN — LIDOCAINE 1 PATCH: 246 PATCH TOPICAL at 08:53

## 2022-10-22 RX ADMIN — ENOXAPARIN SODIUM 40 MG: 100 INJECTION SUBCUTANEOUS at 18:09

## 2022-10-22 RX ADMIN — BUMETANIDE 0.5 MG: 0.5 TABLET ORAL at 08:53

## 2022-10-22 RX ADMIN — Medication 25 MCG: at 08:53

## 2022-10-22 RX ADMIN — DOXAZOSIN 2 MG: 1 TABLET ORAL at 08:52

## 2022-10-22 RX ADMIN — MULTIPLE VITAMINS W/ MINERALS TAB 1 TABLET: TAB at 12:39

## 2022-10-22 RX ADMIN — LEVOTHYROXINE SODIUM 112 MCG: 0.11 TABLET ORAL at 06:00

## 2022-10-22 RX ADMIN — SENNOSIDES 1 TABLET: 8.6 TABLET, FILM COATED ORAL at 08:53

## 2022-10-22 ASSESSMENT — ACTIVITIES OF DAILY LIVING (ADL)
ADLS_ACUITY_SCORE: 77

## 2022-10-22 NOTE — PLAN OF CARE
"          Problem: Plan of Care - These are the overarching goals to be used throughout the patient stay.    Goal: Patient-Specific Goal (Individualized)  Description: You can add care plan individualizations to a care plan. Examples of Individualization might be:  \"Parent requests to be called daily at 9am for status\", \"I have a hard time hearing out of my right ear\", or \"Do not touch me to wake me up as it startles me\".  Outcome: Progressing     Problem: Pain Acute  Goal: Acceptable Pain Control and Functional Ability  Outcome: Progressing  Intervention: Prevent or Manage Pain  Recent Flowsheet Documentation  Taken 10/21/2022 1615 by Pam Lin RN  Sensory Stimulation Regulation: quiet environment promoted  Sleep/Rest Enhancement:   awakenings minimized   natural light exposure provided   noise level reduced   regular sleep/rest pattern promoted   relaxation techniques promoted  Bowel Elimination Promotion:   adequate fluid intake promoted   privacy promoted  Complementary Therapy: (Therapeutic presence) other (see comments)  Medication Review/Management: medications reviewed  Intervention: Optimize Psychosocial Wellbeing  Recent Flowsheet Documentation  Taken 10/21/2022 1615 by Pam Lin RN  Supportive Measures:   active listening utilized   positive reinforcement provided   verbalization of feelings encouraged   relaxation techniques promoted     Problem: Fall Injury Risk  Goal: Absence of Fall and Fall-Related Injury  Outcome: Progressing  Intervention: Identify and Manage Contributors  Recent Flowsheet Documentation  Taken 10/21/2022 1615 by Pam Lin RN  Medication Review/Management: medications reviewed  Intervention: Promote Injury-Free Environment  Recent Flowsheet Documentation  Taken 10/21/2022 1615 by Pam Lin RN  Safety Promotion/Fall Prevention: bed alarm on     Problem: Confusion Chronic  Goal: Optimal Cognitive Function  Outcome: " Progressing  Intervention: Minimize Injury Risk and Provide Safety  Recent Flowsheet Documentation  Taken 10/21/2022 1615 by Pam Lin RN  Enhanced Safety Measures:   bed alarm set   room near unit station  Intervention: Minimize and Manage Confusion  Recent Flowsheet Documentation  Taken 10/21/2022 1615 by Pam Lin RN  Sensory Stimulation Regulation: quiet environment promoted  Reorientation Measures:   clock in view   glasses use encouraged   hearing device use encouraged  Environmental Support:   calm environment promoted   personal routine supported   rest periods encouraged  Environment Familiarity/Consistency:   daily routine followed   familiar objects from home provided     Goal Outcome Evaluation:     Patient's vital signs were within stable limits. He denied pains. Lidoderm Patch  held intact. He had 5 BM's today,  small to large in amount mostly soft in consistencies. His scheduled dose of Senna tablet was held  for this evening.  He was scratching his belly at bedtime and noted to be touching his feeding tube. A brief  and abdominal  binder were applied  to keep him from pulling his feeding tube.  He was also redirected  to keep his hands off his tube and  he agreed. Will keep monitoring patient's progress and safety.

## 2022-10-22 NOTE — PLAN OF CARE
Problem: Plan of Care - These are the overarching goals to be used throughout the patient stay.    Goal: Optimal Comfort and Wellbeing  Outcome: Progressing  Intervention: Provide Person-Centered Care  Recent Flowsheet Documentation  Taken 10/22/2022 0100 by Sona Keith, RN  Trust Relationship/Rapport:    care explained    choices provided    emotional support provided   Patient slept on and off last night. Slept 5-6 hrs. Denied any discomfort. He had large bowel movement at 0600. Will continue to monitor.

## 2022-10-22 NOTE — PLAN OF CARE
Problem: Pain Acute  Goal: Acceptable Pain Control and Functional Ability  Intervention: Prevent or Manage Pain  Recent Flowsheet Documentation  Taken 10/22/2022 1220 by Maile Montesinos RN  Sensory Stimulation Regulation:   television on   quiet environment promoted  Sleep/Rest Enhancement: comfort measures  Bowel Elimination Promotion: adequate fluid intake promoted  Medication Review/Management: medications reviewed     Problem: Fall Injury Risk  Goal: Absence of Fall and Fall-Related Injury  Outcome: Progressing  Intervention: Identify and Manage Contributors  Recent Flowsheet Documentation  Taken 10/22/2022 1220 by Maile Montesinos RN  Medication Review/Management: medications reviewed  Intervention: Promote Injury-Free Environment  Recent Flowsheet Documentation  Taken 10/22/2022 1220 by Maile Montesinos RN  Safety Promotion/Fall Prevention:   activity supervised   bed alarm on   chair alarm on   fall prevention program maintained   clutter free environment maintained   mobility aid in reach   nonskid shoes/slippers when out of bed   room door open   VSS, denies pain. Ate 100% & 50% for break fast and lunch.  Blood sugar 91 & 150 before break fast & lunch. Attended therapy as schedule. Up in the chair for 2 hrs, tolerated well. Will continue monitoring.

## 2022-10-22 NOTE — PROGRESS NOTES
[facility]    Medicine Progress Note - Hospitalist Service    Date of Admission:  9/22/2022  Brief Hospital Course Summary:    Alexandru Still is a 89 year old man w/ PMH of Lambert-Eaton syndrome, hypothyroidism,  complete heart block s/p pacemaker, HTN, BPH,  PB, and DM type 2 who was admitted to the SICU on 9/15/2022 following an unwitnessed fall and striking his head. He was initially seen at an outside hospital and underwent a comprehensive trauma work up found to have a left frontal SDH (5mm) + C1 fx + Type II C2 odontoid fracture with a 1cm posterior displacement. He was  intubated for airway protection in the ED given high c-spine injury and difficulty managing his secretions. On admission he requested all cares without surgical cervical spine stabilization, agreeable to a tracheostomy and PEG. Multiple care conferences held with his family who confirmed his wishes.     LTACH course:    9/24-9/26:  Speech evaluated patient and recommends NPO, cont tube feeds via PEG due to severe oropharyngeal dysphasia.  PT/OT, dietitian, wo nurse saw pt on 9/23.  Pulled out G tube during night of 9/23. Pt now on soft wrist restraints.  IVF changed to D51/2 NS at low rate as pt has TAVR.  Morphine IV ordered prn pain.    Spoke w/ Dr. Brown- IR - recommends no hannah tube as this is a brand new PEG and placing it blind will likely lead it to not be in correct position.  He will not do a PEG placement on the weekend, scheduled for Monday.  Pt cannot be fed via NG tube as it is contraindicated with C1 and C2 fx.  For PEG tube replacement (herpain on hold, place ICD)on 9/26.  Will discontinue IVF once PEG tube is placed and restart po meds.  Given NaPhos 9/26 9/27-10/3:  9/27 patient had PEG tube replaced after patient himself removed it on 9/23.  Was a started on tube feeding.  P.O. medication were restarted.  IV fluids were discontinued.  He still needs soft restraint x2.  Is off ventilator and stable.  9/28 Failed blue  dye test, Continue NPO   9/30 patient had [place] J 300 collar pads delivered  10/1 SLP found patient well below baseline for swallowing, cognition and communication.  Patient stays n.p.o. except ice chips    10/4/22 -/10/8:   -No acute events, Vent weaning phase 2. Continue diuresis with bumex. Continue plan of care. Cognitive testing PENDING. May need neuropsych evaluation depending on cognitive evaluation.     10/9-10/17: he follows commands, cont [place] J.  He does better with glasses and hearing device on.  He has neck pain and headache.  Constipation issues.  Speech eval with swallow study and placed on puree with moderately, thick fluids.  Stopped insulin sliding scale as pt has normal glucose.        10/18-10/19:   decannulated yesterday, confused overnight,  +back pain.  High risk of aspiration.  Consult palliative care as he has intermittent confusion.  Need goals of care   10/20:  Seen by gabrielle yesterday, Doing well on room air overnight, understands he has risk of aspiration and wants eat and willing to take risk.  BP elevated -add lisinopril  10/21:  Needs assistance for feeding, redness of L side of jaw, buttock, and perineum - calmoseptine applied  10/22: swallow test done yesterday- no aspiration observed, recommend: mildly thick, puree solids.  Scratching at PEG- abdominal binder applied for safety.        Assessment & Plan        Traumatic 5mm left frontal lobe SDH  Posteriorly displaced (1 cm) Type II odontoid fx  C1 anterior arch fracture   Epidural hemorrhage of 7mm  Acute traumatic neck pain  Acute L 6th rib fracture  S/p fall, Facial abrasions with ecchymoses  RLE wound  He was seen and evaluated by Neurosurgery.  Pt refused surgical intervention.   A repeat head CT was obtained with a stable subdural hematoma. He was placed in a cervical collar for the cervical fracture. No surgical intervention per patient preference.  -Cont [place] J -keep in place at all times  -Tylenol, oxy prn for pain,  changed tylenol to extra strength  -Neurosurg ok lovenox for DVT ppx  -Wound care following   -f/u Neurosurgery in 6 weeks with an upright XR of the cervical spine (November PENDING appointment)  -CT cervical spine in 3 months (first week of December).   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 Failed bedside and radiology swallow. He completed 3 doses of Decadron for laryngeal edema.   -S/P tracheostomy 09/21/2022, Trach suture removal on 09/26  -Pulm and RT following  -decannulated on 10/17        Hypertension   Complete heart block s/p PM placement  Nonrheumatic aortic valve stenosis s/p TAVR 2019  -Monitor  -bumex, cardura  -added lisinopril   -Echo on 9/16:  EF: 60-65%, no LV dysfunction     Severe orophayngeal dysphagia   S/P PEG   Severe protein calorie malnutrition  Hiatal hernia  -PEG tube placed 09/20. Pt pulled out PEG tube on night of 9/23, replaced on 9/27  -off TF   -RD following  -needs feeder for each meal due to [place] J- order placed      Hypophosphatemia  Hypomagnesia   Hypokalemia  -Monitor and replete as needed    Hyponatremia  -IVF recheck Na- if ok will discontinue fluids     Hypothyroidism   -Continue Levothyroxine     Anemia of chronic disease  -Hg stable  -Cont to monitor intermittently.     BPH  -Cont doxazosin     DM type 2  -discontinue Sliding scale insulin  -Last HbA1:  6.2  -Hypoglycemic protocol    L shoulder pain   -lidocaine  patch      Deconditioning  -PT/OT following           Diet: Snacks/Supplements Adult: Magic Cup; Between Meals  Pureed Diet (level 4) Mildly Thick (level 2)    DVT Prophylaxis: Lovenox  [place] Catheter: Not present  Central Lines: None    Cardiac Monitoring: None  Code Status: No CPR- Do NOT Intubate      Disposition Plan     Discharge date: TBD, to TCU on discharge  Barriers: Placement     The patient's care was discussed with patient, RN, SW/AINSLEY Rubio MD  Hospitalist Service  LTACH  Securely message with the Vocera Web Console (learn more here)  Text page via [facility] Paging/Directory           ______________________________________________________________________    Interval History    Chart reviewed and events noted.  Patient seen and examined.     Reports L shoulder pain has improved with lidocaine patch     He has no chest pain, dyspnea, or painful urination    ROS:  A 10-system review was obtained and is negative with the exception of the symptoms noted above    Data reviewed today: I reviewed all medications, new labs and imaging results over the last 24 hours.     Physical Exam   Vital Signs: Temp: 97.8  F (36.6  C) Temp src: Axillary BP: 130/77 Pulse: 66   Resp: 20 SpO2: 97 % O2 Device: None (Room air)    Weight: 188 lbs 1.6 oz    General:  No acute distress,awake and alert  HEENT: [place] J in place, trach in place  Lungs:  Clear to auscultation bilaterally  CVS:  S1 and S2, RRR  Abdomen:  Soft, nontender, PEG in place  Musculoskeletal:  No edema  Neuro: Awake, alert, oriented, speech+, following commands.   Skin:  erythematous area of R jaw, buttocks and perineum    Data   Recent Labs   Lab 10/22/22  0800 10/22/22  0715 10/21/22  1218 10/21/22  0805 10/20/22  0714 10/17/22  1835 10/17/22  0649 10/17/22  0646   WBC  --   --   --   --  6.0  --   --  8.7   HGB  --   --   --   --  9.8*  --  10.5* 10.0*   MCV  --   --   --   --  93  --   --  92   PLT  --   --   --   --  251  --   --  262   NA  --   135*  --   --  135*  --  137 133*   POTASSIUM  --  3.9  --   --  4.1  --  3.7 3.9   CHLORIDE  --  99  --   --  98  --   --  97*   CO2  --  29  --   --  27  --   --  30*   BUN  --  14.5  --   --  14.2  --   --  12.0   CR  --  0.76  --   --  0.78  --   --  0.74   ANIONGAP  --  7  --   --  10  --   --  6*   TITA  --  8.6  --   --  8.6  --   --  8.7   GLC 91 93 124*   < > 88   < > 84 87   ALBUMIN  --   --   --   --  2.9*  --   --   --    PROTTOTAL  --   --   --   --  6.1*  --   --   --    BILITOTAL  --   --   --   --  0.4  --   --   --    ALKPHOS  --   --   --   --  97  --   --   --    ALT  --   --   --   --  16  --   --   --    AST  --   --   --   --  21  --   --   --     < > = values in this interval not displayed.     Recent Results (from the past 24 hour(s))   XR Video Swallow with SLP or OT    Narrative    EXAM: XR VIDEO SWALLOW WITH SLP OR OT  LOCATION: [facility]  DATE/TIME: 10/21/2022 10:00 AM    INDICATION: Difficulty swallowing.  COMPARISON: 10/6/2022.    TECHNIQUE: Routine swallow study with speech pathology using multiple barium thicknesses.    FINDINGS:   FLUOROSCOPIC TIME: 1.4 minutes  NUMBER OF IMAGES: 0    Swallow study with Speech Pathology using multiple barium thicknesses.     Adequate oral phase.    Delay in swallow reflex during swallowing of thin and mildly thick liquid, past the epiglottis. No delay in initiation of swallow reflex during swallowing of moderately thick liquid, puree or crunchy solid consistency. Complete epiglottic inversion was   demonstrated.    Single episode of deep laryngeal penetration occurred during consecutive swallows of thin liquid from a straw. No laryngeal penetration or aspiration was seen during swallowing of single swallows of thin liquid, mildly thick liquid, moderately thick   liquid, puree or crunchy solid consistencies.    No significant stasis.      Impression    IMPRESSION:  1.  Adequate oral phase.  2.  Inconsistent delay in swallow  reflex with complete epiglottic inversion.  3.  Single episode of deep laryngeal penetration during swallowing of thin liquid.  4.  No significant stasis.      Please refer to speech therapy dysphasia evaluation report for additional information.    Examination performed by Melany Hansen, LAURYN/GEOVANNA, RT (R) under the general supervision of Dr. Kian Juan.         Medications     - MEDICATION INSTRUCTIONS -         bumetanide  0.5 mg Oral Daily     calcium carbonate  500 mg Oral BID w/meals     doxazosin  2 mg Oral Daily     enoxaparin ANTICOAGULANT  40 mg Subcutaneous Q24H     levothyroxine  112 mcg Oral QAM AC     lidocaine  1 patch Transdermal Q24H     lidocaine   Transdermal Q8H ZHEN     lisinopril  5 mg Oral Daily     multivitamin w/minerals  1 tablet Oral Daily     pantoprazole  40 mg Oral or Feeding Tube QAM AC     sennosides  1 tablet Oral BID     cholecalciferol  25 mcg Oral Daily

## 2022-10-23 PROCEDURE — 250N000013 HC RX MED GY IP 250 OP 250 PS 637: Performed by: HOSPITALIST

## 2022-10-23 PROCEDURE — 250N000013 HC RX MED GY IP 250 OP 250 PS 637: Performed by: NURSE PRACTITIONER

## 2022-10-23 PROCEDURE — 120N000017 HC R&B RESPIRATORY CARE

## 2022-10-23 PROCEDURE — 250N000011 HC RX IP 250 OP 636: Performed by: HOSPITALIST

## 2022-10-23 PROCEDURE — 250N000013 HC RX MED GY IP 250 OP 250 PS 637: Performed by: INTERNAL MEDICINE

## 2022-10-23 PROCEDURE — 99232 SBSQ HOSP IP/OBS MODERATE 35: CPT | Performed by: HOSPITALIST

## 2022-10-23 RX ADMIN — LISINOPRIL 5 MG: 2.5 TABLET ORAL at 09:08

## 2022-10-23 RX ADMIN — MULTIPLE VITAMINS W/ MINERALS TAB 1 TABLET: TAB at 11:29

## 2022-10-23 RX ADMIN — ENOXAPARIN SODIUM 40 MG: 100 INJECTION SUBCUTANEOUS at 18:18

## 2022-10-23 RX ADMIN — LIDOCAINE 1 PATCH: 246 PATCH TOPICAL at 09:07

## 2022-10-23 RX ADMIN — ACETAMINOPHEN 1000 MG: 500 TABLET ORAL at 16:50

## 2022-10-23 RX ADMIN — SENNOSIDES 1 TABLET: 8.6 TABLET, FILM COATED ORAL at 11:29

## 2022-10-23 RX ADMIN — BUMETANIDE 0.5 MG: 0.5 TABLET ORAL at 09:19

## 2022-10-23 RX ADMIN — SENNOSIDES 1 TABLET: 8.6 TABLET, FILM COATED ORAL at 20:17

## 2022-10-23 RX ADMIN — Medication 40 MG: at 06:36

## 2022-10-23 RX ADMIN — CALCIUM 500 MG: 500 TABLET ORAL at 16:22

## 2022-10-23 RX ADMIN — CALCIUM 500 MG: 500 TABLET ORAL at 11:29

## 2022-10-23 RX ADMIN — DOXAZOSIN 2 MG: 1 TABLET ORAL at 09:08

## 2022-10-23 RX ADMIN — Medication 25 MCG: at 09:08

## 2022-10-23 RX ADMIN — LEVOTHYROXINE SODIUM 112 MCG: 0.11 TABLET ORAL at 06:36

## 2022-10-23 ASSESSMENT — ACTIVITIES OF DAILY LIVING (ADL)
ADLS_ACUITY_SCORE: 76
ADLS_ACUITY_SCORE: 85
ADLS_ACUITY_SCORE: 85
ADLS_ACUITY_SCORE: 83
ADLS_ACUITY_SCORE: 79
ADLS_ACUITY_SCORE: 89
ADLS_ACUITY_SCORE: 76
ADLS_ACUITY_SCORE: 85
ADLS_ACUITY_SCORE: 76
ADLS_ACUITY_SCORE: 76
ADLS_ACUITY_SCORE: 83
ADLS_ACUITY_SCORE: 76

## 2022-10-23 NOTE — PLAN OF CARE
Problem: Confusion Chronic  Goal: Optimal Cognitive Function  Outcome: Progressing  Intervention: Minimize Injury Risk and Provide Safety  Recent Flowsheet Documentation  Taken 10/23/2022 0912 by Cathy Mays RN  Enhanced Safety Measures: bed alarm set   Goal Outcome Evaluation:       Pt alert; disoriented to time only; denied pain; no PRNs given. Pt has a good appetite - ate 100% of breakfast and 75% of lunch.  Pt spent time up in chair.  Pt had 1 large BM.  All care needs met.      Cathy Mays, RN

## 2022-10-23 NOTE — PLAN OF CARE
Problem: Plan of Care - These are the overarching goals to be used throughout the patient stay.    Goal: Optimal Comfort and Wellbeing  Outcome: Progressing  Intervention: Provide Person-Centered Care  Recent Flowsheet Documentation  Taken 10/23/2022 0207 by Melani Keith RN  Trust Relationship/Rapport:   care explained   choices provided     Problem: Pain Acute  Goal: Acceptable Pain Control and Functional Ability  Outcome: Progressing  Intervention: Prevent or Manage Pain  Recent Flowsheet Documentation  Taken 10/23/2022 0207 by Melani Keith RN  Bowel Elimination Promotion: adequate fluid intake promoted  Medication Review/Management: medications reviewed   Goal Outcome Evaluation:       Slept 5-6 hours the whole night, no complaints of pain, monitored for safety.

## 2022-10-23 NOTE — PLAN OF CARE
"  Problem: Confusion Chronic  Goal: Optimal Cognitive Function  Outcome: Not Progressing  Intervention: Minimize Injury Risk and Provide Safety  Recent Flowsheet Documentation  Taken 10/22/2022 2300 by Consuelo Romero RN  Enhanced Safety Measures:   bed alarm set   room near unit station  Intervention: Minimize and Manage Confusion  Recent Flowsheet Documentation  Taken 10/22/2022 1900 by Consuelo Romero, RN  Sensory Stimulation Regulation:   care clustered   quiet environment promoted  Reorientation Measures:   clock in view   glasses use encouraged   hearing device use encouraged     Problem: Plan of Care - These are the overarching goals to be used throughout the patient stay.    Goal: Readiness for Transition of Care  Outcome: Progressing  Flowsheets (Taken 10/22/2022 2351)  Anticipated Changes Related to Illness: inability to care for self  Concerns to be Addressed:   adjustment to diagnosis/illness   basic needs   cognitive/perceptual   decision making  Barriers to Discharge:   ongoing confusion   needs feeding and assistance with ADLs.  Intervention: Mutually Develop Transition Plan  Recent Flowsheet Documentation  Taken 10/22/2022 2351 by Consuelo Romero RN  Anticipated Changes Related to Illness: inability to care for self  Concerns to be Addressed:   adjustment to diagnosis/illness   basic needs   cognitive/perceptual   decision making   Goal Outcome Evaluation:      Alexandru told writer when she enterered room that he needed help finding his \"black call thing.\" When asked if he meant his call light, which he was holding, he said yes. When RN pointed this out, Alexandru became angry and yelled that he had to call 9-1-1. When asked what he needed help with and reminded that he is already in the hospital, he said irritably, \"I know that! You people only want money.I might have to call for help if I fall!\" Writer reassured Alexandru and found his black personal cell phone and offered that to " him, which seemed to calm him. Frequent rounding done. Alexandru needs help with all ADLs. He did not seem to remember anything RN said about what his enoxaparin was for, etc.

## 2022-10-23 NOTE — PROGRESS NOTES
[facility]    Medicine Progress Note - Hospitalist Service    Date of Admission:  9/22/2022  Brief Hospital Course Summary:    Alexandru Still is a 89 year old man w/ PMH of Lambert-Eaton syndrome, hypothyroidism,  complete heart block s/p pacemaker, HTN, BPH,  PB, and DM type 2 who was admitted to the SICU on 9/15/2022 following an unwitnessed fall and striking his head. He was initially seen at an outside hospital and underwent a comprehensive trauma work up found to have a left frontal SDH (5mm) + C1 fx + Type II C2 odontoid fracture with a 1cm posterior displacement. He was  intubated for airway protection in the ED given high c-spine injury and difficulty managing his secretions. On admission he requested all cares without surgical cervical spine stabilization, agreeable to a tracheostomy and PEG. Multiple care conferences held with his family who confirmed his wishes.     LTACH course:    9/24-9/26:  Speech evaluated patient and recommends NPO, cont tube feeds via PEG due to severe oropharyngeal dysphasia.  PT/OT, dietitian, wo nurse saw pt on 9/23.  Pulled out G tube during night of 9/23. Pt now on soft wrist restraints.  IVF changed to D51/2 NS at low rate as pt has TAVR.  Morphine IV ordered prn pain.    Spoke w/ Dr. Brown- IR - recommends no hannah tube as this is a brand new PEG and placing it blind will likely lead it to not be in correct position.  He will not do a PEG placement on the weekend, scheduled for Monday.  Pt cannot be fed via NG tube as it is contraindicated with C1 and C2 fx.  For PEG tube replacement (herpain on hold, place ICD)on 9/26.  Will discontinue IVF once PEG tube is placed and restart po meds.  Given NaPhos 9/26 9/27-10/3:  9/27 patient had PEG tube replaced after patient himself removed it on 9/23.  Was a started on tube feeding.  P.O. medication were restarted.  IV fluids were discontinued.  He still needs soft restraint x2.  Is off ventilator and stable.  9/28 Failed blue  dye test, Continue NPO   9/30 patient had Cahto J 300 collar pads delivered  10/1 SLP found patient well below baseline for swallowing, cognition and communication.  Patient stays n.p.o. except ice chips    10/4/22 -/10/8:   -No acute events, Vent weaning phase 2. Continue diuresis with bumex. Continue plan of care. Cognitive testing PENDING. May need neuropsych evaluation depending on cognitive evaluation.     10/9-10/17: he follows commands, cont Cahto J.  He does better with glasses and hearing device on.  He has neck pain and headache.  Constipation issues.  Speech eval with swallow study and placed on puree with moderately, thick fluids.  Stopped insulin sliding scale as pt has normal glucose.        10/18-10/19:   decannulated yesterday, confused overnight,  +back pain.  High risk of aspiration.  Consult palliative care as he has intermittent confusion.  Need goals of care   10/20:  Seen by gabrielle yesterday, Doing well on room air overnight, understands he has risk of aspiration and wants eat and willing to take risk.  BP elevated -add lisinopril  10/21:  Needs assistance for feeding, redness of L side of jaw, buttock, and perineum - calmoseptine applied  10/22: swallow test done yesterday- no aspiration observed, recommend: mildly thick, puree solids.  Scratching at PEG- abdominal binder applied for safety.   10/23: tolerated therapy well yesterday and ate well,  Confused overnight      Assessment & Plan        Traumatic 5mm left frontal lobe SDH  Posteriorly displaced (1 cm) Type II odontoid fx  C1 anterior arch fracture   Epidural hemorrhage of 7mm  Acute traumatic neck pain  Acute L 6th rib fracture  S/p fall, Facial abrasions with ecchymoses  RLE wound  He was seen and evaluated by Neurosurgery.  Pt refused surgical intervention.   A repeat head CT was obtained with a stable subdural hematoma. He was placed in a cervical collar for the cervical fracture. No surgical intervention per patient  preference.  -Cont Marshall J -keep in place at all times  -Tylenol, oxy prn for pain, changed tylenol to extra strength  -Neurosurg ok lovenox for DVT ppx  -Wound care following   -f/u Neurosurgery in 6 weeks with an upright XR of the cervical spine (November PENDING appointment)  -CT cervical spine in 3 months (first week of December).   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 Failed bedside and radiology swallow. He completed 3 doses of Decadron for laryngeal edema.   -S/P tracheostomy 09/21/2022, Trach suture removal on 09/26  -Pulm and RT following  -decannulated on 10/17        Hypertension   Complete heart block s/p PM placement  Nonrheumatic aortic valve stenosis s/p TAVR 2019  -Monitor  -bumex, cardura  -added lisinopril   -Echo on 9/16:  EF: 60-65%, no LV dysfunction     Severe orophayngeal dysphagia   S/P PEG   Severe protein calorie malnutrition  Hiatal hernia  -PEG tube placed 09/20. Pt pulled out PEG tube on night of 9/23, replaced on 9/27  -off TF   -RD following  -needs feeder for each meal due to Marshall J- order placed      Hypophosphatemia  Hypomagnesia   Hypokalemia  -Monitor and replete as needed    Hyponatremia  -IVF recheck Na- if ok will discontinue fluids     Hypothyroidism   -Continue Levothyroxine     Anemia of chronic disease  -Hg stable  -Cont to monitor intermittently.     BPH  -Cont doxazosin     DM type 2  -discontinue Sliding scale  insulin  -Last HbA1:  6.2  -Hypoglycemic protocol    L shoulder pain   -lidocaine patch      Deconditioning  -PT/OT following           Diet: Snacks/Supplements Adult: Magic Cup; Between Meals  Pureed Diet (level 4) Mildly Thick (level 2)    DVT Prophylaxis: Lovenox  [place]udero Catheter: Not present  Central Lines: None    Cardiac Monitoring: None  Code Status: No CPR- Do NOT Intubate      Disposition Plan     Discharge date: TBD, to TCU on discharge  Barriers: Placement     The patient's care was discussed with patient, RN, SW/AINSLEY Rubio MD  Hospitalist Service  LTACH  Securely message with the Vocera Web Console (learn more here)  Text page via [employer] Paging/Directory           ______________________________________________________________________    Interval History    Chart reviewed and events noted.  Patient seen and examined.     Reportspain in  L shoulder pain -patch needs to be replaced.      He has no chest pain, dyspnea, or painful urination    ROS:  A 10-system review was obtained and is negative with the exception of the symptoms noted above    Data reviewed today: I reviewed all medications, new labs and imaging results over the last 24 hours.     Physical Exam   Vital Signs: Temp: 98.5  F (36.9  C) Temp src: Oral BP: (!) 146/70 Pulse: 59   Resp: 18 SpO2: 96 % O2 Device: None (Room air)    Weight: 187 lbs 12.8 oz    General:  No acute distress,awake and alert  HEENT: Swisher J in place, trach in place  Lungs:  Clear to auscultation bilaterally  CVS:  S1 and S2, RRR  Abdomen:  Soft, nontender, PEG in place  Musculoskeletal:  No edema  Neuro: Awake, alert, oriented, speech+, following commands.   Skin:  erythematous area of R jaw, buttocks and perineum    Data   Recent Labs   Lab 10/22/22  1122 10/22/22  0800 10/22/22  0715 10/21/22  0805 10/20/22  0714 10/17/22  1835 10/17/22  0649 10/17/22  0646   WBC  --   --   --   --  6.0  --   --  8.7   HGB  --   --   --   --  9.8*  --  10.5* 10.0*   MCV  --    --   --   --  93  --   --  92   PLT  --   --   --   --  251  --   --  262   NA  --   --  135*  --  135*  --  137 133*   POTASSIUM  --   --  3.9  --  4.1  --  3.7 3.9   CHLORIDE  --   --  99  --  98  --   --  97*   CO2  --   --  29  --  27  --   --  30*   BUN  --   --  14.5  --  14.2  --   --  12.0   CR  --   --  0.76  --  0.78  --   --  0.74   ANIONGAP  --   --  7  --  10  --   --  6*   TITA  --   --  8.6  --  8.6  --   --  8.7   * 91 93   < > 88   < > 84 87   ALBUMIN  --   --   --   --  2.9*  --   --   --    PROTTOTAL  --   --   --   --  6.1*  --   --   --    BILITOTAL  --   --   --   --  0.4  --   --   --    ALKPHOS  --   --   --   --  97  --   --   --    ALT  --   --   --   --  16  --   --   --    AST  --   --   --   --  21  --   --   --     < > = values in this interval not displayed.     No results found for this or any previous visit (from the past 24 hour(s)).  Medications     - MEDICATION INSTRUCTIONS -         bumetanide  0.5 mg Oral Daily     calcium carbonate  500 mg Oral BID w/meals     doxazosin  2 mg Oral Daily     enoxaparin ANTICOAGULANT  40 mg Subcutaneous Q24H     levothyroxine  112 mcg Oral QAM AC     lidocaine  1 patch Transdermal Q24H     lidocaine   Transdermal Q8H ZHEN     lisinopril  5 mg Oral Daily     multivitamin w/minerals  1 tablet Oral Daily     pantoprazole  40 mg Oral or Feeding Tube QAM AC     sennosides  1 tablet Oral BID     cholecalciferol  25 mcg Oral Daily

## 2022-10-24 ENCOUNTER — APPOINTMENT (OUTPATIENT)
Dept: PHYSICAL THERAPY | Facility: CLINIC | Age: 87
DRG: 208 | End: 2022-10-24
Attending: INTERNAL MEDICINE
Payer: MEDICARE

## 2022-10-24 ENCOUNTER — DOCUMENTATION ONLY (OUTPATIENT)
Dept: OTHER | Facility: CLINIC | Age: 87
End: 2022-10-24

## 2022-10-24 LAB
ANION GAP SERPL CALCULATED.3IONS-SCNC: 5 MMOL/L (ref 7–15)
BASOPHILS # BLD AUTO: 0.1 10E3/UL (ref 0–0.2)
BASOPHILS NFR BLD AUTO: 1 %
BUN SERPL-MCNC: 13.4 MG/DL (ref 8–23)
CALCIUM SERPL-MCNC: 8.7 MG/DL (ref 8.2–9.6)
CHLORIDE SERPL-SCNC: 97 MMOL/L (ref 98–107)
CREAT SERPL-MCNC: 0.75 MG/DL (ref 0.67–1.17)
DEPRECATED HCO3 PLAS-SCNC: 29 MMOL/L (ref 22–29)
EOSINOPHIL # BLD AUTO: 0.2 10E3/UL (ref 0–0.7)
EOSINOPHIL NFR BLD AUTO: 3 %
ERYTHROCYTE [DISTWIDTH] IN BLOOD BY AUTOMATED COUNT: 14.9 % (ref 10–15)
GFR SERPL CREATININE-BSD FRML MDRD: 85 ML/MIN/1.73M2
GLUCOSE SERPL-MCNC: 90 MG/DL (ref 70–99)
HCT VFR BLD AUTO: 30.1 % (ref 40–53)
HGB BLD-MCNC: 10.2 G/DL (ref 13.3–17.7)
IMM GRANULOCYTES # BLD: 0 10E3/UL
IMM GRANULOCYTES NFR BLD: 1 %
LYMPHOCYTES # BLD AUTO: 1.3 10E3/UL (ref 0.8–5.3)
LYMPHOCYTES NFR BLD AUTO: 22 %
MAGNESIUM SERPL-MCNC: 1.9 MG/DL (ref 1.7–2.3)
MCH RBC QN AUTO: 31.2 PG (ref 26.5–33)
MCHC RBC AUTO-ENTMCNC: 33.9 G/DL (ref 31.5–36.5)
MCV RBC AUTO: 92 FL (ref 78–100)
MONOCYTES # BLD AUTO: 0.8 10E3/UL (ref 0–1.3)
MONOCYTES NFR BLD AUTO: 14 %
NEUTROPHILS # BLD AUTO: 3.4 10E3/UL (ref 1.6–8.3)
NEUTROPHILS NFR BLD AUTO: 59 %
NRBC # BLD AUTO: 0 10E3/UL
NRBC BLD AUTO-RTO: 0 /100
PHOSPHATE SERPL-MCNC: 2.9 MG/DL (ref 2.5–4.5)
PLATELET # BLD AUTO: 275 10E3/UL (ref 150–450)
POTASSIUM SERPL-SCNC: 4.2 MMOL/L (ref 3.4–5.3)
RBC # BLD AUTO: 3.27 10E6/UL (ref 4.4–5.9)
SODIUM SERPL-SCNC: 131 MMOL/L (ref 136–145)
WBC # BLD AUTO: 5.8 10E3/UL (ref 4–11)

## 2022-10-24 PROCEDURE — 120N000017 HC R&B RESPIRATORY CARE

## 2022-10-24 PROCEDURE — 97110 THERAPEUTIC EXERCISES: CPT | Mod: GP

## 2022-10-24 PROCEDURE — 99233 SBSQ HOSP IP/OBS HIGH 50: CPT | Performed by: FAMILY MEDICINE

## 2022-10-24 PROCEDURE — 97530 THERAPEUTIC ACTIVITIES: CPT | Mod: GP

## 2022-10-24 PROCEDURE — 250N000013 HC RX MED GY IP 250 OP 250 PS 637: Performed by: NURSE PRACTITIONER

## 2022-10-24 PROCEDURE — 36415 COLL VENOUS BLD VENIPUNCTURE: CPT | Performed by: HOSPITALIST

## 2022-10-24 PROCEDURE — 83735 ASSAY OF MAGNESIUM: CPT | Performed by: HOSPITALIST

## 2022-10-24 PROCEDURE — 99232 SBSQ HOSP IP/OBS MODERATE 35: CPT | Performed by: HOSPITALIST

## 2022-10-24 PROCEDURE — 250N000011 HC RX IP 250 OP 636: Performed by: HOSPITALIST

## 2022-10-24 PROCEDURE — 250N000013 HC RX MED GY IP 250 OP 250 PS 637: Performed by: HOSPITALIST

## 2022-10-24 PROCEDURE — 250N000013 HC RX MED GY IP 250 OP 250 PS 637: Performed by: INTERNAL MEDICINE

## 2022-10-24 PROCEDURE — 84100 ASSAY OF PHOSPHORUS: CPT | Performed by: HOSPITALIST

## 2022-10-24 PROCEDURE — 82310 ASSAY OF CALCIUM: CPT | Performed by: HOSPITALIST

## 2022-10-24 PROCEDURE — 85025 COMPLETE CBC W/AUTO DIFF WBC: CPT | Performed by: HOSPITALIST

## 2022-10-24 PROCEDURE — 250N000013 HC RX MED GY IP 250 OP 250 PS 637: Performed by: FAMILY MEDICINE

## 2022-10-24 RX ORDER — ACETAMINOPHEN 325 MG/1
650 TABLET ORAL 3 TIMES DAILY
Status: DISCONTINUED | OUTPATIENT
Start: 2022-10-24 | End: 2022-10-28 | Stop reason: HOSPADM

## 2022-10-24 RX ADMIN — SENNOSIDES 1 TABLET: 8.6 TABLET, FILM COATED ORAL at 20:13

## 2022-10-24 RX ADMIN — ENOXAPARIN SODIUM 40 MG: 100 INJECTION SUBCUTANEOUS at 18:55

## 2022-10-24 RX ADMIN — Medication 40 MG: at 06:59

## 2022-10-24 RX ADMIN — OXYCODONE HYDROCHLORIDE 2.5 MG: 5 TABLET ORAL at 20:13

## 2022-10-24 RX ADMIN — LIDOCAINE 1 PATCH: 246 PATCH TOPICAL at 08:46

## 2022-10-24 RX ADMIN — BUMETANIDE 0.5 MG: 0.5 TABLET ORAL at 08:48

## 2022-10-24 RX ADMIN — CALCIUM 500 MG: 500 TABLET ORAL at 17:32

## 2022-10-24 RX ADMIN — DICLOFENAC 2 G: 10 GEL TOPICAL at 14:07

## 2022-10-24 RX ADMIN — CALCIUM 500 MG: 500 TABLET ORAL at 12:29

## 2022-10-24 RX ADMIN — ACETAMINOPHEN 650 MG: 325 TABLET ORAL at 20:48

## 2022-10-24 RX ADMIN — LISINOPRIL 5 MG: 2.5 TABLET ORAL at 08:48

## 2022-10-24 RX ADMIN — DICLOFENAC 2 G: 10 GEL TOPICAL at 20:14

## 2022-10-24 RX ADMIN — DOXAZOSIN 2 MG: 1 TABLET ORAL at 08:48

## 2022-10-24 RX ADMIN — LEVOTHYROXINE SODIUM 112 MCG: 0.11 TABLET ORAL at 06:59

## 2022-10-24 RX ADMIN — MULTIPLE VITAMINS W/ MINERALS TAB 1 TABLET: TAB at 12:29

## 2022-10-24 RX ADMIN — Medication 25 MCG: at 08:48

## 2022-10-24 RX ADMIN — SENNOSIDES 1 TABLET: 8.6 TABLET, FILM COATED ORAL at 08:48

## 2022-10-24 ASSESSMENT — ACTIVITIES OF DAILY LIVING (ADL)
ADLS_ACUITY_SCORE: 77
ADLS_ACUITY_SCORE: 83
ADLS_ACUITY_SCORE: 81
ADLS_ACUITY_SCORE: 77
ADLS_ACUITY_SCORE: 77
ADLS_ACUITY_SCORE: 83
ADLS_ACUITY_SCORE: 81
ADLS_ACUITY_SCORE: 76
ADLS_ACUITY_SCORE: 81
ADLS_ACUITY_SCORE: 83
ADLS_ACUITY_SCORE: 81
ADLS_ACUITY_SCORE: 81

## 2022-10-24 NOTE — PLAN OF CARE
Problem: Fall Injury Risk  Goal: Absence of Fall and Fall-Related Injury  Outcome: Progressing    Problem: Confusion Chronic  Goal: Optimal Cognitive Function  Outcome: Progressing     Problem: Plan of Care - These are the overarching goals to be used throughout the patient stay.    Goal: Optimal Comfort and Wellbeing  Intervention: Monitor Pain and Promote Comfort  Recent Flowsheet Documentation  Taken 10/24/2022 0900 by Sharri Holbrook RN  Pain Management Interventions: (lidocaine patch applied) medication (see MAR)        Patient is alert with some confusion noted. O'Brien of hearing, uses pocket talker. Walker J collar in place. Up in w/c this shift. Needs assistance with feeding. Incontinent of bowel and primofit in place. Denies pain. Good appetite noted. Vitals stable.

## 2022-10-24 NOTE — PROGRESS NOTES
[facility] Palliative Care Family Meeting Note    Date/time:  10/24/2022, 9:45 to 10:20 (first half) and then additional non face to face time of   30 minutes in discussion separately with son in [facility] Er, completing POLST, discussion with [facility] Dr Rubio.    Location:  [facility] room 124 (at bedside for first half)    Patient present:  yes    Reason for Meeting:  Goals of care and advanced care planning    People Present:  Son in [facility], [facility] MD Alexandru Tsang (pt)    Meeting Led by: Eileen Jeffries MD      Meeting Summary:    Met with Alexandru and Erv.  Alexandru noted having taken a trip over the weekend, going to [place] for a while and returning back to [facility] (chart review indicates this was not the case).    Alexandru affirmed today that he would  NOT want to undergo CPR if he had cardiac arrest, and son in [facility] Erv affirmed Alexandru has said the same to him in separate conversations over the past week.    Alexandru noted awareness he would not be able to return to his [facility] where he resided prior to his fall and subsequent [facility] hospitalization.  He asked to have the neck brace removed; he was unable to tell me what risks/benefits of removing the neck brace are.  We then discussed the neck brace is keeping an unstable C1C2 fracture from moving and potentially causing catastrophic spinal cord injury that would lead to death.  Recommended keeping neck brace in place unless goals of care were clearly comfort focused and hospice support/comfort care involved.    Pain is bothering Alexandru (stiffness/soreness in shoulders, headache across forehead and neck stiffness/soreness).  He would like more treatment for that.    Alexandru then noted a desire to rest, and affirmed he would want further conversation to take place outside his room between palliative and his HCA/surrogate decisionmaker Erv.        I reviewed with Erv separately that Alexandru's life expectancy appears to be  weeks-short months.  Noted dysphagia issues and that Alexandru has noted desire to be able to eat and enjoy food by mouth; he has been informed of risk of pneumonia and aspiration and prefers to eat for pleasure.  Noted he has PEG tube, and at some point LTACH [facility] would discuss proper timing of removal of PEG tube; indefinite artificial nutrition is not desired by Alexandru as it would not support acceptable quality of life.    Noted currently SW trying to find TCU and advanced age and potential challenge with restorative goals of care (may not be attainable), LTC may be needed.  Discussed that with life expectancy in shorter months, hospice support would be suggested for support in LTC setting.  Alexandru is highly service connected  and may have benefits through the [facility].    Decision making capacity:  Alexandru has partial decisional capacity--he can clearly decline treatments and express preferences.  Complex decisions would require additional support from his surrogate decision makers.  Alexandru has had delirium waxing and waning during this hospital stay.    Estimated length of life:  Appears to be weeks-short months, it is possible he is entering last six months of life.     Symptoms:    Pain, neck, shoulders, headache, due to neck stiffness and unstable C1C2 fracture requiring cervical collar round-the-clock.  - receiving lidocaine patch  - topical agents (diclofenac,  - receiving oxycodone (last used 10/19)--recommend scheduling 2.5mg oxycodone q6H during daytime to see if helpful for incident pain, hold if oversedation.  Ordered this and will follow up 10/26.  - schedule APAP TID also.    Goals of Care:  Currently life-prolonging with limits; Alexandru is making more comments about his current QoL being frustrating and not acceptable .      Understanding/Coping:  Alexandru sometimes notes awareness he is unable to return to his [facility] and would need LTC (historically has been very frustrated/much dislike of  medically intense setting) indefinitely going forward.    Meeting Action Items & Recommendations:   1.)  POLST (DNR/comfort focused treatment) completed and uploaded to EMR.  2.)  At this time, Alexandru is not interested in transfer to higher level of care (like ICU/intubation) should he have worsening clinically.  3.) continue current treatments; low threshold to revisit discussion to transition to comfort focused goals of treatment in hospital if clinical decline.    Next Meeting date/time:  TBD    Eileen Jeffries MD  [facility] Palliative Medicine Service: [facility]  Department voice mail (checked M-F 8a-4pm approx): [phone]  [facility] Palliative Medicine pager: [phone]  (Please use "[employer] for text paging if possible, use link under Palliative service)  May page me directly via "[employer]

## 2022-10-24 NOTE — PLAN OF CARE
Problem: Confusion Chronic  Goal: Optimal Cognitive Function  Outcome: Not Progressing  Intervention: Minimize Injury Risk and Provide Safety  Recent Flowsheet Documentation  Taken 10/23/2022 2100 by Consuelo Romero, RN  Enhanced Safety Measures:   bed alarm set   chair alarm set   room near unit station  Intervention: Minimize and Manage Confusion  Recent Flowsheet Documentation  Taken 10/23/2022 1800 by Consuelo Romero, RN  Sensory Stimulation Regulation:   care clustered   quiet environment promoted  Reorientation Measures:   clock in view   glasses use encouraged   hearing device use encouraged   reorientation provided     Problem: Plan of Care - These are the overarching goals to be used throughout the patient stay.    Goal: Patient-Specific Goal (Individualized)  Outcome: Progressing  Flowsheets (Taken 10/24/2022 0242)  Individualized Care Needs:   Alexandru needs skin inspection using a team of staff at least twice a day under [place] J collar   he had slightly reddened, though blanchable area on R side of neck, which was padded tonight   also, grayish debris in folds at back of neck, so this needs cleaning. Alexandru needs reorientation and frequent checks due to confusion.  Anxieties, Fears or Concerns:   tender left shoulder and very rigid   Alexandru yells ouch even when staff doesn't touch L shoulder but grabs the lifting sheet under him   his neck also chronically hurts him   it is hyperextended in [place] J collar at all times  Patient-Specific Goals (Include Timeframe): Begin to feed himself by 10/30.   Goal Outcome Evaluation:    Alexandru reported that the Tylenol given to him toward the beginning of the shift was helpful for his 5/10 neck discomfort. At one point tonight, RN entered his room to find him with one leg over the side of the bed and his gown mostly off. He is very stiff and unable to sit up or get himself off the bed without assist. At the time, he was unable to articulate his  "needs, but when asked if he needed to use the bedpan, he stated that he did. This was provided, but Alexandru did not have a BM, though he had already been incontinent of a soft smear of BM. He seemed to think that he had had a BM on the bedpan, and settled after this. When asked if there are fish in the sea, he  replied, \"no.\" He responds to name but was otherwise disoriented. Staff checked on Alexandru frequently and left door open when not with him and bed alarm on. He is mildly irritable at times, possibly because he doesn't understand where he is or why, saying things about needing to drive home, etc., despite reorientation.                        "

## 2022-10-24 NOTE — PROGRESS NOTES
[facility]    Medicine Progress Note - Hospitalist Service    Date of Admission:  9/22/2022  Brief Hospital Course Summary:    Alexandru Still is a 89 year old man w/ PMH of Lambert-Eaton syndrome, hypothyroidism,  complete heart block s/p pacemaker, HTN, BPH,  PB, and DM type 2 who was admitted to the SICU on 9/15/2022 following an unwitnessed fall and striking his head. He was initially seen at an outside hospital and underwent a comprehensive trauma work up found to have a left frontal SDH (5mm) + C1 fx + Type II C2 odontoid fracture with a 1cm posterior displacement. He was  intubated for airway protection in the ED given high c-spine injury and difficulty managing his secretions. On admission he requested all cares without surgical cervical spine stabilization, agreeable to a tracheostomy and PEG. Multiple care conferences held with his family who confirmed his wishes.     LTACH course:    9/24-9/26:  Speech evaluated patient and recommends NPO, cont tube feeds via PEG due to severe oropharyngeal dysphasia.  PT/OT, dietitian, wo nurse saw pt on 9/23.  Pulled out G tube during night of 9/23. Pt now on soft wrist restraints.  IVF changed to D51/2 NS at low rate as pt has TAVR.  Morphine IV ordered prn pain.    Spoke w/ Dr. Brown- IR - recommends no hannah tube as this is a brand new PEG and placing it blind will likely lead it to not be in correct position.  He will not do a PEG placement on the weekend, scheduled for Monday.  Pt cannot be fed via NG tube as it is contraindicated with C1 and C2 fx.  For PEG tube replacement (herpain on hold, place ICD)on 9/26.  Will discontinue IVF once PEG tube is placed and restart po meds.  Given NaPhos 9/26 9/27-10/3:  9/27 patient had PEG tube replaced after patient himself removed it on 9/23.  Was a started on tube feeding.  P.O. medication were restarted.  IV fluids were discontinued.  He still needs soft restraint x2.  Is off ventilator and stable.  9/28 Failed blue  dye test, Continue NPO   9/30 patient had [place] 300 collar pads delivered  10/1 SLP found patient well below baseline for swallowing, cognition and communication.  Patient stays n.p.o. except ice chips    10/4/22 -/10/8:   -No acute events, Vent weaning phase 2. Continue diuresis with bumex. Continue plan of care. Cognitive testing PENDING. May need neuropsych evaluation depending on cognitive evaluation.     10/9-10/17: he follows commands, cont [place] J.  He does better with glasses and hearing device on.  He has neck pain and headache.  Constipation issues.  Speech eval with swallow study and placed on puree with moderately, thick fluids.  Stopped insulin sliding scale as pt has normal glucose.        10/18-10/19:   decannulated yesterday, confused overnight,  +back pain.  High risk of aspiration.  Consult palliative care as he has intermittent confusion.  Need goals of care   10/20:  Seen by gabrielle yesterday, Doing well on room air overnight, understands he has risk of aspiration and wants eat and willing to take risk.  BP elevated -add lisinopril  10/21:  Needs assistance for feeding, redness of L side of jaw, buttock, and perineum - calmoseptine applied  10/22: swallow test done yesterday- no aspiration observed, recommend: mildly thick, puree solids.  Scratching at PEG- abdominal binder applied for safety.   10/23: tolerated therapy well yesterday and ate well,  Confused overnight  10/24:  Disoriented overnight,  +neck pain- tylenol given.  This morning he awake and not confused.  He is still having neck and shoulder pain, now w/ headache.  Palliative care saw pt today       Assessment & Plan        Traumatic 5mm left frontal lobe SDH  Posteriorly displaced (1 cm) Type II odontoid fx  C1 anterior arch fracture   Epidural hemorrhage of 7mm  Acute traumatic neck pain  Acute L 6th rib fracture  S/p fall, Facial abrasions with ecchymoses  RLE wound  He was seen and evaluated by Neurosurgery.  Pt refused  surgical intervention.   A repeat head CT was obtained with a stable subdural hematoma. He was placed in a cervical collar for the cervical fracture. No surgical intervention per patient preference.  -Cont [place] J -keep in place at all times  -Tylenol, oxy prn for pain, Voltaren added  -Neurosurg ok lovenox for DVT ppx  -Wound care following   -f/u Neurosurgery in 6 weeks with an upright XR of the cervical spine (November PENDING appointment)  -CT cervical spine in 3 months (first week of December).   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 Failed bedside and radiology swallow. He completed 3 doses of Decadron for laryngeal edema.   -S/P tracheostomy 09/21/2022, Trach suture removal on 09/26  -Pulm and RT following  -decannulated on 10/17        Hypertension   Complete heart block s/p PM placement  Nonrheumatic aortic valve stenosis s/p TAVR 2019  -Monitor  -bumex, cardura  -added lisinopril   -Echo on 9/16:  EF: 60-65%, no LV dysfunction     Severe orophayngeal dysphagia   S/P PEG   Severe protein calorie malnutrition  Hiatal hernia  -PEG tube placed 09/20. Pt pulled out PEG tube on night of 9/23, replaced on 9/27  -off TF   -RD following  -needs feeder for each meal due to [place] J- order placed      Hypophosphatemia  Hypomagnesia   Hypokalemia  -Monitor and replete as needed    Hyponatremia  -s/p  IVF  -10/24- increase FWF to 125 ml q8h     Hypothyroidism   -Continue Levothyroxine     Anemia of chronic disease  -Hg stable  -Cont to monitor intermittently.     BPH  -Cont doxazosin     DM type 2  -discontinue Sliding scale insulin  -Last HbA1:  6.2  -Hypoglycemic protocol    L shoulder pain   -lidocaine patch      Deconditioning  -PT/OT following           Diet: Snacks/Supplements Adult: Magic Cup; Between Meals  Pureed Diet (level 4) Mildly Thick (level 2)    DVT Prophylaxis: Lovenox  [place] Catheter: Not present  Central Lines: None    Cardiac Monitoring: None  Code Status: No CPR- Do NOT Intubate      Disposition Plan     Discharge date: TBD, to TCU on discharge  Barriers: Placement     The patient's care was discussed with patient, RN, SW/CM    Neli Rubio MD  Hospitalist Service  LTACH  Securely message with the Vocera Web Console (learn more here)  Text page via Enbase Paging/Directory           ______________________________________________________________________    Interval History    Chart reviewed and events noted.  Patient seen and examined.     Reports pain in  B/l shoulder, neck and has a headache, bandlike, constant.       He has no chest pain, dyspnea, or painful urination    ROS:  A 10-system review was obtained and is negative with the exception of the symptoms noted above    Data reviewed today: I reviewed all medications, new labs and imaging results over the last 24 hours.     Physical Exam   Vital Signs: Temp: 97.4  F (36.3  C) Temp src: Axillary BP: (!) 160/77 Pulse: 57   Resp: 18 SpO2: 93 % O2 Device: None (Room air)    Weight: 188 lbs 4.8 oz    General:  No acute distress,awake and alert  HEENT: [place] J in place, trach in place  Lungs:  Clear to auscultation bilaterally  CVS:  S1 and S2, RRR  Abdomen:  Soft, nontender, PEG in place  Musculoskeletal:  No edema  Neuro: Awake, alert, oriented, speech+, following commands.   Skin:  erythematous area of R jaw, buttocks and  perineum    Data   Recent Labs   Lab 10/24/22  0723 10/22/22  1122 10/22/22  0800 10/22/22  0715 10/21/22  0805 10/20/22  0714   WBC 5.8  --   --   --   --  6.0   HGB 10.2*  --   --   --   --  9.8*   MCV 92  --   --   --   --  93     --   --   --   --  251   NA  --   --   --  135*  --  135*   POTASSIUM  --   --   --  3.9  --  4.1   CHLORIDE  --   --   --  99  --  98   CO2  --   --   --  29  --  27   BUN  --   --   --  14.5  --  14.2   CR  --   --   --  0.76  --  0.78   ANIONGAP  --   --   --  7  --  10   TITA  --   --   --  8.6  --  8.6   GLC  --  150* 91 93   < > 88   ALBUMIN  --   --   --   --   --  2.9*   PROTTOTAL  --   --   --   --   --  6.1*   BILITOTAL  --   --   --   --   --  0.4   ALKPHOS  --   --   --   --   --  97   ALT  --   --   --   --   --  16   AST  --   --   --   --   --  21    < > = values in this interval not displayed.     No results found for this or any previous visit (from the past 24 hour(s)).  Medications     - MEDICATION INSTRUCTIONS -         bumetanide  0.5 mg Oral Daily     calcium carbonate  500 mg Oral BID w/meals     doxazosin  2 mg Oral Daily     enoxaparin ANTICOAGULANT  40 mg Subcutaneous Q24H     levothyroxine  112 mcg Oral QAM AC     lidocaine  1 patch Transdermal Q24H     lidocaine   Transdermal Q8H ZHEN     lisinopril  5 mg Oral Daily     multivitamin w/minerals  1 tablet Oral Daily     pantoprazole  40 mg Oral or Feeding Tube QAM AC     sennosides  1 tablet Oral BID     cholecalciferol  25 mcg Oral Daily

## 2022-10-24 NOTE — PLAN OF CARE
Goal Outcome Evaluation:       Patient slept most of the night. Awake during cares. Appears calm and cooperative. Turned and repositioned every 2 hrs and tolerated well. No complain of pain at this time.  Problem: Plan of Care - These are the overarching goals to be used throughout the patient stay.    Goal: Absence of Hospital-Acquired Illness or Injury  Intervention: Identify and Manage Fall Risk  Recent Flowsheet Documentation  Taken 10/24/2022 0200 by Carrie Lawson RN  Safety Promotion/Fall Prevention:    bed alarm on    fall prevention program maintained    lighting adjusted    room door open  Intervention: Prevent and Manage VTE (Venous Thromboembolism) Risk  Recent Flowsheet Documentation  Taken 10/24/2022 0200 by Carrie Lawson RN  VTE Prevention/Management: (on blood thinner medication) --  Intervention: Prevent Infection  Recent Flowsheet Documentation  Taken 10/24/2022 0200 by Carrie Lawosn RN  Infection Prevention:    equipment surfaces disinfected    hand hygiene promoted    rest/sleep promoted    single patient room provided  Goal: Optimal Comfort and Wellbeing  Intervention: Provide Person-Centered Care  Recent Flowsheet Documentation  Taken 10/24/2022 0200 by Carrie Lawson RN  Trust Relationship/Rapport:    care explained    choices provided     Problem: Device-Related Complication Risk (Mechanical Ventilation, Invasive)  Goal: Optimal Device Function  Intervention: Optimize Device Care and Function  Recent Flowsheet Documentation  Taken 10/24/2022 0200 by Carrie Lawson RN  Airway Safety Measures: all equipment/monitors on and audible     Problem: Inability to Wean (Mechanical Ventilation, Invasive)  Goal: Mechanical Ventilation Liberation  Intervention: Promote Extubation and Mechanical Ventilation Liberation  Recent Flowsheet Documentation  Taken 10/24/2022 0200 by Carrie Lawson RN  Medication Review/Management: medications reviewed     Problem: Restraint, Nonbehavioral  (Nonviolent)  Goal: Absence of Harm or Injury  Intervention: Implement Least Restrictive Safety Strategies  Recent Flowsheet Documentation  Taken 10/24/2022 0200 by Carrie Lawson RN  Medical Device Protection: torso covered  Intervention: Protect Dignity, Rights, and Personal Wellbeing  Recent Flowsheet Documentation  Taken 10/24/2022 0200 by Carrie Lawson RN  Trust Relationship/Rapport:    care explained    choices provided  Intervention: Protect Skin and Joint Integrity  Recent Flowsheet Documentation  Taken 10/24/2022 0200 by Carrie Lawson RN  Range of Motion: active ROM (range of motion) encouraged     Problem: Pain Acute  Goal: Acceptable Pain Control and Functional Ability  Intervention: Prevent or Manage Pain  Recent Flowsheet Documentation  Taken 10/24/2022 0200 by Carrie Lawson RN  Sensory Stimulation Regulation:    care clustered    quiet environment promoted  Bowel Elimination Promotion: adequate fluid intake promoted  Medication Review/Management: medications reviewed     Problem: Communication Impairment (Artificial Airway)  Goal: Effective Communication  Intervention: Ensure Effective Communication  Recent Flowsheet Documentation  Taken 10/24/2022 0200 by Carrie Lawson RN  Communication Enhancement Strategies: call light answered in person  Trust Relationship/Rapport:    care explained    choices provided     Problem: Device-Related Complication Risk (Artificial Airway)  Goal: Optimal Device Function  Intervention: Optimize Device Care and Function  Recent Flowsheet Documentation  Taken 10/24/2022 0200 by Carrie Lawson RN  Airway Safety Measures: all equipment/monitors on and audible  Aspiration Precautions:    awake/alert before oral intake    food texture adjusted    liquids thickened     Problem: Fall Injury Risk  Goal: Absence of Fall and Fall-Related Injury  Intervention: Identify and Manage Contributors  Recent Flowsheet Documentation  Taken 10/24/2022 0200 by Renny  Carrie GOLDEN RN  Medication Review/Management: medications reviewed  Intervention: Promote Injury-Free Environment  Recent Flowsheet Documentation  Taken 10/24/2022 0200 by Carrie Lawson RN  Safety Promotion/Fall Prevention:    bed alarm on    fall prevention program maintained    lighting adjusted    room door open     Problem: Confusion Chronic  Goal: Optimal Cognitive Function  Intervention: Minimize Injury Risk and Provide Safety  Recent Flowsheet Documentation  Taken 10/24/2022 0200 by Carrie Lawson RN  Enhanced Safety Measures: bed alarm set  Intervention: Minimize and Manage Confusion  Recent Flowsheet Documentation  Taken 10/24/2022 0200 by Carrie Lawson RN  Sensory Stimulation Regulation:    care clustered    quiet environment promoted  Reorientation Measures: clock in view     Problem: Hypertension Acute  Goal: Blood Pressure Within Desired Range  Intervention: Normalize Blood Pressure  Recent Flowsheet Documentation  Taken 10/24/2022 0200 by Carrie Lawson RN  Sensory Stimulation Regulation:    care clustered    quiet environment promoted  Medication Review/Management: medications reviewed

## 2022-10-25 ENCOUNTER — APPOINTMENT (OUTPATIENT)
Dept: OCCUPATIONAL THERAPY | Facility: CLINIC | Age: 87
DRG: 208 | End: 2022-10-25
Attending: INTERNAL MEDICINE
Payer: MEDICARE

## 2022-10-25 ENCOUNTER — APPOINTMENT (OUTPATIENT)
Dept: PHYSICAL THERAPY | Facility: CLINIC | Age: 87
DRG: 208 | End: 2022-10-25
Attending: INTERNAL MEDICINE
Payer: MEDICARE

## 2022-10-25 ENCOUNTER — APPOINTMENT (OUTPATIENT)
Dept: SPEECH THERAPY | Facility: CLINIC | Age: 87
DRG: 208 | End: 2022-10-25
Attending: INTERNAL MEDICINE
Payer: MEDICARE

## 2022-10-25 ENCOUNTER — TELEPHONE (OUTPATIENT)
Dept: NEUROSURGERY | Facility: CLINIC | Age: 87
End: 2022-10-25

## 2022-10-25 LAB
ALBUMIN SERPL BCG-MCNC: 3 G/DL (ref 3.5–5.2)
ALP SERPL-CCNC: 88 U/L (ref 40–129)
ALT SERPL W P-5'-P-CCNC: 11 U/L (ref 10–50)
ANION GAP SERPL CALCULATED.3IONS-SCNC: 9 MMOL/L (ref 7–15)
AST SERPL W P-5'-P-CCNC: 12 U/L (ref 10–50)
BILIRUB SERPL-MCNC: 0.3 MG/DL
BUN SERPL-MCNC: 14.6 MG/DL (ref 8–23)
CALCIUM SERPL-MCNC: 8.8 MG/DL (ref 8.2–9.6)
CHLORIDE SERPL-SCNC: 98 MMOL/L (ref 98–107)
CREAT SERPL-MCNC: 0.81 MG/DL (ref 0.67–1.17)
DEPRECATED HCO3 PLAS-SCNC: 29 MMOL/L (ref 22–29)
ERYTHROCYTE [DISTWIDTH] IN BLOOD BY AUTOMATED COUNT: 15 % (ref 10–15)
GFR SERPL CREATININE-BSD FRML MDRD: 83 ML/MIN/1.73M2
GLUCOSE SERPL-MCNC: 84 MG/DL (ref 70–99)
HCT VFR BLD AUTO: 30.7 % (ref 40–53)
HGB BLD-MCNC: 10.1 G/DL (ref 13.3–17.7)
MAGNESIUM SERPL-MCNC: 1.9 MG/DL (ref 1.7–2.3)
MCH RBC QN AUTO: 31.1 PG (ref 26.5–33)
MCHC RBC AUTO-ENTMCNC: 32.9 G/DL (ref 31.5–36.5)
MCV RBC AUTO: 95 FL (ref 78–100)
PHOSPHATE SERPL-MCNC: 3.1 MG/DL (ref 2.5–4.5)
PLATELET # BLD AUTO: 275 10E3/UL (ref 150–450)
POTASSIUM SERPL-SCNC: 4.2 MMOL/L (ref 3.4–5.3)
PROT SERPL-MCNC: 6 G/DL (ref 6.4–8.3)
RBC # BLD AUTO: 3.25 10E6/UL (ref 4.4–5.9)
SODIUM SERPL-SCNC: 136 MMOL/L (ref 136–145)
WBC # BLD AUTO: 5.4 10E3/UL (ref 4–11)

## 2022-10-25 PROCEDURE — 120N000017 HC R&B RESPIRATORY CARE

## 2022-10-25 PROCEDURE — 250N000013 HC RX MED GY IP 250 OP 250 PS 637: Performed by: HOSPITALIST

## 2022-10-25 PROCEDURE — 99232 SBSQ HOSP IP/OBS MODERATE 35: CPT | Performed by: HOSPITALIST

## 2022-10-25 PROCEDURE — 250N000013 HC RX MED GY IP 250 OP 250 PS 637: Performed by: FAMILY MEDICINE

## 2022-10-25 PROCEDURE — 92526 ORAL FUNCTION THERAPY: CPT | Mod: GN | Performed by: SPEECH-LANGUAGE PATHOLOGIST

## 2022-10-25 PROCEDURE — 97110 THERAPEUTIC EXERCISES: CPT | Mod: GO | Performed by: OCCUPATIONAL THERAPIST

## 2022-10-25 PROCEDURE — 97129 THER IVNTJ 1ST 15 MIN: CPT | Mod: GN | Performed by: SPEECH-LANGUAGE PATHOLOGIST

## 2022-10-25 PROCEDURE — 250N000013 HC RX MED GY IP 250 OP 250 PS 637: Performed by: INTERNAL MEDICINE

## 2022-10-25 PROCEDURE — 84100 ASSAY OF PHOSPHORUS: CPT | Performed by: HOSPITALIST

## 2022-10-25 PROCEDURE — 97530 THERAPEUTIC ACTIVITIES: CPT | Mod: GP | Performed by: PHYSICAL THERAPIST

## 2022-10-25 PROCEDURE — 83735 ASSAY OF MAGNESIUM: CPT | Performed by: HOSPITALIST

## 2022-10-25 PROCEDURE — 85027 COMPLETE CBC AUTOMATED: CPT | Performed by: HOSPITALIST

## 2022-10-25 PROCEDURE — 80053 COMPREHEN METABOLIC PANEL: CPT | Performed by: HOSPITALIST

## 2022-10-25 PROCEDURE — 36415 COLL VENOUS BLD VENIPUNCTURE: CPT | Performed by: HOSPITALIST

## 2022-10-25 PROCEDURE — 250N000013 HC RX MED GY IP 250 OP 250 PS 637: Performed by: NURSE PRACTITIONER

## 2022-10-25 PROCEDURE — 97116 GAIT TRAINING THERAPY: CPT | Mod: GP | Performed by: PHYSICAL THERAPIST

## 2022-10-25 PROCEDURE — 250N000011 HC RX IP 250 OP 636: Performed by: HOSPITALIST

## 2022-10-25 RX ORDER — DOXAZOSIN 1 MG/1
2 TABLET ORAL AT BEDTIME
Status: DISCONTINUED | OUTPATIENT
Start: 2022-10-25 | End: 2022-10-28 | Stop reason: HOSPADM

## 2022-10-25 RX ADMIN — CALCIUM 500 MG: 500 TABLET ORAL at 11:03

## 2022-10-25 RX ADMIN — Medication 25 MCG: at 08:29

## 2022-10-25 RX ADMIN — DICLOFENAC 2 G: 10 GEL TOPICAL at 13:16

## 2022-10-25 RX ADMIN — SENNOSIDES 1 TABLET: 8.6 TABLET, FILM COATED ORAL at 08:30

## 2022-10-25 RX ADMIN — ACETAMINOPHEN 650 MG: 325 TABLET ORAL at 08:25

## 2022-10-25 RX ADMIN — CALCIUM 500 MG: 500 TABLET ORAL at 18:17

## 2022-10-25 RX ADMIN — OXYCODONE HYDROCHLORIDE 2.5 MG: 5 TABLET ORAL at 08:30

## 2022-10-25 RX ADMIN — LEVOTHYROXINE SODIUM 112 MCG: 0.11 TABLET ORAL at 06:33

## 2022-10-25 RX ADMIN — Medication 40 MG: at 06:33

## 2022-10-25 RX ADMIN — DICLOFENAC 2 G: 10 GEL TOPICAL at 21:04

## 2022-10-25 RX ADMIN — LIDOCAINE 1 PATCH: 246 PATCH TOPICAL at 08:25

## 2022-10-25 RX ADMIN — DICLOFENAC 2 G: 10 GEL TOPICAL at 08:30

## 2022-10-25 RX ADMIN — OXYCODONE HYDROCHLORIDE 2.5 MG: 5 TABLET ORAL at 20:59

## 2022-10-25 RX ADMIN — LISINOPRIL 5 MG: 2.5 TABLET ORAL at 08:26

## 2022-10-25 RX ADMIN — OXYCODONE HYDROCHLORIDE 2.5 MG: 5 TABLET ORAL at 13:15

## 2022-10-25 RX ADMIN — DOXAZOSIN 2 MG: 1 TABLET ORAL at 21:04

## 2022-10-25 RX ADMIN — ENOXAPARIN SODIUM 40 MG: 100 INJECTION SUBCUTANEOUS at 18:18

## 2022-10-25 RX ADMIN — SENNOSIDES 1 TABLET: 8.6 TABLET, FILM COATED ORAL at 21:03

## 2022-10-25 RX ADMIN — DOXAZOSIN 2 MG: 1 TABLET ORAL at 08:26

## 2022-10-25 RX ADMIN — ACETAMINOPHEN 650 MG: 325 TABLET ORAL at 21:03

## 2022-10-25 RX ADMIN — ACETAMINOPHEN 650 MG: 325 TABLET ORAL at 13:15

## 2022-10-25 RX ADMIN — MULTIPLE VITAMINS W/ MINERALS TAB 1 TABLET: TAB at 11:03

## 2022-10-25 RX ADMIN — BUMETANIDE 0.5 MG: 0.5 TABLET ORAL at 08:25

## 2022-10-25 ASSESSMENT — ACTIVITIES OF DAILY LIVING (ADL)
ADLS_ACUITY_SCORE: 79
ADLS_ACUITY_SCORE: 79
ADLS_ACUITY_SCORE: 81
ADLS_ACUITY_SCORE: 77
ADLS_ACUITY_SCORE: 83
ADLS_ACUITY_SCORE: 83
ADLS_ACUITY_SCORE: 77
ADLS_ACUITY_SCORE: 81
ADLS_ACUITY_SCORE: 79
ADLS_ACUITY_SCORE: 85
ADLS_ACUITY_SCORE: 77
ADLS_ACUITY_SCORE: 83

## 2022-10-25 NOTE — PROGRESS NOTES
Social Work Note:    Writer called and spoke with patient main family contact: Erv (son in law).    Patient's daughter (Erv wife) has passed away, and patient's son is in an prison with cognitive deficits.   We discussed discharge planning and need for LTC placement. Erv in agreement.  He has no preference/chice for a SNF, but would like patient in [facility].    We spoke briefly about patient's fiances.  Erv is not FPOA, and is not fully aware of patient's financial situation.  Erv will speak to patient about finances and need for FPOA.     Bob Reynolds, [facility] Center[facility]  [facility]  [phone]

## 2022-10-25 NOTE — PROGRESS NOTES
[facility]    Medicine Progress Note - Hospitalist Service    Date of Admission:  9/22/2022  Brief Hospital Course Summary:    Alexandru Still is a 89 year old man w/ PMH of Lambert-Eaton syndrome, hypothyroidism,  complete heart block s/p pacemaker, HTN, BPH,  PB, and DM type 2 who was admitted to the SICU on 9/15/2022 following an unwitnessed fall and striking his head. He was initially seen at an outside hospital and underwent a comprehensive trauma work up found to have a left frontal SDH (5mm) + C1 fx + Type II C2 odontoid fracture with a 1cm posterior displacement. He was  intubated for airway protection in the ED given high c-spine injury and difficulty managing his secretions. On admission he requested all cares without surgical cervical spine stabilization, agreeable to a tracheostomy and PEG. Multiple care conferences held with his family who confirmed his wishes.     LTACH course:    9/24-9/26:  Speech evaluated patient and recommends NPO, cont tube feeds via PEG due to severe oropharyngeal dysphasia.  PT/OT, dietitian, wo nurse saw pt on 9/23.  Pulled out G tube during night of 9/23. Pt now on soft wrist restraints.  IVF changed to D51/2 NS at low rate as pt has TAVR.  Morphine IV ordered prn pain.    Spoke w/ Dr. Brown- IR - recommends no hannah tube as this is a brand new PEG and placing it blind will likely lead it to not be in correct position.  He will not do a PEG placement on the weekend, scheduled for Monday.  Pt cannot be fed via NG tube as it is contraindicated with C1 and C2 fx.  For PEG tube replacement (herpain on hold, place ICD)on 9/26.  Will discontinue IVF once PEG tube is placed and restart po meds.  Given NaPhos 9/26 9/27-10/3:  9/27 patient had PEG tube replaced after patient himself removed it on 9/23.  Was a started on tube feeding.  P.O. medication were restarted.  IV fluids were discontinued.  He still needs soft restraint x2.  Is off ventilator and stable.  9/28 Failed blue  dye test, Continue NPO   9/30 patient had Ione J 300 collar pads delivered  10/1 SLP found patient well below baseline for swallowing, cognition and communication.  Patient stays n.p.o. except ice chips    10/4/22 -/10/8:   -No acute events, Vent weaning phase 2. Continue diuresis with bumex. Continue plan of care. Cognitive testing PENDING. May need neuropsych evaluation depending on cognitive evaluation.     10/9-10/17: he follows commands, cont Ione J.  He does better with glasses and hearing device on.  He has neck pain and headache.  Constipation issues.  Speech eval with swallow study and placed on puree with moderately, thick fluids.  Stopped insulin sliding scale as pt has normal glucose.        10/18-10/19:   decannulated yesterday, confused overnight,  +back pain.  High risk of aspiration.  Consult palliative care as he has intermittent confusion.  Need goals of care   10/20:  Seen by gabrielle yesterday, Doing well on room air overnight, understands he has risk of aspiration and wants eat and willing to take risk.  BP elevated -add lisinopril  10/21:  Needs assistance for feeding, redness of L side of jaw, buttock, and perineum - calmoseptine applied  10/22: swallow test done yesterday- no aspiration observed, recommend: mildly thick, puree solids.  Scratching at PEG- abdominal binder applied for safety.   10/23: tolerated therapy well yesterday and ate well,  Confused overnight  10/24:  Disoriented overnight,  +neck pain- tylenol given.  This morning he awake and not confused.  He is still having neck and shoulder pain, now w/ headache.  Palliative care saw pt today   10/25:  Needs assistant with feeding, incontinent, CM looking for TCU      Assessment & Plan        Traumatic 5mm left frontal lobe SDH  Posteriorly displaced (1 cm) Type II odontoid fx  C1 anterior arch fracture   Epidural hemorrhage of 7mm  Acute traumatic neck pain  Acute L 6th rib fracture  S/p fall, Facial abrasions with  ecchymoses  RLE wound  He was seen and evaluated by Neurosurgery.  Pt refused surgical intervention.   A repeat head CT was obtained with a stable subdural hematoma. He was placed in a cervical collar for the cervical fracture. No surgical intervention per patient preference.  -Cont Burnett J -keep in place at all times  -Tylenol, oxy prn for pain, Voltaren added  -Neurosurg ok lovenox for DVT ppx  -Wound care following   -f/u Neurosurgery in 6 weeks with an upright XR of the cervical spine (November PENDING appointment)  -CT cervical spine in 3 months (first week of December).   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 Failed bedside and radiology swallow. He completed 3 doses of Decadron for laryngeal edema.   -S/P tracheostomy 09/21/2022, Trach suture removal on 09/26  -Pulm and RT following  -decannulated on 10/17        Hypertension   Complete heart block s/p PM placement  Nonrheumatic aortic valve stenosis s/p TAVR 2019  -Monitor  -bumex, cardura  -added lisinopril   -Echo on 9/16:  EF: 60-65%, no LV dysfunction     Severe orophayngeal dysphagia   S/P PEG   Severe protein calorie malnutrition  Hiatal hernia  -PEG tube placed 09/20. Pt pulled out PEG tube on night of 9/23, replaced on 9/27  -off TF   -RD following  -needs feeder for each meal due to Burnett J- order placed      Hypophosphatemia  Hypomagnesia    Hypokalemia  -Monitor and replete as needed    Hyponatremia  -s/p IVF  -10/24- increase FWF to 125 ml q8h     Hypothyroidism   -Continue Levothyroxine     Anemia of chronic disease  -Hg stable  -Cont to monitor intermittently.     BPH  -Cont doxazosin     DM type 2  -discontinue Sliding scale insulin  -Last HbA1:  6.2  -Hypoglycemic protocol    L shoulder pain   -lidocaine patch      Deconditioning  -PT/OT following           Diet: Snacks/Supplements Adult: Magic Cup; Between Meals  Pureed Diet (level 4) Mildly Thick (level 2)    DVT Prophylaxis: Lovenox  [place] Catheter: Not present  Central Lines: None    Cardiac Monitoring: None  Code Status: No CPR- Do NOT Intubate      Disposition Plan     Discharge date: TBD, to TCU on discharge  Barriers: Placement     The patient's care was discussed with patient, RN, TAMRA/AINSLEY Rubio MD  Hospitalist Service  LTACH  Securely message with the Vocera Web Console (learn more here)  Text page via [employer] Paging/Directory           ______________________________________________________________________    Interval History    Chart reviewed and events noted.  Patient seen and examined.     Shoulder pain is better, neck and headache are also better today    He has no chest pain, dyspnea, or painful urination    ROS:  A 10-system review was obtained and is negative with the exception of the symptoms noted above    Data reviewed today: I reviewed all medications, new labs and imaging results over the last 24 hours.     Physical Exam   Vital Signs: Temp: 98.3  F (36.8  C) Temp src: Oral BP: (!) 165/73 Pulse: 56   Resp: 20 SpO2: 96 % O2 Device: None (Room air)    Weight: 191 lbs 0 oz    General:  No acute distress,awake and alert  HEENT: [place] J in place, trach in place  Lungs:  Clear to auscultation bilaterally  CVS:  S1 and S2, RRR  Abdomen:  Soft, nontender, PEG in place  Musculoskeletal:  No edema  Neuro: Awake, alert, oriented, speech+, following commands.   Skin:   erythematous area of R jaw, buttocks and perineum    Data   Recent Labs   Lab 10/25/22  0632 10/25/22  0631 10/24/22  0723 10/22/22  1122 10/22/22  0800 10/22/22  0715 10/21/22  0805 10/20/22  0714   WBC  --  5.4 5.8  --   --   --   --  6.0   HGB  --  10.1* 10.2*  --   --   --   --  9.8*   MCV  --  95 92  --   --   --   --  93   PLT  --  275 275  --   --   --   --  251     --  131*  --   --  135*  --  135*   POTASSIUM 4.2  --  4.2  --   --  3.9  --  4.1   CHLORIDE 98  --  97*  --   --  99  --  98   CO2 29  --  29  --   --  29  --  27   BUN 14.6  --  13.4  --   --  14.5  --  14.2   CR 0.81  --  0.75  --   --  0.76  --  0.78   ANIONGAP 9  --  5*  --   --  7  --  10   TITA 8.8  --  8.7  --   --  8.6  --  8.6   GLC 84  --  90 150*   < > 93   < > 88   ALBUMIN 3.0*  --   --   --   --   --   --  2.9*   PROTTOTAL 6.0*  --   --   --   --   --   --  6.1*   BILITOTAL 0.3  --   --   --   --   --   --  0.4   ALKPHOS 88  --   --   --   --   --   --  97   ALT 11  --   --   --   --   --   --  16   AST 12  --   --   --   --   --   --  21    < > = values in this interval not displayed.     No results found for this or any previous visit (from the past 24 hour(s)).  Medications     - MEDICATION INSTRUCTIONS -         acetaminophen  650 mg Oral TID     bumetanide  0.5 mg Oral Daily     calcium carbonate  500 mg Oral BID w/meals     diclofenac  2 g Topical TID     doxazosin  2 mg Oral Daily     enoxaparin ANTICOAGULANT  40 mg Subcutaneous Q24H     levothyroxine  112 mcg Oral QAM AC     lidocaine  1 patch Transdermal Q24H     lidocaine   Transdermal Q8H ZHEN     lisinopril  5 mg Oral Daily     multivitamin w/minerals  1 tablet Oral Daily     oxyCODONE  2.5 mg Oral Q6H WA     pantoprazole  40 mg Oral or Feeding Tube QAM AC     sennosides  1 tablet Oral BID     cholecalciferol  25 mcg Oral Daily

## 2022-10-25 NOTE — PROGRESS NOTES
Social Work Note:  Patient chart reviewed.  Patient discussed in morning rounds.  Barriers to discharge:   * Will need LTC placement     Writer made referrals today for LTC  placement:     * [facility]  * [place]:   * [place]:   * [facility]  * [facility]  * [facility]  * [facility]  * [facility]:   * [facility] ([place]):   * [facility]  * [facility]  * [facility]  * [facility]  * [facility]:   * [facility]-        [facility]  [phone]

## 2022-10-25 NOTE — PROGRESS NOTES
Patient denied pain/anxiety/nausea and no PRNs given this shift. Meds given by tube. Patient has slept most of shift and allowed repositioning. No new skin or vitals concerns.

## 2022-10-25 NOTE — PLAN OF CARE
Problem: Pain Acute  Goal: Acceptable Pain Control and Functional Ability  Outcome: Adequate for Care Transition  Intervention: Prevent or Manage Pain  Recent Flowsheet Documentation  Taken 10/25/2022 1000 by Hailey Patterson RN  Sensory Stimulation Regulation:   care clustered   quiet environment promoted  Bowel Elimination Promotion: adequate fluid intake promoted  Medication Review/Management: medications reviewed  Intervention: Optimize Psychosocial Wellbeing  Recent Flowsheet Documentation  Taken 10/25/2022 1000 by Hailey Patterson RN  Supportive Measures: relaxation techniques promoted   Goal Outcome Evaluation:         Alert and oriented X2, complained of pain, managed by scheduled Oxycodone and acetaminophen, elevated BP, managed by scheduled BP medication, tolerated sitting on the chair and walking with walker, repositioned on the bed, good appetite, and pleasant and calm.  Hailey Patterson RN

## 2022-10-25 NOTE — TELEPHONE ENCOUNTER
Writer routed message to Neurosurgery clinic scheduling pool and Trudy KAUR CNP    ATTN: Brigida KNOX, Neurosurgery clinic scheduler and NATASHA Skinner CNP     FYI: Patient is inpatient at [facility] and Taylor, Unit Coordinator, is calling again to see if her appointment with NATASHA Skinner CNP on 10/27 can be changed to a virtual appointment and if imaging can be done there as well. Please coordinate and call Taylor to let her know. Thank you.    NOLAN Dalton  Neurosurgery nurse navigator.

## 2022-10-25 NOTE — PROGRESS NOTES
CLINICAL NUTRITION SERVICES - REASSESSMENT NOTE      RECOMMENDATIONS FOR MD/PROVIDER TO ORDER:   Consider removing FT after  (6 weeks from placement). Not currently being used for nutrition     Recommendations Ordered by Registered Dietitian (RD):   None     Future/Additional Recommendations:   Continue to monitor meal intakes and weight.     Malnutrition:   Previously noted severe       EVALUATION OF PROGRESS TOWARD GOALS   Diet:  Orders Placed This Encounter      Pureed Diet (level 4) Mildly Thick (level 2)  Supplement: Berry magic cup bid    Peg placed : 125 ml H20 flush q 8 hr - Per MD    Intake/Tolerance:  Good, 100 %of meals. Fed. With supplements intake 1634-2572 kcal, 102-104 g protein/day, meeting estimated nutrition needs      ASSESSED NUTRITION NEEDS:  Dosing Weight  70 kg  IBW  Estimated Energy Needs: 0070-8020 kcals/day (25 - 30 kcals/kg)  Justification: Obese vs age, s/p Fx  Estimated Protein Needs: 105-140 grams protein/day (1.5 - 2 grams of pro/kg)  Justification: Obesity guidlelines   Estimated Fluid Needs: 1 mL/kcal   Justification: Maintenance or Per provider pending fluid status    NEW FINDINGS:   -BM: 1-3 formed BM/day  -Electrolytes: Na 136 improved  -B-150 mg/dl past 24 hours  -Meds: bumex daily, oscal,  levothyroxine, protonix, senokot BID, vitamin D3, mvi with minerals  Oxycodone q 6 hr added. ssi dc'd  -Weight: 86.6 kg (191 lb) 10/25, up 8 lb from 4 days prior      Previous Goals:   Total avg nutritional intake to meet a minimum of 25 kcal/kg and 1.5 g PRO/kg daily  Evaluation: met    Achieve and maintain fluid and electrolyte balance  Evaluation: progressing    Previous Nutrition Diagnosis:   Malnutrition related to critical illness as evidenced by severe weight loss 13% in 3 months, trophic feeds at [facility], and mild-moderate muscle wasting.  Evaluation: improving    CURRENT NUTRITION DIAGNOSIS  Malnutrition related to critical illness as evidenced by severe weight loss 13% in 3  months, trophic feeds at [facility], and mild-moderate muscle wasting.    INTERVENTIONS  Recommendations / Nutrition Prescription  See top of note.  Consider removing FT after 11/1 (6 weeks from placement)- discussed with MD in rounds    Implementation  Collaboration with interdisciplinary team    Goals  Total avg nutritional intake to meet a minimum of 25 kcal/kg and 1.5 g PRO/kg daily  Achieve and maintain fluid and electrolyte balance    MONITORING AND EVALUATION:  Progress towards goals will be monitored and evaluated per protocol and Practice Guidelines    Brigida Levine RDN, LD  Clinical Dietitian

## 2022-10-25 NOTE — PLAN OF CARE
Goal Outcome Evaluation:       Calm and cooperative with cares. Ate 100% for his dinner. Scheduled oxycodone, diclofenac and tylenol given for  shoulder pain with a good effect. Vital stable. Pt resting well at this time.  Problem: Plan of Care - These are the overarching goals to be used throughout the patient stay.    Goal: Absence of Hospital-Acquired Illness or Injury  Intervention: Identify and Manage Fall Risk  Recent Flowsheet Documentation  Taken 10/24/2022 1633 by Carrie Lawson RN  Safety Promotion/Fall Prevention: bed alarm on  Intervention: Prevent Skin Injury  Recent Flowsheet Documentation  Taken 10/24/2022 1633 by Carrie Lawson RN  Skin Protection: skin-to-skin areas padded  Intervention: Prevent Infection  Recent Flowsheet Documentation  Taken 10/24/2022 1633 by Carrie Lawson RN  Infection Prevention: equipment surfaces disinfected  Goal: Optimal Comfort and Wellbeing  Intervention: Provide Person-Centered Care  Recent Flowsheet Documentation  Taken 10/24/2022 1633 by Carrie Lawson RN  Trust Relationship/Rapport:    care explained    choices provided     Problem: Device-Related Complication Risk (Mechanical Ventilation, Invasive)  Goal: Optimal Device Function  Intervention: Optimize Device Care and Function  Recent Flowsheet Documentation  Taken 10/24/2022 1633 by Carrie Lawson RN  Airway Safety Measures: all equipment/monitors on and audible  Oral Care: swabbed with sterile water     Problem: Inability to Wean (Mechanical Ventilation, Invasive)  Goal: Mechanical Ventilation Liberation  Intervention: Promote Extubation and Mechanical Ventilation Liberation  Recent Flowsheet Documentation  Taken 10/24/2022 1633 by Carrie Lawsno RN  Medication Review/Management: medications reviewed     Problem: Skin and Tissue Injury (Mechanical Ventilation, Invasive)  Goal: Absence of Device-Related Skin and Tissue Injury  Intervention: Maintain Skin and Tissue Health  Recent Flowsheet  Documentation  Taken 10/24/2022 1633 by Carrie Lawson RN  Device Skin Pressure Protection: positioning supports utilized     Problem: Ventilator-Induced Lung Injury (Mechanical Ventilation, Invasive)  Goal: Absence of Ventilator-Induced Lung Injury  Intervention: Prevent Ventilator-Associated Pneumonia  Recent Flowsheet Documentation  Taken 10/24/2022 1633 by Carrie Lawson RN  Oral Care: swabbed with sterile water     Problem: Restraint, Nonbehavioral (Nonviolent)  Goal: Absence of Harm or Injury  Intervention: Implement Least Restrictive Safety Strategies  Recent Flowsheet Documentation  Taken 10/24/2022 1633 by Carrie Lawson RN  Medical Device Protection: tubing secured  Intervention: Protect Dignity, Rights, and Personal Wellbeing  Recent Flowsheet Documentation  Taken 10/24/2022 1633 by Carrie Lawson RN  Trust Relationship/Rapport:    care explained    choices provided  Intervention: Protect Skin and Joint Integrity  Recent Flowsheet Documentation  Taken 10/24/2022 1633 by Carrie Lawson RN  Skin Protection: skin-to-skin areas padded  Range of Motion: active ROM (range of motion) encouraged     Problem: Pain Acute  Goal: Acceptable Pain Control and Functional Ability  Intervention: Prevent or Manage Pain  Recent Flowsheet Documentation  Taken 10/24/2022 1633 by Carrie Lawson RN  Medication Review/Management: medications reviewed     Problem: Communication Impairment (Artificial Airway)  Goal: Effective Communication  Intervention: Ensure Effective Communication  Recent Flowsheet Documentation  Taken 10/24/2022 1633 by Carrie Lawson RN  Trust Relationship/Rapport:    care explained    choices provided     Problem: Device-Related Complication Risk (Artificial Airway)  Goal: Optimal Device Function  Intervention: Optimize Device Care and Function  Recent Flowsheet Documentation  Taken 10/24/2022 1633 by Carrie Lawson RN  Airway Safety Measures: all equipment/monitors on and  audible  Aspiration Precautions: awake/alert before oral intake  Oral Care: swabbed with sterile water     Problem: Skin and Tissue Injury (Artificial Airway)  Goal: Absence of Device-Related Skin or Tissue Injury  Intervention: Maintain Skin and Tissue Health  Recent Flowsheet Documentation  Taken 10/24/2022 1633 by Carrie Lawson RN  Device Skin Pressure Protection: positioning supports utilized     Problem: Fall Injury Risk  Goal: Absence of Fall and Fall-Related Injury  Intervention: Identify and Manage Contributors  Recent Flowsheet Documentation  Taken 10/24/2022 1633 by Carrie Lawson RN  Medication Review/Management: medications reviewed  Intervention: Promote Injury-Free Environment  Recent Flowsheet Documentation  Taken 10/24/2022 1633 by Carrie Lawson RN  Safety Promotion/Fall Prevention: bed alarm on     Problem: Confusion Chronic  Goal: Optimal Cognitive Function  Intervention: Minimize Injury Risk and Provide Safety  Recent Flowsheet Documentation  Taken 10/24/2022 1633 by Carrie Lawson RN  Enhanced Safety Measures: bed alarm set     Problem: Hypertension Acute  Goal: Blood Pressure Within Desired Range  Intervention: Normalize Blood Pressure  Recent Flowsheet Documentation  Taken 10/24/2022 1633 by Carrie Lawson RN  Medication Review/Management: medications reviewed

## 2022-10-26 ENCOUNTER — APPOINTMENT (OUTPATIENT)
Dept: PHYSICAL THERAPY | Facility: CLINIC | Age: 87
DRG: 208 | End: 2022-10-26
Attending: INTERNAL MEDICINE
Payer: MEDICARE

## 2022-10-26 ENCOUNTER — APPOINTMENT (OUTPATIENT)
Dept: OCCUPATIONAL THERAPY | Facility: CLINIC | Age: 87
DRG: 208 | End: 2022-10-26
Attending: INTERNAL MEDICINE
Payer: MEDICARE

## 2022-10-26 ENCOUNTER — APPOINTMENT (OUTPATIENT)
Dept: SPEECH THERAPY | Facility: CLINIC | Age: 87
DRG: 208 | End: 2022-10-26
Attending: INTERNAL MEDICINE
Payer: MEDICARE

## 2022-10-26 PROCEDURE — 97535 SELF CARE MNGMENT TRAINING: CPT | Mod: GO | Performed by: OCCUPATIONAL THERAPIST

## 2022-10-26 PROCEDURE — 250N000011 HC RX IP 250 OP 636: Performed by: HOSPITALIST

## 2022-10-26 PROCEDURE — 250N000013 HC RX MED GY IP 250 OP 250 PS 637: Performed by: FAMILY MEDICINE

## 2022-10-26 PROCEDURE — 250N000013 HC RX MED GY IP 250 OP 250 PS 637: Performed by: HOSPITALIST

## 2022-10-26 PROCEDURE — 250N000013 HC RX MED GY IP 250 OP 250 PS 637: Performed by: INTERNAL MEDICINE

## 2022-10-26 PROCEDURE — 97110 THERAPEUTIC EXERCISES: CPT | Mod: GP

## 2022-10-26 PROCEDURE — 99232 SBSQ HOSP IP/OBS MODERATE 35: CPT | Performed by: HOSPITALIST

## 2022-10-26 PROCEDURE — 92507 TX SP LANG VOICE COMM INDIV: CPT | Mod: GN

## 2022-10-26 PROCEDURE — 92526 ORAL FUNCTION THERAPY: CPT | Mod: GN

## 2022-10-26 PROCEDURE — 120N000017 HC R&B RESPIRATORY CARE

## 2022-10-26 PROCEDURE — 250N000013 HC RX MED GY IP 250 OP 250 PS 637: Performed by: NURSE PRACTITIONER

## 2022-10-26 RX ADMIN — BUMETANIDE 0.5 MG: 0.5 TABLET ORAL at 08:38

## 2022-10-26 RX ADMIN — OXYCODONE HYDROCHLORIDE 2.5 MG: 5 TABLET ORAL at 20:08

## 2022-10-26 RX ADMIN — OXYCODONE HYDROCHLORIDE 2.5 MG: 5 TABLET ORAL at 13:48

## 2022-10-26 RX ADMIN — SENNOSIDES 1 TABLET: 8.6 TABLET, FILM COATED ORAL at 08:38

## 2022-10-26 RX ADMIN — ACETAMINOPHEN 650 MG: 325 TABLET ORAL at 20:08

## 2022-10-26 RX ADMIN — CALCIUM 500 MG: 500 TABLET ORAL at 18:13

## 2022-10-26 RX ADMIN — ACETAMINOPHEN 650 MG: 325 TABLET ORAL at 13:48

## 2022-10-26 RX ADMIN — Medication 40 MG: at 06:34

## 2022-10-26 RX ADMIN — DICLOFENAC 2 G: 10 GEL TOPICAL at 13:48

## 2022-10-26 RX ADMIN — SENNOSIDES 1 TABLET: 8.6 TABLET, FILM COATED ORAL at 20:08

## 2022-10-26 RX ADMIN — DICLOFENAC 2 G: 10 GEL TOPICAL at 08:43

## 2022-10-26 RX ADMIN — DOXAZOSIN 2 MG: 1 TABLET ORAL at 20:09

## 2022-10-26 RX ADMIN — ENOXAPARIN SODIUM 40 MG: 100 INJECTION SUBCUTANEOUS at 18:13

## 2022-10-26 RX ADMIN — OXYCODONE HYDROCHLORIDE 2.5 MG: 5 TABLET ORAL at 08:38

## 2022-10-26 RX ADMIN — LIDOCAINE 1 PATCH: 246 PATCH TOPICAL at 08:38

## 2022-10-26 RX ADMIN — ACETAMINOPHEN 650 MG: 325 TABLET ORAL at 08:38

## 2022-10-26 RX ADMIN — LEVOTHYROXINE SODIUM 112 MCG: 0.11 TABLET ORAL at 06:34

## 2022-10-26 RX ADMIN — MULTIPLE VITAMINS W/ MINERALS TAB 1 TABLET: TAB at 12:24

## 2022-10-26 RX ADMIN — CALCIUM 500 MG: 500 TABLET ORAL at 12:25

## 2022-10-26 RX ADMIN — DICLOFENAC 2 G: 10 GEL TOPICAL at 20:09

## 2022-10-26 RX ADMIN — LISINOPRIL 5 MG: 2.5 TABLET ORAL at 08:38

## 2022-10-26 RX ADMIN — Medication 25 MCG: at 08:38

## 2022-10-26 ASSESSMENT — ACTIVITIES OF DAILY LIVING (ADL)
ADLS_ACUITY_SCORE: 79
ADLS_ACUITY_SCORE: 85
ADLS_ACUITY_SCORE: 77
ADLS_ACUITY_SCORE: 77
ADLS_ACUITY_SCORE: 79
ADLS_ACUITY_SCORE: 83
ADLS_ACUITY_SCORE: 77
ADLS_ACUITY_SCORE: 77
ADLS_ACUITY_SCORE: 79
ADLS_ACUITY_SCORE: 85

## 2022-10-26 NOTE — PROGRESS NOTES
[facility]  Palliative Care Chart Check Note    Palliative medicine was consulted for goals of care.  --> palliative medicine will sign off as Alexandru has been stable, symptoms managed.  Recommendations for transfer to TCU below.  -->please call our service if new needs arise prior to discharge.    Chart reviewed and recent events noted from preceding 48 hours/days.  - pain is better controlled with the low dose oxycodone scheduled  - continues with bowel movements    Goals of care: attempting restorative and life prolonging goals of care, with limits (avoid escalation of care, avoid rehospitalization if possible)   -->see family meeting note from 10/24/22.    Advanced care planning: HCD completed, POLST (DNR/Comfort) completed also.     Support: Spiritism reema  Son in law, Abhijit Rao is health care agent (alternates are Erkate's children).    Symptoms:  Pain, neck, shoulders, headache, due to neck stiffness and unstable C1C2 fracture requiring cervical collar round-the-clock.  - receiving lidocaine patch--continue at transfer)  - topical agents (diclofenac--continue at transfer)  - receiving oxycodone --recommend scheduling 2.5mg oxycodone TID during daytime to see if helpful for incident pain, hold if oversedation. Suggest continue same at TCU for one week and then have rounding provider review and reevaluate.  - schedule APAP TID also at discharge.    Eileen Jeffries MD  [facility] Palliative Medicine Service: [facility]  Department voice mail (checked M-F 8a-4pm approx): [phone]  [facility] Palliative Medicine pager: [phone]  (Please use [email or web address] for text paging if possible, use link under Palliative service)  May page me directly via [email or web address]

## 2022-10-26 NOTE — PLAN OF CARE
Problem: Pain Acute  Goal: Acceptable Pain Control and Functional Ability  10/26/2022 1118 by Nicole Delgado RN  Outcome: Progressing  10/26/2022 1118 by Nicole Delgado RN  Reactivated  Intervention: Prevent or Manage Pain  Recent Flowsheet Documentation  Taken 10/26/2022 0900 by Nicole Delgado RN  Medication Review/Management: medications reviewed     Problem: Confusion Chronic  Goal: Optimal Cognitive Function  Outcome: Progressing  Intervention: Minimize Injury Risk and Provide Safety  Recent Flowsheet Documentation  Taken 10/26/2022 0900 by Nicole Delgado RN  Enhanced Safety Measures: bed alarm set     Problem: Hypertension Acute  Goal: Blood Pressure Within Desired Range  Outcome: Progressing  Intervention: Normalize Blood Pressure  Recent Flowsheet Documentation  Taken 10/26/2022 0900 by Nicole Delgado RN  Medication Review/Management: medications reviewed     Problem: Anxiety  Goal: Anxiety Reduction or Resolution  Outcome: Progressing   Goal Outcome Evaluation:         Patient calm, pleasant and cooperative with cares. Vital signs T. 98.5, HR 58, RR 20, /66. Patient had good appetite, needed staff assistance with meals, able to swallow medication whole without any difficulty. Up in chair this am, tolerated it well. Incontinent of bowel and bladder. Patient complained of shoulder pain upon awakening, received scheduled pain medication with effect.    Nicole Delgado RN

## 2022-10-26 NOTE — PLAN OF CARE
Problem: Plan of Care - These are the overarching goals to be used throughout the patient stay.    Goal: Optimal Comfort and Wellbeing  Outcome: Progressing  Intervention: Provide Person-Centered Care  Recent Flowsheet Documentation  Taken 10/25/2022 1545 by Pam Lin RN  Trust Relationship/Rapport: care explained     Problem: Pain Acute  Goal: Acceptable Pain Control and Functional Ability  Intervention: Prevent or Manage Pain  Recent Flowsheet Documentation  Taken 10/25/2022 1545 by Pam Lin RN  Sensory Stimulation Regulation:   care clustered   lighting decreased   television on   visual stimulation provided   auditory stimulation provided  Sleep/Rest Enhancement:   comfort measures   consistent schedule promoted   relaxation techniques promoted   natural light exposure provided  Bowel Elimination Promotion:   adequate fluid intake promoted   privacy promoted  Complementary Therapy: (Therapeutic presence) other (see comments)  Medication Review/Management: medications reviewed    Goal Outcome Evaluation:     Patient's vital signs were stable this evening.  He was up in chair for breakfast  and lunch. He was not gotten up for supper because he was a bit drowsy , so he was fed his supper while on high back rest on bed. He ate 100% and took  his mildly thickened liquids with good appetite.   He claimed of mild tenderness on bilateral shoulders with position changes and shifting his  body alignment. Scheduled Lidoderm Patch, Oxycodone and  Tylenol were effective in managing his pain / discomfort. Will keep monitoring patient's progress and safety.

## 2022-10-26 NOTE — PROGRESS NOTES
Social Work Note:    Patient has been accepted at [facility] for LTC placement with orders for PT/OT/ST.  Writer met with patient to discuss and called his son in law-Erv.  Plan for discharge on Friday 10/28/22.    Bob Reynolds, [facility]/[place]  [phone]

## 2022-10-26 NOTE — PLAN OF CARE
Goal Outcome Evaluation:         Patient calm and cooperative with cares. Slept 5-6 hrs through the night. Denies pain. Vitals stable.  Problem: Plan of Care - These are the overarching goals to be used throughout the patient stay.    Goal: Absence of Hospital-Acquired Illness or Injury  Intervention: Identify and Manage Fall Risk  Recent Flowsheet Documentation  Taken 10/26/2022 0013 by Carrie Lawson RN  Safety Promotion/Fall Prevention:    bed alarm on    lighting adjusted    room door open  Intervention: Prevent and Manage VTE (Venous Thromboembolism) Risk  Recent Flowsheet Documentation  Taken 10/26/2022 0013 by Carrie Lawson RN  VTE Prevention/Management: (on Lovenox) --  Intervention: Prevent Infection  Recent Flowsheet Documentation  Taken 10/26/2022 0013 by Carrie Lawson RN  Infection Prevention:    equipment surfaces disinfected    hand hygiene promoted    rest/sleep promoted    single patient room provided  Goal: Optimal Comfort and Wellbeing  Intervention: Provide Person-Centered Care  Recent Flowsheet Documentation  Taken 10/26/2022 0013 by Carrie aLwson RN  Trust Relationship/Rapport: care explained     Problem: Device-Related Complication Risk (Mechanical Ventilation, Invasive)  Goal: Optimal Device Function  Intervention: Optimize Device Care and Function  Recent Flowsheet Documentation  Taken 10/26/2022 0013 by Carrie Lawson RN  Airway Safety Measures: all equipment/monitors on and audible  Oral Care: lip/mouth moisturizer applied     Problem: Inability to Wean (Mechanical Ventilation, Invasive)  Goal: Mechanical Ventilation Liberation  Intervention: Promote Extubation and Mechanical Ventilation Liberation  Recent Flowsheet Documentation  Taken 10/26/2022 0013 by Carrie Lawson RN  Medication Review/Management: medications reviewed  Environmental Support: calm environment promoted     Problem: Ventilator-Induced Lung Injury (Mechanical Ventilation, Invasive)  Goal: Absence of  Ventilator-Induced Lung Injury  Intervention: Prevent Ventilator-Associated Pneumonia  Recent Flowsheet Documentation  Taken 10/26/2022 0013 by Carrie Lawson RN  Oral Care: lip/mouth moisturizer applied     Problem: Restraint, Nonbehavioral (Nonviolent)  Goal: Absence of Harm or Injury  Intervention: Implement Least Restrictive Safety Strategies  Recent Flowsheet Documentation  Taken 10/26/2022 0013 by Carrie Lawson RN  Medical Device Protection: tubing secured  Intervention: Protect Dignity, Rights, and Personal Wellbeing  Recent Flowsheet Documentation  Taken 10/26/2022 0013 by Carrie Lawson RN  Trust Relationship/Rapport: care explained  Intervention: Protect Skin and Joint Integrity  Recent Flowsheet Documentation  Taken 10/26/2022 0013 by Carrie Lawson RN  Range of Motion: active ROM (range of motion) encouraged     Problem: Pain Acute  Goal: Acceptable Pain Control and Functional Ability  Intervention: Prevent or Manage Pain  Recent Flowsheet Documentation  Taken 10/26/2022 0013 by Carrie Lawson RN  Sensory Stimulation Regulation:    care clustered    lighting decreased    visual stimulation minimized  Bowel Elimination Promotion: adequate fluid intake promoted  Medication Review/Management: medications reviewed  Intervention: Optimize Psychosocial Wellbeing  Recent Flowsheet Documentation  Taken 10/26/2022 0013 by Carrie Lawson RN  Supportive Measures: relaxation techniques promoted     Problem: Communication Impairment (Artificial Airway)  Goal: Effective Communication  Intervention: Ensure Effective Communication  Recent Flowsheet Documentation  Taken 10/26/2022 0013 by Carrie Lawson RN  Communication Enhancement Strategies:    communication device used    repetition utilized    call light answered in person  Trust Relationship/Rapport: care explained     Problem: Device-Related Complication Risk (Artificial Airway)  Goal: Optimal Device Function  Intervention: Optimize Device  Care and Function  Recent Flowsheet Documentation  Taken 10/26/2022 0013 by Carrie Lwason RN  Airway Safety Measures: all equipment/monitors on and audible  Oral Care: lip/mouth moisturizer applied     Problem: Fall Injury Risk  Goal: Absence of Fall and Fall-Related Injury  Intervention: Identify and Manage Contributors  Recent Flowsheet Documentation  Taken 10/26/2022 0013 by Carrie Lawson RN  Medication Review/Management: medications reviewed  Intervention: Promote Injury-Free Environment  Recent Flowsheet Documentation  Taken 10/26/2022 0013 by Carrie Lawson RN  Safety Promotion/Fall Prevention:    bed alarm on    lighting adjusted    room door open     Problem: Confusion Chronic  Goal: Optimal Cognitive Function  Intervention: Minimize Injury Risk and Provide Safety  Recent Flowsheet Documentation  Taken 10/26/2022 0013 by Carrie Lawson RN  Enhanced Safety Measures: bed alarm set  Intervention: Minimize and Manage Confusion  Recent Flowsheet Documentation  Taken 10/26/2022 0013 by Carrie Lawson RN  Sensory Stimulation Regulation:    care clustered    lighting decreased    visual stimulation minimized  Reorientation Measures:    clock in view    reorientation provided  Environmental Support: calm environment promoted  Environment Familiarity/Consistency: daily routine followed     Problem: Hypertension Acute  Goal: Blood Pressure Within Desired Range  Intervention: Normalize Blood Pressure  Recent Flowsheet Documentation  Taken 10/26/2022 0013 by Carrie Lawson RN  Sensory Stimulation Regulation:    care clustered    lighting decreased    visual stimulation minimized  Medication Review/Management: medications reviewed     Problem: Anxiety  Goal: Anxiety Reduction or Resolution  Intervention: Promote Anxiety Reduction  Recent Flowsheet Documentation  Taken 10/26/2022 0013 by Carrie Lawson RN  Supportive Measures: relaxation techniques promoted

## 2022-10-26 NOTE — PROGRESS NOTES
[facility]    Medicine Progress Note - Hospitalist Service    Date of Admission:  9/22/2022  Brief Hospital Course Summary:    Alexandru Still is a 89 year old man w/ PMH of Lambert-Eaton syndrome, hypothyroidism,  complete heart block s/p pacemaker, HTN, BPH,  PB, and DM type 2 who was admitted to the SICU on 9/15/2022 following an unwitnessed fall and striking his head. He was initially seen at an outside hospital and underwent a comprehensive trauma work up found to have a left frontal SDH (5mm) + C1 fx + Type II C2 odontoid fracture with a 1cm posterior displacement. He was  intubated for airway protection in the ED given high c-spine injury and difficulty managing his secretions. On admission he requested all cares without surgical cervical spine stabilization, agreeable to a tracheostomy and PEG. Multiple care conferences held with his family who confirmed his wishes.     LTACH course:    9/24-9/26:  Speech evaluated patient and recommends NPO, cont tube feeds via PEG due to severe oropharyngeal dysphasia.  PT/OT, dietitian, wo nurse saw pt on 9/23.  Pulled out G tube during night of 9/23. Pt now on soft wrist restraints.  IVF changed to D51/2 NS at low rate as pt has TAVR.  Morphine IV ordered prn pain.    Spoke w/ Dr. Brown- IR - recommends no hannah tube as this is a brand new PEG and placing it blind will likely lead it to not be in correct position.  He will not do a PEG placement on the weekend, scheduled for Monday.  Pt cannot be fed via NG tube as it is contraindicated with C1 and C2 fx.  For PEG tube replacement (herpain on hold, place ICD)on 9/26.  Will discontinue IVF once PEG tube is placed and restart po meds.  Given NaPhos 9/26 9/27-10/3:  9/27 patient had PEG tube replaced after patient himself removed it on 9/23.  Was a started on tube feeding.  P.O. medication were restarted.  IV fluids were discontinued.  He still needs soft restraint x2.  Is off ventilator and stable.  9/28 Failed blue  dye test, Continue NPO   9/30 patient had [place] 300 collar pads delivered  10/1 SLP found patient well below baseline for swallowing, cognition and communication.  Patient stays n.p.o. except ice chips    10/4/22 -/10/8:   -No acute events, Vent weaning phase 2. Continue diuresis with bumex. Continue plan of care. Cognitive testing PENDING. May need neuropsych evaluation depending on cognitive evaluation.     10/9-10/17: he follows commands, cont [place].  He does better with glasses and hearing device on.  He has neck pain and headache.  Constipation issues.  Speech eval with swallow study and placed on puree with moderately, thick fluids.  Stopped insulin sliding scale as pt has normal glucose.        10/18-10/26:   decannulated 10/17 and doing well on RA, He has intermittent episodes of confusion.   Palliative care is following patient. He understands he has risk of aspiration and wants eat and willing to take risk of pnuemonia.  Needs assistance at times with feeding.  He is DNR.  BP elevated -add lisinopril  There is redness of L side of jaw, buttock, and perineum - calmoseptine applied.  Can discontinue PEG after 11/1. Still w/ pain- taking oxy, tylenol, and lidoderm patch.  To go to Neurosurg clinic tomorrow.      Assessment & Plan        Traumatic 5mm left frontal lobe SDH  Posteriorly displaced (1 cm) Type II odontoid fx  C1 anterior arch fracture   Epidural hemorrhage of 7mm  Acute traumatic neck pain  Acute L 6th rib fracture  S/p fall, Facial abrasions with ecchymoses  RLE wound  He was seen and evaluated by Neurosurgery.  Pt refused surgical intervention.   A repeat head CT was obtained with a stable subdural hematoma. He was placed in a cervical collar for the cervical fracture. No surgical intervention per patient preference.  -Cont [place] -keep in place at all times  -Tylenol, oxy prn for pain, Voltaren added  -Neurosurg ok lovenox for DVT ppx  -Wound care following   -f/u Neurosurgery tomorrow in  clinic   -upright XR of the cervical spine (November PENDING appointment)  -CT cervical spine in 3 months (first week of December).   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 Failed bedside and radiology swallow. He completed 3 doses of Decadron for laryngeal edema.   -S/P tracheostomy 09/21/2022, Trach suture removal on 09/26  -Pulm and RT following  -decannulated on 10/17        Hypertension   Complete heart block s/p PM placement  Nonrheumatic aortic valve stenosis s/p TAVR 2019  -Monitor  -bumex, cardura  -added lisinopril   -Echo on 9/16:  EF: 60-65%, no LV dysfunction     Severe orophayngeal dysphagia   S/P PEG   Severe protein calorie malnutrition  Hiatal hernia  -PEG tube placed 09/20. Pt pulled out PEG tube on night of 9/23, replaced on 9/27  -off TF   -RD following  -needs feeder for each meal due to [place]- order placed      Hypophosphatemia  Hypomagnesia   Hypokalemia  -Monitor and replete as needed    Hyponatremia  -s/p IVF  -10/24- increase FWF to 125 ml q8h     Hypothyroidism   -Continue Levothyroxine     Anemia of chronic disease  -Hg stable  -Cont to monitor intermittently.     BPH  -Cont doxazosin     DM type 2  -discontinue Sliding scale insulin  -Last HbA1:  6.2  -Hypoglycemic protocol    L shoulder pain   -lidocaine patch      Deconditioning  -PT/OT following           Diet:  Snacks/Supplements Adult: Magic Cup; Between Meals  Pureed Diet (level 4) Mildly Thick (level 2)    DVT Prophylaxis: Lovenox  [place] Catheter: Not present  Central Lines: None    Cardiac Monitoring: None  Code Status: No CPR- Do NOT Intubate      Disposition Plan     Discharge date: TBD, to TCU on discharge  Barriers: Placement     The patient's care was discussed with patient, RN, SW/AINSLEY Rubio MD  Hospitalist Service  LTACH  Securely message with the Vocera Web Console (learn more here)  Text page via [employer] Paging/Directory           ______________________________________________________________________    Interval History    Chart reviewed and events noted.  Patient seen and examined.     Shoulder pain is better, neck and headache are also better today    He has no chest pain, dyspnea, or painful urination    ROS:  A 10-system review was obtained and is negative with the exception of the symptoms noted above    Data reviewed today: I reviewed all medications, new labs and imaging results over the last 24 hours.     Physical Exam   Vital Signs: Temp: 98.5  F (36.9  C) Temp src: Axillary BP: (!) 140/66 Pulse: 58   Resp: 20 SpO2: 95 % O2 Device: None (Room air)    Weight: 182 lbs 0 oz    General:  No acute distress,awake and alert  HEENT: [place] J in place, trach in place  Lungs:  Clear to auscultation bilaterally  CVS:  S1 and S2, RRR  Abdomen:  Soft, nontender, PEG in place  Musculoskeletal:  No edema  Neuro: Awake, alert, oriented, speech+, following commands.   Skin:  erythematous area of R jaw, buttocks and perineum    Data   Recent Labs   Lab 10/25/22  0632 10/25/22  0631 10/24/22  0723 10/22/22  1122 10/22/22  0800 10/22/22  0715 10/21/22  0805 10/20/22  0714   WBC  --  5.4 5.8  --   --   --   --  6.0   HGB  --  10.1* 10.2*  --   --   --   --  9.8*   MCV  --  95 92  --   --   --   --  93   PLT  --  275 275  --   --   --   --  251     --  131*  --   --  135*  --  135*   POTASSIUM 4.2  --   4.2  --   --  3.9  --  4.1   CHLORIDE 98  --  97*  --   --  99  --  98   CO2 29  --  29  --   --  29  --  27   BUN 14.6  --  13.4  --   --  14.5  --  14.2   CR 0.81  --  0.75  --   --  0.76  --  0.78   ANIONGAP 9  --  5*  --   --  7  --  10   TITA 8.8  --  8.7  --   --  8.6  --  8.6   GLC 84  --  90 150*   < > 93   < > 88   ALBUMIN 3.0*  --   --   --   --   --   --  2.9*   PROTTOTAL 6.0*  --   --   --   --   --   --  6.1*   BILITOTAL 0.3  --   --   --   --   --   --  0.4   ALKPHOS 88  --   --   --   --   --   --  97   ALT 11  --   --   --   --   --   --  16   AST 12  --   --   --   --   --   --  21    < > = values in this interval not displayed.     No results found for this or any previous visit (from the past 24 hour(s)).  Medications     - MEDICATION INSTRUCTIONS -         acetaminophen  650 mg Oral TID     bumetanide  0.5 mg Oral Daily     calcium carbonate  500 mg Oral BID w/meals     diclofenac  2 g Topical TID     doxazosin  2 mg Oral At Bedtime     enoxaparin ANTICOAGULANT  40 mg Subcutaneous Q24H     levothyroxine  112 mcg Oral QAM AC     lidocaine  1 patch Transdermal Q24H     lidocaine   Transdermal Q8H ZHEN     lisinopril  5 mg Oral Daily     multivitamin w/minerals  1 tablet Oral Daily     oxyCODONE  2.5 mg Oral Q6H WA     pantoprazole  40 mg Oral or Feeding Tube QAM AC     sennosides  1 tablet Oral BID     cholecalciferol  25 mcg Oral Daily

## 2022-10-26 NOTE — PROGRESS NOTES
10/25/22 1330   Appointment Info   Signing Clinician's Name / Credentials (OT) Vita Green OTR/L   Therapeutic Procedures/Exercise   Therapeutic Procedure: strength, endurance, ROM, flexibillity minutes (00333) 25   Symptoms Noted During/After Treatment fatigue   Treatment Detail/Skilled Intervention Pt up in chair, OT facilitated UB ex to progress strength and act chichi for ADLIND. Pt lethargic throughout session, easy to doze off when not directly engaged by OT. UB ergometer, pt tolerated using one arm at a time for up to 1 min at a time, dozing in between sets. When returned to room, brief AROM for stretch.   OT Discharge Planning   OT Plan 10/25: neck collar on at all times, aggitation/confusion improving but remains easily frustrated Tx: ADLs at EOB/w/c as able, activity tolerance building, bed mobility, trf training-(commode), STS practice   OT Discharge Recommendation (DC Rec) Transitional Care Facility   OT Rationale for DC Rec Pt presenting below baseline with functional skills and cognition. Has support at home. Will need lengthy rehab to return home at [facility].   OT Brief overview of current status Pt lethargic but easily rousable throughout session for UB ex. OT continues to address ADLs, transfers and activity tolerance.   Total Session Time   Timed Code Treatment Minutes 25   Total Session Time (sum of timed and untimed services) 25

## 2022-10-27 ENCOUNTER — APPOINTMENT (OUTPATIENT)
Dept: SPEECH THERAPY | Facility: CLINIC | Age: 87
DRG: 208 | End: 2022-10-27
Attending: INTERNAL MEDICINE
Payer: MEDICARE

## 2022-10-27 ENCOUNTER — APPOINTMENT (OUTPATIENT)
Dept: OCCUPATIONAL THERAPY | Facility: CLINIC | Age: 87
DRG: 208 | End: 2022-10-27
Attending: INTERNAL MEDICINE
Payer: MEDICARE

## 2022-10-27 ENCOUNTER — ANCILLARY PROCEDURE (OUTPATIENT)
Dept: GENERAL RADIOLOGY | Facility: CLINIC | Age: 87
End: 2022-10-27
Payer: MEDICARE

## 2022-10-27 ENCOUNTER — APPOINTMENT (OUTPATIENT)
Dept: PHYSICAL THERAPY | Facility: CLINIC | Age: 87
DRG: 208 | End: 2022-10-27
Attending: INTERNAL MEDICINE
Payer: MEDICARE

## 2022-10-27 ENCOUNTER — OFFICE VISIT (OUTPATIENT)
Dept: NEUROSURGERY | Facility: CLINIC | Age: 87
End: 2022-10-27
Payer: MEDICARE

## 2022-10-27 DIAGNOSIS — S12.100A CLOSED ODONTOID FRACTURE, INITIAL ENCOUNTER (H): Primary | ICD-10-CM

## 2022-10-27 DIAGNOSIS — T14.90XA TRAUMA: ICD-10-CM

## 2022-10-27 DIAGNOSIS — S12.100A: ICD-10-CM

## 2022-10-27 PROCEDURE — 72040 X-RAY EXAM NECK SPINE 2-3 VW: CPT | Mod: 26 | Performed by: RADIOLOGY

## 2022-10-27 PROCEDURE — 97116 GAIT TRAINING THERAPY: CPT | Mod: GP

## 2022-10-27 PROCEDURE — 92526 ORAL FUNCTION THERAPY: CPT | Mod: GN

## 2022-10-27 PROCEDURE — 97535 SELF CARE MNGMENT TRAINING: CPT | Mod: GO | Performed by: OCCUPATIONAL THERAPIST

## 2022-10-27 PROCEDURE — 97530 THERAPEUTIC ACTIVITIES: CPT | Mod: GP

## 2022-10-27 PROCEDURE — 250N000013 HC RX MED GY IP 250 OP 250 PS 637: Performed by: FAMILY MEDICINE

## 2022-10-27 PROCEDURE — 92507 TX SP LANG VOICE COMM INDIV: CPT | Mod: GN

## 2022-10-27 PROCEDURE — 99213 OFFICE O/P EST LOW 20 MIN: CPT | Performed by: NURSE PRACTITIONER

## 2022-10-27 PROCEDURE — 120N000017 HC R&B RESPIRATORY CARE

## 2022-10-27 PROCEDURE — 250N000013 HC RX MED GY IP 250 OP 250 PS 637: Performed by: NURSE PRACTITIONER

## 2022-10-27 PROCEDURE — 250N000011 HC RX IP 250 OP 636: Performed by: HOSPITALIST

## 2022-10-27 PROCEDURE — 250N000013 HC RX MED GY IP 250 OP 250 PS 637: Performed by: HOSPITALIST

## 2022-10-27 PROCEDURE — 99232 SBSQ HOSP IP/OBS MODERATE 35: CPT | Performed by: HOSPITALIST

## 2022-10-27 PROCEDURE — 250N000013 HC RX MED GY IP 250 OP 250 PS 637: Performed by: INTERNAL MEDICINE

## 2022-10-27 RX ADMIN — SENNOSIDES 1 TABLET: 8.6 TABLET, FILM COATED ORAL at 08:31

## 2022-10-27 RX ADMIN — OXYCODONE HYDROCHLORIDE 2.5 MG: 5 TABLET ORAL at 21:29

## 2022-10-27 RX ADMIN — DICLOFENAC 2 G: 10 GEL TOPICAL at 21:33

## 2022-10-27 RX ADMIN — LISINOPRIL 5 MG: 2.5 TABLET ORAL at 08:30

## 2022-10-27 RX ADMIN — DOXAZOSIN 2 MG: 1 TABLET ORAL at 21:29

## 2022-10-27 RX ADMIN — OXYCODONE HYDROCHLORIDE 2.5 MG: 5 TABLET ORAL at 08:29

## 2022-10-27 RX ADMIN — MULTIPLE VITAMINS W/ MINERALS TAB 1 TABLET: TAB at 11:34

## 2022-10-27 RX ADMIN — ACETAMINOPHEN 650 MG: 325 TABLET ORAL at 21:29

## 2022-10-27 RX ADMIN — DICLOFENAC 2 G: 10 GEL TOPICAL at 14:43

## 2022-10-27 RX ADMIN — ACETAMINOPHEN 650 MG: 325 TABLET ORAL at 08:30

## 2022-10-27 RX ADMIN — LEVOTHYROXINE SODIUM 112 MCG: 0.11 TABLET ORAL at 06:41

## 2022-10-27 RX ADMIN — DICLOFENAC 2 G: 10 GEL TOPICAL at 08:46

## 2022-10-27 RX ADMIN — SENNOSIDES 1 TABLET: 8.6 TABLET, FILM COATED ORAL at 21:29

## 2022-10-27 RX ADMIN — CALCIUM 500 MG: 500 TABLET ORAL at 11:34

## 2022-10-27 RX ADMIN — CALCIUM 500 MG: 500 TABLET ORAL at 16:55

## 2022-10-27 RX ADMIN — BUMETANIDE 0.5 MG: 0.5 TABLET ORAL at 08:29

## 2022-10-27 RX ADMIN — ENOXAPARIN SODIUM 40 MG: 100 INJECTION SUBCUTANEOUS at 18:33

## 2022-10-27 RX ADMIN — LIDOCAINE 1 PATCH: 246 PATCH TOPICAL at 08:29

## 2022-10-27 RX ADMIN — ACETAMINOPHEN 650 MG: 325 TABLET ORAL at 14:38

## 2022-10-27 RX ADMIN — Medication 40 MG: at 06:41

## 2022-10-27 RX ADMIN — Medication 25 MCG: at 08:31

## 2022-10-27 RX ADMIN — OXYCODONE HYDROCHLORIDE 5 MG: 5 TABLET ORAL at 17:01

## 2022-10-27 RX ADMIN — OXYCODONE HYDROCHLORIDE 2.5 MG: 5 TABLET ORAL at 14:39

## 2022-10-27 ASSESSMENT — ACTIVITIES OF DAILY LIVING (ADL)
ADLS_ACUITY_SCORE: 81
ADLS_ACUITY_SCORE: 81
ADLS_ACUITY_SCORE: 77
ADLS_ACUITY_SCORE: 79
ADLS_ACUITY_SCORE: 79
ADLS_ACUITY_SCORE: 81
ADLS_ACUITY_SCORE: 81
ADLS_ACUITY_SCORE: 79
ADLS_ACUITY_SCORE: 81
ADLS_ACUITY_SCORE: 79
ADLS_ACUITY_SCORE: 81
ADLS_ACUITY_SCORE: 79

## 2022-10-27 NOTE — PLAN OF CARE
Goal Outcome Evaluation:       Patient slept around 5-6 hrs throughout the night. Denies pain. Vitals stable.Pt has neuro surgery appointment.  time around 1200 and will  accompanied by RN.    Problem: Plan of Care - These are the overarching goals to be used throughout the patient stay.    Goal: Absence of Hospital-Acquired Illness or Injury  Intervention: Identify and Manage Fall Risk  Recent Flowsheet Documentation  Taken 10/27/2022 0013 by Carrie Lawson RN  Safety Promotion/Fall Prevention:    bed alarm on    fall prevention program maintained    room door open  Intervention: Prevent and Manage VTE (Venous Thromboembolism) Risk  Recent Flowsheet Documentation  Taken 10/27/2022 0013 by Carrie Lawson RN  VTE Prevention/Management: (pt on Lovenox) --  Intervention: Prevent Infection  Recent Flowsheet Documentation  Taken 10/27/2022 0013 by Carrie Lawson RN  Infection Prevention:    rest/sleep promoted    single patient room provided    hand hygiene promoted  Goal: Optimal Comfort and Wellbeing  Intervention: Provide Person-Centered Care  Recent Flowsheet Documentation  Taken 10/27/2022 0013 by Carrie Lawson RN  Trust Relationship/Rapport:    care explained    choices provided     Problem: Device-Related Complication Risk (Mechanical Ventilation, Invasive)  Goal: Optimal Device Function  Intervention: Optimize Device Care and Function  Recent Flowsheet Documentation  Taken 10/27/2022 0013 by Carrie Lawson RN  Airway Safety Measures: all equipment/monitors on and audible  Oral Care: lip/mouth moisturizer applied     Problem: Inability to Wean (Mechanical Ventilation, Invasive)  Goal: Mechanical Ventilation Liberation  Intervention: Promote Extubation and Mechanical Ventilation Liberation  Recent Flowsheet Documentation  Taken 10/27/2022 0013 by Carrie Lawson RN  Medication Review/Management: medications reviewed     Problem: Ventilator-Induced Lung Injury (Mechanical Ventilation,  Invasive)  Goal: Absence of Ventilator-Induced Lung Injury  Intervention: Prevent Ventilator-Associated Pneumonia  Recent Flowsheet Documentation  Taken 10/27/2022 0013 by Carrie Lawson RN  Oral Care: lip/mouth moisturizer applied     Problem: Restraint, Nonbehavioral (Nonviolent)  Goal: Absence of Harm or Injury  Intervention: Implement Least Restrictive Safety Strategies  Recent Flowsheet Documentation  Taken 10/27/2022 0013 by Carrie Lawson RN  Medical Device Protection: tubing secured  Intervention: Protect Dignity, Rights, and Personal Wellbeing  Recent Flowsheet Documentation  Taken 10/27/2022 0013 by Carrie Lawson RN  Trust Relationship/Rapport:    care explained    choices provided  Intervention: Protect Skin and Joint Integrity  Recent Flowsheet Documentation  Taken 10/27/2022 0013 by Carrie Lawson RN  Range of Motion: ROM (range of motion) performed     Problem: Pain Acute  Goal: Acceptable Pain Control and Functional Ability  Intervention: Prevent or Manage Pain  Recent Flowsheet Documentation  Taken 10/27/2022 0013 by Carrie Lawson RN  Sensory Stimulation Regulation:    care clustered    lighting decreased  Bowel Elimination Promotion: adequate fluid intake promoted  Medication Review/Management: medications reviewed     Problem: Communication Impairment (Artificial Airway)  Goal: Effective Communication  Intervention: Ensure Effective Communication  Recent Flowsheet Documentation  Taken 10/27/2022 0013 by Carrie Lawson RN  Communication Enhancement Strategies:    communication device used    extra time allowed for response  Family/Support System Care: presence promoted  Trust Relationship/Rapport:    care explained    choices provided     Problem: Device-Related Complication Risk (Artificial Airway)  Goal: Optimal Device Function  Intervention: Optimize Device Care and Function  Recent Flowsheet Documentation  Taken 10/27/2022 0013 by Carrie Lawson RN  Airway Safety Measures:  all equipment/monitors on and audible  Oral Care: lip/mouth moisturizer applied     Problem: Fall Injury Risk  Goal: Absence of Fall and Fall-Related Injury  Intervention: Identify and Manage Contributors  Recent Flowsheet Documentation  Taken 10/27/2022 0013 by Carrie Lawson RN  Medication Review/Management: medications reviewed  Intervention: Promote Injury-Free Environment  Recent Flowsheet Documentation  Taken 10/27/2022 0013 by Carrie Lawson RN  Safety Promotion/Fall Prevention:    bed alarm on    fall prevention program maintained    room door open     Problem: Confusion Chronic  Goal: Optimal Cognitive Function  Intervention: Minimize Injury Risk and Provide Safety  Recent Flowsheet Documentation  Taken 10/27/2022 0013 by Carrie Lawson RN  Enhanced Safety Measures: bed alarm set  Intervention: Minimize and Manage Confusion  Recent Flowsheet Documentation  Taken 10/27/2022 0013 by Carrie Lawson RN  Sensory Stimulation Regulation:    care clustered    lighting decreased  Reorientation Measures: clock in view  Family/Support System Care: presence promoted     Problem: Hypertension Acute  Goal: Blood Pressure Within Desired Range  Intervention: Normalize Blood Pressure  Recent Flowsheet Documentation  Taken 10/27/2022 0013 by Carrie Lawson RN  Sensory Stimulation Regulation:    care clustered    lighting decreased  Medication Review/Management: medications reviewed     Problem: Anxiety  Goal: Anxiety Reduction or Resolution  Intervention: Promote Anxiety Reduction  Recent Flowsheet Documentation  Taken 10/27/2022 0013 by Carrie Lawson RN  Family/Support System Care: presence promoted

## 2022-10-27 NOTE — PROGRESS NOTES
"    Neurosurgery Clinic Note    Chief Complaint: hospital follow-up     DATE OF VISIT: 10/27/2022    HPI:      Alexandru Still is a 89 year old male with past medical histroy of complete heart block with cardiac pacemaker in situ, S/P transcatheter aortic valve replacement, and HTN who was transferred to [facility] ED on 9/15/2022 from [facility] for trauma work-up.  The patient had a unwitnessed mechanical fall, with positive head strike, no loss of consciousness and was down for 2 hours. CT scan demonstrated a left frontal subdural hemorrhage measuring 5 mm, C1 fracture, and a type II C2 odontoid fracture with 1 cm of posterior displacement.   Patient was fitted with a [place] J collar and conservative versus surgical management was discussed with the patient and his family, they elected to pursue conservative measures given his age and medical co-morbidities.  Repeat cervical x-rays were performed and were stable.  In regards to subdural hematoma that was noted on imaging, repeat imaging revealed unchanged thin 2 mm frontal subdural hematoma.  No additional imaging was obtained and aspirin was restarted.  Given respiratory failure the patient required tracheostomy and percutaneous endogastric tube placement during hospitalization.  The patient was ultimately discharged to [facility] on 9/22/2022.  He presents today for repeat hospital follow-up and repeat imaging.     Currently, the patient continues to note midline posterior neck pain, denies arm pain or weakness.  Has been wearing cervical collar as directed.  Recently had tracheostomy decannulated.  Has been working with therapies and is hopeful to discharge from [facility] tomorrow.  The RN taking care of him at [facility] accompanies him today.  Patient was asked again if he would consider surgical intervention if fracture worsens, he again tells me \"no.\"                 Review of Systems   Negative except in HPI    Past Medical History:   Diagnosis Date     Anemia      " Aortic stenosis      BPH (benign prostatic hyperplasia)      Closed T12 fracture      Complete heart block     s/p dual chamber pacemaker     Hypertension      Hypothyroidism      Kidney stone      Lambert-Eaton myasthenic syndrome      Lower extremity edema        Past Surgical History:   Procedure Laterality Date     CV TRANSCATHETER AORTIC VALVE REPLACEMENT TRANSAORTIC APPROACH       EP PACEMAKER       ESOPHAGOGASTRODUODENOSCOPY, WITH NASOGASTRIC TUBE INSERTION N/A 2022    Procedure: ESOPHAGOGASTRODUODENOSCOPY, WITH NASOJEJUNAL TUBE INSERTION ;  Surgeon: Nils Drew MD;  Location: [facility] GI     HERNIA REPAIR       IR GASTROSTOMY TUBE PERCUTANEOUS PLCMNT  2022     IR PICC PLACEMENT > 5 YRS OF AGE  6/15/2022     LASER HOLMIUM LITHOTRIPSY URETER(S), INSERT STENT, COMBINED N/A 6/15/2022    Procedure: 1. Cystourethroscopy 2. Laser lithotripsy of a urethral stone 3. Basketing of a urethral stone 4. Complex hannah catheter placement over a wire;  Surgeon: Beny Ha MD;  Location: [facility] OR     TRACHEOSTOMY N/A 2022    Procedure: CREATION, TRACHEOSTOMY;  Surgeon: Bob Dill MD;  Location: [facility] OR       Social History     Socioeconomic History     Marital status:    Tobacco Use     Smoking status: Former     Packs/day: 1.00     Years: 20.00     Pack years: 20.00     Types: Cigarettes     Quit date:      Years since quittin.8     Smokeless tobacco: Never   Substance and Sexual Activity     Alcohol use: Not Currently     Drug use: Never       family history includes Cerebrovascular Disease in his mother; Chronic Obstructive Pulmonary Disease in his father.              Physical Exam   There were no vitals taken for this visit.  Constitutional: Oriented to person, place, and time. Appears well-developed and well-nourished. Cooperative. No distress.   HENT: Head normocephalic and atraumatic.   Neurological: alert and oriented to person, place, and time. CN II-XII grossly   intact      STRENGTH LEFT RIGHT   Deltoid 4 4   Bicep 4 4   Wrist Extensor 5 5   Tricep 5 5   Finger flexion 5 5   Finger abduction 5 5    5 5       Hip Flexion     5     5   Knee Extension 5 5   Ankle Dorsiflexion 5 5   Extensor Hallucis Longus 5 5   Plantar Flexion 5 5   Foot eversion 5 5   Foot inversion 5 5       Skin: Skin is warm, dry and intact.   Psychiatric: Normal mood and affect. Speech is normal and behavior is normal.      IMAGING:  Personally reviewed cervical AP/Lateral x-rays dated 10/27/2022:  Type II odontoid fracture redemonstrated with approximately 9 mm displacement     ASSESSMENT:  Alexandru Still is a 89 year old male s/p type II C2 odontoid fracture with 1 cm of posterior displacement following a fall    PLAN:  Do not do any bending, twisting, strenuous exercise, or heavy lifting (greater than 10 pounds) for 4-6 weeks    Recommend Calcium and Vitamin D supplementation     Please continue to wear cervical collar at all times.  Ok to remove for hygiene checks.     Please return in 3 months with CT cervical spine.                 I spent 25 minutes in patient care with greater than 50% spent in counseling and/or coordination of care.     I performed independent visualization of radiographic imaging and entered my own interpretation, reviewed and/or ordered tests in radiology        NATASHA Skinner, CNP  Department of Neurosurgery  Pager: [phone]

## 2022-10-27 NOTE — PROGRESS NOTES
Social Work Note:    Writer met patient again today to discuss discharge plan, and remind him of plan.  Patient appears forgetful.  Gave patient handout with the SNF/TCU information again today.      Bob Reynolds [facility]  [facility]  [phone]

## 2022-10-27 NOTE — PLAN OF CARE
Problem: Pain Acute  Goal: Acceptable Pain Control and Functional Ability  Outcome: Progressing  Intervention: Develop Pain Management Plan  Recent Flowsheet Documentation  Taken 10/27/2022 0934 by Jeanna Auguste RN  Pain Management Interventions: (Scheduled oxy and tylenol given at 0829 and 0830 respectively) medication (see MAR)  Intervention: Prevent or Manage Pain  Recent Flowsheet Documentation  Taken 10/27/2022 0936 by Jeanna Auguste RN  Bowel Elimination Promotion: adequate fluid intake promoted     Problem: Fall Injury Risk  Goal: Absence of Fall and Fall-Related Injury  Outcome: Progressing  Intervention: Promote Injury-Free Environment  Recent Flowsheet Documentation  Taken 10/27/2022 0936 by Jeanna Auguste RN  Safety Promotion/Fall Prevention:   activity supervised   clutter free environment maintained   fall prevention program maintained     Problem: Hypertension Acute  Goal: Blood Pressure Within Desired Range  Outcome: Progressing     Problem: Anxiety  Goal: Anxiety Reduction or Resolution  Outcome: Progressing     Problem: Fall Injury Risk  Goal: Absence of Fall and Fall-Related Injury  Outcome: Progressing  Intervention: Promote Injury-Free Environment  Recent Flowsheet Documentation  Taken 10/27/2022 0936 by Jeanna Auguste RN  Safety Promotion/Fall Prevention:   activity supervised   clutter free environment maintained   fall prevention program maintained   Goal Outcome Evaluation:    Patient reported bilateral shoulder pain (6/10)-scheduled oxy and tylenol given at  0829 and 0830 respectively (pain down to 4/10). Patient's affect has been flat since the start of shift, irritable at times, was medication compliant though. Patient ate 100 % of breakfast (was fed), diet will be changed back to pureed per patient's request according to speech therapist (see speech therapist's note)

## 2022-10-27 NOTE — PLAN OF CARE
Problem: Plan of Care - These are the overarching goals to be used throughout the patient stay.    Goal: Optimal Comfort and Wellbeing  Outcome: Progressing  Intervention: Provide Person-Centered Care  Recent Flowsheet Documentation  Taken 10/26/2022 1611 by Pam Lin RN  Trust Relationship/Rapport:   care explained   choices provided   emotional support provided   empathic listening provided   reassurance provided   thoughts/feelings acknowledged   questions encouraged     Problem: Pain Acute  Goal: Acceptable Pain Control and Functional Ability  Intervention: Prevent or Manage Pain  Recent Flowsheet Documentation  Taken 10/26/2022 1611 by Pam Lin RN  Sensory Stimulation Regulation:   care clustered   lighting decreased   television on  Sleep/Rest Enhancement:   awakenings minimized   consistent schedule promoted   natural light exposure provided   regular sleep/rest pattern promoted   relaxation techniques promoted   therapeutic touch utilized  Bowel Elimination Promotion: adequate fluid intake promoted  Complementary Therapy: (Therapeutic presence.) other (see comments)  Medication Review/Management: medications reviewed     Goal Outcome Evaluation:         Patient's vital signs were stable  this evening. He  was gotten up on his reclining chair for supper and  was lifted back to bed by [place] at bedtime. He ate 100% of  supper and drank moderate amounts of mildly thickened liquids.  No BM was noted this shift. He has a Neuro-Surgery appointment with Trudy Payton  tomorrow; pickup time  at around 1200 noon per stretcher and to be accompanied by  RN. Will keep monitoring patient's progress and safety.

## 2022-10-27 NOTE — PROGRESS NOTES
Social Work Note:    If medically stable, patient can discharge to [facility] for LTC placement with orders for PT/OT/ST.Discharge on Friday 10/28/22 @ 11:00    Bob Reynolds [facility]  [place] [facility]/[place]  [phone]

## 2022-10-27 NOTE — PROGRESS NOTES
Social Work Note:     Writer called and spoke to patient's son in law-Erv to review discharge plan for tomorrow.  We discussed LTC placement  Again.  We discussed that writer was unable [facility], or [facility]  Patient has been accepted at [facility] for LTC placement with orders for PT/OT/ST.  Writer met with patient to discuss and called his son in law-Erv.  Plan for discharge on Friday 10/28/22 @ 11:00     Bob Reynolds, [facility]/[place]  [phone]

## 2022-10-27 NOTE — PATIENT INSTRUCTIONS
Do not do any bending, twisting, strenuous exercise, or heavy lifting (greater than 10 pounds) for 4-6 weeks    Recommend Calcium and Vitamin D supplementation     Please continue to wear cervical collar at all times.  Ok to remove for hygiene checks.     Please return in 3 months with CT cervical spine

## 2022-10-27 NOTE — PROGRESS NOTES
Patient left for neurosurgery appointment at 1218, returned back at 1420 in a stable condition, was put back in  Bed, cleaned up and made comfortable in bed. Patient is being assisted with lunch at this time, took all 1400 medications

## 2022-10-27 NOTE — PROGRESS NOTES
[facility]    Medicine Progress Note - Hospitalist Service    Date of Admission:  9/22/2022  Brief Hospital Course Summary:    Alexandru Still is a 89 year old man w/ PMH of Lambert-Eaton syndrome, hypothyroidism,  complete heart block s/p pacemaker, HTN, BPH,  PB, and DM type 2 who was admitted to the SICU on 9/15/2022 following an unwitnessed fall and striking his head. He was initially seen at an outside hospital and underwent a comprehensive trauma work up found to have a left frontal SDH (5mm) + C1 fx + Type II C2 odontoid fracture with a 1cm posterior displacement. He was  intubated for airway protection in the ED given high c-spine injury and difficulty managing his secretions. On admission he requested all cares without surgical cervical spine stabilization, agreeable to a tracheostomy and PEG. Multiple care conferences held with his family who confirmed his wishes.     LTACH course:    9/24-9/26:  Speech evaluated patient and recommends NPO, cont tube feeds via PEG due to severe oropharyngeal dysphasia.  PT/OT, dietitian, wo nurse saw pt on 9/23.  Pulled out G tube during night of 9/23. Pt now on soft wrist restraints.  IVF changed to D51/2 NS at low rate as pt has TAVR.  Morphine IV ordered prn pain.    Spoke w/ Dr. Brown- IR - recommends no hannah tube as this is a brand new PEG and placing it blind will likely lead it to not be in correct position.  He will not do a PEG placement on the weekend, scheduled for Monday.  Pt cannot be fed via NG tube as it is contraindicated with C1 and C2 fx.  For PEG tube replacement (herpain on hold, place ICD)on 9/26.  Will discontinue IVF once PEG tube is placed and restart po meds.  Given NaPhos 9/26 9/27-10/3:  9/27 patient had PEG tube replaced after patient himself removed it on 9/23.  Was a started on tube feeding.  P.O. medication were restarted.  IV fluids were discontinued.  He still needs soft restraint x2.  Is off ventilator and stable.  9/28 Failed blue  dye test, Continue NPO   9/30 patient had [place] J 300 collar pads delivered  10/1 SLP found patient well below baseline for swallowing, cognition and communication.  Patient stays n.p.o. except ice chips    10/4/22 -/10/8:   -No acute events, Vent weaning phase 2. Continue diuresis with bumex. Continue plan of care. Cognitive testing PENDING. May need neuropsych evaluation depending on cognitive evaluation.     10/9-10/17: he follows commands, cont [place] J.  He does better with glasses and hearing device on.  He has neck pain and headache.  Constipation issues.  Speech eval with swallow study and placed on puree with moderately, thick fluids.  Stopped insulin sliding scale as pt has normal glucose.        10/18-10/27:   decannulated 10/17 and doing well on RA, He has intermittent episodes of confusion.   Palliative care is following patient. He understands he has risk of aspiration and wants eat and willing to take risk of pnuemonia.  Needs assistance at times with feeding.  He is DNR.  BP elevated -add lisinopril  There is redness of L side of jaw, buttock, and perineum - calmoseptine applied.  Can discontinue PEG after 11/1. Still w/ pain- taking oxy, tylenol, and lidoderm patch.  Going to Neurosurg clinic today.    Assessment & Plan        Traumatic 5mm left frontal lobe SDH  Posteriorly displaced (1 cm) Type II odontoid fx  C1 anterior arch fracture   Epidural hemorrhage of 7mm  Acute traumatic neck pain  Acute L 6th rib fracture  S/p fall, Facial abrasions with ecchymoses  RLE wound  He was seen and evaluated by Neurosurgery.  Pt refused surgical intervention.   A repeat head CT was obtained with a stable subdural hematoma. He was placed in a cervical collar for the cervical fracture. No surgical intervention per patient preference.  -Cont [place] J -keep in place at all times  -Tylenol, oxy prn for pain, Voltaren added  -Neurosurg ok lovenox for DVT ppx  -Wound care following   -f/u Neurosurgery recommendations  from 10/27  -upright XR of the cervical spine (November PENDING appointment)  -CT cervical spine in 3 months (first week of December).   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 Failed bedside and radiology swallow. He completed 3 doses of Decadron for laryngeal edema.   -S/P tracheostomy 09/21/2022, Trach suture removal on 09/26  -Pulm and RT following  -decannulated on 10/17        Hypertension   Complete heart block s/p PM placement  Nonrheumatic aortic valve stenosis s/p TAVR 2019  -Monitor  -bumex, cardura  -added lisinopril   -Echo on 9/16:  EF: 60-65%, no LV dysfunction     Severe orophayngeal dysphagia   S/P PEG   Severe protein calorie malnutrition  Hiatal hernia  -PEG tube placed 09/20. Pt pulled out PEG tube on night of 9/23, replaced on 9/27  -off TF   -RD following  -needs feeder for each meal due to [place] J- order placed      Hypophosphatemia  Hypomagnesia   Hypokalemia  -Monitor and replete as needed    Hyponatremia  -s/p IVF  -10/24- increase FWF to 125 ml q8h     Hypothyroidism   -Continue Levothyroxine     Anemia of chronic disease  -Hg stable  -Cont to monitor intermittently.     BPH  -Cont doxazosin     DM type 2  -discontinue Sliding scale insulin  -Last HbA1:  6.2  -Hypoglycemic protocol    L shoulder pain   -lidocaine patch      Deconditioning  -PT/OT following           Diet:  Snacks/Supplements Adult: Magic Cup; Between Meals  Minced & Moist Diet (level 5) Mildly Thick (level 2)    DVT Prophylaxis: Lovenox  [place] Catheter: Not present  Central Lines: None    Cardiac Monitoring: None  Code Status: No CPR- Do NOT Intubate      Disposition Plan     Discharge date: TBD, to TCU on discharge  Barriers: Placement     The patient's care was discussed with patient, RN, SW/CM    Neli Rubio MD  Hospitalist Service  LTACH  Securely message with the Vocera Web Console (learn more here)  Text page via [employer] Paging/Directory           ______________________________________________________________________    Interval History    Chart reviewed and events noted.  Patient seen and examined.     Shoulder pain is better, neck and headache are also better today    He has no chest pain, dyspnea, or painful urination    ROS:  A 10-system review was obtained and is negative with the exception of the symptoms noted above    Data reviewed today: I reviewed all medications, new labs and imaging results over the last 24 hours.     Physical Exam   Vital Signs: Temp: 98  F (36.7  C) Temp src: Oral BP: 108/56 Pulse: 62   Resp: 20 SpO2: 97 % O2 Device: None (Room air)    Weight: 191 lbs 9.6 oz    General:  No acute distress,awake and alert  HEENT: [place] J in place, trach in place  Lungs:  Clear to auscultation bilaterally  CVS:  S1 and S2, RRR  Abdomen:  Soft, nontender, PEG in place  Musculoskeletal:  No edema  Neuro: Awake, alert, oriented, speech+, following commands.   Skin:  erythematous area of R jaw, buttocks and perineum    Data   Recent Labs   Lab 10/25/22  0632 10/25/22  0631 10/24/22  0723 10/22/22  1122 10/22/22  0800 10/22/22  0715   WBC  --  5.4 5.8  --   --   --    HGB  --  10.1* 10.2*  --   --   --    MCV  --  95 92  --   --   --    PLT  --  275 275  --   --   --      --  131*  --   --  135*   POTASSIUM 4.2  --  4.2  --   --  3.9   CHLORIDE 98  --  97*  --   --  99   CO2 29  --  29  --    --  29   BUN 14.6  --  13.4  --   --  14.5   CR 0.81  --  0.75  --   --  0.76   ANIONGAP 9  --  5*  --   --  7   TITA 8.8  --  8.7  --   --  8.6   GLC 84  --  90 150*   < > 93   ALBUMIN 3.0*  --   --   --   --   --    PROTTOTAL 6.0*  --   --   --   --   --    BILITOTAL 0.3  --   --   --   --   --    ALKPHOS 88  --   --   --   --   --    ALT 11  --   --   --   --   --    AST 12  --   --   --   --   --     < > = values in this interval not displayed.     No results found for this or any previous visit (from the past 24 hour(s)).  Medications     - MEDICATION INSTRUCTIONS -         acetaminophen  650 mg Oral TID     bumetanide  0.5 mg Oral Daily     calcium carbonate  500 mg Oral BID w/meals     diclofenac  2 g Topical TID     doxazosin  2 mg Oral At Bedtime     enoxaparin ANTICOAGULANT  40 mg Subcutaneous Q24H     levothyroxine  112 mcg Oral QAM AC     lidocaine  1 patch Transdermal Q24H     lidocaine   Transdermal Q8H ZHEN     lisinopril  5 mg Oral Daily     multivitamin w/minerals  1 tablet Oral Daily     oxyCODONE  2.5 mg Oral Q6H WA     pantoprazole  40 mg Oral or Feeding Tube QAM AC     sennosides  1 tablet Oral BID     cholecalciferol  25 mcg Oral Daily

## 2022-10-28 VITALS
BODY MASS INDEX: 29.06 KG/M2 | DIASTOLIC BLOOD PRESSURE: 60 MMHG | RESPIRATION RATE: 18 BRPM | OXYGEN SATURATION: 94 % | TEMPERATURE: 98.4 F | HEART RATE: 59 BPM | SYSTOLIC BLOOD PRESSURE: 136 MMHG | WEIGHT: 191.1 LBS

## 2022-10-28 PROCEDURE — 250N000013 HC RX MED GY IP 250 OP 250 PS 637: Performed by: HOSPITALIST

## 2022-10-28 PROCEDURE — 250N000013 HC RX MED GY IP 250 OP 250 PS 637: Performed by: INTERNAL MEDICINE

## 2022-10-28 PROCEDURE — 250N000013 HC RX MED GY IP 250 OP 250 PS 637: Performed by: NURSE PRACTITIONER

## 2022-10-28 PROCEDURE — 250N000013 HC RX MED GY IP 250 OP 250 PS 637: Performed by: FAMILY MEDICINE

## 2022-10-28 PROCEDURE — 99239 HOSP IP/OBS DSCHRG MGMT >30: CPT | Performed by: INTERNAL MEDICINE

## 2022-10-28 RX ORDER — NICOTINE POLACRILEX 4 MG
15-30 LOZENGE BUCCAL
DISCHARGE
Start: 2022-10-28

## 2022-10-28 RX ORDER — QUETIAPINE FUMARATE 25 MG/1
25 TABLET, FILM COATED ORAL EVERY 6 HOURS PRN
DISCHARGE
Start: 2022-10-28

## 2022-10-28 RX ORDER — OXYCODONE HYDROCHLORIDE 5 MG/1
2.5 TABLET ORAL EVERY 6 HOURS
Qty: 12 TABLET | Refills: 0 | Status: SHIPPED | OUTPATIENT
Start: 2022-10-28 | End: 2022-10-31

## 2022-10-28 RX ORDER — ACETAMINOPHEN 500 MG
1000 TABLET ORAL EVERY 6 HOURS PRN
DISCHARGE
Start: 2022-10-28 | End: 2022-10-28

## 2022-10-28 RX ORDER — OXYCODONE HYDROCHLORIDE 5 MG/1
5 TABLET ORAL EVERY 4 HOURS PRN
Qty: 18 TABLET | Refills: 0 | Status: SHIPPED | OUTPATIENT
Start: 2022-10-28 | End: 2022-10-28

## 2022-10-28 RX ORDER — SENNOSIDES 8.6 MG
1 TABLET ORAL 2 TIMES DAILY
DISCHARGE
Start: 2022-10-28

## 2022-10-28 RX ORDER — LIDOCAINE 4 G/G
1 PATCH TOPICAL EVERY 24 HOURS
DISCHARGE
Start: 2022-10-28

## 2022-10-28 RX ORDER — BUMETANIDE 0.5 MG/1
0.5 TABLET ORAL DAILY
DISCHARGE
Start: 2022-10-29

## 2022-10-28 RX ORDER — DOXAZOSIN 2 MG/1
2 TABLET ORAL AT BEDTIME
DISCHARGE
Start: 2022-10-28

## 2022-10-28 RX ORDER — OXYCODONE HYDROCHLORIDE 5 MG/1
2.5 TABLET ORAL EVERY 4 HOURS PRN
Qty: 18 TABLET | Refills: 0 | Status: SHIPPED | OUTPATIENT
Start: 2022-10-28

## 2022-10-28 RX ORDER — ACETAMINOPHEN 325 MG/1
650 TABLET ORAL 3 TIMES DAILY
DISCHARGE
Start: 2022-10-28

## 2022-10-28 RX ORDER — CALCIUM CARBONATE 500(1250)
500 TABLET ORAL 2 TIMES DAILY WITH MEALS
DISCHARGE
Start: 2022-10-28

## 2022-10-28 RX ORDER — LEVOTHYROXINE SODIUM 112 UG/1
112 TABLET ORAL
DISCHARGE
Start: 2022-10-29

## 2022-10-28 RX ORDER — MULTIPLE VITAMINS W/ MINERALS TAB 9MG-400MCG
1 TAB ORAL DAILY
DISCHARGE
Start: 2022-10-28

## 2022-10-28 RX ORDER — ACETYLCYSTEINE 200 MG/ML
2 SOLUTION ORAL; RESPIRATORY (INHALATION) EVERY 6 HOURS PRN
DISCHARGE
Start: 2022-10-28

## 2022-10-28 RX ORDER — ALBUTEROL SULFATE 0.83 MG/ML
2.5 SOLUTION RESPIRATORY (INHALATION) EVERY 4 HOURS PRN
Qty: 90 ML | DISCHARGE
Start: 2022-10-28

## 2022-10-28 RX ORDER — ACETAMINOPHEN 325 MG/1
650 TABLET ORAL EVERY 6 HOURS PRN
DISCHARGE
Start: 2022-10-28

## 2022-10-28 RX ORDER — LIDOCAINE 50 MG/G
OINTMENT TOPICAL EVERY 4 HOURS PRN
DISCHARGE
Start: 2022-10-28

## 2022-10-28 RX ORDER — LISINOPRIL 5 MG/1
5 TABLET ORAL DAILY
DISCHARGE
Start: 2022-10-29

## 2022-10-28 RX ADMIN — Medication 40 MG: at 06:41

## 2022-10-28 RX ADMIN — ACETAMINOPHEN 650 MG: 325 TABLET ORAL at 08:29

## 2022-10-28 RX ADMIN — LISINOPRIL 5 MG: 2.5 TABLET ORAL at 08:29

## 2022-10-28 RX ADMIN — LIDOCAINE 1 PATCH: 246 PATCH TOPICAL at 08:28

## 2022-10-28 RX ADMIN — MULTIPLE VITAMINS W/ MINERALS TAB 1 TABLET: TAB at 10:44

## 2022-10-28 RX ADMIN — LEVOTHYROXINE SODIUM 112 MCG: 0.11 TABLET ORAL at 06:41

## 2022-10-28 RX ADMIN — Medication 25 MCG: at 08:28

## 2022-10-28 RX ADMIN — SENNOSIDES 1 TABLET: 8.6 TABLET, FILM COATED ORAL at 08:29

## 2022-10-28 RX ADMIN — DICLOFENAC 2 G: 10 GEL TOPICAL at 08:30

## 2022-10-28 RX ADMIN — CALCIUM 500 MG: 500 TABLET ORAL at 10:44

## 2022-10-28 RX ADMIN — BUMETANIDE 0.5 MG: 0.5 TABLET ORAL at 08:28

## 2022-10-28 RX ADMIN — OXYCODONE HYDROCHLORIDE 2.5 MG: 5 TABLET ORAL at 08:29

## 2022-10-28 ASSESSMENT — ACTIVITIES OF DAILY LIVING (ADL)
ADLS_ACUITY_SCORE: 81
ADLS_ACUITY_SCORE: 85
ADLS_ACUITY_SCORE: 81

## 2022-10-28 NOTE — PLAN OF CARE
Occupational Therapy Discharge Summary    Reason for therapy discharge:    Discharged to transitional care facility.    Progress towards therapy goal(s). See goals on Care Plan in [facility] electronic health record for goal details.  Goals met to potential at this level of care. Pt cont to need much assist w/ functional mobility, ADL and cognitive safety for daily tasks.     Therapy recommendation(s):    Continued therapy is recommended.  Rationale/Recommendations:  Pt not at basline. Would benefit from further skilled OT to address ADLs, trf, bed mobility and safety skills for daily living. .

## 2022-10-28 NOTE — DISCHARGE SUMMARY
Physical Therapy Discharge Summary    Reason for therapy discharge:    Discharged to long term care facility.    Progress towards therapy goal(s). See goals on Care Plan in [facility] electronic health record for goal details.  Goals not met.  Barriers to achieving goals:   discharge from facility.    Therapy recommendation(s):    Continued therapy is recommended.  Rationale/Recommendations:  strength and mobility.

## 2022-10-28 NOTE — PLAN OF CARE
"  Problem: Plan of Care - These are the overarching goals to be used throughout the patient stay.    Goal: Patient-Specific Goal (Individualized)  Description: You can add care plan individualizations to a care plan. Examples of Individualization might be:  \"Parent requests to be called daily at 9am for status\", \"I have a hard time hearing out of my right ear\", or \"Do not touch me to wake me up as it startles me\".  Outcome: Progressing   Goal Outcome Evaluation:  Patient's vital signs were stable. Patient was up in chair most of the shift. No signs and symptoms of pain noted. Patient repositioned every two hours when in bed. No prn given. All due cares and medications given.                      "

## 2022-10-28 NOTE — PLAN OF CARE
Problem: Pain Acute  Goal: Acceptable Pain Control and Functional Ability  Outcome: Progressing  Intervention: Prevent or Manage Pain  Recent Flowsheet Documentation  Taken 10/28/2022 0127 by Sandra Chilel RN  Medication Review/Management: medications reviewed     Problem: Fall Injury Risk  Goal: Absence of Fall and Fall-Related Injury  Outcome: Progressing  Intervention: Identify and Manage Contributors  Recent Flowsheet Documentation  Taken 10/28/2022 0127 by Sandra Chilel RN  Medication Review/Management: medications reviewed  Intervention: Promote Injury-Free Environment  Recent Flowsheet Documentation  Taken 10/28/2022 0127 by Sandra Chilel RN  Safety Promotion/Fall Prevention:    bed alarm on    fall prevention program maintained    clutter free environment maintained    lighting adjusted    room door open    room near nurse's station     Problem: Anxiety  Goal: Anxiety Reduction or Resolution  Outcome: Progressing   Goal Outcome Evaluation:      Patient appeared to rest well during noc shift, no reports of pain or discomfort, no prn's requested or adm, vss, will continue to monitor.

## 2022-10-28 NOTE — DISCHARGE SUMMARY
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  Chronic hip and shoulder pain  Laryngeal edema 2/2 traumatic cervical fracture/injury  Acute on Chronic hypoxic respiratory failure secondary to traumatic cervical injury  Hx PB  B/l pleural effusions  Hypertension   Complete heart block s/p PM placement  Nonrheumatic aortic valve stenosis s/p TAVR 2019  Severe orophayngeal dysphagia   S/P PEG   Severe protein calorie malnutrition  Hiatal hernia  Hypophosphatemia  Hypomagnesia   Hypokalemia  Hyponatremia  Hypothyroidism  Anemia of chronic disease  BPH  DM type 2  L shoulder pain  Deconditioning    Follow-ups Needed After Discharge   Follow-up Appointments     Follow Up and recommended labs and tests      Please follow up with primary care provider at patient's earliest   convenience.  Recommend neurosurgery follow-up.  Upright XR of the cervical spine (November PENDING appointment)  CT cervical spine in 3 months (first week of December).  Recommend cardiology follow-up.  Recommend outpatient palliative care.              Unresulted Labs Ordered in the Past 30 Days of this Admission     No orders found from 8/23/2022 to 9/23/2022.        Discharge Disposition   Discharged to skilled nursing facility.  Condition at discharge: Stable    Hospital Course   Alexandru Still is a 89 year old man w/ PMH of Lambert-Eaton syndrome, hypothyroidism,  complete  heart block s/p pacemaker, HTN, BPH,  PB, and DM type 2 who was admitted to the SICU on 9/15/2022 following an unwitnessed fall and striking his head. He was initially seen at an outside hospital and underwent a comprehensive trauma work up found to have a left frontal SDH (5mm) + C1 fx + Type II C2 odontoid fracture with a 1cm posterior displacement. He was  intubated for airway protection in the ED given high c-spine injury and difficulty managing his secretions. On admission he requested all cares without surgical cervical spine stabilization, agreeable to a tracheostomy and PEG. Multiple care conferences held with his family who confirmed his wishes.     LTACH course:    9/24-9/26:  Speech evaluated patient and recommends NPO, cont tube feeds via PEG due to severe oropharyngeal dysphasia.  PT/OT, dietitian, woc nurse saw pt on 9/23.  Pulled out G tube during night of 9/23. Pt now on soft wrist restraints.  IVF changed to D51/2 NS at low rate as pt has TAVR.  Morphine IV ordered prn pain.    Spoke w/ Dr. Brown- IR - recommends no hannah tube as this is a brand new PEG and placing it blind will likely lead it to not be in correct position.  He will not do a PEG placement on the weekend, scheduled for Monday.  Pt cannot be fed via NG tube as it is contraindicated with C1 and C2 fx.  For PEG tube replacement (herpain on hold, place ICD)on 9/26.  Will discontinue IVF once PEG tube is placed and restart po meds.  Given NaPhos 9/26 9/27-10/3:  9/27 patient had PEG tube replaced after patient himself removed it on 9/23.  Was a started on tube feeding.  P.O. medication were restarted.  IV fluids were discontinued.  He still needs soft restraint x2.  Is off ventilator and stable.  9/28 Failed blue dye test, Continue NPO   9/30 patient had [place] J 300 collar pads delivered  10/1 SLP found patient well below baseline for swallowing, cognition and communication.  Patient stays n.p.o. except ice chips     10/4/22-10/8:    -No acute events, Vent weaning phase 2. Continue diuresis with bumex. Continue plan of care. Cognitive testing PENDING. May need neuropsych evaluation depending on cognitive evaluation.      10/9-10/17: he follows commands, cont [place].  He does better with glasses and hearing device on.  He has neck pain and headache.  Constipation issues.  Speech eval with swallow study and placed on puree with moderately, thick fluids.  Stopped insulin sliding scale as pt has normal glucose.        10/18-10/27:   decannulated 10/17 and doing well on RA, He has intermittent episodes of confusion.   Palliative care is following patient. He understands he has risk of aspiration and wants eat and willing to take risk of pnuemonia.  Needs assistance at times with feeding.  He is DNR.  BP elevated -add lisinopril  There is redness of L side of jaw, buttock, and perineum - calmoseptine applied.  Can discontinue PEG after 11/1. Still w/ pain- taking oxy, tylenol, and lidoderm patch.  Going to Neurosurg clinic today.    Consultations This Hospital Stay   THERAPEUTIC RECREATION EVAL & TREAT  NUTRITION SERVICES ADULT IP CONSULT  OCCUPATIONAL THERAPY ADULT IP CONSULT  PHYSICAL THERAPY ADULT IP CONSULT  RESPIRATORY CARE IP CONSULT  SPEECH LANGUAGE PATH ADULT IP CONSULT  WOUND OSTOMY CONTINENCE NURSE  IP CONSULT  PULMONARY IP CONSULT  PHARMACY IP CONSULT  PHARMACY IP CONSULT  INTERVENTIONAL RADIOLOGY ADULT/PEDS IP CONSULT  PHARMACY IP CONSULT  VASCULAR ACCESS ADULT IP CONSULT  VASCULAR ACCESS ADULT IP CONSULT  SPEECH LANGUAGE PATH ADULT IP CONSULT  SPEAKING VALVE WITH CUFF DEFLATION IP CONSULT  ORTHOSIS CERVICAL COLLAR IP CONSULT  SPEECH LANGUAGE PATH ADULT IP CONSULT  PHARMACY IP CONSULT  ORTHOSIS CERVICAL COLLAR IP CONSULT  PALLIATIVE CARE ADULT IP CONSULT  SPIRITUAL HEALTH SERVICES IP CONSULT  PHYSICAL THERAPY ADULT IP CONSULT  OCCUPATIONAL THERAPY ADULT IP CONSULT  SPEECH LANGUAGE PATH ADULT IP CONSULT    Code Status   No CPR- Do NOT  Intubate    Time Spent on this Encounter   I, Giovani Wyman DO, personally saw the patient today and spent greater than 30 minutes discharging this patient.       Giovani Wyman DO  [facility] [address] [place] [address]  Phone: [phone]  Fax: [phone]  ______________________________________________________________________       Primary Care Physician   [facility]    Discharge Orders      General info for SNF    Length of Stay Estimate: TBD  Condition at Discharge: Stable  Level of care:skilled   Rehabilitation Potential: Fair  Admission H&P remains valid and up-to-date: No (If no, please update H&P)  Recent Chemotherapy: N/A  Use Nursing Home Standing Orders: Yes     Mantoux instructions    Give two-step Mantoux (PPD) Per Facility Policy     Follow Up and recommended labs and tests    Please follow up with primary care provider at patient's earliest convenience.  Recommend neurosurgery follow-up.  Upright XR of the cervical spine (November PENDING appointment)  CT cervical spine in 3 months (first week of December).  Recommend cardiology follow-up.  Recommend outpatient palliative care.     Reason for your hospital stay    Alexandru Still is a 89 year old man w/ PMH of Lambert-Eaton syndrome, hypothyroidism,  complete heart block s/p pacemaker, HTN, BPH,  PB, and DM type 2 who was admitted to the SICU on 9/15/2022 following an unwitnessed fall and striking his head. Patient found to have a left frontal SDH.     Activity - Up with nursing assistance     Additional Discharge Instructions    Neck collar on at all times     No CPR- Do NOT Intubate    Per electronic medical record     Physical Therapy Adult Consult    Evaluate and treat as clinically indicated.    Reason: Deconditioning     Occupational Therapy Adult Consult    Evaluate and treat as clinically indicated.    Reason: Deconditioning     Speech Language Path Adult Consult     Evaluate and treat as clinically indicated.    Reason: Dysphagia     Fall precautions     Diet    Follow this diet upon discharge: Orders Placed This Encounter      Snacks/Supplements Adult: Magic Cup; Between Meals      Pureed Diet (level 4) Mildly Thick (level 2); No Straws       Significant Results and Procedures   Results for orders placed or performed during the hospital encounter of 09/22/22   XR Chest Port 1 View    Narrative    EXAM: XR CHEST PORT 1 VIEW  LOCATION: [facility]  DATE/TIME: 9/26/2022 9:09 AM    INDICATION: Follow up on b l opacities, pleural effusions  COMPARISON: 9/22/2022      Impression    IMPRESSION: No change in dual-lead cardiac pacer, tracheostomy tube, or right shoulder arthroplasty. Heart size is prominent. There is pulmonary vascular congestion, progressed from prior study. Bibasilar opacities are not significantly changed. Probable   small bilateral pleural effusions, stable to slightly increased.   IR Gastrostomy Tube Percutaneous Plcmnt    Narrative    [facility]  LOCATION: [facility]  DATE: 9/27/2022    PROCEDURE: PERCUTANEOUS GASTROSTOMY REINSERTION.    INTERVENTIONAL RADIOLOGIST: Juvencio Luis MD.    INDICATION: 89-year-old male with a cervical spine fracture. The patient had a surgically placed gastrostomy which has fallen out on 9/23/2022. An attempt at reinsertion at [facility] was unsuccessful.    CONSENT: The risks, benefits and alternatives of the stated procedure were discussed with the patient's power of  in detail. All questions were answered. Informed consent was given to proceed with the procedure.    MODERATE SEDATION: None.    CONTRAST: 10 mL Omnipaque 350.  ANTIBIOTICS: None.  ADDITIONAL MEDICATIONS: None.    FLUOROSCOPIC TIME: 0.3 minutes.  RADIATION DOSE: Air Kerma: 18 mGy.    COMPLICATIONS: No immediate complications.    STERILE BARRIER TECHNIQUE: Maximum sterile barrier technique was used. Cutaneous  antisepsis was performed at the operative site with application of 2% chlorhexidine and large sterile drape. Prior to the procedure, the  and assistant performed   hand hygiene and wore hat, mask, sterile gown, and sterile gloves during the entire procedure.    PROCEDURE:    Using 1% lidocaine for local anesthesia, the existing percutaneous tract at the skin site was re-opened with an 11 blade scalpel. A 5 Malawian Kumpe catheter was then manipulated through the subcutaneous structures and into the gastric lumen. This was   confirmed with an injection of contrast. The tract was dilated and an 18 [place]awian gastrostomy was placed. The retention balloon was inflated with 10 mL of saline. A post placement contrast injection was performed.    FINDINGS:  On physical examination the previous percutaneous tract has completely closed.    The completion image shows the new gastrostomy is in appropriate position within the gastric lumen.      Impression    IMPRESSION:    Successful gastrostomy tube reinsertion as detailed above.       XR Chest Port 1 View    Narrative    EXAM: XR CHEST PORT 1 VIEW  LOCATION: [facility]  DATE/TIME: 10/3/2022 8:40 AM    INDICATION: hypoxic respiratory failure  COMPARISON: Chest x-ray 09/26/2022      Impression    IMPRESSION: Stable satisfactory tracheostomy tube position. TAVR. Stable dual-lead left-sided cardiac conduction device. Right shoulder arthroplasty. Bibasilar pulmonary opacities which may reflect atelectasis and/or infiltrates, increased on the left   and stable on the right. No definite pleural effusion. Stable cardiomegaly and central vascular congestion.   XR Video Swallow with SLP or OT    Narrative    EXAM: XR VIDEO SWALLOW WITH SLP OR OT  LOCATION: [facility]  DATE/TIME: 10/6/2022 9:41 AM    INDICATION: Difficulty swallowing.  COMPARISON: None.    TECHNIQUE: Routine swallow study with speech pathology using multiple barium  thicknesses.    FINDINGS:   FLUOROSCOPIC TIME: 1.7 minutes  NUMBER OF IMAGES: 0    Swallow study with Speech Pathology using multiple barium thicknesses.     Adequate oral phase.    Delay in initiation of swallow reflex with pour over to the vallecular space during swallowing of moderately thick liquid, puree and crunchy solid consistencies, past the epiglottis during swallowing of mildly thick liquid and to the pyriform sinuses   during swallowing of thin liquid. Horizontal to complete epiglottic inversion was demonstrated.    Deep laryngeal penetration occurred during swallowing of thin and mildly thick liquid. No laryngeal penetration or aspiration of moderately thick liquid, puree or crunchy solid consistencies.    Mild stasis after swallowing of thin liquid, and mildly thick liquid, moderately thick liquid, puree and crunchy solid consistencies.      Impression    IMPRESSION:  1.  Adequate oral phase.  2.  Inconsistent delay in swallow reflex with horizontal to complete epiglottic inversion.  3.  Deep laryngeal penetration of thin and mildly thick liquid.  4.  Mild stasis.    Please refer to speech therapy dysphasia evaluation report for additional information.    Examination performed by Melany Hansen, LAURYN/GEOVANNA, RT (R) under the general supervision of .       XR Video Swallow with SLP or OT    Narrative    EXAM: XR VIDEO SWALLOW WITH SLP OR OT  LOCATION: [facility]  DATE/TIME: 10/21/2022 10:00 AM    INDICATION: Difficulty swallowing.  COMPARISON: 10/6/2022.    TECHNIQUE: Routine swallow study with speech pathology using multiple barium thicknesses.    FINDINGS:   FLUOROSCOPIC TIME: 1.4 minutes  NUMBER OF IMAGES: 0    Swallow study with Speech Pathology using multiple barium thicknesses.     Adequate oral phase.    Delay in swallow reflex during swallowing of thin and mildly thick liquid, past the epiglottis. No delay in initiation of swallow reflex during swallowing of moderately thick  liquid, puree or crunchy solid consistency. Complete epiglottic inversion was   demonstrated.    Single episode of deep laryngeal penetration occurred during consecutive swallows of thin liquid from a straw. No laryngeal penetration or aspiration was seen during swallowing of single swallows of thin liquid, mildly thick liquid, moderately thick   liquid, puree or crunchy solid consistencies.    No significant stasis.      Impression    IMPRESSION:  1.  Adequate oral phase.  2.  Inconsistent delay in swallow reflex with complete epiglottic inversion.  3.  Single episode of deep laryngeal penetration during swallowing of thin liquid.  4.  No significant stasis.      Please refer to speech therapy dysphasia evaluation report for additional information.    Examination performed by Melany Hansen, RPA/RRA, RT (R) under the general supervision of Dr. Kian Juan.             Discharge Medications   Current Discharge Medication List      START taking these medications    Details   acetylcysteine (MUCOMYST) 20 % neb solution Take 2 mLs by nebulization every 6 hours as needed for mucolysis/respiratory distress    Associated Diagnoses: Acute hypoxemic respiratory failure (H)      albuterol (PROVENTIL) (2.5 MG/3ML) 0.083% neb solution Take 1 vial (2.5 mg) by nebulization every 4 hours as needed for wheezing  Qty: 90 mL    Associated Diagnoses: Acute hypoxemic respiratory failure (H)      bumetanide (BUMEX) 0.5 MG tablet Take 1 tablet (0.5 mg) by mouth daily    Associated Diagnoses: Acute hypoxemic respiratory failure (H)      calcium carbonate 500 mg, elemental, (OSCAL) 500 MG tablet Take 1 tablet (500 mg) by mouth 2 times daily (with meals)    Associated Diagnoses: Routine adult health maintenance      doxazosin (CARDURA) 2 MG tablet Take 1 tablet (2 mg) by mouth At Bedtime    Associated Diagnoses: Hypertension, unspecified type      glucose 40 % (400 mg/mL) gel Take 15-30 g by mouth every 15 minutes as needed for low blood sugar     Associated Diagnoses: Hypoglycemia      Lidocaine (LIDOCARE) 4 % Patch Place 1 patch onto the skin every 24 hours To prevent lidocaine toxicity, patient should be patch free for 12 hrs daily.    Associated Diagnoses: Pain      lidocaine (XYLOCAINE) 5 % external ointment Apply topically every 4 hours as needed for moderate pain    Associated Diagnoses: Pain      lisinopril (ZESTRIL) 5 MG tablet Take 1 tablet (5 mg) by mouth daily    Associated Diagnoses: Hypertension, unspecified type      menthol-zinc oxide (CALMOSEPTINE) 0.44-20.6 % OINT ointment Apply topically 4 times daily as needed for skin protection    Associated Diagnoses: Pain      miconazole (MICATIN) 2 % external powder Apply topically 3 times daily as needed for other (candidiasis/intertrigo)    Associated Diagnoses: Fungal infection      multivitamin w/minerals (THERA-VIT-M) tablet Take 1 tablet by mouth daily    Associated Diagnoses: Routine adult health maintenance      !! oxyCODONE (ROXICODONE) 5 MG tablet Take 0.5 tablets (2.5 mg) by mouth every 6 hours for 3 days  Qty: 12 tablet, Refills: 0    Associated Diagnoses: Pain      !! oxyCODONE (ROXICODONE) 5 MG tablet Take 0.5 tablets (2.5 mg) by mouth every 4 hours as needed for moderate to severe pain  Qty: 18 tablet, Refills: 0    Associated Diagnoses: Pain      QUEtiapine (SEROQUEL) 25 MG tablet Take 1 tablet (25 mg) by mouth every 6 hours as needed    Associated Diagnoses: Agitation       !! - Potential duplicate medications found. Please discuss with provider.      CONTINUE these medications which have CHANGED    Details   !! acetaminophen (TYLENOL) 325 MG tablet Take 2 tablets (650 mg) by mouth 3 times daily    Associated Diagnoses: Pain      !! acetaminophen (TYLENOL) 325 MG tablet 2 tablets (650 mg) by Oral or Feeding Tube route every 6 hours as needed for mild pain    Associated Diagnoses: Pain      diclofenac (VOLTAREN) 1 % topical gel Apply 2 g topically 3 times daily    Associated  Diagnoses: Pain      levothyroxine (SYNTHROID/LEVOTHROID) 112 MCG tablet Take 1 tablet (112 mcg) by mouth every morning (before breakfast)    Associated Diagnoses: Hypothyroidism, unspecified type      pantoprazole (PROTONIX) 2 mg/mL SUSP suspension 20 mLs (40 mg) by Oral or Feeding Tube route every morning (before breakfast)    Associated Diagnoses: Gastroesophageal reflux disease, unspecified whether esophagitis present      sennosides (SENOKOT) 8.6 MG tablet Take 1 tablet by mouth 2 times daily    Associated Diagnoses: Constipation, unspecified constipation type       !! - Potential duplicate medications found. Please discuss with provider.      CONTINUE these medications which have NOT CHANGED    Details   bisacodyl (DULCOLAX) 10 MG suppository Place 1 suppository (10 mg) rectally daily as needed for constipation    Associated Diagnoses: Closed fracture of second cervical vertebra without spinal cord injury, initial encounter (H)      protein modular (PROSOURCE TF) LIQD 2 packets by Per Feeding Tube route every 8 hours    Associated Diagnoses: Closed fracture of second cervical vertebra without spinal cord injury, initial encounter (H)      Vitamin D3 (CHOLECALCIFEROL) 25 mcg (1000 units) tablet Take 1 tablet (25 mcg) by mouth daily    Associated Diagnoses: Closed fracture of second cervical vertebra without spinal cord injury, initial encounter (H)         STOP taking these medications       calcium carbonate (OS-TITA) 500 MG tablet Comments:   Reason for Stopping:         doxazosin (CARDURA) 0.6 mg/mL SUSP suspension Comments:   Reason for Stopping:         fluticasone (FLONASE) 50 MCG/ACT nasal spray Comments:   Reason for Stopping:         furosemide (LASIX) 40 MG tablet Comments:   Reason for Stopping:         ipratropium - albuterol 0.5 mg/2.5 mg/3 mL (DUONEB) 0.5-2.5 (3) MG/3ML neb solution Comments:   Reason for Stopping:         Multiple Vitamins-Minerals (MULTIVITAMINS W/MINERALS) liquid Comments:    Reason for Stopping:         oxyCODONE (ROXICODONE) 5 MG/5ML solution Comments:   Reason for Stopping:             Allergies   Allergies   Allergen Reactions     Cefuroxime Hives     Contrast Dye      Gadodiamide Hives

## 2022-10-28 NOTE — PROGRESS NOTES
Care Management Discharge Note    Discharge Date: 10/28/2022       Discharge Disposition: Skilled Nursing Facility. [facility] for LTC placement with orders for PT/OT/ST.Discharge on Friday 10/28/22 @ 11:00    Discharge Services:  LTC with therapies    Discharge DME: Wheelchair    Discharge Transportation: [facility] wheelchair @ 11:0h0    Private pay costs discussed: insurance costs out of pocket expenses    PAS Confirmation Code: [id number]  Patient/family educated on Medicare website which has current facility and service quality ratings:  yes    Education Provided on the Discharge Plan:  Patient and son in law-Erv  Persons Notified of Discharge Plans: patient and Erv.  Have updated patient everyday this week.  Patient/Family in Agreement with the Plan: yes    Handoff Referral Completed: Yes    Additional Information:  Skilled Nursing Facility. [facility] for LTC placement with orders for PT/OT/ST.Discharge on Friday 10/28/22 @ 11:00  Writer called and spoke to patient's son in lawErv yesterday to review discharge plan for today  We discussed LTC placement  Again.  We discussed that writer was [facility]  Patient has been accepted at [facility] for LTC placement with orders for PT/OT/ST.  Writer met with patient to discuss [facility] Writer has met patient everyday this week to discuss discharge plan.  However, it appears patient forgets from day to day  Plan for discharge on Friday 10/28/22 @ 11:00      TERRELL Posada

## 2022-10-28 NOTE — PLAN OF CARE
Problem: Fall Injury Risk  Goal: Absence of Fall and Fall-Related Injury  Outcome: Adequate for Care Transition  Intervention: Identify and Manage Contributors  Recent Flowsheet Documentation  Taken 10/28/2022 1000 by Hailey Patterson RN  Medication Review/Management: medications reviewed  Intervention: Promote Injury-Free Environment  Recent Flowsheet Documentation  Taken 10/28/2022 1000 by Hailey Patterson RN  Safety Promotion/Fall Prevention: bed alarm on   Goal Outcome Evaluation:         Alert and oriented X2, slow to responds, elevated BP, managed by scheduled Lisinopril, complained of pain on the left shoulder, applied Voltaren gel, and managed by scheduled medications, discharged to TCU, picked up by transport via wheelchair, all belongings were sent including cellphone with  and one hearing aid and .  Hailey Patterson RN

## 2022-10-28 NOTE — PLAN OF CARE
"Speech Language Therapy Discharge Summary    Reason for therapy discharge:    Discharged to LTC with continued PT/OT/SLP.    Progress towards therapy goal(s). See goals on Care Plan in [facility] electronic health record for goal details.  Goals partially met.  Barriers to achieving goals:   discharge from facility.    Therapy recommendation(s):    Continued therapy is recommended.  Rationale/Recommendations:  See below.    Patient decannulated 10/17/2022. Recommend continued SLP for dysphagia for safe return to least restrictive oral diet.    VFSS 10/21/2022: \"Repeat videofluoroscopic swallow study completed. No aspiration was observed during this evaluation. Patient presented with moderate laryngeal penetration via large consecutive straw sips only. No penetration via small single cup or straw sip. Oral phase of swallow was WFL for puree, moderately thick, mildly thick, and thin. Bolus prep and posterior transit were significantly prolonged and inadequate with soft and bite sized texture. Pt had poor awareness of current state of dentition as well as capability. Tongue base retraction was WFL. Swallow was initiated at the valleculae with puree, moderately thick, and small single sips of thin or mildly thick. Large and consecutive bolus of thin and mildly thick resulted in swallow initiation past epiglottis or at the pyriform sinuses. This resulted in laryngeal penetration with large consecutive sips of thin only. Epiglottic inversion was posterior-inferior. Hyolaryngeal elevation was WNL. Hyolaryngeal excursion was WNL. Pharyngeal constriction was WFL. Patient is okay for liquid upgrade to mildly thick.\"    SLP note from 10/27/2022:  Meal observation of minced and moist with mildly thick. No s/s aspiration or oral debris. Mastication was adequate, however, the patient expressed that he likes the pureed consistency better d/t restricted ROM with aspen collar. Patient was oriented to his name, age, , hospital, city, " "state, situation, and year. Not oriented to month or date. He stated \"at my age you don't always know that and it isn't important anymore\".  Discussed d/c demands and goals. Patients communication is functional and cognition is likely near baseline. No ongoing ST to address cognitive-linguistic skills. Patient in agreement with plan.    Patient will be downgraded to puree solids, and continue mildly thick liquids per his preference. After trial of minced and moist he decided he likes the pureed solids better. Continue ST at d/c to address dysphagia.                            "

## 2022-11-10 ENCOUNTER — LAB REQUISITION (OUTPATIENT)
Dept: LAB | Facility: CLINIC | Age: 87
End: 2022-11-10
Payer: MEDICARE

## 2022-11-10 DIAGNOSIS — S06.5XAD TRAUMATIC SUBDURAL HEMORRHAGE WITH LOSS OF CONSCIOUSNESS STATUS UNKNOWN, SUBSEQUENT ENCOUNTER: ICD-10-CM

## 2022-11-10 DIAGNOSIS — E55.9 VITAMIN D DEFICIENCY, UNSPECIFIED: ICD-10-CM

## 2022-11-10 DIAGNOSIS — E16.2 HYPOGLYCEMIA, UNSPECIFIED: ICD-10-CM

## 2022-11-10 DIAGNOSIS — R06.03 ACUTE RESPIRATORY DISTRESS: ICD-10-CM

## 2022-11-14 ENCOUNTER — LAB REQUISITION (OUTPATIENT)
Dept: LAB | Facility: CLINIC | Age: 87
End: 2022-11-14
Payer: MEDICARE

## 2022-11-14 DIAGNOSIS — E03.9 HYPOTHYROIDISM, UNSPECIFIED: ICD-10-CM

## 2022-11-15 LAB — TSH SERPL DL<=0.005 MIU/L-ACNC: 6.74 UIU/ML (ref 0.3–4.2)

## 2022-11-15 PROCEDURE — 36415 COLL VENOUS BLD VENIPUNCTURE: CPT | Performed by: FAMILY MEDICINE

## 2022-11-15 PROCEDURE — 84443 ASSAY THYROID STIM HORMONE: CPT | Performed by: FAMILY MEDICINE

## 2022-11-15 PROCEDURE — P9603 ONE-WAY ALLOW PRORATED MILES: HCPCS | Performed by: FAMILY MEDICINE

## 2022-11-23 ENCOUNTER — MEDICAL CORRESPONDENCE (OUTPATIENT)
Dept: HEALTH INFORMATION MANAGEMENT | Facility: CLINIC | Age: 87
End: 2022-11-23

## 2022-11-30 ENCOUNTER — TELEPHONE (OUTPATIENT)
Dept: NEUROSURGERY | Facility: CLINIC | Age: 87
End: 2022-11-30

## 2022-12-01 ENCOUNTER — NURSE TRIAGE (OUTPATIENT)
Dept: NURSING | Facility: CLINIC | Age: 87
End: 2022-12-01

## 2022-12-01 ENCOUNTER — TELEPHONE (OUTPATIENT)
Dept: NEUROSURGERY | Facility: CLINIC | Age: 87
End: 2022-12-01

## 2022-12-01 NOTE — TELEPHONE ENCOUNTER
Nurse Triage SBAR    Situation:   PEG tube removal    Background:   -Erv calling, It is okay to leave a detailed message at this number.   -No consent to communicate on file  -Currently living in a [facility]    Assessment:   -Peg tube was placed dakota chavez at [facility]  -ERv called [facility] IR and that told himit wouldhave to be re moved by the persion who ordered  it in the first place (who was a NP in the hospital)  -He now feels like it should be removed    Recommendation:   -Call PCP to request that the tube be romoved- if they are unwilling to assess him please call back.        Reason for Disposition    Information only question and nurse able to answer    Protocols used: INFORMATION ONLY CALL - NO TRIAGE-A-OH

## 2022-12-02 ENCOUNTER — TELEPHONE (OUTPATIENT)
Dept: NEUROSURGERY | Facility: CLINIC | Age: 87
End: 2022-12-02

## 2022-12-30 ENCOUNTER — LAB REQUISITION (OUTPATIENT)
Dept: LAB | Facility: CLINIC | Age: 87
End: 2022-12-30
Payer: MEDICARE

## 2022-12-30 DIAGNOSIS — E03.9 HYPOTHYROIDISM, UNSPECIFIED: ICD-10-CM

## 2023-01-02 ENCOUNTER — LAB REQUISITION (OUTPATIENT)
Dept: LAB | Facility: CLINIC | Age: 88
End: 2023-01-02
Payer: MEDICARE

## 2023-01-02 DIAGNOSIS — E03.9 HYPOTHYROIDISM, UNSPECIFIED: ICD-10-CM

## 2023-01-03 LAB — TSH SERPL DL<=0.005 MIU/L-ACNC: 25.7 UIU/ML (ref 0.3–4.2)

## 2023-01-03 PROCEDURE — 84443 ASSAY THYROID STIM HORMONE: CPT | Mod: ORL | Performed by: FAMILY MEDICINE

## 2023-01-03 PROCEDURE — P9603 ONE-WAY ALLOW PRORATED MILES: HCPCS | Performed by: FAMILY MEDICINE

## 2023-01-03 PROCEDURE — 36415 COLL VENOUS BLD VENIPUNCTURE: CPT | Mod: ORL | Performed by: FAMILY MEDICINE

## 2023-01-05 LAB — TSH SERPL DL<=0.005 MIU/L-ACNC: 25.2 UIU/ML (ref 0.3–4.2)

## 2023-01-05 PROCEDURE — 36415 COLL VENOUS BLD VENIPUNCTURE: CPT | Performed by: FAMILY MEDICINE

## 2023-01-05 PROCEDURE — P9603 ONE-WAY ALLOW PRORATED MILES: HCPCS | Performed by: FAMILY MEDICINE

## 2023-01-05 PROCEDURE — 84443 ASSAY THYROID STIM HORMONE: CPT | Performed by: FAMILY MEDICINE

## 2023-01-10 ENCOUNTER — LAB REQUISITION (OUTPATIENT)
Dept: LAB | Facility: CLINIC | Age: 88
End: 2023-01-10
Payer: MEDICARE

## 2023-01-10 DIAGNOSIS — I10 ESSENTIAL (PRIMARY) HYPERTENSION: ICD-10-CM

## 2023-01-10 PROCEDURE — P9603 ONE-WAY ALLOW PRORATED MILES: HCPCS | Mod: ORL | Performed by: NURSE PRACTITIONER

## 2023-01-11 LAB
ANION GAP SERPL CALCULATED.3IONS-SCNC: 17 MMOL/L (ref 7–15)
BUN SERPL-MCNC: 20.8 MG/DL (ref 8–23)
CALCIUM SERPL-MCNC: 9.4 MG/DL (ref 8.2–9.6)
CHLORIDE SERPL-SCNC: 96 MMOL/L (ref 98–107)
CREAT SERPL-MCNC: 0.96 MG/DL (ref 0.67–1.17)
DEPRECATED HCO3 PLAS-SCNC: 21 MMOL/L (ref 22–29)
GFR SERPL CREATININE-BSD FRML MDRD: 74 ML/MIN/1.73M2
GLUCOSE SERPL-MCNC: 116 MG/DL (ref 70–99)
POTASSIUM SERPL-SCNC: 4.5 MMOL/L (ref 3.4–5.3)
SODIUM SERPL-SCNC: 134 MMOL/L (ref 136–145)

## 2023-01-11 PROCEDURE — 80048 BASIC METABOLIC PNL TOTAL CA: CPT | Performed by: NURSE PRACTITIONER

## 2023-01-11 PROCEDURE — 36415 COLL VENOUS BLD VENIPUNCTURE: CPT | Mod: ORL | Performed by: NURSE PRACTITIONER

## 2023-01-17 NOTE — TELEPHONE ENCOUNTER
[facility] Call Center    Phone Message    May a detailed message be left on voicemail: yes     Reason for Call: Other: Please call   Erv (Son-in-Law)   [phone]  To schedule appt for Pt.    Action Taken: Message routed to:  [facility] ([facility] Neurosurgery    Travel Screening: Not Applicable

## 2023-01-17 NOTE — TELEPHONE ENCOUNTER
Writer routed to Neurosurgery Clinic Scheduling.   *Please schedule patient imaging with follow up appointment. Patient Sone in calls requesting call back to schedule.     Audra Harris LPN  Neurosurgery

## 2023-02-01 ENCOUNTER — LAB REQUISITION (OUTPATIENT)
Dept: LAB | Facility: CLINIC | Age: 88
End: 2023-02-01
Payer: MEDICARE

## 2023-02-01 DIAGNOSIS — E16.2 HYPOGLYCEMIA, UNSPECIFIED: ICD-10-CM

## 2023-02-01 DIAGNOSIS — R06.03 ACUTE RESPIRATORY DISTRESS: ICD-10-CM

## 2023-02-01 DIAGNOSIS — E55.9 VITAMIN D DEFICIENCY, UNSPECIFIED: ICD-10-CM

## 2023-02-01 DIAGNOSIS — S06.5XAD TRAUMATIC SUBDURAL HEMORRHAGE WITH LOSS OF CONSCIOUSNESS STATUS UNKNOWN, SUBSEQUENT ENCOUNTER: ICD-10-CM

## 2023-02-01 DIAGNOSIS — R52 PAIN, UNSPECIFIED: ICD-10-CM

## 2023-02-02 LAB
ERYTHROCYTE [DISTWIDTH] IN BLOOD BY AUTOMATED COUNT: 14.6 % (ref 10–15)
HCT VFR BLD AUTO: 35.9 % (ref 40–53)
HGB BLD-MCNC: 11.3 G/DL (ref 13.3–17.7)
MCH RBC QN AUTO: 30 PG (ref 26.5–33)
MCHC RBC AUTO-ENTMCNC: 31.5 G/DL (ref 31.5–36.5)
MCV RBC AUTO: 95 FL (ref 78–100)
PLATELET # BLD AUTO: 256 10E3/UL (ref 150–450)
RBC # BLD AUTO: 3.77 10E6/UL (ref 4.4–5.9)
TSH SERPL DL<=0.005 MIU/L-ACNC: 5.85 UIU/ML (ref 0.3–4.2)
WBC # BLD AUTO: 8.9 10E3/UL (ref 4–11)

## 2023-02-02 PROCEDURE — P9603 ONE-WAY ALLOW PRORATED MILES: HCPCS | Mod: ORL | Performed by: FAMILY MEDICINE

## 2023-02-02 PROCEDURE — 84443 ASSAY THYROID STIM HORMONE: CPT | Mod: ORL | Performed by: FAMILY MEDICINE

## 2023-02-02 PROCEDURE — 85027 COMPLETE CBC AUTOMATED: CPT | Mod: ORL | Performed by: FAMILY MEDICINE

## 2023-02-02 PROCEDURE — 36415 COLL VENOUS BLD VENIPUNCTURE: CPT | Performed by: FAMILY MEDICINE

## 2023-02-06 ENCOUNTER — ANCILLARY PROCEDURE (OUTPATIENT)
Dept: CT IMAGING | Facility: CLINIC | Age: 88
End: 2023-02-06
Attending: NURSE PRACTITIONER
Payer: MEDICARE

## 2023-02-06 DIAGNOSIS — S12.100A CLOSED ODONTOID FRACTURE, INITIAL ENCOUNTER (H): ICD-10-CM

## 2023-02-06 PROCEDURE — G1010 CDSM STANSON: HCPCS | Mod: GC | Performed by: RADIOLOGY

## 2023-02-06 PROCEDURE — 72125 CT NECK SPINE W/O DYE: CPT | Mod: MF | Performed by: RADIOLOGY

## 2023-02-09 ENCOUNTER — OFFICE VISIT (OUTPATIENT)
Dept: NEUROSURGERY | Facility: CLINIC | Age: 88
End: 2023-02-09
Payer: MEDICARE

## 2023-02-09 VITALS — HEART RATE: 66 BPM | SYSTOLIC BLOOD PRESSURE: 108 MMHG | DIASTOLIC BLOOD PRESSURE: 62 MMHG | OXYGEN SATURATION: 95 %

## 2023-02-09 DIAGNOSIS — S12.100A CLOSED ODONTOID FRACTURE, INITIAL ENCOUNTER (H): Primary | ICD-10-CM

## 2023-02-09 PROCEDURE — 99214 OFFICE O/P EST MOD 30 MIN: CPT | Performed by: NURSE PRACTITIONER

## 2023-02-09 RX ORDER — PANTOPRAZOLE SODIUM 20 MG/1
TABLET, DELAYED RELEASE ORAL EVERY OTHER DAY
COMMUNITY
Start: 2023-01-27

## 2023-02-09 ASSESSMENT — PAIN SCALES - GENERAL: PAINLEVEL: NO PAIN (0)

## 2023-02-09 NOTE — LETTER
2/9/2023       RE: Alexandru Still  [address]  [place] [address]     Dear Colleague,    Thank you for referring your patient, Alexandru Still, to the [facility] [place] at [facility]. Please see a copy of my visit note below.       Neurosurgery Clinic Note     Chief Complaint: hospital follow-up      DATE OF VISIT: 10/27/2022     HPI:       Alexandru Still is a 89 year old male with past medical histroy of complete heart block with cardiac pacemaker in situ, S/P transcatheter aortic valve replacement, and HTN who was transferred to [facility] ED on 9/15/2022 from [facility] for trauma work-up.  The patient had a unwitnessed mechanical fall, with positive head strike, no loss of consciousness and was down for 2 hours. CT scan demonstrated a left frontal subdural hemorrhage measuring 5 mm, C1 fracture, and a type II C2 odontoid fracture with 1 cm of posterior displacement.   Patient was fitted with a [place] J collar and conservative versus surgical management was discussed with the patient and his family, they elected to pursue conservative measures given his age and medical co-morbidities.  Repeat cervical x-rays were performed and were stable.  In regards to subdural hematoma that was noted on imaging, repeat imaging revealed unchanged thin 2 mm frontal subdural hematoma.  No additional imaging was obtained and aspirin was restarted.  Given respiratory failure the patient required tracheostomy and percutaneous endogastric tube placement during hospitalization.  The patient was ultimately discharged to [facility] on 9/22/2022.      Patient was last seen on 10/27/2022 and that time imaging was stable.  He has been residing at an assisted living since his last visit and has been wearing his collar as directed.  He presents today for repeat repeat imaging.   Patient is very hard of hearing and due to this a proper history is  difficult to obtain.  Currently he states he is swallowing without difficult and eating normally.  Denies neck or arm pain but does state his left shoulder hurts.  Patient has not been walking and mobilizes with assistance of gait belt and wheelchair.                Review of Systems   Negative except in HPI     Past Medical History        Past Medical History:   Diagnosis Date     Anemia       Aortic stenosis       BPH (benign prostatic hyperplasia)       Closed T12 fracture       Complete heart block       s/p dual chamber pacemaker     Hypertension       Hypothyroidism       Kidney stone       Lambert-Eaton myasthenic syndrome       Lower extremity edema              Past Surgical History         Past Surgical History:   Procedure Laterality Date     CV TRANSCATHETER AORTIC VALVE REPLACEMENT TRANSAORTIC APPROACH         EP PACEMAKER         ESOPHAGOGASTRODUODENOSCOPY, WITH NASOGASTRIC TUBE INSERTION N/A 2022     Procedure: ESOPHAGOGASTRODUODENOSCOPY, WITH NASOJEJUNAL TUBE INSERTION ;  Surgeon: Nils Drew MD;  [facility]: [facility]     HERNIA REPAIR         IR GASTROSTOMY TUBE PERCUTANEOUS PLCMNT   2022     IR PICC PLACEMENT > 5 YRS OF AGE   6/15/2022     LASER HOLMIUM LITHOTRIPSY URETER(S), INSERT STENT, COMBINED N/A 6/15/2022     Procedure: 1. Cystourethroscopy 2. Laser lithotripsy of a urethral stone 3. Basketing of a urethral stone 4. Complex hannah catheter placement over a wire;  Surgeon: Beny Ha MD;  Location: [facility] OR     TRACHEOSTOMY N/A 2022     Procedure: CREATION, TRACHEOSTOMY;  Surgeon: Bob Dill MD;  Location: [facility] OR            Social History            Socioeconomic History     Marital status:    Tobacco Use     Smoking status: Former       Packs/day: 1.00       Years: 20.00       Pack years: 20.00       Types: Cigarettes       Quit date:        Years since quittin.8     Smokeless tobacco: Never   Substance and Sexual Activity     Alcohol  use: Not Currently     Drug use: Never         family history includes Cerebrovascular Disease in his mother; Chronic Obstructive Pulmonary Disease in his father.                Physical Exam   There were no vitals taken for this visit.  Constitutional: Oriented to person, place, and time. Appears well-developed and well-nourished. Cooperative. No distress.   HENT: Head normocephalic and atraumatic.   Neurological: alert and oriented to person, place, and time. CN II-XII grossly  intact        STRENGTH LEFT RIGHT   Deltoid 4 4   Bicep 4 4   Wrist Extensor 5 5   Tricep 5 5   Finger flexion 5 5   Finger abduction 5 5    5 5         Hip Flexion       5       5   Knee Extension 5 5   Ankle Dorsiflexion 5 5   Extensor Hallucis Longus 5 5   Plantar Flexion 5 5   Foot eversion 5 5   Foot inversion 5 5        Skin: Skin is warm, dry and intact.   Psychiatric: Normal mood and affect. Speech is normal and behavior is normal.        IMAGING:  Personally reviewed cervical CT imaging dated 02/06/2023:    1. Redemonstration of posteriorly displaced type II odontoid fracture,  with continued displacement of the posterior fracture fragments now  measuring approximately 0.9 cm (previously 1 cm on 9/15/2022) with  persistent mild spinal canal stenosis at this level. There is improved  alignment of the anterior fracture fragment of C1.   2. Multilevel cervical spondylosis with no high-grade spinal canal  stenosis. Multilevel neural foraminal narrowings as detailed above.      ASSESSMENT:  Alexandru Still is a 89 year old male s/p type II C2 odontoid fracture with 1 cm of posterior displacement following a fall; repeat imaging revealing non-healing fracture.     PLAN:  Unfortunately it does not appear the patient's fracture is healing.  Given that he does not elect to pursue surgical intervention the only other option for treatment is indefinite use of the cervical collar at all times.       Please return in 3 months with CT  cervical spine and upright x-rays.        NATASHA Longoria, CNP  Department of Neurosurgery  Pager: [phone]

## 2023-02-09 NOTE — PROGRESS NOTES
Neurosurgery Clinic Note     Chief Complaint: hospital follow-up      DATE OF VISIT: 10/27/2022     HPI:       Alexandru Still is a 89 year old male with past medical histroy of complete heart block with cardiac pacemaker in situ, S/P transcatheter aortic valve replacement, and HTN who was transferred to [facility] ED on 9/15/2022 from [facility] for trauma work-up.  The patient had a unwitnessed mechanical fall, with positive head strike, no loss of consciousness and was down for 2 hours. CT scan demonstrated a left frontal subdural hemorrhage measuring 5 mm, C1 fracture, and a type II C2 odontoid fracture with 1 cm of posterior displacement.   Patient was fitted with a [place] J collar and conservative versus surgical management was discussed with the patient and his family, they elected to pursue conservative measures given his age and medical co-morbidities.  Repeat cervical x-rays were performed and were stable.  In regards to subdural hematoma that was noted on imaging, repeat imaging revealed unchanged thin 2 mm frontal subdural hematoma.  No additional imaging was obtained and aspirin was restarted.  Given respiratory failure the patient required tracheostomy and percutaneous endogastric tube placement during hospitalization.  The patient was ultimately discharged to [facility] on 9/22/2022.      Patient was last seen on 10/27/2022 and that time imaging was stable.  He has been residing at an assisted living since his last visit and has been wearing his collar as directed.  He presents today for repeat repeat imaging.   Patient is very hard of hearing and due to this a proper history is difficult to obtain.  Currently he states he is swallowing without difficult and eating normally.  Denies neck or arm pain but does state his left shoulder hurts.  Patient has not been walking and mobilizes with assistance of gait belt and wheelchair.                Review of Systems   Negative except in HPI     Past Medical  History        Past Medical History:   Diagnosis Date     Anemia       Aortic stenosis       BPH (benign prostatic hyperplasia)       Closed T12 fracture       Complete heart block       s/p dual chamber pacemaker     Hypertension       Hypothyroidism       Kidney stone       Lambert-Eaton myasthenic syndrome       Lower extremity edema              Past Surgical History         Past Surgical History:   Procedure Laterality Date     CV TRANSCATHETER AORTIC VALVE REPLACEMENT TRANSAORTIC APPROACH         EP PACEMAKER         ESOPHAGOGASTRODUODENOSCOPY, WITH NASOGASTRIC TUBE INSERTION N/A 2022     Procedure: ESOPHAGOGASTRODUODENOSCOPY, WITH NASOJEJUNAL TUBE INSERTION ;  Surgeon: Nils Drew MD;  Location: [facility]     HERNIA REPAIR         IR GASTROSTOMY TUBE PERCUTANEOUS PLCMNT   2022     IR PICC PLACEMENT > 5 YRS OF AGE   6/15/2022     LASER HOLMIUM LITHOTRIPSY URETER(S), INSERT STENT, COMBINED N/A 6/15/2022     Procedure: 1. Cystourethroscopy 2. Laser lithotripsy of a urethral stone 3. Basketing of a urethral stone 4. Complex hannah catheter placement over a wire;  Surgeon: Beny Ha MD;  Location: [facility]     TRACHEOSTOMY N/A 2022     Procedure: CREATION, TRACHEOSTOMY;  Surgeon: Bob Dill MD;  Location: [facility]            Social History            Socioeconomic History     Marital status:    Tobacco Use     Smoking status: Former       Packs/day: 1.00       Years: 20.00       Pack years: 20.00       Types: Cigarettes       Quit date:        Years since quittin.8     Smokeless tobacco: Never   Substance and Sexual Activity     Alcohol use: Not Currently     Drug use: Never         family history includes Cerebrovascular Disease in his mother; Chronic Obstructive Pulmonary Disease in his father.                Physical Exam   There were no vitals taken for this visit.  Constitutional: Oriented to person, place, and time. Appears well-developed and  well-nourished. Cooperative. No distress.   HENT: Head normocephalic and atraumatic.   Neurological: alert and oriented to person, place, and time. CN II-XII grossly  intact        STRENGTH LEFT RIGHT   Deltoid 4 4   Bicep 4 4   Wrist Extensor 5 5   Tricep 5 5   Finger flexion 5 5   Finger abduction 5 5    5 5         Hip Flexion       5       5   Knee Extension 5 5   Ankle Dorsiflexion 5 5   Extensor Hallucis Longus 5 5   Plantar Flexion 5 5   Foot eversion 5 5   Foot inversion 5 5        Skin: Skin is warm, dry and intact.   Psychiatric: Normal mood and affect. Speech is normal and behavior is normal.        IMAGING:  Personally reviewed cervical CT imaging dated 02/06/2023:    1. Redemonstration of posteriorly displaced type II odontoid fracture,  with continued displacement of the posterior fracture fragments now  measuring approximately 0.9 cm (previously 1 cm on 9/15/2022) with  persistent mild spinal canal stenosis at this level. There is improved  alignment of the anterior fracture fragment of C1.   2. Multilevel cervical spondylosis with no high-grade spinal canal  stenosis. Multilevel neural foraminal narrowings as detailed above.      ASSESSMENT:  Alexandru Still is a 89 year old male s/p type II C2 odontoid fracture with 1 cm of posterior displacement following a fall; repeat imaging revealing non-healing fracture.     PLAN:  Unfortunately it does not appear the patient's fracture is healing.  Given that he does not elect to pursue surgical intervention the only other option for treatment is indefinite use of the cervical collar at all times.       Please return in 3 months with CT cervical spine and upright x-rays.        NATASHA Longoria, CNP  Department of Neurosurgery  Pager: [phone]

## 2023-02-09 NOTE — PATIENT INSTRUCTIONS
Do not do any bending, twisting, strenuous exercise, or heavy lifting (greater than 10 pounds) for 4-6 weeks     Recommend Calcium and Vitamin D supplementation      Please continue to wear cervical collar at all times.  Ok to remove for hygiene checks.      Please return in 3 months with CT cervical spine and upright x-rays

## 2023-02-15 ENCOUNTER — LAB REQUISITION (OUTPATIENT)
Dept: LAB | Facility: CLINIC | Age: 88
End: 2023-02-15
Payer: MEDICARE

## 2023-02-15 DIAGNOSIS — R60.9 EDEMA, UNSPECIFIED: ICD-10-CM

## 2023-02-16 LAB
ANION GAP SERPL CALCULATED.3IONS-SCNC: 14 MMOL/L (ref 7–15)
BUN SERPL-MCNC: 20.8 MG/DL (ref 8–23)
CALCIUM SERPL-MCNC: 9.4 MG/DL (ref 8.2–9.6)
CHLORIDE SERPL-SCNC: 101 MMOL/L (ref 98–107)
CREAT SERPL-MCNC: 0.99 MG/DL (ref 0.67–1.17)
DEPRECATED HCO3 PLAS-SCNC: 22 MMOL/L (ref 22–29)
GFR SERPL CREATININE-BSD FRML MDRD: 71 ML/MIN/1.73M2
GLUCOSE SERPL-MCNC: 99 MG/DL (ref 70–99)
POTASSIUM SERPL-SCNC: 3.8 MMOL/L (ref 3.4–5.3)
SODIUM SERPL-SCNC: 137 MMOL/L (ref 136–145)

## 2023-02-16 PROCEDURE — P9603 ONE-WAY ALLOW PRORATED MILES: HCPCS | Mod: ORL | Performed by: FAMILY MEDICINE

## 2023-02-16 PROCEDURE — 80048 BASIC METABOLIC PNL TOTAL CA: CPT | Mod: ORL | Performed by: FAMILY MEDICINE

## 2023-02-16 PROCEDURE — 36415 COLL VENOUS BLD VENIPUNCTURE: CPT | Mod: ORL | Performed by: FAMILY MEDICINE

## 2023-04-04 ENCOUNTER — TELEPHONE (OUTPATIENT)
Dept: NEUROSURGERY | Facility: CLINIC | Age: 88
End: 2023-04-04
Payer: MEDICARE

## 2023-04-24 ENCOUNTER — LAB REQUISITION (OUTPATIENT)
Dept: LAB | Facility: CLINIC | Age: 88
End: 2023-04-24
Payer: MEDICARE

## 2023-04-24 DIAGNOSIS — R60.9 EDEMA, UNSPECIFIED: ICD-10-CM

## 2023-04-25 ENCOUNTER — TELEPHONE (OUTPATIENT)
Dept: NEUROSURGERY | Facility: CLINIC | Age: 88
End: 2023-04-25
Payer: MEDICARE

## 2023-04-25 LAB
ANION GAP SERPL CALCULATED.3IONS-SCNC: 14 MMOL/L (ref 7–15)
BUN SERPL-MCNC: 20.5 MG/DL (ref 8–23)
CALCIUM SERPL-MCNC: 9.5 MG/DL (ref 8.2–9.6)
CHLORIDE SERPL-SCNC: 101 MMOL/L (ref 98–107)
CREAT SERPL-MCNC: 0.98 MG/DL (ref 0.67–1.17)
DEPRECATED HCO3 PLAS-SCNC: 23 MMOL/L (ref 22–29)
GFR SERPL CREATININE-BSD FRML MDRD: 72 ML/MIN/1.73M2
GLUCOSE SERPL-MCNC: 129 MG/DL (ref 70–99)
POTASSIUM SERPL-SCNC: 4.1 MMOL/L (ref 3.4–5.3)
SODIUM SERPL-SCNC: 138 MMOL/L (ref 136–145)

## 2023-04-25 PROCEDURE — 36415 COLL VENOUS BLD VENIPUNCTURE: CPT | Mod: ORL | Performed by: NURSE PRACTITIONER

## 2023-04-25 PROCEDURE — 80048 BASIC METABOLIC PNL TOTAL CA: CPT | Mod: ORL | Performed by: NURSE PRACTITIONER

## 2023-04-25 PROCEDURE — P9603 ONE-WAY ALLOW PRORATED MILES: HCPCS | Mod: ORL | Performed by: NURSE PRACTITIONER

## 2023-05-15 ENCOUNTER — TELEPHONE (OUTPATIENT)
Dept: NEUROSURGERY | Facility: CLINIC | Age: 88
End: 2023-05-15
Payer: MEDICARE

## 2023-06-04 ENCOUNTER — LAB REQUISITION (OUTPATIENT)
Dept: LAB | Facility: CLINIC | Age: 88
End: 2023-06-04
Payer: MEDICARE

## 2023-06-04 DIAGNOSIS — E16.2 HYPOGLYCEMIA, UNSPECIFIED: ICD-10-CM

## 2023-06-06 ENCOUNTER — LAB REQUISITION (OUTPATIENT)
Dept: LAB | Facility: CLINIC | Age: 88
End: 2023-06-06
Payer: MEDICARE

## 2023-06-06 DIAGNOSIS — E16.2 HYPOGLYCEMIA, UNSPECIFIED: ICD-10-CM

## 2023-06-06 LAB
HBA1C MFR BLD: 5.8 %
TSH SERPL DL<=0.005 MIU/L-ACNC: 12 UIU/ML (ref 0.3–4.2)

## 2023-06-06 PROCEDURE — P9604 ONE-WAY ALLOW PRORATED TRIP: HCPCS | Mod: ORL | Performed by: FAMILY MEDICINE

## 2023-06-06 PROCEDURE — 84443 ASSAY THYROID STIM HORMONE: CPT | Mod: ORL | Performed by: FAMILY MEDICINE

## 2023-06-06 PROCEDURE — 36415 COLL VENOUS BLD VENIPUNCTURE: CPT | Mod: ORL | Performed by: FAMILY MEDICINE

## 2023-06-06 PROCEDURE — 83036 HEMOGLOBIN GLYCOSYLATED A1C: CPT | Mod: ORL | Performed by: FAMILY MEDICINE

## 2023-06-20 ENCOUNTER — TELEPHONE (OUTPATIENT)
Dept: NEUROSURGERY | Facility: CLINIC | Age: 88
End: 2023-06-20
Payer: MEDICARE

## 2023-06-29 ENCOUNTER — TELEPHONE (OUTPATIENT)
Dept: NEUROSURGERY | Facility: CLINIC | Age: 88
End: 2023-06-29

## 2023-07-18 ENCOUNTER — LAB REQUISITION (OUTPATIENT)
Dept: LAB | Facility: CLINIC | Age: 88
End: 2023-07-18
Payer: MEDICARE

## 2023-07-18 DIAGNOSIS — E03.9 HYPOTHYROIDISM, UNSPECIFIED: ICD-10-CM

## 2023-07-20 LAB — TSH SERPL DL<=0.005 MIU/L-ACNC: 10.1 UIU/ML (ref 0.3–4.2)

## 2023-07-20 PROCEDURE — 36415 COLL VENOUS BLD VENIPUNCTURE: CPT | Mod: ORL | Performed by: NURSE PRACTITIONER

## 2023-07-20 PROCEDURE — P9603 ONE-WAY ALLOW PRORATED MILES: HCPCS | Mod: ORL | Performed by: NURSE PRACTITIONER

## 2023-07-20 PROCEDURE — 84443 ASSAY THYROID STIM HORMONE: CPT | Mod: ORL | Performed by: NURSE PRACTITIONER

## 2023-08-08 ENCOUNTER — OFFICE VISIT (OUTPATIENT)
Dept: NEUROSURGERY | Facility: CLINIC | Age: 88
End: 2023-08-08
Payer: MEDICARE

## 2023-08-08 ENCOUNTER — ANCILLARY PROCEDURE (OUTPATIENT)
Dept: GENERAL RADIOLOGY | Facility: CLINIC | Age: 88
End: 2023-08-08
Attending: NURSE PRACTITIONER
Payer: MEDICARE

## 2023-08-08 ENCOUNTER — ANCILLARY PROCEDURE (OUTPATIENT)
Dept: CT IMAGING | Facility: CLINIC | Age: 88
End: 2023-08-08
Attending: NURSE PRACTITIONER
Payer: MEDICARE

## 2023-08-08 VITALS
OXYGEN SATURATION: 96 % | BODY MASS INDEX: 29.19 KG/M2 | WEIGHT: 192 LBS | RESPIRATION RATE: 16 BRPM | HEART RATE: 57 BPM | SYSTOLIC BLOOD PRESSURE: 150 MMHG | DIASTOLIC BLOOD PRESSURE: 95 MMHG

## 2023-08-08 DIAGNOSIS — S12.100A CLOSED ODONTOID FRACTURE, INITIAL ENCOUNTER (H): ICD-10-CM

## 2023-08-08 DIAGNOSIS — S12.111K: Primary | ICD-10-CM

## 2023-08-08 PROCEDURE — 72125 CT NECK SPINE W/O DYE: CPT | Mod: MF | Performed by: STUDENT IN AN ORGANIZED HEALTH CARE EDUCATION/TRAINING PROGRAM

## 2023-08-08 PROCEDURE — G1010 CDSM STANSON: HCPCS | Mod: GC | Performed by: STUDENT IN AN ORGANIZED HEALTH CARE EDUCATION/TRAINING PROGRAM

## 2023-08-08 PROCEDURE — 99214 OFFICE O/P EST MOD 30 MIN: CPT | Performed by: PHYSICIAN ASSISTANT

## 2023-08-08 PROCEDURE — 72040 X-RAY EXAM NECK SPINE 2-3 VW: CPT | Mod: GC | Performed by: RADIOLOGY

## 2023-08-08 RX ORDER — DOXYCYCLINE 100 MG/1
CAPSULE ORAL
COMMUNITY
Start: 2023-07-18

## 2023-08-08 ASSESSMENT — PAIN SCALES - GENERAL: PAINLEVEL: NO PAIN (0)

## 2023-08-08 NOTE — PATIENT INSTRUCTIONS
Follow up in 3 months with cervical CT and XR.    Continue to wear cervical collar 24/7 indefinitely.

## 2023-08-08 NOTE — PROGRESS NOTES
Neurosurgery Clinic Note     Chief Complaint: hospital follow-up      DATE OF VISIT: 8/8/23     HPI:     Alexandru Still is a 89 year old male with past medical histroy of complete heart block with cardiac pacemaker in situ, S/P transcatheter aortic valve replacement, and HTN who was transferred to [facility] ED on 9/15/2022 from [facility] for trauma work-up.  The patient had a unwitnessed mechanical fall, with positive head strike, no loss of consciousness and was down for 2 hours. CT scan demonstrated a left frontal subdural hemorrhage measuring 5 mm, C1 fracture, and a type II C2 odontoid fracture with 1 cm of posterior displacement.   Patient was fitted with a [place] J collar and conservative versus surgical management was discussed with the patient and his family, they elected to pursue conservative measures given his age and medical co-morbidities.  Repeat cervical x-rays were performed and were stable.  In regards to subdural hematoma that was noted on imaging, repeat imaging revealed unchanged thin 2 mm frontal subdural hematoma.  No additional imaging was obtained and aspirin was restarted.  Given respiratory failure the patient required tracheostomy and percutaneous endogastric tube placement during hospitalization.  The patient was ultimately discharged to [facility] on 9/22/2022.       Patient was last seen on 2/9/23 and that time imaging was stable.  He has been residing at [facility] and has been wearing his collar as directed.  He presents today for repeat imaging.     Patient is very hard of hearing and due to this a proper history is difficult to obtain.  He is tolerating the cervical collar.  He denies swallowing issues and is able to eat fine.  He denies neck pain or paresthesias in his extremities.  Patient has not been walking and mobilizes with assistance of gait belt and wheelchair. He is present today with his former son-in-law.             Review of Systems   Negative  except in HPI     Past Medical History           Past Medical History:   Diagnosis Date    Anemia      Aortic stenosis      BPH (benign prostatic hyperplasia)      Closed T12 fracture      Complete heart block       s/p dual chamber pacemaker    Hypertension      Hypothyroidism      Kidney stone      Lambert-Eaton myasthenic syndrome      Lower extremity edema              Past Surgical History             Past Surgical History:   Procedure Laterality Date    CV TRANSCATHETER AORTIC VALVE REPLACEMENT TRANSAORTIC APPROACH        EP PACEMAKER        ESOPHAGOGASTRODUODENOSCOPY, WITH NASOGASTRIC TUBE INSERTION N/A 2022     Procedure: ESOPHAGOGASTRODUODENOSCOPY, WITH NASOJEJUNAL TUBE INSERTION ;  Surgeon: Nils Drew MD;  Location: [facility]    HERNIA REPAIR        IR GASTROSTOMY TUBE PERCUTANEOUS PLCMNT   2022    IR PICC PLACEMENT > 5 YRS OF AGE   6/15/2022    LASER HOLMIUM LITHOTRIPSY URETER(S), INSERT STENT, COMBINED N/A 6/15/2022     Procedure: 1. Cystourethroscopy 2. Laser lithotripsy of a urethral stone 3. Basketing of a urethral stone 4. Complex hannah catheter placement over a wire;  Surgeon: Beny Ha MD;  Location: [facility]    TRACHEOSTOMY N/A 2022     Procedure: CREATION, TRACHEOSTOMY;  Surgeon: Bob Dill MD;  Location: [facility]            Social History                Socioeconomic History    Marital status:    Tobacco Use    Smoking status: Former       Packs/day: 1.00       Years: 20.00       Pack years: 20.00       Types: Cigarettes       Quit date:        Years since quittin.8    Smokeless tobacco: Never   Substance and Sexual Activity    Alcohol use: Not Currently    Drug use: Never         family history includes Cerebrovascular Disease in his mother; Chronic Obstructive Pulmonary Disease in his father.         Physical Exam   BP (!) 150/95   Pulse 57   Resp 16   Wt 87.1 kg (192 lb)   SpO2 96%   BMI 29.19 kg/m      Constitutional: Oriented to  person, place, and time. Appears well-developed and well-nourished. Cooperative. No distress. Wearing cervical collar, but chin is tucked down in the collar and it is overlying his clothing.   HENT: Head normocephalic and atraumatic.   Neurological: alert, is not oriented to month.    CN II-XII grossly  intact  Skin: Skin is warm, dry and intact.   Psychiatric: Normal mood and affect. Speech is normal and behavior is normal.    Moving BUE/BLE equally well.          IMAGING:  Imaging personally reviewed.     Cervical CT imaging 8/8/23 - C1 and dens fractures appears stable.  Non-healing dens fracture.     IMPRESSION:  1. No acute fracture or traumatic subluxation since February 2023.  2. Stable chronic moderate displacement of type II dens fracture.  Stable mild spinal canal stenosis at this level.  3. Similar multilevel moderate/severe degenerative neural foraminal  narrowing without significant spinal canal narrowing. Level by level  characterization above.    Cervical XR 8/8/23 - alignment appears stable    Impression:  Stable alignment of the cervical spine with stable moderately  displaced type II dens fracture. No appreciable acute osseous  abnormality.    ASSESSMENT:  Alexandru Still is a 89 year old male s/p type II C2 odontoid fracture with 1 cm of posterior displacement following a fall; repeat imaging revealing stable non-healing fracture.     PLAN:  Continue with cervical collar indefinitely.    Surveillance imaging in another 3 months w/ CT and XR cervical.    Discussed option of forgoing additional imaging.  He is okay with coming back in 3 months.      I spent 30 minutes spent in patient care, independent review and interpretation of medical records/imaging, reviewing old records.        Jihan Greer PA-C  Department of Neurosurgery  Office: [phone]

## 2023-08-08 NOTE — LETTER
8/8/2023       RE: Alexandru Still  [address]  [facility] [address]     Dear Colleague,    Thank you for referring your patient, Alexandru Still, to the [facility] [place] at [facility]. Please see a copy of my visit note below.       Neurosurgery Clinic Note     Chief Complaint: hospital follow-up      DATE OF VISIT: 8/8/23     HPI:     Alexandru Still is a 89 year old male with past medical histroy of complete heart block with cardiac pacemaker in situ, S/P transcatheter aortic valve replacement, and HTN who was transferred to [facility] ED on 9/15/2022 from [facility] for trauma work-up.  The patient had a unwitnessed mechanical fall, with positive head strike, no loss of consciousness and was down for 2 hours. CT scan demonstrated a left frontal subdural hemorrhage measuring 5 mm, C1 fracture, and a type II C2 odontoid fracture with 1 cm of posterior displacement.   Patient was fitted with a [place] J collar and conservative versus surgical management was discussed with the patient and his family, they elected to pursue conservative measures given his age and medical co-morbidities.  Repeat cervical x-rays were performed and were stable.  In regards to subdural hematoma that was noted on imaging, repeat imaging revealed unchanged thin 2 mm frontal subdural hematoma.  No additional imaging was obtained and aspirin was restarted.  Given respiratory failure the patient required tracheostomy and percutaneous endogastric tube placement during hospitalization.  The patient was ultimately discharged to [facility] on 9/22/2022.       Patient was last seen on 2/9/23 and that time imaging was stable.  He has been residing at [facility] and has been wearing his collar as directed.  He presents today for repeat imaging.     Patient is very hard of hearing and due to this a proper history is difficult to obtain.   He is tolerating the cervical collar.  He denies swallowing issues and is able to eat fine.  He denies neck pain or paresthesias in his extremities.  Patient has not been walking and mobilizes with assistance of gait belt and wheelchair. He is present today with his former son-in-law.             Review of Systems   Negative except in HPI     Past Medical History           Past Medical History:   Diagnosis Date    Anemia      Aortic stenosis      BPH (benign prostatic hyperplasia)      Closed T12 fracture      Complete heart block       s/p dual chamber pacemaker    Hypertension      Hypothyroidism      Kidney stone      Lambert-Eaton myasthenic syndrome      Lower extremity edema              Past Surgical History             Past Surgical History:   Procedure Laterality Date    CV TRANSCATHETER AORTIC VALVE REPLACEMENT TRANSAORTIC APPROACH        EP PACEMAKER        ESOPHAGOGASTRODUODENOSCOPY, WITH NASOGASTRIC TUBE INSERTION N/A 2022     Procedure: ESOPHAGOGASTRODUODENOSCOPY, WITH NASOJEJUNAL TUBE INSERTION ;  Surgeon: Nils Drew MD;  Location: [facility] GI    HERNIA REPAIR        IR GASTROSTOMY TUBE PERCUTANEOUS PLCMNT   2022    IR PICC PLACEMENT > 5 YRS OF AGE   6/15/2022    LASER HOLMIUM LITHOTRIPSY URETER(S), INSERT STENT, COMBINED N/A 6/15/2022     Procedure: 1. Cystourethroscopy 2. Laser lithotripsy of a urethral stone 3. Basketing of a urethral stone 4. Complex hannah catheter placement over a wire;  Surgeon: Beny Ha MD;  Location: [facility] OR    TRACHEOSTOMY N/A 2022     Procedure: CREATION, TRACHEOSTOMY;  Surgeon: Bob Dill MD;  Location: [facility] OR            Social History                Socioeconomic History    Marital status:    Tobacco Use    Smoking status: Former       Packs/day: 1.00       Years: 20.00       Pack years: 20.00       Types: Cigarettes       Quit date:        Years since quittin.8    Smokeless tobacco: Never   Substance and Sexual  Activity    Alcohol use: Not Currently    Drug use: Never         family history includes Cerebrovascular Disease in his mother; Chronic Obstructive Pulmonary Disease in his father.         Physical Exam   BP (!) 150/95   Pulse 57   Resp 16   Wt 87.1 kg (192 lb)   SpO2 96%   BMI 29.19 kg/m      Constitutional: Oriented to person, place, and time. Appears well-developed and well-nourished. Cooperative. No distress. Wearing cervical collar, but chin is tucked down in the collar and it is overlying his clothing.   HENT: Head normocephalic and atraumatic.   Neurological: alert, is not oriented to month.    CN II-XII grossly  intact  Skin: Skin is warm, dry and intact.   Psychiatric: Normal mood and affect. Speech is normal and behavior is normal.    Moving BUE/BLE equally well.          IMAGING:  Imaging personally reviewed.     Cervical CT imaging 8/8/23 - C1 and dens fractures appears stable.  Non-healing dens fracture.     IMPRESSION:  1. No acute fracture or traumatic subluxation since February 2023.  2. Stable chronic moderate displacement of type II dens fracture.  Stable mild spinal canal stenosis at this level.  3. Similar multilevel moderate/severe degenerative neural foraminal  narrowing without significant spinal canal narrowing. Level by level  characterization above.    Cervical XR 8/8/23 - alignment appears stable    Impression:  Stable alignment of the cervical spine with stable moderately  displaced type II dens fracture. No appreciable acute osseous  abnormality.    ASSESSMENT:  Alexandru Still is a 89 year old male s/p type II C2 odontoid fracture with 1 cm of posterior displacement following a fall; repeat imaging revealing stable non-healing fracture.     PLAN:  Continue with cervical collar indefinitely.    Surveillance imaging in another 3 months w/ CT and XR cervical.    Discussed option of forgoing additional imaging.  He is okay with coming back in 3 months.      I spent 30 minutes spent  in patient care, independent review and interpretation of medical records/imaging, reviewing old records.            Again, thank you for allowing me to participate in the care of your patient.      Sincerely,    Jihan Greer PA-C

## 2023-09-12 NOTE — LETTER
10/27/2022       RE: Alexandru Still  [address]  [facility] [address]     Dear Colleague,    Thank you for referring your patient, Alexandru Still, to the [facility] [place] at [facility]. Please see a copy of my visit note below.        Neurosurgery Clinic Note    Chief Complaint: hospital follow-up     DATE OF VISIT: 10/27/2022    HPI:      Alexandru Still is a 89 year old male with past medical histroy of complete heart block with cardiac pacemaker in situ, S/P transcatheter aortic valve replacement, and HTN who was transferred to [facility] ED on 9/15/2022 from [facility] for trauma work-up.  The patient had a unwitnessed mechanical fall, with positive head strike, no loss of consciousness and was down for 2 hours. CT scan demonstrated a left frontal subdural hemorrhage measuring 5 mm, C1 fracture, and a type II C2 odontoid fracture with 1 cm of posterior displacement.   Patient was fitted with a [place] J collar and conservative versus surgical management was discussed with the patient and his family, they elected to pursue conservative measures given his age and medical co-morbidities.  Repeat cervical x-rays were performed and were stable.  In regards to subdural hematoma that was noted on imaging, repeat imaging revealed unchanged thin 2 mm frontal subdural hematoma.  No additional imaging was obtained and aspirin was restarted.  Given respiratory failure the patient required tracheostomy and percutaneous endogastric tube placement during hospitalization.  The patient was ultimately discharged to [facility] on 9/22/2022.  He presents today for repeat hospital follow-up and repeat imaging.     Currently, the patient continues to note midline posterior neck pain, denies arm pain or weakness.  Has been wearing cervical collar as directed.  Recently had tracheostomy decannulated.  Has been working with therapies and is  "hopeful to discharge from [facility] tomorrow.  The RN taking care of him at [facility] accompanies him today.  Patient was asked again if he would consider surgical intervention if fracture worsens, he again tells me \"no.\"                 Review of Systems   Negative except in HPI    Past Medical History:   Diagnosis Date     Anemia      Aortic stenosis      BPH (benign prostatic hyperplasia)      Closed T12 fracture      Complete heart block     s/p dual chamber pacemaker     Hypertension      Hypothyroidism      Kidney stone      Lambert-Eaton myasthenic syndrome      Lower extremity edema        Past Surgical History:   Procedure Laterality Date     CV TRANSCATHETER AORTIC VALVE REPLACEMENT TRANSAORTIC APPROACH       EP PACEMAKER       ESOPHAGOGASTRODUODENOSCOPY, WITH NASOGASTRIC TUBE INSERTION N/A 2022    Procedure: ESOPHAGOGASTRODUODENOSCOPY, WITH NASOJEJUNAL TUBE INSERTION ;  Surgeon: Nils Drew MD;  Location: [facility] GI     HERNIA REPAIR       IR GASTROSTOMY TUBE PERCUTANEOUS PLCMNT  2022     IR PICC PLACEMENT > 5 YRS OF AGE  6/15/2022     LASER HOLMIUM LITHOTRIPSY URETER(S), INSERT STENT, COMBINED N/A 6/15/2022    Procedure: 1. Cystourethroscopy 2. Laser lithotripsy of a urethral stone 3. Basketing of a urethral stone 4. Complex hannah catheter placement over a wire;  Surgeon: Beny Ha MD;  Location: [facility] OR     TRACHEOSTOMY N/A 2022    Procedure: CREATION, TRACHEOSTOMY;  Surgeon: Bob Dill MD;  Location: [facility] OR       Social History     Socioeconomic History     Marital status:    Tobacco Use     Smoking status: Former     Packs/day: 1.00     Years: 20.00     Pack years: 20.00     Types: Cigarettes     Quit date:      Years since quittin.8     Smokeless tobacco: Never   Substance and Sexual Activity     Alcohol use: Not Currently     Drug use: Never       family history includes Cerebrovascular Disease in his mother; Chronic Obstructive Pulmonary Disease " in his father.              Physical Exam   There were no vitals taken for this visit.  Constitutional: Oriented to person, place, and time. Appears well-developed and well-nourished. Cooperative. No distress.   HENT: Head normocephalic and atraumatic.   Neurological: alert and oriented to person, place, and time. CN II-XII grossly  intact      STRENGTH LEFT RIGHT   Deltoid 4 4   Bicep 4 4   Wrist Extensor 5 5   Tricep 5 5   Finger flexion 5 5   Finger abduction 5 5    5 5       Hip Flexion     5     5   Knee Extension 5 5   Ankle Dorsiflexion 5 5   Extensor Hallucis Longus 5 5   Plantar Flexion 5 5   Foot eversion 5 5   Foot inversion 5 5       Skin: Skin is warm, dry and intact.   Psychiatric: Normal mood and affect. Speech is normal and behavior is normal.      IMAGING:  Personally reviewed cervical AP/Lateral x-rays dated 10/27/2022:  Type II odontoid fracture redemonstrated with approximately 9 mm displacement     ASSESSMENT:  Alexandru Still is a 89 year old male s/p type II C2 odontoid fracture with 1 cm of posterior displacement following a fall    PLAN:  Do not do any bending, twisting, strenuous exercise, or heavy lifting (greater than 10 pounds) for 4-6 weeks    Recommend Calcium and Vitamin D supplementation     Please continue to wear cervical collar at all times.  Ok to remove for hygiene checks.     Please return in 3 months with CT cervical spine.                 I spent 25 minutes in patient care with greater than 50% spent in counseling and/or coordination of care.     I performed independent visualization of radiographic imaging and entered my own interpretation, reviewed and/or ordered tests in radiology          Again, thank you for allowing me to participate in the care of your patient.      Sincerely,    NATASHA Bauer CNP       Partial Purse String (Simple) Text: Given the location of the defect and the characteristics of the surrounding skin a simple purse string closure was deemed most appropriate.  Undermining was performed circumfirentially around the surgical defect.  A purse string suture was then placed and tightened. Wound tension only allowed a partial closure of the circular defect.

## 2023-09-27 ENCOUNTER — LAB REQUISITION (OUTPATIENT)
Dept: LAB | Facility: CLINIC | Age: 88
End: 2023-09-27
Payer: MEDICARE

## 2023-09-27 DIAGNOSIS — S06.5XAD TRAUMATIC SUBDURAL HEMORRHAGE WITH LOSS OF CONSCIOUSNESS STATUS UNKNOWN, SUBSEQUENT ENCOUNTER: ICD-10-CM

## 2023-09-27 DIAGNOSIS — S12.091D: ICD-10-CM

## 2023-09-28 LAB
ANION GAP SERPL CALCULATED.3IONS-SCNC: 14 MMOL/L (ref 7–15)
BUN SERPL-MCNC: 20.4 MG/DL (ref 8–23)
CALCIUM SERPL-MCNC: 9.4 MG/DL (ref 8.2–9.6)
CHLORIDE SERPL-SCNC: 98 MMOL/L (ref 98–107)
CREAT SERPL-MCNC: 0.83 MG/DL (ref 0.67–1.17)
EGFRCR SERPLBLD CKD-EPI 2021: 82 ML/MIN/1.73M2
ERYTHROCYTE [DISTWIDTH] IN BLOOD BY AUTOMATED COUNT: 13.3 % (ref 10–15)
GLUCOSE SERPL-MCNC: 158 MG/DL (ref 70–99)
HCO3 SERPL-SCNC: 24 MMOL/L (ref 22–29)
HCT VFR BLD AUTO: 36 % (ref 40–53)
HGB BLD-MCNC: 12.1 G/DL (ref 13.3–17.7)
MCH RBC QN AUTO: 32.9 PG (ref 26.5–33)
MCHC RBC AUTO-ENTMCNC: 33.6 G/DL (ref 31.5–36.5)
MCV RBC AUTO: 98 FL (ref 78–100)
PLATELET # BLD AUTO: 256 10E3/UL (ref 150–450)
POTASSIUM SERPL-SCNC: 4 MMOL/L (ref 3.4–5.3)
RBC # BLD AUTO: 3.68 10E6/UL (ref 4.4–5.9)
SODIUM SERPL-SCNC: 136 MMOL/L (ref 135–145)
T4 FREE SERPL-MCNC: 1.22 NG/DL (ref 0.9–1.7)
TSH SERPL DL<=0.005 MIU/L-ACNC: 9.43 UIU/ML (ref 0.3–4.2)
WBC # BLD AUTO: 7.1 10E3/UL (ref 4–11)

## 2023-09-28 PROCEDURE — 84443 ASSAY THYROID STIM HORMONE: CPT | Mod: ORL | Performed by: FAMILY MEDICINE

## 2023-09-28 PROCEDURE — P9603 ONE-WAY ALLOW PRORATED MILES: HCPCS | Mod: ORL | Performed by: FAMILY MEDICINE

## 2023-09-28 PROCEDURE — 80048 BASIC METABOLIC PNL TOTAL CA: CPT | Mod: ORL | Performed by: FAMILY MEDICINE

## 2023-09-28 PROCEDURE — 84439 ASSAY OF FREE THYROXINE: CPT | Mod: ORL | Performed by: FAMILY MEDICINE

## 2023-09-28 PROCEDURE — 85027 COMPLETE CBC AUTOMATED: CPT | Mod: ORL | Performed by: FAMILY MEDICINE

## 2023-09-28 PROCEDURE — 36415 COLL VENOUS BLD VENIPUNCTURE: CPT | Mod: ORL | Performed by: FAMILY MEDICINE

## 2023-10-24 ENCOUNTER — TELEPHONE (OUTPATIENT)
Dept: NEUROSURGERY | Facility: CLINIC | Age: 88
End: 2023-10-24
Payer: MEDICARE

## 2023-12-04 ENCOUNTER — TELEPHONE (OUTPATIENT)
Dept: NEUROSURGERY | Facility: CLINIC | Age: 88
End: 2023-12-04
Payer: MEDICARE

## 2023-12-04 ENCOUNTER — LAB REQUISITION (OUTPATIENT)
Dept: LAB | Facility: CLINIC | Age: 88
End: 2023-12-04
Payer: MEDICARE

## 2023-12-04 DIAGNOSIS — I50.30 UNSPECIFIED DIASTOLIC (CONGESTIVE) HEART FAILURE (H): ICD-10-CM

## 2023-12-05 LAB
ANION GAP SERPL CALCULATED.3IONS-SCNC: 9 MMOL/L (ref 7–15)
BUN SERPL-MCNC: 25.6 MG/DL (ref 8–23)
CALCIUM SERPL-MCNC: 9.6 MG/DL (ref 8.2–9.6)
CHLORIDE SERPL-SCNC: 100 MMOL/L (ref 98–107)
CREAT SERPL-MCNC: 0.92 MG/DL (ref 0.67–1.17)
DEPRECATED HCO3 PLAS-SCNC: 30 MMOL/L (ref 22–29)
EGFRCR SERPLBLD CKD-EPI 2021: 78 ML/MIN/1.73M2
GLUCOSE SERPL-MCNC: 78 MG/DL (ref 70–99)
POTASSIUM SERPL-SCNC: 3.8 MMOL/L (ref 3.4–5.3)
SODIUM SERPL-SCNC: 139 MMOL/L (ref 135–145)

## 2023-12-05 PROCEDURE — 80048 BASIC METABOLIC PNL TOTAL CA: CPT | Mod: ORL | Performed by: FAMILY MEDICINE

## 2023-12-05 PROCEDURE — 36415 COLL VENOUS BLD VENIPUNCTURE: CPT | Mod: ORL | Performed by: FAMILY MEDICINE

## 2023-12-05 PROCEDURE — P9603 ONE-WAY ALLOW PRORATED MILES: HCPCS | Mod: ORL | Performed by: FAMILY MEDICINE

## 2023-12-10 ENCOUNTER — LAB REQUISITION (OUTPATIENT)
Dept: LAB | Facility: CLINIC | Age: 88
End: 2023-12-10
Payer: MEDICARE

## 2023-12-10 DIAGNOSIS — I50.30 UNSPECIFIED DIASTOLIC (CONGESTIVE) HEART FAILURE (H): ICD-10-CM

## 2023-12-12 LAB
ANION GAP SERPL CALCULATED.3IONS-SCNC: 10 MMOL/L (ref 7–15)
BUN SERPL-MCNC: 27.3 MG/DL (ref 8–23)
CALCIUM SERPL-MCNC: 9 MG/DL (ref 8.2–9.6)
CHLORIDE SERPL-SCNC: 102 MMOL/L (ref 98–107)
CREAT SERPL-MCNC: 1.05 MG/DL (ref 0.67–1.17)
DEPRECATED HCO3 PLAS-SCNC: 30 MMOL/L (ref 22–29)
EGFRCR SERPLBLD CKD-EPI 2021: 66 ML/MIN/1.73M2
ERYTHROCYTE [DISTWIDTH] IN BLOOD BY AUTOMATED COUNT: 13.4 % (ref 10–15)
GLUCOSE SERPL-MCNC: 78 MG/DL (ref 70–99)
HCT VFR BLD AUTO: 35.8 % (ref 40–53)
HGB BLD-MCNC: 11.8 G/DL (ref 13.3–17.7)
MCH RBC QN AUTO: 33.4 PG (ref 26.5–33)
MCHC RBC AUTO-ENTMCNC: 33 G/DL (ref 31.5–36.5)
MCV RBC AUTO: 101 FL (ref 78–100)
PLATELET # BLD AUTO: 204 10E3/UL (ref 150–450)
POTASSIUM SERPL-SCNC: 4.2 MMOL/L (ref 3.4–5.3)
RBC # BLD AUTO: 3.53 10E6/UL (ref 4.4–5.9)
SODIUM SERPL-SCNC: 142 MMOL/L (ref 135–145)
WBC # BLD AUTO: 6.2 10E3/UL (ref 4–11)

## 2023-12-12 PROCEDURE — 80048 BASIC METABOLIC PNL TOTAL CA: CPT | Mod: ORL | Performed by: FAMILY MEDICINE

## 2023-12-12 PROCEDURE — 85027 COMPLETE CBC AUTOMATED: CPT | Mod: ORL | Performed by: FAMILY MEDICINE

## 2023-12-12 PROCEDURE — P9603 ONE-WAY ALLOW PRORATED MILES: HCPCS | Mod: ORL | Performed by: FAMILY MEDICINE

## 2023-12-12 PROCEDURE — 36415 COLL VENOUS BLD VENIPUNCTURE: CPT | Mod: ORL | Performed by: FAMILY MEDICINE

## 2023-12-14 ENCOUNTER — TELEPHONE (OUTPATIENT)
Dept: NEUROSURGERY | Facility: CLINIC | Age: 88
End: 2023-12-14
Payer: MEDICARE

## 2024-01-01 NOTE — TELEPHONE ENCOUNTER
[facility] Call Center    Phone Message    May a detailed message be left on voicemail: yes     Reason for Call: Taylor from [place] wants to let us know that Pt is currently admitted at their hospital.  She wants to know if the 10/27 appointment can be switched to virtual so she can do it while inpatient.  She also wants to know if Pt needs any imaging done they can do it there.  Thanks.                                                                Well , 2 Weeks  YOUR TWO-WEEK-OLD:  Will sleep a total of 15 18 hours a day, waking to feed or for diaper changes. Your baby does not know the difference between night and day.  Has weak neck muscles and needs support to hold his or her head up.  May be able to lift his or her chin for a few seconds when lying on his or her tummy.  Grasps objects placed in his or her hand.  Can follow some moving objects with his or her eyes. Babies can see best 7 9 inches (8 18 cm) away.  Enjoys looking at smiling faces and bright colors (red, black, white).  May turn towards calm, soothing voices. [place] babies enjoy gentle rocking movement to soothe them.  Tells you what his or her needs are by crying. May cry up to 2 3 hours a day.  Will startle to loud noises or sudden movement.  Only needs breast milk or infant formula to eat. Feed the baby when he or she is hungry. Formula-fed babies need 2 3 ounces (60 90 mL) every 2 3 hours.  babies need to feed about 10 minutes on each breast, usually every 2 hours.  Will wake during the night to feed.  Needs to be burped group home through feeding and then at the end of feeding.  Should not get any water, juice, or solid foods.  SKIN/BATHING  The baby's cord should be dry and fall off by about 10 14 days. Keep the belly button clean and dry.  A white or blood-tinged discharge from the female baby's vagina is common.  If your baby boy is not circumcised, do not try to pull the foreskin back. Clean with warm water and a small amount of soap.  If your baby boy has been circumcised, clean the tip of the penis with warm water. A yellow crusting of the circumcised penis is normal in the first week.  Babies should get a brief sponge bath until the cord falls off. When the cord comes off, the baby can be placed in an infant bath tub. Babies do not need a bath every day, but if they seem to enjoy bathing, this is fine. Do not apply talcum powder due to the chance of choking. You  can apply a mild lubricating lotion or cream after bathing.  The 2-week-old should have 6 8 wet diapers a day, and at least one bowel movement a day, usually after every feeding. It is normal for babies to appear to grunt or strain or develop a red face as they pass their bowel movement.  To prevent diaper rash, change diapers frequently when they become wet or soiled. Over-the-counter diaper creams and ointments may be used if the diaper area becomes mildly irritated. Avoid diaper wipes that contain alcohol or irritating substances.  Clean the outer ear with a wash cloth. Never insert cotton swabs into the baby's ear canal.  Clean the baby's scalp with mild shampoo every 1 2 days. Gently scrub the scalp all over, using a wash cloth or a soft bristled brush. This gentle scrubbing can prevent the development of cradle cap. Cradle cap is thick, dry, scaly skin on the scalp.  RECOMMENDED IMMUNIZATIONS  The  should have received the birth dose of hepatitis B vaccine prior to discharge from the hospital. Infants who did not receive this birth dose should obtain the first dose as soon as possible. If the baby's mother has hepatitis B, the baby should have received an injection of hepatitis B immune globulin in addition to the first dose of hepatitis B vaccine during the hospital stay, or within 7 days of life.  TESTING  Your baby should have had a hearing test (screen) performed in the hospital. If the baby did not pass the hearing screen, a follow-up appointment should be provided for another hearing test.  All babies should have blood drawn for the  metabolic screening. This is sometimes called the state infant screen (PKU test), before leaving the hospital. This test is required by state law and checks for many serious conditions. Depending upon the baby's age at the time of discharge from the hospital or birthing center and the state in which you live, a second metabolic screen may be required. Check  with the baby's caregiver about whether your baby needs another screen. This testing is very important to detect medical problems or conditions as early as possible and may save the baby's life.  NUTRITION AND ORAL HEALTH  Breastfeeding is the preferred feeding method for babies at this age and is recommended for at least 12 months, with exclusive breastfeeding (no additional formula, water, juice, or solids) for about 6 months. Alternatively, iron-fortified infant formula may be provided if the baby is not being exclusively .  Most 2-week-olds feed every 2 3 hours during the day and night.  Babies who take less than 16 ounces (480 mL) of formula each day require a vitamin D supplement.  Babies less than 6 months of age should not be given juice.  The baby receives adequate water from breast milk or formula, so no additional water is recommended.  Babies receive adequate nutrition from breast milk or infant formula and should not receive solids until about 6 months. Babies who have solids introduced at less than 6 months are more likely to develop food allergies.  Clean the baby's gums with a soft cloth or piece of gauze 1 2 times a day.  Toothpaste is not necessary.  Provide fluoride supplements if the family water supply does not contain fluoride.  DEVELOPMENT  Read books daily to your baby. Allow your baby to touch, mouth, and point to objects. Choose books with interesting pictures, colors, and textures.  Recite nursery rhymes and sing songs to your baby.  SLEEP  Place babies to sleep on their back to reduce the chance of SIDS, or crib death.  Pacifiers may be introduced at 1 month to reduce the risk of SIDS.  Do not place the baby in a bed with pillows, loose comforters or blankets, or stuffed toys.  Most children take at least 2 3 naps each day, sleeping about 18 hours each day.  Place babies to sleep when drowsy, but not completely asleep, so the baby can learn to self soothe.  Babies should sleep in  their own sleep space. Do not allow the baby to share a bed with other children or with adults. Never place babies on water beds, couches, or bean bags, which can conform to the baby's face.  PARENTING TIPS   babies cannot be spoiled. They need frequent holding, cuddling, and interaction to develop social skills and attachment to their parents and caregivers. Talk to your baby regularly.  Follow package directions to mix formula. Formula should be kept refrigerated after mixing. Once the baby drinks from the bottle and finishes the feeding, throw away any remaining formula.  Warming of refrigerated formula may be accomplished by placing the bottle in a container of warm water. Never heat the baby's bottle in the microwave because this can burn the baby's mouth.  Dress your baby how you would dress (sweater in cool weather, short sleeves in warm weather). Overdressing can cause overheating and fussiness. If you are not sure if your baby is too hot or cold, feel his or her neck, not hands and feet.  Use mild skin care products on your baby. Avoid products with smells or color because they may irritate the baby's sensitive skin. Use a mild baby detergent on the baby's clothes and avoid fabric softener.  Always call your caregiver if your baby shows any signs of illness or has a fever (temperature higher than 100.4° F [38° C]). It is not necessary to take the temperature unless your baby is acting ill.  Do not treat your baby with over-the-counter medications without calling your caregiver.  SAFETY  Set your home water heater at 120° F (49° C).  Provide a cigarette-free and drug-free environment for your baby.  Do not leave your baby alone. Do not leave your baby with young children or pets.  Do not leave your baby alone on any high surfaces such as a changing table or sofa.  Do not use a hand-me-down or antique crib. The crib should be placed away from a heater or air vent. Make sure the crib meets safety  "standards and should have slats no more than 2 inches (6 cm) apart.  Always place your baby to sleep on his or her back. \"Back to Sleep\" reduces the chance of SIDS, or crib death.  Do not place your baby in a bed with pillows, loose comforters or blankets, or stuffed toys.  Babies are safest when sleeping in their own sleep space. A bassinet or crib placed beside the parent bed allows easy access to the baby at night.  Never place babies to sleep on water beds, couches, or bean bags, which can cover the baby's face so the baby cannot breathe. Also, do not place pillows, stuffed animals, large blankets or plastic sheets in the crib for the same reason.  Your baby should always be restrained in an appropriate child safety seat in the middle of the back seat of your vehicle. Your baby should be positioned to face backward until he or she is at least 2 years old or until he or she is heavier or taller than the maximum weight or height recommended in the safety seat instructions. The car seat should never be placed in the front seat of a vehicle with front-seat air bags.  Make sure the infant seat is secured in the car correctly.  Never feed or let a fussy baby out of a safety seat while the car is moving. If your baby needs a break or needs to eat, stop the car and feed or calm him or her.  Never leave your baby in the car alone.  Use car window shades to help protect your baby's skin and eyes.  Make sure your home has smoke detectors and remember to change the batteries regularly.  Always provide direct supervision of your baby at all times, including bath time. Do not expect older children to supervise the baby.  Babies should not be left in the sunlight and should be protected from the sun by covering them with clothing, hats, and umbrellas.  Learn CPR so that you know what to do if your baby starts choking or stops breathing. Call your local Emergency Services (at the non-emergency number) to find CPR lessons.  If " your baby becomes very yellow (jaundiced), call your baby's caregiver right away.  If the baby stops breathing, turns blue, or is unresponsive, call your local Emergency Services (911 in [place]  WHAT IS NEXT?  Your next visit will be when your baby is 1 month old. Your caregiver may recommend an earlier visit if your baby is jaundiced or is having any feeding problems.   Document Released: 05/06/2010 Document Revised: 04/14/2014 Document Reviewed: 05/06/2010  [employer]® Patient Information ©2014 [employer].

## 2024-01-16 ENCOUNTER — LAB REQUISITION (OUTPATIENT)
Dept: LAB | Facility: CLINIC | Age: 89
End: 2024-01-16
Payer: MEDICARE

## 2024-01-16 DIAGNOSIS — I50.30 UNSPECIFIED DIASTOLIC (CONGESTIVE) HEART FAILURE (H): ICD-10-CM

## 2024-01-16 LAB
ANION GAP SERPL CALCULATED.3IONS-SCNC: 12 MMOL/L (ref 7–15)
BUN SERPL-MCNC: 24.8 MG/DL (ref 8–23)
CALCIUM SERPL-MCNC: 9.2 MG/DL (ref 8.2–9.6)
CHLORIDE SERPL-SCNC: 97 MMOL/L (ref 98–107)
CREAT SERPL-MCNC: 1.01 MG/DL (ref 0.67–1.17)
DEPRECATED HCO3 PLAS-SCNC: 28 MMOL/L (ref 22–29)
EGFRCR SERPLBLD CKD-EPI 2021: 69 ML/MIN/1.73M2
GLUCOSE SERPL-MCNC: 166 MG/DL (ref 70–99)
HOLD SPECIMEN: NORMAL
NT-PROBNP SERPL-MCNC: 396 PG/ML (ref 0–1800)
POTASSIUM SERPL-SCNC: 4.5 MMOL/L (ref 3.4–5.3)
SODIUM SERPL-SCNC: 137 MMOL/L (ref 135–145)

## 2024-01-16 PROCEDURE — 80048 BASIC METABOLIC PNL TOTAL CA: CPT | Mod: ORL | Performed by: NURSE PRACTITIONER

## 2024-01-16 PROCEDURE — 83880 ASSAY OF NATRIURETIC PEPTIDE: CPT | Mod: ORL | Performed by: NURSE PRACTITIONER

## 2024-01-28 ENCOUNTER — LAB REQUISITION (OUTPATIENT)
Dept: LAB | Facility: CLINIC | Age: 89
End: 2024-01-28
Payer: MEDICARE

## 2024-01-28 DIAGNOSIS — I50.30 UNSPECIFIED DIASTOLIC (CONGESTIVE) HEART FAILURE (H): ICD-10-CM

## 2024-01-30 LAB
ANION GAP SERPL CALCULATED.3IONS-SCNC: 12 MMOL/L (ref 7–15)
BUN SERPL-MCNC: 30.5 MG/DL (ref 8–23)
CALCIUM SERPL-MCNC: 9.8 MG/DL (ref 8.2–9.6)
CHLORIDE SERPL-SCNC: 102 MMOL/L (ref 98–107)
CREAT SERPL-MCNC: 1 MG/DL (ref 0.67–1.17)
DEPRECATED HCO3 PLAS-SCNC: 27 MMOL/L (ref 22–29)
EGFRCR SERPLBLD CKD-EPI 2021: 70 ML/MIN/1.73M2
ERYTHROCYTE [DISTWIDTH] IN BLOOD BY AUTOMATED COUNT: 13.7 % (ref 10–15)
GLUCOSE SERPL-MCNC: 73 MG/DL (ref 70–99)
HCT VFR BLD AUTO: 36 % (ref 40–53)
HGB BLD-MCNC: 12 G/DL (ref 13.3–17.7)
MCH RBC QN AUTO: 34.1 PG (ref 26.5–33)
MCHC RBC AUTO-ENTMCNC: 33.3 G/DL (ref 31.5–36.5)
MCV RBC AUTO: 102 FL (ref 78–100)
PLATELET # BLD AUTO: 231 10E3/UL (ref 150–450)
POTASSIUM SERPL-SCNC: 4 MMOL/L (ref 3.4–5.3)
RBC # BLD AUTO: 3.52 10E6/UL (ref 4.4–5.9)
SODIUM SERPL-SCNC: 141 MMOL/L (ref 135–145)
WBC # BLD AUTO: 7 10E3/UL (ref 4–11)

## 2024-01-30 PROCEDURE — P9603 ONE-WAY ALLOW PRORATED MILES: HCPCS | Mod: ORL | Performed by: FAMILY MEDICINE

## 2024-01-30 PROCEDURE — 85027 COMPLETE CBC AUTOMATED: CPT | Mod: ORL | Performed by: FAMILY MEDICINE

## 2024-01-30 PROCEDURE — 36415 COLL VENOUS BLD VENIPUNCTURE: CPT | Mod: ORL | Performed by: FAMILY MEDICINE

## 2024-01-30 PROCEDURE — 80048 BASIC METABOLIC PNL TOTAL CA: CPT | Mod: ORL | Performed by: FAMILY MEDICINE

## 2024-03-08 ENCOUNTER — LAB REQUISITION (OUTPATIENT)
Dept: LAB | Facility: CLINIC | Age: 89
End: 2024-03-08
Payer: MEDICARE

## 2024-03-08 LAB
C DIFF GDH STL QL IA: POSITIVE
C DIFF TOX A+B STL QL IA: NEGATIVE
C DIFF TOX B STL QL: POSITIVE

## 2024-03-08 PROCEDURE — 87493 C DIFF AMPLIFIED PROBE: CPT | Mod: ORL | Performed by: NURSE PRACTITIONER

## 2024-03-08 PROCEDURE — 87324 CLOSTRIDIUM AG IA: CPT | Mod: ORL | Performed by: NURSE PRACTITIONER

## 2024-04-15 ENCOUNTER — LAB REQUISITION (OUTPATIENT)
Dept: LAB | Facility: CLINIC | Age: 89
End: 2024-04-15
Payer: MEDICARE

## 2024-04-15 DIAGNOSIS — S06.5XAD TRAUMATIC SUBDURAL HEMORRHAGE WITH LOSS OF CONSCIOUSNESS STATUS UNKNOWN, SUBSEQUENT ENCOUNTER: ICD-10-CM

## 2024-04-15 DIAGNOSIS — S12.091D: ICD-10-CM

## 2024-04-16 LAB
ANION GAP SERPL CALCULATED.3IONS-SCNC: 11 MMOL/L (ref 7–15)
BUN SERPL-MCNC: 23.7 MG/DL (ref 8–23)
CALCIUM SERPL-MCNC: 9.4 MG/DL (ref 8.2–9.6)
CHLORIDE SERPL-SCNC: 100 MMOL/L (ref 98–107)
CREAT SERPL-MCNC: 1.09 MG/DL (ref 0.67–1.17)
DEPRECATED HCO3 PLAS-SCNC: 28 MMOL/L (ref 22–29)
EGFRCR SERPLBLD CKD-EPI 2021: 63 ML/MIN/1.73M2
ERYTHROCYTE [DISTWIDTH] IN BLOOD BY AUTOMATED COUNT: 13.2 % (ref 10–15)
GLUCOSE SERPL-MCNC: 72 MG/DL (ref 70–99)
HCT VFR BLD AUTO: 33.2 % (ref 40–53)
HGB BLD-MCNC: 10.9 G/DL (ref 13.3–17.7)
MCH RBC QN AUTO: 32.2 PG (ref 26.5–33)
MCHC RBC AUTO-ENTMCNC: 32.8 G/DL (ref 31.5–36.5)
MCV RBC AUTO: 98 FL (ref 78–100)
PLATELET # BLD AUTO: 228 10E3/UL (ref 150–450)
POTASSIUM SERPL-SCNC: 4.2 MMOL/L (ref 3.4–5.3)
RBC # BLD AUTO: 3.39 10E6/UL (ref 4.4–5.9)
SODIUM SERPL-SCNC: 139 MMOL/L (ref 135–145)
TSH SERPL DL<=0.005 MIU/L-ACNC: 3.85 UIU/ML (ref 0.3–4.2)
WBC # BLD AUTO: 6.9 10E3/UL (ref 4–11)

## 2024-04-16 PROCEDURE — 84443 ASSAY THYROID STIM HORMONE: CPT | Mod: ORL | Performed by: FAMILY MEDICINE

## 2024-04-16 PROCEDURE — P9603 ONE-WAY ALLOW PRORATED MILES: HCPCS | Mod: ORL | Performed by: FAMILY MEDICINE

## 2024-04-16 PROCEDURE — 36415 COLL VENOUS BLD VENIPUNCTURE: CPT | Mod: ORL | Performed by: FAMILY MEDICINE

## 2024-04-16 PROCEDURE — 80048 BASIC METABOLIC PNL TOTAL CA: CPT | Mod: ORL | Performed by: FAMILY MEDICINE

## 2024-04-16 PROCEDURE — 85027 COMPLETE CBC AUTOMATED: CPT | Mod: ORL | Performed by: FAMILY MEDICINE

## 2024-04-26 DIAGNOSIS — S12.111K: Primary | ICD-10-CM

## 2024-04-26 NOTE — PROGRESS NOTES
Return call to Martha with [facility].  She is wanting to know if the standing order for his collar can be modified.  They have been having issues with patient's chin tucking down in the collar.  Despite their best efforts, they do not seem to be able to help patient keep his head up.  He is not currently in palliative care. He reportedly is not in any pain.  He has some coughing fits regardless of position of his head in the collar.  Patient is now wheelchair dependent.      Modify order:  Encourage patient to wear collar appropriately with chin up.  Adjust for comfort.      Next appointment is not yet scheduled.  Discussed getting a new F/E XR and if no movement can d/c collar. Also discussed possibility of palliative care and then could wear collar for comfort only.  Current guardian is ex-son-in-law and that is possibly being switched to conservator later this month.      Jihan Greer PA-C  Department of Neurosurgery  Office: [phone]

## 2024-07-25 ENCOUNTER — ANCILLARY PROCEDURE (OUTPATIENT)
Dept: GENERAL RADIOLOGY | Facility: CLINIC | Age: 89
End: 2024-07-25
Attending: PHYSICIAN ASSISTANT
Payer: MEDICARE

## 2024-07-25 ENCOUNTER — ANCILLARY PROCEDURE (OUTPATIENT)
Dept: CT IMAGING | Facility: CLINIC | Age: 89
End: 2024-07-25
Attending: PHYSICIAN ASSISTANT
Payer: MEDICARE

## 2024-07-25 ENCOUNTER — MEDICAL CORRESPONDENCE (OUTPATIENT)
Dept: HEALTH INFORMATION MANAGEMENT | Facility: CLINIC | Age: 89
End: 2024-07-25

## 2024-07-25 ENCOUNTER — OFFICE VISIT (OUTPATIENT)
Dept: NEUROSURGERY | Facility: CLINIC | Age: 89
End: 2024-07-25
Payer: MEDICARE

## 2024-07-25 VITALS
DIASTOLIC BLOOD PRESSURE: 75 MMHG | RESPIRATION RATE: 16 BRPM | SYSTOLIC BLOOD PRESSURE: 131 MMHG | HEART RATE: 62 BPM | OXYGEN SATURATION: 92 %

## 2024-07-25 DIAGNOSIS — S12.111K: ICD-10-CM

## 2024-07-25 DIAGNOSIS — S12.100K CLOSED ODONTOID FRACTURE WITH NONUNION, SUBSEQUENT ENCOUNTER: ICD-10-CM

## 2024-07-25 DIAGNOSIS — S12.111K: Primary | ICD-10-CM

## 2024-07-25 PROCEDURE — 72050 X-RAY EXAM NECK SPINE 4/5VWS: CPT | Performed by: STUDENT IN AN ORGANIZED HEALTH CARE EDUCATION/TRAINING PROGRAM

## 2024-07-25 PROCEDURE — 72125 CT NECK SPINE W/O DYE: CPT | Mod: MG | Performed by: STUDENT IN AN ORGANIZED HEALTH CARE EDUCATION/TRAINING PROGRAM

## 2024-07-25 PROCEDURE — 99214 OFFICE O/P EST MOD 30 MIN: CPT | Performed by: PHYSICIAN ASSISTANT

## 2024-07-25 PROCEDURE — G1010 CDSM STANSON: HCPCS | Mod: GC | Performed by: STUDENT IN AN ORGANIZED HEALTH CARE EDUCATION/TRAINING PROGRAM

## 2024-07-25 PROCEDURE — 72040 X-RAY EXAM NECK SPINE 2-3 VW: CPT | Mod: XS | Performed by: STUDENT IN AN ORGANIZED HEALTH CARE EDUCATION/TRAINING PROGRAM

## 2024-07-25 RX ORDER — LOPERAMIDE HCL 2 MG
2 CAPSULE ORAL 3 TIMES DAILY PRN
COMMUNITY

## 2024-07-25 RX ORDER — ESCITALOPRAM OXALATE 5 MG/1
5 TABLET ORAL DAILY
COMMUNITY

## 2024-07-25 NOTE — PROGRESS NOTES
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  The patient had a unwitnessed mechanical fall, with positive head strike, no loss of consciousness and was down for 2 hours. CT scan demonstrated a left frontal subdural hemorrhage measuring 5 mm, C1 fracture, and a type II C2 odontoid fracture with 1 cm of posterior displacement.   Patient was fitted with a [place] J collar and conservative versus surgical management was discussed with the patient and his family, they elected to pursue conservative measures given his age and medical co-morbidities.  Repeat cervical x-rays were performed and were stable.  In regards to subdural hematoma that was noted on imaging, repeat imaging revealed unchanged thin 2 mm frontal subdural hematoma.  No additional imaging was obtained and aspirin was restarted.  Given respiratory failure the patient required tracheostomy and percutaneous endogastric tube placement during hospitalization.  The patient was ultimately discharged to [facility] on 9/22/2022.       Patient was last seen on 8/8/23 for stable, non-healing Type II odontoid fracture w/ 1 cm of posterior displacement, wearing [place] J collar which was recommended indefinitely.  He continues to reside at [facility].  He presents today for follow up with his new guardian, Maylin Bond of [address].  His care facility is having difficulty with maintaining appropriate chin placement with his collar and that it is difficult for him to receive food when his chin sinks down in the collar.       Patient continues to deny neck pain.  He is very [place], so discussion is very limited.  He is able to move  his arms, but is limited in his ROM.  Movement of his head and arms do not cause him pain.  He is wheelchair confined, which has been the case for quite some time.  His guardian is wondering if a soft collar can be used for mealtime.           Review of Systems   Negative except in HPI     Past Medical History           Past Medical History:   Diagnosis Date    Anemia      Aortic stenosis      BPH (benign prostatic hyperplasia)      Closed T12 fracture      Complete heart block       s/p dual chamber pacemaker    Hypertension      Hypothyroidism      Kidney stone      Lambert-Eaton myasthenic syndrome      Lower extremity edema              Past Surgical History             Past Surgical History:   Procedure Laterality Date    CV TRANSCATHETER AORTIC VALVE REPLACEMENT TRANSAORTIC APPROACH        EP PACEMAKER        ESOPHAGOGASTRODUODENOSCOPY, WITH NASOGASTRIC TUBE INSERTION N/A 2022     Procedure: ESOPHAGOGASTRODUODENOSCOPY, WITH NASOJEJUNAL TUBE INSERTION ;  Surgeon: Nils Drew MD;  Location: [facility]    HERNIA REPAIR        IR GASTROSTOMY TUBE PERCUTANEOUS PLCMNT   2022    IR PICC PLACEMENT > 5 YRS OF AGE   6/15/2022    LASER HOLMIUM LITHOTRIPSY URETER(S), INSERT STENT, COMBINED N/A 6/15/2022     Procedure: 1. Cystourethroscopy 2. Laser lithotripsy of a urethral stone 3. Basketing of a urethral stone 4. Complex hannah catheter placement over a wire;  Surgeon: Beny Ha MD;  Location: [facility]    TRACHEOSTOMY N/A 2022     Procedure: CREATION, TRACHEOSTOMY;  Surgeon: Bob Dill MD;  Location: [facility]            Social History                Socioeconomic History    Marital status:    Tobacco Use    Smoking status: Former       Packs/day: 1.00       Years: 20.00       Pack years: 20.00       Types: Cigarettes       Quit date:        Years since quittin.8    Smokeless tobacco: Never   Substance and Sexual Activity    Alcohol use: Not Currently    Drug use: Never          family history includes Cerebrovascular Disease in his mother; Chronic Obstructive Pulmonary Disease in his father.         Physical Exam   /75 (BP Location: Left arm, Patient Position: Sitting)   Pulse 62   Resp 16   SpO2 92%     Constitutional: Appears well-developed and well-nourished. Cooperative. No distress. Wearing cervical collar, but chin is tucked down in the collar.   Neurological: alert, NAD, very [place]  Communication is difficult, but he is able to mimic arm and head motions.  He is not in any distress when doing these motions and indicates no pain.    No TTP axial or paraspinal cervical.  Collar removed and patient able to move head w/ limited ROM, w/o apparent pain.        IMAGING:  Imaging personally reviewed.     Dynamic Cervical XR 7/25/24 -   FINDINGS: Lateral, flexion, extension, and odontoid views of the  cervical spine were obtained. C1-C5 is visualized. Collar in place.  Unable to fully appreciate cervical spinal bodies on odontoid view.  Stable appearance of the chronic moderately displaced type II dens  fracture compared to prior. No significant displacement of the chronic  fracture fragment neck flexion or extension Stable alignment of the  cervical spine since 10/27/2022. Severe disc height loss at C3-C4 and  moderate disc height loss at C2-C3 and C4-C5, similar to prior.  Multilevel facet arthropathy. No prevertebral edema.  IMPRESSION: Stable alignment of the cervical spine with stable  moderately displaced type II dens fracture, unchanged in position with  neck flexion and extension.    Cervical CT 7/25/24 -   FINDINGS: Grade 1 anterolisthesis of C4 on C5 and C7 on T1.  Redemonstration of type II odontoid fracture with posterior  displacement, now measuring 1.1 cm approximately stable position  (measured 1.0 cm on 8/8/2023). Persistent mild spinal canal stenosis  C1-C2, secondary to displaced fracture fragment. Additional severe  degenerative changes about C1 on but no  acute osseous abnormalities.  Severe multilevel degenerative changes, most pronounced C5-T2 with  severe loss of disc height, subchondral cystic change, and osteophytic  bridging.   The findings on a level by level basis are as follows:  C2-C3: Moderate to severe loss of disc height. No significant spinal  canal stenosis. Bulky facet arthropathy. Mild bilateral neural  foraminal stenosis.   C3-C4: Severe loss of disc height. No significant spinal canal  stenosis. Bulky facet arthropathy. Moderate bilateral neural foraminal  stenosis.  C4-C5: Severe loss of disc height. No significant canal stenosis.  Bulky facet arthropathy. Mild to moderate right neural foraminal  stenosis. No significant left neural foraminal stenosis.  C5-C6: Severe loss of disc height. No significant canal stenosis.  Bulky facet arthropathy. Mild to moderate bilateral neural foraminal  stenosis.  C6-C7: Severe loss of disc height. No significant canal stenosis.  Bulky facet arthropathy. Bilateral moderate neural foraminal stenosis.  C7-T1: Severe loss of disc height. No significant canal stenosis.  Bulky facet arthropathy. Bilateral severe neural foraminal stenosis.   Diffuse vascular calcifications. Right greater than left mastoid air  cell effusion. Bilateral atelectatic changes of the posterior lungs.  IMPRESSION:  1. Redemonstration of type II odontoid fracture with with stable  posterior displacement, now measuring 1.1 cm (previously 1.0 cm it  8/8/2023) and stable mild canal stenosis.  2. Similar multilevel degenerative changes as detailed above    Cervical XR 7/25/24 -   FINDINGS: AP and lateral views of the cervical spine were obtained.  Only C1-C3 is well visualized due to overlying thoracic soft tissues.  Color in place. Unable to fully appreciate cervical spinal bodies on  odontoid view. Stable appearance of the chronic moderately displaced  type II dens fracture compared to prior. Stable alignment of the  cervical spine since  10/27/2022. Loss of disc height similar to prior.  No prevertebral edema.  Alveolar infiltrates in the bilateral upper lung fields. Evidence of  prior right clavicle fracture.. Pacemaker visualized in appropriate  position. Prior right shoulder arthroplasty visualized.  IMPRESSION: Limited imaging of the cervical spine limits of radiograph  based on patient's position.  Stable alignment of the cervical spine with stable moderately  displaced type II dens fracture. Please refer to same-day cervical  spine CT for detailed evaluation of the cervical osseous structures.      ASSESSMENT & PLAN:  Alexandru Still is a 89 year old male s/p type II odontoid fracture with ~1 cm of posterior displacement following a fall 9/2022.  Repeat imaging today shows grossly similar alignment of the fractured area w/o dynamic instability per radiology read.  I do see about 2 mm of moment by my measurements.       Continue with hard cervical collar indefinitely.  Okay to use soft collar for dining.    Referral to SLP to confirm patient is swallowing appropriately with collar.  Recommend discussing palliative care with his PCP if patient wanting to d/c cervical collar.  Once palliative takes over, they can help make decisions about goals of care and whether the cervical collar fits into those goals.  It was explained to guardian today that patient would be at risk of paralysis or death if the fracture progresses.  The bones have not fused, but the ligaments and scar tissue are likely holding things in place.          I spent 37 minutes spent in patient care, independent review and interpretation of medical records/imaging, reviewing old records.        Jihan Greer PA-C  Department of Neurosurgery  Office: [phone]

## 2024-07-25 NOTE — LETTER
7/25/2024       RE: Alexandru Sitll  [address] [facility] [address]     Dear Colleague,    Thank you for referring your patient, Alexandru Still, to the [facility] at [facility]. Please see a copy of my visit note below.       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  The patient had a unwitnessed mechanical fall, with positive head strike, no loss of consciousness and was down for 2 hours. CT scan demonstrated a left frontal subdural hemorrhage measuring 5 mm, C1 fracture, and a type II C2 odontoid fracture with 1 cm of posterior displacement.   Patient was fitted with a [place] J collar and conservative versus surgical management was discussed with the patient and his family, they elected to pursue conservative measures given his age and medical co-morbidities.  Repeat cervical x-rays were performed and were stable.  In regards to subdural hematoma that was noted on imaging, repeat imaging revealed unchanged thin 2 mm frontal subdural hematoma.  No additional imaging was obtained and aspirin was restarted.  Given respiratory failure the patient required tracheostomy and percutaneous endogastric tube placement during hospitalization.  The patient was ultimately discharged to [facility] on 9/22/2022.       Patient was last seen on 8/8/23 for stable, non-healing Type II odontoid fracture w/ 1 cm of posterior displacement, wearing [place] J collar which was recommended indefinitely.  He continues to reside at [facility].  He presents today for follow up with his new guardian, Maylin Bond of [employer]  [address] [place], [place] [address].  His care facility is having difficulty with maintaining appropriate chin placement with his collar and that it is difficult for him to receive food when his chin sinks down in the collar.       Patient continues to deny neck pain.  He is very [place], so discussion is very limited.  He is able to move his arms, but is limited in his ROM.  Movement of his head and arms do not cause him pain.  He is wheelchair confined, which has been the case for quite some time.  His guardian is wondering if a soft collar can be used for mealtime.           Review of Systems   Negative except in HPI     Past Medical History           Past Medical History:   Diagnosis Date    Anemia      Aortic stenosis      BPH (benign prostatic hyperplasia)      Closed T12 fracture      Complete heart block       s/p dual chamber pacemaker    Hypertension      Hypothyroidism      Kidney stone      Lambert-Eaton myasthenic syndrome      Lower extremity edema              Past Surgical History             Past Surgical History:   Procedure Laterality Date    CV TRANSCATHETER AORTIC VALVE REPLACEMENT TRANSAORTIC APPROACH        EP PACEMAKER        ESOPHAGOGASTRODUODENOSCOPY, WITH NASOGASTRIC TUBE INSERTION N/A 9/18/2022     Procedure: ESOPHAGOGASTRODUODENOSCOPY, WITH NASOJEJUNAL TUBE INSERTION ;  Surgeon: Nils Drew MD;  Location: [facility]    HERNIA REPAIR        IR GASTROSTOMY TUBE PERCUTANEOUS PLCMNT   9/27/2022    IR PICC PLACEMENT > 5 YRS OF AGE   6/15/2022    LASER HOLMIUM LITHOTRIPSY URETER(S), INSERT STENT, COMBINED N/A 6/15/2022     Procedure: 1. Cystourethroscopy 2. Laser lithotripsy of a urethral stone 3. Basketing of a urethral stone 4. Complex hannah catheter placement over a wire;  Surgeon: Beny Ha MD;  Location: [facility]    TRACHEOSTOMY N/A 9/21/2022     Procedure: CREATION, TRACHEOSTOMY;  Surgeon: Bob Dill MD;  Location: [facility]            Social History                Socioeconomic History     Marital status:    Tobacco Use    Smoking status: Former       Packs/day: 1.00       Years: 20.00       Pack years: 20.00       Types: Cigarettes       Quit date:        Years since quittin.8    Smokeless tobacco: Never   Substance and Sexual Activity    Alcohol use: Not Currently    Drug use: Never         family history includes Cerebrovascular Disease in his mother; Chronic Obstructive Pulmonary Disease in his father.         Physical Exam   /75 (BP Location: Left arm, Patient Position: Sitting)   Pulse 62   Resp 16   SpO2 92%     Constitutional: Appears well-developed and well-nourished. Cooperative. No distress. Wearing cervical collar, but chin is tucked down in the collar.   Neurological: alert, NAD, very [place]  Communication is difficult, but he is able to mimic arm and head motions.  He is not in any distress when doing these motions and indicates no pain.    No TTP axial or paraspinal cervical.  Collar removed and patient able to move head w/ limited ROM, w/o apparent pain.        IMAGING:  Imaging personally reviewed.     Dynamic Cervical XR 24 -   FINDINGS: Lateral, flexion, extension, and odontoid views of the  cervical spine were obtained. C1-C5 is visualized. Collar in place.  Unable to fully appreciate cervical spinal bodies on odontoid view.  Stable appearance of the chronic moderately displaced type II dens  fracture compared to prior. No significant displacement of the chronic  fracture fragment neck flexion or extension Stable alignment of the  cervical spine since 10/27/2022. Severe disc height loss at C3-C4 and  moderate disc height loss at C2-C3 and C4-C5, similar to prior.  Multilevel facet arthropathy. No prevertebral edema.  IMPRESSION: Stable alignment of the cervical spine with stable  moderately displaced type II dens fracture, unchanged in position with  neck flexion and extension.    Cervical CT 24 -   FINDINGS: Grade 1 anterolisthesis of C4 on C5  and C7 on T1.  Redemonstration of type II odontoid fracture with posterior  displacement, now measuring 1.1 cm approximately stable position  (measured 1.0 cm on 8/8/2023). Persistent mild spinal canal stenosis  C1-C2, secondary to displaced fracture fragment. Additional severe  degenerative changes about C1 on but no acute osseous abnormalities.  Severe multilevel degenerative changes, most pronounced C5-T2 with  severe loss of disc height, subchondral cystic change, and osteophytic  bridging.   The findings on a level by level basis are as follows:  C2-C3: Moderate to severe loss of disc height. No significant spinal  canal stenosis. Bulky facet arthropathy. Mild bilateral neural  foraminal stenosis.   C3-C4: Severe loss of disc height. No significant spinal canal  stenosis. Bulky facet arthropathy. Moderate bilateral neural foraminal  stenosis.  C4-C5: Severe loss of disc height. No significant canal stenosis.  Bulky facet arthropathy. Mild to moderate right neural foraminal  stenosis. No significant left neural foraminal stenosis.  C5-C6: Severe loss of disc height. No significant canal stenosis.  Bulky facet arthropathy. Mild to moderate bilateral neural foraminal  stenosis.  C6-C7: Severe loss of disc height. No significant canal stenosis.  Bulky facet arthropathy. Bilateral moderate neural foraminal stenosis.  C7-T1: Severe loss of disc height. No significant canal stenosis.  Bulky facet arthropathy. Bilateral severe neural foraminal stenosis.   Diffuse vascular calcifications. Right greater than left mastoid air  cell effusion. Bilateral atelectatic changes of the posterior lungs.  IMPRESSION:  1. Redemonstration of type II odontoid fracture with with stable  posterior displacement, now measuring 1.1 cm (previously 1.0 cm it  8/8/2023) and stable mild canal stenosis.  2. Similar multilevel degenerative changes as detailed above    Cervical XR 7/25/24 -   FINDINGS: AP and lateral views of the cervical spine  were obtained.  Only C1-C3 is well visualized due to overlying thoracic soft tissues.  Color in place. Unable to fully appreciate cervical spinal bodies on  odontoid view. Stable appearance of the chronic moderately displaced  type II dens fracture compared to prior. Stable alignment of the  cervical spine since 10/27/2022. Loss of disc height similar to prior.  No prevertebral edema.  Alveolar infiltrates in the bilateral upper lung fields. Evidence of  prior right clavicle fracture.. Pacemaker visualized in appropriate  position. Prior right shoulder arthroplasty visualized.  IMPRESSION: Limited imaging of the cervical spine limits of radiograph  based on patient's position.  Stable alignment of the cervical spine with stable moderately  displaced type II dens fracture. Please refer to same-day cervical  spine CT for detailed evaluation of the cervical osseous structures.      ASSESSMENT & PLAN:  Alexandru Still is a 89 year old male s/p type II odontoid fracture with ~1 cm of posterior displacement following a fall 9/2022.  Repeat imaging today shows grossly similar alignment of the fractured area w/o dynamic instability per radiology read.  I do see about 2 mm of moment by my measurements.       Continue with hard cervical collar indefinitely.  Okay to use soft collar for dining.    Referral to SLP to confirm patient is swallowing appropriately with collar.  Recommend discussing palliative care with his PCP if patient wanting to d/c cervical collar.  Once palliative takes over, they can help make decisions about goals of care and whether the cervical collar fits into those goals.  It was explained to guardian today that patient would be at risk of paralysis or death if the fracture progresses.  The bones have not fused, but the ligaments and scar tissue are likely holding things in place.          I spent 37 minutes spent in patient care, independent review and interpretation of medical records/imaging,  reviewing old records.          Again, thank you for allowing me to participate in the care of your patient.      Sincerely,    Jihan Greer PA-C

## 2024-07-25 NOTE — PATIENT INSTRUCTIONS
Dynamic cervical XR today.      Provider will review and contact guardian with further recommendations.

## 2024-07-29 ENCOUNTER — DOCUMENTATION ONLY (OUTPATIENT)
Dept: NEUROSURGERY | Facility: CLINIC | Age: 89
End: 2024-07-29
Payer: MEDICARE

## 2024-08-20 ENCOUNTER — DOCUMENTATION ONLY (OUTPATIENT)
Dept: NEUROSURGERY | Facility: CLINIC | Age: 89
End: 2024-08-20
Payer: MEDICARE

## 2024-08-20 DIAGNOSIS — S12.111K: Primary | ICD-10-CM

## 2024-08-29 ENCOUNTER — LAB REQUISITION (OUTPATIENT)
Dept: LAB | Facility: CLINIC | Age: 89
End: 2024-08-29
Payer: MEDICARE

## 2024-08-29 DIAGNOSIS — S12.091D: ICD-10-CM

## 2024-08-29 DIAGNOSIS — S06.5XAD TRAUMATIC SUBDURAL HEMORRHAGE WITH LOSS OF CONSCIOUSNESS STATUS UNKNOWN, SUBSEQUENT ENCOUNTER: ICD-10-CM

## 2024-09-01 ENCOUNTER — LAB REQUISITION (OUTPATIENT)
Dept: LAB | Facility: CLINIC | Age: 89
End: 2024-09-01
Payer: MEDICARE

## 2024-09-01 DIAGNOSIS — S06.5XAD TRAUMATIC SUBDURAL HEMORRHAGE WITH LOSS OF CONSCIOUSNESS STATUS UNKNOWN, SUBSEQUENT ENCOUNTER: ICD-10-CM

## 2024-09-01 DIAGNOSIS — I82.402 ACUTE EMBOLISM AND THROMBOSIS OF UNSPECIFIED DEEP VEINS OF LEFT LOWER EXTREMITY (H): ICD-10-CM

## 2024-09-01 DIAGNOSIS — S12.091D: ICD-10-CM

## 2024-09-03 LAB
ANION GAP SERPL CALCULATED.3IONS-SCNC: 15 MMOL/L (ref 7–15)
BUN SERPL-MCNC: 27.3 MG/DL (ref 8–23)
CALCIUM SERPL-MCNC: 9.7 MG/DL (ref 8.8–10.4)
CHLORIDE SERPL-SCNC: 99 MMOL/L (ref 98–107)
CREAT SERPL-MCNC: 1.04 MG/DL (ref 0.67–1.17)
EGFRCR SERPLBLD CKD-EPI 2021: 67 ML/MIN/1.73M2
GLUCOSE SERPL-MCNC: 122 MG/DL (ref 70–99)
HCO3 SERPL-SCNC: 25 MMOL/L (ref 22–29)
HGB BLD-MCNC: 10.7 G/DL (ref 13.3–17.7)
POTASSIUM SERPL-SCNC: 4.2 MMOL/L (ref 3.4–5.3)
SODIUM SERPL-SCNC: 139 MMOL/L (ref 135–145)
TSH SERPL DL<=0.005 MIU/L-ACNC: 5.18 UIU/ML (ref 0.3–4.2)

## 2024-09-03 PROCEDURE — 36415 COLL VENOUS BLD VENIPUNCTURE: CPT | Mod: ORL | Performed by: FAMILY MEDICINE

## 2024-09-03 PROCEDURE — 85018 HEMOGLOBIN: CPT | Mod: ORL | Performed by: FAMILY MEDICINE

## 2024-09-03 PROCEDURE — 80048 BASIC METABOLIC PNL TOTAL CA: CPT | Mod: ORL | Performed by: FAMILY MEDICINE

## 2024-09-03 PROCEDURE — P9604 ONE-WAY ALLOW PRORATED TRIP: HCPCS | Mod: ORL | Performed by: FAMILY MEDICINE

## 2024-09-03 PROCEDURE — 84443 ASSAY THYROID STIM HORMONE: CPT | Mod: ORL | Performed by: FAMILY MEDICINE

## 2024-12-09 ENCOUNTER — LAB REQUISITION (OUTPATIENT)
Dept: LAB | Facility: CLINIC | Age: 89
End: 2024-12-09
Payer: MEDICARE

## 2024-12-09 DIAGNOSIS — J18.9 PNEUMONIA, UNSPECIFIED ORGANISM: ICD-10-CM

## 2024-12-10 LAB
ANION GAP SERPL CALCULATED.3IONS-SCNC: 10 MMOL/L (ref 7–15)
BUN SERPL-MCNC: 27.6 MG/DL (ref 8–23)
CALCIUM SERPL-MCNC: 9 MG/DL (ref 8.8–10.4)
CHLORIDE SERPL-SCNC: 103 MMOL/L (ref 98–107)
CREAT SERPL-MCNC: 1.14 MG/DL (ref 0.67–1.17)
EGFRCR SERPLBLD CKD-EPI 2021: 60 ML/MIN/1.73M2
GLUCOSE SERPL-MCNC: 83 MG/DL (ref 70–99)
HCO3 SERPL-SCNC: 30 MMOL/L (ref 22–29)
POTASSIUM SERPL-SCNC: 3.8 MMOL/L (ref 3.4–5.3)
SODIUM SERPL-SCNC: 143 MMOL/L (ref 135–145)

## 2024-12-10 PROCEDURE — 80048 BASIC METABOLIC PNL TOTAL CA: CPT | Mod: ORL | Performed by: NURSE PRACTITIONER

## 2024-12-10 PROCEDURE — 36415 COLL VENOUS BLD VENIPUNCTURE: CPT | Mod: ORL | Performed by: NURSE PRACTITIONER

## 2024-12-10 PROCEDURE — P9603 ONE-WAY ALLOW PRORATED MILES: HCPCS | Mod: ORL | Performed by: NURSE PRACTITIONER

## 2024-12-14 ENCOUNTER — LAB REQUISITION (OUTPATIENT)
Dept: LAB | Facility: CLINIC | Age: 89
End: 2024-12-14
Payer: MEDICARE

## 2024-12-14 DIAGNOSIS — I50.30 UNSPECIFIED DIASTOLIC (CONGESTIVE) HEART FAILURE (H): ICD-10-CM

## 2024-12-17 LAB
ANION GAP SERPL CALCULATED.3IONS-SCNC: 10 MMOL/L (ref 7–15)
BUN SERPL-MCNC: 33.1 MG/DL (ref 8–23)
CALCIUM SERPL-MCNC: 9.5 MG/DL (ref 8.8–10.4)
CHLORIDE SERPL-SCNC: 105 MMOL/L (ref 98–107)
CREAT SERPL-MCNC: 1.2 MG/DL (ref 0.67–1.17)
EGFRCR SERPLBLD CKD-EPI 2021: 56 ML/MIN/1.73M2
GLUCOSE SERPL-MCNC: 89 MG/DL (ref 70–99)
HCO3 SERPL-SCNC: 29 MMOL/L (ref 22–29)
POTASSIUM SERPL-SCNC: 4.1 MMOL/L (ref 3.4–5.3)
SODIUM SERPL-SCNC: 144 MMOL/L (ref 135–145)

## 2024-12-23 ENCOUNTER — TELEPHONE (OUTPATIENT)
Dept: NEUROSURGERY | Facility: CLINIC | Age: 89
End: 2024-12-23
Payer: MEDICARE

## 2024-12-23 DIAGNOSIS — S12.111K: Primary | ICD-10-CM

## 2024-12-23 NOTE — TELEPHONE ENCOUNTER
[facility] Call Center    Phone Message    May a detailed message be left on voicemail: yes     Reason for Call: Other: Myriam calling from [facility] and [facility] calling Patient's hard collar is broken. She needs orders for patient to wear broken Hard collar until it can be replaced or orders to  wear his Soft collar contentiously until the Hard collar can be replaced. Please place an order for a new Hard collar. Please review and call Myriam back to discuss if appropriate.      Action Taken: Message routed to:  [facility] ([facility] Neurosurgery    Travel Screening: Not Applicable     Date of Service:

## 2024-12-23 NOTE — TELEPHONE ENCOUNTER
Unable to reach Myriam. Spoke with , Maricel, who then transferred me to Fern, unit supervisor. Per Fern, patient's [place]-J neck piece is cracked. I explained to her that we will not provide orders for patient to wear broken collar or his soft collar. A new orthotics consult was placed for patient to be fitted with new [place]-J. Provided Orthotics direct # for Fern to call and schedule with them STAT. If patient cannot be seen today, informed Fern that they may need to present to the nearest ED.

## 2024-12-30 ENCOUNTER — LAB REQUISITION (OUTPATIENT)
Dept: LAB | Facility: CLINIC | Age: 89
End: 2024-12-30
Payer: MEDICARE

## 2024-12-30 LAB
ALBUMIN UR-MCNC: NEGATIVE MG/DL
APPEARANCE UR: CLEAR
BILIRUB UR QL STRIP: NEGATIVE
COLOR UR AUTO: ABNORMAL
GLUCOSE UR STRIP-MCNC: NEGATIVE MG/DL
HGB UR QL STRIP: ABNORMAL
KETONES UR STRIP-MCNC: NEGATIVE MG/DL
LEUKOCYTE ESTERASE UR QL STRIP: ABNORMAL
MUCOUS THREADS #/AREA URNS LPF: PRESENT /LPF
NITRATE UR QL: NEGATIVE
PH UR STRIP: 6.5 [PH] (ref 5–7)
RBC URINE: 5 /HPF
SP GR UR STRIP: 1.01 (ref 1–1.03)
UROBILINOGEN UR STRIP-MCNC: NORMAL MG/DL
WBC URINE: 12 /HPF

## 2024-12-30 PROCEDURE — 81001 URINALYSIS AUTO W/SCOPE: CPT | Mod: ORL | Performed by: FAMILY MEDICINE

## 2024-12-30 PROCEDURE — 87088 URINE BACTERIA CULTURE: CPT | Mod: ORL | Performed by: FAMILY MEDICINE

## 2025-01-02 LAB
BACTERIA UR CULT: ABNORMAL
BACTERIA UR CULT: ABNORMAL

## 2025-01-04 ENCOUNTER — LAB REQUISITION (OUTPATIENT)
Dept: LAB | Facility: CLINIC | Age: OVER 89
End: 2025-01-04
Payer: MEDICARE

## 2025-01-04 DIAGNOSIS — I50.30 UNSPECIFIED DIASTOLIC (CONGESTIVE) HEART FAILURE (H): ICD-10-CM

## 2025-01-07 LAB
ANION GAP SERPL CALCULATED.3IONS-SCNC: 11 MMOL/L (ref 7–15)
BUN SERPL-MCNC: 23.6 MG/DL (ref 8–23)
CALCIUM SERPL-MCNC: 9.1 MG/DL (ref 8.8–10.4)
CHLORIDE SERPL-SCNC: 106 MMOL/L (ref 98–107)
CREAT SERPL-MCNC: 1.06 MG/DL (ref 0.67–1.17)
EGFRCR SERPLBLD CKD-EPI 2021: 65 ML/MIN/1.73M2
GLUCOSE SERPL-MCNC: 90 MG/DL (ref 70–99)
HCO3 SERPL-SCNC: 28 MMOL/L (ref 22–29)
POTASSIUM SERPL-SCNC: 3.6 MMOL/L (ref 3.4–5.3)
SODIUM SERPL-SCNC: 145 MMOL/L (ref 135–145)

## 2025-01-07 PROCEDURE — 80048 BASIC METABOLIC PNL TOTAL CA: CPT | Mod: ORL | Performed by: FAMILY MEDICINE

## 2025-01-07 PROCEDURE — P9603 ONE-WAY ALLOW PRORATED MILES: HCPCS | Mod: ORL | Performed by: FAMILY MEDICINE

## 2025-01-07 PROCEDURE — 36415 COLL VENOUS BLD VENIPUNCTURE: CPT | Mod: ORL | Performed by: FAMILY MEDICINE

## (undated) DEVICE — Device

## (undated) DEVICE — LINEN TOWEL PACK X5 5464

## (undated) DEVICE — SOL NACL 0.9% IRRIG 3000ML BAG 07972-08

## (undated) DEVICE — CATH FOLEY COUNCIL 20FR 5ML LATEX 0196SI20

## (undated) DEVICE — GUIDEWIRE URO STR STIFF .035"X150CM NITINOL 150NSS35

## (undated) DEVICE — SPONGE RAY-TEC 4X8" 7318

## (undated) DEVICE — SOL WATER IRRIG 1000ML BOTTLE 2F7114

## (undated) DEVICE — PACK CYSTO CUSTOM RIDGES

## (undated) DEVICE — CUP AND LID 2PK 2OZ STERILE  SSK9006A

## (undated) DEVICE — TUBING SUCTION 10'X3/16" N510

## (undated) DEVICE — LINEN HALF SHEET 5512

## (undated) DEVICE — STRAP UNIVERSAL POSITIONING 2-PIECE 4X47X76" 91-287

## (undated) DEVICE — PREP SKIN SCRUB TRAY 4461A

## (undated) DEVICE — SOL NACL 0.9% IRRIG 1000ML BOTTLE 2F7124

## (undated) DEVICE — SU VICRYL 3-0 SH 27" UND J416H

## (undated) DEVICE — PREP SCRUB CARE CHLOROXYLENOL (PCMX) 4OZ 29902-004

## (undated) DEVICE — NDL COUNTER 20CT 31142493

## (undated) DEVICE — PREP POVIDONE IODINE SOLUTION 10% 4OZ BOTTLE 29906-004

## (undated) DEVICE — LINEN FULL SHEET 5511

## (undated) DEVICE — DRAPE MAYO STAND 23X54 8337

## (undated) DEVICE — RAD RX OPTIRAY 320 (50ML) IOVERSOL 68% CHARGE PER ML

## (undated) DEVICE — BASKET NITINOL TIPLESS HALO  1.5FRX120CM 554120

## (undated) DEVICE — TUBE TRACH PERC INTRODUCER CIAGLIA BLUE RHINO 8FR G12115

## (undated) DEVICE — LASER FIBER HOLMIUM FLEXIVA 365UM M0068403920

## (undated) DEVICE — TUBING FLUID WARMER RANGER 24750

## (undated) DEVICE — DRSG DRAIN 4X4" 7086

## (undated) DEVICE — LINEN TOWEL PACK X6 WHITE 5487

## (undated) DEVICE — SOL WATER IRRIG 1000ML BOTTLE 07139-09

## (undated) DEVICE — ESU ELEC BLADE 2.75" COATED/INSULATED E1455

## (undated) DEVICE — ESU PENCIL SMOKE EVAC W/ROCKER SWITCH 0703-047-000

## (undated) DEVICE — SU PDS II 3-0 SH 27" Z316H

## (undated) RX ORDER — FENTANYL CITRATE 50 UG/ML
INJECTION, SOLUTION INTRAMUSCULAR; INTRAVENOUS
Status: DISPENSED
Start: 2022-06-15

## (undated) RX ORDER — BUPIVACAINE HYDROCHLORIDE AND EPINEPHRINE 2.5; 5 MG/ML; UG/ML
INJECTION, SOLUTION EPIDURAL; INFILTRATION; INTRACAUDAL; PERINEURAL
Status: DISPENSED
Start: 2022-09-21

## (undated) RX ORDER — GLYCOPYRROLATE 0.2 MG/ML
INJECTION INTRAMUSCULAR; INTRAVENOUS
Status: DISPENSED
Start: 2022-06-15

## (undated) RX ORDER — LIDOCAINE HYDROCHLORIDE 20 MG/ML
SOLUTION OROPHARYNGEAL
Status: DISPENSED
Start: 2022-09-16

## (undated) RX ORDER — PROPOFOL 10 MG/ML
INJECTION, EMULSION INTRAVENOUS
Status: DISPENSED
Start: 2022-06-15

## (undated) RX ORDER — LIDOCAINE HYDROCHLORIDE 10 MG/ML
INJECTION, SOLUTION EPIDURAL; INFILTRATION; INTRACAUDAL; PERINEURAL
Status: DISPENSED
Start: 2022-06-15

## (undated) RX ORDER — PROPOFOL 10 MG/ML
INJECTION, EMULSION INTRAVENOUS
Status: DISPENSED
Start: 2022-09-21

## (undated) RX ORDER — FENTANYL CITRATE-0.9 % NACL/PF 10 MCG/ML
PLASTIC BAG, INJECTION (ML) INTRAVENOUS
Status: DISPENSED
Start: 2022-06-15

## (undated) RX ORDER — FENTANYL CITRATE 50 UG/ML
INJECTION, SOLUTION INTRAMUSCULAR; INTRAVENOUS
Status: DISPENSED
Start: 2022-09-21

## (undated) RX ORDER — ONDANSETRON 2 MG/ML
INJECTION INTRAMUSCULAR; INTRAVENOUS
Status: DISPENSED
Start: 2022-09-21

## (undated) RX ORDER — DEXAMETHASONE SODIUM PHOSPHATE 4 MG/ML
INJECTION, SOLUTION INTRA-ARTICULAR; INTRALESIONAL; INTRAMUSCULAR; INTRAVENOUS; SOFT TISSUE
Status: DISPENSED
Start: 2022-09-21